# Patient Record
Sex: FEMALE | Race: WHITE | NOT HISPANIC OR LATINO | Employment: OTHER | URBAN - METROPOLITAN AREA
[De-identification: names, ages, dates, MRNs, and addresses within clinical notes are randomized per-mention and may not be internally consistent; named-entity substitution may affect disease eponyms.]

---

## 2017-06-01 DIAGNOSIS — M16.9 OSTEOARTHRITIS OF HIP, UNSPECIFIED LATERALITY, UNSPECIFIED OSTEOARTHRITIS TYPE: ICD-10-CM

## 2017-06-01 DIAGNOSIS — I10 ESSENTIAL HYPERTENSION: ICD-10-CM

## 2017-06-02 NOTE — TELEPHONE ENCOUNTER
lisinopril (PRINIVIL,ZESTRIL) 10 MG tablet,celecoxib (CELEBREX) 200 MG capsule         Last Written Prescription Date:  6/01/2016  Last Fill Quantity: 90,   # refills: 3  Last Office Visit with G, P or [facility] prescribing provider: 12/05/2016  Future Office visit:    Next 5 appointments (look out 90 days)     Jun 21, 2017  9:30 AM CDT   PHYSICAL with Arun Tao MD   [facility]    [address] [place] [address] [id number] [phone]                   Routing refill request to provider for review/approval because:  Medication is reported/discontinued

## 2017-06-06 RX ORDER — LISINOPRIL 10 MG/1
TABLET ORAL
Qty: 90 TABLET | Refills: 0 | Status: SHIPPED | OUTPATIENT
Start: 2017-06-06 | End: 2017-06-21

## 2017-06-06 RX ORDER — CELECOXIB 200 MG/1
CAPSULE ORAL
Qty: 90 CAPSULE | Refills: 0 | Status: SHIPPED | OUTPATIENT
Start: 2017-06-06 | End: 2017-06-21

## 2017-06-21 ENCOUNTER — RADIANT APPOINTMENT (OUTPATIENT)
Dept: MAMMOGRAPHY | Facility: CLINIC | Age: 72
End: 2017-06-21
Attending: INTERNAL MEDICINE
Payer: COMMERCIAL

## 2017-06-21 ENCOUNTER — OFFICE VISIT (OUTPATIENT)
Dept: INTERNAL MEDICINE | Facility: CLINIC | Age: 72
End: 2017-06-21
Payer: COMMERCIAL

## 2017-06-21 VITALS
TEMPERATURE: 98.6 F | DIASTOLIC BLOOD PRESSURE: 80 MMHG | HEART RATE: 74 BPM | HEIGHT: 67 IN | OXYGEN SATURATION: 96 % | BODY MASS INDEX: 39.44 KG/M2 | WEIGHT: 251.3 LBS | SYSTOLIC BLOOD PRESSURE: 128 MMHG

## 2017-06-21 DIAGNOSIS — Z96.642 S/P HIP REPLACEMENT, LEFT: ICD-10-CM

## 2017-06-21 DIAGNOSIS — G89.4 CHRONIC PAIN SYNDROME: ICD-10-CM

## 2017-06-21 DIAGNOSIS — M16.9 OSTEOARTHRITIS OF HIP, UNSPECIFIED LATERALITY, UNSPECIFIED OSTEOARTHRITIS TYPE: ICD-10-CM

## 2017-06-21 DIAGNOSIS — Z23 ENCOUNTER FOR IMMUNIZATION: ICD-10-CM

## 2017-06-21 DIAGNOSIS — I10 ESSENTIAL HYPERTENSION: ICD-10-CM

## 2017-06-21 DIAGNOSIS — Z00.00 MEDICARE ANNUAL WELLNESS VISIT, SUBSEQUENT: Primary | ICD-10-CM

## 2017-06-21 DIAGNOSIS — Z12.11 SCREENING FOR COLON CANCER: ICD-10-CM

## 2017-06-21 DIAGNOSIS — Z12.31 VISIT FOR SCREENING MAMMOGRAM: ICD-10-CM

## 2017-06-21 LAB
ALBUMIN SERPL-MCNC: 4 G/DL (ref 3.4–5)
ALP SERPL-CCNC: 75 U/L (ref 40–150)
ALT SERPL W P-5'-P-CCNC: 37 U/L (ref 0–50)
ANION GAP SERPL CALCULATED.3IONS-SCNC: 8 MMOL/L (ref 3–14)
AST SERPL W P-5'-P-CCNC: 22 U/L (ref 0–45)
BILIRUB SERPL-MCNC: 0.4 MG/DL (ref 0.2–1.3)
BUN SERPL-MCNC: 20 MG/DL (ref 7–30)
CALCIUM SERPL-MCNC: 9.3 MG/DL (ref 8.5–10.1)
CHLORIDE SERPL-SCNC: 106 MMOL/L (ref 94–109)
CHOLEST SERPL-MCNC: 252 MG/DL
CO2 SERPL-SCNC: 26 MMOL/L (ref 20–32)
CREAT SERPL-MCNC: 0.76 MG/DL (ref 0.52–1.04)
GFR SERPL CREATININE-BSD FRML MDRD: 75 ML/MIN/1.7M2
GLUCOSE SERPL-MCNC: 106 MG/DL (ref 70–99)
HDLC SERPL-MCNC: 66 MG/DL
LDLC SERPL CALC-MCNC: 164 MG/DL
NONHDLC SERPL-MCNC: 186 MG/DL
POTASSIUM SERPL-SCNC: 4.3 MMOL/L (ref 3.4–5.3)
PROT SERPL-MCNC: 7.7 G/DL (ref 6.8–8.8)
SODIUM SERPL-SCNC: 140 MMOL/L (ref 133–144)
TRIGL SERPL-MCNC: 112 MG/DL

## 2017-06-21 PROCEDURE — 90471 IMMUNIZATION ADMIN: CPT | Performed by: INTERNAL MEDICINE

## 2017-06-21 PROCEDURE — 80053 COMPREHEN METABOLIC PANEL: CPT | Performed by: INTERNAL MEDICINE

## 2017-06-21 PROCEDURE — 99397 PER PM REEVAL EST PAT 65+ YR: CPT | Mod: 25 | Performed by: INTERNAL MEDICINE

## 2017-06-21 PROCEDURE — 90715 TDAP VACCINE 7 YRS/> IM: CPT | Performed by: INTERNAL MEDICINE

## 2017-06-21 PROCEDURE — 80061 LIPID PANEL: CPT | Performed by: INTERNAL MEDICINE

## 2017-06-21 PROCEDURE — 36415 COLL VENOUS BLD VENIPUNCTURE: CPT | Performed by: INTERNAL MEDICINE

## 2017-06-21 PROCEDURE — G0202 SCR MAMMO BI INCL CAD: HCPCS | Mod: TC

## 2017-06-21 RX ORDER — LISINOPRIL 10 MG/1
10 TABLET ORAL DAILY
Qty: 90 TABLET | Refills: 3 | Status: SHIPPED | OUTPATIENT
Start: 2017-06-21 | End: 2018-06-29

## 2017-06-21 RX ORDER — CELECOXIB 200 MG/1
200 CAPSULE ORAL DAILY
Qty: 90 CAPSULE | Refills: 3 | Status: SHIPPED | OUTPATIENT
Start: 2017-06-21 | End: 2018-06-29

## 2017-06-21 RX ORDER — TRAMADOL HYDROCHLORIDE 50 MG/1
50-100 TABLET ORAL DAILY PRN
Qty: 90 TABLET | Refills: 0 | Status: SHIPPED | OUTPATIENT
Start: 2017-06-21 | End: 2018-02-27

## 2017-06-21 NOTE — PROGRESS NOTES
SUBJECTIVE:                                                            Yohana Marques is a 71 year old female who presents for Preventive Visit.      Are you in the first 12 months of your Medicare Part B coverage?  No    Healthy Habits:    Do you get at least three servings of calcium containing foods daily (dairy, green leafy vegetables, etc.)? yes    Amount of exercise or daily activities, outside of work: As much as she can     Problems taking medications regularly No    Medication side effects: No    Have you had an eye exam in the past two years? No-will be getting one soon     Do you see a dentist twice per year? no    Do you have sleep apnea, excessive snoring or daytime drowsiness?no    COGNITIVE SCREEN  1) Repeat 3 items (Banana, Sunrise, Chair)    2) Clock draw: NORMAL  3) 3 item recall: Recalls 3 objects  Results: 3 items recalled: COGNITIVE IMPAIRMENT LESS LIKELY    Mini-CogTM Copyright JACLYN Jones. Licensed by the author for use in [facility]; reprinted with permission ([email or web address]). All rights reserved.                Reviewed and updated as needed this visit by clinical staff  Tobacco  Allergies         Reviewed and updated as needed this visit by Provider        Social History   Substance Use Topics     Smoking status: Never Smoker     Smokeless tobacco: Never Used     Alcohol use 0.0 oz/week     0 Standard drinks or equivalent per week      Comment: wine, not for last 3 days       The patient does not drink >3 drinks per day nor >7 drinks per week.    Today's PHQ-2 Score:   PHQ-2 ( 1999 Pfizer) 6/1/2016 6/1/2016   Q1: Little interest or pleasure in doing things 0 0   Q2: Feeling down, depressed or hopeless 0 0   PHQ-2 Score 0 0   Q1: Little interest or pleasure in doing things - -   Q2: Feeling down, depressed or hopeless - -   PHQ-2 Score - -       Do you feel safe in your environment - Yes    Do you have a Health Care Directive?: Yes: Advance Directive has been received and  "scanned.    Current providers sharing in care for this patient include:   Patient Care Team:  Arun Tao MD as PCP - General  Venessa Cheung RN as       Hearing impairment: No    Ability to successfully perform activities of daily living: Yes, no assistance needed     Fall risk:       Home safety:  none identified      The following health maintenance items are reviewed in Epic and correct as of today:  Health Maintenance   Topic Date Due     JOSE QUESTIONNAIRE 1 YEAR  1945     PHQ-9 Q1YR  1945     URINE DRUG SCREEN Q1 YR  07/07/1962     ADVANCE DIRECTIVE PLANNING Q5 YRS  04/04/2017     TETANUS Q10 YR  05/02/2017     FALL RISK ASSESSMENT  06/01/2017     MAMMO Q1 YR  06/01/2017     COLONOSCOPY Q10 YR  07/21/2017     INFLUENZA VACCINE (SYSTEM ASSIGNED)  09/01/2017     LIPID SCREEN Q5 YR FEMALE (SYSTEM ASSIGNED)  06/01/2021     DEXA SCAN SCREENING (SYSTEM ASSIGNED)  Completed     PNEUMOCOCCAL  Completed     HEPATITIS C SCREENING  Completed         Pneumonia Vaccine:Adults age 65+ who received Pneumovax (PPSV23) at 65 years or older: Should be given PCV13 > 1 year after their most recent PPSV23     Patient has recovered from her left total hip arthroplasty and her mobility has greatly improved.  She does continue on daily Celebrex, and uses tramadol very judiciously.  She last had a refill of 90 tablets approximately one year ago, which lasted her the entire year.  Requesting a refill today.     Continues on lisinopril monotherapy for hypertension.  Today's blood pressure noted.    ROS:  Constitutional, HEENT, cardiovascular, pulmonary, GI, , musculoskeletal, neuro, skin, endocrine and psych systems are negative, except as otherwise noted.      OBJECTIVE:                                                            /80 (BP Location: Left arm, Patient Position: Chair, Cuff Size: Adult Large)  Pulse 74  Temp 98.6  F (37  C) (Oral)  Ht 5' 7\" (1.702 m)  Wt 251 lb 4.8 oz (114 " "kg)  SpO2 96%  BMI 39.36 kg/m2 Estimated body mass index is 39.36 kg/(m^2) as calculated from the following:    Height as of this encounter: 5' 7\" (1.702 m).    Weight as of this encounter: 251 lb 4.8 oz (114 kg).  EXAM:   GENERAL: healthy, alert and no distress  NECK: no adenopathy, no asymmetry, masses, or scars and thyroid normal to palpation  RESP: lungs clear to auscultation - no rales, rhonchi or wheezes  CV: regular rate and rhythm, normal S1 S2, no S3 or S4, no murmur, click or rub, no peripheral edema and peripheral pulses strong  ABDOMEN: soft, nontender, no hepatosplenomegaly, no masses and bowel sounds normal  MS: no gross musculoskeletal defects noted, no edema    ASSESSMENT / PLAN:                                                            1. Medicare annual wellness visit, subsequent      2. Essential hypertension  Stable, reasonably well-controlled  - lisinopril (PRINIVIL/ZESTRIL) 10 MG tablet; Take 1 tablet (10 mg) by mouth daily  Dispense: 90 tablet; Refill: 3  - Lipid Profile with reflex to direct LDL  - Comprehensive metabolic panel (BMP + Alb, Alk Phos, ALT, AST, Total. Bili, TP)    3. Osteoarthritis of hip, unspecified laterality, unspecified osteoarthritis type    - celecoxib (CELEBREX) 200 MG capsule; Take 1 capsule (200 mg) by mouth daily  Dispense: 90 capsule; Refill: 3    4. S/P hip replacement, left    - traMADol (ULTRAM) 50 MG tablet; Take 1-2 tablets ( mg) by mouth daily as needed for pain  Dispense: 90 tablet; Refill: 0    5. Chronic pain syndrome    - traMADol (ULTRAM) 50 MG tablet; Take 1-2 tablets ( mg) by mouth daily as needed for pain  Dispense: 90 tablet; Refill: 0    6. Screening for colon cancer    - GASTROENTEROLOGY ADULT REF PROCEDURE ONLY    7. Encounter for immunization    - TDAP VACCINE (ADACEL)    End of Life Planning:  Patient currently has an advanced directive: No.  I have verified the patient's ablity to prepare an advanced directive/make health care " "decisions.  Literature was provided to assist patient in preparing an advanced directive.    COUNSELING:  Reviewed preventive health counseling, as reflected in patient instructions        Estimated body mass index is 39.36 kg/(m^2) as calculated from the following:    Height as of this encounter: 5' 7\" (1.702 m).    Weight as of this encounter: 251 lb 4.8 oz (114 kg).  Weight management plan: Discussed healthy diet and exercise guidelines and patient will follow up in 12 months in clinic to re-evaluate.   reports that she has never smoked. She has never used smokeless tobacco.      Appropriate preventive services were discussed with this patient, including applicable screening as appropriate for cardiovascular disease, diabetes, osteopenia/osteoporosis, and glaucoma.  As appropriate for age/gender, discussed screening for colorectal cancer, prostate cancer, breast cancer, and cervical cancer. Checklist reviewing preventive services available has been given to the patient.    Reviewed patients plan of care and provided an AVS. The Basic Care Plan (routine screening as documented in Health Maintenance) for Yohana meets the Care Plan requirement. This Care Plan has been established and reviewed with the Patient.    Counseling Resources:  ATP IV Guidelines  Pooled Cohorts Equation Calculator  Breast Cancer Risk Calculator  FRAX Risk Assessment  ICSI Preventive Guidelines  Dietary Guidelines for Americans, 2010  USDA's MyPlate  ASA Prophylaxis  Lung CA Screening    Arun Tao MD  [facility]  "

## 2017-06-21 NOTE — MR AVS SNAPSHOT
After Visit Summary   6/21/2017    Yohana Marques    MRN: [id number]           Patient Information     Date Of Birth          1945        Visit Information        Provider Department      6/21/2017 9:30 AM Arun Tao MD [facility]        Today's Diagnoses     Medicare annual wellness visit, subsequent    -  1    Essential hypertension        Osteoarthritis of hip, unspecified laterality, unspecified osteoarthritis type        S/P hip replacement, left        Chronic pain syndrome        Screening for colon cancer        Encounter for immunization          Care Instructions      Preventive Health Recommendations  Female Ages 65 +    Yearly exam:     See your health care provider every year in order to  o Review health changes.   o Discuss preventive care.    o Review your medicines if your doctor has prescribed any.      You no longer need a yearly Pap test unless you've had an abnormal Pap test in the past 10 years. If you have vaginal symptoms, such as bleeding or discharge, be sure to talk with your provider about a Pap test.      Every 1 to 2 years, have a mammogram.  If you are over 69, talk with your health care provider about whether or not you want to continue having screening mammograms.      Every 10 years, have a colonoscopy. Or, have a yearly FIT test (stool test). These exams will check for colon cancer.       Have a cholesterol test every 5 years, or more often if your doctor advises it.       Have a diabetes test (fasting glucose) every three years. If you are at risk for diabetes, you should have this test more often.       At age 65, have a bone density scan (DEXA) to check for osteoporosis (brittle bone disease).    Shots:    Get a flu shot each year.    Get a tetanus shot every 10 years.    Talk to your doctor about your pneumonia vaccines. There are now two you should receive - Pneumovax (PPSV 23) and Prevnar (PCV 13).    Talk to your  doctor about the shingles vaccine.    Talk to your doctor about the hepatitis B vaccine.    Nutrition:     Eat at least 5 servings of fruits and vegetables each day.      Eat whole-grain bread, whole-wheat pasta and brown rice instead of white grains and rice.      Talk to your provider about Calcium and Vitamin D.     Lifestyle    Exercise at least 150 minutes a week (30 minutes a day, 5 days a week). This will help you control your weight and prevent disease.      Limit alcohol to one drink per day.      No smoking.       Wear sunscreen to prevent skin cancer.       See your dentist twice a year for an exam and cleaning.      See your eye doctor every 1 to 2 years to screen for conditions such as glaucoma, macular degeneration, cataracts, etc           Follow-ups after your visit        Additional Services     GASTROENTEROLOGY ADULT REF PROCEDURE ONLY       Last Lab Result: Creatinine (mg/dL)       Date                     Value                 12/19/2016               0.66             ----------  Body mass index is 39.36 kg/(m^2).     Needed:  No  Language:  English    Patient will be contacted to schedule procedure.     Please be aware that coverage of these services is subject to the terms and limitations of your health insurance plan.  Call member services at your health plan with any benefit or coverage questions.  Any procedures must be performed at a [place] facility OR coordinated by your clinic's referral office.    Please bring the following with you to your appointment:    (1) Any X-Rays, CTs or MRIs which have been performed.  Contact the facility where they were done to arrange for  prior to your scheduled appointment.    (2) List of current medications   (3) This referral request   (4) Any documents/labs given to you for this referral                  Your next 10 appointments already scheduled     Jun 21, 2017  1:15 PM CDT   MA SCREENING DIGITAL BILATERAL with OXMA1   [place]  [facility] ([facility])    [address] [id number] [phone]           Do not use any powder, lotion or deodorant under your arms or on your breast. If you do, we will ask you to remove it before your exam.  Wear comfortable, two-piece clothing.  If you have any allergies, tell your care team.  Bring any previous mammograms from other facilities or have them mailed to the breast center.              Who to contact     If you have questions or need follow up information about today's clinic visit or your schedule please contact [facility] directly at [phone].  Normal or non-critical lab and imaging results will be communicated to you by Omadahart, letter or phone within 4 business days after the clinic has received the results. If you do not hear from us within 7 days, please contact the clinic through Omadahart or phone. If you have a critical or abnormal lab result, we will notify you by phone as soon as possible.  Submit refill requests through [employer] or call your pharmacy and they will forward the refill request to us. Please allow 3 business days for your refill to be completed.          Additional Information About Your Visit        CommercialTribe Information     [employer] gives you secure access to your electronic health record. If you see a primary care provider, you can also send messages to your care team and make appointments. If you have questions, please call your primary care clinic.  If you do not have a primary care provider, please call [phone] and they will assist you.        Care EveryWhere ID     This is your Care EveryWhere ID. This could be used by other organizations to access your [facility] medical records  [id number]        Your Vitals Were     Pulse Temperature Height Pulse Oximetry BMI (Body Mass Index)       74 98.6  F (37  C) (Oral) 5' 7\" (1.702 m) 96% 39.36 kg/m2        Blood Pressure from Last " 3 Encounters:   06/21/17 128/80   12/20/16 102/60   12/20/16 91/52    Weight from Last 3 Encounters:   06/21/17 251 lb 4.8 oz (114 kg)   12/20/16 251 lb 6.4 oz (114 kg)   12/20/16 251 lb 6.4 oz (114 kg)              We Performed the Following     Comprehensive metabolic panel (BMP + Alb, Alk Phos, ALT, AST, Total. Bili, TP)     GASTROENTEROLOGY ADULT REF PROCEDURE ONLY     Lipid Profile with reflex to direct LDL     TDAP VACCINE (ADACEL)          Today's Medication Changes          These changes are accurate as of: 6/21/17  9:57 AM.  If you have any questions, ask your nurse or doctor.               These medicines have changed or have updated prescriptions.        Dose/Directions    celecoxib 200 MG capsule   Commonly known as:  celeBREX   This may have changed:  See the new instructions.   Used for:  Osteoarthritis of hip, unspecified laterality, unspecified osteoarthritis type   Changed by:  Arun Tao MD        Dose:  200 mg   Take 1 capsule (200 mg) by mouth daily   Quantity:  90 capsule   Refills:  3       lisinopril 10 MG tablet   Commonly known as:  PRINIVIL/ZESTRIL   This may have changed:  See the new instructions.   Used for:  Essential hypertension   Changed by:  Arun Tao MD        Dose:  10 mg   Take 1 tablet (10 mg) by mouth daily   Quantity:  90 tablet   Refills:  3       * TRAMADOL HCL PO   This may have changed:  Another medication with the same name was added. Make sure you understand how and when to take each.   Changed by:  Arun Tao MD        Dose:  50 mg   Take 50 mg by mouth   Refills:  0       * traMADol 50 MG tablet   Commonly known as:  ULTRAM   This may have changed:  You were already taking a medication with the same name, and this prescription was added. Make sure you understand how and when to take each.   Used for:  S/P hip replacement, left, Chronic pain syndrome   Changed by:  Arun Tao MD        Dose:   mg   Take 1-2 tablets (  mg) by mouth daily as needed for pain   Quantity:  90 tablet   Refills:  0       * Notice:  This list has 2 medication(s) that are the same as other medications prescribed for you. Read the directions carefully, and ask your doctor or other care provider to review them with you.      Stop taking these medicines if you haven't already. Please contact your care team if you have questions.     ACETAMINOPHEN PO   Stopped by:  Arun Tao MD           eucerin cream   Stopped by:  Arun Tao MD           OXYCODONE HCL PO   Stopped by:  Arun Tao MD                Where to get your medicines      These medications were sent to [facility] [address] [place], [place] - [address] [facility] [address] [facility] [facility] [address]     Phone:  [phone]     celecoxib 200 MG capsule    lisinopril 10 MG tablet         Some of these will need a paper prescription and others can be bought over the counter.  Ask your nurse if you have questions.     Bring a paper prescription for each of these medications     traMADol 50 MG tablet                Primary Care Provider Office Phone # Fax #    Arun Tao [phone]       [facility] [address]  [facility] [address]        Equal Access to Services     DARREN SILVERIO AH: Hadii kelsie ku hadasho Soomaali, waaxda luqadaha, qaybta kaalmada adeegyada, waxay idiin hayelizabethn alysia paiz. So [facility] [phone].    ATENCIÓN: Si habla español, tiene a haq disposición servicios gratuitos de asistencia lingüística. Llame al [phone].    We comply with applicable federal civil rights laws and [place] laws. We do not discriminate on the basis of race, color, national origin, age, disability sex, sexual orientation or gender identity.            Thank you!     Thank you for choosing [facility]  for your care. Our goal is always to provide you with excellent care.  Hearing back from our patients is one way we can continue to improve our services. Please take a few minutes to complete the written survey that you may receive in the mail after your visit with us. Thank you!             Your Updated Medication List - Protect others around you: Learn how to safely use, store and throw away your medicines at www.disposemymeds.org.          This list is accurate as of: 6/21/17  9:57 AM.  Always use your most recent med list.                   Brand Name Dispense Instructions for use Diagnosis    ASPIRIN EC PO      Take 81 mg by mouth daily        calcium-vitamin D 600-400 MG-UNIT per tablet    CALTRATE     Take 1 tablet by mouth 2 times daily        celecoxib 200 MG capsule    celeBREX    90 capsule    Take 1 capsule (200 mg) by mouth daily    Osteoarthritis of hip, unspecified laterality, unspecified osteoarthritis type       CENTRUM SILVER per tablet      Take 1 tablet by mouth daily        lisinopril 10 MG tablet    PRINIVIL/ZESTRIL    90 tablet    Take 1 tablet (10 mg) by mouth daily    Essential hypertension       * TRAMADOL HCL PO      Take 50 mg by mouth        * traMADol 50 MG tablet    ULTRAM    90 tablet    Take 1-2 tablets ( mg) by mouth daily as needed for pain    S/P hip replacement, left, Chronic pain syndrome       * Notice:  This list has 2 medication(s) that are the same as other medications prescribed for you. Read the directions carefully, and ask your doctor or other care provider to review them with you.

## 2017-06-21 NOTE — NURSING NOTE
"Chief Complaint   Patient presents with     Physical       Initial /80 (BP Location: Left arm, Patient Position: Chair, Cuff Size: Adult Large)  Pulse 74  Temp 98.6  F (37  C) (Oral)  Ht 5' 7\" (1.702 m)  Wt 251 lb 4.8 oz (114 kg)  SpO2 96%  BMI 39.36 kg/m2 Estimated body mass index is 39.36 kg/(m^2) as calculated from the following:    Height as of this encounter: 5' 7\" (1.702 m).    Weight as of this encounter: 251 lb 4.8 oz (114 kg).  Medication Reconciliation: complete    "

## 2017-07-20 ENCOUNTER — HOSPITAL ENCOUNTER (OUTPATIENT)
Facility: CLINIC | Age: 72
Discharge: HOME | End: 2017-07-20
Attending: INTERNAL MEDICINE | Admitting: INTERNAL MEDICINE
Payer: COMMERCIAL

## 2017-07-20 ENCOUNTER — SURGERY (OUTPATIENT)
Age: 72
End: 2017-07-20

## 2017-07-20 VITALS
OXYGEN SATURATION: 93 % | SYSTOLIC BLOOD PRESSURE: 117 MMHG | RESPIRATION RATE: 21 BRPM | DIASTOLIC BLOOD PRESSURE: 77 MMHG

## 2017-07-20 LAB — COLONOSCOPY: NORMAL

## 2017-07-20 PROCEDURE — 88305 TISSUE EXAM BY PATHOLOGIST: CPT | Performed by: INTERNAL MEDICINE

## 2017-07-20 PROCEDURE — 45380 COLONOSCOPY AND BIOPSY: CPT | Performed by: INTERNAL MEDICINE

## 2017-07-20 PROCEDURE — 88305 TISSUE EXAM BY PATHOLOGIST: CPT | Mod: 26 | Performed by: INTERNAL MEDICINE

## 2017-07-20 PROCEDURE — 25000128 H RX IP 250 OP 636: Performed by: INTERNAL MEDICINE

## 2017-07-20 PROCEDURE — G0500 MOD SEDAT ENDO SERVICE >5YRS: HCPCS | Performed by: INTERNAL MEDICINE

## 2017-07-20 RX ORDER — LIDOCAINE 40 MG/G
CREAM TOPICAL
Status: DISCONTINUED | OUTPATIENT
Start: 2017-07-20 | End: 2017-07-20 | Stop reason: HOSPADM

## 2017-07-20 RX ORDER — SODIUM CHLORIDE 9 MG/ML
INJECTION, SOLUTION INTRAVENOUS CONTINUOUS PRN
Status: DISCONTINUED | OUTPATIENT
Start: 2017-07-20 | End: 2017-07-20 | Stop reason: HOSPADM

## 2017-07-20 RX ORDER — FENTANYL CITRATE 50 UG/ML
INJECTION, SOLUTION INTRAMUSCULAR; INTRAVENOUS PRN
Status: DISCONTINUED | OUTPATIENT
Start: 2017-07-20 | End: 2017-07-20 | Stop reason: HOSPADM

## 2017-07-20 RX ORDER — ONDANSETRON 2 MG/ML
4 INJECTION INTRAMUSCULAR; INTRAVENOUS
Status: DISCONTINUED | OUTPATIENT
Start: 2017-07-20 | End: 2017-07-20 | Stop reason: HOSPADM

## 2017-07-20 RX ADMIN — MIDAZOLAM HYDROCHLORIDE 1 MG: 1 INJECTION, SOLUTION INTRAMUSCULAR; INTRAVENOUS at 07:47

## 2017-07-20 RX ADMIN — FENTANYL CITRATE 50 MCG: 50 INJECTION, SOLUTION INTRAMUSCULAR; INTRAVENOUS at 07:52

## 2017-07-20 RX ADMIN — MIDAZOLAM HYDROCHLORIDE 1 MG: 1 INJECTION, SOLUTION INTRAMUSCULAR; INTRAVENOUS at 07:45

## 2017-07-20 RX ADMIN — SODIUM CHLORIDE 500 ML: 9 INJECTION, SOLUTION INTRAVENOUS at 07:27

## 2017-07-20 RX ADMIN — FENTANYL CITRATE 100 MCG: 50 INJECTION, SOLUTION INTRAMUSCULAR; INTRAVENOUS at 07:42

## 2017-07-20 RX ADMIN — MIDAZOLAM HYDROCHLORIDE 1 MG: 1 INJECTION, SOLUTION INTRAMUSCULAR; INTRAVENOUS at 07:52

## 2017-07-20 RX ADMIN — MIDAZOLAM HYDROCHLORIDE 2 MG: 1 INJECTION, SOLUTION INTRAMUSCULAR; INTRAVENOUS at 07:43

## 2017-07-20 NOTE — H&P
[facility]  Pre-Endoscopy History and Physical     Yohana Marques MRN# [id number]   YOB: 1945 Age: 72 year old     Date of Procedure: 7/20/2017  Primary care provider: Arun Tao  Type of Endoscopy: colonoscopy  Reason for Procedure:  Screening colonoscopy  Type of Anesthesia Anticipated: Moderate Sedation    HPI:    Yohana is a 72 year old female who will be undergoing the above procedure.      A history and physical has been performed. The patient's medications and allergies have been reviewed. The risks and benefits of the procedure and the sedation options and risks were discussed with the patient.  All questions were answered and informed consent was obtained.      She denies a personal or family history of anesthesia complications or bleeding disorders.     Allergies   Allergen Reactions     No Known Drug Allergies         Prior to Admission Medications   Prescriptions Last Dose Informant Patient Reported? Taking?   ASPIRIN EC PO 7/19/2017 Self Yes Yes   Sig: Take 81 mg by mouth daily   Multiple Vitamins-Minerals (CENTRUM SILVER) per tablet 7/19/2017 Self Yes Yes   Sig: Take 1 tablet by mouth daily   TRAMADOL HCL PO Past Month  Yes Yes   Sig: Take 50 mg by mouth   calcium-vitamin D (CALTRATE) 600-400 MG-UNIT per tablet 7/19/2017 Self Yes Yes   Sig: Take 1 tablet by mouth 2 times daily   celecoxib (CELEBREX) 200 MG capsule 7/19/2017  No Yes   Sig: Take 1 capsule (200 mg) by mouth daily   lisinopril (PRINIVIL/ZESTRIL) 10 MG tablet 7/19/2017  No Yes   Sig: Take 1 tablet (10 mg) by mouth daily   traMADol (ULTRAM) 50 MG tablet   No No   Sig: Take 1-2 tablets ( mg) by mouth daily as needed for pain      Facility-Administered Medications: None       Patient Active Problem List   Diagnosis     HTN (hypertension)     Osteopenia     HYPERLIPIDEMIA LDL GOAL <160     ACP (advance care planning)     Health Care Home     Spinal stenosis, lumbar region, with neurogenic  claudication     Physical deconditioning     S/P lumbar laminectomy     Chronic pain syndrome     Hip joint replacement status     S/P hip replacement, left     Benign essential hypertension     Primary osteoarthritis of left hip        Past Medical History:   Diagnosis Date     HTN (hypertension)      Hyperlipidemia      Leiomyoma of uterus, unspecified      Numbness and tingling     spinal stenosis causes numbness and tingling on feet and knees bilaterally     Osteoarthrosis, unspecified whether generalized or localized, unspecified site      Other and unspecified disc disorder of lumbar region      Spinal stenosis     S/P diskectomy x 2, most recently 2014        Past Surgical History:   Procedure Laterality Date     ARTHROPLASTY HIP Left 2016    Procedure: ARTHROPLASTY HIP;  Surgeon: Leonid Morfin MD;  Location:  OR     BACK SURGERY       C NONSPECIFIC PROCEDURE  1996    lumbar discectomy     C NONSPECIFIC PROCEDURE      cervical cone biopsy     C NONSPECIFIC PROCEDURE      Left knee replacement     DISCECTOMY LUMBAR POSTERIOR MICROSCOPIC TWO LEVELS  2014    Procedure: DISCECTOMY LUMBAR POSTERIOR MICROSCOPIC TWO LEVELS;  Surgeon: Warren Herring MD;  Location:  OR     ORTHOPEDIC SURGERY         Social History   Substance Use Topics     Smoking status: Never Smoker     Smokeless tobacco: Never Used     Alcohol use 0.0 oz/week     0 Standard drinks or equivalent per week      Comment: social       Family History   Problem Relation Age of Onset     Family History Negative Mother       age 64 mva     CANCER Father       age 84 lung ca     Family History Negative Sister      Breast Cancer Paternal Grandmother      Genetic Disorder Other      daughter has Autoimmune disease       REVIEW OF SYSTEMS:     5 point ROS negative except as noted above in HPI, including Gen., Resp., CV, GI &  system review.      PHYSICAL EXAM:   /82  Resp 16  SpO2 97% Estimated body  "mass index is 39.36 kg/(m^2) as calculated from the following:    Height as of 6/21/17: 1.702 m (5' 7\").    Weight as of 6/21/17: 114 kg (251 lb 4.8 oz).   GENERAL APPEARANCE: healthy, alert and no distress  MENTAL STATUS: alert  AIRWAY EXAM: Mallampatti Class I (visualization of the soft palate, fauces, uvula, anterior and posterior pillars)  RESP: lungs clear to auscultation - no rales, rhonchi or wheezes  CV: normal S1 S2, no S3 or S4      DIAGNOSTICS:    Not indicated      IMPRESSION   ASA Class 1 - Healthy patient, no medical problems        PLAN:       Plan for colonoscopy. We discussed the risks, benefits and alternatives and the patient wished to proceed.    The above has been forwarded to the consulting provider.      Signed Electronically by: Catarino Salas MD  July 20, 2017  "

## 2017-07-24 LAB — COPATH REPORT: NORMAL

## 2018-02-27 DIAGNOSIS — Z96.642 S/P HIP REPLACEMENT, LEFT: ICD-10-CM

## 2018-02-27 DIAGNOSIS — G89.4 CHRONIC PAIN SYNDROME: ICD-10-CM

## 2018-02-27 NOTE — TELEPHONE ENCOUNTER
traMADol (ULTRAM) 50 MG tablet      Last Written Prescription Date:  6/21/2017  Last Fill Quantity: 90,   # refills: 0  Last Office Visit: 6/21/2017  Future Office visit:       Routing refill request to provider for review/approval because:  Drug not on the [facility], [facility] or [facility] refill protocol or controlled substance

## 2018-03-01 RX ORDER — TRAMADOL HYDROCHLORIDE 50 MG/1
TABLET ORAL
Qty: 90 TABLET | Refills: 0 | Status: SHIPPED | OUTPATIENT
Start: 2018-03-01 | End: 2018-06-29

## 2018-06-02 ENCOUNTER — OFFICE VISIT (OUTPATIENT)
Dept: URGENT CARE | Facility: URGENT CARE | Age: 73
End: 2018-06-02
Payer: COMMERCIAL

## 2018-06-02 VITALS
TEMPERATURE: 97.7 F | BODY MASS INDEX: 41.24 KG/M2 | SYSTOLIC BLOOD PRESSURE: 138 MMHG | WEIGHT: 263.3 LBS | RESPIRATION RATE: 20 BRPM | HEART RATE: 74 BPM | DIASTOLIC BLOOD PRESSURE: 84 MMHG

## 2018-06-02 DIAGNOSIS — S70.351A SUPERFICIAL FOREIGN BODY, RIGHT THIGH, INITIAL ENCOUNTER: ICD-10-CM

## 2018-06-02 DIAGNOSIS — S80.852A FOREIGN BODY IN LEFT LOWER EXTREMITY, INITIAL ENCOUNTER: Primary | ICD-10-CM
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
## 2018-06-02 PROCEDURE — 99213 OFFICE O/P EST LOW 20 MIN: CPT | Mod: 25 | Performed by: PHYSICIAN ASSISTANT

## 2018-06-02 PROCEDURE — 10120 INC&RMVL FB SUBQ TISS SMPL: CPT | Mod: 59 | Performed by: PHYSICIAN ASSISTANT

## 2018-06-02 RX ORDER — DOXYCYCLINE 100 MG/1
100 CAPSULE ORAL 2 TIMES DAILY
Qty: 28 CAPSULE | Refills: 0 | Status: SHIPPED | OUTPATIENT
Start: 2018-06-02 | End: 2018-06-16

## 2018-06-02 NOTE — PROGRESS NOTES
SUBJECTIVE:   Yohana Marques is a 72 year old female presenting with a chief complaint of having multiple tick bites with head parts left in the skin.  Onset of symptoms was 2 week(s) ago.  Course of illness is worsening.    Severity moderate  Current and Associated symptoms: noticed red rings around the tick bites  Treatment measures tried include none.  Predisposing factors include tick bites x2 with retained mouth parts.    Past Medical History:   Diagnosis Date     HTN (hypertension)      Hyperlipidemia      Leiomyoma of uterus, unspecified      Numbness and tingling     spinal stenosis causes numbness and tingling on feet and knees bilaterally     Osteoarthrosis, unspecified whether generalized or localized, unspecified site      Other and unspecified disc disorder of lumbar region      Spinal stenosis     S/P diskectomy x 2, most recently 8/2014        Allergies   Allergen Reactions     No Known Drug Allergies          Social History   Substance Use Topics     Smoking status: Never Smoker     Smokeless tobacco: Never Used     Alcohol use 0.0 oz/week     0 Standard drinks or equivalent per week      Comment: social       ROS:  CONSTITUTIONAL:NEGATIVE for fever, chills, change in weight  INTEGUMENTARY/SKIN: POSITIVE for erythematous rings around tick bites  ENT/MOUTH: NEGATIVE for ear, mouth and throat problems  RESP:NEGATIVE for significant cough or SOB  CV: NEGATIVE for chest pain, palpitations or peripheral edema  GI: NEGATIVE for nausea, abdominal pain, heartburn, or change in bowel habits  MUSCULOSKELETAL: Positive for tick bite in right thigh and one in left lower leg  NEURO: NEGATIVE for weakness, dizziness or paresthesias    OBJECTIVE  :/84  Pulse 74  Temp 97.7  F (36.5  C) (Oral)  Resp 20  Wt 263 lb 4.8 oz (119.4 kg)  BMI 41.24 kg/m2  GENERAL APPEARANCE: healthy, alert and no distress  RESP: lungs clear to auscultation - no rales, rhonchi or wheezes  CV: regular rates and rhythm, normal  S1 S2, no murmur noted  ABDOMEN:  soft, nontender, no HSM or masses and bowel sounds normal  Extremities: Positive for erythematous rings on both legs  MS: Positive for retained mouth parts in areas of both tick bites  NEURO: Normal strength and tone, sensory exam grossly normal,  normal speech and mentation  SKIN: Positive for erythema migrans x2    ASSESSMENT/PLAN:    ICD-10-CM    1. Foreign body in left lower extremity, initial encounter S80.852A REMOVE FOREIGN BODY SIMPLE   2. Superficial foreign body, right thigh, initial encounter S70.351A REMOVE FOREIGN BODY SIMPLE   3. Tick bite, initial encounter W57.XXXA doxycycline (VIBRAMYCIN) 100 MG capsule     Orders Placed This Encounter     REMOVE FOREIGN BODY SIMPLE     REMOVE FOREIGN BODY SIMPLE     doxycycline (VIBRAMYCIN) 100 MG capsule       PROCEDURES  #1  Area was prepped with alcohol, 1/2 cc 1% lidocaine with epi was infiltrated under the foreign body.  11 blade was used to make incisions to remove mouth parts from the skin.  Bandage was placed.  Area was left lower leg     #2 Area was prepped with alcohol, 1/2 cc 1% lidocaine with epi was infiltrated under the foreign body.  11 blade was used to make incisions to remove mouth parts from the skin.  Bandage was placed.  Area was right thigh

## 2018-06-02 NOTE — MR AVS SNAPSHOT
After Visit Summary   6/2/2018    Yohana Marques    MRN: [id number]           Patient Information     Date Of Birth          1945        Visit Information        Provider Department      6/2/2018 9:25 AM Naif Aceves PA-C [facility]        Today's Diagnoses     Foreign body in left lower extremity, initial encounter    -  1    Superficial foreign body, right thigh, initial encounter        Tick bite, initial encounter           Follow-ups after your visit        Your next 10 appointments already scheduled     Jun 29, 2018  2:30 PM CDT   [facility] ANNUAL MEDICARE WELLNESS with Arun Tao MD   [facility]    [address] [address] [phone]              Who to contact     If you have questions or need follow up information about today's clinic visit or your schedule please contact [facility] directly at [phone].  Normal or non-critical lab and imaging results will be communicated to you by IntegenXhart, letter or phone within 4 business days after the clinic has received the results. If you do not hear from us within 7 days, please contact the clinic through ChangePanda or phone. If you have a critical or abnormal lab result, we will notify you by phone as soon as possible.  Submit refill requests through ChangePanda or call your pharmacy and they will forward the refill request to us. Please allow 3 business days for your refill to be completed.          Additional Information About Your Visit        IntegenXhart Information     ChangePanda gives you secure access to your electronic health record. If you see a primary care provider, you can also send messages to your care team and make appointments. If you have questions, please call your primary care clinic.  If you do not have a primary care provider, please call [phone] and they will assist  you.        Care EveryWhere ID     This is your Care EveryWhere ID. This could be used by other organizations to access your [place] medical records  [id number]        Your Vitals Were     Pulse Temperature Respirations BMI (Body Mass Index)          74 97.7  F (36.5  C) (Oral) 20 41.24 kg/m2         Blood Pressure from Last 3 Encounters:   06/02/18 138/84   07/20/17 117/77   06/21/17 128/80    Weight from Last 3 Encounters:   06/02/18 263 lb 4.8 oz (119.4 kg)   06/21/17 251 lb 4.8 oz (114 kg)   12/20/16 251 lb 6.4 oz (114 kg)              We Performed the Following     REMOVE FOREIGN BODY SIMPLE     REMOVE FOREIGN BODY SIMPLE          Today's Medication Changes          These changes are accurate as of 6/2/18 10:02 AM.  If you have any questions, ask your nurse or doctor.               Start taking these medicines.        Dose/Directions    doxycycline 100 MG capsule   Commonly known as:  VIBRAMYCIN   Used for:  Tick bite, initial encounter   Started by:  Naif Aceves PA-C        Dose:  100 mg   Take 1 capsule (100 mg) by mouth 2 times daily for 14 days   Quantity:  28 capsule   Refills:  0            Where to get your medicines      These medications were sent to [facility] [place] [id number]     Phone:  [phone]     doxycycline 100 MG capsule                Primary Care Provider Office Phone # Fax #    Arun Tao [phone]       [address] [place] [address]        Equal Access to Services     DARREN SILVERIO AH: Hadii kelsie boldeno Solulu, waaxda luqadaha, qaybta kaalmada maría ching idiburt paiz. So [facility] [phone].    ATENCIÓN: Si habla español, tiene a haq disposición servicios gratuitos de asistencia lingüística. Llame al [phone].    We comply with applicable federal civil rights laws and [place] laws. We do not discriminate on the basis of race, color,  national origin, age, disability, sex, sexual orientation, or gender identity.            Thank you!     Thank you for choosing [facility]  for your care. Our goal is always to provide you with excellent care. Hearing back from our patients is one way we can continue to improve our services. Please take a few minutes to complete the written survey that you may receive in the mail after your visit with us. Thank you!             Your Updated Medication List - Protect others around you: Learn how to safely use, store and throw away your medicines at www.disposemymeds.org.          This list is accurate as of 6/2/18 10:02 AM.  Always use your most recent med list.                   Brand Name Dispense Instructions for use Diagnosis    ASPIRIN EC PO      Take 81 mg by mouth daily        calcium-vitamin D 600-400 MG-UNIT per tablet    CALTRATE     Take 1 tablet by mouth 2 times daily        celecoxib 200 MG capsule    celeBREX    90 capsule    Take 1 capsule (200 mg) by mouth daily    Osteoarthritis of hip, unspecified laterality, unspecified osteoarthritis type       CENTRUM SILVER per tablet      Take 1 tablet by mouth daily        doxycycline 100 MG capsule    VIBRAMYCIN    28 capsule    Take 1 capsule (100 mg) by mouth 2 times daily for 14 days    Tick bite, initial encounter       lisinopril 10 MG tablet    PRINIVIL/ZESTRIL    90 tablet    Take 1 tablet (10 mg) by mouth daily    Essential hypertension       * TRAMADOL HCL PO      Take 50 mg by mouth        * traMADol 50 MG tablet    ULTRAM    90 tablet    TAKE 1-2 TABLETS BY MOUTH DAILY AS NEEDED FOR PAIN    S/P hip replacement, left, Chronic pain syndrome       * Notice:  This list has 2 medication(s) that are the same as other medications prescribed for you. Read the directions carefully, and ask your doctor or other care provider to review them with you.

## 2018-06-29 ENCOUNTER — OFFICE VISIT (OUTPATIENT)
Dept: INTERNAL MEDICINE | Facility: CLINIC | Age: 73
End: 2018-06-29
Payer: COMMERCIAL

## 2018-06-29 VITALS
DIASTOLIC BLOOD PRESSURE: 80 MMHG | HEIGHT: 68 IN | SYSTOLIC BLOOD PRESSURE: 140 MMHG | WEIGHT: 261.1 LBS | TEMPERATURE: 98.1 F | BODY MASS INDEX: 39.57 KG/M2 | HEART RATE: 81 BPM | RESPIRATION RATE: 16 BRPM | OXYGEN SATURATION: 98 %

## 2018-06-29 DIAGNOSIS — Z96.642 S/P HIP REPLACEMENT, LEFT: ICD-10-CM

## 2018-06-29 DIAGNOSIS — I10 ESSENTIAL HYPERTENSION: ICD-10-CM

## 2018-06-29 DIAGNOSIS — Z00.00 MEDICARE ANNUAL WELLNESS VISIT, SUBSEQUENT: Primary | ICD-10-CM

## 2018-06-29 DIAGNOSIS — Z12.31 VISIT FOR SCREENING MAMMOGRAM: ICD-10-CM

## 2018-06-29 DIAGNOSIS — M16.9 OSTEOARTHRITIS OF HIP, UNSPECIFIED LATERALITY, UNSPECIFIED OSTEOARTHRITIS TYPE: ICD-10-CM

## 2018-06-29 DIAGNOSIS — E66.01 MORBID OBESITY (H): ICD-10-CM

## 2018-06-29 DIAGNOSIS — G89.4 CHRONIC PAIN SYNDROME: ICD-10-CM

## 2018-06-29 PROCEDURE — 80061 LIPID PANEL: CPT | Performed by: INTERNAL MEDICINE

## 2018-06-29 PROCEDURE — G0439 PPPS, SUBSEQ VISIT: HCPCS | Performed by: INTERNAL MEDICINE

## 2018-06-29 PROCEDURE — 80053 COMPREHEN METABOLIC PANEL: CPT | Performed by: INTERNAL MEDICINE

## 2018-06-29 PROCEDURE — 36415 COLL VENOUS BLD VENIPUNCTURE: CPT | Performed by: INTERNAL MEDICINE

## 2018-06-29 RX ORDER — CELECOXIB 200 MG/1
200 CAPSULE ORAL DAILY
Qty: 90 CAPSULE | Refills: 3 | Status: SHIPPED | OUTPATIENT
Start: 2018-06-29 | End: 2019-07-03

## 2018-06-29 RX ORDER — LISINOPRIL 10 MG/1
10 TABLET ORAL DAILY
Qty: 90 TABLET | Refills: 3 | Status: SHIPPED | OUTPATIENT
Start: 2018-06-29 | End: 2019-07-03

## 2018-06-29 RX ORDER — TRAMADOL HYDROCHLORIDE 50 MG/1
50-100 TABLET ORAL DAILY PRN
Qty: 90 TABLET | Refills: 0 | Status: SHIPPED | OUTPATIENT
Start: 2018-06-29 | End: 2018-11-20

## 2018-06-29 ASSESSMENT — ANXIETY QUESTIONNAIRES
IF YOU CHECKED OFF ANY PROBLEMS ON THIS QUESTIONNAIRE, HOW DIFFICULT HAVE THESE PROBLEMS MADE IT FOR YOU TO DO YOUR WORK, TAKE CARE OF THINGS AT HOME, OR GET ALONG WITH OTHER PEOPLE: NOT DIFFICULT AT ALL
1. FEELING NERVOUS, ANXIOUS, OR ON EDGE: NOT AT ALL
7. FEELING AFRAID AS IF SOMETHING AWFUL MIGHT HAPPEN: NOT AT ALL
GAD7 TOTAL SCORE: 3
2. NOT BEING ABLE TO STOP OR CONTROL WORRYING: NOT AT ALL
6. BECOMING EASILY ANNOYED OR IRRITABLE: NOT AT ALL
5. BEING SO RESTLESS THAT IT IS HARD TO SIT STILL: SEVERAL DAYS
3. WORRYING TOO MUCH ABOUT DIFFERENT THINGS: SEVERAL DAYS

## 2018-06-29 ASSESSMENT — PATIENT HEALTH QUESTIONNAIRE - PHQ9: 5. POOR APPETITE OR OVEREATING: SEVERAL DAYS

## 2018-06-29 NOTE — MR AVS SNAPSHOT
After Visit Summary   6/29/2018    Yohana Marques    MRN: [id number]           Patient Information     Date Of Birth          1945        Visit Information        Provider Department      6/29/2018 2:30 PM Arun Tao MD [facility]        Today's Diagnoses     Medicare annual wellness visit, subsequent    -  1    Visit for screening mammogram        Osteoarthritis of hip, unspecified laterality, unspecified osteoarthritis type        Essential hypertension        S/P hip replacement, left        Chronic pain syndrome        Morbid obesity (H)          Care Instructions      Preventive Health Recommendations    Female Ages 65 +    Yearly exam:     See your health care provider every year in order to  o Review health changes.   o Discuss preventive care.    o Review your medicines if your doctor has prescribed any.      You no longer need a yearly Pap test unless you've had an abnormal Pap test in the past 10 years. If you have vaginal symptoms, such as bleeding or discharge, be sure to talk with your provider about a Pap test.      Every 1 to 2 years, have a mammogram.  If you are over 69, talk with your health care provider about whether or not you want to continue having screening mammograms.      Every 10 years, have a colonoscopy. Or, have a yearly FIT test (stool test). These exams will check for colon cancer.       Have a cholesterol test every 5 years, or more often if your doctor advises it.       Have a diabetes test (fasting glucose) every three years. If you are at risk for diabetes, you should have this test more often.       At age 65, have a bone density scan (DEXA) to check for osteoporosis (brittle bone disease).    Shots:    Get a flu shot each year.    Get a tetanus shot every 10 years.    Talk to your doctor about your pneumonia vaccines. There are now two you should receive - Pneumovax (PPSV 23) and Prevnar (PCV 13).    Talk to your  pharmacist about the shingles vaccine.    Talk to your doctor about the hepatitis B vaccine.    Nutrition:     Eat at least 5 servings of fruits and vegetables each day.      Eat whole-grain bread, whole-wheat pasta and brown rice instead of white grains and rice.      Get adequate Calcium and Vitamin D.     Lifestyle    Exercise at least 150 minutes a week (30 minutes a day, 5 days a week). This will help you control your weight and prevent disease.      Limit alcohol to one drink per day.      No smoking.       Wear sunscreen to prevent skin cancer.       See your dentist twice a year for an exam and cleaning.      See your eye doctor every 1 to 2 years to screen for conditions such as glaucoma, macular degeneration and cataracts.          Follow-ups after your visit        Who to contact     If you have questions or need follow up information about today's clinic visit or your schedule please contact [facility] directly at [phone].  Normal or non-critical lab and imaging results will be communicated to you by MyChart, letter or phone within 4 business days after the clinic has received the results. If you do not hear from us within 7 days, please contact the clinic through Space-Time Insightt or phone. If you have a critical or abnormal lab result, we will notify you by phone as soon as possible.  Submit refill requests through [employer] or call your pharmacy and they will forward the refill request to us. Please allow 3 business days for your refill to be completed.          Additional Information About Your Visit        ViacorharBNI Video Information     [employer] gives you secure access to your electronic health record. If you see a primary care provider, you can also send messages to your care team and make appointments. If you have questions, please call your primary care clinic.  If you do not have a primary care provider, please call [phone] and they will assist you.        Care EveryWhere ID      "This is your Care EveryWhere ID. This could be used by other organizations to access your [place] medical records  [id number]        Your Vitals Were     Pulse Temperature Respirations Height Pulse Oximetry BMI (Body Mass Index)    81 98.1  F (36.7  C) (Oral) 16 5' 8\" (1.727 m) 98% 39.7 kg/m2       Blood Pressure from Last 3 Encounters:   06/29/18 140/80   06/02/18 138/84   07/20/17 117/77    Weight from Last 3 Encounters:   06/29/18 261 lb 1.6 oz (118.4 kg)   06/02/18 263 lb 4.8 oz (119.4 kg)   06/21/17 251 lb 4.8 oz (114 kg)              We Performed the Following     Comprehensive metabolic panel (BMP + Alb, Alk Phos, ALT, AST, Total. Bili, TP)     Lipid panel reflex to direct LDL Fasting          Today's Medication Changes          These changes are accurate as of 6/29/18 11:59 PM.  If you have any questions, ask your nurse or doctor.               These medicines have changed or have updated prescriptions.        Dose/Directions    traMADol 50 MG tablet   Commonly known as:  ULTRAM   This may have changed:    - See the new instructions.  - Another medication with the same name was removed. Continue taking this medication, and follow the directions you see here.   Used for:  S/P hip replacement, left, Chronic pain syndrome   Changed by:  Arun Tao MD        Dose:   mg   Take 1-2 tablets ( mg) by mouth daily as needed for severe pain   Quantity:  90 tablet   Refills:  0            Where to get your medicines      These medications were sent to [id number] - [place], [place] - [address], [facility] [address]     Phone:  [phone]     celecoxib 200 MG capsule    lisinopril 10 MG tablet         Some of these will need a paper prescription and others can be bought over the counter.  Ask your nurse if you have questions.     Bring a paper prescription for each of these medications     traMADol 50 MG tablet             "   Information about OPIOIDS     PRESCRIPTION OPIOIDS: WHAT YOU NEED TO KNOW   We gave you an opioid (narcotic) pain medicine. It is important to manage your pain, but opioids are not always the best choice. You should first try all the other options your care team gave you. Take this medicine for as short a time (and as few doses) as possible.     These medicines have risks:    DO NOT drive when on new or higher doses of pain medicine. These medicines can affect your alertness and reaction times, and you could be arrested for driving under the influence (DUI). If you need to use opioids long-term, talk to your care team about driving.    DO NOT operate heave machinery    DO NOT do any other dangerous activities while taking these medicines.     DO NOT drink any alcohol while taking these medicines.      If the opioid prescribed includes acetaminophen, DO NOT take with any other medicines that contain acetaminophen. Read all labels carefully. Look for the word  acetaminophen  or  Tylenol.  Ask your pharmacist if you have questions or are unsure.    You can get addicted to pain medicines, especially if you have a history of addiction (chemical, alcohol or substance dependence). Talk to your care team about ways to reduce this risk.    Store your pills in a secure place, locked if possible. We will not replace any lost or stolen medicine. If you don t finish your medicine, please throw away (dispose) as directed by your pharmacist. The [place] Pollution Control Agency has more information about safe disposal: https://www.pca.[facility].[place].us/living-green/managing-unwanted-medications.     All opioids tend to cause constipation. Drink plenty of water and eat foods that have a lot of fiber, such as fruits, vegetables, prune juice, apple juice and high-fiber cereal. Take a laxative (Miralax, milk of magnesia, Colace, Senna) if you don t move your bowels at least every other day.          Primary Care Provider Office Phone #  Fax #    Arun Tao [phone] [address] [address]        Equal Access to Services     DARREN SILVERIO : Hadii aad ku hadstephanie Casaslulu, walulda luqmarlenha, qanadirta kataoda jeane, maría gregoryparadise lozadajyoti reyes laKunalsheron paiz. So [facility] [phone].    ATENCIÓN: Si habla español, tiene a haq disposición servicios gratuitos de asistencia lingüística. Llame al [phone].    We comply with applicable federal civil rights laws and [place] laws. We do not discriminate on the basis of race, color, national origin, age, disability, sex, sexual orientation, or gender identity.            Thank you!     Thank you for choosing [facility]  for your care. Our goal is always to provide you with excellent care. Hearing back from our patients is one way we can continue to improve our services. Please take a few minutes to complete the written survey that you may receive in the mail after your visit with us. Thank you!             Your Updated Medication List - Protect others around you: Learn how to safely use, store and throw away your medicines at www.disposemymeds.org.          This list is accurate as of 6/29/18 11:59 PM.  Always use your most recent med list.                   Brand Name Dispense Instructions for use Diagnosis    ASPIRIN EC PO      Take 81 mg by mouth daily        calcium-vitamin D 600-400 MG-UNIT per tablet    CALTRATE     Take 1 tablet by mouth 2 times daily        celecoxib 200 MG capsule    celeBREX    90 capsule    Take 1 capsule (200 mg) by mouth daily    Osteoarthritis of hip, unspecified laterality, unspecified osteoarthritis type       CENTRUM SILVER per tablet      Take 1 tablet by mouth daily        lisinopril 10 MG tablet    PRINIVIL/ZESTRIL    90 tablet    Take 1 tablet (10 mg) by mouth daily    Essential hypertension       traMADol 50 MG tablet    ULTRAM    90 tablet    Take 1-2 tablets ( mg) by mouth daily as needed  for severe pain    S/P hip replacement, left, Chronic pain syndrome

## 2018-06-29 NOTE — PROGRESS NOTES
SUBJECTIVE:   Yohana Marques is a 72 year old female who presents for Preventive Visit.      Are you in the first 12 months of your Medicare Part B coverage?  No    Healthy Habits:    Do you get at least three servings of calcium containing foods daily (dairy, green leafy vegetables, etc.)? yes    Amount of exercise or daily activities, outside of work: Activities of daily living is for exercise due to stenosis    Problems taking medications regularly No    Medication side effects: No    Have you had an eye exam in the past two years? yes    Do you see a dentist twice per year? no    Do you have sleep apnea, excessive snoring or daytime drowsiness?no      Ability to successfully perform activities of daily living: Yes, no assistance needed    Home safety:  none identified     Hearing impairment: No    Fall risk:  Fallen 2 or more times in the past year?: No  Any fall with injury in the past year?: No        COGNITIVE SCREEN  1) Repeat 3 items (Leader, Season, Table)    2) Clock draw: NORMAL  3) 3 item recall: Recalls 3 objects  Results: 3 items recalled: COGNITIVE IMPAIRMENT LESS LIKELY    Mini-CogTM Copyright JACLYN Jones. Licensed by the author for use in [facility]; reprinted with permission (solaura@[facility]). All rights reserved.                Reviewed and updated as needed this visit by clinical staff  Tobacco  Allergies  Meds  Problems         Reviewed and updated as needed this visit by Provider  Allergies  Meds  Problems        Social History   Substance Use Topics     Smoking status: Never Smoker     Smokeless tobacco: Never Used     Alcohol use 0.0 oz/week     0 Standard drinks or equivalent per week      Comment: social       If you drink alcohol do you typically have >3 drinks per day or >7 drinks per week? No                        Today's PHQ-2 Score:   PHQ-2 ( 1999 [employer]) 6/21/2017 6/1/2016   Q1: Little interest or pleasure in doing things 0 0   Q2: Feeling down, depressed or  "hopeless 0 0   PHQ-2 Score 0 0   Q1: Little interest or pleasure in doing things - -   Q2: Feeling down, depressed or hopeless - -   PHQ-2 Score - -       Do you feel safe in your environment - Yes    Do you have a Health Care Directive?: Yes: Advance Directive has been received and scanned.    Current providers sharing in care for this patient include:   Patient Care Team:  Arun Tao MD as PCP - General  Venessa Cheung RN as     The following health maintenance items are reviewed in Epic and correct as of today:  Health Maintenance   Topic Date Due     URINE DRUG SCREEN Q1 YR  07/07/1962     ADVANCE DIRECTIVE PLANNING Q5 YRS  04/04/2017     INFLUENZA VACCINE (1) 09/01/2018     FALL RISK ASSESSMENT  06/29/2019     JOSE QUESTIONNAIRE 1 YEAR  06/29/2019     PHQ-9 Q1YR  06/29/2019     MAMMO Q1 YR  07/02/2019     LIPID SCREEN Q5 YR FEMALE (SYSTEM ASSIGNED)  06/29/2023     TETANUS Q10 YR  06/21/2027     COLONOSCOPY Q10 YR  07/20/2027     DEXA SCAN SCREENING (SYSTEM ASSIGNED)  Completed     PNEUMOCOCCAL  Completed     HEPATITIS C SCREENING  Completed             ROS:  Constitutional, HEENT, cardiovascular, pulmonary, GI, , musculoskeletal, neuro, skin, endocrine and psych systems are negative, except as otherwise noted.    OBJECTIVE:   /80  Pulse 81  Temp 98.1  F (36.7  C) (Oral)  Resp 16  Ht 5' 8\" (1.727 m)  Wt 261 lb 1.6 oz (118.4 kg)  SpO2 98%  BMI 39.7 kg/m2 Estimated body mass index is 39.7 kg/(m^2) as calculated from the following:    Height as of this encounter: 5' 8\" (1.727 m).    Weight as of this encounter: 261 lb 1.6 oz (118.4 kg).  EXAM:   GENERAL: healthy, alert and no distress  NECK: no adenopathy, no asymmetry, masses, or scars and thyroid normal to palpation  RESP: lungs clear to auscultation - no rales, rhonchi or wheezes  CV: regular rate and rhythm, normal S1 S2, no S3 or S4, no murmur, click or rub, no peripheral edema and peripheral pulses strong  ABDOMEN: " "soft, nontender, no hepatosplenomegaly, no masses and bowel sounds normal  MS: no gross musculoskeletal defects noted, no edema        ASSESSMENT / PLAN:   1. Medicare annual wellness visit, subsequent      2. Visit for screening mammogram      3. Osteoarthritis of hip, unspecified laterality, unspecified osteoarthritis type    - celecoxib (CELEBREX) 200 MG capsule; Take 1 capsule (200 mg) by mouth daily  Dispense: 90 capsule; Refill: 3    4. Essential hypertension    - lisinopril (PRINIVIL/ZESTRIL) 10 MG tablet; Take 1 tablet (10 mg) by mouth daily  Dispense: 90 tablet; Refill: 3  - Lipid panel reflex to direct LDL Fasting  - Comprehensive metabolic panel (BMP + Alb, Alk Phos, ALT, AST, Total. Bili, TP)    5. S/P hip replacement, left    - traMADol (ULTRAM) 50 MG tablet; Take 1-2 tablets ( mg) by mouth daily as needed for severe pain  Dispense: 90 tablet; Refill: 0    6. Chronic pain syndrome    - traMADol (ULTRAM) 50 MG tablet; Take 1-2 tablets ( mg) by mouth daily as needed for severe pain  Dispense: 90 tablet; Refill: 0    7. Morbid obesity (H)        End of Life Planning:  Patient currently has an advanced directive: No.  I have verified the patient's ablity to prepare an advanced directive/make health care decisions.  Literature was provided to assist patient in preparing an advanced directive.    COUNSELING:  Reviewed preventive health counseling, as reflected in patient instructions    BP Readings from Last 1 Encounters:   06/29/18 140/80     Estimated body mass index is 39.7 kg/(m^2) as calculated from the following:    Height as of this encounter: 5' 8\" (1.727 m).    Weight as of this encounter: 261 lb 1.6 oz (118.4 kg).      Weight management plan: Discussed healthy diet and exercise guidelines and patient will follow up in 12 months in clinic to re-evaluate.     reports that she has never smoked. She has never used smokeless tobacco.      Appropriate preventive services were discussed with " this patient, including applicable screening as appropriate for cardiovascular disease, diabetes, osteopenia/osteoporosis, and glaucoma.  As appropriate for age/gender, discussed screening for colorectal cancer, prostate cancer, breast cancer, and cervical cancer. Checklist reviewing preventive services available has been given to the patient.    Reviewed patients plan of care and provided an AVS. The Basic Care Plan (routine screening as documented in Health Maintenance) for Yohana meets the Care Plan requirement. This Care Plan has been established and reviewed with the Patient.    Counseling Resources:  ATP IV Guidelines  Pooled Cohorts Equation Calculator  Breast Cancer Risk Calculator  FRAX Risk Assessment  ICSI Preventive Guidelines  Dietary Guidelines for Americans, 2010  USDA's MyPlate  ASA Prophylaxis  Lung CA Screening    Arun Tao MD  [facility]

## 2018-06-30 LAB
ALBUMIN SERPL-MCNC: 4.2 G/DL (ref 3.4–5)
ALP SERPL-CCNC: 64 U/L (ref 40–150)
ALT SERPL W P-5'-P-CCNC: 62 U/L (ref 0–50)
ANION GAP SERPL CALCULATED.3IONS-SCNC: 10 MMOL/L (ref 3–14)
AST SERPL W P-5'-P-CCNC: 44 U/L (ref 0–45)
BILIRUB SERPL-MCNC: 0.5 MG/DL (ref 0.2–1.3)
BUN SERPL-MCNC: 16 MG/DL (ref 7–30)
CALCIUM SERPL-MCNC: 9.3 MG/DL (ref 8.5–10.1)
CHLORIDE SERPL-SCNC: 106 MMOL/L (ref 94–109)
CHOLEST SERPL-MCNC: 216 MG/DL
CO2 SERPL-SCNC: 24 MMOL/L (ref 20–32)
CREAT SERPL-MCNC: 0.7 MG/DL (ref 0.52–1.04)
GFR SERPL CREATININE-BSD FRML MDRD: 83 ML/MIN/1.7M2
GLUCOSE SERPL-MCNC: 89 MG/DL (ref 70–99)
HDLC SERPL-MCNC: 56 MG/DL
LDLC SERPL CALC-MCNC: 124 MG/DL
NONHDLC SERPL-MCNC: 160 MG/DL
POTASSIUM SERPL-SCNC: 4 MMOL/L (ref 3.4–5.3)
PROT SERPL-MCNC: 8.1 G/DL (ref 6.8–8.8)
SODIUM SERPL-SCNC: 140 MMOL/L (ref 133–144)
TRIGL SERPL-MCNC: 182 MG/DL

## 2018-06-30 ASSESSMENT — ANXIETY QUESTIONNAIRES: GAD7 TOTAL SCORE: 3

## 2018-06-30 ASSESSMENT — PATIENT HEALTH QUESTIONNAIRE - PHQ9: SUM OF ALL RESPONSES TO PHQ QUESTIONS 1-9: 4

## 2018-07-02 ENCOUNTER — RADIANT APPOINTMENT (OUTPATIENT)
Dept: MAMMOGRAPHY | Facility: CLINIC | Age: 73
End: 2018-07-02
Attending: INTERNAL MEDICINE
Payer: COMMERCIAL

## 2018-07-02 DIAGNOSIS — Z12.31 VISIT FOR SCREENING MAMMOGRAM: ICD-10-CM

## 2018-07-02 PROCEDURE — 77067 SCR MAMMO BI INCL CAD: CPT | Mod: TC

## 2018-08-14 ENCOUNTER — OFFICE VISIT (OUTPATIENT)
Dept: DERMATOLOGY | Facility: CLINIC | Age: 73
End: 2018-08-14
Payer: COMMERCIAL

## 2018-08-14 VITALS — SYSTOLIC BLOOD PRESSURE: 139 MMHG | DIASTOLIC BLOOD PRESSURE: 75 MMHG | HEART RATE: 78 BPM | OXYGEN SATURATION: 93 %

## 2018-08-14 DIAGNOSIS — D48.5 NEOPLASM OF UNCERTAIN BEHAVIOR OF SKIN: Primary | ICD-10-CM

## 2018-08-14 DIAGNOSIS — B07.8 PERIUNGUAL WART: ICD-10-CM

## 2018-08-14 PROCEDURE — 11100 HC BIOPSY SKIN/SUBQ/MUC MEM, SINGLE LESION: CPT | Performed by: PHYSICIAN ASSISTANT

## 2018-08-14 PROCEDURE — 99213 OFFICE O/P EST LOW 20 MIN: CPT | Mod: 25 | Performed by: PHYSICIAN ASSISTANT

## 2018-08-14 PROCEDURE — 88305 TISSUE EXAM BY PATHOLOGIST: CPT | Mod: TC | Performed by: PHYSICIAN ASSISTANT

## 2018-08-14 NOTE — PROGRESS NOTES
HPI:   Yohana Marques is a 73 year old female who presents for evaluation of a spot on the chin  chief complaint  Location: inferior chin   Condition present for:  Awhile - seems to be growing.   Previous treatments include: none    Review Of Systems  Eyes: negative  Ears/Nose/Throat: negative  Respiratory: No shortness of breath, dyspnea on exertion, cough, or hemoptysis  Cardiovascular: negative  Gastrointestinal: negative  Genitourinary: negative  Musculoskeletal: negative  Neurologic: negative  Psychiatric: negative        PHYSICAL EXAM:    /75  Pulse 78  SpO2 93%  Breastfeeding? No  Skin exam performed as follows: Type 2 skin. Mood appropriate  Alert and Oriented X 3. Well developed, well nourished in no distress.  General appearance: Normal  Head including face: Normal  Eyes: conjunctiva and lids: Normal  Mouth: Lips, teeth, gums: Normal  Neck: Normal  Chest-breast/axillae: Normal  Back: Normal  Spleen and liver: Normal  Cardiovascular: Exam of peripheral vascular system by observation for swelling, varicosities, edema: Normal  Genitalia: groin, buttocks: Normal  Extremities: digits/nails (clubbing): Normal  Eccrine and Apocrine glands: Normal  Right upper extremity: Normal  Left upper extremity: Normal  Right lower extremity: Normal  Left lower extremity: Normal  Skin: Scalp and body hair: See below    1. 6 mm verrucous papule on the inferior chin  2. Verrucous plaque on right 2nd finger    ASSESSMENT/PLAN:     1. Wart r/o other on the inferior chin. Shave bx in typical fashion .  Area cleaned with betadyne and anesthetized with 1% lidocaine with epi .  Dermablade used to remove the lesion and sent to pathology. Bleeding was cauterized. Pt tolerated procedure well.  2. Periungual wart on the right 2nd digit - would like to start with OTC treatments.           Follow-up: PRN  CC:   Scribed By: Loraine Solomon, MS, PA-C

## 2018-08-14 NOTE — MR AVS SNAPSHOT
After Visit Summary   8/14/2018    Yohana Marques    MRN: [id number]           Patient Information     Date Of Birth          1945        Visit Information        Provider Department      8/14/2018 9:00 AM Loraine Solomon PA-C [facility]        Today's Diagnoses     Neoplasm of uncertain behavior of skin    -  1      Care Instructions          Wound Care Instructions     FOR SUPERFICIAL WOUNDS     [facility] [phone]    [facility] [phone]                       AFTER 24 HOURS YOU SHOULD REMOVE THE BANDAGE AND BEGIN DAILY DRESSING CHANGES AS FOLLOWS:     1) Remove Dressing.     2) Clean and dry the area with tap water using a Q-tip or sterile gauze pad.     3) Apply Vaseline, Aquaphor, Polysporin ointment or Bacitracin ointment over entire wound.  Do NOT use Neosporin ointment.     4) Cover the wound with a band-aid, or a sterile non-stick gauze pad and micropore paper tape      REPEAT THESE INSTRUCTIONS AT LEAST ONCE A DAY UNTIL THE WOUND HAS COMPLETELY HEALED.    It is an old wives tale that a wound heals better when it is exposed to air and allowed to dry out. The wound will heal faster with a better cosmetic result if it is kept moist with ointment and covered with a bandage.    **Do not let the wound dry out.**      Supplies Needed:      *Cotton tipped applicators (Q-tips)    *Polysporin Ointment or Bacitracin Ointment (NOT NEOSPORIN)    *Band-aids or non-stick gauze pads and micropore paper tape.      PATIENT INFORMATION:    During the healing process you will notice a number of changes. All wounds develop a small halo of redness surrounding the wound.  This means healing is occurring. Severe itching with extensive redness usually indicates sensitivity to the ointment or bandage tape used to dress the wound.  You should call our office if this develops.      Swelling  and/or discoloration around your surgical site is common,  particularly when performed around the eye.    All wounds normally drain.  The larger the wound the more drainage there will be.  After 7-10 days, you will notice the wound beginning to shrink and new skin will begin to grow.  The wound is healed when you can see skin has formed over the entire area.  A healed wound has a healthy, shiny look to the surface and is red to dark pink in color to normalize.  Wounds may take approximately 4-6 weeks to heal.  Larger wounds may take 6-8 weeks.  After the wound is healed you may discontinue dressing changes.    You may experience a sensation of tightness as your wound heals. This is normal and will gradually subside.    Your healed wound may be sensitive to temperature changes. This sensitivity improves with time, but if you re having a lot of discomfort, try to avoid temperature extremes.    Patients frequently experience itching after their wound appears to have healed because of the continue healing under the skin.  Plain Vaseline will help relieve the itching.        POSSIBLE COMPLICATIONS    BLEEDIN. Leave the bandage in place.  2. Use tightly rolled up gauze or a cloth to apply direct pressure over the bandage for 30  minutes.  3. Reapply pressure for an additional 30 minutes if necessary  4. Use additional gauze and tape to maintain pressure once the bleeding has stopped.          Follow-ups after your visit        Who to contact     If you have questions or need follow up information about today's clinic visit or your schedule please contact [facility] directly at [phone].  Normal or non-critical lab and imaging results will be communicated to you by MyChart, letter or phone within 4 business days after the clinic has received the results. If you do not hear from us within 7 days, please contact the clinic through MyChart or phone. If you have a critical or abnormal lab result, we will notify you by phone as soon as  possible.  Submit refill requests through [employer] or call your pharmacy and they will forward the refill request to us. Please allow 3 business days for your refill to be completed.          Additional Information About Your Visit        [employer] Information     [employer] gives you secure access to your electronic health record. If you see a primary care provider, you can also send messages to your care team and make appointments. If you have questions, please call your primary care clinic.  If you do not have a primary care provider, please call [phone] and they will assist you.        Care EveryWhere ID     This is your Care EveryWhere ID. This could be used by other organizations to access your [facility] medical records  [id number]        Your Vitals Were     Pulse Pulse Oximetry Breastfeeding?             78 93% No          Blood Pressure from Last 3 Encounters:   08/14/18 139/75   06/29/18 140/80   06/02/18 138/84    Weight from Last 3 Encounters:   06/29/18 118.4 kg (261 lb 1.6 oz)   06/02/18 119.4 kg (263 lb 4.8 oz)   06/21/17 114 kg (251 lb 4.8 oz)              We Performed the Following     BIOPSY SKIN/SUBQ/MUC MEM, SINGLE LESION     Dermatological path order and indications        Primary Care Provider Office Phone # Fax #    Arun Tao [phone]       [address] [place] [address]        Equal Access to Services     DARREN SILVERIO : Hadii aad ku hadasho Soomaali, waaxda luqadaha, qaybta kaalmada adejyotiyada, maría paiz. So [facility] [phone].    ATENCIÓN: Si habla español, tiene a haq disposición servicios gratuitos de asistencia lingüística. Candace al [phone].    We comply with applicable federal civil rights laws and [place] laws. We do not discriminate on the basis of race, color, national origin, age, disability, sex, sexual orientation, or gender identity.            Thank you!     Thank you for choosing [facility]  [facility]  for your care. Our goal is always to provide you with excellent care. Hearing back from our patients is one way we can continue to improve our services. Please take a few minutes to complete the written survey that you may receive in the mail after your visit with us. Thank you!             Your Updated Medication List - Protect others around you: Learn how to safely use, store and throw away your medicines at www.disposemymeds.org.          This list is accurate as of 8/14/18  9:12 AM.  Always use your most recent med list.                   Brand Name Dispense Instructions for use Diagnosis    ASPIRIN EC PO      Take 81 mg by mouth daily        calcium-vitamin D 600-400 MG-UNIT per tablet    CALTRATE     Take 1 tablet by mouth 2 times daily        celecoxib 200 MG capsule    celeBREX    90 capsule    Take 1 capsule (200 mg) by mouth daily    Osteoarthritis of hip, unspecified laterality, unspecified osteoarthritis type       CENTRUM SILVER per tablet      Take 1 tablet by mouth daily        lisinopril 10 MG tablet    PRINIVIL/ZESTRIL    90 tablet    Take 1 tablet (10 mg) by mouth daily    Essential hypertension       traMADol 50 MG tablet    ULTRAM    90 tablet    Take 1-2 tablets ( mg) by mouth daily as needed for severe pain    S/P hip replacement, left, Chronic pain syndrome

## 2018-08-14 NOTE — PATIENT INSTRUCTIONS
Wound Care Instructions     FOR SUPERFICIAL WOUNDS     [facility] [phone]    [facility] [phone]                       AFTER 24 HOURS YOU SHOULD REMOVE THE BANDAGE AND BEGIN DAILY DRESSING CHANGES AS FOLLOWS:     1) Remove Dressing.     2) Clean and dry the area with tap water using a Q-tip or sterile gauze pad.     3) Apply Vaseline, Aquaphor, Polysporin ointment or Bacitracin ointment over entire wound.  Do NOT use Neosporin ointment.     4) Cover the wound with a band-aid, or a sterile non-stick gauze pad and micropore paper tape      REPEAT THESE INSTRUCTIONS AT LEAST ONCE A DAY UNTIL THE WOUND HAS COMPLETELY HEALED.    It is an old wives tale that a wound heals better when it is exposed to air and allowed to dry out. The wound will heal faster with a better cosmetic result if it is kept moist with ointment and covered with a bandage.    **Do not let the wound dry out.**      Supplies Needed:      *Cotton tipped applicators (Q-tips)    *Polysporin Ointment or Bacitracin Ointment (NOT NEOSPORIN)    *Band-aids or non-stick gauze pads and micropore paper tape.      PATIENT INFORMATION:    During the healing process you will notice a number of changes. All wounds develop a small halo of redness surrounding the wound.  This means healing is occurring. Severe itching with extensive redness usually indicates sensitivity to the ointment or bandage tape used to dress the wound.  You should call our office if this develops.      Swelling  and/or discoloration around your surgical site is common, particularly when performed around the eye.    All wounds normally drain.  The larger the wound the more drainage there will be.  After 7-10 days, you will notice the wound beginning to shrink and new skin will begin to grow.  The wound is healed when you can see skin has formed over the entire area.  A healed wound has a healthy, shiny look to the surface and is red to dark pink in color  to normalize.  Wounds may take approximately 4-6 weeks to heal.  Larger wounds may take 6-8 weeks.  After the wound is healed you may discontinue dressing changes.    You may experience a sensation of tightness as your wound heals. This is normal and will gradually subside.    Your healed wound may be sensitive to temperature changes. This sensitivity improves with time, but if you re having a lot of discomfort, try to avoid temperature extremes.    Patients frequently experience itching after their wound appears to have healed because of the continue healing under the skin.  Plain Vaseline will help relieve the itching.        POSSIBLE COMPLICATIONS    BLEEDIN. Leave the bandage in place.  2. Use tightly rolled up gauze or a cloth to apply direct pressure over the bandage for 30  minutes.  3. Reapply pressure for an additional 30 minutes if necessary  4. Use additional gauze and tape to maintain pressure once the bleeding has stopped.

## 2018-08-14 NOTE — LETTER
8/14/2018         RE: Yohana Marques  [address] [address] [place] [id number]        Dear Colleague,    Thank you for referring your patient, Yohana Marques, to the [facility]. Please see a copy of my visit note below.    HPI:   Yohana Marques is a 73 year old female who presents for evaluation of a spot on the chin  chief complaint  Location: inferior chin   Condition present for:  Awhile - seems to be growing.   Previous treatments include: none    Review Of Systems  Eyes: negative  Ears/Nose/Throat: negative  Respiratory: No shortness of breath, dyspnea on exertion, cough, or hemoptysis  Cardiovascular: negative  Gastrointestinal: negative  Genitourinary: negative  Musculoskeletal: negative  Neurologic: negative  Psychiatric: negative        PHYSICAL EXAM:    /75  Pulse 78  SpO2 93%  Breastfeeding? No  Skin exam performed as follows: Type 2 skin. Mood appropriate  Alert and Oriented X 3. Well developed, well nourished in no distress.  General appearance: Normal  Head including face: Normal  Eyes: conjunctiva and lids: Normal  Mouth: Lips, teeth, gums: Normal  Neck: Normal  Chest-breast/axillae: Normal  Back: Normal  Spleen and liver: Normal  Cardiovascular: Exam of peripheral vascular system by observation for swelling, varicosities, edema: Normal  Genitalia: groin, buttocks: Normal  Extremities: digits/nails (clubbing): Normal  Eccrine and Apocrine glands: Normal  Right upper extremity: Normal  Left upper extremity: Normal  Right lower extremity: Normal  Left lower extremity: Normal  Skin: Scalp and body hair: See below    1. 6 mm verrucous papule on the inferior chin  2. Verrucous plaque on right 2nd finger    ASSESSMENT/PLAN:     1. Wart r/o other on the inferior chin. Shave bx in typical fashion .  Area cleaned with betadyne and anesthetized with 1% lidocaine with epi .  Dermablade used to remove the lesion and sent to pathology. Bleeding was cauterized. Pt  tolerated procedure well.  2. Periungual wart on the right 2nd digit - would like to start with OTC treatments.           Follow-up: PRN  CC:   Scribed By: Loraine Solomon MS, CHERRI      Again, thank you for allowing me to participate in the care of your patient.        Sincerely,        Loraine Solomon PA-C

## 2018-08-20 LAB — COPATH REPORT: NORMAL

## 2018-11-20 DIAGNOSIS — G89.4 CHRONIC PAIN SYNDROME: ICD-10-CM

## 2018-11-20 DIAGNOSIS — Z96.642 S/P HIP REPLACEMENT, LEFT: ICD-10-CM

## 2018-11-20 RX ORDER — TRAMADOL HYDROCHLORIDE 50 MG/1
TABLET ORAL
Qty: 90 TABLET | Refills: 0 | Status: SHIPPED | OUTPATIENT
Start: 2018-11-20 | End: 2019-05-14

## 2018-11-20 NOTE — TELEPHONE ENCOUNTER
Controlled Substance Refill Request for tramadol    Last refill: 6/29/18    Last clinic visit: 6/29/18     Clinic visit frequency required: Q 6  months  Next appt:     Controlled substance agreement on file: Yes:  Date see chart.    Documentation in problem list reviewed:  Yes    Processing:  mail to specified pharmacy     RX [place] ([place] reviewed: when reviewed  no medications showed up on the patients file.    [place] profile:  [email or web address]

## 2018-11-20 NOTE — TELEPHONE ENCOUNTER
Approved, at [facility].    Patient informed Additional Views of Left Breast along with US are needed. She will call [facility] Patient Scheduling.   Ryan Mckeon, 01/16/18, 2:36 PM

## 2019-05-14 DIAGNOSIS — Z96.642 S/P HIP REPLACEMENT, LEFT: ICD-10-CM

## 2019-05-14 DIAGNOSIS — G89.4 CHRONIC PAIN SYNDROME: ICD-10-CM

## 2019-05-15 RX ORDER — TRAMADOL HYDROCHLORIDE 50 MG/1
TABLET ORAL
Qty: 90 TABLET | Refills: 0 | Status: SHIPPED | OUTPATIENT
Start: 2019-05-15 | End: 2019-05-24

## 2019-05-15 NOTE — TELEPHONE ENCOUNTER
Requested Prescriptions   Pending Prescriptions Disp Refills     traMADol (ULTRAM) 50 MG tablet [Pharmacy Med Name: TRAMADOL 50MG TABLETS] 90 tablet 0     Sig: TAKE 1-2 TABLETS BY MOUTH DAILY AS NEEDED FOR SEVERE PAIN       There is no refill protocol information for this order        Last Written Prescription Date:  11/20/18  Last Fill Quantity: 90,  # refills: 0   Last office visit: 6/29/2018 with prescribing provider:  6/29/18   Future Office Visit:

## 2019-05-16 ENCOUNTER — TELEPHONE (OUTPATIENT)
Dept: INTERNAL MEDICINE | Facility: CLINIC | Age: 74
End: 2019-05-16

## 2019-05-16 DIAGNOSIS — Z96.642 S/P HIP REPLACEMENT, LEFT: ICD-10-CM

## 2019-05-16 DIAGNOSIS — G89.4 CHRONIC PAIN SYNDROME: ICD-10-CM

## 2019-05-16 NOTE — TELEPHONE ENCOUNTER
Prior Authorization Retail Medication Request    Medication/DosetraMADol (ULTRAM) 50 MG tablet:   ICD code (if different than what is on RX):  Z96.642  Previously Tried and Failed:    Rationale:      Insurance Name:  [facility]  Insurance ID:  [id number]  Please call plan at [phone]    Pharmacy Information (if different than what is on RX)  Name:  [facility]  Phone:  [phone]

## 2019-05-20 NOTE — TELEPHONE ENCOUNTER
PA Initiation    Medication: traMADol (ULTRAM) 50 MG tablet  Insurance Company: [employer] - Phone [phone] Fax [phone]  Pharmacy Filling the Rx: [id number] - [place], [place] -  [address]  Filling Pharmacy Phone: [phone]  Filling Pharmacy Fax:    Start Date: 5/20/2019    Central Prior Authorization Team   Phone: [phone]        Awaiting additional questions via CMM

## 2019-05-21 NOTE — TELEPHONE ENCOUNTER
Pharmacy is in need of new script showing that pt can get a 7 day supply at a time due to insurance guidelines. Please see message below. (hopefully I am reading this correctly). Samantha Mitchell on 5/21/2019 at 10:29 AM

## 2019-05-21 NOTE — TELEPHONE ENCOUNTER
Prior Authorization Not Needed per Insurance    Medication: traMADol (ULTRAM) 50 MG tablet  Insurance Company: [employer] - Phone [phone] Fax [phone]  Pharmacy Filling the Rx: [facility] [address] [place] [address] - [address]  Pharmacy Notified: Yes    Called [facility] to check make sure medication did go through patient's insurance, per pharmacist they did sell patient a weeks worth on 05/17/19, this plan has the limit of only 7 days first fill for all opioids, pharmacy would need a new script as the remainder of the script is void. Routing back to clinic

## 2019-05-24 RX ORDER — TRAMADOL HYDROCHLORIDE 50 MG/1
TABLET ORAL
Qty: 90 TABLET | Refills: 0 | Status: SHIPPED | OUTPATIENT
Start: 2019-05-24 | End: 2019-09-13

## 2019-07-03 ENCOUNTER — OFFICE VISIT (OUTPATIENT)
Dept: INTERNAL MEDICINE | Facility: CLINIC | Age: 74
End: 2019-07-03
Payer: COMMERCIAL

## 2019-07-03 VITALS
WEIGHT: 248.2 LBS | TEMPERATURE: 98.2 F | DIASTOLIC BLOOD PRESSURE: 78 MMHG | OXYGEN SATURATION: 98 % | RESPIRATION RATE: 16 BRPM | BODY MASS INDEX: 37.62 KG/M2 | HEART RATE: 80 BPM | HEIGHT: 68 IN | SYSTOLIC BLOOD PRESSURE: 136 MMHG

## 2019-07-03 DIAGNOSIS — E78.5 HYPERLIPIDEMIA LDL GOAL <160: ICD-10-CM

## 2019-07-03 DIAGNOSIS — Z12.31 VISIT FOR SCREENING MAMMOGRAM: ICD-10-CM

## 2019-07-03 DIAGNOSIS — M16.9 OSTEOARTHRITIS OF HIP, UNSPECIFIED LATERALITY, UNSPECIFIED OSTEOARTHRITIS TYPE: ICD-10-CM

## 2019-07-03 DIAGNOSIS — E66.01 MORBID OBESITY (H): ICD-10-CM

## 2019-07-03 DIAGNOSIS — I10 ESSENTIAL HYPERTENSION: ICD-10-CM

## 2019-07-03 DIAGNOSIS — Z00.00 MEDICARE ANNUAL WELLNESS VISIT, SUBSEQUENT: Primary | ICD-10-CM

## 2019-07-03 LAB
ALBUMIN SERPL-MCNC: 4 G/DL (ref 3.4–5)
ALP SERPL-CCNC: 68 U/L (ref 40–150)
ALT SERPL W P-5'-P-CCNC: 48 U/L (ref 0–50)
ANION GAP SERPL CALCULATED.3IONS-SCNC: 5 MMOL/L (ref 3–14)
AST SERPL W P-5'-P-CCNC: 31 U/L (ref 0–45)
BILIRUB SERPL-MCNC: 0.5 MG/DL (ref 0.2–1.3)
BUN SERPL-MCNC: 13 MG/DL (ref 7–30)
CALCIUM SERPL-MCNC: 9.2 MG/DL (ref 8.5–10.1)
CHLORIDE SERPL-SCNC: 104 MMOL/L (ref 94–109)
CHOLEST SERPL-MCNC: 213 MG/DL
CO2 SERPL-SCNC: 28 MMOL/L (ref 20–32)
CREAT SERPL-MCNC: 0.72 MG/DL (ref 0.52–1.04)
GFR SERPL CREATININE-BSD FRML MDRD: 84 ML/MIN/{1.73_M2}
GLUCOSE SERPL-MCNC: 103 MG/DL (ref 70–99)
HDLC SERPL-MCNC: 64 MG/DL
LDLC SERPL CALC-MCNC: 118 MG/DL
NONHDLC SERPL-MCNC: 149 MG/DL
POTASSIUM SERPL-SCNC: 4.2 MMOL/L (ref 3.4–5.3)
PROT SERPL-MCNC: 7.6 G/DL (ref 6.8–8.8)
SODIUM SERPL-SCNC: 137 MMOL/L (ref 133–144)
TRIGL SERPL-MCNC: 156 MG/DL

## 2019-07-03 PROCEDURE — 99397 PER PM REEVAL EST PAT 65+ YR: CPT | Performed by: INTERNAL MEDICINE

## 2019-07-03 PROCEDURE — 77067 SCR MAMMO BI INCL CAD: CPT | Mod: TC

## 2019-07-03 PROCEDURE — 80053 COMPREHEN METABOLIC PANEL: CPT | Performed by: INTERNAL MEDICINE

## 2019-07-03 PROCEDURE — 36415 COLL VENOUS BLD VENIPUNCTURE: CPT | Performed by: INTERNAL MEDICINE

## 2019-07-03 PROCEDURE — 99214 OFFICE O/P EST MOD 30 MIN: CPT | Mod: 25 | Performed by: INTERNAL MEDICINE

## 2019-07-03 PROCEDURE — 80061 LIPID PANEL: CPT | Performed by: INTERNAL MEDICINE

## 2019-07-03 RX ORDER — CELECOXIB 200 MG/1
200 CAPSULE ORAL DAILY
Qty: 90 CAPSULE | Refills: 3 | Status: SHIPPED | OUTPATIENT
Start: 2019-07-03 | End: 2020-09-11

## 2019-07-03 RX ORDER — LISINOPRIL 10 MG/1
10 TABLET ORAL DAILY
Qty: 90 TABLET | Refills: 3 | Status: SHIPPED | OUTPATIENT
Start: 2019-07-03 | End: 2020-09-11

## 2019-07-03 ASSESSMENT — PATIENT HEALTH QUESTIONNAIRE - PHQ9
SUM OF ALL RESPONSES TO PHQ QUESTIONS 1-9: 0
5. POOR APPETITE OR OVEREATING: NOT AT ALL

## 2019-07-03 ASSESSMENT — ANXIETY QUESTIONNAIRES
5. BEING SO RESTLESS THAT IT IS HARD TO SIT STILL: NOT AT ALL
1. FEELING NERVOUS, ANXIOUS, OR ON EDGE: NOT AT ALL
3. WORRYING TOO MUCH ABOUT DIFFERENT THINGS: NOT AT ALL
IF YOU CHECKED OFF ANY PROBLEMS ON THIS QUESTIONNAIRE, HOW DIFFICULT HAVE THESE PROBLEMS MADE IT FOR YOU TO DO YOUR WORK, TAKE CARE OF THINGS AT HOME, OR GET ALONG WITH OTHER PEOPLE: NOT DIFFICULT AT ALL
GAD7 TOTAL SCORE: 0
6. BECOMING EASILY ANNOYED OR IRRITABLE: NOT AT ALL
2. NOT BEING ABLE TO STOP OR CONTROL WORRYING: NOT AT ALL
7. FEELING AFRAID AS IF SOMETHING AWFUL MIGHT HAPPEN: NOT AT ALL

## 2019-07-03 ASSESSMENT — ACTIVITIES OF DAILY LIVING (ADL): CURRENT_FUNCTION: NO ASSISTANCE NEEDED

## 2019-07-03 ASSESSMENT — MIFFLIN-ST. JEOR: SCORE: 1689.33

## 2019-07-03 NOTE — PROGRESS NOTES
"SUBJECTIVE:   Yohana Marques is a 73 year old female who presents for Preventive Visit.      Are you in the first 12 months of your Medicare coverage?  No    Healthy Habits:     In general, how would you rate your overall health?  Good    Frequency of exercise:  None    Do you usually eat at least 4 servings of fruit and vegetables a day, include whole grains    & fiber and avoid regularly eating high fat or \"junk\" foods?  Yes    Taking medications regularly:  Yes    Medication side effects:  None    Ability to successfully perform activities of daily living:  No assistance needed    Home Safety:  No safety concerns identified    Hearing Impairment:  No hearing concerns    In the past 6 months, have you been bothered by leaking of urine? Yes    In general, how would you rate your overall mental or emotional health?  Good      PHQ-2 Total Score: 0    Additional concerns today:  No  Question 1.  In the last 12 months: We worried food would run out before we had money to buy more. Never True    Question 2.  In the last 12 months: The food we bought just didn't last and we didn't have money to buy more. Never True    Did the patient answer Sometimes True or Often True to EITHER Question 1 or Question 2? No      Do you feel safe in your environment? Yes    Do you have a Health Care Directive? No: Advance care planning reviewed with patient; information given to patient to review.      Fall risk  Fallen 2 or more times in the past year?: No  Any fall with injury in the past year?: No    Cognitive Screening   1) Repeat 3 items (Leader, Season, Table)    2) Clock draw: NORMAL  3) 3 item recall: Recalls 3 objects  Results: 3 items recalled: COGNITIVE IMPAIRMENT LESS LIKELY    Mini-CogTM Copyright JACLYN Jones. Licensed by the author for use in [facility]; reprinted with permission ([email or web address].[email or web address]). All rights reserved.      Do you have sleep apnea, excessive snoring or daytime drowsiness?: no    Reviewed and " updated as needed this visit by clinical staff  Tobacco  Allergies  Meds  Problems  Med Hx  Surg Hx  Fam Hx         Reviewed and updated as needed this visit by Provider  Tobacco  Allergies  Meds  Problems  Med Hx  Surg Hx  Fam Hx        Social History     Tobacco Use     Smoking status: Never Smoker     Smokeless tobacco: Never Used   Substance Use Topics     Alcohol use: Yes     Alcohol/week: 0.0 oz     Comment: social     If you drink alcohol do you typically have >3 drinks per day or >7 drinks per week? No    Alcohol Use 7/3/2019   Prescreen: >3 drinks/day or >7 drinks/week? No   Prescreen: >3 drinks/day or >7 drinks/week? -               Current providers sharing in care for this patient include:   Patient Care Team:  Arun Tao MD as PCP - Venessa Bernard RN as   Arun Tao MD as Assigned PCP    The following health maintenance items are reviewed in Epic and correct as of today:  Health Maintenance   Topic Date Due     URINE DRUG SCREEN  1945     ZOSTER IMMUNIZATION (2 of 3) 07/01/2013     ADVANCE CARE PLANNING  04/04/2017     FALL RISK ASSESSMENT  06/29/2019     MEDICARE ANNUAL WELLNESS VISIT  06/29/2019     JOSE ASSESSMENT  06/29/2019     PHQ-9  06/29/2019     MAMMO SCREENING  07/02/2019     INFLUENZA VACCINE (1) 09/01/2019     LIPID  06/29/2023     DTAP/TDAP/TD IMMUNIZATION (3 - Td) 06/21/2027     COLONOSCOPY  07/20/2027     DEXA  Completed     HEPATITIS C SCREENING  Completed     IPV IMMUNIZATION  Aged Out     MENINGITIS IMMUNIZATION  Aged Out         Patient continues on lisinopril therapy for blood pressure control.  Today's blood pressure noted.    Continues on stable dose of tramadol chronically for chronic pain.      Review of Systems  Constitutional, HEENT, cardiovascular, pulmonary, GI, , musculoskeletal, neuro, skin, endocrine and psych systems are negative, except as otherwise noted.    OBJECTIVE:   /78 (BP Location: Left arm,  "Patient Position: Chair, Cuff Size: Adult Large)   Pulse 80   Temp 98.2  F (36.8  C) (Oral)   Resp 16   Ht 1.727 m (5' 8\")   Wt 112.6 kg (248 lb 3.2 oz)   SpO2 98%   BMI 37.74 kg/m   Estimated body mass index is 37.74 kg/m  as calculated from the following:    Height as of this encounter: 1.727 m (5' 8\").    Weight as of this encounter: 112.6 kg (248 lb 3.2 oz).  Physical Exam  GENERAL: healthy, alert and no distress  NECK: no adenopathy, no asymmetry, masses, or scars and thyroid normal to palpation  RESP: lungs clear to auscultation - no rales, rhonchi or wheezes  CV: regular rate and rhythm, normal S1 S2, no S3 or S4, no murmur, click or rub, no peripheral edema and peripheral pulses strong  ABDOMEN: soft, nontender, no hepatosplenomegaly, no masses and bowel sounds normal  MS: no gross musculoskeletal defects noted, no edema        ASSESSMENT / PLAN:   1. Medicare annual wellness visit, subsequent      2. Morbid obesity (H)  Reiterated the importance of dietary modification and weight loss.    3. Essential hypertension  Stable, reasonably well controlled  - lisinopril (PRINIVIL/ZESTRIL) 10 MG tablet; Take 1 tablet (10 mg) by mouth daily  Dispense: 90 tablet; Refill: 3    4. Hyperlipidemia LDL goal <160  Recheck surveillance labs today  - Lipid panel reflex to direct LDL Fasting  - Comprehensive metabolic panel (BMP + Alb, Alk Phos, ALT, AST, Total. Bili, TP)    5. Osteoarthritis of hip, unspecified laterality, unspecified osteoarthritis type  Continue Celebrex once daily, has been quite effective.  - celecoxib (CELEBREX) 200 MG capsule; Take 1 capsule (200 mg) by mouth daily  Dispense: 90 capsule; Refill: 3    End of Life Planning:  Patient currently has an advanced directive: Yes.  Practitioner is supportive of decision.    COUNSELING:  Reviewed preventive health counseling, as reflected in patient instructions    Estimated body mass index is 37.74 kg/m  as calculated from the following:    Height as of " "this encounter: 1.727 m (5' 8\").    Weight as of this encounter: 112.6 kg (248 lb 3.2 oz).    Weight management plan: Discussed healthy diet and exercise guidelines     reports that she has never smoked. She has never used smokeless tobacco.      Appropriate preventive services were discussed with this patient, including applicable screening as appropriate for cardiovascular disease, diabetes, osteopenia/osteoporosis, and glaucoma.  As appropriate for age/gender, discussed screening for colorectal cancer, prostate cancer, breast cancer, and cervical cancer. Checklist reviewing preventive services available has been given to the patient.    Reviewed patients plan of care and provided an AVS. The Basic Care Plan (routine screening as documented in Health Maintenance) for Yohana meets the Care Plan requirement. This Care Plan has been established and reviewed with the Patient.    Counseling Resources:  ATP IV Guidelines  Pooled Cohorts Equation Calculator  Breast Cancer Risk Calculator  FRAX Risk Assessment  ICSI Preventive Guidelines  Dietary Guidelines for Americans, 2010  USDA's MyPlate  ASA Prophylaxis  Lung CA Screening    Arun Tao MD  [facility]    "

## 2019-07-04 ASSESSMENT — ANXIETY QUESTIONNAIRES: GAD7 TOTAL SCORE: 0

## 2019-09-13 DIAGNOSIS — G89.4 CHRONIC PAIN SYNDROME: ICD-10-CM

## 2019-09-13 DIAGNOSIS — Z96.642 S/P HIP REPLACEMENT, LEFT: ICD-10-CM

## 2019-09-14 NOTE — TELEPHONE ENCOUNTER
Requested Prescriptions   Pending Prescriptions Disp Refills     traMADol (ULTRAM) 50 MG tablet [Pharmacy Med Name: TRAMADOL 50MG TABLETS] 90 tablet 0     Sig: TAKE 1 TO 2 TABLETS BY MOUTH DAILY AS NEEDED FOR SEVERE PAIN       There is no refill protocol information for this order        Controlled Substance Refill Request for tramadol  Problem List Complete:  No     PROVIDER TO CONSIDER COMPLETION OF PROBLEM LIST AND OVERVIEW/CONTROLLED SUBSTANCE AGREEMENT    Last Written Prescription Date:  5/24/2019  Last Fill Quantity: 90,   # refills: 0    THE MOST RECENT OFFICE VISIT MUST BE WITHIN THE PAST 3 MONTHS. AT LEAST ONE FACE TO FACE VISIT MUST OCCUR EVERY 6 MONTHS. ADDITIONAL VISITS CAN BE VIRTUAL.  (THIS STATEMENT SHOULD BE DELETED.)    Last Office Visit with [facility] primary care provider: 7/3/2019    Future Office visit:     Controlled substance agreement:   Encounter-Level CSA - 06/01/2016:    Controlled Substance Agreement - Scan on 6/6/2016 11:34 AM: CONTROLLED SUBSTANCE AGREEMENT 6.1.16 (below)       Patient-Level CSA:    There are no patient-level csa.         Last Urine Drug Screen: No results found for: CDAUT, No results found for: COMDAT, No results found for: THC13, PCP13, COC13, MAMP13, OPI13, AMP13, BZO13, TCA13, MTD13, BAR13, OXY13, PPX13, BUP13     Processing:  Patient will  in clinic     [email or web address]       checked in past 3 months?  No, route to RN

## 2019-09-24 RX ORDER — TRAMADOL HYDROCHLORIDE 50 MG/1
TABLET ORAL
Qty: 90 TABLET | Refills: 0 | Status: SHIPPED | OUTPATIENT
Start: 2019-09-24 | End: 2020-03-16

## 2019-09-25 NOTE — TELEPHONE ENCOUNTER
Unable to reach a person on the phone (held too long) so left a voicemail message with the new prescription's details (if needed) and asked them to call us back if there are any further questions or concerns.  RUSTAM Collier LPN

## 2019-09-26 ENCOUNTER — TELEPHONE (OUTPATIENT)
Dept: INTERNAL MEDICINE | Facility: CLINIC | Age: 74
End: 2019-09-26

## 2019-09-26 NOTE — TELEPHONE ENCOUNTER
Prior Authorization Retail Medication Request    Medication/Dose: traMADol (ULTRAM) 50 MG tablet  ICD code (if different than what is on RX):  Z96.642,G89.4  Previously Tried and Failed:    Rationale:      Insurance Name:  [facility]  Insurance ID:  [id number]      Pharmacy Information (if different than what is on RX)  Name:  [facility]  Phone:  [phone]

## 2019-09-27 NOTE — TELEPHONE ENCOUNTER
Central Prior Authorization Team  Phone: [phone]    PA Initiation    Medication: traMADol (ULTRAM) 50 MG tablet  Insurance Company: [employer] - Phone [phone] Fax [phone]  Pharmacy Filling the Rx: [id number] - [facility] [address]  Filling Pharmacy Phone: [phone]  Filling Pharmacy Fax:    Start Date: 9/27/2019    Initiated pa over the phone. Pa case #[id number]

## 2019-09-27 NOTE — TELEPHONE ENCOUNTER
Prior Authorization Approval    Authorization Effective Date: 8/28/2019  Authorization Expiration Date: 9/26/2020  Medication: traMADol (ULTRAM) 50 MG tablet- APPROVED   Approved Dose/Quantity:   Reference #:     Insurance Company: [employer] - Phone [phone] Fax [phone]  Expected CoPay:       CoPay Card Available:      Foundation Assistance Needed:    Which Pharmacy is filling the prescription (Not needed for infusion/clinic administered): [id number] - [place], [place] - [address]  Pharmacy Notified: Yes  Patient Notified: Comment:  **Instructed pharmacy to notify patient when script is ready to /ship.**

## 2019-11-25 ENCOUNTER — OFFICE VISIT (OUTPATIENT)
Dept: INTERNAL MEDICINE | Facility: CLINIC | Age: 74
End: 2019-11-25
Payer: COMMERCIAL

## 2019-11-25 VITALS
TEMPERATURE: 98.7 F | SYSTOLIC BLOOD PRESSURE: 120 MMHG | RESPIRATION RATE: 16 BRPM | WEIGHT: 246 LBS | DIASTOLIC BLOOD PRESSURE: 80 MMHG | OXYGEN SATURATION: 96 % | BODY MASS INDEX: 37.4 KG/M2 | HEART RATE: 76 BPM

## 2019-11-25 DIAGNOSIS — Z01.818 PREOP GENERAL PHYSICAL EXAM: Primary | ICD-10-CM

## 2019-11-25 DIAGNOSIS — H26.9 CATARACT OF BOTH EYES, UNSPECIFIED CATARACT TYPE: ICD-10-CM

## 2019-11-25 DIAGNOSIS — I10 BENIGN ESSENTIAL HYPERTENSION: ICD-10-CM

## 2019-11-25 PROCEDURE — 99214 OFFICE O/P EST MOD 30 MIN: CPT | Performed by: INTERNAL MEDICINE

## 2019-11-25 NOTE — PROGRESS NOTES
[address] [id number] [phone]  Dept: [phone]    PRE-OP EVALUATION:  Today's date: 2019    Yohana Marques (: 1945) presents for pre-operative evaluation assessment as requested by Dr. EUCEDA.  She requires evaluation and anesthesia risk assessment prior to undergoing surgery/procedure for treatment of Cataract 19 left eye, and Cataract right eye 19 .    Fax number for surgical facility: [phone]  Primary Physician: Arun Tao  Type of Anesthesia Anticipated: Local with MAC    Patient has a Health Care Directive or Living Will:  NO    Preop Questions 2019   Who is doing your surgery? Dr Lyle Euceda MD   What are you having done? Cataract both eyes   Date of Surgery/Procedure:  and 2019   Facility or Hospital where procedure/surgery will be performed: 0600   1.  Do you have a history of Heart attack, stroke, stent, coronary bypass surgery, or other heart surgery? No   2.  Do you ever have any pain or discomfort in your chest? No   3.  Do you have a history of  Heart Failure? No   4.   Are you troubled by shortness of breath when:  walking on a level surface, or up a slight hill, or at night? No   5.  Do you currently have a cold, bronchitis or other respiratory infection? No   6.  Do you have a cough, shortness of breath, or wheezing? No   7.  Do you sometimes get pains in the calves of your legs when you walk? YES -    8. Do you or anyone in your family have previous history of blood clots? No   9.  Do you or does anyone in your family have a serious bleeding problem such as prolonged bleeding following surgeries or cuts? No   10. Have you ever had problems with anemia or been told to take iron pills? No   11. Have you had any abnormal blood loss such as black, tarry or bloody stools, or abnormal vaginal bleeding? No   12. Have you ever had a blood transfusion? No   13. Have you or any  of your relatives ever had problems with anesthesia? No   14. Do you have sleep apnea, excessive snoring or daytime drowsiness? No   15. Do you have any prosthetic heart valves? No   16. Do you have prosthetic joints? YES -    17. Is there any chance that you may be pregnant? No         HPI:       HYPERTENSION - Patient has longstanding history of HTN , currently denies any symptoms referable to elevated blood pressure. Specifically denies chest pain, palpitations, dyspnea, orthopnea, PND or peripheral edema. Blood pressure readings have been in normal range. Current medication regimen is as listed below. Patient denies any side effects of medication.     No known history of coronary artery disease.  She had nuclear stress test in 2012 that was perfectly normal.  She denies any chest pain or reduction in exertional capacity.      MEDICAL HISTORY:     Patient Active Problem List    Diagnosis Date Noted     ACP (advance care planning) 04/04/2012     Priority: High     Discussed advance care planning with patient; information given to patient to review.4/4/2012   Facilitator called patient and mailed information re: Honoring choices  Will await call back regarding meeting with facilitator. JOVANA Jordan RN  4/25/2012          Morbid obesity (H) 06/29/2018     Priority: Medium     S/P hip replacement, left 12/18/2016     Priority: Medium     Benign essential hypertension 12/18/2016     Priority: Medium     Primary osteoarthritis of left hip 12/18/2016     Priority: Medium     Hip joint replacement status 12/12/2016     Priority: Medium     Chronic pain syndrome 06/01/2016     Priority: Medium     Patient is followed by [facility] for ongoing prescription of pain medication.  All refills should be approved by this provider, or covering partner.    Medication(s): Tramadol.   Maximum quantity per month: 40  Clinic visit frequency required: Q 6  months     Controlled substance agreement on file: Yes       Date(s):  6/1/2016    Pain Clinic evaluation in the past: No    DIRE Total Score(s):  No flowsheet data found.    Last [facility] website verification:  done on 6/1/2016   [email or web address]         S/P lumbar laminectomy 08/29/2014     Priority: Medium     Physical deconditioning 08/15/2014     Priority: Medium     Spinal stenosis, lumbar region, with neurogenic claudication 08/11/2014     Priority: Medium     --S/P discectomy x 2, most recently 2-level laminectomy/discectomy 8/2014       Health Care Home 07/20/2012     Priority: Medium     FPA [facility] for .  Criselda Wood RN-BC    [phone]     DX V65.8 REPLACED WITH [id number] HEALTH CARE HOME (04/08/2013)       HYPERLIPIDEMIA LDL GOAL <160 10/31/2010     Priority: Medium     Osteopenia 04/07/2010     Priority: Medium     HTN (hypertension)      Priority: Medium      Past Medical History:   Diagnosis Date     HTN (hypertension)      Hyperlipidemia      Leiomyoma of uterus, unspecified      Numbness and tingling     spinal stenosis causes numbness and tingling on feet and knees bilaterally     Osteoarthrosis, unspecified whether generalized or localized, unspecified site      Other and unspecified disc disorder of lumbar region      Spinal stenosis     S/P diskectomy x 2, most recently 8/2014     Past Surgical History:   Procedure Laterality Date     ARTHROPLASTY HIP Left 12/12/2016    Procedure: ARTHROPLASTY HIP;  Surgeon: Leonid Morfin MD;  Location:  OR     BACK SURGERY       C NONSPECIFIC PROCEDURE  1996    lumbar discectomy     C NONSPECIFIC PROCEDURE      cervical cone biopsy     C NONSPECIFIC PROCEDURE      Left knee replacement     COLONOSCOPY N/A 7/20/2017    Procedure: COMBINED COLONOSCOPY, SINGLE OR MULTIPLE BIOPSY/POLYPECTOMY BY BIOPSY;  colonoscopy;  Surgeon: Catarino Salas MD;  Location:  GI     DISCECTOMY LUMBAR POSTERIOR MICROSCOPIC TWO LEVELS  8/11/2014    Procedure: DISCECTOMY LUMBAR POSTERIOR MICROSCOPIC TWO  LEVELS;  Surgeon: Warren Herring MD;  Location:  OR     ORTHOPEDIC SURGERY       Current Outpatient Medications   Medication Sig Dispense Refill     ASPIRIN EC PO Take 81 mg by mouth daily       calcium-vitamin D (CALTRATE) 600-400 MG-UNIT per tablet Take 1 tablet by mouth 2 times daily       celecoxib (CELEBREX) 200 MG capsule Take 1 capsule (200 mg) by mouth daily 90 capsule 3     lisinopril (PRINIVIL/ZESTRIL) 10 MG tablet Take 1 tablet (10 mg) by mouth daily 90 tablet 3     Multiple Vitamins-Minerals (CENTRUM SILVER) per tablet Take 1 tablet by mouth daily       traMADol (ULTRAM) 50 MG tablet TAKE 1 TO 2 TABLETS BY MOUTH DAILY AS NEEDED FOR SEVERE PAIN 90 tablet 0     OTC products: Aspirin    No Known Allergies   Latex Allergy: NO    Social History     Tobacco Use     Smoking status: Never Smoker     Smokeless tobacco: Never Used   Substance Use Topics     Alcohol use: Yes     Alcohol/week: 0.0 standard drinks     Comment: social     History   Drug Use No       REVIEW OF SYSTEMS:   CONSTITUTIONAL: NEGATIVE for fever, chills, change in weight  ENT/MOUTH: NEGATIVE for ear, mouth and throat problems  RESP: NEGATIVE for significant cough or SOB  CV: NEGATIVE for chest pain, palpitations or peripheral edema    EXAM:   /80 (BP Location: Left arm, Patient Position: Chair, Cuff Size: Adult Large)   Pulse 76   Temp 98.7  F (37.1  C) (Oral)   Resp 16   Wt 111.6 kg (246 lb)   SpO2 96%   BMI 37.40 kg/m    GENERAL APPEARANCE: healthy, alert and no distress  HENT: ear canals and TM's normal and nose and mouth without ulcers or lesions  RESP: lungs clear to auscultation - no rales, rhonchi or wheezes  CV: regular rate and rhythm, normal S1 S2, no S3 or S4 and no murmur, click or rub   ABDOMEN: soft, nontender, no HSM or masses and bowel sounds normal  NEURO: Normal strength and tone, sensory exam grossly normal, mentation intact and speech normal    DIAGNOSTICS:   No labs or EKG required for low risk surgery  (cataract, skin procedure, breast biopsy, etc)    Recent Labs   Lab Test 07/03/19  0927 06/29/18  1451  12/19/16  0700 12/15/16  0622 12/14/16  0638  08/08/14  0908   HGB  --   --   --  10.9*  --  10.5*   < > 14.6   PLT  --   --   --  334 167  --    < >  --    INR  --   --   --   --   --   --   --  0.97    140   < > 138  --   --    < > 138   POTASSIUM 4.2 4.0   < > 4.5  --   --    < > 4.4   CR 0.72 0.70   < > 0.66 0.63  --    < > 0.80    < > = values in this interval not displayed.        IMPRESSION:   Reason for surgery/procedure: Bilateral cataract  Diagnosis/reason for consult: HTN    The proposed surgical procedure is considered LOW risk.    REVISED CARDIAC RISK INDEX  The patient has the following serious cardiovascular risks for perioperative complications such as (MI, PE, VFib and 3  AV Block):  No serious cardiac risks  INTERPRETATION: 0 risks: Class I (very low risk - 0.4% complication rate)    The patient has the following additional risks for perioperative complications:  No identified additional risks      ICD-10-CM    1. Preop general physical exam Z01.818    2. Cataract of both eyes, unspecified cataract type H26.9    3. Benign essential hypertension I10        RECOMMENDATIONS:     --Patient is to take all scheduled medications on the day of surgery    APPROVAL GIVEN to proceed with proposed procedure, without further diagnostic evaluation       Signed Electronically by: Arun Tao MD    Copy of this evaluation report is provided to requesting physician.    [facility] Preop Guidelines    Revised Cardiac Risk Index

## 2020-03-12 DIAGNOSIS — Z96.642 S/P HIP REPLACEMENT, LEFT: ICD-10-CM

## 2020-03-12 DIAGNOSIS — G89.4 CHRONIC PAIN SYNDROME: ICD-10-CM

## 2020-03-16 RX ORDER — TRAMADOL HYDROCHLORIDE 50 MG/1
TABLET ORAL
Qty: 90 TABLET | Refills: 0 | Status: SHIPPED | OUTPATIENT
Start: 2020-03-16 | End: 2020-06-12

## 2020-06-12 DIAGNOSIS — Z96.642 S/P HIP REPLACEMENT, LEFT: ICD-10-CM

## 2020-06-12 DIAGNOSIS — G89.4 CHRONIC PAIN SYNDROME: ICD-10-CM

## 2020-06-12 RX ORDER — TRAMADOL HYDROCHLORIDE 50 MG/1
TABLET ORAL
Qty: 90 TABLET | Refills: 0 | Status: SHIPPED | OUTPATIENT
Start: 2020-06-12 | End: 2020-09-09

## 2020-06-12 NOTE — TELEPHONE ENCOUNTER
Tramadol      Last Written Prescription Date:  3/16/2020  Last Fill Quantity: 90,   # refills: 0  Last Office Visit: 11/25/2019  Future Office visit:       Routing refill request to provider for review/approval because:  Drug not on the [facility], [facility] or [facility] refill protocol or controlled substance      Anh FIELDSN, RN, PHN

## 2020-09-08 DIAGNOSIS — G89.4 CHRONIC PAIN SYNDROME: ICD-10-CM

## 2020-09-08 DIAGNOSIS — Z96.642 S/P HIP REPLACEMENT, LEFT: ICD-10-CM

## 2020-09-09 DIAGNOSIS — M16.9 OSTEOARTHRITIS OF HIP, UNSPECIFIED LATERALITY, UNSPECIFIED OSTEOARTHRITIS TYPE: ICD-10-CM

## 2020-09-09 DIAGNOSIS — I10 ESSENTIAL HYPERTENSION: ICD-10-CM

## 2020-09-09 RX ORDER — TRAMADOL HYDROCHLORIDE 50 MG/1
TABLET ORAL
Qty: 90 TABLET | Refills: 0 | Status: SHIPPED | OUTPATIENT
Start: 2020-09-09 | End: 2020-12-10

## 2020-09-09 NOTE — TELEPHONE ENCOUNTER
tramadol      Last Written Prescription Date:  6/12/20  Last Fill Quantity: 90,   # refills: 0  Last Office Visit: 11/25/19  Future Office visit:       Routing refill request to provider for review/approval because:  Drug not on the [facility], P or [facility] refill protocol or controlled substance

## 2020-09-09 NOTE — TELEPHONE ENCOUNTER
Routing refill request to provider for review/approval because:  out of range:  age  Labs not current:  Alt, ast, cbc, creat, K

## 2020-09-11 RX ORDER — LISINOPRIL 10 MG/1
TABLET ORAL
Qty: 90 TABLET | Refills: 3 | Status: SHIPPED | OUTPATIENT
Start: 2020-09-11 | End: 2021-07-05

## 2020-09-11 RX ORDER — CELECOXIB 200 MG/1
CAPSULE ORAL
Qty: 90 CAPSULE | Refills: 3 | Status: SHIPPED | OUTPATIENT
Start: 2020-09-11 | End: 2021-07-05

## 2020-10-29 ENCOUNTER — TELEPHONE (OUTPATIENT)
Dept: SCHEDULING | Facility: CLINIC | Age: 75
End: 2020-10-29

## 2020-12-08 DIAGNOSIS — G89.4 CHRONIC PAIN SYNDROME: ICD-10-CM

## 2020-12-08 DIAGNOSIS — Z96.642 S/P HIP REPLACEMENT, LEFT: ICD-10-CM

## 2020-12-09 NOTE — TELEPHONE ENCOUNTER
tramadol      Last Written Prescription Date:  9/9/20  Last Fill Quantity: 90,   # refills: 0  Last Office Visit: 11/25/19  Future Office visit:       Routing refill request to provider for review/approval because:  Drug not on the [facility], P or [facility] refill protocol or controlled substance

## 2020-12-10 RX ORDER — TRAMADOL HYDROCHLORIDE 50 MG/1
TABLET ORAL
Qty: 90 TABLET | Refills: 0 | Status: SHIPPED | OUTPATIENT
Start: 2020-12-10 | End: 2021-03-09

## 2021-01-15 ENCOUNTER — HEALTH MAINTENANCE LETTER (OUTPATIENT)
Age: 76
End: 2021-01-15

## 2021-03-06 ENCOUNTER — MYC MEDICAL ADVICE (OUTPATIENT)
Dept: INTERNAL MEDICINE | Facility: CLINIC | Age: 76
End: 2021-03-06

## 2021-03-08 DIAGNOSIS — Z96.642 S/P HIP REPLACEMENT, LEFT: ICD-10-CM

## 2021-03-08 DIAGNOSIS — G89.4 CHRONIC PAIN SYNDROME: ICD-10-CM

## 2021-03-09 NOTE — TELEPHONE ENCOUNTER
Tramadol    Last Written Prescription Date:  12/8  Last Fill Quantity: 90,  # refills: 0   Last office visit: 11/25/2019    Future Office Visit: NONE    Routing refill request to provider for review/approval because:  Drug not on the [facility] refill protocol     Erika Johnson, MSN, RN  [facility]

## 2021-03-10 RX ORDER — TRAMADOL HYDROCHLORIDE 50 MG/1
TABLET ORAL
Qty: 90 TABLET | Refills: 0 | Status: SHIPPED | OUTPATIENT
Start: 2021-03-10 | End: 2021-06-14

## 2021-06-07 DIAGNOSIS — Z96.642 S/P HIP REPLACEMENT, LEFT: ICD-10-CM

## 2021-06-07 DIAGNOSIS — I10 BENIGN ESSENTIAL HYPERTENSION: Primary | ICD-10-CM

## 2021-06-07 DIAGNOSIS — G89.4 CHRONIC PAIN SYNDROME: ICD-10-CM

## 2021-06-07 NOTE — LETTER
[facility]  [address] [address]  [phone]            Yohana Marques  [address] [address] [place] [address]        June 14, 2021    Dear Yohana,    While refilling your prescription today, we noticed that you are due to have labs drawn and see your provider.  We will refill your prescription for 60 days, but a follow-up appointment must be made before any additional refills can be approved.     Taking care of your health is important to us and we look forward to seeing you in the near future.  Please call us at [phone] or [phone] (or use [employer]) to schedule an appointment.     Please disregard this notice if you have already made an appointment.    Sincerely,        [facility]

## 2021-06-14 RX ORDER — TRAMADOL HYDROCHLORIDE 50 MG/1
TABLET ORAL
Qty: 90 TABLET | Refills: 0 | Status: SHIPPED | OUTPATIENT
Start: 2021-06-14 | End: 2021-09-08

## 2021-06-14 NOTE — TELEPHONE ENCOUNTER
Approved, but needs follow-up appointment in clinic in next few months, with fasting labs a few days prior.  Future orders placed, please assist patient in scheduling.

## 2021-06-14 NOTE — TELEPHONE ENCOUNTER
Medication refilled and letter sent to pt to make an appt with PCP and lab appt before next refill.

## 2021-07-05 ENCOUNTER — ANCILLARY PROCEDURE (OUTPATIENT)
Dept: MAMMOGRAPHY | Facility: CLINIC | Age: 76
End: 2021-07-05
Payer: COMMERCIAL

## 2021-07-05 ENCOUNTER — OFFICE VISIT (OUTPATIENT)
Dept: INTERNAL MEDICINE | Facility: CLINIC | Age: 76
End: 2021-07-05
Payer: COMMERCIAL

## 2021-07-05 VITALS
TEMPERATURE: 95.3 F | OXYGEN SATURATION: 96 % | HEART RATE: 101 BPM | SYSTOLIC BLOOD PRESSURE: 120 MMHG | DIASTOLIC BLOOD PRESSURE: 80 MMHG | HEIGHT: 67 IN | RESPIRATION RATE: 16 BRPM | WEIGHT: 263.9 LBS | BODY MASS INDEX: 41.42 KG/M2

## 2021-07-05 DIAGNOSIS — Z12.31 VISIT FOR SCREENING MAMMOGRAM: ICD-10-CM

## 2021-07-05 DIAGNOSIS — Z00.00 MEDICARE ANNUAL WELLNESS VISIT, SUBSEQUENT: Primary | ICD-10-CM

## 2021-07-05 DIAGNOSIS — M16.9 OSTEOARTHRITIS OF HIP, UNSPECIFIED LATERALITY, UNSPECIFIED OSTEOARTHRITIS TYPE: ICD-10-CM

## 2021-07-05 DIAGNOSIS — E66.01 MORBID OBESITY (H): ICD-10-CM

## 2021-07-05 DIAGNOSIS — I10 ESSENTIAL HYPERTENSION: ICD-10-CM

## 2021-07-05 LAB
ALBUMIN SERPL-MCNC: 4 G/DL (ref 3.4–5)
ALP SERPL-CCNC: 71 U/L (ref 40–150)
ALT SERPL W P-5'-P-CCNC: 66 U/L (ref 0–50)
ANION GAP SERPL CALCULATED.3IONS-SCNC: 6 MMOL/L (ref 3–14)
AST SERPL W P-5'-P-CCNC: 60 U/L (ref 0–45)
BILIRUB SERPL-MCNC: 0.5 MG/DL (ref 0.2–1.3)
BUN SERPL-MCNC: 15 MG/DL (ref 7–30)
CALCIUM SERPL-MCNC: 9.5 MG/DL (ref 8.5–10.1)
CHLORIDE SERPL-SCNC: 104 MMOL/L (ref 94–109)
CHOLEST SERPL-MCNC: 220 MG/DL
CO2 SERPL-SCNC: 27 MMOL/L (ref 20–32)
CREAT SERPL-MCNC: 0.87 MG/DL (ref 0.52–1.04)
GFR SERPL CREATININE-BSD FRML MDRD: 66 ML/MIN/{1.73_M2}
GLUCOSE SERPL-MCNC: 101 MG/DL (ref 70–99)
HDLC SERPL-MCNC: 66 MG/DL
LDLC SERPL CALC-MCNC: 130 MG/DL
NONHDLC SERPL-MCNC: 154 MG/DL
POTASSIUM SERPL-SCNC: 4.2 MMOL/L (ref 3.4–5.3)
PROT SERPL-MCNC: 8.2 G/DL (ref 6.8–8.8)
SODIUM SERPL-SCNC: 137 MMOL/L (ref 133–144)
TRIGL SERPL-MCNC: 122 MG/DL

## 2021-07-05 PROCEDURE — 80053 COMPREHEN METABOLIC PANEL: CPT | Performed by: INTERNAL MEDICINE

## 2021-07-05 PROCEDURE — 36415 COLL VENOUS BLD VENIPUNCTURE: CPT | Performed by: INTERNAL MEDICINE

## 2021-07-05 PROCEDURE — 99214 OFFICE O/P EST MOD 30 MIN: CPT | Mod: 25 | Performed by: INTERNAL MEDICINE

## 2021-07-05 PROCEDURE — 99397 PER PM REEVAL EST PAT 65+ YR: CPT | Performed by: INTERNAL MEDICINE

## 2021-07-05 PROCEDURE — 77067 SCR MAMMO BI INCL CAD: CPT | Mod: TC | Performed by: RADIOLOGY

## 2021-07-05 PROCEDURE — 80061 LIPID PANEL: CPT | Performed by: INTERNAL MEDICINE

## 2021-07-05 RX ORDER — CELECOXIB 200 MG/1
CAPSULE ORAL
Qty: 90 CAPSULE | Refills: 3 | Status: SHIPPED | OUTPATIENT
Start: 2021-07-05 | End: 2022-07-07

## 2021-07-05 RX ORDER — CHOLECALCIFEROL (VITAMIN D3) 50 MCG
1 TABLET ORAL DAILY
COMMUNITY
Start: 2021-07-05

## 2021-07-05 RX ORDER — LISINOPRIL 10 MG/1
10 TABLET ORAL DAILY
Qty: 90 TABLET | Refills: 3 | Status: SHIPPED | OUTPATIENT
Start: 2021-07-05 | End: 2022-07-07

## 2021-07-05 ASSESSMENT — MIFFLIN-ST. JEOR: SCORE: 1734.67

## 2021-07-05 NOTE — PATIENT INSTRUCTIONS
Patient Education   Personalized Prevention Plan  You are due for the preventive services outlined below.  Your care team is available to assist you in scheduling these services.  If you have already completed any of these items, please share that information with your care team to update in your medical record.  Health Maintenance Due   Topic Date Due     URINE DRUG SCREEN  Never done     ANNUAL REVIEW OF HM ORDERS  Never done     Discuss Advance Care Planning  04/04/2017     Annual Wellness Visit  07/03/2020     FALL RISK ASSESSMENT  07/03/2020     Mammogram  07/03/2020     PHQ-2  01/01/2021

## 2021-07-05 NOTE — PROGRESS NOTES
"  SUBJECTIVE:   Yohana Marques is a 75 year old female who presents for Preventive Visit.      Patient has been advised of split billing requirements and indicates understanding: Yes  Are you in the first 12 months of your Medicare Part B coverage?  No    Physical Health:    In general, how would you rate your overall physical health? good    Outside of work, how many days during the week do you exercise? none    Outside of work, approximately how many minutes a day do you exercise?not applicable    If you drink alcohol do you typically have >3 drinks per day or >7 drinks per week? No    Do you usually eat at least 4 servings of fruit and vegetables a day, include whole grains & fiber and avoid regularly eating high fat or \"junk\" foods? Yes    Do you have any problems taking medications regularly?  No    Do you have any side effects from medications? none    Needs assistance for the following daily activities: no assistance needed    Which of the following safety concerns are present in your home?  none identified     Hearing impairment: No    In the past 6 months, have you been bothered by leaking of urine? yes    Mental Health:    In general, how would you rate your overall mental or emotional health? good  PHQ-2 Score:      Do you feel safe in your environment? Yes    Have you ever done Advance Care Planning? (For example, a Health Directive, POLST, or a discussion with a medical provider or your loved ones about your wishes): No, advance care planning information given to patient to review.  Patient plans to discuss their wishes with loved ones or provider.      Additional concerns to address?  No    Fall risk:  Fallen 2 or more times in the past year?: No  Any fall with injury in the past year?: No    Cognitive Screenin) Repeat 3 items (Leader, Season, Table)    2) Clock draw: NORMAL  3) 3 item recall: Recalls 3 objects  Results: 3 items recalled: COGNITIVE IMPAIRMENT LESS LIKELY    Mini-CogTM " Copyright JACLYN Jones. Licensed by the author for use in [facility]; reprinted with permission ([email or web address]@[facility]). All rights reserved.      Do you have sleep apnea, excessive snoring or daytime drowsiness?: no            Reviewed and updated as needed this visit by clinical staff  Tobacco  Allergies  Meds              Reviewed and updated as needed this visit by Provider                Social History     Tobacco Use     Smoking status: Never Smoker     Smokeless tobacco: Never Used   Substance Use Topics     Alcohol use: Yes     Alcohol/week: 0.0 standard drinks     Comment: social                           Current providers sharing in care for this patient include:   Patient Care Team:  Arun Tao MD as PCP - General  Venessa Cheung RN as   Arun Tao MD as Assigned PCP    The following health maintenance items are reviewed in Epic and correct as of today:  Health Maintenance   Topic Date Due     URINE DRUG SCREEN  Never done     FALL RISK ASSESSMENT  07/03/2020     MAMMO SCREENING  07/03/2020     INFLUENZA VACCINE (1) 09/01/2021     MEDICARE ANNUAL WELLNESS VISIT  07/05/2022     ANNUAL REVIEW OF HM ORDERS  07/05/2022     LIPID  07/03/2024     ADVANCE CARE PLANNING  07/05/2026     DTAP/TDAP/TD IMMUNIZATION (3 - Td) 06/21/2027     COLORECTAL CANCER SCREENING  07/20/2027     DEXA  04/11/2028     HEPATITIS C SCREENING  Completed     PHQ-2  Completed     Pneumococcal Vaccine: 65+ Years  Completed     ZOSTER IMMUNIZATION  Completed     COVID-19 Vaccine  Completed     IPV IMMUNIZATION  Aged Out     MENINGITIS IMMUNIZATION  Aged Out     HEPATITIS B IMMUNIZATION  Aged Out           ROS:  Constitutional, HEENT, cardiovascular, pulmonary, GI, , musculoskeletal, neuro, skin, endocrine and psych systems are negative, except as otherwise noted.    OBJECTIVE:   /80 (BP Location: Left arm, Patient Position: Chair, Cuff Size: Adult Large)   Pulse 101   Temp 95.3  F (35.2  C)  "(Temporal)   Resp 16   Ht 1.702 m (5' 7\")   Wt 119.7 kg (263 lb 14.4 oz)   SpO2 96%   BMI 41.33 kg/m   Estimated body mass index is 41.33 kg/m  as calculated from the following:    Height as of this encounter: 1.702 m (5' 7\").    Weight as of this encounter: 119.7 kg (263 lb 14.4 oz).  EXAM:   GENERAL: healthy, alert and no distress  NECK: no adenopathy, no asymmetry, masses, or scars and thyroid normal to palpation  RESP: lungs clear to auscultation - no rales, rhonchi or wheezes  CV: regular rate and rhythm, normal S1 S2, no S3 or S4, no murmur, click or rub, no peripheral edema and peripheral pulses strong  ABDOMEN: soft, nontender, no hepatosplenomegaly, no masses and bowel sounds normal  MS: no gross musculoskeletal defects noted, no edema        ASSESSMENT / PLAN:   1. Medicare annual wellness visit, subsequent      2. Osteoarthritis of hip, unspecified laterality, unspecified osteoarthritis type    - celecoxib (CELEBREX) 200 MG capsule; TAKE 1 CAPSULE BY MOUTH EVERY DAY  Dispense: 90 capsule; Refill: 3    3. Essential hypertension    - lisinopril (ZESTRIL) 10 MG tablet; Take 1 tablet (10 mg) by mouth daily  Dispense: 90 tablet; Refill: 3  - Lipid panel reflex to direct LDL Fasting  - Comprehensive metabolic panel (BMP + Alb, Alk Phos, ALT, AST, Total. Bili, TP)    4. Morbid obesity (H)  Reiterated the importance of dietary modification and weight loss.      Patient has been advised of split billing requirements and indicates understanding: Yes    COUNSELING:  Reviewed preventive health counseling, as reflected in patient instructions    Estimated body mass index is 41.33 kg/m  as calculated from the following:    Height as of this encounter: 1.702 m (5' 7\").    Weight as of this encounter: 119.7 kg (263 lb 14.4 oz).    Weight management plan: Discussed healthy diet and exercise guidelines    She reports that she has never smoked. She has never used smokeless tobacco.    Appropriate preventive services " were discussed with this patient, including applicable screening as appropriate for cardiovascular disease, diabetes, osteopenia/osteoporosis, and glaucoma.  As appropriate for age/gender, discussed screening for colorectal cancer, prostate cancer, breast cancer, and cervical cancer. Checklist reviewing preventive services available has been given to the patient.    Reviewed patients plan of care and provided an AVS. The Basic Care Plan (routine screening as documented in Health Maintenance) for Yohana meets the Care Plan requirement. This Care Plan has been established and reviewed with the Patient.    Counseling Resources:  ATP IV Guidelines  Pooled Cohorts Equation Calculator  Breast Cancer Risk Calculator  BRCA-Related Cancer Risk Assessment: FHS-7 Tool  FRAX Risk Assessment  ICSI Preventive Guidelines  Dietary Guidelines for Americans, 2010  USDA's MyPlate  ASA Prophylaxis  Lung CA Screening    Arun Tao MD  [facility]

## 2021-09-04 ENCOUNTER — HEALTH MAINTENANCE LETTER (OUTPATIENT)
Age: 76
End: 2021-09-04

## 2021-09-06 DIAGNOSIS — G89.4 CHRONIC PAIN SYNDROME: ICD-10-CM

## 2021-09-06 DIAGNOSIS — Z96.642 S/P HIP REPLACEMENT, LEFT: ICD-10-CM

## 2021-09-08 RX ORDER — TRAMADOL HYDROCHLORIDE 50 MG/1
TABLET ORAL
Qty: 90 TABLET | Refills: 0 | Status: SHIPPED | OUTPATIENT
Start: 2021-09-08 | End: 2021-12-10

## 2021-09-08 NOTE — TELEPHONE ENCOUNTER
Routing refill request to provider for review/approval because:  Drug not on the [facility] refill protocol   Mary Kate Covington RN  [facility]=

## 2021-12-06 DIAGNOSIS — Z96.642 S/P HIP REPLACEMENT, LEFT: ICD-10-CM

## 2021-12-06 DIAGNOSIS — G89.4 CHRONIC PAIN SYNDROME: ICD-10-CM

## 2021-12-08 NOTE — TELEPHONE ENCOUNTER
Routing refill request to provider for review/approval because:  Drug not on the [facility] refill protocol     Winston Rush RN  [facility] Triage Nurse

## 2021-12-10 RX ORDER — TRAMADOL HYDROCHLORIDE 50 MG/1
TABLET ORAL
Qty: 90 TABLET | Refills: 0 | Status: SHIPPED | OUTPATIENT
Start: 2021-12-10 | End: 2022-03-09

## 2022-03-07 DIAGNOSIS — Z96.642 S/P HIP REPLACEMENT, LEFT: ICD-10-CM

## 2022-03-07 DIAGNOSIS — G89.4 CHRONIC PAIN SYNDROME: ICD-10-CM

## 2022-03-07 NOTE — TELEPHONE ENCOUNTER
Routing refill request to provider for review/approval because:  Drug not on the [facility] refill protocol   Yee Mallory RN

## 2022-03-09 RX ORDER — TRAMADOL HYDROCHLORIDE 50 MG/1
TABLET ORAL
Qty: 90 TABLET | Refills: 0 | Status: SHIPPED | OUTPATIENT
Start: 2022-03-09 | End: 2022-06-01

## 2022-06-01 DIAGNOSIS — G89.4 CHRONIC PAIN SYNDROME: ICD-10-CM

## 2022-06-01 DIAGNOSIS — Z96.642 S/P HIP REPLACEMENT, LEFT: ICD-10-CM

## 2022-06-01 RX ORDER — TRAMADOL HYDROCHLORIDE 50 MG/1
TABLET ORAL
Qty: 90 TABLET | Refills: 0 | Status: SHIPPED | OUTPATIENT
Start: 2022-06-01 | End: 2022-09-02

## 2022-06-01 NOTE — TELEPHONE ENCOUNTER
Routing refill request to provider for review/approval because:  Drug not on the [facility] refill protocol     Winston Rush RN  [facility] Triage Nurse

## 2022-07-07 ENCOUNTER — ANCILLARY PROCEDURE (OUTPATIENT)
Dept: MAMMOGRAPHY | Facility: CLINIC | Age: 77
End: 2022-07-07
Attending: INTERNAL MEDICINE
Payer: COMMERCIAL

## 2022-07-07 ENCOUNTER — OFFICE VISIT (OUTPATIENT)
Dept: INTERNAL MEDICINE | Facility: CLINIC | Age: 77
End: 2022-07-07
Payer: COMMERCIAL

## 2022-07-07 VITALS
HEIGHT: 68 IN | SYSTOLIC BLOOD PRESSURE: 122 MMHG | TEMPERATURE: 97.9 F | WEIGHT: 227 LBS | BODY MASS INDEX: 34.4 KG/M2 | OXYGEN SATURATION: 96 % | DIASTOLIC BLOOD PRESSURE: 78 MMHG | HEART RATE: 86 BPM

## 2022-07-07 DIAGNOSIS — I10 ESSENTIAL HYPERTENSION: ICD-10-CM

## 2022-07-07 DIAGNOSIS — Z12.31 VISIT FOR SCREENING MAMMOGRAM: ICD-10-CM

## 2022-07-07 DIAGNOSIS — E78.5 HYPERLIPIDEMIA LDL GOAL <160: ICD-10-CM

## 2022-07-07 DIAGNOSIS — Z00.00 MEDICARE ANNUAL WELLNESS VISIT, SUBSEQUENT: Primary | ICD-10-CM

## 2022-07-07 DIAGNOSIS — M16.9 OSTEOARTHRITIS OF HIP, UNSPECIFIED LATERALITY, UNSPECIFIED OSTEOARTHRITIS TYPE: ICD-10-CM

## 2022-07-07 PROBLEM — E66.01 MORBID OBESITY (H): Status: RESOLVED | Noted: 2018-06-29 | Resolved: 2022-07-07

## 2022-07-07 LAB
ALBUMIN SERPL-MCNC: 3.7 G/DL (ref 3.4–5)
ALP SERPL-CCNC: 299 U/L (ref 40–150)
ALT SERPL W P-5'-P-CCNC: 69 U/L (ref 0–50)
ANION GAP SERPL CALCULATED.3IONS-SCNC: 7 MMOL/L (ref 3–14)
AST SERPL W P-5'-P-CCNC: 70 U/L (ref 0–45)
BILIRUB SERPL-MCNC: 0.8 MG/DL (ref 0.2–1.3)
BUN SERPL-MCNC: 12 MG/DL (ref 7–30)
CALCIUM SERPL-MCNC: 9.9 MG/DL (ref 8.5–10.1)
CHLORIDE BLD-SCNC: 104 MMOL/L (ref 94–109)
CHOLEST SERPL-MCNC: 241 MG/DL
CO2 SERPL-SCNC: 26 MMOL/L (ref 20–32)
CREAT SERPL-MCNC: 0.63 MG/DL (ref 0.52–1.04)
FASTING STATUS PATIENT QL REPORTED: YES
GFR SERPL CREATININE-BSD FRML MDRD: >90 ML/MIN/1.73M2
GLUCOSE BLD-MCNC: 90 MG/DL (ref 70–99)
HDLC SERPL-MCNC: 78 MG/DL
LDLC SERPL CALC-MCNC: 139 MG/DL
NONHDLC SERPL-MCNC: 163 MG/DL
POTASSIUM BLD-SCNC: 3.9 MMOL/L (ref 3.4–5.3)
PROT SERPL-MCNC: 8.3 G/DL (ref 6.8–8.8)
SODIUM SERPL-SCNC: 137 MMOL/L (ref 133–144)
TRIGL SERPL-MCNC: 119 MG/DL

## 2022-07-07 PROCEDURE — 80061 LIPID PANEL: CPT | Performed by: INTERNAL MEDICINE

## 2022-07-07 PROCEDURE — 99397 PER PM REEVAL EST PAT 65+ YR: CPT | Performed by: INTERNAL MEDICINE

## 2022-07-07 PROCEDURE — 80053 COMPREHEN METABOLIC PANEL: CPT | Performed by: INTERNAL MEDICINE

## 2022-07-07 PROCEDURE — 36415 COLL VENOUS BLD VENIPUNCTURE: CPT | Performed by: INTERNAL MEDICINE

## 2022-07-07 PROCEDURE — 77067 SCR MAMMO BI INCL CAD: CPT | Mod: TC | Performed by: RADIOLOGY

## 2022-07-07 RX ORDER — LISINOPRIL 10 MG/1
10 TABLET ORAL DAILY
Qty: 90 TABLET | Refills: 3 | Status: SHIPPED | OUTPATIENT
Start: 2022-07-07 | End: 2022-09-08

## 2022-07-07 RX ORDER — CELECOXIB 200 MG/1
CAPSULE ORAL
Qty: 90 CAPSULE | Refills: 3 | Status: SHIPPED | OUTPATIENT
Start: 2022-07-07 | End: 2022-09-08

## 2022-07-07 ASSESSMENT — ACTIVITIES OF DAILY LIVING (ADL): CURRENT_FUNCTION: NO ASSISTANCE NEEDED

## 2022-07-07 ASSESSMENT — ENCOUNTER SYMPTOMS
NERVOUS/ANXIOUS: 0
JOINT SWELLING: 0
FEVER: 0
DIZZINESS: 0
WEAKNESS: 0
SORE THROAT: 0
SHORTNESS OF BREATH: 0
COUGH: 0
DIARRHEA: 0
ABDOMINAL PAIN: 0
PARESTHESIAS: 1
CONSTIPATION: 0
FREQUENCY: 0
PALPITATIONS: 0
MYALGIAS: 1
DYSURIA: 0
HEADACHES: 0
HEMATOCHEZIA: 0
EYE PAIN: 0
ARTHRALGIAS: 1
BREAST MASS: 0
HEARTBURN: 0
CHILLS: 0
NAUSEA: 0
HEMATURIA: 0

## 2022-07-07 NOTE — PROGRESS NOTES
SUBJECTIVE:   Yohana Marques is a 77 year old female who presents for Preventive Visit.      Patient has been advised of split billing requirements and indicates understanding: Yes  Are you in the first 12 months of your Medicare coverage?  No    HPI  Do you feel safe in your environment? Yes    Have you ever done Advance Care Planning? (For example, a Health Directive, POLST, or a discussion with a medical provider or your loved ones about your wishes): No, advance care planning information given to patient to review.  Patient declined advance care planning discussion at this time.      Fall risk  Fallen 2 or more times in the past year?: No  Any fall with injury in the past year?: No    Cognitive Screening   1) Repeat 3 items (Leader, Season, Table)    2) Clock draw: NORMAL  3) 3 item recall: Recalls 3 objects  Results: 3 items recalled: COGNITIVE IMPAIRMENT LESS LIKELY    Mini-CogTM Copyright S Robert. Licensed by the author for use in [facility]; reprinted with permission (christina@[facility]). All rights reserved.      Do you have sleep apnea, excessive snoring or daytime drowsiness?: no    Reviewed and updated as needed this visit by clinical staff   Tobacco  Allergies                 Reviewed and updated as needed this visit by Provider                   Social History     Tobacco Use     Smoking status: Never Smoker     Smokeless tobacco: Never Used   Substance Use Topics     Alcohol use: Yes     Alcohol/week: 0.0 standard drinks     Comment: social         Alcohol Use 7/7/2022   Prescreen: >3 drinks/day or >7 drinks/week? No   Prescreen: >3 drinks/day or >7 drinks/week? -           Current providers sharing in care for this patient include:   Patient Care Team:  Arun Tao MD as PCP - Venessa Bernard RN as   Arun Tao MD as Assigned PCP    The following health maintenance items are reviewed in Epic and correct as of today:  Health Maintenance Due  "  Topic Date Due     URINE DRUG SCREEN  Never done     ANNUAL REVIEW OF HM ORDERS  07/05/2022     MEDICARE ANNUAL WELLNESS VISIT  07/05/2022     MAMMO SCREENING  07/05/2022             Pertinent mammograms are reviewed under the imaging tab.    Review of Systems  Constitutional, HEENT, cardiovascular, pulmonary, GI, , musculoskeletal, neuro, skin, endocrine and psych systems are negative, except as otherwise noted.    OBJECTIVE:   /78   Pulse 86   Temp 97.9  F (36.6  C) (Oral)   Ht 1.727 m (5' 8\")   Wt 103 kg (227 lb)   SpO2 96%   BMI 34.52 kg/m   Estimated body mass index is 34.52 kg/m  as calculated from the following:    Height as of this encounter: 1.727 m (5' 8\").    Weight as of this encounter: 103 kg (227 lb).  Physical Exam  GENERAL: healthy, alert and no distress  NECK: no adenopathy, no asymmetry, masses, or scars and thyroid normal to palpation  RESP: lungs clear to auscultation - no rales, rhonchi or wheezes  CV: regular rate and rhythm, normal S1 S2, no S3 or S4, no murmur, click or rub, no peripheral edema and peripheral pulses strong  ABDOMEN: soft, nontender, no hepatosplenomegaly, no masses and bowel sounds normal  MS: no gross musculoskeletal defects noted, no edema        ASSESSMENT / PLAN:   Medicare annual wellness visit, subsequent      Hyperlipidemia LDL goal <160    - Lipid panel reflex to direct LDL Fasting; Future  - Comprehensive metabolic panel (BMP + Alb, Alk Phos, ALT, AST, Total. Bili, TP); Future  - Lipid panel reflex to direct LDL Fasting  - Comprehensive metabolic panel (BMP + Alb, Alk Phos, ALT, AST, Total. Bili, TP)    Osteoarthritis of hip, unspecified laterality, unspecified osteoarthritis type    - celecoxib (CELEBREX) 200 MG capsule; TAKE 1 CAPSULE BY MOUTH EVERY DAY    Essential hypertension    - lisinopril (ZESTRIL) 10 MG tablet; Take 1 tablet (10 mg) by mouth daily          COUNSELING:      Estimated body mass index is 34.52 kg/m  as calculated from the " "following:    Height as of this encounter: 1.727 m (5' 8\").    Weight as of this encounter: 103 kg (227 lb).        She reports that she has never smoked. She has never used smokeless tobacco.      Appropriate preventive services were discussed with this patient, including applicable screening as appropriate for cardiovascular disease, diabetes, osteopenia/osteoporosis, and glaucoma.  As appropriate for age/gender, discussed screening for colorectal cancer, prostate cancer, breast cancer, and cervical cancer. Checklist reviewing preventive services available has been given to the patient.    Reviewed patients plan of care and provided an AVS. The Basic Care Plan (routine screening as documented in Health Maintenance) for Yohana meets the Care Plan requirement. This Care Plan has been established and reviewed with the Patient.    Counseling Resources:  ATP IV Guidelines  Pooled Cohorts Equation Calculator  Breast Cancer Risk Calculator  Breast Cancer: Medication to Reduce Risk  FRAX Risk Assessment  ICSI Preventive Guidelines  Dietary Guidelines for Americans, 2010  USDA's MyPlate  ASA Prophylaxis  Lung CA Screening    Arun Tao MD  [facility]    Identified Health Risks:  "

## 2022-07-14 DIAGNOSIS — R79.89 ELEVATED LFTS: Primary | ICD-10-CM

## 2022-08-08 ENCOUNTER — HOSPITAL ENCOUNTER (OUTPATIENT)
Dept: ULTRASOUND IMAGING | Facility: OTHER | Age: 77
Discharge: HOME | End: 2022-08-08
Attending: INTERNAL MEDICINE | Admitting: INTERNAL MEDICINE
Payer: COMMERCIAL

## 2022-08-08 DIAGNOSIS — R79.89 ELEVATED LFTS: ICD-10-CM

## 2022-08-08 PROCEDURE — 76700 US EXAM ABDOM COMPLETE: CPT

## 2022-08-17 DIAGNOSIS — K80.20 CALCULUS OF GALLBLADDER WITHOUT CHOLECYSTITIS WITHOUT OBSTRUCTION: Primary | ICD-10-CM

## 2022-08-23 ENCOUNTER — OFFICE VISIT (OUTPATIENT)
Dept: SURGERY | Facility: OTHER | Age: 77
End: 2022-08-23
Attending: INTERNAL MEDICINE
Payer: COMMERCIAL

## 2022-08-23 ENCOUNTER — TELEPHONE (OUTPATIENT)
Dept: SURGERY | Facility: OTHER | Age: 77
End: 2022-08-23

## 2022-08-23 VITALS
SYSTOLIC BLOOD PRESSURE: 158 MMHG | HEART RATE: 68 BPM | BODY MASS INDEX: 34.4 KG/M2 | WEIGHT: 227 LBS | DIASTOLIC BLOOD PRESSURE: 84 MMHG | HEIGHT: 68 IN | RESPIRATION RATE: 18 BRPM | TEMPERATURE: 97.9 F | OXYGEN SATURATION: 98 %

## 2022-08-23 DIAGNOSIS — K80.20 CALCULUS OF GALLBLADDER WITHOUT CHOLECYSTITIS WITHOUT OBSTRUCTION: Primary | ICD-10-CM

## 2022-08-23 DIAGNOSIS — K83.8 COMMON BILE DUCT DILATATION: ICD-10-CM

## 2022-08-23 LAB
ALBUMIN SERPL-MCNC: 3.7 G/DL (ref 3.5–5.7)
ALP SERPL-CCNC: 373 U/L (ref 34–104)
ALT SERPL W P-5'-P-CCNC: 65 U/L (ref 7–52)
AMYLASE SERPL-CCNC: 41 U/L (ref 29–103)
ANION GAP SERPL CALCULATED.3IONS-SCNC: 11 MMOL/L (ref 3–14)
AST SERPL W P-5'-P-CCNC: 101 U/L (ref 13–39)
BILIRUB SERPL-MCNC: 1.5 MG/DL (ref 0.3–1)
BUN SERPL-MCNC: 12 MG/DL (ref 7–25)
CALCIUM SERPL-MCNC: 9.7 MG/DL (ref 8.6–10.3)
CHLORIDE BLD-SCNC: 100 MMOL/L (ref 98–107)
CO2 SERPL-SCNC: 28 MMOL/L (ref 21–31)
CREAT SERPL-MCNC: 0.58 MG/DL (ref 0.6–1.2)
ERYTHROCYTE [DISTWIDTH] IN BLOOD BY AUTOMATED COUNT: 15.3 % (ref 10–15)
GFR SERPL CREATININE-BSD FRML MDRD: >90 ML/MIN/1.73M2
GLUCOSE BLD-MCNC: 95 MG/DL (ref 70–105)
HCT VFR BLD AUTO: 40.7 % (ref 35–47)
HGB BLD-MCNC: 13.4 G/DL (ref 11.7–15.7)
HOLD SPECIMEN: NORMAL
LIPASE SERPL-CCNC: 31 U/L (ref 11–82)
MCH RBC QN AUTO: 32.1 PG (ref 26.5–33)
MCHC RBC AUTO-ENTMCNC: 32.9 G/DL (ref 31.5–36.5)
MCV RBC AUTO: 97 FL (ref 78–100)
PLATELET # BLD AUTO: 306 10E3/UL (ref 150–450)
POTASSIUM BLD-SCNC: 4.2 MMOL/L (ref 3.5–5.1)
PROT SERPL-MCNC: 7.7 G/DL (ref 6.4–8.9)
RBC # BLD AUTO: 4.18 10E6/UL (ref 3.8–5.2)
SODIUM SERPL-SCNC: 139 MMOL/L (ref 134–144)
WBC # BLD AUTO: 6.8 10E3/UL (ref 4–11)

## 2022-08-23 PROCEDURE — 99204 OFFICE O/P NEW MOD 45 MIN: CPT | Performed by: SURGERY

## 2022-08-23 PROCEDURE — G0463 HOSPITAL OUTPT CLINIC VISIT: HCPCS

## 2022-08-23 PROCEDURE — 82150 ASSAY OF AMYLASE: CPT | Mod: ZL | Performed by: SURGERY

## 2022-08-23 PROCEDURE — 83690 ASSAY OF LIPASE: CPT | Mod: ZL | Performed by: SURGERY

## 2022-08-23 PROCEDURE — 85027 COMPLETE CBC AUTOMATED: CPT | Mod: ZL | Performed by: SURGERY

## 2022-08-23 PROCEDURE — 36415 COLL VENOUS BLD VENIPUNCTURE: CPT | Mod: ZL | Performed by: SURGERY

## 2022-08-23 PROCEDURE — 80053 COMPREHEN METABOLIC PANEL: CPT | Mod: ZL | Performed by: SURGERY

## 2022-08-23 ASSESSMENT — PAIN SCALES - GENERAL: PAINLEVEL: NO PAIN (0)

## 2022-08-23 NOTE — PROGRESS NOTES
Surgical Clinic Consult  Referring physician:  Arun Tao   Primary physician:     Arun Tao    Chief complaint:   Gallstones    History of present illness:  This is a 77 year old female I am seeing in consultation for gallstones.  The patient had mild elevation of her alkaline phosphatase and transaminases at the beginning of July 2022, bilirubin normal.  The patient denies any abdominal pain nausea jaundice dark urine.  An ultrasound was done which shows gallstones and enlarged common duct at 14 mm .  She reports that she is intentionally lost 50 pounds, although she noted it came off easily.     Past medical history:   Past Medical History:   Diagnosis Date     HTN (hypertension)      Hyperlipidemia      Leiomyoma of uterus, unspecified      Numbness and tingling     spinal stenosis causes numbness and tingling on feet and knees bilaterally     Osteoarthrosis, unspecified whether generalized or localized, unspecified site      Other and unspecified disc disorder of lumbar region      Spinal stenosis     S/P diskectomy x 2, most recently 8/2014       Pastsurgical history:  Past Surgical History:   Procedure Laterality Date     ARTHROPLASTY HIP Left 12/12/2016    Procedure: ARTHROPLASTY HIP;  Surgeon: Leonid Morfin MD;  Location:  OR     BACK SURGERY       COLONOSCOPY N/A 7/20/2017    Procedure: COMBINED COLONOSCOPY, SINGLE OR MULTIPLE BIOPSY/POLYPECTOMY BY BIOPSY;  colonoscopy;  Surgeon: Catarino Salas MD;  Location:  GI     DISCECTOMY LUMBAR POSTERIOR MICROSCOPIC TWO LEVELS  8/11/2014    Procedure: DISCECTOMY LUMBAR POSTERIOR MICROSCOPIC TWO LEVELS;  Surgeon: Warren Herring MD;  Location:  OR     ORTHOPEDIC SURGERY       [facility] NONSPECIFIC PROCEDURE  1996    lumbar discectomy     [facility] NONSPECIFIC PROCEDURE      cervical cone biopsy     [facility] NONSPECIFIC PROCEDURE      Left knee replacement       Current medications:  Current Outpatient Medications   Medication Sig  Dispense Refill     ASPIRIN EC PO Take 81 mg by mouth daily       calcium-vitamin D (CALTRATE) 600-400 MG-UNIT per tablet Take 1 tablet by mouth 2 times daily       celecoxib (CELEBREX) 200 MG capsule TAKE 1 CAPSULE BY MOUTH EVERY DAY 90 capsule 3     lisinopril (ZESTRIL) 10 MG tablet Take 1 tablet (10 mg) by mouth daily 90 tablet 3     Multiple Vitamins-Minerals (CENTRUM SILVER) per tablet Take 1 tablet by mouth daily       traMADol (ULTRAM) 50 MG tablet TAKE 1 TO 2 TABLETS BY MOUTH EVERY DAY FOR SEVERE PAIN 90 tablet 0     vitamin D3 (CHOLECALCIFEROL) 50 mcg (2000 units) tablet Take 1 tablet (50 mcg) by mouth daily         Allergies:  No Known Allergies    Family history:  Family History   Problem Relation Age of Onset     Family History Negative Mother          age 64 mva     Cancer Father          age 84 lung ca     Family History Negative Sister      Breast Cancer Paternal Grandmother      Genetic Disorder Other         daughter has Autoimmune disease       Social history:  Social History     Socioeconomic History     Marital status:      Spouse name: Not on file     Number of children: Not on file     Years of education: Not on file     Highest education level: Not on file   Occupational History     Not on file   Tobacco Use     Smoking status: Never Smoker     Smokeless tobacco: Never Used   Substance and Sexual Activity     Alcohol use: Yes     Alcohol/week: 0.0 standard drinks     Comment: social     Drug use: No     Sexual activity: Yes     Partners: Male   Other Topics Concern     Parent/sibling w/ CABG, MI or angioplasty before 65F 55M? Not Asked   Social History Narrative     Not on file     Social Determinants of Health     Financial Resource Strain: Not on file   Food Insecurity: Not on file   Transportation Needs: Not on file   Physical Activity: Not on file   Stress: Not on file   Social Connections: Not on file   Intimate Partner Violence: Not on file   Housing Stability: Not  "on file       PROBLEM LIST:  Patient Active Problem List   Diagnosis     HTN (hypertension)     Osteopenia     HYPERLIPIDEMIA LDL GOAL <160     ACP (advance care planning)     Health Care Home     Spinal stenosis, lumbar region, with neurogenic claudication     Physical deconditioning     S/P lumbar laminectomy     Chronic pain syndrome     Hip joint replacement status     S/P hip replacement, left     Benign essential hypertension     Primary osteoarthritis of left hip     Common bile duct dilatation     Review of systems:  COMPLETE REVIEW OF SYSTEMS: Weight loss as noted in HPI  Gastrointestinal: Constipation  Cardiovascular: Denies problems  Respiratory: Denies problems  Genitourinary: Denies problems  Musculoskeletal: Joint and muscle pain difficulty walking  Skin: Itching  Neurologic: Denies problems  Psychiatric: Denies problems  Endocrine: Denies problems  Heme/Lymphatic: Denies problems  Vascular: Leg pain with walking and at night      Physical exam: BP (!) 158/84 (BP Location: Right arm, Patient Position: Sitting, Cuff Size: Adult Large)   Pulse 68   Temp 97.9  F (36.6  C) (Tympanic)   Resp 18   Ht 1.727 m (5' 8\")   Wt 103 kg (227 lb)   SpO2 98%   BMI 34.52 kg/m        General: this is a pleasant female patient in no acute distress.  Patient is awake alert and oriented x3 .   Respiratory: Clear  Cardiovascular: Regular rate and rhythm  Abdominal: Soft and nontender    Assessment:   1.  Cholelithiasis  2. Common bile duct dilation  3. Weight loss ( too easy intentional)  Gallstone appear asymptomatic, but could cause dilation of CBD ( not so asymptomatic). Also concerned that we need to exclude malignancy/mass with CBD dilation and no pain.     Plan:    CBC, Comp panel, amylase and lipase today  MR ABD attn pancreas  MRCP      Warren Zhang MD FACS          "

## 2022-08-23 NOTE — NURSING NOTE
Patient presents to clinic today for consult for calculus of gallbladder.   Medication reconciliation completed.    ACP on file? No    FOOD SECURITY SCREENING QUESTIONS    The next two questions are to help us understand your food security.  If you are feeling you need any assistance in this area, we have resources available to support you today.    Hunger Vital Signs:  Within the past 12 months we worried whether our food would run out before we got money to buy more. Never  Within the past 12 months the food we bought just didn't last and we didn't have money to get more. Never    Daniella Nguyen CMA(AAMA)..................8/23/2022   9:06 AM

## 2022-08-29 ENCOUNTER — HOSPITAL ENCOUNTER (OUTPATIENT)
Dept: MRI IMAGING | Facility: OTHER | Age: 77
Discharge: HOME | End: 2022-08-29
Attending: SURGERY | Admitting: SURGERY
Payer: COMMERCIAL

## 2022-08-29 DIAGNOSIS — K83.8 COMMON BILE DUCT DILATATION: ICD-10-CM

## 2022-08-29 DIAGNOSIS — K80.20 CALCULUS OF GALLBLADDER WITHOUT CHOLECYSTITIS WITHOUT OBSTRUCTION: ICD-10-CM

## 2022-08-29 PROCEDURE — 74183 MRI ABD W/O CNTR FLWD CNTR: CPT

## 2022-08-29 PROCEDURE — A9575 INJ GADOTERATE MEGLUMI 0.1ML: HCPCS | Performed by: SURGERY

## 2022-08-29 PROCEDURE — 255N000002 HC RX 255 OP 636: Performed by: SURGERY

## 2022-08-29 RX ADMIN — GADOTERATE MEGLUMINE 20 ML: 376.9 INJECTION INTRAVENOUS at 10:22

## 2022-09-02 DIAGNOSIS — G89.4 CHRONIC PAIN SYNDROME: ICD-10-CM

## 2022-09-02 DIAGNOSIS — Z96.642 S/P HIP REPLACEMENT, LEFT: ICD-10-CM

## 2022-09-02 RX ORDER — TRAMADOL HYDROCHLORIDE 50 MG/1
TABLET ORAL
Qty: 90 TABLET | Refills: 0 | Status: SHIPPED | OUTPATIENT
Start: 2022-09-02 | End: 2022-12-20

## 2022-09-07 ENCOUNTER — TELEPHONE (OUTPATIENT)
Dept: SURGERY | Facility: OTHER | Age: 77
End: 2022-09-07

## 2022-09-07 DIAGNOSIS — I10 ESSENTIAL HYPERTENSION: ICD-10-CM

## 2022-09-07 DIAGNOSIS — M16.9 OSTEOARTHRITIS OF HIP, UNSPECIFIED LATERALITY, UNSPECIFIED OSTEOARTHRITIS TYPE: ICD-10-CM

## 2022-09-07 NOTE — TELEPHONE ENCOUNTER
Patient states she is waiting for results on MR done 8/29. Please call.    Sangita Hernandez on 9/7/2022 at 1:11 PM

## 2022-09-07 NOTE — TELEPHONE ENCOUNTER
Surgery scheduled for October with Dr. Zhang for laparoscopic cholecystectomy.  Will pick-up surgery folder at unit 4 registration.  Georgette Manrique LPN..........9/7/2022  2:12 PM

## 2022-09-08 RX ORDER — CELECOXIB 200 MG/1
CAPSULE ORAL
Qty: 90 CAPSULE | Refills: 1 | Status: ON HOLD | OUTPATIENT
Start: 2022-09-08 | End: 2022-12-03

## 2022-09-08 RX ORDER — LISINOPRIL 10 MG/1
TABLET ORAL
Qty: 90 TABLET | Refills: 1 | Status: SHIPPED | OUTPATIENT
Start: 2022-09-08 | End: 2023-01-01

## 2022-09-08 NOTE — TELEPHONE ENCOUNTER
Routing refill request to provider for review/approval because:   Blood pressure under 140/90 in past 12 months    Normal ALT on file in past 12 months    Normal AST on file in past 12 months    Patient is age 6-64 years    Normal serum creatinine on file in past 12 months       Winston Rush, RN  [facility] Triage Nurse

## 2022-09-09 NOTE — TELEPHONE ENCOUNTER
After verification of name and date of birth, patient notified of change in surgery date. Patient verbalizes understanding and has no further questions or concerns. Had patient note new appointments in folder for surgery, PAC, and Post op appointment. Patient verified correct information received. Harper Murray RN  ....................  9/9/2022   11:13 AM

## 2022-09-26 RX ORDER — CEFAZOLIN SODIUM 2 G/100ML
2 INJECTION, SOLUTION INTRAVENOUS SEE ADMIN INSTRUCTIONS
Status: CANCELLED | OUTPATIENT
Start: 2022-09-26

## 2022-09-26 RX ORDER — ACETAMINOPHEN 325 MG/1
975 TABLET ORAL ONCE
Status: CANCELLED | OUTPATIENT
Start: 2022-09-26 | End: 2022-09-26

## 2022-09-26 RX ORDER — CEFAZOLIN SODIUM 2 G/100ML
2 INJECTION, SOLUTION INTRAVENOUS
Status: CANCELLED | OUTPATIENT
Start: 2022-09-26

## 2022-10-04 PROBLEM — K83.1 MIRIZZI'S SYNDROME (H): Status: ACTIVE | Noted: 2022-08-23

## 2022-10-04 RX ORDER — HYDROCODONE BITARTRATE AND ACETAMINOPHEN 5; 325 MG/1; MG/1
1 TABLET ORAL EVERY 6 HOURS PRN
Qty: 12 TABLET | Refills: 0 | Status: CANCELLED | OUTPATIENT
Start: 2022-10-04

## 2022-10-05 ENCOUNTER — ANESTHESIA EVENT (OUTPATIENT)
Dept: SURGERY | Facility: OTHER | Age: 77
End: 2022-10-05
Payer: COMMERCIAL

## 2022-10-06 ENCOUNTER — HOSPITAL ENCOUNTER (OUTPATIENT)
Facility: OTHER | Age: 77
Discharge: HOME | End: 2022-10-06
Attending: SURGERY | Admitting: SURGERY
Payer: COMMERCIAL

## 2022-10-06 ENCOUNTER — ANESTHESIA (OUTPATIENT)
Dept: SURGERY | Facility: OTHER | Age: 77
End: 2022-10-06
Payer: COMMERCIAL

## 2022-10-06 VITALS
OXYGEN SATURATION: 94 % | SYSTOLIC BLOOD PRESSURE: 136 MMHG | HEART RATE: 72 BPM | WEIGHT: 205 LBS | TEMPERATURE: 97.5 F | DIASTOLIC BLOOD PRESSURE: 80 MMHG | BODY MASS INDEX: 31.17 KG/M2 | RESPIRATION RATE: 16 BRPM

## 2022-10-06 DIAGNOSIS — K80.20 CALCULUS OF GALLBLADDER WITHOUT CHOLECYSTITIS WITHOUT OBSTRUCTION: ICD-10-CM

## 2022-10-06 PROBLEM — K81.1: Status: ACTIVE | Noted: 2022-08-23

## 2022-10-06 LAB — GLUCOSE BLDC GLUCOMTR-MCNC: 104 MG/DL (ref 70–99)

## 2022-10-06 PROCEDURE — 47379 UNLISTED LAPS PX LIVER: CPT | Performed by: SURGERY

## 2022-10-06 PROCEDURE — 258N000003 HC RX IP 258 OP 636: Performed by: NURSE ANESTHETIST, CERTIFIED REGISTERED

## 2022-10-06 PROCEDURE — 710N000010 HC RECOVERY PHASE 1, LEVEL 2, PER MIN: Performed by: SURGERY

## 2022-10-06 PROCEDURE — 82962 GLUCOSE BLOOD TEST: CPT

## 2022-10-06 PROCEDURE — 258N000001 HC RX 258: Performed by: SURGERY

## 2022-10-06 PROCEDURE — 999N000141 HC STATISTIC PRE-PROCEDURE NURSING ASSESSMENT: Performed by: SURGERY

## 2022-10-06 PROCEDURE — 88307 TISSUE EXAM BY PATHOLOGIST: CPT

## 2022-10-06 PROCEDURE — 250N000009 HC RX 250: Performed by: NURSE ANESTHETIST, CERTIFIED REGISTERED

## 2022-10-06 PROCEDURE — 250N000011 HC RX IP 250 OP 636: Performed by: SURGERY

## 2022-10-06 PROCEDURE — 250N000011 HC RX IP 250 OP 636: Performed by: NURSE ANESTHETIST, CERTIFIED REGISTERED

## 2022-10-06 PROCEDURE — 272N000002 HC OR SUPPLY OTHER OPNP: Performed by: SURGERY

## 2022-10-06 PROCEDURE — 370N000017 HC ANESTHESIA TECHNICAL FEE, PER MIN: Performed by: SURGERY

## 2022-10-06 PROCEDURE — 250N000025 HC SEVOFLURANE, PER MIN: Performed by: SURGERY

## 2022-10-06 PROCEDURE — 272N000001 HC OR GENERAL SUPPLY STERILE: Performed by: SURGERY

## 2022-10-06 PROCEDURE — 250N000013 HC RX MED GY IP 250 OP 250 PS 637: Performed by: SURGERY

## 2022-10-06 PROCEDURE — 360N000076 HC SURGERY LEVEL 3, PER MIN: Performed by: SURGERY

## 2022-10-06 PROCEDURE — 88313 SPECIAL STAINS GROUP 2: CPT

## 2022-10-06 RX ORDER — CEFAZOLIN SODIUM/WATER 2 G/20 ML
2 SYRINGE (ML) INTRAVENOUS
Status: DISCONTINUED | OUTPATIENT
Start: 2022-10-06 | End: 2022-10-06 | Stop reason: HOSPADM

## 2022-10-06 RX ORDER — MEPERIDINE HYDROCHLORIDE 50 MG/ML
12.5 INJECTION INTRAMUSCULAR; INTRAVENOUS; SUBCUTANEOUS
Status: DISCONTINUED | OUTPATIENT
Start: 2022-10-06 | End: 2022-10-06 | Stop reason: HOSPADM

## 2022-10-06 RX ORDER — SODIUM CHLORIDE, SODIUM LACTATE, POTASSIUM CHLORIDE, CALCIUM CHLORIDE 600; 310; 30; 20 MG/100ML; MG/100ML; MG/100ML; MG/100ML
INJECTION, SOLUTION INTRAVENOUS CONTINUOUS PRN
Status: DISCONTINUED | OUTPATIENT
Start: 2022-10-06 | End: 2022-10-06

## 2022-10-06 RX ORDER — ACETAMINOPHEN 325 MG/1
975 TABLET ORAL ONCE
Status: COMPLETED | OUTPATIENT
Start: 2022-10-06 | End: 2022-10-06

## 2022-10-06 RX ORDER — NALOXONE HYDROCHLORIDE 0.4 MG/ML
0.2 INJECTION, SOLUTION INTRAMUSCULAR; INTRAVENOUS; SUBCUTANEOUS
Status: DISCONTINUED | OUTPATIENT
Start: 2022-10-06 | End: 2022-10-06 | Stop reason: HOSPADM

## 2022-10-06 RX ORDER — GLYCOPYRROLATE 0.2 MG/ML
INJECTION, SOLUTION INTRAMUSCULAR; INTRAVENOUS PRN
Status: DISCONTINUED | OUTPATIENT
Start: 2022-10-06 | End: 2022-10-06

## 2022-10-06 RX ORDER — FENTANYL CITRATE 50 UG/ML
50 INJECTION, SOLUTION INTRAMUSCULAR; INTRAVENOUS
Status: DISCONTINUED | OUTPATIENT
Start: 2022-10-06 | End: 2022-10-06 | Stop reason: HOSPADM

## 2022-10-06 RX ORDER — OXYCODONE AND ACETAMINOPHEN 5; 325 MG/1; MG/1
1 TABLET ORAL EVERY 6 HOURS PRN
Qty: 12 TABLET | Refills: 0 | Status: SHIPPED | OUTPATIENT
Start: 2022-10-06 | End: 2022-10-09

## 2022-10-06 RX ORDER — CEFAZOLIN SODIUM/WATER 2 G/20 ML
2 SYRINGE (ML) INTRAVENOUS SEE ADMIN INSTRUCTIONS
Status: DISCONTINUED | OUTPATIENT
Start: 2022-10-06 | End: 2022-10-06 | Stop reason: HOSPADM

## 2022-10-06 RX ORDER — OXYCODONE HYDROCHLORIDE 5 MG/1
5 TABLET ORAL EVERY 4 HOURS PRN
Status: DISCONTINUED | OUTPATIENT
Start: 2022-10-06 | End: 2022-10-06 | Stop reason: HOSPADM

## 2022-10-06 RX ORDER — ONDANSETRON 2 MG/ML
4 INJECTION INTRAMUSCULAR; INTRAVENOUS EVERY 30 MIN PRN
Status: DISCONTINUED | OUTPATIENT
Start: 2022-10-06 | End: 2022-10-06 | Stop reason: HOSPADM

## 2022-10-06 RX ORDER — SODIUM CHLORIDE, SODIUM LACTATE, POTASSIUM CHLORIDE, CALCIUM CHLORIDE 600; 310; 30; 20 MG/100ML; MG/100ML; MG/100ML; MG/100ML
INJECTION, SOLUTION INTRAVENOUS CONTINUOUS
Status: DISCONTINUED | OUTPATIENT
Start: 2022-10-06 | End: 2022-10-06 | Stop reason: HOSPADM

## 2022-10-06 RX ORDER — LIDOCAINE 40 MG/G
CREAM TOPICAL
Status: DISCONTINUED | OUTPATIENT
Start: 2022-10-06 | End: 2022-10-06 | Stop reason: HOSPADM

## 2022-10-06 RX ORDER — VECURONIUM BROMIDE 1 MG/ML
INJECTION, POWDER, LYOPHILIZED, FOR SOLUTION INTRAVENOUS PRN
Status: DISCONTINUED | OUTPATIENT
Start: 2022-10-06 | End: 2022-10-06

## 2022-10-06 RX ORDER — NALOXONE HYDROCHLORIDE 0.4 MG/ML
0.4 INJECTION, SOLUTION INTRAMUSCULAR; INTRAVENOUS; SUBCUTANEOUS
Status: DISCONTINUED | OUTPATIENT
Start: 2022-10-06 | End: 2022-10-06 | Stop reason: HOSPADM

## 2022-10-06 RX ORDER — PROPOFOL 10 MG/ML
INJECTION, EMULSION INTRAVENOUS PRN
Status: DISCONTINUED | OUTPATIENT
Start: 2022-10-06 | End: 2022-10-06

## 2022-10-06 RX ORDER — ACETAMINOPHEN 325 MG/1
650 TABLET ORAL 4 TIMES DAILY
Qty: 24 TABLET | Refills: 0 | Status: SHIPPED | OUTPATIENT
Start: 2022-10-06 | End: 2022-10-09

## 2022-10-06 RX ORDER — FENTANYL CITRATE 50 UG/ML
50 INJECTION, SOLUTION INTRAMUSCULAR; INTRAVENOUS EVERY 5 MIN PRN
Status: DISCONTINUED | OUTPATIENT
Start: 2022-10-06 | End: 2022-10-06 | Stop reason: HOSPADM

## 2022-10-06 RX ORDER — BUPIVACAINE HYDROCHLORIDE 2.5 MG/ML
INJECTION, SOLUTION INFILTRATION; PERINEURAL PRN
Status: DISCONTINUED | OUTPATIENT
Start: 2022-10-06 | End: 2022-10-06 | Stop reason: HOSPADM

## 2022-10-06 RX ORDER — FENTANYL CITRATE 50 UG/ML
INJECTION, SOLUTION INTRAMUSCULAR; INTRAVENOUS PRN
Status: DISCONTINUED | OUTPATIENT
Start: 2022-10-06 | End: 2022-10-06

## 2022-10-06 RX ORDER — ONDANSETRON 4 MG/1
4 TABLET, ORALLY DISINTEGRATING ORAL EVERY 30 MIN PRN
Status: DISCONTINUED | OUTPATIENT
Start: 2022-10-06 | End: 2022-10-06 | Stop reason: HOSPADM

## 2022-10-06 RX ORDER — OXYCODONE HYDROCHLORIDE 5 MG/1
5 TABLET ORAL
Status: DISCONTINUED | OUTPATIENT
Start: 2022-10-06 | End: 2022-10-06 | Stop reason: HOSPADM

## 2022-10-06 RX ORDER — IBUPROFEN 600 MG/1
600 TABLET, FILM COATED ORAL 4 TIMES DAILY
Qty: 12 TABLET | Refills: 0 | Status: SHIPPED | OUTPATIENT
Start: 2022-10-06 | End: 2022-10-09

## 2022-10-06 RX ORDER — HYDROMORPHONE HYDROCHLORIDE 1 MG/ML
0.4 INJECTION, SOLUTION INTRAMUSCULAR; INTRAVENOUS; SUBCUTANEOUS EVERY 5 MIN PRN
Status: DISCONTINUED | OUTPATIENT
Start: 2022-10-06 | End: 2022-10-06 | Stop reason: HOSPADM

## 2022-10-06 RX ADMIN — SODIUM CHLORIDE, SODIUM LACTATE, POTASSIUM CHLORIDE, AND CALCIUM CHLORIDE 100 ML/HR: 600; 310; 30; 20 INJECTION, SOLUTION INTRAVENOUS at 18:00

## 2022-10-06 RX ADMIN — SODIUM CHLORIDE, POTASSIUM CHLORIDE, SODIUM LACTATE AND CALCIUM CHLORIDE: 600; 310; 30; 20 INJECTION, SOLUTION INTRAVENOUS at 13:13

## 2022-10-06 RX ADMIN — SUGAMMADEX 200 MG: 100 INJECTION, SOLUTION INTRAVENOUS at 17:21

## 2022-10-06 RX ADMIN — FENTANYL CITRATE 50 MCG: 50 INJECTION, SOLUTION INTRAMUSCULAR; INTRAVENOUS at 16:35

## 2022-10-06 RX ADMIN — VECURONIUM BROMIDE 7 MG: 1 INJECTION, POWDER, LYOPHILIZED, FOR SOLUTION INTRAVENOUS at 16:37

## 2022-10-06 RX ADMIN — FENTANYL CITRATE 50 MCG: 50 INJECTION, SOLUTION INTRAMUSCULAR; INTRAVENOUS at 18:05

## 2022-10-06 RX ADMIN — SODIUM CHLORIDE, POTASSIUM CHLORIDE, SODIUM LACTATE AND CALCIUM CHLORIDE 100 ML/HR: 600; 310; 30; 20 INJECTION, SOLUTION INTRAVENOUS at 17:41

## 2022-10-06 RX ADMIN — Medication 2 G: at 15:57

## 2022-10-06 RX ADMIN — FENTANYL CITRATE 50 MCG: 50 INJECTION, SOLUTION INTRAMUSCULAR; INTRAVENOUS at 17:52

## 2022-10-06 RX ADMIN — GLYCOPYRROLATE 0.2 MG: 0.2 INJECTION, SOLUTION INTRAMUSCULAR; INTRAVENOUS at 16:35

## 2022-10-06 RX ADMIN — SODIUM CHLORIDE, SODIUM LACTATE, POTASSIUM CHLORIDE, AND CALCIUM CHLORIDE: 600; 310; 30; 20 INJECTION, SOLUTION INTRAVENOUS at 16:33

## 2022-10-06 RX ADMIN — PROPOFOL 150 MG: 10 INJECTION, EMULSION INTRAVENOUS at 16:37

## 2022-10-06 RX ADMIN — DEXMEDETOMIDINE HYDROCHLORIDE 12 MCG: 100 INJECTION, SOLUTION INTRAVENOUS at 16:53

## 2022-10-06 RX ADMIN — ACETAMINOPHEN 975 MG: 325 TABLET ORAL at 12:56

## 2022-10-06 ASSESSMENT — ACTIVITIES OF DAILY LIVING (ADL)
ADLS_ACUITY_SCORE: 22
ADLS_ACUITY_SCORE: 24
ADLS_ACUITY_SCORE: 22

## 2022-10-06 NOTE — BRIEF OP NOTE
[facility]    Brief Operative Note    Pre-operative diagnosis: Calculus of gallbladder without cholecystitis without obstruction [K80.20]  Post-operative diagnosis Cirrhosis and posible gallbladder cancer    Procedure: Procedure(s):  Diagnostic laparoscopy with liver biopsy  Surgeon: Surgeon(s) and Role:     * Warren Zhang MD - Primary  Anesthesia: General   Estimated Blood Loss: Less than 100 ml    Drains: None  Specimens:   ID Type Source Tests Collected by Time Destination   1 :  Biopsy Liver SURGICAL PATHOLOGY EXAM Warren Zhang MD 10/6/2022  5:08 PM      Findings:   See photos.  Complications: None.  Implants: * No implants in log *    Severely contracted fibrous gallbladder and dense nodular liver.

## 2022-10-06 NOTE — ANESTHESIA POSTPROCEDURE EVALUATION
Patient: Yohana Marques    Procedure: Procedure(s):  Diagnostic laparoscopy with liver biopsy       Anesthesia Type:  General    Note:  Disposition: Admission   Postop Pain Control:            Sign Out: Well controlled pain   PONV: No   Neuro/Psych:             Sign Out: Acceptable/Baseline neuro status   Airway/Respiratory:             Sign Out: Acceptable/Baseline resp. status   CV/Hemodynamics:             Sign Out: Acceptable CV status   Other NRE: NONE   DID A NON-ROUTINE EVENT OCCUR? No           Last vitals:  Vitals Value Taken Time   /71 10/06/22 1820   Temp 97.1  F (36.2  C) 10/06/22 1810   Pulse 73 10/06/22 1821   Resp 14 10/06/22 1821   SpO2 97 % 10/06/22 1821   Vitals shown include unvalidated device data.    Electronically Signed By: David Kellerman, APRN CRNA  October 6, 2022  6:21 PM

## 2022-10-06 NOTE — PROGRESS NOTES
Patient did not want percocet for after procedure- she states she would use tramdol that she has at home instead.

## 2022-10-06 NOTE — H&P
History and Physical    CHIEF COMPLAINT / REASON FOR PROCEDURE:  Cholelithiasis    PERTINENT HISTORY   Patient is a 77 year old female who presents today for  Laparoscopic cholecystectomy.   The patient had mild elevation of her alkaline phosphatase and transaminases at the beginning of July 2022, bilirubin normal.  The patient denies any abdominal pain nausea jaundice dark urine.  An ultrasound was done which shows gallstones and enlarged common duct at 14 mm .  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   Patient has no complaints.    Past Medical History:   Diagnosis Date     HTN (hypertension)      Hyperlipidemia      Leiomyoma of uterus, unspecified      Numbness and tingling     spinal stenosis causes numbness and tingling on feet and knees bilaterally     Osteoarthrosis, unspecified whether generalized or localized, unspecified site      Other and unspecified disc disorder of lumbar region      Primary osteoarthritis of left hip 12/18/2016     Spinal stenosis     S/P diskectomy x 2, most recently 8/2014     Past Surgical History:   Procedure Laterality Date     ARTHROPLASTY HIP Left 12/12/2016    Procedure: ARTHROPLASTY HIP;  Surgeon: Leonid Morfin MD;  Location:  OR     BACK SURGERY       COLONOSCOPY N/A 7/20/2017    Procedure: COMBINED COLONOSCOPY, SINGLE OR MULTIPLE BIOPSY/POLYPECTOMY BY BIOPSY;  colonoscopy;  Surgeon: Catarino Salas MD;  Location:  GI     DISCECTOMY LUMBAR POSTERIOR MICROSCOPIC TWO LEVELS  8/11/2014    Procedure: DISCECTOMY LUMBAR POSTERIOR MICROSCOPIC TWO LEVELS;  Surgeon: Warren Herring MD;  Location:  OR     ORTHOPEDIC SURGERY       [facility] NONSPECIFIC PROCEDURE  1996    lumbar discectomy     [facility] NONSPECIFIC PROCEDURE      cervical cone biopsy     [facility] NONSPECIFIC PROCEDURE      Left knee replacement       Bleeding tendencies:  No    ALLERGIES/SENSITIVITIES: No Known Allergies     CURRENT MEDICATIONS:    Prior  to Admission medications    Medication Sig Start Date End Date Taking? Authorizing Provider   celecoxib (CELEBREX) 200 MG capsule TAKE 1 CAPSULE BY MOUTH EVERY DAY 9/8/22  Yes Arun Tao MD   lisinopril (ZESTRIL) 10 MG tablet TAKE 1 TABLET(10 MG) BY MOUTH DAILY 9/8/22  Yes Arun Tao MD   Multiple Vitamins-Minerals (CENTRUM SILVER) per tablet Take 1 tablet by mouth daily   Yes Reported, Patient   traMADol (ULTRAM) 50 MG tablet TAKE 1 TO 2 TABLETS BY MOUTH EVERY DAY FOR SEVERE PAIN 9/2/22  Yes Arun Tao MD   ASPIRIN EC PO Take 81 mg by mouth daily    Reported, Patient   calcium-vitamin D (CALTRATE) 600-400 MG-UNIT per tablet Take 1 tablet by mouth 2 times daily    Reported, Patient   vitamin D3 (CHOLECALCIFEROL) 50 mcg (2000 units) tablet Take 1 tablet (50 mcg) by mouth daily 7/5/21   Arun Tao MD       Physical Exam:  BP (!) 148/84 (Cuff Size: Adult Regular)   Pulse 80   Temp 98.4  F (36.9  C) (Tympanic)   Resp 14   Wt 93 kg (205 lb)   SpO2 97%   BMI 31.17 kg/m    EXAM:  Chest/Respiratory Exam: Normal - Clear to auscultation without rales, rhonchi, or wheezing.  Cardiovascular Exam: normal, regular rate and rhythm  Abdomen: soft and non-tender      PLAN: Laparoscopic cholecystectomy .  Patient understands risks of bleeding, infection, potential injury to bowel or bile duct, possible open procedure and wishes to proceed.

## 2022-10-06 NOTE — ANESTHESIA CARE TRANSFER NOTE
Patient: Yohana Marques    Procedure: Procedure(s):  Diagnostic laparoscopy with liver biopsy       Diagnosis: Calculus of gallbladder without cholecystitis without obstruction [K80.20]  Diagnosis Additional Information: No value filed.    Anesthesia Type:   General     Note:      Level of Consciousness: awake and drowsy  Oxygen Supplementation: face mask  Level of Supplemental Oxygen (L/min / FiO2): 8 L/min  Independent Airway: airway patency satisfactory and stable  Dentition: dentition unchanged  Vital Signs Stable: post-procedure vital signs reviewed and stable  Report to RN Given: handoff report given  Patient transferred to: PACU    Handoff Report: Identifed the Patient, Identified the Reponsible Provider, Reviewed the pertinent medical history, Discussed the surgical course, Reviewed Intra-OP anesthesia mangement and issues during anesthesia, Set expectations for post-procedure period and Allowed opportunity for questions and acknowledgement of understanding      Vitals:  Vitals Value Taken Time   /69 10/06/22 1740   Temp     Pulse 74 10/06/22 1742   Resp 20 10/06/22 1742   SpO2 95 % 10/06/22 1742   Vitals shown include unvalidated device data.    Electronically Signed By: David Kellerman, APRN CRNA  October 6, 2022  5:43 PM

## 2022-10-06 NOTE — ANESTHESIA PREPROCEDURE EVALUATION
Anesthesia Pre-Procedure Evaluation    Patient: Yohana Marques   MRN: [id number] : 1945        Procedure : Procedure(s):  CHOLECYSTECTOMY, LAPAROSCOPIC          Past Medical History:   Diagnosis Date     HTN (hypertension)      Hyperlipidemia      Leiomyoma of uterus, unspecified      Numbness and tingling     spinal stenosis causes numbness and tingling on feet and knees bilaterally     Osteoarthrosis, unspecified whether generalized or localized, unspecified site      Other and unspecified disc disorder of lumbar region      Primary osteoarthritis of left hip 2016     Spinal stenosis     S/P diskectomy x 2, most recently 2014      Past Surgical History:   Procedure Laterality Date     ARTHROPLASTY HIP Left 2016    Procedure: ARTHROPLASTY HIP;  Surgeon: Leonid Morfin MD;  Location:  OR     BACK SURGERY       COLONOSCOPY N/A 2017    Procedure: COMBINED COLONOSCOPY, SINGLE OR MULTIPLE BIOPSY/POLYPECTOMY BY BIOPSY;  colonoscopy;  Surgeon: Catarino Salas MD;  Location:  GI     DISCECTOMY LUMBAR POSTERIOR MICROSCOPIC TWO LEVELS  2014    Procedure: DISCECTOMY LUMBAR POSTERIOR MICROSCOPIC TWO LEVELS;  Surgeon: Warren Herring MD;  Location:  OR     ORTHOPEDIC SURGERY       [facility] NONSPECIFIC PROCEDURE  1996    lumbar discectomy     [facility] NONSPECIFIC PROCEDURE      cervical cone biopsy     [facility] NONSPECIFIC PROCEDURE      Left knee replacement      No Known Allergies   Social History     Tobacco Use     Smoking status: Never Smoker     Smokeless tobacco: Never Used   Substance Use Topics     Alcohol use: Yes     Alcohol/week: 0.0 standard drinks     Comment: social      Wt Readings from Last 1 Encounters:   10/06/22 93 kg (205 lb)        Anesthesia Evaluation   Pt has had prior anesthetic.         ROS/MED HX  ENT/Pulmonary:  - neg pulmonary ROS     Neurologic:  - neg neurologic ROS     Cardiovascular:     (+) Dyslipidemia hypertension-----    METS/Exercise  Tolerance: 4 - Raking leaves, gardening    Hematologic:       Musculoskeletal: Comment: H/O Left AMARIS    Spinal Stenosis   (+) arthritis,     GI/Hepatic:  - neg GI/hepatic ROS     Renal/Genitourinary:  - neg Renal ROS     Endo:  - neg endo ROS     Psychiatric/Substance Use:  - neg psychiatric ROS     Infectious Disease:  - neg infectious disease ROS     Malignancy:  - neg malignancy ROS     Other:  - neg other ROS          Physical Exam    Airway        Mallampati: II   TM distance: > 3 FB   Neck ROM: full   Mouth opening: > 3 cm    Respiratory Devices and Support         Dental  no notable dental history         Cardiovascular   cardiovascular exam normal       Rhythm and rate: regular and normal     Pulmonary   pulmonary exam normal        breath sounds clear to auscultation           OUTSIDE LABS:  CBC:   Lab Results   Component Value Date    WBC 6.8 08/23/2022    WBC 6.6 12/19/2016    HGB 13.4 08/23/2022    HGB 10.9 (L) 12/19/2016    HCT 40.7 08/23/2022    HCT 32.8 (L) 12/19/2016     08/23/2022     12/19/2016     BMP:   Lab Results   Component Value Date     08/23/2022     07/07/2022    POTASSIUM 4.2 08/23/2022    POTASSIUM 3.9 07/07/2022    CHLORIDE 100 08/23/2022    CHLORIDE 104 07/07/2022    CO2 28 08/23/2022    CO2 26 07/07/2022    BUN 12 08/23/2022    BUN 12 07/07/2022    CR 0.58 (L) 08/23/2022    CR 0.63 07/07/2022     (H) 10/06/2022    GLC 95 08/23/2022     COAGS:   Lab Results   Component Value Date    PTT 25 05/31/2006    INR 0.97 08/08/2014     POC:   Lab Results   Component Value Date     (H) 08/13/2014     HEPATIC:   Lab Results   Component Value Date    ALBUMIN 3.7 08/23/2022    PROTTOTAL 7.7 08/23/2022    ALT 65 (H) 08/23/2022     (H) 08/23/2022    ALKPHOS 373 (H) 08/23/2022    BILITOTAL 1.5 (H) 08/23/2022     OTHER:   Lab Results   Component Value Date    A1C 6.1 (H) 11/18/2005    YEE 9.7 08/23/2022    LIPASE 31 08/23/2022    AMYLASE 41 08/23/2022     TSH 2.30 11/18/2005    T4 1.13 11/18/2005    SED 5 08/20/2013       Anesthesia Plan    ASA Status:  2   NPO Status:  NPO Appropriate    Anesthesia Type: General.              Consents    Anesthesia Plan(s) and associated risks, benefits, and realistic alternatives discussed. Questions answered and patient/representative(s) expressed understanding.     - Discussed: Risks, Benefits and Alternatives for the PROCEDURE were discussed     - Discussed with:  Patient         Postoperative Care            Comments:                David Kellerman, APRN CRNA

## 2022-10-06 NOTE — OR NURSING
PACU Transfer Note    Yohana Marques was transferred to [facility] room 316 via cart.  Equipment used for transport:  none.  Accompanied by:  RN X2  Prescriptions were: e-scribed to [facility]    PACU Respiratory Event Documentation     1) Episodes of Apnea greater than or equal to 10 seconds: no    2) Bradypnea - less than 8 breaths per minute: no    3) Pain score on 0 to 10 scale: 0/10    4) Pain-sedation mismatch (yes or no): no    5) Repeated 02 desaturation less than 90% (yes or no): no    Anesthesia notified? (yes or no): no    Any of the above events occuring repeatedly in separate 30 minute intervals may be considered recurrent PACU respiratory events.    Patient stable and meets phase 1 discharge criteria for transport from PACU.    Report given to GARCÍA Luna

## 2022-10-06 NOTE — ANESTHESIA PROCEDURE NOTES
Airway       Patient location during procedure: OR       Procedure Start/Stop Times: 10/6/2022 4:37 PM  Staff -        CRNA: Kellerman, David, APRN CRNA       Performed By: CRNA  Consent for Airway        Urgency: elective  Indications and Patient Condition       Indications for airway management: stevie-procedural       Induction type:intravenous       Mask difficulty assessment: 0 - not attempted    Final Airway Details       Final airway type: endotracheal airway       Successful airway: ETT - single  Endotracheal Airway Details        ETT size (mm): 7.0       Cuffed: yes       Cuff volume (mL): 8       Successful intubation technique: direct laryngoscopy       DL Blade Type: MAC 4       Grade View of Cords: 1       Adjucts: stylet       Position: Right       Measured from: gums/teeth       Secured at (cm): 21       Bite block used: None    Post intubation assessment        Placement verified by: capnometry, equal breath sounds and chest rise        Number of attempts at approach: 1       Number of other approaches attempted: 0       Secured with: silk tape       Ease of procedure: easy       Dentition: Intact and Unchanged    Medication(s) Administered   Medication Administration Time: 10/6/2022 4:37 PM

## 2022-10-07 ENCOUNTER — TELEPHONE (OUTPATIENT)
Dept: SURGERY | Facility: OTHER | Age: 77
End: 2022-10-07

## 2022-10-07 DIAGNOSIS — K81.1: Primary | ICD-10-CM

## 2022-10-07 NOTE — TELEPHONE ENCOUNTER
S-(situation): Referral Hepato-biliary Surgery    B-(background): Patient was seen by Dr. Zhang on 10/6/2022 for Diagnostic Laparoscopic with liver biopsy. Received message from Dr. Zhang looking for help getting patient a referral to hepato-biliary surgery. Patient prefers [facility]. Otherwise agreeable to go to the [facility] or [facility].     A-(assessment): Spoke with [facility] and was informed they do not perform this procedure. Advised to contact [facility]. Contact the [facility] and after some bouncing around with numbers looking for this information did ended up getting information on process.    R-(recommendations): Advised to send referral to [facility] and it will get sent to the location. Unable to confirm that this procedure is performed there except that [facility] sends all Liver procedures to the [facility].     Harper Murray RN  ....................  10/7/2022   9:41 AM

## 2022-10-07 NOTE — PROGRESS NOTES
NSG DISCHARGE NOTE    Patient discharged to home at 9:20 PM via wheel chair. Accompanied by spouse and staff. Discharge instructions reviewed with patient and spouse, opportunity offered to ask questions. Prescriptions sent to patients preferred pharmacy. All belongings sent with patient.    Patient is POD 0 from lap colectomy. VSS. Room Air. Afebrile. Pain managed with ice. Denies nausea. Ate, drank and voided. Up with SBA assist first time and IND there after.     Lorenza Roman RN

## 2022-10-07 NOTE — OP NOTE
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    POSTOPERATIVE DIAGNOSES:    1.  Nodular liver suspicious for cirrhosis.  2.  Contracted gallbladder, suspicious for gallbladder cancer or Xanthogranulomatous cholecystitis.    PROCEDURE:  Diagnostic laparoscopy with needle biopsy of the liver.    SURGEON:  Warren Zhang M.D.     ASSISTANT:  TAMMI Fowler    ANESTHESIA:  General, David Kellerman, CRNA.    INDICATIONS FOR PROCEDURE:  The patient is a 77-year-old woman who was noted to have gallstones and had been noted to have a mild elevation in her alkaline phosphatase and transaminases at the beginning of July 2022.  Her bilirubin was normal.  She did not have any abdominal symptoms except that she intentionally lost 50 pounds, but reported that it came off too easily.  An ultrasound showed gallstones and suggested a common duct at 14 mm.  An MRCP was done, which showed a large gallstone with external compression of the common hepatic duct, which was dilated at 1.5 cm and the common duct was normal.    DESCRIPTION OF PROCEDURE:  After adequate general anesthesia, the patient was prepped and draped in the usual sterile fashion.  The abdomen was entered through a 5 mm incision at the umbilicus using the Visiport technique.  The abdomen was insufflated with carbon dioxide to a pressure of 15 mmHg.  Under direct vision, a 12 mm epigastric and two 5 mm right-sided ports were placed.  The gallbladder was inspected.  The liver was markedly nodular, dense and fibrous suggesting cirrhosis.  The gallbladder was identified.  It was contracted.  There were adhesions to the dome of the gallbladder.  These were taken down with scissors.  There was some bleeding from those adhesions, which was controlled with cautery.  The gallbladder itself appeared white, fibrous and was very dense, not mobile and highly suspicious for malignancy.  Alternatively under consideration would be  a granulomatous cholecystitis given the contracted nature of the hepatic portal area.  The duodenum, common duct area was all close to the hepatic portal area, and that area near the base of the gallbladder was very dense and immobile; therefore, it was elected to discontinue the cholecystectomy and proceed with liver biopsy.  Three needle core biopsies were taken of the right side of the liver.  There was adequate hemostasis.  The right upper quadrant was irrigated with a liter of normal saline.  There was good hemostasis.  Photos were taken of the liver and the gallbladder.  The three upper ports were inspected, were unremarkable and were removed.  The abdomen deflated.  The remaining trocar removed.  The umbilical fascial defect was closed with a 2-0 Vicryl suture.  The wound was infiltrated with 0.25% Marcaine plain.  Proxy strips, clean dressings were applied and the patient was taken to recovery room in stable condition.    Warren Zhang MD        D: 10/06/2022   T: 10/06/2022   MT: RAY    Name:     CARLY DONALDSON  MRN:      [id number]        Account:        [id number]   :      1945           Procedure Date: 10/06/2022     Document: [id number]

## 2022-10-10 LAB
PATH REPORT.COMMENTS IMP SPEC: NORMAL
PATH REPORT.FINAL DX SPEC: NORMAL
PATH REPORT.RELEVANT HX SPEC: NORMAL
PHOTO IMAGE: NORMAL

## 2022-10-13 ENCOUNTER — HOSPITAL ENCOUNTER (INPATIENT)
Facility: CLINIC | Age: 77
LOS: 3 days | Discharge: HOME | DRG: 444 | End: 2022-10-16
Attending: EMERGENCY MEDICINE | Admitting: INTERNAL MEDICINE
Payer: COMMERCIAL

## 2022-10-13 ENCOUNTER — TELEPHONE (OUTPATIENT)
Dept: ONCOLOGY | Facility: CLINIC | Age: 77
End: 2022-10-13

## 2022-10-13 ENCOUNTER — OFFICE VISIT (OUTPATIENT)
Dept: SURGERY | Facility: OTHER | Age: 77
End: 2022-10-13
Attending: SURGERY
Payer: COMMERCIAL

## 2022-10-13 VITALS
OXYGEN SATURATION: 100 % | TEMPERATURE: 98.4 F | DIASTOLIC BLOOD PRESSURE: 60 MMHG | WEIGHT: 212.8 LBS | RESPIRATION RATE: 15 BRPM | SYSTOLIC BLOOD PRESSURE: 100 MMHG | HEART RATE: 67 BPM | BODY MASS INDEX: 32.36 KG/M2

## 2022-10-13 DIAGNOSIS — K83.09 CHOLANGITIS (H): Primary | ICD-10-CM

## 2022-10-13 DIAGNOSIS — K83.8 COMMON BILE DUCT DILATATION: Primary | ICD-10-CM

## 2022-10-13 DIAGNOSIS — Z20.822 CONTACT WITH AND (SUSPECTED) EXPOSURE TO COVID-19: ICD-10-CM

## 2022-10-13 DIAGNOSIS — K83.1 BILIARY OBSTRUCTION (H): ICD-10-CM

## 2022-10-13 LAB
ABO/RH(D): NORMAL
ALBUMIN SERPL BCG-MCNC: 3 G/DL (ref 3.5–5.2)
ALBUMIN SERPL-MCNC: 2.9 G/DL (ref 3.5–5.7)
ALP SERPL-CCNC: 583 U/L (ref 34–104)
ALP SERPL-CCNC: 736 U/L (ref 35–104)
ALT SERPL W P-5'-P-CCNC: 63 U/L (ref 7–52)
ALT SERPL W P-5'-P-CCNC: 66 U/L (ref 10–35)
AMMONIA PLAS-SCNC: 35 UMOL/L (ref 11–51)
ANION GAP SERPL CALCULATED.3IONS-SCNC: 10 MMOL/L (ref 7–15)
ANION GAP SERPL CALCULATED.3IONS-SCNC: 8 MMOL/L (ref 3–14)
ANTIBODY SCREEN: NEGATIVE
AST SERPL W P-5'-P-CCNC: 167 U/L (ref 13–39)
AST SERPL W P-5'-P-CCNC: 187 U/L (ref 10–35)
ATRIAL RATE - MUSE: 80 BPM
BASOPHILS # BLD AUTO: 0 10E3/UL (ref 0–0.2)
BASOPHILS NFR BLD AUTO: 0 %
BILIRUB DIRECT SERPL-MCNC: 6.9 MG/DL (ref 0–0.2)
BILIRUB SERPL-MCNC: 10.5 MG/DL (ref 0.3–1)
BILIRUB SERPL-MCNC: 9.2 MG/DL
BUN SERPL-MCNC: 10 MG/DL (ref 7–25)
BUN SERPL-MCNC: 9.4 MG/DL (ref 8–23)
CALCIUM SERPL-MCNC: 8.9 MG/DL (ref 8.8–10.2)
CALCIUM SERPL-MCNC: 9.3 MG/DL (ref 8.6–10.3)
CHLORIDE BLD-SCNC: 97 MMOL/L (ref 98–107)
CHLORIDE SERPL-SCNC: 98 MMOL/L (ref 98–107)
CO2 SERPL-SCNC: 31 MMOL/L (ref 21–31)
CREAT SERPL-MCNC: 0.59 MG/DL (ref 0.51–0.95)
CREAT SERPL-MCNC: 0.73 MG/DL (ref 0.6–1.2)
CRP SERPL-MCNC: 105 MG/L
DEPRECATED HCO3 PLAS-SCNC: 26 MMOL/L (ref 22–29)
DIASTOLIC BLOOD PRESSURE - MUSE: NORMAL MMHG
EOSINOPHIL # BLD AUTO: 0.1 10E3/UL (ref 0–0.7)
EOSINOPHIL NFR BLD AUTO: 1 %
ERYTHROCYTE [DISTWIDTH] IN BLOOD BY AUTOMATED COUNT: 18.2 % (ref 10–15)
ERYTHROCYTE [DISTWIDTH] IN BLOOD BY AUTOMATED COUNT: 18.2 % (ref 10–15)
ERYTHROCYTE [SEDIMENTATION RATE] IN BLOOD BY WESTERGREN METHOD: 67 MM/HR (ref 0–30)
GFR SERPL CREATININE-BSD FRML MDRD: 85 ML/MIN/1.73M2
GFR SERPL CREATININE-BSD FRML MDRD: >90 ML/MIN/1.73M2
GLUCOSE BLD-MCNC: 95 MG/DL (ref 70–105)
GLUCOSE SERPL-MCNC: 88 MG/DL (ref 70–99)
HCT VFR BLD AUTO: 35.2 % (ref 35–47)
HCT VFR BLD AUTO: 36 % (ref 35–47)
HGB BLD-MCNC: 12.5 G/DL (ref 11.7–15.7)
HGB BLD-MCNC: 12.7 G/DL (ref 11.7–15.7)
IMM GRANULOCYTES # BLD: 0.1 10E3/UL
IMM GRANULOCYTES NFR BLD: 1 %
INTERPRETATION ECG - MUSE: NORMAL
LYMPHOCYTES # BLD AUTO: 2.2 10E3/UL (ref 0.8–5.3)
LYMPHOCYTES NFR BLD AUTO: 20 %
MAGNESIUM SERPL-MCNC: 1.9 MG/DL (ref 1.7–2.3)
MCH RBC QN AUTO: 33.2 PG (ref 26.5–33)
MCH RBC QN AUTO: 33.5 PG (ref 26.5–33)
MCHC RBC AUTO-ENTMCNC: 35.3 G/DL (ref 31.5–36.5)
MCHC RBC AUTO-ENTMCNC: 35.5 G/DL (ref 31.5–36.5)
MCV RBC AUTO: 94 FL (ref 78–100)
MCV RBC AUTO: 94 FL (ref 78–100)
MONOCYTES # BLD AUTO: 0.7 10E3/UL (ref 0–1.3)
MONOCYTES NFR BLD AUTO: 6 %
NEUTROPHILS # BLD AUTO: 8 10E3/UL (ref 1.6–8.3)
NEUTROPHILS NFR BLD AUTO: 72 %
NRBC # BLD AUTO: 0 10E3/UL
NRBC BLD AUTO-RTO: 0 /100
P AXIS - MUSE: 72 DEGREES
PLATELET # BLD AUTO: 328 10E3/UL (ref 150–450)
PLATELET # BLD AUTO: 340 10E3/UL (ref 150–450)
POTASSIUM BLD-SCNC: 4 MMOL/L (ref 3.5–5.1)
POTASSIUM SERPL-SCNC: 4.2 MMOL/L (ref 3.4–5.3)
PR INTERVAL - MUSE: 150 MS
PROT SERPL-MCNC: 6.9 G/DL (ref 6.4–8.3)
PROT SERPL-MCNC: 6.9 G/DL (ref 6.4–8.9)
QRS DURATION - MUSE: 88 MS
QT - MUSE: 406 MS
QTC - MUSE: 468 MS
R AXIS - MUSE: 22 DEGREES
RBC # BLD AUTO: 3.73 10E6/UL (ref 3.8–5.2)
RBC # BLD AUTO: 3.82 10E6/UL (ref 3.8–5.2)
SARS-COV-2 RNA RESP QL NAA+PROBE: NEGATIVE
SODIUM SERPL-SCNC: 134 MMOL/L (ref 136–145)
SODIUM SERPL-SCNC: 136 MMOL/L (ref 134–144)
SPECIMEN EXPIRATION DATE: NORMAL
SYSTOLIC BLOOD PRESSURE - MUSE: NORMAL MMHG
T AXIS - MUSE: 43 DEGREES
VENTRICULAR RATE- MUSE: 80 BPM
WBC # BLD AUTO: 11 10E3/UL (ref 4–11)
WBC # BLD AUTO: 9.8 10E3/UL (ref 4–11)

## 2022-10-13 PROCEDURE — 86140 C-REACTIVE PROTEIN: CPT | Performed by: EMERGENCY MEDICINE

## 2022-10-13 PROCEDURE — 80053 COMPREHEN METABOLIC PANEL: CPT | Mod: ZL | Performed by: SURGERY

## 2022-10-13 PROCEDURE — 82248 BILIRUBIN DIRECT: CPT | Mod: ZL | Performed by: SURGERY

## 2022-10-13 PROCEDURE — C9803 HOPD COVID-19 SPEC COLLECT: HCPCS | Performed by: EMERGENCY MEDICINE

## 2022-10-13 PROCEDURE — G0463 HOSPITAL OUTPT CLINIC VISIT: HCPCS

## 2022-10-13 PROCEDURE — 99285 EMERGENCY DEPT VISIT HI MDM: CPT | Performed by: EMERGENCY MEDICINE

## 2022-10-13 PROCEDURE — 120N000002 HC R&B MED SURG/OB UMMC

## 2022-10-13 PROCEDURE — 99207 PR APP CREDIT; MD BILLING SHARED VISIT: CPT | Performed by: PHYSICIAN ASSISTANT

## 2022-10-13 PROCEDURE — 83735 ASSAY OF MAGNESIUM: CPT | Performed by: EMERGENCY MEDICINE

## 2022-10-13 PROCEDURE — 99223 1ST HOSP IP/OBS HIGH 75: CPT | Mod: AI | Performed by: INTERNAL MEDICINE

## 2022-10-13 PROCEDURE — U0005 INFEC AGEN DETEC AMPLI PROBE: HCPCS | Performed by: EMERGENCY MEDICINE

## 2022-10-13 PROCEDURE — 36415 COLL VENOUS BLD VENIPUNCTURE: CPT | Mod: ZL | Performed by: SURGERY

## 2022-10-13 PROCEDURE — 85027 COMPLETE CBC AUTOMATED: CPT | Performed by: EMERGENCY MEDICINE

## 2022-10-13 PROCEDURE — 82140 ASSAY OF AMMONIA: CPT | Performed by: EMERGENCY MEDICINE

## 2022-10-13 PROCEDURE — 99281 EMR DPT VST MAYX REQ PHY/QHP: CPT | Performed by: EMERGENCY MEDICINE

## 2022-10-13 PROCEDURE — 86901 BLOOD TYPING SEROLOGIC RH(D): CPT | Performed by: EMERGENCY MEDICINE

## 2022-10-13 PROCEDURE — 99024 POSTOP FOLLOW-UP VISIT: CPT | Performed by: SURGERY

## 2022-10-13 PROCEDURE — 84155 ASSAY OF PROTEIN SERUM: CPT | Performed by: EMERGENCY MEDICINE

## 2022-10-13 PROCEDURE — 93005 ELECTROCARDIOGRAM TRACING: CPT | Performed by: EMERGENCY MEDICINE

## 2022-10-13 PROCEDURE — 84450 TRANSFERASE (AST) (SGOT): CPT | Performed by: EMERGENCY MEDICINE

## 2022-10-13 PROCEDURE — 85027 COMPLETE CBC AUTOMATED: CPT | Mod: ZL | Performed by: SURGERY

## 2022-10-13 PROCEDURE — 85652 RBC SED RATE AUTOMATED: CPT | Performed by: EMERGENCY MEDICINE

## 2022-10-13 PROCEDURE — 36415 COLL VENOUS BLD VENIPUNCTURE: CPT | Performed by: EMERGENCY MEDICINE

## 2022-10-13 RX ORDER — ONDANSETRON 4 MG/1
4 TABLET, ORALLY DISINTEGRATING ORAL EVERY 6 HOURS PRN
Status: DISCONTINUED | OUTPATIENT
Start: 2022-10-13 | End: 2022-10-16 | Stop reason: HOSPADM

## 2022-10-13 RX ORDER — ONDANSETRON 2 MG/ML
4 INJECTION INTRAMUSCULAR; INTRAVENOUS EVERY 6 HOURS PRN
Status: DISCONTINUED | OUTPATIENT
Start: 2022-10-13 | End: 2022-10-16 | Stop reason: HOSPADM

## 2022-10-13 RX ORDER — LIDOCAINE 40 MG/G
CREAM TOPICAL
Status: DISCONTINUED | OUTPATIENT
Start: 2022-10-13 | End: 2022-10-16 | Stop reason: HOSPADM

## 2022-10-13 RX ORDER — SODIUM CHLORIDE, SODIUM LACTATE, POTASSIUM CHLORIDE, CALCIUM CHLORIDE 600; 310; 30; 20 MG/100ML; MG/100ML; MG/100ML; MG/100ML
INJECTION, SOLUTION INTRAVENOUS CONTINUOUS
Status: DISCONTINUED | OUTPATIENT
Start: 2022-10-13 | End: 2022-10-15

## 2022-10-13 ASSESSMENT — ACTIVITIES OF DAILY LIVING (ADL)
ADLS_ACUITY_SCORE: 35

## 2022-10-13 ASSESSMENT — PAIN SCALES - GENERAL: PAINLEVEL: NO PAIN (1)

## 2022-10-13 NOTE — NURSING NOTE
"Chief Complaint   Patient presents with     Abdominal Pain     Surgical Followup     Surgical sites pain 1/10. Continue to have the nausea intermittent. Some days are worse than others. Some swelling in the lower extremities.     Initial There were no vitals taken for this visit. Estimated body mass index is 31.17 kg/m  as calculated from the following:    Height as of 8/23/22: 1.727 m (5' 8\").    Weight as of 10/6/22: 93 kg (205 lb).  Medication Reconciliation: complete  Harper Murray RN      "

## 2022-10-13 NOTE — TELEPHONE ENCOUNTER
Late entry note.  Discussed Yohana Marques with Dr Villagomez at noon today.    Received a call regarding her recent diagnostic laparoscopy + liver biopsy and aborted laparoscopic cholecystectomy by Dr Zhang. 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  The liver biopsy showed steatohepatitis.  Dr Villagomez saw her today who noted her bilirubin to be significantly elevated (total 10.5, direct 6.9) and called for advice.  He indicated that the patient was doing well without fever or signs of cholangitis. He felt she was safe to drive to the [place].  I recommended that she come to the [facility] Emergency Department to be evaluated by GI for urgent biliary decompression.  She should be admitted to medicine if admission is indicated.  Surgical oncology will see her in consultation as well, though the primary objective will be biliary decompression, recovery, then followed by subsequent elective surgery with the surgical oncology hepatobiliary team as long as she remains stable.    Nani Carney MD MSc [facility] FACS  Associate Professor of Surgery  Division of Surgical Oncology  [facility]

## 2022-10-13 NOTE — ED NOTES
"ED Triage Provider Note  [facility]  Encounter Date: Oct 13, 2022    History:  Chief Complaint   Patient presents with     Jaundice     Yohana Marques is a 77 year old female with history of gallstones and dyslipidemia who presents to the ED with referral from primary doctor with concern for jaundice and hyperbilirubinemia, referred to [facility] for stent placement and definitive treatment..     Review of Systems:  Denies fevers    Exam:  BP (!) 150/85   Pulse 87   Temp 97.9  F (36.6  C) (Oral)   Resp 20   Ht 1.727 m (5' 8\")   Wt 96.2 kg (212 lb 1.9 oz)   SpO2 98%   BMI 32.25 kg/m    General: No acute distress. Appears stated age.   Cardio: Regular rate, extremities well perfused  Resp: Normal work of breathing, grossly normal respiratory rate  Neuro: Alert. CN II-XII grossly intact. Grossly intact strength.       Medical Decision Making:  Patient arriving to the ED with problem as above. A medical screening exam was performed. IV, labs orders initiated from Triage. The patient is appropriate to wait in triage.      Wilner Lomas MD on 10/13/2022 at 6:52 PM     Wilner Lomas MD  10/13/22 1855    "

## 2022-10-13 NOTE — PROGRESS NOTES
Yohana Marques presents to clinic for follow-up after undergoing diagnostic laparoscopy with liver biopsy on 10/6/2020.  Patient states she is doing well today.  Denies significant abdominal pain, nausea or vomiting.  States she is little bit low energy.  Denies fevers or chills.  States the port sites are healing and she is having no issues with the incisions.  Patient notes that yellowness to skin and eyes is new and she is having dark orange color urine, stools are normal.      /60 (BP Location: Right arm, Patient Position: Sitting, Cuff Size: Adult Large)   Pulse 67   Temp 98.4  F (36.9  C) (Tympanic)   Resp 15   Wt 96.5 kg (212 lb 12.8 oz)   SpO2 100%   BMI 32.36 kg/m      Patient is sitting up in the chair, appears comfortable  There is scleral icterus and diffuse jaundice  Abdomen is soft, nontender, nondistended.  Incisions are clean and intact  Patient is alert and oriented, normal speech and mentation    Labs are reviewed and are significant for sodium 136, potassium 4.0, creatinine 0.73, albumin 2.9, alk phos 538, ALT 63, , total bilirubin 10.5, direct bilirubin 6.9, WBC 9.8, hemoglobin 12.7, platelet 328    Surgical pathology report shows micronodular cirrhosis consistent with steatohepatitis      Yohana Marques presents to clinic for follow-up after undergoing diagnostic laparoscopy with liver biopsy, aborted laparoscopic cholecystectomy.  Patient is doing well and recovering from her procedure, there are no apparent complications.  However, patient is newly jaundice and bilirubin has made a significant jump since previously checked.  Seems like the biliary blockage has persisted and gotten worse.  This clearly bears urgent investigation.  I called Dr. Carney, surgical oncologist, the [facility] who has made arrangements for the patient to be admitted through the emergency department for further work-up and treatment.  Patient was instructed over the phone to  present to the ER by private car on the [address] of the [facility] as soon as is convenient for her.  Patient understood the directions and said them back to me.      Aristeo Villagomez MD

## 2022-10-13 NOTE — ED TRIAGE NOTES
Pt was seen in the clinic today and advised to come to the ED to have a stent placed in her bile duct. Pt reports she has an obstruction in her bile ducts and has gallbladder and liver surgery to be scheduled.      Triage Assessment     Row Name 10/13/22 7613       Triage Assessment (Adult)    Airway WDL WDL       Respiratory WDL    Respiratory WDL WDL       Skin Circulation/Temperature WDL    Skin Circulation/Temperature WDL WDL       Cardiac WDL    Cardiac WDL WDL       Peripheral/Neurovascular WDL    Peripheral Neurovascular WDL WDL       Cognitive/Neuro/Behavioral WDL    Cognitive/Neuro/Behavioral WDL WDL

## 2022-10-14 ENCOUNTER — ANESTHESIA EVENT (OUTPATIENT)
Dept: SURGERY | Facility: CLINIC | Age: 77
DRG: 444 | End: 2022-10-14
Payer: COMMERCIAL

## 2022-10-14 ENCOUNTER — APPOINTMENT (OUTPATIENT)
Dept: GENERAL RADIOLOGY | Facility: CLINIC | Age: 77
DRG: 444 | End: 2022-10-14
Attending: INTERNAL MEDICINE
Payer: COMMERCIAL

## 2022-10-14 ENCOUNTER — ANESTHESIA (OUTPATIENT)
Dept: SURGERY | Facility: CLINIC | Age: 77
DRG: 444 | End: 2022-10-14
Payer: COMMERCIAL

## 2022-10-14 ENCOUNTER — APPOINTMENT (OUTPATIENT)
Dept: CT IMAGING | Facility: CLINIC | Age: 77
DRG: 444 | End: 2022-10-14
Attending: PHYSICIAN ASSISTANT
Payer: COMMERCIAL

## 2022-10-14 LAB
ALBUMIN SERPL BCG-MCNC: 2.6 G/DL (ref 3.5–5.2)
ALBUMIN UR-MCNC: NEGATIVE MG/DL
ALP SERPL-CCNC: 655 U/L (ref 35–104)
ALT SERPL W P-5'-P-CCNC: 64 U/L (ref 10–35)
ANION GAP SERPL CALCULATED.3IONS-SCNC: 9 MMOL/L (ref 7–15)
APPEARANCE UR: CLEAR
AST SERPL W P-5'-P-CCNC: 173 U/L (ref 10–35)
BASOPHILS # BLD AUTO: 0 10E3/UL (ref 0–0.2)
BASOPHILS NFR BLD AUTO: 0 %
BILIRUB SERPL-MCNC: 8.3 MG/DL
BILIRUB UR QL STRIP: ABNORMAL
BUN SERPL-MCNC: 9.1 MG/DL (ref 8–23)
CALCIUM SERPL-MCNC: 8.6 MG/DL (ref 8.8–10.2)
CHLORIDE SERPL-SCNC: 99 MMOL/L (ref 98–107)
COLOR UR AUTO: ABNORMAL
CREAT SERPL-MCNC: 0.56 MG/DL (ref 0.51–0.95)
DEPRECATED HCO3 PLAS-SCNC: 26 MMOL/L (ref 22–29)
EOSINOPHIL # BLD AUTO: 0.1 10E3/UL (ref 0–0.7)
EOSINOPHIL NFR BLD AUTO: 1 %
ERCP: NORMAL
ERYTHROCYTE [DISTWIDTH] IN BLOOD BY AUTOMATED COUNT: 18.5 % (ref 10–15)
GFR SERPL CREATININE-BSD FRML MDRD: >90 ML/MIN/1.73M2
GLUCOSE BLDC GLUCOMTR-MCNC: 76 MG/DL (ref 70–99)
GLUCOSE SERPL-MCNC: 80 MG/DL (ref 70–99)
GLUCOSE UR STRIP-MCNC: NEGATIVE MG/DL
HCT VFR BLD AUTO: 33 % (ref 35–47)
HGB BLD-MCNC: 11.5 G/DL (ref 11.7–15.7)
HGB UR QL STRIP: NEGATIVE
IMM GRANULOCYTES # BLD: 0.1 10E3/UL
IMM GRANULOCYTES NFR BLD: 1 %
INR PPP: 1.74 (ref 0.85–1.15)
KETONES UR STRIP-MCNC: ABNORMAL MG/DL
LEUKOCYTE ESTERASE UR QL STRIP: NEGATIVE
LYMPHOCYTES # BLD AUTO: 2.1 10E3/UL (ref 0.8–5.3)
LYMPHOCYTES NFR BLD AUTO: 21 %
MCH RBC QN AUTO: 32.7 PG (ref 26.5–33)
MCHC RBC AUTO-ENTMCNC: 34.8 G/DL (ref 31.5–36.5)
MCV RBC AUTO: 94 FL (ref 78–100)
MONOCYTES # BLD AUTO: 0.7 10E3/UL (ref 0–1.3)
MONOCYTES NFR BLD AUTO: 7 %
NEUTROPHILS # BLD AUTO: 6.8 10E3/UL (ref 1.6–8.3)
NEUTROPHILS NFR BLD AUTO: 70 %
NITRATE UR QL: NEGATIVE
NRBC # BLD AUTO: 0 10E3/UL
NRBC BLD AUTO-RTO: 0 /100
PH UR STRIP: 6.5 [PH] (ref 5–7)
PLATELET # BLD AUTO: 331 10E3/UL (ref 150–450)
POTASSIUM SERPL-SCNC: 4.2 MMOL/L (ref 3.4–5.3)
PROT SERPL-MCNC: 6 G/DL (ref 6.4–8.3)
RBC # BLD AUTO: 3.52 10E6/UL (ref 3.8–5.2)
RBC URINE: 1 /HPF
SODIUM SERPL-SCNC: 134 MMOL/L (ref 136–145)
SP GR UR STRIP: 1.01 (ref 1–1.03)
SQUAMOUS EPITHELIAL: 4 /HPF
UROBILINOGEN UR STRIP-MCNC: NORMAL MG/DL
WBC # BLD AUTO: 9.7 10E3/UL (ref 4–11)
WBC URINE: 0 /HPF

## 2022-10-14 PROCEDURE — 74177 CT ABD & PELVIS W/CONTRAST: CPT

## 2022-10-14 PROCEDURE — C2617 STENT, NON-COR, TEM W/O DEL: HCPCS | Performed by: INTERNAL MEDICINE

## 2022-10-14 PROCEDURE — 250N000009 HC RX 250: Performed by: NURSE ANESTHETIST, CERTIFIED REGISTERED

## 2022-10-14 PROCEDURE — 250N000009 HC RX 250: Performed by: INTERNAL MEDICINE

## 2022-10-14 PROCEDURE — 999N000248 HC STATISTIC IV INSERT WITH US BY RN

## 2022-10-14 PROCEDURE — C1769 GUIDE WIRE: HCPCS | Performed by: INTERNAL MEDICINE

## 2022-10-14 PROCEDURE — 85610 PROTHROMBIN TIME: CPT | Performed by: STUDENT IN AN ORGANIZED HEALTH CARE EDUCATION/TRAINING PROGRAM

## 2022-10-14 PROCEDURE — 99221 1ST HOSP IP/OBS SF/LOW 40: CPT | Mod: GC | Performed by: SURGERY

## 2022-10-14 PROCEDURE — 250N000013 HC RX MED GY IP 250 OP 250 PS 637: Performed by: PHYSICIAN ASSISTANT

## 2022-10-14 PROCEDURE — 99222 1ST HOSP IP/OBS MODERATE 55: CPT | Performed by: PHYSICIAN ASSISTANT

## 2022-10-14 PROCEDURE — 82040 ASSAY OF SERUM ALBUMIN: CPT | Performed by: PHYSICIAN ASSISTANT

## 2022-10-14 PROCEDURE — 360N000083 HC SURGERY LEVEL 3 W/ FLUORO, PER MIN: Performed by: INTERNAL MEDICINE

## 2022-10-14 PROCEDURE — 370N000017 HC ANESTHESIA TECHNICAL FEE, PER MIN: Performed by: INTERNAL MEDICINE

## 2022-10-14 PROCEDURE — 74177 CT ABD & PELVIS W/CONTRAST: CPT | Mod: 26 | Performed by: RADIOLOGY

## 2022-10-14 PROCEDURE — 0F9480Z DRAINAGE OF GALLBLADDER WITH DRAINAGE DEVICE, VIA NATURAL OR ARTIFICIAL OPENING ENDOSCOPIC: ICD-10-PCS | Performed by: INTERNAL MEDICINE

## 2022-10-14 PROCEDURE — 80053 COMPREHEN METABOLIC PANEL: CPT | Performed by: PHYSICIAN ASSISTANT

## 2022-10-14 PROCEDURE — 250N000011 HC RX IP 250 OP 636: Performed by: NURSE ANESTHETIST, CERTIFIED REGISTERED

## 2022-10-14 PROCEDURE — 272N000001 HC OR GENERAL SUPPLY STERILE: Performed by: INTERNAL MEDICINE

## 2022-10-14 PROCEDURE — 255N000002 HC RX 255 OP 636: Performed by: INTERNAL MEDICINE

## 2022-10-14 PROCEDURE — 71260 CT THORAX DX C+: CPT | Mod: 26 | Performed by: RADIOLOGY

## 2022-10-14 PROCEDURE — 85025 COMPLETE CBC W/AUTO DIFF WBC: CPT | Performed by: PHYSICIAN ASSISTANT

## 2022-10-14 PROCEDURE — 0F798DZ DILATION OF COMMON BILE DUCT WITH INTRALUMINAL DEVICE, VIA NATURAL OR ARTIFICIAL OPENING ENDOSCOPIC: ICD-10-PCS | Performed by: INTERNAL MEDICINE

## 2022-10-14 PROCEDURE — C1877 STENT, NON-COAT/COV W/O DEL: HCPCS | Performed by: INTERNAL MEDICINE

## 2022-10-14 PROCEDURE — 0FC98ZZ EXTIRPATION OF MATTER FROM COMMON BILE DUCT, VIA NATURAL OR ARTIFICIAL OPENING ENDOSCOPIC: ICD-10-PCS | Performed by: INTERNAL MEDICINE

## 2022-10-14 PROCEDURE — 999N000157 HC STATISTIC RCP TIME EA 10 MIN

## 2022-10-14 PROCEDURE — 81003 URINALYSIS AUTO W/O SCOPE: CPT | Performed by: PHYSICIAN ASSISTANT

## 2022-10-14 PROCEDURE — 710N000010 HC RECOVERY PHASE 1, LEVEL 2, PER MIN: Performed by: INTERNAL MEDICINE

## 2022-10-14 PROCEDURE — 36415 COLL VENOUS BLD VENIPUNCTURE: CPT | Performed by: PHYSICIAN ASSISTANT

## 2022-10-14 PROCEDURE — 999N000179 XR SURGERY CARM FLUORO LESS THAN 5 MIN W STILLS: Mod: TC

## 2022-10-14 PROCEDURE — 250N000013 HC RX MED GY IP 250 OP 250 PS 637: Performed by: STUDENT IN AN ORGANIZED HEALTH CARE EDUCATION/TRAINING PROGRAM

## 2022-10-14 PROCEDURE — 258N000003 HC RX IP 258 OP 636: Performed by: PHYSICIAN ASSISTANT

## 2022-10-14 PROCEDURE — 258N000003 HC RX IP 258 OP 636: Performed by: NURSE ANESTHETIST, CERTIFIED REGISTERED

## 2022-10-14 PROCEDURE — 250N000025 HC SEVOFLURANE, PER MIN: Performed by: INTERNAL MEDICINE

## 2022-10-14 PROCEDURE — C1726 CATH, BAL DIL, NON-VASCULAR: HCPCS | Performed by: INTERNAL MEDICINE

## 2022-10-14 PROCEDURE — 999N000141 HC STATISTIC PRE-PROCEDURE NURSING ASSESSMENT: Performed by: INTERNAL MEDICINE

## 2022-10-14 PROCEDURE — 120N000002 HC R&B MED SURG/OB UMMC

## 2022-10-14 PROCEDURE — 99233 SBSQ HOSP IP/OBS HIGH 50: CPT | Performed by: INTERNAL MEDICINE

## 2022-10-14 PROCEDURE — 250N000011 HC RX IP 250 OP 636: Performed by: PHYSICIAN ASSISTANT

## 2022-10-14 PROCEDURE — 250N000011 HC RX IP 250 OP 636: Performed by: INTERNAL MEDICINE

## 2022-10-14 DEVICE — STENT ZIMMON BILIARY 07FRX12CM DBL PIGTAIL
Type: IMPLANTABLE DEVICE | Site: BILE DUCT | Status: NON-FUNCTIONAL
Removed: 2022-12-01

## 2022-10-14 DEVICE — IMPLANTABLE DEVICE
Type: IMPLANTABLE DEVICE | Site: BILE DUCT | Status: NON-FUNCTIONAL
Removed: 2023-01-25

## 2022-10-14 RX ORDER — LIDOCAINE HYDROCHLORIDE 20 MG/ML
INJECTION, SOLUTION INFILTRATION; PERINEURAL PRN
Status: DISCONTINUED | OUTPATIENT
Start: 2022-10-14 | End: 2022-10-14

## 2022-10-14 RX ORDER — LISINOPRIL 10 MG/1
10 TABLET ORAL DAILY
Status: DISCONTINUED | OUTPATIENT
Start: 2022-10-15 | End: 2022-10-16 | Stop reason: HOSPADM

## 2022-10-14 RX ORDER — IOPAMIDOL 510 MG/ML
INJECTION, SOLUTION INTRAVASCULAR PRN
Status: DISCONTINUED | OUTPATIENT
Start: 2022-10-14 | End: 2022-10-14 | Stop reason: HOSPADM

## 2022-10-14 RX ORDER — SODIUM CHLORIDE, SODIUM LACTATE, POTASSIUM CHLORIDE, CALCIUM CHLORIDE 600; 310; 30; 20 MG/100ML; MG/100ML; MG/100ML; MG/100ML
INJECTION, SOLUTION INTRAVENOUS CONTINUOUS PRN
Status: DISCONTINUED | OUTPATIENT
Start: 2022-10-14 | End: 2022-10-14

## 2022-10-14 RX ORDER — HYDROMORPHONE HCL IN WATER/PF 6 MG/30 ML
0.2 PATIENT CONTROLLED ANALGESIA SYRINGE INTRAVENOUS EVERY 5 MIN PRN
Status: DISCONTINUED | OUTPATIENT
Start: 2022-10-14 | End: 2022-10-14 | Stop reason: HOSPADM

## 2022-10-14 RX ORDER — CIPROFLOXACIN 2 MG/ML
INJECTION, SOLUTION INTRAVENOUS PRN
Status: DISCONTINUED | OUTPATIENT
Start: 2022-10-14 | End: 2022-10-14

## 2022-10-14 RX ORDER — DEXAMETHASONE SODIUM PHOSPHATE 4 MG/ML
INJECTION, SOLUTION INTRA-ARTICULAR; INTRALESIONAL; INTRAMUSCULAR; INTRAVENOUS; SOFT TISSUE PRN
Status: DISCONTINUED | OUTPATIENT
Start: 2022-10-14 | End: 2022-10-14

## 2022-10-14 RX ORDER — LABETALOL 20 MG/4 ML (5 MG/ML) INTRAVENOUS SYRINGE
PRN
Status: DISCONTINUED | OUTPATIENT
Start: 2022-10-14 | End: 2022-10-14

## 2022-10-14 RX ORDER — ONDANSETRON 4 MG/1
4 TABLET, ORALLY DISINTEGRATING ORAL EVERY 30 MIN PRN
Status: DISCONTINUED | OUTPATIENT
Start: 2022-10-14 | End: 2022-10-14 | Stop reason: HOSPADM

## 2022-10-14 RX ORDER — HYDRALAZINE HYDROCHLORIDE 25 MG/1
25 TABLET, FILM COATED ORAL 4 TIMES DAILY PRN
Status: DISCONTINUED | OUTPATIENT
Start: 2022-10-14 | End: 2022-10-16 | Stop reason: HOSPADM

## 2022-10-14 RX ORDER — SODIUM CHLORIDE, SODIUM LACTATE, POTASSIUM CHLORIDE, CALCIUM CHLORIDE 600; 310; 30; 20 MG/100ML; MG/100ML; MG/100ML; MG/100ML
INJECTION, SOLUTION INTRAVENOUS CONTINUOUS
Status: DISCONTINUED | OUTPATIENT
Start: 2022-10-14 | End: 2022-10-14 | Stop reason: HOSPADM

## 2022-10-14 RX ORDER — INDOMETHACIN 50 MG/1
SUPPOSITORY RECTAL PRN
Status: DISCONTINUED | OUTPATIENT
Start: 2022-10-14 | End: 2022-10-15

## 2022-10-14 RX ORDER — MULTIVITAMIN,THERAPEUTIC
1 TABLET ORAL DAILY
Status: DISCONTINUED | OUTPATIENT
Start: 2022-10-15 | End: 2022-10-16 | Stop reason: HOSPADM

## 2022-10-14 RX ORDER — IOPAMIDOL 755 MG/ML
126 INJECTION, SOLUTION INTRAVASCULAR ONCE
Status: COMPLETED | OUTPATIENT
Start: 2022-10-14 | End: 2022-10-14

## 2022-10-14 RX ORDER — PROPOFOL 10 MG/ML
INJECTION, EMULSION INTRAVENOUS PRN
Status: DISCONTINUED | OUTPATIENT
Start: 2022-10-14 | End: 2022-10-14

## 2022-10-14 RX ORDER — OXYCODONE HYDROCHLORIDE 5 MG/1
5 TABLET ORAL EVERY 4 HOURS PRN
Status: DISCONTINUED | OUTPATIENT
Start: 2022-10-14 | End: 2022-10-14 | Stop reason: HOSPADM

## 2022-10-14 RX ORDER — FENTANYL CITRATE 50 UG/ML
INJECTION, SOLUTION INTRAMUSCULAR; INTRAVENOUS PRN
Status: DISCONTINUED | OUTPATIENT
Start: 2022-10-14 | End: 2022-10-14

## 2022-10-14 RX ORDER — FENTANYL CITRATE 50 UG/ML
25 INJECTION, SOLUTION INTRAMUSCULAR; INTRAVENOUS EVERY 5 MIN PRN
Status: DISCONTINUED | OUTPATIENT
Start: 2022-10-14 | End: 2022-10-14 | Stop reason: HOSPADM

## 2022-10-14 RX ORDER — TRAMADOL HYDROCHLORIDE 50 MG/1
50 TABLET ORAL EVERY 6 HOURS PRN
Status: DISCONTINUED | OUTPATIENT
Start: 2022-10-14 | End: 2022-10-14

## 2022-10-14 RX ORDER — ONDANSETRON 2 MG/ML
INJECTION INTRAMUSCULAR; INTRAVENOUS PRN
Status: DISCONTINUED | OUTPATIENT
Start: 2022-10-14 | End: 2022-10-14

## 2022-10-14 RX ORDER — ONDANSETRON 2 MG/ML
4 INJECTION INTRAMUSCULAR; INTRAVENOUS EVERY 30 MIN PRN
Status: DISCONTINUED | OUTPATIENT
Start: 2022-10-14 | End: 2022-10-14 | Stop reason: HOSPADM

## 2022-10-14 RX ORDER — CELECOXIB 200 MG/1
200 CAPSULE ORAL DAILY
Status: DISCONTINUED | OUTPATIENT
Start: 2022-10-15 | End: 2022-10-16 | Stop reason: HOSPADM

## 2022-10-14 RX ADMIN — Medication 50 MG: at 16:16

## 2022-10-14 RX ADMIN — Medication 50 MG: at 01:46

## 2022-10-14 RX ADMIN — LABETALOL 20 MG/4 ML (5 MG/ML) INTRAVENOUS SYRINGE 5 MG: at 16:45

## 2022-10-14 RX ADMIN — SUGAMMADEX 200 MG: 100 INJECTION, SOLUTION INTRAVENOUS at 17:56

## 2022-10-14 RX ADMIN — SODIUM CHLORIDE, POTASSIUM CHLORIDE, SODIUM LACTATE AND CALCIUM CHLORIDE: 600; 310; 30; 20 INJECTION, SOLUTION INTRAVENOUS at 17:45

## 2022-10-14 RX ADMIN — TRAMADOL HYDROCHLORIDE 50 MG: 50 TABLET, COATED ORAL at 18:56

## 2022-10-14 RX ADMIN — CIPROFLOXACIN 400 MG: 2 INJECTION INTRAVENOUS at 16:42

## 2022-10-14 RX ADMIN — FENTANYL CITRATE 50 MCG: 50 INJECTION, SOLUTION INTRAMUSCULAR; INTRAVENOUS at 16:40

## 2022-10-14 RX ADMIN — FENTANYL CITRATE 100 MCG: 50 INJECTION, SOLUTION INTRAMUSCULAR; INTRAVENOUS at 16:15

## 2022-10-14 RX ADMIN — Medication 20 MG: at 17:25

## 2022-10-14 RX ADMIN — PHYTONADIONE 10 MG: 10 INJECTION, EMULSION INTRAMUSCULAR; INTRAVENOUS; SUBCUTANEOUS at 11:26

## 2022-10-14 RX ADMIN — IOPAMIDOL 126 ML: 755 INJECTION, SOLUTION INTRAVENOUS at 12:15

## 2022-10-14 RX ADMIN — ONDANSETRON 4 MG: 2 INJECTION INTRAMUSCULAR; INTRAVENOUS at 17:51

## 2022-10-14 RX ADMIN — PROPOFOL 180 MG: 10 INJECTION, EMULSION INTRAVENOUS at 16:15

## 2022-10-14 RX ADMIN — Medication 50 MG: at 13:53

## 2022-10-14 RX ADMIN — SODIUM CHLORIDE, POTASSIUM CHLORIDE, SODIUM LACTATE AND CALCIUM CHLORIDE: 600; 310; 30; 20 INJECTION, SOLUTION INTRAVENOUS at 02:17

## 2022-10-14 RX ADMIN — LIDOCAINE HYDROCHLORIDE 100 MG: 20 INJECTION, SOLUTION INFILTRATION; PERINEURAL at 16:15

## 2022-10-14 RX ADMIN — DEXAMETHASONE SODIUM PHOSPHATE 4 MG: 4 INJECTION, SOLUTION INTRA-ARTICULAR; INTRALESIONAL; INTRAMUSCULAR; INTRAVENOUS; SOFT TISSUE at 16:31

## 2022-10-14 RX ADMIN — SODIUM CHLORIDE, POTASSIUM CHLORIDE, SODIUM LACTATE AND CALCIUM CHLORIDE: 600; 310; 30; 20 INJECTION, SOLUTION INTRAVENOUS at 12:38

## 2022-10-14 RX ADMIN — SODIUM CHLORIDE, POTASSIUM CHLORIDE, SODIUM LACTATE AND CALCIUM CHLORIDE: 600; 310; 30; 20 INJECTION, SOLUTION INTRAVENOUS at 16:05

## 2022-10-14 RX ADMIN — FENTANYL CITRATE 50 MCG: 50 INJECTION, SOLUTION INTRAMUSCULAR; INTRAVENOUS at 16:36

## 2022-10-14 RX ADMIN — LABETALOL 20 MG/4 ML (5 MG/ML) INTRAVENOUS SYRINGE 5 MG: at 17:11

## 2022-10-14 ASSESSMENT — ACTIVITIES OF DAILY LIVING (ADL)
WEAR_GLASSES_OR_BLIND: YES
ADLS_ACUITY_SCORE: 20
ADLS_ACUITY_SCORE: 18
DIFFICULTY_EATING/SWALLOWING: NO
DOING_ERRANDS_INDEPENDENTLY_DIFFICULTY: NO
ADLS_ACUITY_SCORE: 35
ADLS_ACUITY_SCORE: 20
FALL_HISTORY_WITHIN_LAST_SIX_MONTHS: NO
TOILETING: 0-->INDEPENDENT
TOILETING: 0-->INDEPENDENT
WALKING_OR_CLIMBING_STAIRS: AMBULATION DIFFICULTY, REQUIRES EQUIPMENT
ADLS_ACUITY_SCORE: 18
ADLS_ACUITY_SCORE: 20
ADLS_ACUITY_SCORE: 20
EQUIPMENT_CURRENTLY_USED_AT_HOME: CANE, STRAIGHT
CHANGE_IN_FUNCTIONAL_STATUS_SINCE_ONSET_OF_CURRENT_ILLNESS/INJURY: NO
TRANSFERRING: 0-->INDEPENDENT
TOILETING_ISSUES: NO
TRANSFERRING: 0-->INDEPENDENT
ADLS_ACUITY_SCORE: 20
CONCENTRATING,_REMEMBERING_OR_MAKING_DECISIONS_DIFFICULTY: NO
ADLS_ACUITY_SCORE: 20
ADLS_ACUITY_SCORE: 20
DRESSING/BATHING_DIFFICULTY: NO
WALKING_OR_CLIMBING_STAIRS_DIFFICULTY: YES
ADLS_ACUITY_SCORE: 20
ADLS_ACUITY_SCORE: 20
VISION_MANAGEMENT: GLASSES

## 2022-10-14 ASSESSMENT — LIFESTYLE VARIABLES: TOBACCO_USE: 0

## 2022-10-14 ASSESSMENT — COPD QUESTIONNAIRES: COPD: 0

## 2022-10-14 NOTE — PROGRESS NOTES
"Admission/Transfer from: ED  2 RN skin assessment completed. Yes  Significant findings include: blanchable redness in gluteal cleft, \"corn\" on R third toe, dry skin.   WOC Nurse Consult Ordered? No  Was NST/NA able to join during assessment? No  Bed algorithm can be found in PCS flowsheets and forms binder, was this used for this assessment? Yes  Was a pulsate mattress ordered? No        "

## 2022-10-14 NOTE — BRIEF OP NOTE
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      Procedure: Procedure(s):  ENDOSCOPIC RETROGRADE CHOLANGIOPANCREATOGRAPHY with biliary spincterotomy, biliary stent placement, cholangioscopy  Surgeon: Surgeon(s) and Role:     * Carson Franklin MD - Primary  Anesthesia: General   Estimated Blood Loss: None    Drains: None  Specimens: * No specimens in log *  Findings:   Biliary sphincterotomy, evidence of Mirizzi by both cholangioscopy and cholangiography, three large stacked stonesm two in the gallbladder and one occupying the cystic compressing the common with upstream dilation; impressive pus drainage, 10F stent across the compressed common and 7F DPT to drain the gallbladder.  Complications: None.  Implants:   Implant Name Type Inv. Item Serial No.  Lot No. LRB No. Used Action   STENT [employer]OTTON-REYES [employer] NAVYA SOF-FLEX [id number] - [id number] Stent STENT COTTON-REYES ([place]) NAVYA SOF-FLEX [id number]  [employer]  N/A 1 Implanted   STENT [facility] BILIARY [id number] DBL PIGTAIL - [id number] Stent STENT ZIMMON BILIARY [id number] DBL PIGTAIL  [employer]  N/A 1 Implanted       Recommendations: Follow liver studies, complete a course of antibiotic for cholangitis or cholecystitis, repeat ERCP within 3m preferably immedaitely after tandem cholecystectomy (GB stent can be pulled just before); avoid antithrombotics for at least three days; all other medications and diet may resume

## 2022-10-14 NOTE — CONSULTS
SURGERY CONSULT  Yohana Marques MRN# [id number]   YOB: 1945 Age: 77 year old     Date of Admission: 10/14/22    REASON FOR CONSULTATION: biliary obstruction, Mirizzi Syndrome     HPI: Yohana Marques is a 77 year old year old female transferred from an outside provider and admitted to the medical service 10/13/2022. She noted a 50 lb unintentional weight loss earlier this year and, upon evaluation, was found to have abnormal LFTs in July 2022.     Further workup included and US RUQ with gallstones and dilated CBD (14mm). MRCP 8/29 showed findings suspicious for Mirizzi syndrome with stone within gallbladder neck and focal mass effect on common hepatic duct with associated intrahepatic and common hepatic ductal dilation.     She underwent attempted laparoscopic cholecystectomy on 10/6 which was aborted intraoperatively. Liver biopsies were obtained with path report revealing micronodular cirrhosis consistent with steatohepatitis.     She presented for post surgical follow up yesterday and was noted to be jaundiced and to have worsening bilirubin. She was then transferred to the [facility] for definitive cares.     Upon interview, the patient notes an unintentional weight loss of around 50-60 lbs throughout the year. She has not had much of an appetite. Will become intermittently nauseous but denies vomiting. Denies loose, bloody or acholic stools. Denies dark urine. She had not noticed the skin color change until her surgeon mentioned it in clinic yesterday.     Denies abdominal surgical history other than recent laparoscopy. Has had a colonoscopy around 5 years ago - recalls a few polyps and was told to return in 10 years. She denies abdominal pain.     REVIEW OF SYSTEMS: The remainder of the complete ROS was negative unless noted in the HPI. Denies visual changes, headache, sore throat, rhinorrhea, chest pain, sob, abdominal pain, constipation, fevers, night sweats.     PAST MEDICAL  "HISTORY:   HTN  HLD  Spinal stenosis   Osteoarthritis     PAST SURGICAL HISTORY:   Arthroplasty hip   Back surgery   Diagnostic laparoscopy     ALLERGIES:  No Known Allergies    HOME MEDICATIONS:   No anticoagulation or immunosuppression     SOCIAL HISTORY:  Lives with  En[facility]   Non smoker   Infrequent EtOH   Uses a cane due to back pain     FAMILY HISTORY:   Non contributory     PHYSICAL EXAMINATION:  BP (!) 150/69 (BP Location: Left arm, Patient Position: Semi-Penny's, Cuff Size: Adult Regular)   Pulse 75   Temp 98.1  F (36.7  C) (Oral)   Resp 18   Ht 1.727 m (5' 8\")   Wt 96.2 kg (212 lb 1.9 oz)   SpO2 96%   BMI 32.25 kg/m       General: NAD, awake and alert, jaundice and scleral icterus present   CV: Non-cyanotic   Pulm: No increased work of breathing on room air   Abd: soft, non-distended, minimally tender to deep palpation in the RUQ   : Deferred   Extremities: WWP without edema  Neuro: No focal deficits noted, patient moves all extremities spontaneously    LABS reviewed   Alb 2.6    ALT 64    Bili total 8.3  Bili direct 6.9     IMAGING reviewed     US RUQ 8/8/2022                                                                    IMPRESSION:      Cholelithiasis. Dilation of the common bile duct up to 1.4 cm.  Choledocholithiasis is in the differential. Correlate with serum  bilirubin levels.     Hepatic steatosis.    MRI/MRCP 8/29/2022     IMPRESSION:  Findings suspicious for Mirizzi syndrome. A 2.4 cm T2  hypointense stone within the gallbladder neck is associated with focal  mass-effect upon the common hepatic duct and upstream intrahepatic and  common hepatic ductal dilatation.      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     Recommend GI consultation for biliary decompression   Will plan to offer operative intervention as an outpatient in the coming weeks "     Discussed with Dr. Jose Rodriguez Vásquez   Surgery Resident

## 2022-10-14 NOTE — H&P (VIEW-ONLY)
Advanced Endoscopy/Pancreaticobiliary Consultation      Date of Admission:  10/13/2022  Reason for Admission: jaundice  Date of Consult  10/14/2022   Requesting Physician:  Andrzej Parkinson MD           ASSESSMENT AND RECOMMENDATIONS:   Assessment:  77 year old female with a history of spinal stenosis with newly discovered elevated liver tests who is admitted for workup of worsening jaundice and biliary obstruction.    #. Painless jaundice  #. 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 She is without GI symptoms except mild nausea but has had significant weight loss. Plan for ERCP with biliary decompression today however will need to involve the surgical oncology team    #. Cirrhosis, new diagnosis  Biopsy from laparoscopy showing micronodular cirrhosis most compatible with steatohepatitis. No known history of liver disease. Denies alcohol use. Also with mild LE edema, unclear if new.   MELD-Na score: 22 at 10/14/2022  5:32 AM  MELD score: 20 at 10/14/2022  5:32 AM  Calculated from:  Serum Creatinine: 0.56 mg/dL (Using min of 1 mg/dL) at 10/14/2022  5:32 AM  Serum Sodium: 134 mmol/L at 10/14/2022  5:32 AM  Total Bilirubin: 8.3 mg/dL at 10/14/2022  5:32 AM  INR(ratio): 1.74 at 10/14/2022  5:32 AM  Age: 77 years     Recommendations:  CT chest/abd/pelvis with IV contrast today (ordered)  ERCP planned later today (on ADD ON list)  Continue NPO status   Give IV Vit K 10mg (ordered)  Trend LFT, INR, CBC  Surg/onc consultation  Analgesia/Antiemetics per primary team  Discussed with primary medicine team    Gastroenterology follow up recommendations: TBD    Thank you for involving us in this patient's care. Please do not hesitate to contact the GI service with any  questions or concerns.     Pt seen and care plan discussed with Dr. Henry, GI staff physician.        Ruby Baez PA-C  Advanced Endoscopy/Pancreaticobiliary GI Service  [facility]  Text Page  -------------------------------------------------------------------------------------------------------------------       Reason for Consultation:   Biliary obstruction           History of Present Illness:   Patient seen and examined at 0830. History is obtained from the patient and the medical record.    Yohana Marques is a 77 year old female with a PMH significant for spinal stenosis s/p diskectomy with chronic back pain, HTN, who presents to [facility] with painless jaundice and concern for biliary obstruction. The patient was initially discovered to have elevation in her liver test this summer during a wellness visit.  At that time her alkaline phosphatase was 299, ALT 69, AST 70, total bilirubin 0.8.  An ultrasound of the right upper quadrant was obtained showing dilation of the common bile duct up to 1.4 cm as well as hepatic steatosis.  At that time she was not having any symptoms however did note approximately 50 pound weight loss which she reports was somewhat intentional but the weight came off very easily.  She attributes this to eating much less than normal.    The patient was referred to a general surgeon near her hometown. He ordered an MRCP 8/29 showing findings suspicious for Mirizzi syndrome with a 2.4 cm hypointense stone within the gallbladder neck and focal mass-effect upon the common hepatic duct with upstream intra and extrahepatic biliary dilation.  He arranged for laparoscopic cholecystectomy.  Per the operative report on 10/6, her liver was noted to be markedly nodular, dense and fibrous and there were adhesions to the dome of the gallbladder.  The gallbladder itself appeared white, fibrous and was very dense, not mobile and highly suspicious for malignancy.  The  cholecystectomy was aborted and a liver biopsy obtained.  This showed micronodular cirrhosis most compatible with steatohepatitis etiology as well as ductular proliferation and focal cholestasis compatible with superimposed large duct obstruction.    Between the time of her MRCP and her surgery she did develop some nausea, fatigue and lack of appetite.  However she has denied abdominal pain, vomiting, bowel or bladder changes (she does have chronic intermittent incontinence of both bowel and bladder).  She denies any chest pain, shortness of breath, fevers, chills or sweats.  She does note some itching but this is chronic and was told it may be related to her spinal stenosis.  When she followed up with surgery on 10/13 after her laparoscopy she was noted to be jaundiced and did state that her urine was becoming very dark and her stools were pale.  Her liver tests were rechecked and showed total bilirubin 10.5, alk phos 583, ALT 63 and . Her surgeon contacted the surgical oncology team here at [facility] to discuss transfer via ED for further evaluation of possible gallbladder malignancy as well as biliary obstruction.     In the ED, she was afebrile, slightly hypertensive, not tachycardic or hypoxic. Her liver tests are below, INR elevated to 1.7. She was admitted to the internal medicine service. Currently, the patient reports that her only symptom is that she is very thirsty.              Past Medical History:   Reviewed and edited as appropriate  Past Medical History:   Diagnosis Date     HTN (hypertension)      Hyperlipidemia      Leiomyoma of uterus, unspecified      Numbness and tingling     spinal stenosis causes numbness and tingling on feet and knees bilaterally     Osteoarthrosis, unspecified whether generalized or localized, unspecified site      Other and unspecified disc disorder of lumbar region      Primary osteoarthritis of left hip 12/18/2016     Spinal stenosis     S/P diskectomy x 2, most  recently 2014            Past Surgical History:   Reviewed and edited as appropriate   Past Surgical History:   Procedure Laterality Date     ARTHROPLASTY HIP Left 2016    Procedure: ARTHROPLASTY HIP;  Surgeon: Leonid Morfin MD;  Location:  OR     BACK SURGERY       COLONOSCOPY N/A 2017    Procedure: COMBINED COLONOSCOPY, SINGLE OR MULTIPLE BIOPSY/POLYPECTOMY BY BIOPSY;  colonoscopy;  Surgeon: Catarino Salas MD;  Location:  GI     DISCECTOMY LUMBAR POSTERIOR MICROSCOPIC TWO LEVELS  2014    Procedure: DISCECTOMY LUMBAR POSTERIOR MICROSCOPIC TWO LEVELS;  Surgeon: Warren Herring MD;  Location:  OR     LAPAROSCOPY DIAGNOSTIC (GENERAL) N/A 10/6/2022    Procedure: Diagnostic laparoscopy with liver biopsy;  Surgeon: Warren Zhang MD;  Location:  OR     ORTHOPEDIC SURGERY       ZZ NONSPECIFIC PROCEDURE  1996    lumbar discectomy     Z NONSPECIFIC PROCEDURE      cervical cone biopsy     Z NONSPECIFIC PROCEDURE      Left knee replacement              Social History:   The patient lives in [place], [place] with spouse  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)    Alcohol: denies  Tobacco: denies  Illicit drugs: denies           Family History:   Patient's family history is reviewed today and is non-contributory  Family History   Problem Relation Age of Onset     Family History Negative Mother          age 64 mva     Cancer Father          age 84 lung ca     Family History Negative Sister      Breast Cancer Paternal Grandmother      Genetic Disorder Other         daughter has Autoimmune disease             Allergies:   Reviewed and edited as appropriate   No Known Allergies         Medications:     Current Facility-Administered Medications   Medication     [START ON 10/15/2022] calcium carbonate (OS-YEE) tablet 600 mg     [START ON 10/15/2022] celecoxib (celeBREX) capsule 200 mg     hydrALAZINE (APRESOLINE) tablet 25 mg      lactated ringers infusion     lidocaine (LMX4) cream     lidocaine 1 % 0.1-1 mL     [START ON 10/15/2022] lisinopril (ZESTRIL) tablet 10 mg     melatonin tablet 1 mg     [START ON 10/15/2022] multivitamin, therapeutic (THERA-VIT) tablet 1 tablet     ondansetron (ZOFRAN ODT) ODT tab 4 mg    Or     ondansetron (ZOFRAN) injection 4 mg     phytonadione 10 mg in sodium chloride 0.9 % 50 mL intermittent infusion     sodium chloride (PF) 0.9% PF flush 3 mL     sodium chloride (PF) 0.9% PF flush 3 mL     traMADol (ULTRAM) half-tab 25-50 mg             Review of Systems:     A complete review of systems was performed and is negative except as noted in the HPI             Physical Exam:   Temp: 98.1  F (36.7  C) Temp src: Oral BP: (!) 150/69 Pulse: 75   Resp: 18 SpO2: 96 % O2 Device: None (Room air)    Wt:   Wt Readings from Last 2 Encounters:   10/13/22 96.2 kg (212 lb 1.9 oz)   10/13/22 96.5 kg (212 lb 12.8 oz)        General: Very pleasant female in NAD.  Answers appropriately.    HEENT: Head is AT/NC. Sclera +icteric. No conjunctival injection.  Oropharynx is clear, moist and w/o exudate or lesions.  Neck: No masses or thyromegaly.  Lungs: Clear to auscultation bilaterally.  No wheezes, rhonchi or crackles.    Heart: Regular rate and rhythm.  No murmurs, gallops or rubs.  Normal S1 and S2.  Abdomen: Soft, obese, non-tender, non-distended.  BS +.  No hepatosplenomegaly. No rebound or peritoneal signs, lap incisions CDI  Extremities: WWP, 1+ gayle LE edema  Skin: + jaundice         Data:   Labs and imaging below were independently reviewed and interpreted    LAB WORK:    BMP  Recent Labs   Lab 10/14/22  0532 10/13/22  1841 10/13/22  0920   * 134* 136   POTASSIUM 4.2 4.2 4.0   CHLORIDE 99 98 97*   YEE 8.6* 8.9 9.3   CO2 26 26 31   BUN 9.1 9.4 10   CR 0.56 0.59 0.73   GLC 80 88 95     CBC  Recent Labs   Lab 10/14/22  0532 10/13/22  1841 10/13/22  0920   WBC 9.7 11.0 9.8   RBC 3.52* 3.73* 3.82   HGB 11.5* 12.5 12.7   HCT  "33.0* 35.2 36.0   MCV 94 94 94   MCH 32.7 33.5* 33.2*   MCHC 34.8 35.5 35.3   RDW 18.5* 18.2* 18.2*    340 328     INR  Recent Labs   Lab 10/14/22  0532   INR 1.74*     LFTs  Recent Labs   Lab 10/14/22  0532 10/13/22  1841 10/13/22  0920   ALKPHOS 655* 736* 583*   * 187* 167*   ALT 64* 66* 63*   BILITOTAL 8.3* 9.2* 10.5*   PROTTOTAL 6.0* 6.9 6.9   ALBUMIN 2.6* 3.0* 2.9*      PANCNo lab results found in last 7 days.    IMAGING:  PROCEDURE: MR ABDOMEN MRCP W/O & W CONTRAST     INDICATION: Enlarged CBD; Calculus of gallbladder without  cholecystitis without obstruction; Common bile duct dilatation.     COMPARISON: Ultrasound 8/8/2022     TECHNIQUE:  Axial and coronal T2 HASTE, axial diffusion and ADC, axial  in and opposed phase T1, axial and coronal HASTE T2 THIN, axial T2 fat  suppressed, coronal T2 MRCP, axial and coronal pre-contrast T1, and  axial and coronal post-contrast T1 weighted images were obtained per  the \"MRCP/Pancreas\" protocol.     MEDS/CONTRAST: Dotarem 20 mL     FINDINGS:  The gallbladder contains multiple bulky stones. A 2.2 cm  stone within the gallbladder neck is associated with mass effect upon  the hepatic and cystic ducts. There is dilation of the hepatic duct to  1.5 cm above this level. There is intrahepatic ductal dilatation. The  common bile duct below the stone is borderline dilated for age at 8  mm.     No pericholecystic fluid is identified. The gallbladder is not fully  distended, mostly collapsed around multiple stones.     No pancreatic mass is identified. No hepatic or common duct filling  defect is seen. No pancreatic ductal dilatation is seen.     No hepatic steatosis is identified. No solid intrahepatic mass is  identified. The spleen is normal in volume. Symmetric nephrograms are  present without hydronephrosis. Subcentimeter renal cortical  hypodensities are too small to characterize, likely tiny cysts. Next     No suspicious adenopathy is identified. No suspicious " osseous lesion  is seen.                                                                      IMPRESSION:  Findings suspicious for Mirizzi syndrome. A 2.4 cm T2  hypointense stone within the gallbladder neck is associated with focal  mass-effect upon the common hepatic duct and upstream intrahepatic and  common hepatic ductal dilatation.     LETY RAYO MD         =======================================================================

## 2022-10-14 NOTE — PLAN OF CARE
Goal Outcome Evaluation:           Overall Patient Progress: no changeOverall Patient Progress: no change    Outcome Evaluation: Currently NPO; pt admit with decreased appetite and unintentional wt loss; nutrition to follow per protocol.

## 2022-10-14 NOTE — ANESTHESIA CARE TRANSFER NOTE
Patient: Yohana Marques    Procedure: Procedure(s):  ENDOSCOPIC RETROGRADE CHOLANGIOPANCREATOGRAPHY with biliary spincterotomy, biliary stent placement, cholangioscopy       Diagnosis: Biliary obstruction [K83.1]  Diagnosis Additional Information: No value filed.    Anesthesia Type:   General     Note:    Oropharynx: oropharynx clear of all foreign objects  Level of Consciousness: drowsy  Oxygen Supplementation: face mask  Level of Supplemental Oxygen (L/min / FiO2): 4  Independent Airway: airway patency satisfactory and stable  Dentition: dentition unchanged  Vital Signs Stable: post-procedure vital signs reviewed and stable  Report to RN Given: handoff report given  Patient transferred to: PACU    Handoff Report: Identifed the Patient, Identified the Reponsible Provider, Reviewed the pertinent medical history, Discussed the surgical course, Reviewed Intra-OP anesthesia mangement and issues during anesthesia, Set expectations for post-procedure period and Allowed opportunity for questions and acknowledgement of understanding      Vitals:  Vitals Value Taken Time   /70 10/14/22 1845   Temp 36.4  C (97.5  F) 10/14/22 1811   Pulse 65 10/14/22 1851   Resp 15 10/14/22 1851   SpO2 95 % 10/14/22 1851   Vitals shown include unvalidated device data.    Electronically Signed By: HANY De Souza CRNA  October 14, 2022  6:52 PM

## 2022-10-14 NOTE — PROGRESS NOTES
"CLINICAL NUTRITION SERVICES - ASSESSMENT NOTE     Nutrition Prescription    RECOMMENDATIONS FOR MDs/PROVIDERS TO ORDER:  Diet advance as soon as medically appropriate    Malnutrition Status:    Severe malnutrition in the context of acute illness    Recommendations already ordered by Registered Dietitian (RD):  None at this time     Future/Additional Recommendations:  Monitor diet adv, PO intakes, wt trends     REASON FOR ASSESSMENT  Yohana Marques is a/an 77 year old female assessed by the dietitian for Admission Nutrition Risk Screen for positive    NUTRITION HISTORY  Per medicine team note yesterday, \"She reports lack of appetite and poor PO intake over the past few months, as well as previously stated weight loss. \"; per this note, pt reported 25 lb wt loss over the past 6 months.  Per GI note, pt reported\"approximately 50 pound weight loss which she reports was somewhat intentional but the weight came off very easily.  She attributes this to eating much less than normal.\"    CURRENT NUTRITION ORDERS  Diet: NPO  Intake/Tolerance: NPO since admission yesterday.    LABS  Labs reviewed    MEDICATIONS  Medications reviewed  - Os-Alex  - Thera-Vit  - LR @ 100 mL/hr    ANTHROPOMETRICS  Height: 172.7 cm (5' 8\")  Most Recent Weight: 93.9 kg (207 lb 1.6 oz)    IBW: 63.6 kg   BMI: Obesity Grade I BMI 30-34.9  Weight History: 20 lb wt loss over the past 2 months (8.8% wt loss)  Wt Readings from Last 10 Encounters:   10/14/22 93.9 kg (207 lb 1.6 oz)   10/13/22 96.5 kg (212 lb 12.8 oz)   10/06/22 93 kg (205 lb)   08/23/22 103 kg (227 lb)   07/07/22 103 kg (227 lb)   07/05/21 119.7 kg (263 lb 14.4 oz)   11/25/19 111.6 kg (246 lb)   07/03/19 112.6 kg (248 lb 3.2 oz)   06/29/18 118.4 kg (261 lb 1.6 oz)   06/02/18 119.4 kg (263 lb 4.8 oz)      Dosing Weight: 71 kg (adjusted based on actual wt 93.9 kg and IBW)    ASSESSED NUTRITION NEEDS  Estimated Energy Needs: 1005-7051+ kcals/day (25 - 30+ kcals/kg)  Justification: " "Maintenance, aim higher end given recent wt loss  Estimated Protein Needs:  grams protein/day (1.2 - 1.5 grams of pro/kg)  Justification: Hypercatabolism with acute illness and Repletion  Estimated Fluid Needs: (1 mL/kcal)   Justification: Maintenance, or other per provider pending fluid status    PHYSICAL FINDINGS  See malnutrition section below.    MALNUTRITION  % Intake: < 75% for >/= 1 month (moderate)  % Weight Loss: > 5% in 1 month (severe) vs >7.5% in 3 months (severe)  Subcutaneous Fat Loss: Unable to assess  Muscle Loss: Unable to assess  Fluid Accumulation/Edema: mild per flowsheets  Malnutrition Diagnosis: Severe malnutrition in the context of acute illness    NUTRITION DIAGNOSIS  Inadequate oral intake related to decreased appetite as evidenced by provider note reports \"She reports lack of appetite and poor PO intake over the past few months\"      INTERVENTIONS  Implementation  Nutrition Education: Unable to complete due to pt unavailable     Goals  Patient to consume % of nutritionally adequate meal trays TID, or the equivalent with supplements/snacks.     Monitoring/Evaluation  Progress toward goals will be monitored and evaluated per protocol.     Enedelia Leon, RD, , LD  Weekday Pager: [phone]  Weekday Units covered: 7C (all beds) and 5A (beds 5201 through 5211-2)  Weekend/Holiday RD Pager: [phone]    "

## 2022-10-14 NOTE — PLAN OF CARE
Goal Outcome Evaluation:      Plan of Care Reviewed With: patient    Overall Patient Progress: no changeOverall Patient Progress: no change     Pt admitted from [facility] ~ 0130 for bili obstruction & GI consult.  A&Ox4.  VSS on RA ex hypertensive.  MD notified of BP, PRN hydralazine ordered not in parameters this AM. C/o back pain, tolerable w/ PRN tramadol x1.  Up SBA, cane at bedside used for further distances.  L PIV removed dt pain, new R PIV infusing LR @ 100 ml/hr.  Denies abd pain or nausea.  NPO for GI & Surg Onc consults.  Skin flaky & jaundice.  Urine lisa.  LBM 10/12.  Continue to monitor & follow POC.

## 2022-10-14 NOTE — CONSULTS
Advanced Endoscopy/Pancreaticobiliary Consultation      Date of Admission:  10/13/2022  Reason for Admission: jaundice  Date of Consult  10/14/2022   Requesting Physician:  Andrzej Parkinson MD           ASSESSMENT AND RECOMMENDATIONS:   Assessment:  77 year old female with a history of spinal stenosis with newly discovered elevated liver tests who is admitted for workup of worsening jaundice and biliary obstruction.    #. Painless jaundice  #. 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 She is without GI symptoms except mild nausea but has had significant weight loss. Plan for ERCP with biliary decompression today however will need to involve the surgical oncology team    #. Cirrhosis, new diagnosis  Biopsy from laparoscopy showing micronodular cirrhosis most compatible with steatohepatitis. No known history of liver disease. Denies alcohol use. Also with mild LE edema, unclear if new.   MELD-Na score: 22 at 10/14/2022  5:32 AM  MELD score: 20 at 10/14/2022  5:32 AM  Calculated from:  Serum Creatinine: 0.56 mg/dL (Using min of 1 mg/dL) at 10/14/2022  5:32 AM  Serum Sodium: 134 mmol/L at 10/14/2022  5:32 AM  Total Bilirubin: 8.3 mg/dL at 10/14/2022  5:32 AM  INR(ratio): 1.74 at 10/14/2022  5:32 AM  Age: 77 years     Recommendations:  CT chest/abd/pelvis with IV contrast today (ordered)  ERCP planned later today (on ADD ON list)  Continue NPO status   Give IV Vit K 10mg (ordered)  Trend LFT, INR, CBC  Surg/onc consultation  Analgesia/Antiemetics per primary team  Discussed with primary medicine team    Gastroenterology follow up recommendations: TBD    Thank you for involving us in this patient's care. Please do not hesitate to contact the GI service with any  questions or concerns.     Pt seen and care plan discussed with Dr. Henry, GI staff physician.        Ruby Baez PA-C  Advanced Endoscopy/Pancreaticobiliary GI Service  [facility]  Text Page  -------------------------------------------------------------------------------------------------------------------       Reason for Consultation:   Biliary obstruction           History of Present Illness:   Patient seen and examined at 0830. History is obtained from the patient and the medical record.    Yohana Marques is a 77 year old female with a PMH significant for spinal stenosis s/p diskectomy with chronic back pain, HTN, who presents to [facility] with painless jaundice and concern for biliary obstruction. The patient was initially discovered to have elevation in her liver test this summer during a wellness visit.  At that time her alkaline phosphatase was 299, ALT 69, AST 70, total bilirubin 0.8.  An ultrasound of the right upper quadrant was obtained showing dilation of the common bile duct up to 1.4 cm as well as hepatic steatosis.  At that time she was not having any symptoms however did note approximately 50 pound weight loss which she reports was somewhat intentional but the weight came off very easily.  She attributes this to eating much less than normal.    The patient was [place] to a general surgeon near [place] hometown. He ordered an MRCP 8/29 showing findings suspicious for Mirizzi syndrome with a 2.4 cm hypointense stone within the gallbladder neck and focal mass-effect upon the common hepatic duct with upstream intra and extrahepatic biliary dilation.  He arranged for laparoscopic cholecystectomy.  Per the operative report on 10/6, her liver was noted to be markedly nodular, dense and fibrous and there were adhesions to the dome of the gallbladder.  The gallbladder itself appeared white, fibrous and was very dense, not mobile and highly suspicious for malignancy.  The  cholecystectomy was aborted and a liver biopsy obtained.  This showed micronodular cirrhosis most compatible with steatohepatitis etiology as well as ductular proliferation and focal cholestasis compatible with superimposed large duct obstruction.    Between the time of her MRCP and her surgery she did develop some nausea, fatigue and lack of appetite.  However she has denied abdominal pain, vomiting, bowel or bladder changes (she does have chronic intermittent incontinence of both bowel and bladder).  She denies any chest pain, shortness of breath, fevers, chills or sweats.  She does note some itching but this is chronic and was told it may be related to her spinal stenosis.  When she followed up with surgery on 10/13 after her laparoscopy she was noted to be jaundiced and did state that her urine was becoming very dark and her stools were pale.  Her liver tests were rechecked and showed total bilirubin 10.5, alk phos 583, ALT 63 and . Her surgeon contacted the surgical oncology team here at [facility] to discuss transfer via ED for further evaluation of possible gallbladder malignancy as well as biliary obstruction.     In the ED, she was afebrile, slightly hypertensive, not tachycardic or hypoxic. Her liver tests are below, INR elevated to 1.7. She was admitted to the internal medicine service. Currently, the patient reports that her only symptom is that she is very thirsty.              Past Medical History:   Reviewed and edited as appropriate  Past Medical History:   Diagnosis Date     HTN (hypertension)      Hyperlipidemia      Leiomyoma of uterus, unspecified      Numbness and tingling     spinal stenosis causes numbness and tingling on feet and knees bilaterally     Osteoarthrosis, unspecified whether generalized or localized, unspecified site      Other and unspecified disc disorder of lumbar region      Primary osteoarthritis of left hip 12/18/2016     Spinal stenosis     S/P diskectomy x 2, most  recently 2014            Past Surgical History:   Reviewed and edited as appropriate   Past Surgical History:   Procedure Laterality Date     ARTHROPLASTY HIP Left 2016    Procedure: ARTHROPLASTY HIP;  Surgeon: Leonid Morfin MD;  Location:  OR     BACK SURGERY       COLONOSCOPY N/A 2017    Procedure: COMBINED COLONOSCOPY, SINGLE OR MULTIPLE BIOPSY/POLYPECTOMY BY BIOPSY;  colonoscopy;  Surgeon: Catarino Salas MD;  Location:  GI     DISCECTOMY LUMBAR POSTERIOR MICROSCOPIC TWO LEVELS  2014    Procedure: DISCECTOMY LUMBAR POSTERIOR MICROSCOPIC TWO LEVELS;  Surgeon: Warren Herring MD;  Location:  OR     LAPAROSCOPY DIAGNOSTIC (GENERAL) N/A 10/6/2022    Procedure: Diagnostic laparoscopy with liver biopsy;  Surgeon: Warren Zhang MD;  Location:  OR     ORTHOPEDIC SURGERY       ZZ NONSPECIFIC PROCEDURE  1996    lumbar discectomy     Z NONSPECIFIC PROCEDURE      cervical cone biopsy     Z NONSPECIFIC PROCEDURE      Left knee replacement              Social History:   The patient lives in [place], [place] with spouse  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)    Alcohol: denies  Tobacco: denies  Illicit drugs: denies           Family History:   Patient's family history is reviewed today and is non-contributory  Family History   Problem Relation Age of Onset     Family History Negative Mother          age 64 mva     Cancer Father          age 84 lung ca     Family History Negative Sister      Breast Cancer Paternal Grandmother      Genetic Disorder Other         daughter has Autoimmune disease             Allergies:   Reviewed and edited as appropriate   No Known Allergies         Medications:     Current Facility-Administered Medications   Medication     [START ON 10/15/2022] calcium carbonate (OS-YEE) tablet 600 mg     [START ON 10/15/2022] celecoxib (celeBREX) capsule 200 mg     hydrALAZINE (APRESOLINE) tablet 25 mg      lactated ringers infusion     lidocaine (LMX4) cream     lidocaine 1 % 0.1-1 mL     [START ON 10/15/2022] lisinopril (ZESTRIL) tablet 10 mg     melatonin tablet 1 mg     [START ON 10/15/2022] multivitamin, therapeutic (THERA-VIT) tablet 1 tablet     ondansetron (ZOFRAN ODT) ODT tab 4 mg    Or     ondansetron (ZOFRAN) injection 4 mg     phytonadione 10 mg in sodium chloride 0.9 % 50 mL intermittent infusion     sodium chloride (PF) 0.9% PF flush 3 mL     sodium chloride (PF) 0.9% PF flush 3 mL     traMADol (ULTRAM) half-tab 25-50 mg             Review of Systems:     A complete review of systems was performed and is negative except as noted in the HPI             Physical Exam:   Temp: 98.1  F (36.7  C) Temp src: Oral BP: (!) 150/69 Pulse: 75   Resp: 18 SpO2: 96 % O2 Device: None (Room air)    Wt:   Wt Readings from Last 2 Encounters:   10/13/22 96.2 kg (212 lb 1.9 oz)   10/13/22 96.5 kg (212 lb 12.8 oz)        General: Very pleasant female in NAD.  Answers appropriately.    HEENT: Head is AT/NC. Sclera +icteric. No conjunctival injection.  Oropharynx is clear, moist and w/o exudate or lesions.  Neck: No masses or thyromegaly.  Lungs: Clear to auscultation bilaterally.  No wheezes, rhonchi or crackles.    Heart: Regular rate and rhythm.  No murmurs, gallops or rubs.  Normal S1 and S2.  Abdomen: Soft, obese, non-tender, non-distended.  BS +.  No hepatosplenomegaly. No rebound or peritoneal signs, lap incisions CDI  Extremities: WWP, 1+ gayle LE edema  Skin: + jaundice         Data:   Labs and imaging below were independently reviewed and interpreted    LAB WORK:    BMP  Recent Labs   Lab 10/14/22  0532 10/13/22  1841 10/13/22  0920   * 134* 136   POTASSIUM 4.2 4.2 4.0   CHLORIDE 99 98 97*   YEE 8.6* 8.9 9.3   CO2 26 26 31   BUN 9.1 9.4 10   CR 0.56 0.59 0.73   GLC 80 88 95     CBC  Recent Labs   Lab 10/14/22  0532 10/13/22  1841 10/13/22  0920   WBC 9.7 11.0 9.8   RBC 3.52* 3.73* 3.82   HGB 11.5* 12.5 12.7   HCT  "33.0* 35.2 36.0   MCV 94 94 94   MCH 32.7 33.5* 33.2*   MCHC 34.8 35.5 35.3   RDW 18.5* 18.2* 18.2*    340 328     INR  Recent Labs   Lab 10/14/22  0532   INR 1.74*     LFTs  Recent Labs   Lab 10/14/22  0532 10/13/22  1841 10/13/22  0920   ALKPHOS 655* 736* 583*   * 187* 167*   ALT 64* 66* 63*   BILITOTAL 8.3* 9.2* 10.5*   PROTTOTAL 6.0* 6.9 6.9   ALBUMIN 2.6* 3.0* 2.9*      PANCNo lab results found in last 7 days.    IMAGING:  PROCEDURE: MR ABDOMEN MRCP W/O & W CONTRAST     INDICATION: Enlarged CBD; Calculus of gallbladder without  cholecystitis without obstruction; Common bile duct dilatation.     COMPARISON: Ultrasound 8/8/2022     TECHNIQUE:  Axial and coronal T2 HASTE, axial diffusion and ADC, axial  in and opposed phase T1, axial and coronal HASTE T2 THIN, axial T2 fat  suppressed, coronal T2 MRCP, axial and coronal pre-contrast T1, and  axial and coronal post-contrast T1 weighted images were obtained per  the \"MRCP/Pancreas\" protocol.     MEDS/CONTRAST: Dotarem 20 mL     FINDINGS:  The gallbladder contains multiple bulky stones. A 2.2 cm  stone within the gallbladder neck is associated with mass effect upon  the hepatic and cystic ducts. There is dilation of the hepatic duct to  1.5 cm above this level. There is intrahepatic ductal dilatation. The  common bile duct below the stone is borderline dilated for age at 8  mm.     No pericholecystic fluid is identified. The gallbladder is not fully  distended, mostly collapsed around multiple stones.     No pancreatic mass is identified. No hepatic or common duct filling  defect is seen. No pancreatic ductal dilatation is seen.     No hepatic steatosis is identified. No solid intrahepatic mass is  identified. The spleen is normal in volume. Symmetric nephrograms are  present without hydronephrosis. Subcentimeter renal cortical  hypodensities are too small to characterize, likely tiny cysts. Next     No suspicious adenopathy is identified. No suspicious " osseous lesion  is seen.                                                                      IMPRESSION:  Findings suspicious for Mirizzi syndrome. A 2.4 cm T2  hypointense stone within the gallbladder neck is associated with focal  mass-effect upon the common hepatic duct and upstream intrahepatic and  common hepatic ductal dilatation.     LETY RAYO MD         =======================================================================

## 2022-10-14 NOTE — H&P
[facility]    History and Physical - Hospitalist Service, GOLD TEAM        Date of Admission:  10/13/2022    Assessment & Plan   Yohana Marques is a 77 year old female admitted on 10/13/2022. She has a history of HTN, HLD, OA, and spinal stenosis. She presents with acute hyperbiliruinemia and jaundice in setting of recently recently diagnosed Mirizzi syndrome and concern for gallbladder mass.     Hyperbilirubinemia with obstructive jaundice d/t suspected biliary mass:  Found to have abnormal LFTs in 7/2022 during evaluation for unintentional weight loss, although bilirubin normal at that time. Abdominal US 8/8 showed gallstones and dilated CBD (14mm). MRCP 8/29 showed findings suspicious for Mirizzi syndrome with possible stone within gallbladder neck and focal mass effect on common hepatic duct. Scheduled for lap cholecystectomy on 10/6, aborted due to findings of nodular liver and contracted fibrous gallbladder. Liver biopsy obtained, pathology showed micronodular cirrhosis. Gallbladder findings concerning for biliary mass vs granulomatous disease. Bilirubin up from 1.5 to 10.5 today at clinic follow up. Case discussed with surgical oncology, who recommended admission for urgent biliary decompression. GI team aware of admission. Vitals stable. Exam benign.   - OK for regular diet tonight  - [place] at [place]  - LR at 100 ml/h  - GI consulted for possible ERCP tomorrow  - Surgical oncology consult ordered for AM  - Repeat CBC and CMP in AM    Hyponatremia:  Suspect volume depletion d/t poor PO hydration.   - IVF as above  - BMP in AM    HTN:  Previously on lisinopril 40mg daily. BP improved following spinal surgery, therefore hypertension felt 2/2 pain. Currently on lisinopril 10mg daily.   - Will hold lisinopril for now to ensure stable GFR  - Monitor BP  - Restart meds as needed        Diet: Regular Diet Adult  NPO per Anesthesia Guidelines for Procedure/Surgery Except  "for: Meds    DVT Prophylaxis: Pneumatic Compression Devices  Nye Catheter: Not present  Central Lines: None  Cardiac Monitoring: None  Code Status: Full Code      Clinically Significant Risk Factors Present on Admission             # Hypoalbuminemia: Albumin = 3.0 g/dL (Ref range: 3.5 - 5.2 g/dL) on admission, will monitor as appropriate     # Hypertension: home medication list includes antihypertensive(s)    # Obesity: Estimated body mass index is 32.25 kg/m  as calculated from the following:    Height as of this encounter: 1.727 m (5' 8\").    Weight as of this encounter: 96.2 kg (212 lb 1.9 oz).        Disposition Plan      Expected Discharge Date: 10/15/2022                The patient's care was discussed with the Attending Physician, Dr. Parkinson.    Boo Packer PA-C  Hospitalist Service, [facility]  Securely message with the Vocera Web Console (learn more here)  Text page via [facility] Paging/Directory   Please see signed in provider for up to date coverage information      ______________________________________________________________________    Chief Complaint   Jaundice    History is obtained from the patient    History of Present Illness   Yohana Marques is a 77 year old female who presents with new onset hyperbilirubinemia and jaundice. Patient reports approximately 25 lb weight loss over the past 6 months. Her primary care provider ordered routine labs to further evaluate and she was noted to have elevated alkaline phosphatase and transaminases. Abdominal US showed gallstones and dilated common bile duct. Subsequent MRCP showed large stone with abutment of the common hepatic duct. She was scheduled for laparoscopic cholecystectomy on 10/6 however the procedure was aborted after the surgeon noted the liver was nodular in appearence with contracted fibrous gallbladder concerning for mass vs granulomatous disease. The liver was biopsied. She " was seen in the clinic today for routine follow up. Surgical pathology showed micronodular cirrhosis. Repeat labs showed a significant rise in bilirubin from 1.5 to 10.5. The case was dicussed with surgical oncology at [facility] and it was recommended that she be directly admitted for urgent biliary decompression.     Patient currently reports feeling well. She is very thirsty and hasn't had anything to eat yet today. She notes intermittent RUQ abdominal pain for the past months with associated nausea but no vomiting. She did not notice jaundice or dark urine until today. She reports lack of appetite and poor PO intake over the past few months, as well as previously stated weight loss.     Review of Systems    The 10 point Review of Systems is negative other than noted in the HPI or here.     Past Medical History    I have reviewed this patient's medical history and updated it with pertinent information if needed.   Past Medical History:   Diagnosis Date     HTN (hypertension)      Hyperlipidemia      Leiomyoma of uterus, unspecified      Numbness and tingling     spinal stenosis causes numbness and tingling on feet and knees bilaterally     Osteoarthrosis, unspecified whether generalized or localized, unspecified site      Other and unspecified disc disorder of lumbar region      Primary osteoarthritis of left hip 12/18/2016     Spinal stenosis     S/P diskectomy x 2, most recently 8/2014       Past Surgical History   I have reviewed this patient's surgical history and updated it with pertinent information if needed.  Past Surgical History:   Procedure Laterality Date     ARTHROPLASTY HIP Left 12/12/2016    Procedure: ARTHROPLASTY HIP;  Surgeon: Leonid Morifn MD;  Location:  OR     BACK SURGERY       COLONOSCOPY N/A 7/20/2017    Procedure: COMBINED COLONOSCOPY, SINGLE OR MULTIPLE BIOPSY/POLYPECTOMY BY BIOPSY;  colonoscopy;  Surgeon: Catarino Salas MD;  Location:  GI     DISCECTOMY LUMBAR  POSTERIOR MICROSCOPIC TWO LEVELS  2014    Procedure: DISCECTOMY LUMBAR POSTERIOR MICROSCOPIC TWO LEVELS;  Surgeon: Warren Herring MD;  Location:  OR     LAPAROSCOPY DIAGNOSTIC (GENERAL) N/A 10/6/2022    Procedure: Diagnostic laparoscopy with liver biopsy;  Surgeon: Warren Zhang MD;  Location:  OR     ORTHOPEDIC SURGERY       [facility] NONSPECIFIC PROCEDURE      lumbar discectomy     [facility] NONSPECIFIC PROCEDURE      cervical cone biopsy     [facility] NONSPECIFIC PROCEDURE      Left knee replacement       Social History   I have reviewed this patient's social history and updated it with pertinent information if needed.  Social History     Tobacco Use     Smoking status: Never     Smokeless tobacco: Never   Substance Use Topics     Alcohol use: Yes     Alcohol/week: 0.0 standard drinks     Comment: social     Drug use: No       Family History   I have reviewed this patient's family history and updated it with pertinent information if needed.  Family History   Problem Relation Age of Onset     Family History Negative Mother          age 64 mva     Cancer Father          age 84 lung ca     Family History Negative Sister      Breast Cancer Paternal Grandmother      Genetic Disorder Other         daughter has Autoimmune disease       Prior to Admission Medications   Prior to Admission Medications   Prescriptions Last Dose Informant Patient Reported? Taking?   ASPIRIN EC PO  Self Yes No   Sig: Take 81 mg by mouth daily   Multiple Vitamins-Minerals (CENTRUM SILVER) per tablet  Self Yes No   Sig: Take 1 tablet by mouth daily   calcium-vitamin D (CALTRATE) 600-400 MG-UNIT per tablet  Self Yes No   Sig: Take 1 tablet by mouth 2 times daily   celecoxib (CELEBREX) 200 MG capsule   No No   Sig: TAKE 1 CAPSULE BY MOUTH EVERY DAY   lisinopril (ZESTRIL) 10 MG tablet   No No   Sig: TAKE 1 TABLET(10 MG) BY MOUTH DAILY   traMADol (ULTRAM) 50 MG tablet   No No   Sig: TAKE 1 TO 2 TABLETS BY MOUTH EVERY DAY FOR SEVERE  PAIN   vitamin D3 (CHOLECALCIFEROL) 50 mcg (2000 units) tablet   Yes No   Sig: Take 1 tablet (50 mcg) by mouth daily      Facility-Administered Medications: None     Allergies   No Known Allergies    Physical Exam   Vital Signs: Temp: 97.9  F (36.6  C) Temp src: Oral BP: (!) 150/85 Pulse: 87   Resp: 20 SpO2: 98 %      Weight: 212 lbs 1.92 oz    General Appearance:  Awake. Alert. Oriented x3. NAD.   HEENT:  mild scleral icterus. Moist mucous membranes.   Respiratory:  Normal effort. CTAB.   Cardiovascular:  RRR. S1,S2. No murmurs or gallops.   GI:  Soft, ND, NT, +BS. No mass or HSM.  Extremity:  Trace pitting edema. No calf tenderness.   Skin:  No visible rash. No jaundice.   Neuro:  Grossly non-focal.  Psych:  Appropriate mood and affect.      Data   Data reviewed today: I reviewed all medications, new labs and imaging results over the last 24 hours.     Recent Labs   Lab 10/13/22  1841 10/13/22  0920   * 136   POTASSIUM 4.2 4.0   CHLORIDE 98 97*   CO2 26 31   ANIONGAP 10 8   BUN 9.4 10   CR 0.59 0.73   YEE 8.9 9.3   MAG 1.9  --    PROTTOTAL 6.9 6.9   ALBUMIN 3.0* 2.9*   BILITOTAL 9.2* 10.5*   ALKPHOS 736* 583*   * 167*   ALT 66* 63*     Recent Labs   Lab 10/13/22  1841 10/13/22  0920   WBC 11.0 9.8   RBC 3.73* 3.82   HGB 12.5 12.7   HCT 35.2 36.0   MCV 94 94   MCH 33.5* 33.2*   MCHC 35.5 35.3   RDW 18.2* 18.2*    328     No lab results found in last 7 days.  Recent Labs   Lab 10/13/22  1841   .00*   SED 67*     Recent Labs   Lab 10/13/22  1841 10/13/22  0920   GLC 88 95

## 2022-10-14 NOTE — INTERVAL H&P NOTE
"I have reviewed the surgical (or preoperative) H&P that is linked to this encounter, and examined the patient. There are no significant changes    Clinical Conditions Present on Arrival:  Clinically Significant Risk Factors Present on Admission               # Hypoalbuminemia: Albumin = 2.6 g/dL (Ref range: 3.5 - 5.2 g/dL) on admission, will monitor as appropriate   # Coagulation Defect: INR = 1.74 (Ref range: 0.85 - 1.15) and/or PTT = N/A on admission, will monitor for bleeding   # Obesity: Estimated body mass index is 31.49 kg/m  as calculated from the following:    Height as of this encounter: 1.727 m (5' 8\").    Weight as of this encounter: 93.9 kg (207 lb 1.6 oz).       "

## 2022-10-14 NOTE — ANESTHESIA PREPROCEDURE EVALUATION
Anesthesia Pre-Procedure Evaluation    Patient: Yohana Marques   MRN: [id number] : 1945        Procedure : Procedure(s):  ENDOSCOPIC RETROGRADE CHOLANGIOPANCREATOGRAPHY          Past Medical History:   Diagnosis Date     HTN (hypertension)      Hyperlipidemia      Leiomyoma of uterus, unspecified      Numbness and tingling     spinal stenosis causes numbness and tingling on feet and knees bilaterally     Osteoarthrosis, unspecified whether generalized or localized, unspecified site      Other and unspecified disc disorder of lumbar region      Primary osteoarthritis of left hip 2016     Spinal stenosis     S/P diskectomy x 2, most recently 2014      Past Surgical History:   Procedure Laterality Date     ARTHROPLASTY HIP Left 2016    Procedure: ARTHROPLASTY HIP;  Surgeon: Leonid Morfin MD;  Location:  OR     BACK SURGERY       COLONOSCOPY N/A 2017    Procedure: COMBINED COLONOSCOPY, SINGLE OR MULTIPLE BIOPSY/POLYPECTOMY BY BIOPSY;  colonoscopy;  Surgeon: Catarino Salas MD;  Location:  GI     DISCECTOMY LUMBAR POSTERIOR MICROSCOPIC TWO LEVELS  2014    Procedure: DISCECTOMY LUMBAR POSTERIOR MICROSCOPIC TWO LEVELS;  Surgeon: Warren Herring MD;  Location:  OR     LAPAROSCOPY DIAGNOSTIC (GENERAL) N/A 10/6/2022    Procedure: Diagnostic laparoscopy with liver biopsy;  Surgeon: Warren Zhang MD;  Location:  OR     ORTHOPEDIC SURGERY       [facility] NONSPECIFIC PROCEDURE  1996    lumbar discectomy     [facility] NONSPECIFIC PROCEDURE      cervical cone biopsy     [facility] NONSPECIFIC PROCEDURE      Left knee replacement      No Known Allergies   Social History     Tobacco Use     Smoking status: Never     Smokeless tobacco: Never   Substance Use Topics     Alcohol use: Yes     Alcohol/week: 0.0 standard drinks     Comment: social      Wt Readings from Last 1 Encounters:   10/14/22 93.9 kg (207 lb 1.6 oz)        Anesthesia Evaluation   Pt has had prior anesthetic. Type: General  and MAC.    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    (+) Dyslipidemia hypertension----- (-) LOVE   METS/Exercise Tolerance: 4 - Raking leaves, gardening    Hematologic:  - neg hematologic  ROS  (-) anemia   Musculoskeletal: Comment: H/O Left AMARIS    Spinal Stenosis   (+) arthritis,     GI/Hepatic:  - neg GI/hepatic ROS   (+) liver disease,  (-) GERD   Renal/Genitourinary:  - neg Renal ROS  (-) renal disease and BPH   Endo:  - neg endo ROS  (-) Type II DM and thyroid disease   Psychiatric/Substance Use:  - neg psychiatric ROS  (-) psychiatric history   Infectious Disease:  - neg infectious disease ROS     Malignancy:  - neg malignancy ROS     Other:  - neg other ROS          Physical Exam    Airway        Mallampati: II   TM distance: > 3 FB   Neck ROM: full   Mouth opening: > 3 cm    Respiratory Devices and Support         Dental       (+) upper dentures and lower dentures      Cardiovascular   cardiovascular exam normal       Rhythm and rate: regular and normal     Pulmonary   pulmonary exam normal        breath sounds clear to auscultation           OUTSIDE LABS:  CBC:   Lab Results   Component Value Date    WBC 9.7 10/14/2022    WBC 11.0 10/13/2022    HGB 11.5 (L) 10/14/2022    HGB 12.5 10/13/2022    HCT 33.0 (L) 10/14/2022    HCT 35.2 10/13/2022     10/14/2022     10/13/2022     BMP:   Lab Results   Component Value Date     (L) 10/14/2022     (L) 10/13/2022    POTASSIUM 4.2 10/14/2022    POTASSIUM 4.2 10/13/2022    CHLORIDE 99 10/14/2022    CHLORIDE 98 10/13/2022    CO2 26 10/14/2022    CO2 26 10/13/2022    BUN 9.1 10/14/2022    BUN 9.4 10/13/2022    CR 0.56 10/14/2022    CR 0.59 10/13/2022    GLC 80 10/14/2022    GLC 88 10/13/2022     COAGS:   Lab Results   Component Value Date    PTT 25  05/31/2006    INR 1.74 (H) 10/14/2022     POC:   Lab Results   Component Value Date     (H) 08/13/2014     HEPATIC:   Lab Results   Component Value Date    ALBUMIN 2.6 (L) 10/14/2022    PROTTOTAL 6.0 (L) 10/14/2022    ALT 64 (H) 10/14/2022     (H) 10/14/2022    ALKPHOS 655 (H) 10/14/2022    BILITOTAL 8.3 (H) 10/14/2022    SVEN 35 10/13/2022     OTHER:   Lab Results   Component Value Date    A1C 6.1 (H) 11/18/2005    YEE 8.6 (L) 10/14/2022    MAG 1.9 10/13/2022    LIPASE 31 08/23/2022    AMYLASE 41 08/23/2022    TSH 2.30 11/18/2005    T4 1.13 11/18/2005    .00 (H) 10/13/2022    SED 67 (H) 10/13/2022       Anesthesia Plan    ASA Status:  3   NPO Status:  NPO Appropriate    Anesthesia Type: General.     - Airway: ETT   Induction: Intravenous, Propofol.   Maintenance: Balanced.   Techniques and Equipment:     - Airway: Video-Laryngoscope     - Lines/Monitors: 2nd IV, BIS     Consents    Anesthesia Plan(s) and associated risks, benefits, and realistic alternatives discussed. Questions answered and patient/representative(s) expressed understanding.    - Discussed:     - Discussed with:  Patient      - Patient is DNR/DNI Status: No         Postoperative Care    Pain management: IV analgesics.   PONV prophylaxis: Ondansetron (or other 5HT-3), Dexamethasone or Solumedrol     Comments:                Marin Noguera MD

## 2022-10-14 NOTE — PHARMACY-ADMISSION MEDICATION HISTORY
Pharmacy Admission Medication History    Admission medication history interview status for the 10/13/2022 admission is complete. See EPIC admission navigator for allergy information, prior to admission medications and immunization status.     Medication history interview source(s): Patient    Medication history resources (including written lists, pill bottles, clinic record): None    Medication history source reliability: Good    Actions taken by pharmacist (provider contacted, medication changes, etc):None     Changes made to medication history:NO CHANGES    Additional medication history information: None    Medication reconciliation/reorder completed by provider prior to medication history? Yes    Time spent in this activity: 15 MINUTES    Prior to Admission medications    Medication Sig Last Dose Taking? Auth Provider Long Term End Date   ASPIRIN EC PO Take 81 mg by mouth daily 10/12/2022 Yes Reported, Patient     lisinopril (ZESTRIL) 10 MG tablet TAKE 1 TABLET(10 MG) BY MOUTH DAILY 10/12/2022 Yes Arun Tao MD Yes    calcium-vitamin D (CALTRATE) 600-400 MG-UNIT per tablet Take 1 tablet by mouth 2 times daily 10/12/2022  Reported, Patient     celecoxib (CELEBREX) 200 MG capsule TAKE 1 CAPSULE BY MOUTH EVERY DAY 10/12/2022  Arun Tao MD Yes    Multiple Vitamins-Minerals (CENTRUM SILVER) per tablet Take 1 tablet by mouth daily 10/12/2022  Reported, Patient     traMADol (ULTRAM) 50 MG tablet TAKE 1 TO 2 TABLETS BY MOUTH EVERY DAY FOR SEVERE PAIN 10/12/2022  Arun Tao MD No    vitamin D3 (CHOLECALCIFEROL) 50 mcg (2000 units) tablet Take 1 tablet (50 mcg) by mouth daily 10/12/2022  Arun Tao MD

## 2022-10-14 NOTE — ED NOTES
Is a 77-year-old female who presents due to concern for biliary obstruction.  She was found to have elevated bilirubin and sent for admission as she likely has biliary obstruction.  Labs in triage showed a alkaline phosphatase of 736, AST of 27 and ALT of 66.  Total bilirubin is 9.2.  I discussed the case with Internal Medicine who will admit to their service.     Chase Chaparro, DO  10/14/22 0229

## 2022-10-15 LAB
ALBUMIN SERPL BCG-MCNC: 2.5 G/DL (ref 3.5–5.2)
ALP SERPL-CCNC: 655 U/L (ref 35–104)
ALT SERPL W P-5'-P-CCNC: 61 U/L (ref 10–35)
ANION GAP SERPL CALCULATED.3IONS-SCNC: 9 MMOL/L (ref 7–15)
AST SERPL W P-5'-P-CCNC: 152 U/L (ref 10–35)
BILIRUB SERPL-MCNC: 7.7 MG/DL
BUN SERPL-MCNC: 9.4 MG/DL (ref 8–23)
CALCIUM SERPL-MCNC: 8.7 MG/DL (ref 8.8–10.2)
CHLORIDE SERPL-SCNC: 99 MMOL/L (ref 98–107)
CREAT SERPL-MCNC: 0.56 MG/DL (ref 0.51–0.95)
DEPRECATED HCO3 PLAS-SCNC: 26 MMOL/L (ref 22–29)
ERYTHROCYTE [DISTWIDTH] IN BLOOD BY AUTOMATED COUNT: 18.6 % (ref 10–15)
GFR SERPL CREATININE-BSD FRML MDRD: >90 ML/MIN/1.73M2
GLUCOSE SERPL-MCNC: 119 MG/DL (ref 70–99)
HCT VFR BLD AUTO: 33.6 % (ref 35–47)
HGB BLD-MCNC: 11.7 G/DL (ref 11.7–15.7)
INR PPP: 1.23 (ref 0.85–1.15)
MCH RBC QN AUTO: 33.1 PG (ref 26.5–33)
MCHC RBC AUTO-ENTMCNC: 34.8 G/DL (ref 31.5–36.5)
MCV RBC AUTO: 95 FL (ref 78–100)
PLATELET # BLD AUTO: 322 10E3/UL (ref 150–450)
POTASSIUM SERPL-SCNC: 4.3 MMOL/L (ref 3.4–5.3)
PROT SERPL-MCNC: 6 G/DL (ref 6.4–8.3)
RBC # BLD AUTO: 3.53 10E6/UL (ref 3.8–5.2)
SODIUM SERPL-SCNC: 134 MMOL/L (ref 136–145)
WBC # BLD AUTO: 8.9 10E3/UL (ref 4–11)

## 2022-10-15 PROCEDURE — 99232 SBSQ HOSP IP/OBS MODERATE 35: CPT | Mod: FS | Performed by: INTERNAL MEDICINE

## 2022-10-15 PROCEDURE — 85027 COMPLETE CBC AUTOMATED: CPT | Performed by: INTERNAL MEDICINE

## 2022-10-15 PROCEDURE — 80053 COMPREHEN METABOLIC PANEL: CPT | Performed by: INTERNAL MEDICINE

## 2022-10-15 PROCEDURE — 85610 PROTHROMBIN TIME: CPT | Performed by: INTERNAL MEDICINE

## 2022-10-15 PROCEDURE — 36415 COLL VENOUS BLD VENIPUNCTURE: CPT | Performed by: INTERNAL MEDICINE

## 2022-10-15 PROCEDURE — 99207 PR APP CREDIT; MD BILLING SHARED VISIT: CPT | Performed by: PHYSICIAN ASSISTANT

## 2022-10-15 PROCEDURE — 250N000013 HC RX MED GY IP 250 OP 250 PS 637: Performed by: PHYSICIAN ASSISTANT

## 2022-10-15 PROCEDURE — 82040 ASSAY OF SERUM ALBUMIN: CPT | Performed by: INTERNAL MEDICINE

## 2022-10-15 PROCEDURE — 120N000002 HC R&B MED SURG/OB UMMC

## 2022-10-15 RX ORDER — METRONIDAZOLE 500 MG/1
500 TABLET ORAL 3 TIMES DAILY
Status: DISCONTINUED | OUTPATIENT
Start: 2022-10-15 | End: 2022-10-16 | Stop reason: HOSPADM

## 2022-10-15 RX ORDER — CIPROFLOXACIN 500 MG/1
500 TABLET, FILM COATED ORAL EVERY 12 HOURS SCHEDULED
Status: DISCONTINUED | OUTPATIENT
Start: 2022-10-15 | End: 2022-10-16 | Stop reason: HOSPADM

## 2022-10-15 RX ADMIN — CELECOXIB 200 MG: 200 CAPSULE ORAL at 09:13

## 2022-10-15 RX ADMIN — CIPROFLOXACIN 500 MG: 500 TABLET, FILM COATED ORAL at 20:33

## 2022-10-15 RX ADMIN — METRONIDAZOLE 500 MG: 500 TABLET ORAL at 16:36

## 2022-10-15 RX ADMIN — LISINOPRIL 10 MG: 10 TABLET ORAL at 09:13

## 2022-10-15 RX ADMIN — Medication 600 MG: at 22:31

## 2022-10-15 RX ADMIN — METRONIDAZOLE 500 MG: 500 TABLET ORAL at 20:33

## 2022-10-15 RX ADMIN — THERA TABS 1 TABLET: TAB at 09:13

## 2022-10-15 RX ADMIN — Medication 600 MG: at 09:13

## 2022-10-15 RX ADMIN — Medication 50 MG: at 20:33

## 2022-10-15 ASSESSMENT — ACTIVITIES OF DAILY LIVING (ADL)
ADLS_ACUITY_SCORE: 20
ADLS_ACUITY_SCORE: 19
ADLS_ACUITY_SCORE: 20
ADLS_ACUITY_SCORE: 19
ADLS_ACUITY_SCORE: 20
ADLS_ACUITY_SCORE: 19
ADLS_ACUITY_SCORE: 20
ADLS_ACUITY_SCORE: 19
ADLS_ACUITY_SCORE: 20
ADLS_ACUITY_SCORE: 20

## 2022-10-15 NOTE — PROGRESS NOTES
[facility]    Medicine Progress Note - Hospitalist Service, GOLD TEAM 6    Date of Admission:  10/13/2022    Assessment & Plan          Mrs. Sagar Marques is a 77-year-old female admitted 10/13/2022 with a past medical history of hypertension, hyperlipidemia obstructive sleep apnea and spinal stenosis who recently visited with her primary care provider after experiencing weight loss and painless jaundice and was found to have elevated liver enzymes (July 2022) and subsequent ultrasound in August 2022 revealed gallstones and a dilated common bile duct.  Follow-up MRCP displayed findings suspicious for Mirizzi syndrome and possible stones in the gallbladder neck with a focal mass-effect on the common hepatic duct.  She was scheduled for a laparoscopic cholecystectomy 10/6/2022 which was aborted secondary to a nodular liver and a contracted fibrous gallbladder.  Liver biopsy was obtained however pathology revealed micronodular cirrhosis.  Her bilirubin had increased to 10.5 and during a clinic visit she was directly admitted for further work-up and care.    # Painless jaundice with hyperbilirubinemia secondary to 3 large gallbladder stones s/p stenting  # 20 pound unintentional weight loss   # Mirizzi syndrome with cholecystitis    -GI was consulted and per their recommendations she underwent a CT chest abdomen and pelvis 10/14/2022 which revealed 4 mm left upper lobe pulmonary nodule, multiple large gallbladder stones in the gallbladder neck, and hepatic and intrahepatic biliary ductal system with no cholecystitis and no masses.  She subsequently underwent an ERCP  -Continue to monitor daily LFTs  -No blood thinners for 3 days  -Resume general diet and encourage oral intake  -START Cipro 500mg twice daily and Flagyl 500mg three times daily x 5 days for treatment of cholangitis/pustulant cholecystitis per GI recs in their procedure note    # SANGEETA pulmonary nodule  -Follow up  "in 6 months with repeat CT chest        Diet: Advance Diet as Tolerated: Regular Diet Adult    DVT Prophylaxis: No blood thinners  Nye Catheter: Not present  Central Lines: None  Cardiac Monitoring: None  Code Status: Full Code      Disposition Plan   From home, will discharge to home after discharge    Expected Discharge Date: 10/17/2022                  AMADOR Rabago  Hospitalist Service, GOLD TEAM 6  [facility]  Securely message with the Vocera Web Console (learn more here)  Text page via [facility] Paging/Directory   Please see signed in provider for up to date coverage information      Clinically Significant Risk Factors Present on Admission               # Obesity: Estimated body mass index is 31.49 kg/m  as calculated from the following:    Height as of this encounter: 1.727 m (5' 8\").    Weight as of this encounter: 93.9 kg (207 lb 1.6 oz).    # Severe Malnutrition: based on nutrition assessment     ______________________________________________________________________    Interval History   Ms. Keith was resting in bed ordering breakfast this morning. She denies any pain, fevers, chills, shortness of breath or additional new symptoms overnight. We reviewed the findings of her imaging and ERCP from yesterday and she had no further questions or concerns.    Data reviewed today: I reviewed all medications, new labs and imaging results over the last 24 hours.    Physical Exam   Vital Signs: Temp: 97.8  F (36.6  C) Temp src: Oral BP: 109/55 Pulse: 59   Resp: 12 SpO2: 96 % O2 Device: None (Room air) Oxygen Delivery: 6 LPM  Weight: 207 lbs 1.6 oz  General Appearance: 77 year old female resting in bed, jaundiced throughout, no acute distress, denies pain  Respiratory: breathing comfortably on room air, no adventitious sounds to bilateral auscultation  Cardiovascular: regular rate and rhythm, no appreciable murmurs, rubs or gallops  GI: tinkering bowel sounds " present, moderately distended, non-tender to palpation throughout  Skin: warm, dry, no open sores, lesions or ulcerations    Data   Recent Labs   Lab 10/15/22  0728 10/14/22  1433 10/14/22  0532 10/13/22  1841   WBC 8.9  --  9.7 11.0   HGB 11.7  --  11.5* 12.5   MCV 95  --  94 94     --  331 340   INR 1.23*  --  1.74*  --    *  --  134* 134*   POTASSIUM 4.3  --  4.2 4.2   CHLORIDE 99  --  99 98   CO2 26  --  26 26   BUN 9.4  --  9.1 9.4   CR 0.56  --  0.56 0.59   ANIONGAP 9  --  9 10   YEE 8.7*  --  8.6* 8.9   * 76 80 88   ALBUMIN 2.5*  --  2.6* 3.0*   PROTTOTAL 6.0*  --  6.0* 6.9   BILITOTAL 7.7*  --  8.3* 9.2*   ALKPHOS 655*  --  655* 736*   ALT 61*  --  64* 66*   *  --  173* 187*

## 2022-10-15 NOTE — PLAN OF CARE
Goal Outcome Evaluation:      Plan of Care Reviewed With: patient, child    Overall Patient Progress: improving    Outcome Evaluation: Returned from ERCP procedure aprox 1930. HTN, did not meet order parameters for PRNs but all other VSS on RA. Per pt, pain managed well with PRN tramadol. A&Ox4. Making needs known. No BM since 10/12, not passing gas, audible bowel sounds. Diet progressed, ate pudding apple sauce and crackers. SBA to bathroom, urinating.

## 2022-10-15 NOTE — PROGRESS NOTES
[facility]    Medicine Progress Note - Hospitalist Service, GOLD TEAM 6    Date of Admission:  10/13/2022    Assessment & Plan            Yohana Marques is a 77 year old female admitted on 10/13/2022. She has a history of HTN, HLD, OA, and spinal stenosis. She presented with acute hyperbiliruinemia and jaundice in setting of recently recently diagnosed Mirizzi syndrome and concern for gallbladder mass.     Hyperbilirubinemia with obstructive jaundice d/t suspected biliary mass:  Found to have abnormal LFTs in 7/2022 during evaluation for unintentional weight loss, although bilirubin normal at that time. Abdominal US 8/8 showed gallstones and dilated CBD (14mm). MRCP 8/29 showed findings suspicious for Mirizzi syndrome with possible stone within gallbladder neck and focal mass effect on common hepatic duct. Scheduled for lap cholecystectomy on 10/6, aborted due to findings of nodular liver and contracted fibrous gallbladder. Liver biopsy obtained, pathology showed micronodular cirrhosis. Gallbladder findings concerning for biliary mass vs granulomatous disease. Bilirubin up from 1.5 to 10.5 at clinic follow up. Case discussed with surgical oncology, who recommended admission for urgent biliary decompression. Vitals stable. Exam benign.   - Appreciate GI consultation: ERCP performed- 2 GB stones and 1 in cystic duct- Mirizzi syndrome and no evidence of mass/malignancy. 2 stents placed and purulence drained  - LR at 100 ml/h before procedure- likely to stop on 10/15/2022 with resumption of reg diet  - Surgical oncology consult appreciated- CT C/A/P without evidence of malignancy  - Trend CBC and CMP after ERCP    Mild, asymptomatic hyponatremia:  Suspect volume depletion d/t poor PO hydration.   - IVF as above  - BMP in AM    HTN:  Previously on lisinopril 40mg daily. BP improved following spinal surgery, therefore hypertension felt 2/2 pain. Currently on lisinopril  "10mg daily.   - Hold lisinopril   - Monitor BP  - Restart meds as needed     Severe malnutrition in the context of acute illness     Diet: Advance Diet as Tolerated: Regular Diet Adult    DVT Prophylaxis: Low Risk/Ambulatory with no VTE prophylaxis indicated, Pneumatic Compression Devices and Ambulate every shift  Nye Catheter: Not present  Central Lines: None  Cardiac Monitoring: None  Code Status: Full Code      Disposition Plan     Expected Discharge Date: 10/17/2022                The patient's care was discussed with the Bedside Nurse, Patient and GI  Consultant.    Claudine Polanco MD  Hospitalist Service, GOLD TEAM [address] [place] [address]  Securely message with the Vocera Web Console (learn more here)  Text page via [facility] Paging/Directory   Please see signed in provider for up to date coverage information      Clinically Significant Risk Factors Present on Admission               # Obesity: Estimated body mass index is 31.49 kg/m  as calculated from the following:    Height as of this encounter: 1.727 m (5' 8\").    Weight as of this encounter: 93.9 kg (207 lb 1.6 oz).      ______________________________________________________________________    Interval History   Yohana continues to feel nauseated today, with no abd pain. She is ready to figure out what's going on and then discharge to home as soon as she can. Remainder of 4 pt ROS neg    Data reviewed today: I reviewed all medications, new labs and imaging results over the last 24 hours.    Physical Exam   Vital Signs: Temp: 98.4  F (36.9  C) Temp src: Oral BP: (!) 143/55 Pulse: 65   Resp: 18 SpO2: 94 % O2 Device: None (Room air) Oxygen Delivery: 6 LPM  Weight: 207 lbs 1.6 oz  Gen: comfortably sitting up in bed chatting on phone and with multiple consultants  HEENT: scleral icterus but no conjunctival injection gayle, no rhinorrhea, MMM, EOMI  Resp: CTAB, no w/C, no increased work of breathing  CV: RRR, no m/r/g, cap " refill <2   Abd: soft, NT.ND, + BS  Extrem: no pitting pedal edema gayle  Neuro: alert, oriented, CN 2-12 grossly intact  Skin: no rashes or lesions noted on exposed skin    Data   Recent Labs   Lab 10/14/22  1433 10/14/22  0532 10/13/22  1841 10/13/22  0920   WBC  --  9.7 11.0 9.8   HGB  --  11.5* 12.5 12.7   MCV  --  94 94 94   PLT  --  331 340 328   INR  --  1.74*  --   --    NA  --  134* 134* 136   POTASSIUM  --  4.2 4.2 4.0   CHLORIDE  --  99 98 97*   CO2  --  26 26 31   BUN  --  9.1 9.4 10   CR  --  0.56 0.59 0.73   ANIONGAP  --  9 10 8   YEE  --  8.6* 8.9 9.3   GLC 76 80 88 95   ALBUMIN  --  2.6* 3.0* 2.9*   PROTTOTAL  --  6.0* 6.9 6.9   BILITOTAL  --  8.3* 9.2* 10.5*   ALKPHOS  --  655* 736* 583*   ALT  --  64* 66* 63*   AST  --  173* 187* 167*     Recent Results (from the past 24 hour(s))   CT Chest/Abdomen/Pelvis w Contrast    Narrative    EXAM: CT CHEST/ABDOMEN/PELVIS W CONTRAST 10/14/2022 12:42 PM    HISTORY: 77-year-old female with jaundice, biliary obstruction,  concern for gallbladder/biliary malignancy, recent MRCP showing  Mirizzi syndrome    COMPARISON: MRCP 8/29/2022. Complete abdominal ultrasound 8/8/2022.    TECHNIQUE: Helical CT images from the thoracic inlet through the  symphysis pubis were obtained with IV contrast. Contrast dose: 126 mL  [id number].    FINDINGS:   image(s) are noncontributory.    LINES/TUBES: None.    CHEST:  LOWER NECK: Unremarkable thyroid.     PULMONARY: Central tracheobronchial tree is patent. No bronchial wall  thickening. No pneumothorax or pleural effusion. No pulmonary edema.  No focal airspace opacity. 4 mm left upper lobe nodule (series 9 image  71).    CARDIAC: No cardiomegaly. No pericardial effusion.     MEDIASTINUM: Ascending aorta and main pulmonary artery diameters are  within normal limits. Normal appearance and configuration of the great  vessels off of the aortic arch. No suspicious mediastinal, hilar, or  axillary lymph nodes.     ESOPHAGUS:  Unremarkable.    ABDOMEN/PELVIS:  HEPATIC: No suspicious focal hepatic lesion.    BILIARY: The gallbladder contains at least 3 large peripherally  calcified stones, one of which is located at the gallbladder neck and  causes mass effect upon the hepatic and cystic biliary ducts. There is  intrahepatic biliary ductal dilation measuring up to 1.3 cm and  hepatic ductal dilation up to 1.6 cm. The common bile duct just below  the stone measures approximately 6 mm. No pericholecystic fluid,  necrosis, or evidence for biliary perforation. Gallbladder appears  collapsed around the stones. No pneumobilia.    PANCREAS: No focal pancreatic lesion. The main pancreatic duct is not  dilated.    SPLEEN: No splenomegaly. No suspicious lesions or masses.    ADRENALS: Unremarkable.    RENAL: No hydronephrosis, calculi, or mass. Simple left renal cortical  cyst. Proximal ureters appear patent without evidence for filling  defect or hydroureter.    URINARY BLADDER: Unremarkable.    REPRODUCTIVE: Unremarkable.    GASTROINTESTINAL: Unremarkable stomach and duodenum. Normal caliber  large and small bowel. No abnormal bowel wall thickening or  enhancement. No pneumatosis or portal venous gas. No appendicitis.    MESENTERY/PERITONEUM: No free air. Small amount of dependent pelvic  free fluid.    VASCULAR: Nonaneurysmal abdominal aorta. Major abdominal vessels  appear patent. Mild atherosclerotic vascular calcifications. Pelvic  phleboliths.    LYMPH NODES: No lymphadenopathy. Benign appearing bilateral axillary  an nonenlarged mediastinal lymph nodes are present.    MUSCULOSKELETAL: Degenerative changes in keeping with the patient's  age. Left hip arthroplasty. L4 discectomy changes. No acute osseous  abnormality. No aggressive osseous lesions. Unremarkable soft tissues.      Impression    IMPRESSION:   1.  Multiple large gallbladder stones, one of which is located at the  gallbladder neck causing mass effect with subsequent common  hepatic  and intrahepatic biliary ductal dilation. No evidence for  cholecystitis or complications thereof.  2.  No definitive  evidence for gallbladder/biliary neoplasm.  3.  4 mm left upper lobe pulmonary nodule, follow-up within one year  recommended.    I have personally reviewed the examination and initial interpretation  and I agree with the findings.    SATISH QUINONES MD         SYSTEM ID:  [id number]   XR Surgery [facility] Fluoro Less Than 5 Min w Stills    Narrative    This exam was marked as non-reportable because it will not be read by a   radiologist or a [facility] non-radiologist provider.           Medications     lactated ringers 100 mL/hr at 10/14/22 1238       calcium carbonate  600 mg Oral BID w/meals     celecoxib  200 mg Oral Daily     lisinopril  10 mg Oral Daily     multivitamin, therapeutic  1 tablet Oral Daily     sodium chloride (PF)  3 mL Intracatheter Q8H

## 2022-10-15 NOTE — PLAN OF CARE
Goal Outcome Evaluation:      Plan of Care Reviewed With: patient    Overall Patient Progress: improvingOverall Patient Progress: improving    Outcome Evaluation: Pt denied pain.  Transitioned to PO antibiotics. Pt started eating more today w/ good tolerance.  Passing flatus, but no BM yet.  Currently awaiting for GI to see pt before discharge.

## 2022-10-15 NOTE — PLAN OF CARE
Goal Outcome Evaluation:      Plan of Care Reviewed With: patient    Overall Patient Progress: improvingOverall Patient Progress: improving    Outcome Evaluation: A&Ox4, VSS on RA. No PRNs given, pt denied pain. Pt slept well throughout the night. No BM.

## 2022-10-15 NOTE — ANESTHESIA POSTPROCEDURE EVALUATION
Patient: Yohana Marques    Procedure: Procedure(s):  ENDOSCOPIC RETROGRADE CHOLANGIOPANCREATOGRAPHY with biliary spincterotomy, biliary stent placement, cholangioscopy       Anesthesia Type:  General    Note:  Disposition: Inpatient   Postop Pain Control: Uneventful            Sign Out: Well controlled pain   PONV: No   Neuro/Psych: Uneventful            Sign Out: Acceptable/Baseline neuro status   Airway/Respiratory: Uneventful            Sign Out: Acceptable/Baseline resp. status   CV/Hemodynamics: Uneventful            Sign Out: Acceptable CV status; No obvious hypovolemia; No obvious fluid overload   Other NRE: NONE   DID A NON-ROUTINE EVENT OCCUR? No           Last vitals:  Vitals Value Taken Time   /74 10/14/22 1900   Temp 36.4  C (97.5  F) 10/14/22 1811   Pulse 65 10/14/22 1907   Resp 13 10/14/22 1907   SpO2 92 % 10/14/22 1907   Vitals shown include unvalidated device data.    Electronically Signed By: Rex Garcia MD  October 14, 2022  7:08 PM

## 2022-10-15 NOTE — OP NOTE
ERCP 10/14/2022  4:07 PM [address] [place] [address] [phone]     Endoscopy Department   _______________________________________________________________________________   Patient Name: Yohana Marques    Procedure Date: 10/14/2022 4:07 PM   MRN: [id number]                       Account Number: [id number]   YOB: 1945               Admit Type: Inpatient   Age: 77                               Room:  OR    Gender: Female                        Note Status: Finalized   Attending MD: ANTON SONG MD Pause for the Cause: pause for cause    completed   Total Sedation Time:                     _______________________________________________________________________________       Procedure:             ERCP   Indications:           Abnormal abdominal CT, Abnormal MRCP, Jaundice   Providers:             ANTON SONG MD   Patient Profile:       Ms Sagar Marques is a 78yo woman with abdominal pain                          and weight loss found to have imaging suggestive of                          Mirzzi. An attempt at cholecystectomy was aborted and                          with rising liver studies she was transferred for                          management by ERCP.   Referring MD:          DIAZ EL MD   Requesting Provider:   MACK SALCEDO MD   Medicines:             General Anesthesia, Cipro 400 mg IV, Indomethacin 100                          mg TX   Complications:         No immediate complications.   _______________________________________________________________________________   Procedure:             Pre-Anesthesia Assessment:                          - Prior to the procedure, a History and Physical was                          performed, and patient medications and allergies were                          reviewed. The patient is competent. The risks and                          benefits of  the procedure and the sedation options and                          risks were discussed with the patient. All questions                          were answered and informed consent was obtained.                          Patient identification and proposed procedure were                          verified by the nurse in the pre-procedure area.                          Mental Status Examination: alert and oriented. Airway                          Examination: Mallampati Class II (the uvula but not                          tonsillar pillars visualized). Respiratory                          Examination: clear to auscultation. CV Examination:                          normal. ASA Grade Assessment: III - A patient with                          severe systemic disease. After reviewing the risks and                          benefits, the patient was deemed in satisfactory                          condition to undergo the procedure. The anesthesia                          plan was to use general anesthesia. Immediately prior                          to administration of medications, the patient was                          re-assessed for adequacy to receive sedatives. The                          heart rate, respiratory rate, oxygen saturations,                          blood pressure, adequacy of pulmonary ventilation, and                          response to care were monitored throughout the                          procedure. The physical status of the patient was                          re-assessed after the procedure.                          After obtaining informed consent, the scope was passed                          under direct vision. Throughout the procedure, the                          patient's blood pressure, pulse, and oxygen                          saturations were monitored continuously. The                          Enteroscope was introduced through the mouth, and used                          to  "inject contrast into and used to cannulate the bile                          duct. The ERCP was accomplished without difficulty.                          The patient tolerated the procedure well.                                                                                     Findings:        A  film of th right upper quadrant was unremarkable. Limited white        light imaging was notable for de ramiro pus from the ampulla. The bile        duct was selectively deeply cannulated with the short-nosed traction        sphincterotome in concert with multiple 0.025\" Visiglide wires. Contrast        was injected and I personally interpreted the bile duct images. Ductal        flow of contrast was adequate, image quality was excellent and contrast        extended throghout the intrahepatics. The biliary system was diffusely        dilated upstream of a mid to lower common duct stenosis related to a        large filling defect within the cystic near the cystic insertion. The        were also two large filing defects within a relatively small        gallbladder. Cannulation of the upstream biliary system was extremely        difficult and ultimately required cholangioscopy. A 6 mm biliary        sphincterotomy was made with a monofilament traction (standard)        sphincterotome using ERBE electrocautery. There was no        post-sphincterotomy bleeding. The bile duct was explored endoscopically        using the SpOmnilink Systemslass direct visualization system and the SpyScope was        advanced to the middle and lower third near the cystic insertion where        the tissue was found macerated and a stone was seen in the widely        dilated cystic insertion. A 10 Fr by 9 cm SF stent with a single        external flap and a single internal flap was placed 9 cm into the common        duct across the compression. Bile and pus flowed through the stent and        the stent was in good position. A 7 Fr by 12 cm transpapillary " Zimmon        DDPT stent with a single external pigtail and a single internal pigtail        was placed 12 cm into the gallbladder. Bile and pus flowed through the        stent and the stent was in good position. The ventral pancreatic duct        and orifice were selectively not interrogated.                                                                                     Impression:            - Mirizzi confirmed by both cholangioscopy and                          cholangiography with two large stones found in a small                          gallbladder and a third large stone occupying a                          dilated cystic compressing the adjacent common                          - Purulent drainage either from the gallbladder or the                          biliary tree upstream of the stenosis, therefore both                          a gallbladder stent and a common duct stent were                          deployed following a sphincterotomy                          - Diffuse dilation of the biliary system upstream of                          the compression   Recommendation:        - General anesthesia recovery with return to the floor                          for ongoing care                          - Complete a course of antibiotic for either                          cholangitis or cholecystitis                          - Hold antithrombotics for at least three days; all                          other medications and diet may resume                          - Plan should be for cholecystectomy wthin 3m at which                          time the gallbladder stent may be removed (just prior)                          and an ERCP performed in tandem just after                          - The findings and recommendations were discussed with                          the patient and their family                                                                                       electronically signed by S.  Amateau

## 2022-10-16 VITALS
TEMPERATURE: 97.9 F | BODY MASS INDEX: 31.9 KG/M2 | DIASTOLIC BLOOD PRESSURE: 55 MMHG | RESPIRATION RATE: 18 BRPM | HEART RATE: 60 BPM | WEIGHT: 210.5 LBS | SYSTOLIC BLOOD PRESSURE: 125 MMHG | HEIGHT: 68 IN | OXYGEN SATURATION: 96 %

## 2022-10-16 LAB
ALBUMIN SERPL BCG-MCNC: 2.6 G/DL (ref 3.5–5.2)
ALP SERPL-CCNC: 607 U/L (ref 35–104)
ALT SERPL W P-5'-P-CCNC: 53 U/L (ref 10–35)
ANION GAP SERPL CALCULATED.3IONS-SCNC: 9 MMOL/L (ref 7–15)
AST SERPL W P-5'-P-CCNC: 130 U/L (ref 10–35)
BILIRUB SERPL-MCNC: 5.1 MG/DL
BUN SERPL-MCNC: 10.9 MG/DL (ref 8–23)
CALCIUM SERPL-MCNC: 8.7 MG/DL (ref 8.8–10.2)
CHLORIDE SERPL-SCNC: 101 MMOL/L (ref 98–107)
CREAT SERPL-MCNC: 0.6 MG/DL (ref 0.51–0.95)
DEPRECATED HCO3 PLAS-SCNC: 26 MMOL/L (ref 22–29)
ERYTHROCYTE [DISTWIDTH] IN BLOOD BY AUTOMATED COUNT: 19 % (ref 10–15)
GFR SERPL CREATININE-BSD FRML MDRD: >90 ML/MIN/1.73M2
GLUCOSE SERPL-MCNC: 93 MG/DL (ref 70–99)
HCT VFR BLD AUTO: 32.6 % (ref 35–47)
HGB BLD-MCNC: 11.1 G/DL (ref 11.7–15.7)
MCH RBC QN AUTO: 32.9 PG (ref 26.5–33)
MCHC RBC AUTO-ENTMCNC: 34 G/DL (ref 31.5–36.5)
MCV RBC AUTO: 97 FL (ref 78–100)
PLATELET # BLD AUTO: 320 10E3/UL (ref 150–450)
POTASSIUM SERPL-SCNC: 4.1 MMOL/L (ref 3.4–5.3)
PROT SERPL-MCNC: 6 G/DL (ref 6.4–8.3)
RBC # BLD AUTO: 3.37 10E6/UL (ref 3.8–5.2)
SODIUM SERPL-SCNC: 136 MMOL/L (ref 136–145)
WBC # BLD AUTO: 8.4 10E3/UL (ref 4–11)

## 2022-10-16 PROCEDURE — 999N000157 HC STATISTIC RCP TIME EA 10 MIN

## 2022-10-16 PROCEDURE — 99207 PR APP CREDIT; MD BILLING SHARED VISIT: CPT | Performed by: PHYSICIAN ASSISTANT

## 2022-10-16 PROCEDURE — G0008 ADMIN INFLUENZA VIRUS VAC: HCPCS | Performed by: PHYSICIAN ASSISTANT

## 2022-10-16 PROCEDURE — 82040 ASSAY OF SERUM ALBUMIN: CPT | Performed by: PHYSICIAN ASSISTANT

## 2022-10-16 PROCEDURE — 3E02340 INTRODUCTION OF INFLUENZA VACCINE INTO MUSCLE, PERCUTANEOUS APPROACH: ICD-10-PCS | Performed by: INTERNAL MEDICINE

## 2022-10-16 PROCEDURE — 85027 COMPLETE CBC AUTOMATED: CPT | Performed by: PHYSICIAN ASSISTANT

## 2022-10-16 PROCEDURE — 250N000011 HC RX IP 250 OP 636: Performed by: PHYSICIAN ASSISTANT

## 2022-10-16 PROCEDURE — 80053 COMPREHEN METABOLIC PANEL: CPT | Performed by: PHYSICIAN ASSISTANT

## 2022-10-16 PROCEDURE — 250N000013 HC RX MED GY IP 250 OP 250 PS 637: Performed by: PHYSICIAN ASSISTANT

## 2022-10-16 PROCEDURE — 99239 HOSP IP/OBS DSCHRG MGMT >30: CPT | Mod: FS | Performed by: INTERNAL MEDICINE

## 2022-10-16 PROCEDURE — 90662 IIV NO PRSV INCREASED AG IM: CPT | Performed by: PHYSICIAN ASSISTANT

## 2022-10-16 PROCEDURE — 36415 COLL VENOUS BLD VENIPUNCTURE: CPT | Performed by: PHYSICIAN ASSISTANT

## 2022-10-16 RX ORDER — CIPROFLOXACIN 500 MG/1
500 TABLET, FILM COATED ORAL 2 TIMES DAILY
Qty: 8 TABLET | Refills: 0 | Status: SHIPPED | OUTPATIENT
Start: 2022-10-16 | End: 2022-10-20

## 2022-10-16 RX ORDER — METRONIDAZOLE 500 MG/1
500 TABLET ORAL 3 TIMES DAILY
Qty: 15 TABLET | Refills: 0 | Status: SHIPPED | OUTPATIENT
Start: 2022-10-16 | End: 2022-10-21

## 2022-10-16 RX ORDER — METRONIDAZOLE 500 MG/1
500 TABLET ORAL 3 TIMES DAILY
Qty: 12 TABLET | Refills: 0 | Status: SHIPPED | OUTPATIENT
Start: 2022-10-16 | End: 2022-10-16

## 2022-10-16 RX ORDER — CIPROFLOXACIN 500 MG/1
500 TABLET, FILM COATED ORAL EVERY 12 HOURS
Qty: 8 TABLET | Refills: 0 | Status: SHIPPED | OUTPATIENT
Start: 2022-10-16 | End: 2022-10-16

## 2022-10-16 RX ADMIN — Medication 50 MG: at 05:46

## 2022-10-16 RX ADMIN — CELECOXIB 200 MG: 200 CAPSULE ORAL at 07:53

## 2022-10-16 RX ADMIN — LISINOPRIL 10 MG: 10 TABLET ORAL at 07:52

## 2022-10-16 RX ADMIN — METRONIDAZOLE 500 MG: 500 TABLET ORAL at 07:53

## 2022-10-16 RX ADMIN — Medication 600 MG: at 11:05

## 2022-10-16 RX ADMIN — INFLUENZA A VIRUS A/VICTORIA/2570/2019 IVR-215 (H1N1) ANTIGEN (FORMALDEHYDE INACTIVATED), INFLUENZA A VIRUS A/DARWIN/9/2021 SAN-010 (H3N2) ANTIGEN (FORMALDEHYDE INACTIVATED), INFLUENZA B VIRUS B/PHUKET/3073/2013 ANTIGEN (FORMALDEHYDE INACTIVATED), AND INFLUENZA B VIRUS B/MICHIGAN/01/2021 ANTIGEN (FORMALDEHYDE INACTIVATED) 0.7 ML: 60; 60; 60; 60 INJECTION, SUSPENSION INTRAMUSCULAR at 10:54

## 2022-10-16 RX ADMIN — THERA TABS 1 TABLET: TAB at 11:05

## 2022-10-16 RX ADMIN — CIPROFLOXACIN 500 MG: 500 TABLET, FILM COATED ORAL at 07:52

## 2022-10-16 ASSESSMENT — ACTIVITIES OF DAILY LIVING (ADL)
ADLS_ACUITY_SCORE: 19

## 2022-10-16 NOTE — PROGRESS NOTES
"Patient refused CPAP treatment.    /68 (BP Location: Right arm)   Pulse 69   Temp 98.2  F (36.8  C) (Oral)   Resp 16   Ht 1.727 m (5' 8\")   Wt 93.9 kg (207 lb 1.6 oz)   SpO2 96%   BMI 31.49 kg/m      Eliot Roman RT on 10/16/2022 at 2:03 AM    "

## 2022-10-16 NOTE — PLAN OF CARE
Goal Outcome Evaluation:      Plan of Care Reviewed With: patient    Overall Patient Progress: improvingOverall Patient Progress: improving    Outcome Evaluation: VSS on room air. Slept well overnight. Pagestan BOLDEN Discontinued CPAP and continuous pulse ox orders, pt unaware of sleep apnea diagnosis and does not use at home equipment. Recieved tramadol x2 overnight with relief for back pain. PIV Saline locked. Up and ind in room.

## 2022-10-16 NOTE — PLAN OF CARE
Goal Outcome Evaluation:      Plan of Care Reviewed With: patient    Overall Patient Progress: improvingOverall Patient Progress: improving    Outcome Evaluation: Pt discharging today w/ oral abx.  Education provided on medication.  PIV removed.  Pt to have  pick pt up for discharge to home.  Denies pain/discomfort.  Pt will follow up with GI outpatient.

## 2022-10-16 NOTE — DISCHARGE SUMMARY
[facility]  Hospitalist Discharge Summary      Date of Admission:  10/13/2022  Date of Discharge:  10/16/2022  Discharging Provider: AMADOR Rabago  Discharge Service: Hospitalist Service, GOLD TEAM 6    Discharge Diagnoses   # Painless jaundice with hyperbilirubinemia secondary to 3 large gallbladder stones s/p stenting  # 20 pound unintentional weight loss   # Mirizzi syndrome with cholecystitis  # Cholangitis due to pustulant gallstones  # Left upper lobe pulmonary nodule    Follow-ups Needed After Discharge   Follow-up Appointments     Follow Up ([facility])      Follow up with GI as an outpatient within two weeks to review pathology   results of brushings taken during ERCP.    Appointments on [place] and/or [facility] (with [facility] or [facility]   provider or service). Call [phone] if you haven't heard regarding   these appointments within 7 days of discharge.             Unresulted Labs Ordered in the Past 30 Days of this Admission     No orders found from 9/13/2022 to 10/14/2022.          Discharge Disposition   Discharged to home  Condition at discharge: Stable    Hospital Course   Mrs. Sagar Marques is a 77-year-old female admitted 10/13/2022 with a past medical history of hypertension, hyperlipidemia obstructive sleep apnea and spinal stenosis who recently visited with her primary care provider after experiencing weight loss and painless jaundice and was found to have elevated liver enzymes (July 2022) and subsequent ultrasound in August 2022 revealed gallstones and a dilated common bile duct.  Follow-up MRCP displayed findings suspicious for Mirizzi syndrome and possible stones in the gallbladder neck with a focal mass-effect on the common hepatic duct.  She was scheduled for a laparoscopic cholecystectomy 10/6/2022 which was aborted secondary to a nodular liver and a contracted fibrous gallbladder.  Liver biopsy was obtained however pathology  revealed micronodular cirrhosis.  Her bilirubin had increased to 10.5 and during a clinic visit she was directly admitted for further work-up and care.    During her admission, she underwent a CT chest, abdomen and pelvis that revealed 3 large gallstones. GI was consulted and recommended an ERCP which was performed and stenting was performed with removal of stones which had an area of pus behind them. Brushings were sent to pathology for further review. Post operatively, she remains jaundiced but improving daily along with her liver enzymes which at the time of discharge were ALT 53 /  / Total bilirubin 5.1.  She continues to deny any pain and was eating and drinking well. She was started on Cipro/Flagyl for treatment of cholangitis given the pustulant gall stones. She will discharge to a local condo in [place] and follow up with GI within two weeks of discharge for follow up of her brushings and to evaluate recovery from her gallstones.     Note that she was also found to have a left upper lobe pulmonary nodule on CT scan that will require a repeat CT chest in 6 months. Ms. Keith prefers to have this imaging repeated in her home town ([facility]) by her primary care team and will follow up with them upon returning home in a week or two.    She also received her yearly influenza vaccine while in the hospital but will need to have her COVID booster as an outpatient.        Consultations This Hospital Stay   GI PANCREATICOBILIARY ADULT IP CONSULT  SURGICAL ONCOLOGY ADULT IP CONSULT  NURSING TO CONSULT FOR VASCULAR ACCESS CARE IP CONSULT    Code Status   Full Code    Time Spent on this Encounter   Mara WALLACE PA, personally saw the patient today and spent greater than 30 minutes discharging this patient.       AMADOR Rabago  [facility] [address] [address]  Phone:  [phone]  ______________________________________________________________________    Physical Exam   Vital Signs: Temp: 97.9  F (36.6  C) Temp src: Oral BP: 125/55 Pulse: 60   Resp: 18 SpO2: 96 % O2 Device: None (Room air)    Weight: 210 lbs 8 oz  General Appearance: 77 year old female resting in bed, jaundiced throughout, no acute distress, denies pain  Respiratory: breathing comfortably on room air, no adventitious sounds to bilateral auscultation  Cardiovascular: regular rate and rhythm, no appreciable murmurs, rubs or gallops  GI: tinkering bowel sounds present, moderately distended, non-tender to palpation throughout  Skin: warm, dry, no open sores, lesions or ulcerations       Primary Care Physician   Arun Tao    Discharge Orders      Reason for your hospital stay    You were hospitalized with acute transaminitis secondary to gall stones and cholangitis.     Activity    Your activity upon discharge: activity as tolerated     Follow Up ([facility])    Follow up with GI as an outpatient within two weeks to review pathology results of brushings taken during ERCP.    Appointments on [place] and/or [facility] (with [facility] or [facility] provider or service). Call [phone] if you haven't heard regarding these appointments within 7 days of discharge.     Diet    Follow this diet upon discharge: Orders Placed This Encounter      Advance Diet as Tolerated: Regular Diet Adult       Significant Results and Procedures   Most Recent 3 CBC's:Recent Labs   Lab Test 10/16/22  0609 10/15/22  0728 10/14/22  0532   WBC 8.4 8.9 9.7   HGB 11.1* 11.7 11.5*   MCV 97 95 94    322 331     Most Recent 3 BMP's:Recent Labs   Lab Test 10/16/22  0609 10/15/22  0728 10/14/22  1433 10/14/22  0532    134*  --  134*   POTASSIUM 4.1 4.3  --  4.2   CHLORIDE 101 99  --  99   CO2 26 26  --  26   BUN 10.9 9.4  --  9.1   CR 0.60 0.56  --  0.56   ANIONGAP 9 9  --  9   YEE 8.7* 8.7*  --  8.6*   GLC 93 119* 76 80     Most  Recent 2 LFT's:Recent Labs   Lab Test 10/16/22  0609 10/15/22  0728   * 152*   ALT 53* 61*   ALKPHOS 607* 655*   BILITOTAL 5.1* 7.7*     Most Recent 3 INR's:Recent Labs   Lab Test 10/15/22  0728 10/14/22  0532 08/08/14  0908   INR 1.23* 1.74* 0.97       Discharge Medications   Current Discharge Medication List      START taking these medications    Details   !! ciprofloxacin (CIPRO) 500 MG tablet Take 1 tablet (500 mg) by mouth 2 times daily for 4 days  Qty: 8 tablet, Refills: 0    Associated Diagnoses: Cholangitis      !! ciprofloxacin (CIPRO) 500 MG tablet Take 1 tablet (500 mg) by mouth every 12 hours for 4 days  Qty: 8 tablet, Refills: 0    Associated Diagnoses: Cholangitis      !! metroNIDAZOLE (FLAGYL) 500 MG tablet Take 1 tablet (500 mg) by mouth 3 times daily for 5 days  Qty: 15 tablet, Refills: 0    Associated Diagnoses: Cholangitis      !! metroNIDAZOLE (FLAGYL) 500 MG tablet Take 1 tablet (500 mg) by mouth 3 times daily for 4 days  Qty: 12 tablet, Refills: 0    Associated Diagnoses: Cholangitis       !! - Potential duplicate medications found. Please discuss with provider.      CONTINUE these medications which have NOT CHANGED    Details   ASPIRIN EC PO Take 81 mg by mouth daily      lisinopril (ZESTRIL) 10 MG tablet TAKE 1 TABLET(10 MG) BY MOUTH DAILY  Qty: 90 tablet, Refills: 1    Associated Diagnoses: Essential hypertension      calcium-vitamin D (CALTRATE) 600-400 MG-UNIT per tablet Take 1 tablet by mouth 2 times daily      celecoxib (CELEBREX) 200 MG capsule TAKE 1 CAPSULE BY MOUTH EVERY DAY  Qty: 90 capsule, Refills: 1    Associated Diagnoses: Osteoarthritis of hip, unspecified laterality, unspecified osteoarthritis type      Multiple Vitamins-Minerals (CENTRUM SILVER) per tablet Take 1 tablet by mouth daily      traMADol (ULTRAM) 50 MG tablet TAKE 1 TO 2 TABLETS BY MOUTH EVERY DAY FOR SEVERE PAIN  Qty: 90 tablet, Refills: 0    Associated Diagnoses: S/P hip replacement, left; Chronic pain  syndrome      vitamin D3 (CHOLECALCIFEROL) 50 mcg (2000 units) tablet Take 1 tablet (50 mcg) by mouth daily  Qty:             Allergies   No Known Allergies

## 2022-10-17 ENCOUNTER — PATIENT OUTREACH (OUTPATIENT)
Dept: SURGERY | Facility: CLINIC | Age: 77
End: 2022-10-17

## 2022-10-17 DIAGNOSIS — K83.1 MIRIZZI'S SYNDROME (H): Primary | ICD-10-CM

## 2022-10-17 NOTE — TELEPHONE ENCOUNTER
Patient seen seen in clinic by provider and later admitted at the [facility] Harper Murray RN  ....................  10/17/2022   10:21 AM

## 2022-10-17 NOTE — PROGRESS NOTES
New Patient Oncology Nurse Navigator Note     Referring provider: Dr. Villagomez     Referring Clinic/Organization: [facility]     Referred to: Hepatobiliary Surgery      Requested provider (if applicable): First available provider    Referral Received: 10/17/22       Evaluation for : Mirizzi's syndrome      Clinical History (per Nurse review of records provided):      See book marked documents:       Referring MD office note    Pathology report    Imaging reports     Procedure reports     Lab Results   Component Value Date/Time    ALBUMIN 2.6 (L) 10/16/2022 06:09 AM    ALBUMIN 2.9 (L) 10/13/2022 09:20 AM    ALBUMIN 4.0 07/05/2021 01:52 PM     (H) 10/16/2022 06:09 AM    AST 60 (H) 07/05/2021 01:52 PM    ALT 53 (H) 10/16/2022 06:09 AM    ALT 66 (H) 07/05/2021 01:52 PM    ALKPHOS 607 (H) 10/16/2022 06:09 AM    ALKPHOS 71 07/05/2021 01:52 PM    BILITOTAL 5.1 (H) 10/16/2022 06:09 AM    BILITOTAL 0.5 07/05/2021 01:52 PM    WBC 8.4 10/16/2022 06:09 AM    WBC 6.6 12/19/2016 07:00 AM          Past Medical History:   Diagnosis Date     HTN (hypertension)      Hyperlipidemia      Leiomyoma of uterus, unspecified      Numbness and tingling     spinal stenosis causes numbness and tingling on feet and knees bilaterally     Osteoarthrosis, unspecified whether generalized or localized, unspecified site      Other and unspecified disc disorder of lumbar region      Primary osteoarthritis of left hip 12/18/2016     Spinal stenosis     S/P diskectomy x 2, most recently 8/2014       Past Surgical History:   Procedure Laterality Date     ARTHROPLASTY HIP Left 12/12/2016    Procedure: ARTHROPLASTY HIP;  Surgeon: Leonid Morfin MD;  Location:  OR     BACK SURGERY       COLONOSCOPY N/A 7/20/2017    Procedure: COMBINED COLONOSCOPY, SINGLE OR MULTIPLE BIOPSY/POLYPECTOMY BY BIOPSY;  colonoscopy;  Surgeon: Catarino Salas MD;  Location:  GI     DISCECTOMY LUMBAR POSTERIOR MICROSCOPIC TWO LEVELS  8/11/2014     Procedure: DISCECTOMY LUMBAR POSTERIOR MICROSCOPIC TWO LEVELS;  Surgeon: Warren Herring MD;  Location:  OR     ENDOSCOPIC RETROGRADE CHOLANGIOPANCREATOGRAM N/A 10/14/2022    Procedure: ENDOSCOPIC RETROGRADE CHOLANGIOPANCREATOGRAPHY with biliary spincterotomy, biliary stent placement, cholangioscopy;  Surgeon: Carson Franklin MD;  Location: [facility] OR     LAPAROSCOPY DIAGNOSTIC (GENERAL) N/A 10/6/2022    Procedure: Diagnostic laparoscopy with liver biopsy;  Surgeon: Warren Zhang MD;  Location:  OR     ORTHOPEDIC SURGERY       [facility] NONSPECIFIC PROCEDURE  1996    lumbar discectomy     [facility] NONSPECIFIC PROCEDURE      cervical cone biopsy     [facility] NONSPECIFIC PROCEDURE      Left knee replacement       Current Outpatient Medications   Medication Sig Dispense Refill     ASPIRIN EC PO Take 81 mg by mouth daily       calcium-vitamin D (CALTRATE) 600-400 MG-UNIT per tablet Take 1 tablet by mouth 2 times daily       celecoxib (CELEBREX) 200 MG capsule TAKE 1 CAPSULE BY MOUTH EVERY DAY 90 capsule 1     ciprofloxacin (CIPRO) 500 MG tablet Take 1 tablet (500 mg) by mouth 2 times daily for 4 days 8 tablet 0     lisinopril (ZESTRIL) 10 MG tablet TAKE 1 TABLET(10 MG) BY MOUTH DAILY 90 tablet 1     metroNIDAZOLE (FLAGYL) 500 MG tablet Take 1 tablet (500 mg) by mouth 3 times daily for 5 days 15 tablet 0     Multiple Vitamins-Minerals (CENTRUM SILVER) per tablet Take 1 tablet by mouth daily       traMADol (ULTRAM) 50 MG tablet TAKE 1 TO 2 TABLETS BY MOUTH EVERY DAY FOR SEVERE PAIN 90 tablet 0     vitamin D3 (CHOLECALCIFEROL) 50 mcg (2000 units) tablet Take 1 tablet (50 mcg) by mouth daily           No Known Allergies       Patient Active Problem List   Diagnosis     HTN (hypertension)     Osteopenia     HYPERLIPIDEMIA LDL GOAL <160     ACP (advance care planning)     Health Care Home     Spinal stenosis, lumbar region, with neurogenic claudication     Physical deconditioning     S/P lumbar laminectomy     Chronic pain syndrome      Hip joint replacement status     S/P hip replacement, left     Benign essential hypertension     Xanthogranulomatous cholecystitis v gallbladder cancer     Calculus of gallbladder without cholecystitis without obstruction     Biliary obstruction         Clinical Assessment / Barriers to Care (Per Nurse):    None at this time.     Records Location:     [facility] Everywhere     Records Needed:     NONE AT THIS TIME      Additional testing needed prior to consult:     NONE AT THIS TIME    Referral updates and Plan:       Consult with Surgical Oncology       Chhaya Keith RN, BSN   Surgical Oncology New Patient Nurse Navigator  [facility]  [phone]

## 2022-10-18 ENCOUNTER — TELEPHONE (OUTPATIENT)
Dept: INTERNAL MEDICINE | Facility: CLINIC | Age: 77
End: 2022-10-18

## 2022-10-18 NOTE — TELEPHONE ENCOUNTER
Pt called the clinic stating-     She left the hospital on Sunday and was told she didn't have cancer however, she received a call yesterday from an oncologist to schedule an appt.    Pt stated she looked at her mychart and didn't see anything that said that she has cancer but stated she may have overlooked it.     Pt is wondering if she has cancer?     Please call pt back with PCPs response.     [phone]- pts home number   [phone]- pts cell phone    Can leave a detailed message on both numbers.

## 2022-10-18 NOTE — TELEPHONE ENCOUNTER
"She does not have any evidence of malignancy, all biopsies thus far have been negative for malignancy.  She has gall stones and had resultant obstruction of her flow of bile, which is why she had the ERCP and stent placement.  The next step is to eventually surgically remove her gallbladder, and it appears the intent was to refer to a particular surgeon who specializes in surgeries involving the gallbladder, and that this referral is accomplished through the Oncology dept.  So - no evidence of cancer, and that's why she got a call from \"Oncology.\"  "

## 2022-10-19 NOTE — TELEPHONE ENCOUNTER
Received a call back from the patient. Relayed message from the provider. Patient expressed understanding and had no additional questions at this time.     Triage nurse gave patient the phone number to call and get her COVID vaccine appointment scheduled.     Isi Meza RN

## 2022-10-20 ENCOUNTER — PATIENT OUTREACH (OUTPATIENT)
Dept: GASTROENTEROLOGY | Facility: CLINIC | Age: 77
End: 2022-10-20

## 2022-10-20 NOTE — TELEPHONE ENCOUNTER
"Called patient to discuss GI follow up after recent admission, per ERCP note:  Plan should be for cholecystectomy wthin 3m at which  time the gallbladder stent may be removed (just prior)  and an ERCP performed in tandem just after     Has clinic scheduled with Dr Garcia 10-31, will follow up with patient after that visit to determine next steps/    Per patient \"having trouble with bowel movements\", still infrequent bowel movement after discharge, is eating \"salad and roughage\", not helping. Get appetite back . Isn't feeling constipated. Discussed recommendation for Miralax if having constipation. Is done with antibiotics.     Will reach out after visit with Dr Garcia to discuss next steps.  ML  "

## 2022-10-24 PROBLEM — K75.81 STEATOHEPATITIS: Status: ACTIVE | Noted: 2022-10-24

## 2022-10-24 PROBLEM — K74.69: Status: ACTIVE | Noted: 2022-10-24

## 2022-10-31 ENCOUNTER — PRE VISIT (OUTPATIENT)
Dept: SURGERY | Facility: CLINIC | Age: 77
End: 2022-10-31

## 2022-10-31 ENCOUNTER — OFFICE VISIT (OUTPATIENT)
Dept: SURGERY | Facility: CLINIC | Age: 77
End: 2022-10-31
Attending: INTERNAL MEDICINE
Payer: COMMERCIAL

## 2022-10-31 VITALS
HEART RATE: 96 BPM | TEMPERATURE: 98.7 F | SYSTOLIC BLOOD PRESSURE: 162 MMHG | DIASTOLIC BLOOD PRESSURE: 82 MMHG | OXYGEN SATURATION: 96 % | WEIGHT: 206.9 LBS | BODY MASS INDEX: 31.46 KG/M2 | RESPIRATION RATE: 16 BRPM

## 2022-10-31 DIAGNOSIS — K83.1 MIRIZZI'S SYNDROME (H): ICD-10-CM

## 2022-10-31 LAB
ALBUMIN SERPL BCG-MCNC: 3.3 G/DL (ref 3.5–5.2)
ALP SERPL-CCNC: 201 U/L (ref 35–104)
ALT SERPL W P-5'-P-CCNC: 10 U/L (ref 10–35)
ANION GAP SERPL CALCULATED.3IONS-SCNC: 11 MMOL/L (ref 7–15)
AST SERPL W P-5'-P-CCNC: 32 U/L (ref 10–35)
BASOPHILS # BLD AUTO: 0 10E3/UL (ref 0–0.2)
BASOPHILS NFR BLD AUTO: 1 %
BILIRUB SERPL-MCNC: 1.7 MG/DL
BUN SERPL-MCNC: 10.7 MG/DL (ref 8–23)
CALCIUM SERPL-MCNC: 9.2 MG/DL (ref 8.8–10.2)
CHLORIDE SERPL-SCNC: 106 MMOL/L (ref 98–107)
CREAT SERPL-MCNC: 0.67 MG/DL (ref 0.51–0.95)
DEPRECATED HCO3 PLAS-SCNC: 24 MMOL/L (ref 22–29)
EOSINOPHIL # BLD AUTO: 0.1 10E3/UL (ref 0–0.7)
EOSINOPHIL NFR BLD AUTO: 2 %
ERYTHROCYTE [DISTWIDTH] IN BLOOD BY AUTOMATED COUNT: 15.7 % (ref 10–15)
GFR SERPL CREATININE-BSD FRML MDRD: >90 ML/MIN/1.73M2
GLUCOSE SERPL-MCNC: 115 MG/DL (ref 70–99)
HCT VFR BLD AUTO: 37.8 % (ref 35–47)
HGB BLD-MCNC: 12.6 G/DL (ref 11.7–15.7)
IMM GRANULOCYTES # BLD: 0 10E3/UL
IMM GRANULOCYTES NFR BLD: 0 %
LYMPHOCYTES # BLD AUTO: 1.3 10E3/UL (ref 0.8–5.3)
LYMPHOCYTES NFR BLD AUTO: 30 %
MCH RBC QN AUTO: 33.5 PG (ref 26.5–33)
MCHC RBC AUTO-ENTMCNC: 33.3 G/DL (ref 31.5–36.5)
MCV RBC AUTO: 101 FL (ref 78–100)
MONOCYTES # BLD AUTO: 0.4 10E3/UL (ref 0–1.3)
MONOCYTES NFR BLD AUTO: 9 %
NEUTROPHILS # BLD AUTO: 2.4 10E3/UL (ref 1.6–8.3)
NEUTROPHILS NFR BLD AUTO: 58 %
NRBC # BLD AUTO: 0 10E3/UL
NRBC BLD AUTO-RTO: 0 /100
PLATELET # BLD AUTO: 248 10E3/UL (ref 150–450)
POTASSIUM SERPL-SCNC: 3.7 MMOL/L (ref 3.4–5.3)
PROT SERPL-MCNC: 6.9 G/DL (ref 6.4–8.3)
RBC # BLD AUTO: 3.76 10E6/UL (ref 3.8–5.2)
SODIUM SERPL-SCNC: 141 MMOL/L (ref 136–145)
WBC # BLD AUTO: 4.2 10E3/UL (ref 4–11)

## 2022-10-31 PROCEDURE — 36415 COLL VENOUS BLD VENIPUNCTURE: CPT | Performed by: SURGERY

## 2022-10-31 PROCEDURE — 85025 COMPLETE CBC W/AUTO DIFF WBC: CPT | Performed by: SURGERY

## 2022-10-31 PROCEDURE — 84134 ASSAY OF PREALBUMIN: CPT | Performed by: SURGERY

## 2022-10-31 PROCEDURE — G0463 HOSPITAL OUTPT CLINIC VISIT: HCPCS

## 2022-10-31 PROCEDURE — 99213 OFFICE O/P EST LOW 20 MIN: CPT | Performed by: SURGERY

## 2022-10-31 PROCEDURE — 80053 COMPREHEN METABOLIC PANEL: CPT | Performed by: SURGERY

## 2022-10-31 PROCEDURE — 82040 ASSAY OF SERUM ALBUMIN: CPT | Performed by: SURGERY

## 2022-10-31 ASSESSMENT — PAIN SCALES - GENERAL: PAINLEVEL: NO PAIN (0)

## 2022-10-31 NOTE — LETTER
10/31/2022         RE: Yohana Marques  [id number] [address] [place] [id number]        Dear Colleague,    Thank you for referring your patient, Yohana Marques, to the [facility]. Please see a copy of my visit note below.    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    HISTORY OF PRESENT ILLNESS:  Ms. 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  She had a stent placed, defervesced and was discharged to home.  Prior to that hospitalization, she did have an attempted laparoscopic cholecystectomy and had worrisome findings.  She has micronodular cirrhosis of the liver along with a very fibrosed gallbladder that raised a concern for possible malignancy and her original surgery was aborted for those reasons.  Now that she has recovered from her recent hospital stay, I am seeing her as an outpatient for further discussion regarding management.  I reiterated some of our previous discussion regarding the management of Mirizzi syndrome.  I think overall, the risk of malignancy is low, but it is certainly a possibility, particularly in light of the fact that she has lost about 50 pounds unintentionally which I did discuss with her is somewhat concerning.    I discussed plans for a reattempt at laparoscopic cholecystectomy.  I discussed the likely need for open surgical resection based on the intraoperative images that we have been able to review.  I discussed the need for bile duct repair as well as the possible need for a partial hepatectomy and lymph node dissection in the event that we identify malignancy.  I discussed once again the likelihood of open surgery.  I discussed risks of surgery including but not limited to bleeding, infection, venous thromboembolism, bile leak.    Overall, Ms. Wilson actually looks quite good today.  I think she is recovering well from her recent illness.  We will get her on the operating room  schedule in the coming weeks for a planned laparoscopic cholecystectomy, common bile duct repair, possible partial hepatectomy, lymph node dissection, possible open surgery with intraoperative cholangiogram.    A total of 15 minutes was spent in face-to-face time with Mr. Wilson today.  An additional 5 minutes was spent in review of her interval history, review of her imaging and coordination of her care.          Again, thank you for allowing me to participate in the care of your patient.        Sincerely,        Thai Garcia MD

## 2022-10-31 NOTE — NURSING NOTE
"Oncology Rooming Note    October 31, 2022 2:04 PM   Yohana Marques is a 77 year old female who presents for:    Chief Complaint   Patient presents with     New Patient     Mirizzis syndrome      Initial Vitals: BP (!) 162/82 (BP Location: Right arm, Patient Position: Sitting, Cuff Size: Adult Large)   Pulse 96   Temp 98.7  F (37.1  C) (Tympanic)   Resp 16   Wt 93.8 kg (206 lb 14.4 oz)   SpO2 96%   BMI 31.46 kg/m   Estimated body mass index is 31.46 kg/m  as calculated from the following:    Height as of 10/13/22: 1.727 m (5' 8\").    Weight as of this encounter: 93.8 kg (206 lb 14.4 oz). Body surface area is 2.12 meters squared.  No Pain (0) Comment: Data Unavailable   No LMP recorded. Patient is postmenopausal.  Allergies reviewed: Yes  Medications reviewed: Yes    Medications: Medication refills not needed today.  Pharmacy name entered into [employer]:    [id number] - [place], [place] - [address] [facility] - [place] - [address] [facility] [place] [address] [id number]  [id number] - [place] - [address]    Clinical concerns: New patient.        Maru Huitron CMA            "

## 2022-10-31 NOTE — TELEPHONE ENCOUNTER
RECORDS STATUS - ALL OTHER DIAGNOSIS      RECORDS RECEIVED FROM: [facility]   DATE RECEIVED: 10/31/22   NOTES STATUS DETAILS   OFFICE NOTE from referring provider Epic 10/13/22: Dr. Claudine Polanco   DISCHARGE SUMMARY from hospital [facility] 10/13/22: [facility]  10/06/22: [facility]    OPERATIVE REPORT Epic 10/14/22: ENDOSCOPIC RETROGRADE CHOLANGIOPANCREATOGRAPHY    10/06/22: Diagnostic laparoscopy with liver biopsy   MEDICATION LIST [facility]    LABS     PATHOLOGY REPORTS Report in EPIC 10/06/22: [id number]   ANYTHING RELATED TO DIAGNOSIS Epic Most recent 10/16/22   IMAGING (NEED IMAGES & REPORT)     CT SCANS PACS 10/14/22: CT Chest Abd Pel   MRI PACS 08/29/22: MR Abd   ULTRASOUND PACS 08/08/22: US Abd

## 2022-10-31 NOTE — PROGRESS NOTES
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    HISTORY OF PRESENT ILLNESS:  Ms. 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  She had a stent placed, defervesced and was discharged to home.  Prior to that hospitalization, she did have an attempted laparoscopic cholecystectomy and had worrisome findings.  She has micronodular cirrhosis of the liver along with a very fibrosed gallbladder that raised a concern for possible malignancy and her original surgery was aborted for those reasons.  Now that she has recovered from her recent hospital stay, I am seeing her as an outpatient for further discussion regarding management.  I reiterated some of our previous discussion regarding the management of Mirizzi syndrome.  I think overall, the risk of malignancy is low, but it is certainly a possibility, particularly in light of the fact that she has lost about 50 pounds unintentionally which I did discuss with her is somewhat concerning.    I discussed plans for a reattempt at laparoscopic cholecystectomy.  I discussed the likely need for open surgical resection based on the intraoperative images that we have been able to review.  I discussed the need for bile duct repair as well as the possible need for a partial hepatectomy and lymph node dissection in the event that we identify malignancy.  I discussed once again the likelihood of open surgery.  I discussed risks of surgery including but not limited to bleeding, infection, venous thromboembolism, bile leak.    Overall, Ms. Wilson actually looks quite good today.  I think she is recovering well from her recent illness.  We will get her on the operating room schedule in the coming weeks for a planned laparoscopic cholecystectomy, common bile duct repair, possible partial hepatectomy, lymph node dissection, possible open surgery with intraoperative cholangiogram.    A total of 15 minutes was spent in face-to-face time  with Mr. Wilson today.  An additional 5 minutes was spent in review of her interval history, review of her imaging and coordination of her care.

## 2022-11-01 ENCOUNTER — TELEPHONE (OUTPATIENT)
Dept: SURGERY | Facility: CLINIC | Age: 77
End: 2022-11-01

## 2022-11-01 LAB — PREALB SERPL IA-MCNC: 14 MG/DL (ref 15–45)

## 2022-11-01 NOTE — TELEPHONE ENCOUNTER
Called patient to schedule surgery with   Date of Surgery: 12/01/2022  Approximate arrival time given:  Yes  Location of surgery: [facility]  Pre-Op H&P: With PCP  Post-Op Appt Date: 12/19/2022   Imaging needed:  No  Discussed COVID-19 Testing: Yes     Patient aware that pre-op RN will call 2-3 days prior to surgery with arrival time and instructions Yes  Packet sent out: Yes 11/01/22      Additional Comments:       All patients questions were answered and was instructed to review surgical packet and call back with any questions or concerns.       Jerod Elias on 11/1/2022 at 1:53 PM

## 2022-11-16 ENCOUNTER — IMMUNIZATION (OUTPATIENT)
Dept: NURSING | Facility: CLINIC | Age: 77
End: 2022-11-16
Payer: COMMERCIAL

## 2022-11-16 PROCEDURE — 0134A COVID-19,PF,MODERNA BIVALENT: CPT

## 2022-11-16 PROCEDURE — 91313 COVID-19,PF,MODERNA BIVALENT: CPT

## 2022-11-21 ENCOUNTER — OFFICE VISIT (OUTPATIENT)
Dept: INTERNAL MEDICINE | Facility: CLINIC | Age: 77
End: 2022-11-21
Payer: COMMERCIAL

## 2022-11-21 VITALS
OXYGEN SATURATION: 98 % | DIASTOLIC BLOOD PRESSURE: 81 MMHG | TEMPERATURE: 98 F | BODY MASS INDEX: 31.75 KG/M2 | SYSTOLIC BLOOD PRESSURE: 136 MMHG | HEART RATE: 81 BPM | HEIGHT: 68 IN | WEIGHT: 209.5 LBS

## 2022-11-21 DIAGNOSIS — I10 BENIGN ESSENTIAL HYPERTENSION: ICD-10-CM

## 2022-11-21 DIAGNOSIS — Z79.899 ENCOUNTER FOR LONG-TERM (CURRENT) USE OF MEDICATIONS: ICD-10-CM

## 2022-11-21 DIAGNOSIS — Z01.818 PREOP GENERAL PHYSICAL EXAM: Primary | ICD-10-CM

## 2022-11-21 DIAGNOSIS — K83.1 BILIARY OBSTRUCTION (H): ICD-10-CM

## 2022-11-21 LAB
ALBUMIN SERPL BCG-MCNC: 3.7 G/DL (ref 3.5–5.2)
ALP SERPL-CCNC: 92 U/L (ref 35–104)
ALT SERPL W P-5'-P-CCNC: 9 U/L (ref 10–35)
ANION GAP SERPL CALCULATED.3IONS-SCNC: 12 MMOL/L (ref 7–15)
AST SERPL W P-5'-P-CCNC: 28 U/L (ref 10–35)
BILIRUB SERPL-MCNC: 0.9 MG/DL
BUN SERPL-MCNC: 10.6 MG/DL (ref 8–23)
CALCIUM SERPL-MCNC: 9.5 MG/DL (ref 8.8–10.2)
CHLORIDE SERPL-SCNC: 105 MMOL/L (ref 98–107)
CREAT SERPL-MCNC: 0.65 MG/DL (ref 0.51–0.95)
CREAT UR-MCNC: 100 MG/DL
DEPRECATED HCO3 PLAS-SCNC: 24 MMOL/L (ref 22–29)
GFR SERPL CREATININE-BSD FRML MDRD: >90 ML/MIN/1.73M2
GLUCOSE SERPL-MCNC: 101 MG/DL (ref 70–99)
POTASSIUM SERPL-SCNC: 4 MMOL/L (ref 3.4–5.3)
PROT SERPL-MCNC: 7.1 G/DL (ref 6.4–8.3)
SODIUM SERPL-SCNC: 141 MMOL/L (ref 136–145)

## 2022-11-21 PROCEDURE — 80307 DRUG TEST PRSMV CHEM ANLYZR: CPT | Performed by: INTERNAL MEDICINE

## 2022-11-21 PROCEDURE — 99214 OFFICE O/P EST MOD 30 MIN: CPT | Performed by: INTERNAL MEDICINE

## 2022-11-21 PROCEDURE — 36415 COLL VENOUS BLD VENIPUNCTURE: CPT | Performed by: INTERNAL MEDICINE

## 2022-11-21 PROCEDURE — 80053 COMPREHEN METABOLIC PANEL: CPT | Performed by: INTERNAL MEDICINE

## 2022-11-21 ASSESSMENT — PATIENT HEALTH QUESTIONNAIRE - PHQ9
SUM OF ALL RESPONSES TO PHQ QUESTIONS 1-9: 2
5. POOR APPETITE OR OVEREATING: NOT AT ALL

## 2022-11-21 ASSESSMENT — ANXIETY QUESTIONNAIRES
3. WORRYING TOO MUCH ABOUT DIFFERENT THINGS: NOT AT ALL
6. BECOMING EASILY ANNOYED OR IRRITABLE: NOT AT ALL
7. FEELING AFRAID AS IF SOMETHING AWFUL MIGHT HAPPEN: NOT AT ALL
2. NOT BEING ABLE TO STOP OR CONTROL WORRYING: NOT AT ALL
1. FEELING NERVOUS, ANXIOUS, OR ON EDGE: NOT AT ALL
5. BEING SO RESTLESS THAT IT IS HARD TO SIT STILL: NOT AT ALL
IF YOU CHECKED OFF ANY PROBLEMS ON THIS QUESTIONNAIRE, HOW DIFFICULT HAVE THESE PROBLEMS MADE IT FOR YOU TO DO YOUR WORK, TAKE CARE OF THINGS AT HOME, OR GET ALONG WITH OTHER PEOPLE: NOT DIFFICULT AT ALL
GAD7 TOTAL SCORE: 0
GAD7 TOTAL SCORE: 0

## 2022-11-21 NOTE — PATIENT INSTRUCTIONS
- Please STOP TAKING your Celebrex, aspirin and multivitamin, 7 days prior to surgery.   - Continue all other medications as usual, up to and including the day of the surgery.

## 2022-11-21 NOTE — LETTER
Opioid / Opioid Plus Controlled Substance Agreement    This is an agreement between you and your provider about the safe and appropriate use of controlled substance/opioids prescribed by your care team. Controlled substances are medicines that can cause physical and mental dependence (abuse).    There are strict laws about having and using these medicines. We here at [facility] are committing to working with you in your efforts to get better. To support you in this work, we ll help you schedule regular office appointments for medicine refills. If we must cancel or change your appointment for any reason, we ll make sure you have enough medicine to last until your next appointment.     As a Provider, I will:    Listen carefully to your concerns and treat you with respect.     Recommend a treatment plan that I believe is in your best interest. This plan may involve therapies other than opioid pain medication.     Talk with you often about the possible benefits, and the risk of harm of any medicine that we prescribe for you.     Provide a plan on how to taper (discontinue or go off) using this medicine if the decision is made to stop its use.    As a Patient, I understand that opioid(s):     Are a controlled substance prescribed by my care team to help me function or work and manage my condition(s).     Are strong medicines and can cause serious side effects such as:    Drowsiness, which can seriously affect my driving ability    A lower breathing rate, enough to cause death    Harm to my thinking ability     Depression     Abuse of and addiction to this medicine    Need to be taken exactly as prescribed. Combining opioids with certain medicines or chemicals (such as illegal drugs, sedatives, sleeping pills, and benzodiazepines) can be dangerous or even fatal. If I stop opioids suddenly, I may have severe withdrawal symptoms.    Do not work for all types of pain nor for all patients. If they re not helpful, I may  be asked to stop them.        The risks, benefits and side effects of these medicine(s) were explained to me. I agree that:  1. I will take part in other treatments as advised by my care team. This may be psychiatry or counseling, physical therapy, behavioral therapy, group treatment or a referral to a specialist.     2. I will keep all my appointments. I understand that this is part of the monitoring of opioids. My care team may require an office visit for EVERY opioid/controlled substance refill. If I miss appointments or don t follow instructions, my care team may stop my medicine.    3. I will take my medicines as prescribed. I will not change the dose or schedule unless my care team tells me to. There will be no refills if I run out early.     4. I may be asked to come to the clinic and complete a urine drug test or complete a pill count at any time. If I don t give a urine sample or participate in a pill count, the care team may stop my medicine.    5. I will only receive prescriptions from this clinic for chronic pain. If I am treated by another provider for acute pain issues, I will tell them that I am taking opioid pain medication for chronic pain and that I have a treatment agreement with this provider. I will inform my [facility] care team within one business day if I am given a prescription for any pain medication by another healthcare provider. My [facility] care team can contact other providers and pharmacists about my use of any medicines.    6. It is up to me to make sure that I don t run out of my medicines on weekends or holidays. If my care team is willing to refill my opioid prescription without a visit, I must request refills only during office hours. Refills may take up to 3 business days to process. I will use one pharmacy to fill all my opioid and other controlled substance prescriptions. I will notify the clinic about any changes to my insurance or medication  availability.    7. I am responsible for my prescriptions. If the medicine/prescription is lost, stolen or destroyed, it will not be replaced. I also agree not to share controlled substance medicines with anyone.    8. I am aware I should not use any illegal or recreational drugs. I agree not to drink alcohol unless my care team says I can.       9. If I enroll in the [place] [facility] program, I will tell my care team prior to my next refill.     10. I will tell my care team right away if I become pregnant, have a new medical problem treated outside of my regular clinic, or have a change in my medications.    11. I understand that this medicine can affect my thinking, judgment and reaction time. Alcohol and drugs affect the brain and body, which can affect the safety of my driving. Being under the influence of alcohol or drugs can affect my decision-making, behaviors, personal safety, and the safety of others. Driving while impaired (DWI) can occur if a person is driving, operating, or in physical control of a car, motorcycle, boat, snowmobile, ATV, motorbike, off-road vehicle, or any other motor vehicle ([place] Statute 169A.20). I understand the risk if I choose to drive or operate any vehicle or machinery.    I understand that if I do not follow any of the conditions above, my prescriptions or treatment may be stopped or changed.          Opioids  What You Need to Know    What are opioids?   Opioids are pain medicines that must be prescribed by a doctor. They are also known as narcotics.     Examples are:   1. morphine (MS Contin, Carmen)  2. oxycodone (Oxycontin)  3. oxycodone and acetaminophen (Percocet)  4. hydrocodone and acetaminophen (Vicodin, Norco)   5. fentanyl patch (Duragesic)   6. hydromorphone (Dilaudid)   7. methadone  8. codeine (Tylenol #3)     What do opioids do well?   Opioids are best for severe short-term pain such as after a surgery or injury. They may work well for cancer pain. They may  help some people with long-lasting (chronic) pain.     What do opioids NOT do well?   Opioids never get rid of pain entirely, and they don t work well for most patients with chronic pain. Opioids don t reduce swelling, one of the causes of pain.                                    Other ways to manage chronic pain and improve function include:       Treat the health problem that may be causing pain    Anti-inflammation medicines, which reduce swelling and tenderness, such as ibuprofen (Advil, Motrin) or naproxen (Aleve)    Acetaminophen (Tylenol)    Antidepressants and anti-seizure medicines, especially for nerve pain    Topical treatments such as patches or creams    Injections or nerve blocks    Chiropractic or osteopathic treatment    Acupuncture, massage, deep breathing, meditation, visual imagery, aromatherapy    Use heat or ice at the pain site    Physical therapy     Exercise    Stop smoking    Take part in therapy       Risks and side effects     Talk to your doctor before you start or decide to keep taking opioids. Possible side effects include:      Lowering your breathing rate enough to cause death    Overdose, including death, especially if taking higher than prescribed doses    Worse depression symptoms; less pleasure in things you usually enjoy    Feeling tired or sluggish    Slower thoughts or cloudy thinking    Being more sensitive to pain over time; pain is harder to control    Trouble sleeping or restless sleep    Changes in hormone levels (for example, less testosterone)    Changes in sex drive or ability to have sex    Constipation    Unsafe driving    Itching and sweating    Dizziness    Nausea, throwing up and dry mouth    What else should I know about opioids?    Opioids may lead to dependence, tolerance, or addiction.      Dependence means that if you stop or reduce the medicine too quickly, you will have withdrawal symptoms. These include loose poop (diarrhea), jitters, flu-like symptoms,  nervousness and tremors. Dependence is not the same as addiction.                       Tolerance means needing higher doses over time to get the same effect. This may increase the chance of serious side effects.      Addiction is when people improperly use a substance that harms their body, their mind or their relations with others. Use of opiates can cause a relapse of addiction if you have a history of drug or alcohol abuse.      People who have used opioids for a long time may have a lower quality of life, worse depression, higher levels of pain and more visits to doctors.    You can overdose on opioids. Take these steps to lower your risk of overdose:    1. Recognize the signs:  Signs of overdose include decrease or loss of consciousness (blackout), slowed breathing, trouble waking up and blue lips. If someone is worried about overdose, they should call 911.    2. Talk to your doctor about Narcan (naloxone).   If you are at risk for overdose, you may be given a prescription for Narcan. This medicine very quickly reverses the effects of opioids.   If you overdose, a friend or family member can give you Narcan while waiting for the ambulance. They need to know the signs of overdose and how to give Narcan.     3. Don't use alcohol or street drugs.   Taking them with opioids can cause death.    4. Do not take any of these medicines unless your doctor says it s OK. Taking these with opioids can cause death:    Benzodiazepines, such as lorazepam (Ativan), alprazolam (Xanax) or diazepam (Valium)    Muscle relaxers, such as cyclobenzaprine (Flexeril)    Sleeping pills like zolpidem (Ambien)     Other opioids      How to keep you and other people safe while taking opioids:    1. Never share your opioids with others.  Opioid medicines are regulated by the Drug Enforcement Agency (DELIO). Selling or sharing medications is a criminal act.    2. Be sure to store opioids in a secure place, locked up if possible. Young children  can easily swallow them and overdose.    3. When you are traveling with your medicines, keep them in the original bottles. If you use a pill box, be sure you also carry a copy of your medicine list from your clinic or pharmacy.    4. Safe disposal of opioids    Most pharmacies have places to get rid of medicine, called disposal kiosks. Medicine disposal options are also available in every [facility]. Search your county and  medication disposal  to find more options. You can find more details at:  [email or web address]://[email or web address].[email or web address]./living-green/managing-unwanted-medications     I agree that my provider, clinic care team, and pharmacy may work with any city, state or federal law enforcement agency that investigates the misuse, sale, or other diversion of my controlled medicine. I will allow my provider to discuss my care with, or share a copy of, this agreement with any other treating provider, pharmacy or emergency room where I receive care.    I have read this agreement and have asked questions about anything I did not understand.    _______________________________________________________  Patient Signature - Yohana Marques _____________________                   Date     _______________________________________________________  Provider Signature - Arun Tao MD   _____________________                   Date     _______________________________________________________  Witness Signature (required if provider not present while patient signing)   _____________________                   Date

## 2022-11-21 NOTE — PROGRESS NOTES
[address] [address]  Phone: [phone]  Primary Provider: Diaz Le  Pre-op Performing Provider: DIAZ LE      PREOPERATIVE EVALUATION:  Today's date: 11/21/2022    Yohana Marques is a 77 year old female who presents for a preoperative evaluation.    Surgical Information:  Surgery/Procedure: LAPAROSCOPIC CHOLECYSTECTOMY  WITH CHOLANGIOGRAM; INTRAOPERATIVE ULTRASOUND OF THE LIVER, COMMON BILE DUCT REPAIR, POSSIBLE OPEN PARTIAL HEPATECTOMY, POSSIBLE PORTAL LYMPH NODE DISSECTION  Surgery Location: [facility]  Surgeon: Dr. Garcia  Surgery Date: 12/01/2022  Time of Surgery: 8:00 am  Where patient plans to recover: At home with family  Fax number for surgical facility: [phone]    Type of Anesthesia Anticipated: General    Assessment & Plan     The proposed surgical procedure is considered LOW risk.    Preop general physical exam    - Comprehensive metabolic panel (BMP + Alb, Alk Phos, ALT, AST, Total. Bili, TP); Future  - Comprehensive metabolic panel (BMP + Alb, Alk Phos, ALT, AST, Total. Bili, TP)    Biliary obstruction      Encounter for long-term (current) use of medications    - [id number] - Urine Drug Confirmation Panel (Comprehensive); Future  - [id number] - Urine Drug Confirmation Panel (Comprehensive)    Benign essential hypertension               Risks and Recommendations:  The patient has the following additional risks and recommendations for perioperative complications:   - No identified additional risk factors other than previously addressed    Medication Instructions:  Patient is to take all scheduled medications on the day of surgery EXCEPT for modifications listed below:   - aspirin: Discontinue aspirin 7-10 days prior to procedure to reduce bleeding risk. It should be resumed postoperatively.    - celecoxib (Celebrex): HOLD 3 days before surgery. May continue without modification for  management of severe pain.     RECOMMENDATION:  APPROVAL GIVEN to proceed with proposed procedure, without further diagnostic evaluation.                      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  Biliary stent is in place which has improved her LFTs.  And attempt at cholecystectomy was aborted due to appearance of micronodular cirrhosis and fibrosed gallbladder raising question of malignancy.  Biopsies from subsequent ERCP however were negative for malignancy.    Preop Questions 11/20/2022   1. Have you ever had a heart attack or stroke? No   2. Have you ever had surgery on your heart or blood vessels, such as a stent placement, a coronary artery bypass, or surgery on an artery in your head, neck, heart, or legs? No   3. Do you have chest pain with activity? No   4. Do you have a history of  heart failure? No   5. Do you currently have a cold, bronchitis or symptoms of other infection? No   6. Do you have a cough, shortness of breath, or wheezing? No   7. Do you or anyone in your family have previous history of blood clots? No   8. Do you or does anyone in your family have a serious bleeding problem such as prolonged bleeding following surgeries or cuts? No   9. Have you ever had problems with anemia or been told to take iron pills? No   10. Have you had any abnormal blood loss such as black, tarry or bloody stools, or abnormal vaginal bleeding? No   11. Have you ever had a blood transfusion? No   12. Are you willing to have a blood transfusion if it is medically needed before, during, or after your surgery? Yes   13. Have you or any of your relatives ever had problems with anesthesia? No   14. Do you have sleep apnea, excessive snoring or daytime drowsiness? No   15. Do you have any artifical heart valves or other implanted medical devices like a pacemaker, defibrillator, or  continuous glucose monitor? No   16. Do you have artificial joints? YES -    17. Are you allergic to latex? No   18. Is there any chance that you may be pregnant? -       Health Care Directive:  Patient does not have a Health Care Directive or Living Will:     Preoperative Review of :   reviewed - controlled substances reflected in medication list.      Status of Chronic Conditions:  HYPERTENSION - Patient has longstanding history of HTN , currently denies any symptoms referable to elevated blood pressure. Specifically denies chest pain, palpitations, dyspnea, orthopnea, PND or peripheral edema. Blood pressure readings have been in normal range. Current medication regimen is as listed below. Patient denies any side effects of medication.       Review of Systems  CONSTITUTIONAL: NEGATIVE for fever, chills, change in weight  ENT/MOUTH: NEGATIVE for ear, mouth and throat problems  RESP: NEGATIVE for significant cough or SOB  CV: NEGATIVE for chest pain, palpitations or peripheral edema    Patient Active Problem List    Diagnosis Date Noted     ACP (advance care planning) 04/04/2012     Priority: High     Discussed advance care planning with patient; information given to patient to review.4/4/2012   Facilitator called patient and mailed information re: Honoring choices  Will await call back regarding meeting with facilitator. 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  All refills should be approved by this provider, or covering partner.    Medication(s): Tramadol.   Maximum quantity per month: 40  Clinic visit frequency required: Q 6  months     Controlled substance agreement on file: Yes       Date(s): 6/1/2016    Pain Clinic evaluation in the past: No    DIRE Total Score(s):  No flowsheet data found.    Last [facility] website verification:  done on 6/1/2016   [email or web address]         S/P lumbar laminectomy 08/29/2014     Priority: Medium     Physical deconditioning 08/15/2014     Priority: Medium     Spinal stenosis, lumbar region, with neurogenic claudication 08/11/2014     Priority: Medium     --S/P discectomy x 2, most recently 2-level laminectomy/discectomy 8/2014       Health Care Home 07/20/2012     Priority: Medium     FPA [facility] for .  Criselda Wood RN-BC    [phone]     DX V65.8 REPLACED WITH [id number]ALTH CARE HOME (04/08/2013)       HYPERLIPIDEMIA LDL GOAL <160 10/31/2010     Priority: Medium     Osteopenia 04/07/2010     Priority: Medium     HTN (hypertension)      Priority: Medium      Past Medical History:   Diagnosis Date     HTN (hypertension)      Hyperlipidemia      Leiomyoma of uterus, unspecified      Numbness and tingling     spinal stenosis causes numbness and tingling on feet and knees bilaterally     Osteoarthrosis, unspecified whether generalized or localized, unspecified site      Other and unspecified disc disorder of lumbar region      Primary osteoarthritis of left hip 12/18/2016     Spinal stenosis     S/P diskectomy x 2, most recently 8/2014     Past Surgical History:   Procedure Laterality Date     ARTHROPLASTY HIP Left 12/12/2016    Procedure: ARTHROPLASTY HIP;  Surgeon: Leonid Morfin MD;  Location: [facility] OR     BACK SURGERY       COLONOSCOPY N/A 7/20/2017    Procedure:  "COMBINED COLONOSCOPY, SINGLE OR MULTIPLE BIOPSY/POLYPECTOMY BY BIOPSY;  colonoscopy;  Surgeon: Catarino Salas MD;  Location:  GI     DISCECTOMY LUMBAR POSTERIOR MICROSCOPIC TWO LEVELS  8/11/2014    Procedure: DISCECTOMY LUMBAR POSTERIOR MICROSCOPIC TWO LEVELS;  Surgeon: Warren Herring MD;  Location:  OR     ENDOSCOPIC RETROGRADE CHOLANGIOPANCREATOGRAM N/A 10/14/2022    Procedure: ENDOSCOPIC RETROGRADE CHOLANGIOPANCREATOGRAPHY with biliary spincterotomy, biliary stent placement, cholangioscopy;  Surgeon: Carson Franklin MD;  Location: [facility] OR     LAPAROSCOPY DIAGNOSTIC (GENERAL) N/A 10/6/2022    Procedure: Diagnostic laparoscopy with liver biopsy;  Surgeon: Warren Zhang MD;  Location:  OR     ORTHOPEDIC SURGERY       [facility] NONSPECIFIC PROCEDURE  1996    lumbar discectomy     [facility] NONSPECIFIC PROCEDURE      cervical cone biopsy     [facility] NONSPECIFIC PROCEDURE      Left knee replacement     Current Outpatient Medications   Medication Sig Dispense Refill     ASPIRIN EC PO Take 81 mg by mouth daily       calcium-vitamin D (CALTRATE) 600-400 MG-UNIT per tablet Take 1 tablet by mouth 2 times daily       celecoxib (CELEBREX) 200 MG capsule TAKE 1 CAPSULE BY MOUTH EVERY DAY 90 capsule 1     lisinopril (ZESTRIL) 10 MG tablet TAKE 1 TABLET(10 MG) BY MOUTH DAILY 90 tablet 1     Multiple Vitamins-Minerals (CENTRUM SILVER) per tablet Take 1 tablet by mouth daily       traMADol (ULTRAM) 50 MG tablet TAKE 1 TO 2 TABLETS BY MOUTH EVERY DAY FOR SEVERE PAIN 90 tablet 0     vitamin D3 (CHOLECALCIFEROL) 50 mcg (2000 units) tablet Take 1 tablet (50 mcg) by mouth daily         No Known Allergies     Social History     Tobacco Use     Smoking status: Never     Smokeless tobacco: Never   Substance Use Topics     Alcohol use: Yes     Alcohol/week: 0.0 standard drinks     Comment: social       History   Drug Use No         Objective     /81   Pulse 81   Temp 98  F (36.7  C) (Temporal)   Ht 1.727 m (5' 8\")   Wt 95 kg " (209 lb 8 oz)   SpO2 98%   BMI 31.85 kg/m      Physical Exam  GENERAL APPEARANCE: healthy, alert and no distress  HENT: ear canals and TM's normal and nose and mouth without ulcers or lesions  RESP: lungs clear to auscultation - no rales, rhonchi or wheezes  CV: regular rate and rhythm, normal S1 S2, no S3 or S4 and no murmur, click or rub   ABDOMEN: soft, nontender, no HSM or masses and bowel sounds normal  NEURO: Normal strength and tone, sensory exam grossly normal, mentation intact and speech normal    Recent Labs   Lab Test 10/31/22  1452 10/16/22  0609 10/15/22  0728 10/14/22  0532   HGB 12.6 11.1* 11.7 11.5*    320 322 331   INR  --   --  1.23* 1.74*    136 134* 134*   POTASSIUM 3.7 4.1 4.3 4.2   CR 0.67 0.60 0.56 0.56        Diagnostics:  Labs pending at this time.  Results will be reviewed when available.   EKG: appears normal, NSR, normal axis, normal intervals, no acute ST/T changes c/w ischemia, no LVH by voltage criteria, unchanged from previous tracings    Revised Cardiac Risk Index (RCRI):  The patient has the following serious cardiovascular risks for perioperative complications:   - No serious cardiac risks = 0 points     RCRI Interpretation: 0 points: Class I (very low risk - 0.4% complication rate)           Signed Electronically by: Arun Tao MD  Copy of this evaluation report is provided to requesting physician.

## 2022-11-22 ENCOUNTER — OFFICE VISIT (OUTPATIENT)
Dept: PODIATRY | Facility: CLINIC | Age: 77
End: 2022-11-22
Payer: COMMERCIAL

## 2022-11-22 VITALS
DIASTOLIC BLOOD PRESSURE: 74 MMHG | SYSTOLIC BLOOD PRESSURE: 132 MMHG | WEIGHT: 209.5 LBS | BODY MASS INDEX: 31.75 KG/M2 | HEIGHT: 68 IN

## 2022-11-22 DIAGNOSIS — L97.511 ULCER OF TOE, RIGHT, LIMITED TO BREAKDOWN OF SKIN (H): Primary | ICD-10-CM

## 2022-11-22 DIAGNOSIS — M79.674 TOE PAIN, RIGHT: ICD-10-CM

## 2022-11-22 PROCEDURE — 99203 OFFICE O/P NEW LOW 30 MIN: CPT | Mod: 25 | Performed by: PODIATRIST

## 2022-11-22 PROCEDURE — 97597 DBRDMT OPN WND 1ST 20 CM/<: CPT | Performed by: PODIATRIST

## 2022-11-22 NOTE — PROGRESS NOTES
ASSESSMENT:  Encounter Diagnoses   Name Primary?     Ulcer of toe, right, limited to breakdown of skin (H) Yes     Toe pain, right      MEDICAL DECISION MAKING:  Problem is consistent with a prolific hyperkeratotic lesion at the distal aspect of the right third toe which is caused underlying ulceration.  Excisional debridement was performed.  Please see below.  I discussed daily cleansing, applying a topical antibiotic and light dressing until the wound is healed.  She was provided basic wound care instructions and clinical signs of infection in her after visit summary.    I explained that she should try to keep this callus from forming via filing.  I also explained that I am happy to help by paring the lesion down, yet this likely will be covered by her insurance.  She is encouraged to check with her health insurance.  I prefer she not let the lesion grow to the size it causes future ulceration.    Using a tissue nippers and #15 scalpel, the hyperkeratotic eschar was excised from the distal right third toe.  The underlying wound was revealed and debrided to the level of deeper skin.  There was mild bleeding.  The area was cleansed with alcohol wipe.  Bacitracin and Band-Aid applied.    Disclaimer: This note consists of symbols derived from keyboarding, dictation and/or voice recognition software. As a result, there may be errors in the script that have gone undetected. Please consider this when interpreting information found in this chart.    Bj Silva DPM, FACFAS, MS    [place] [facility]      ____________________________________________________________________    HPI:       Peggy presents for foot exam and concerned about a growth on her left foot.  She specifies the right third toe.  Problem started months ago.  She has associated throbbing pain.  Pain daily  Worse with walking  She tried an over-the-counter corn removal product  She reports having spinal stenosis and  numbness in her feet.  *  Past Medical History:   Diagnosis Date     HTN (hypertension)      Hyperlipidemia      Leiomyoma of uterus, unspecified      Numbness and tingling     spinal stenosis causes numbness and tingling on feet and knees bilaterally     Osteoarthrosis, unspecified whether generalized or localized, unspecified site      Other and unspecified disc disorder of lumbar region      Primary osteoarthritis of left hip 12/18/2016     Spinal stenosis     S/P diskectomy x 2, most recently 8/2014   *  *  Past Surgical History:   Procedure Laterality Date     ARTHROPLASTY HIP Left 12/12/2016    Procedure: ARTHROPLASTY HIP;  Surgeon: Leonid Morfin MD;  Location:  OR     BACK SURGERY       COLONOSCOPY N/A 7/20/2017    Procedure: COMBINED COLONOSCOPY, SINGLE OR MULTIPLE BIOPSY/POLYPECTOMY BY BIOPSY;  colonoscopy;  Surgeon: Catarino Salas MD;  Location:  GI     DISCECTOMY LUMBAR POSTERIOR MICROSCOPIC TWO LEVELS  8/11/2014    Procedure: DISCECTOMY LUMBAR POSTERIOR MICROSCOPIC TWO LEVELS;  Surgeon: Warren Herring MD;  Location:  OR     ENDOSCOPIC RETROGRADE CHOLANGIOPANCREATOGRAM N/A 10/14/2022    Procedure: ENDOSCOPIC RETROGRADE CHOLANGIOPANCREATOGRAPHY with biliary spincterotomy, biliary stent placement, cholangioscopy;  Surgeon: Carson Franklin MD;  Location: [facility] OR     LAPAROSCOPY DIAGNOSTIC (GENERAL) N/A 10/6/2022    Procedure: Diagnostic laparoscopy with liver biopsy;  Surgeon: Warren Zhang MD;  Location:  OR     ORTHOPEDIC SURGERY       [facility] NONSPECIFIC PROCEDURE  1996    lumbar discectomy     [facility] NONSPECIFIC PROCEDURE      cervical cone biopsy     [facility] NONSPECIFIC PROCEDURE      Left knee replacement   *  *  Current Outpatient Medications   Medication Sig Dispense Refill     ASPIRIN EC PO Take 81 mg by mouth daily       calcium-vitamin D (CALTRATE) 600-400 MG-UNIT per tablet Take 1 tablet by mouth 2 times daily       celecoxib (CELEBREX) 200 MG capsule TAKE 1 CAPSULE BY  MOUTH EVERY DAY 90 capsule 1     lisinopril (ZESTRIL) 10 MG tablet TAKE 1 TABLET(10 MG) BY MOUTH DAILY 90 tablet 1     Multiple Vitamins-Minerals (CENTRUM SILVER) per tablet Take 1 tablet by mouth daily       traMADol (ULTRAM) 50 MG tablet TAKE 1 TO 2 TABLETS BY MOUTH EVERY DAY FOR SEVERE PAIN 90 tablet 0     vitamin D3 (CHOLECALCIFEROL) 50 mcg (2000 units) tablet Take 1 tablet (50 mcg) by mouth daily           EXAM:    Vitals: There were no vitals taken for this visit.  BMI: There is no height or weight on file to calculate BMI.    Constitutional:  Yohana Marques is in no apparent distress, appears well-nourished.  Cooperative with history and physical exam.    Vascular:  Pedal pulses are palpable for both the DP and PT arteries.  CFT < 3 sec.  No edema.      Neuro: Light touch sensation is intact to the L4, L5, S1 distributions  No evidence of weakness, spasticity, or contracture in the lower extremities.   Protective sensation is diminished bilateral foot per [place] monofilament testing.    Derm: Normal texture and turgor.  No erythema, ecchymosis, or cyanosis.  No open lesions.   Thick, prolific, almost nail like hyperkeratotic lesion at the distal aspect of the right third toe.  There is evidence of intraepidermal bleeding.  Post debridement there is a superficial ulceration to the level of deeper skin which bleeds.    Musculoskeletal:    Lower extremity muscle strength is normal.  Pes planus.  Digital contractures.

## 2022-11-22 NOTE — PATIENT INSTRUCTIONS
Thank you for choosing [facility]!    DR. SEVILLA'S CLINIC LOCATIONS:     [facility] TRIAGE LINE: [phone]   [address] APPOINTMENTS: [phone]   [place] [address] RADIOLOGY: [phone]   (Every other Tues - Wed - Fri PM) SET UP SURGERY: [phone]    PHYSICAL THERAPY: [phone]   [place] [facility] BILLING QUESTIONS: [phone] [address] FAX: [phone]   [place] [address]    (Thurs & Fri AM)      WOUND CARE INSTRUCTIONS    1)  Keep the wound covered by a bandage when bathing.    2)  Gently clean the wound with soap water, separate from bath/shower water.      3)  Each day, apply a topical antibiotic ointment to the wound (Neosporin, Triple antibiotic, Bacitracin).   Cover with large band-aid or gauze.      5)  Please seek immediate medical attention if any increasing redness, drainage, smell, or pain related to the wound.     6)  Please return to clinic in the period of time requested by Dr. Sevilla.      SIGNS OF INFECTION  expanding redness around the wound   yellow or greenish-colored pus or cloudy wound drainage   red streaking spreading from the wound   increased swelling, tenderness, or pain around the wound   fever  *If you notice any of these signs of infection, call us right away!      CALLUS / CORNS / IPKs  When there is excessive friction or pressure on the skin, the body responds by making the skin thicker to protect the deeper structures from becoming exposed. While this works well to protect the deeper structures, the thickened skin can increase pressure and pain.    CALLUS: Flat, diffuse thickening are simple calluses and they are usually caused by friction. Often these are the result of rubbing on a shoe or going barefoot.    CORNS: Calluses with a central core between the toes are called corns. These result from prominent joints on adjacent toes rubbing together. Theses are a symptom of bone malalignment and  will always recur unless the underlying bones are addressed surgically.    IPKs: Calluses with a central core on the ball of the foot are usually IPKs (intractable plantar keratosis). These are caused by excessive pressure from the metatarsals, the bones that make up the ball of the foot. Often one of these bones is too long or too prominent.  Again, these will always recur unless the underlying bone issue is addressed. There is no cure for these. They will either go away by themselves, recur, or more could develop.    ROUTINE MAINTENANCE  1. File them down with a pumice stone or callus file a couple times a week.   2. An electric callus removing device. Amope Pedi Perfect Electronic Pedicure Foot File and Callus Remover can be a good option.   3. Lotion can be applied to soften the callus. A urea based cream such as Kersal or Vanicream or thicker cream with shea butter are good options.  4. Toe spacers or toe covers can be used for corns, gel pads can be used for other lesions on the bottom of the foot.   If there is a surgical pathology noted, such as a prominent bone, often this needs to be addressed surgically to minimize recurrence. However, sometimes the lesion simply migrates to another spot after surgery, so it is not a guaranteed cure.     **If you come back to clinic for treatment, insurance does not cover it, and you would be billed. This charge could range from $100 - $227**    Here is a list of routine foot care resources, which includes toenail trimming and callus/corn management.     This is not a referral. It is your responsibility to contact the organization and your insurance to confirm cost and coverage.      ROUTINE FOOT CARE (NAIL TRIMMING / CALLUSES)      [facility] (in home)  [phone]   [facility] (out of pocket)  [phone]  Multiple locations   [facility]  [phone] [facility]  [phone]  Multiple locations   [facility]  [phone]  [place]  [facility]  [phone]  Multiple locations   [facility]  [phone]  Multiple locations [facility]   [phone]   Multiple locations

## 2022-11-22 NOTE — LETTER
11/22/2022         RE: Yohana Marques  [id number] [address] [place] [id number]        Dear Colleague,    Thank you for referring your patient, Yohana Marques, to the [facility]. Please see a copy of my visit note below.    ASSESSMENT:  Encounter Diagnoses   Name Primary?     Ulcer of toe, right, limited to breakdown of skin (H) Yes     Toe pain, right      MEDICAL DECISION MAKING:  Problem is consistent with a prolific hyperkeratotic lesion at the distal aspect of the right third toe which is caused underlying ulceration.  Excisional debridement was performed.  Please see below.  I discussed daily cleansing, applying a topical antibiotic and light dressing until the wound is healed.  She was provided basic wound care instructions and clinical signs of infection in her after visit summary.    I explained that she should try to keep this callus from forming via filing.  I also explained that I am happy to help by paring the lesion down, yet this likely will be covered by her insurance.  She is encouraged to check with her health insurance.  I prefer she not let the lesion grow to the size it causes future ulceration.    Using a tissue nippers and #15 scalpel, the hyperkeratotic eschar was excised from the distal right third toe.  The underlying wound was revealed and debrided to the level of deeper skin.  There was mild bleeding.  The area was cleansed with alcohol wipe.  Bacitracin and Band-Aid applied.    Disclaimer: This note consists of symbols derived from keyboarding, dictation and/or voice recognition software. As a result, there may be errors in the script that have gone undetected. Please consider this when interpreting information found in this chart.    Bj Silva DPM, FACFAS, [facility] Department of Podiatry/Foot & Ankle Surgery      ____________________________________________________________________    HPI:       Peggy presents for foot exam and  concerned about a growth on her left foot.  She specifies the right third toe.  Problem started months ago.  She has associated throbbing pain.  Pain daily  Worse with walking  She tried an over-the-counter corn removal product  She reports having spinal stenosis and numbness in her feet.  *  Past Medical History:   Diagnosis Date     HTN (hypertension)      Hyperlipidemia      Leiomyoma of uterus, unspecified      Numbness and tingling     spinal stenosis causes numbness and tingling on feet and knees bilaterally     Osteoarthrosis, unspecified whether generalized or localized, unspecified site      Other and unspecified disc disorder of lumbar region      Primary osteoarthritis of left hip 12/18/2016     Spinal stenosis     S/P diskectomy x 2, most recently 8/2014   *  *  Past Surgical History:   Procedure Laterality Date     ARTHROPLASTY HIP Left 12/12/2016    Procedure: ARTHROPLASTY HIP;  Surgeon: Leonid Morfin MD;  Location:  OR     BACK SURGERY       COLONOSCOPY N/A 7/20/2017    Procedure: COMBINED COLONOSCOPY, SINGLE OR MULTIPLE BIOPSY/POLYPECTOMY BY BIOPSY;  colonoscopy;  Surgeon: Catarino Salas MD;  Location:  GI     DISCECTOMY LUMBAR POSTERIOR MICROSCOPIC TWO LEVELS  8/11/2014    Procedure: DISCECTOMY LUMBAR POSTERIOR MICROSCOPIC TWO LEVELS;  Surgeon: Warren Herring MD;  Location:  OR     ENDOSCOPIC RETROGRADE CHOLANGIOPANCREATOGRAM N/A 10/14/2022    Procedure: ENDOSCOPIC RETROGRADE CHOLANGIOPANCREATOGRAPHY with biliary spincterotomy, biliary stent placement, cholangioscopy;  Surgeon: Carson Franklin MD;  Location: U OR     LAPAROSCOPY DIAGNOSTIC (GENERAL) N/A 10/6/2022    Procedure: Diagnostic laparoscopy with liver biopsy;  Surgeon: Warren Zhang MD;  Location:  OR     ORTHOPEDIC SURGERY       [facility] NONSPECIFIC PROCEDURE  1996    lumbar discectomy     [facility] NONSPECIFIC PROCEDURE      cervical cone biopsy     [facility] NONSPECIFIC PROCEDURE      Left knee replacement    *  *  Current Outpatient Medications   Medication Sig Dispense Refill     ASPIRIN EC PO Take 81 mg by mouth daily       calcium-vitamin D (CALTRATE) 600-400 MG-UNIT per tablet Take 1 tablet by mouth 2 times daily       celecoxib (CELEBREX) 200 MG capsule TAKE 1 CAPSULE BY MOUTH EVERY DAY 90 capsule 1     lisinopril (ZESTRIL) 10 MG tablet TAKE 1 TABLET(10 MG) BY MOUTH DAILY 90 tablet 1     Multiple Vitamins-Minerals (CENTRUM SILVER) per tablet Take 1 tablet by mouth daily       traMADol (ULTRAM) 50 MG tablet TAKE 1 TO 2 TABLETS BY MOUTH EVERY DAY FOR SEVERE PAIN 90 tablet 0     vitamin D3 (CHOLECALCIFEROL) 50 mcg (2000 units) tablet Take 1 tablet (50 mcg) by mouth daily           EXAM:    Vitals: There were no vitals taken for this visit.  BMI: There is no height or weight on file to calculate BMI.    Constitutional:  Yohana Marques is in no apparent distress, appears well-nourished.  Cooperative with history and physical exam.    Vascular:  Pedal pulses are palpable for both the DP and PT arteries.  CFT < 3 sec.  No edema.      Neuro: Light touch sensation is intact to the L4, L5, S1 distributions  No evidence of weakness, spasticity, or contracture in the lower extremities.   Protective sensation is diminished bilateral foot per [employer] monofilament testing.    Derm: Normal texture and turgor.  No erythema, ecchymosis, or cyanosis.  No open lesions.   Thick, prolific, almost nail like hyperkeratotic lesion at the distal aspect of the right third toe.  There is evidence of intraepidermal bleeding.  Post debridement there is a superficial ulceration to the level of deeper skin which bleeds.    Musculoskeletal:    Lower extremity muscle strength is normal.  Pes planus.  Digital contractures.              Again, thank you for allowing me to participate in the care of your patient.        Sincerely,        Bj Silva, MARTIN

## 2022-11-22 NOTE — PROGRESS NOTES
Pre op physical and labs manually faxed to [facility] at [phone].

## 2022-11-23 LAB
CODEINE UR CFM-MCNC: 65 NG/ML
CODEINE/CREAT UR: 65 NG/MG {CREAT}
N-NORTRAMADOL/CREAT UR CFM: 5860 NG/MG {CREAT}
O-NORTRAMADOL UR CFM-MCNC: 5860 NG/ML
TRAMADOL CTO UR CFM-MCNC: 1180 NG/ML
TRAMADOL/CREAT UR: 1180 NG/MG {CREAT}

## 2022-11-28 ENCOUNTER — LAB (OUTPATIENT)
Dept: URGENT CARE | Facility: URGENT CARE | Age: 77
End: 2022-11-28
Payer: COMMERCIAL

## 2022-11-28 DIAGNOSIS — Z20.822 ENCOUNTER FOR LABORATORY TESTING FOR COVID-19 VIRUS: ICD-10-CM

## 2022-11-28 LAB — SARS-COV-2 RNA RESP QL NAA+PROBE: NEGATIVE

## 2022-11-28 PROCEDURE — U0003 INFECTIOUS AGENT DETECTION BY NUCLEIC ACID (DNA OR RNA); SEVERE ACUTE RESPIRATORY SYNDROME CORONAVIRUS 2 (SARS-COV-2) (CORONAVIRUS DISEASE [COVID-19]), AMPLIFIED PROBE TECHNIQUE, MAKING USE OF HIGH THROUGHPUT TECHNOLOGIES AS DESCRIBED BY CMS-2020-01-R: HCPCS

## 2022-11-28 PROCEDURE — U0005 INFEC AGEN DETEC AMPLI PROBE: HCPCS

## 2022-11-30 ENCOUNTER — ANESTHESIA EVENT (OUTPATIENT)
Dept: SURGERY | Facility: CLINIC | Age: 77
DRG: 409 | End: 2022-11-30
Payer: COMMERCIAL

## 2022-11-30 NOTE — ANESTHESIA PREPROCEDURE EVALUATION
Anesthesia Pre-Procedure Evaluation    Patient: Yohana Marques   MRN: [id number] : 1945        Procedure : Procedure(s):  LAPAROSCOPIC CHOLECYSTECTOMY  WITH CHOLANGIOGRAM; INTRAOPERATIVE ULTRASOUND OF THE LIVER  COMMON BILE DUCT REPAIR  POSSIBLE OPEN PARTIAL HEPATECTOMY  POSSIBLE PORTAL LYMPH NODE DISSECTION          Past Medical History:   Diagnosis Date     HTN (hypertension)      Hyperlipidemia      Leiomyoma of uterus, unspecified      Numbness and tingling     spinal stenosis causes numbness and tingling on feet and knees bilaterally     Osteoarthrosis, unspecified whether generalized or localized, unspecified site      Other and unspecified disc disorder of lumbar region      Primary osteoarthritis of left hip 2016     Spinal stenosis     S/P diskectomy x 2, most recently 2014      Past Surgical History:   Procedure Laterality Date     ARTHROPLASTY HIP Left 2016    Procedure: ARTHROPLASTY HIP;  Surgeon: Leonid Morfin MD;  Location:  OR     BACK SURGERY       COLONOSCOPY N/A 2017    Procedure: COMBINED COLONOSCOPY, SINGLE OR MULTIPLE BIOPSY/POLYPECTOMY BY BIOPSY;  colonoscopy;  Surgeon: Catarino Salas MD;  Location:  GI     DISCECTOMY LUMBAR POSTERIOR MICROSCOPIC TWO LEVELS  2014    Procedure: DISCECTOMY LUMBAR POSTERIOR MICROSCOPIC TWO LEVELS;  Surgeon: Warren Herring MD;  Location:  OR     ENDOSCOPIC RETROGRADE CHOLANGIOPANCREATOGRAM N/A 10/14/2022    Procedure: ENDOSCOPIC RETROGRADE CHOLANGIOPANCREATOGRAPHY with biliary spincterotomy, biliary stent placement, cholangioscopy;  Surgeon: Carson Franklin MD;  Location: [facility] OR     LAPAROSCOPY DIAGNOSTIC (GENERAL) N/A 10/6/2022    Procedure: Diagnostic laparoscopy with liver biopsy;  Surgeon: Warren Zhang MD;  Location:  OR     ORTHOPEDIC SURGERY       [facility] NONSPECIFIC PROCEDURE  1996    lumbar discectomy     [facility] NONSPECIFIC PROCEDURE      cervical cone biopsy     [facility] NONSPECIFIC PROCEDURE       Left knee replacement      No Known Allergies   Social History     Tobacco Use     Smoking status: Never     Smokeless tobacco: Never   Substance Use Topics     Alcohol use: Yes     Alcohol/week: 0.0 standard drinks     Comment: social      Wt Readings from Last 1 Encounters:   11/22/22 95 kg (209 lb 8 oz)        Anesthesia Evaluation   Pt has had prior anesthetic. Type: General.    History of anesthetic complications   MI after knee replacement in .    ROS/MED HX  ENT/Pulmonary:       Neurologic:     (+) peripheral neuropathy,     Cardiovascular: Comment: On asprin    (+) Dyslipidemia hypertension-----Previous cardiac testing   Echo: Date: Results:    Stress Test: Date: Results:    ECG Reviewed: Date: 10/2022 Results:  NSR  Cath: Date: Results:      METS/Exercise Tolerance: 4 - Raking leaves, gardening    Hematologic:       Musculoskeletal: Comment: Spinal Stenosis  (+) arthritis,     GI/Hepatic:     (+) cholecystitis/cholelithiasis, liver disease,     Renal/Genitourinary:       Endo:       Psychiatric/Substance Use:       Infectious Disease:       Malignancy:       Other:      (+) , H/O Chronic Pain,        Physical Exam    Airway        Mallampati: II   TM distance: > 3 FB   Neck ROM: full   Mouth opening: > 3 cm    Respiratory Devices and Support         Dental           Cardiovascular             Pulmonary                   OUTSIDE LABS:  CBC:   Lab Results   Component Value Date    WBC 4.2 10/31/2022    WBC 8.4 10/16/2022    HGB 12.6 10/31/2022    HGB 11.1 (L) 10/16/2022    HCT 37.8 10/31/2022    HCT 32.6 (L) 10/16/2022     10/31/2022     10/16/2022     BMP:   Lab Results   Component Value Date     11/21/2022     10/31/2022    POTASSIUM 4.0 11/21/2022    POTASSIUM 3.7 10/31/2022    CHLORIDE 105 11/21/2022    CHLORIDE 106 10/31/2022    CO2 24 11/21/2022    CO2 24 10/31/2022    BUN 10.6 11/21/2022    BUN 10.7 10/31/2022    CR 0.65 11/21/2022    CR 0.67 10/31/2022     (H)  11/21/2022     (H) 10/31/2022     COAGS:   Lab Results   Component Value Date    PTT 25 05/31/2006    INR 1.23 (H) 10/15/2022     POC:   Lab Results   Component Value Date     (H) 08/13/2014     HEPATIC:   Lab Results   Component Value Date    ALBUMIN 3.7 11/21/2022    PROTTOTAL 7.1 11/21/2022    ALT 9 (L) 11/21/2022    AST 28 11/21/2022    ALKPHOS 92 11/21/2022    BILITOTAL 0.9 11/21/2022    SVEN 35 10/13/2022     OTHER:   Lab Results   Component Value Date    A1C 6.1 (H) 11/18/2005    YEE 9.5 11/21/2022    MAG 1.9 10/13/2022    LIPASE 31 08/23/2022    AMYLASE 41 08/23/2022    TSH 2.30 11/18/2005    T4 1.13 11/18/2005    .00 (H) 10/13/2022    SED 67 (H) 10/13/2022       Anesthesia Plan    ASA Status:  2   NPO Status:  NPO Appropriate    Anesthesia Type: General.     - Airway: ETT   Induction: Intravenous, Propofol.   Maintenance: Balanced.   Techniques and Equipment:     - Lines/Monitors: 2nd IV, BIS, Arterial Line     Consents    Anesthesia Plan(s) and associated risks, benefits, and realistic alternatives discussed. Questions answered and patient/representative(s) expressed understanding.    - Discussed: Risks, Benefits and Alternatives for BOTH SEDATION and the PROCEDURE were discussed     - Discussed with:       - Extended Intubation/Ventilatory Support Discussed: Yes.         Postoperative Care    Pain management: IV analgesics, Oral pain medications, Multi-modal analgesia.   PONV prophylaxis: Ondansetron (or other 5HT-3), Dexamethasone or Solumedrol     Comments:                Raymond Malloy MD

## 2022-12-01 ENCOUNTER — APPOINTMENT (OUTPATIENT)
Dept: GENERAL RADIOLOGY | Facility: CLINIC | Age: 77
DRG: 409 | End: 2022-12-01
Attending: SURGERY
Payer: COMMERCIAL

## 2022-12-01 ENCOUNTER — ANESTHESIA (OUTPATIENT)
Dept: SURGERY | Facility: CLINIC | Age: 77
DRG: 409 | End: 2022-12-01
Payer: COMMERCIAL

## 2022-12-01 ENCOUNTER — HOSPITAL ENCOUNTER (INPATIENT)
Facility: CLINIC | Age: 77
LOS: 2 days | Discharge: HOME | DRG: 409 | End: 2022-12-03
Attending: SURGERY | Admitting: SURGERY
Payer: COMMERCIAL

## 2022-12-01 DIAGNOSIS — M16.9 OSTEOARTHRITIS OF HIP, UNSPECIFIED LATERALITY, UNSPECIFIED OSTEOARTHRITIS TYPE: ICD-10-CM

## 2022-12-01 DIAGNOSIS — K83.1 MIRIZZI'S SYNDROME (H): Primary | ICD-10-CM

## 2022-12-01 LAB
ALBUMIN SERPL BCG-MCNC: 3.5 G/DL (ref 3.5–5.2)
ALP SERPL-CCNC: 82 U/L (ref 35–104)
ALT SERPL W P-5'-P-CCNC: 24 U/L (ref 10–35)
ANION GAP SERPL CALCULATED.3IONS-SCNC: 10 MMOL/L (ref 7–15)
AST SERPL W P-5'-P-CCNC: 63 U/L (ref 10–35)
BILIRUB SERPL-MCNC: 0.7 MG/DL
BUN SERPL-MCNC: 9.4 MG/DL (ref 8–23)
CALCIUM SERPL-MCNC: 9 MG/DL (ref 8.8–10.2)
CHLORIDE SERPL-SCNC: 106 MMOL/L (ref 98–107)
CREAT SERPL-MCNC: 0.61 MG/DL (ref 0.51–0.95)
DEPRECATED HCO3 PLAS-SCNC: 23 MMOL/L (ref 22–29)
ERYTHROCYTE [DISTWIDTH] IN BLOOD BY AUTOMATED COUNT: 13.2 % (ref 10–15)
GFR SERPL CREATININE-BSD FRML MDRD: >90 ML/MIN/1.73M2
GLUCOSE BLDC GLUCOMTR-MCNC: 102 MG/DL (ref 70–99)
GLUCOSE SERPL-MCNC: 144 MG/DL (ref 70–99)
HCT VFR BLD AUTO: 41.4 % (ref 35–47)
HGB BLD-MCNC: 12.1 G/DL (ref 11.7–15.7)
HGB BLD-MCNC: 13.6 G/DL (ref 11.7–15.7)
HOLD SPECIMEN: NORMAL
MAGNESIUM SERPL-MCNC: 1.5 MG/DL (ref 1.7–2.3)
MCH RBC QN AUTO: 32.5 PG (ref 26.5–33)
MCHC RBC AUTO-ENTMCNC: 32.9 G/DL (ref 31.5–36.5)
MCV RBC AUTO: 99 FL (ref 78–100)
PHOSPHATE SERPL-MCNC: 4 MG/DL (ref 2.5–4.5)
PLATELET # BLD AUTO: 178 10E3/UL (ref 150–450)
POTASSIUM SERPL-SCNC: 4.1 MMOL/L (ref 3.4–5.3)
PROT SERPL-MCNC: 6.8 G/DL (ref 6.4–8.3)
RADIOLOGIST FLAGS: ABNORMAL
RBC # BLD AUTO: 4.18 10E6/UL (ref 3.8–5.2)
SODIUM SERPL-SCNC: 139 MMOL/L (ref 136–145)
WBC # BLD AUTO: 11.2 10E3/UL (ref 4–11)

## 2022-12-01 PROCEDURE — 85018 HEMOGLOBIN: CPT | Performed by: ANESTHESIOLOGY

## 2022-12-01 PROCEDURE — 0FPB4DZ REMOVAL OF INTRALUMINAL DEVICE FROM HEPATOBILIARY DUCT, PERCUTANEOUS ENDOSCOPIC APPROACH: ICD-10-PCS | Performed by: SURGERY

## 2022-12-01 PROCEDURE — 272N000001 HC OR GENERAL SUPPLY STERILE: Performed by: SURGERY

## 2022-12-01 PROCEDURE — 84100 ASSAY OF PHOSPHORUS: CPT | Performed by: SURGERY

## 2022-12-01 PROCEDURE — 85014 HEMATOCRIT: CPT | Performed by: SURGERY

## 2022-12-01 PROCEDURE — 255N000002 HC RX 255 OP 636: Performed by: SURGERY

## 2022-12-01 PROCEDURE — 250N000011 HC RX IP 250 OP 636: Performed by: SURGERY

## 2022-12-01 PROCEDURE — 83735 ASSAY OF MAGNESIUM: CPT | Performed by: SURGERY

## 2022-12-01 PROCEDURE — 250N000011 HC RX IP 250 OP 636: Performed by: STUDENT IN AN ORGANIZED HEALTH CARE EDUCATION/TRAINING PROGRAM

## 2022-12-01 PROCEDURE — 250N000013 HC RX MED GY IP 250 OP 250 PS 637: Performed by: STUDENT IN AN ORGANIZED HEALTH CARE EDUCATION/TRAINING PROGRAM

## 2022-12-01 PROCEDURE — 0FC44ZZ EXTIRPATION OF MATTER FROM GALLBLADDER, PERCUTANEOUS ENDOSCOPIC APPROACH: ICD-10-PCS | Performed by: SURGERY

## 2022-12-01 PROCEDURE — 80053 COMPREHEN METABOLIC PANEL: CPT | Performed by: SURGERY

## 2022-12-01 PROCEDURE — 0FB44ZZ EXCISION OF GALLBLADDER, PERCUTANEOUS ENDOSCOPIC APPROACH: ICD-10-PCS | Performed by: SURGERY

## 2022-12-01 PROCEDURE — 120N000002 HC R&B MED SURG/OB UMMC

## 2022-12-01 PROCEDURE — 0FU947Z SUPPLEMENT COMMON BILE DUCT WITH AUTOLOGOUS TISSUE SUBSTITUTE, PERCUTANEOUS ENDOSCOPIC APPROACH: ICD-10-PCS | Performed by: SURGERY

## 2022-12-01 PROCEDURE — C9290 INJ, BUPIVACAINE LIPOSOME: HCPCS | Performed by: STUDENT IN AN ORGANIZED HEALTH CARE EDUCATION/TRAINING PROGRAM

## 2022-12-01 PROCEDURE — 74018 RADEX ABDOMEN 1 VIEW: CPT | Mod: 26 | Performed by: RADIOLOGY

## 2022-12-01 PROCEDURE — 999N000065 XR ABDOMEN PORT 1 VIEW

## 2022-12-01 PROCEDURE — 250N000009 HC RX 250: Performed by: STUDENT IN AN ORGANIZED HEALTH CARE EDUCATION/TRAINING PROGRAM

## 2022-12-01 PROCEDURE — 250N000011 HC RX IP 250 OP 636

## 2022-12-01 PROCEDURE — 999N000179 XR SURGERY CARM FLUORO LESS THAN 5 MIN W STILLS: Mod: TC

## 2022-12-01 PROCEDURE — 250N000013 HC RX MED GY IP 250 OP 250 PS 637: Performed by: SURGERY

## 2022-12-01 PROCEDURE — 710N000010 HC RECOVERY PHASE 1, LEVEL 2, PER MIN: Performed by: SURGERY

## 2022-12-01 PROCEDURE — 250N000025 HC SEVOFLURANE, PER MIN: Performed by: SURGERY

## 2022-12-01 PROCEDURE — 88304 TISSUE EXAM BY PATHOLOGIST: CPT | Mod: TC | Performed by: SURGERY

## 2022-12-01 PROCEDURE — 999N000141 HC STATISTIC PRE-PROCEDURE NURSING ASSESSMENT: Performed by: SURGERY

## 2022-12-01 PROCEDURE — 370N000017 HC ANESTHESIA TECHNICAL FEE, PER MIN: Performed by: SURGERY

## 2022-12-01 PROCEDURE — 258N000003 HC RX IP 258 OP 636: Performed by: STUDENT IN AN ORGANIZED HEALTH CARE EDUCATION/TRAINING PROGRAM

## 2022-12-01 PROCEDURE — 88304 TISSUE EXAM BY PATHOLOGIST: CPT | Mod: 26 | Performed by: PATHOLOGY

## 2022-12-01 PROCEDURE — 0FN44ZZ RELEASE GALLBLADDER, PERCUTANEOUS ENDOSCOPIC APPROACH: ICD-10-PCS | Performed by: SURGERY

## 2022-12-01 PROCEDURE — BF131ZZ FLUOROSCOPY OF GALLBLADDER AND BILE DUCTS USING LOW OSMOLAR CONTRAST: ICD-10-PCS | Performed by: SURGERY

## 2022-12-01 PROCEDURE — 360N000084 HC SURGERY LEVEL 4 W/ FLUORO, PER MIN: Performed by: SURGERY

## 2022-12-01 RX ORDER — CARBOXYMETHYLCELLULOSE SODIUM 5 MG/ML
1 SOLUTION/ DROPS OPHTHALMIC 3 TIMES DAILY PRN
Status: DISCONTINUED | OUTPATIENT
Start: 2022-12-01 | End: 2022-12-03 | Stop reason: HOSPADM

## 2022-12-01 RX ORDER — HYDROMORPHONE HCL IN WATER/PF 6 MG/30 ML
0.4 PATIENT CONTROLLED ANALGESIA SYRINGE INTRAVENOUS EVERY 5 MIN PRN
Status: DISCONTINUED | OUTPATIENT
Start: 2022-12-01 | End: 2022-12-01 | Stop reason: HOSPADM

## 2022-12-01 RX ORDER — ACETAMINOPHEN 325 MG/1
650 TABLET ORAL EVERY 8 HOURS
Status: DISCONTINUED | OUTPATIENT
Start: 2022-12-04 | End: 2022-12-01

## 2022-12-01 RX ORDER — LIDOCAINE 40 MG/G
CREAM TOPICAL
Status: DISCONTINUED | OUTPATIENT
Start: 2022-12-01 | End: 2022-12-01 | Stop reason: HOSPADM

## 2022-12-01 RX ORDER — HYDROMORPHONE HCL IN WATER/PF 6 MG/30 ML
0.2 PATIENT CONTROLLED ANALGESIA SYRINGE INTRAVENOUS EVERY 5 MIN PRN
Status: DISCONTINUED | OUTPATIENT
Start: 2022-12-01 | End: 2022-12-01 | Stop reason: HOSPADM

## 2022-12-01 RX ORDER — OXYCODONE HYDROCHLORIDE 10 MG/1
10 TABLET ORAL EVERY 4 HOURS PRN
Status: DISCONTINUED | OUTPATIENT
Start: 2022-12-01 | End: 2022-12-02

## 2022-12-01 RX ORDER — ENOXAPARIN SODIUM 100 MG/ML
40 INJECTION SUBCUTANEOUS
Status: COMPLETED | OUTPATIENT
Start: 2022-12-01 | End: 2022-12-01

## 2022-12-01 RX ORDER — NALOXONE HYDROCHLORIDE 0.4 MG/ML
0.4 INJECTION, SOLUTION INTRAMUSCULAR; INTRAVENOUS; SUBCUTANEOUS
Status: DISCONTINUED | OUTPATIENT
Start: 2022-12-01 | End: 2022-12-03 | Stop reason: HOSPADM

## 2022-12-01 RX ORDER — PROCHLORPERAZINE MALEATE 5 MG
5 TABLET ORAL EVERY 6 HOURS PRN
Status: DISCONTINUED | OUTPATIENT
Start: 2022-12-01 | End: 2022-12-03 | Stop reason: HOSPADM

## 2022-12-01 RX ORDER — ONDANSETRON 2 MG/ML
4 INJECTION INTRAMUSCULAR; INTRAVENOUS EVERY 6 HOURS PRN
Status: DISCONTINUED | OUTPATIENT
Start: 2022-12-01 | End: 2022-12-03 | Stop reason: HOSPADM

## 2022-12-01 RX ORDER — METRONIDAZOLE 500 MG/100ML
500 INJECTION, SOLUTION INTRAVENOUS ONCE
Status: COMPLETED | OUTPATIENT
Start: 2022-12-01 | End: 2022-12-01

## 2022-12-01 RX ORDER — SODIUM CHLORIDE, SODIUM LACTATE, POTASSIUM CHLORIDE, CALCIUM CHLORIDE 600; 310; 30; 20 MG/100ML; MG/100ML; MG/100ML; MG/100ML
INJECTION, SOLUTION INTRAVENOUS CONTINUOUS
Status: DISCONTINUED | OUTPATIENT
Start: 2022-12-01 | End: 2022-12-02

## 2022-12-01 RX ORDER — FENTANYL CITRATE 50 UG/ML
50 INJECTION, SOLUTION INTRAMUSCULAR; INTRAVENOUS EVERY 5 MIN PRN
Status: DISCONTINUED | OUTPATIENT
Start: 2022-12-01 | End: 2022-12-01 | Stop reason: HOSPADM

## 2022-12-01 RX ORDER — HYDROMORPHONE HCL IN WATER/PF 6 MG/30 ML
0.4 PATIENT CONTROLLED ANALGESIA SYRINGE INTRAVENOUS
Status: DISCONTINUED | OUTPATIENT
Start: 2022-12-01 | End: 2022-12-03 | Stop reason: HOSPADM

## 2022-12-01 RX ORDER — AMOXICILLIN 250 MG
1 CAPSULE ORAL 2 TIMES DAILY
Status: DISCONTINUED | OUTPATIENT
Start: 2022-12-01 | End: 2022-12-03 | Stop reason: HOSPADM

## 2022-12-01 RX ORDER — HYDRALAZINE HYDROCHLORIDE 20 MG/ML
2.5-5 INJECTION INTRAMUSCULAR; INTRAVENOUS EVERY 10 MIN PRN
Status: DISCONTINUED | OUTPATIENT
Start: 2022-12-01 | End: 2022-12-01 | Stop reason: HOSPADM

## 2022-12-01 RX ORDER — MAGNESIUM SULFATE HEPTAHYDRATE 40 MG/ML
4 INJECTION, SOLUTION INTRAVENOUS ONCE
Status: COMPLETED | OUTPATIENT
Start: 2022-12-01 | End: 2022-12-01

## 2022-12-01 RX ORDER — DEXAMETHASONE SODIUM PHOSPHATE 4 MG/ML
INJECTION, SOLUTION INTRA-ARTICULAR; INTRALESIONAL; INTRAMUSCULAR; INTRAVENOUS; SOFT TISSUE PRN
Status: DISCONTINUED | OUTPATIENT
Start: 2022-12-01 | End: 2022-12-01

## 2022-12-01 RX ORDER — POLYETHYLENE GLYCOL 3350 17 G/17G
17 POWDER, FOR SOLUTION ORAL DAILY
Status: DISCONTINUED | OUTPATIENT
Start: 2022-12-02 | End: 2022-12-03 | Stop reason: HOSPADM

## 2022-12-01 RX ORDER — NALOXONE HYDROCHLORIDE 0.4 MG/ML
0.2 INJECTION, SOLUTION INTRAMUSCULAR; INTRAVENOUS; SUBCUTANEOUS
Status: DISCONTINUED | OUTPATIENT
Start: 2022-12-01 | End: 2022-12-03 | Stop reason: HOSPADM

## 2022-12-01 RX ORDER — BUPIVACAINE HYDROCHLORIDE 2.5 MG/ML
INJECTION, SOLUTION EPIDURAL; INFILTRATION; INTRACAUDAL
Status: COMPLETED | OUTPATIENT
Start: 2022-12-01 | End: 2022-12-01

## 2022-12-01 RX ORDER — LIDOCAINE HYDROCHLORIDE 20 MG/ML
INJECTION, SOLUTION INFILTRATION; PERINEURAL PRN
Status: DISCONTINUED | OUTPATIENT
Start: 2022-12-01 | End: 2022-12-01

## 2022-12-01 RX ORDER — DIPHENHYDRAMINE HCL 12.5MG/5ML
12.5 LIQUID (ML) ORAL EVERY 6 HOURS PRN
Status: DISCONTINUED | OUTPATIENT
Start: 2022-12-01 | End: 2022-12-01 | Stop reason: HOSPADM

## 2022-12-01 RX ORDER — DIPHENHYDRAMINE HYDROCHLORIDE 50 MG/ML
12.5 INJECTION INTRAMUSCULAR; INTRAVENOUS EVERY 6 HOURS PRN
Status: DISCONTINUED | OUTPATIENT
Start: 2022-12-01 | End: 2022-12-01 | Stop reason: HOSPADM

## 2022-12-01 RX ORDER — ONDANSETRON 2 MG/ML
INJECTION INTRAMUSCULAR; INTRAVENOUS PRN
Status: DISCONTINUED | OUTPATIENT
Start: 2022-12-01 | End: 2022-12-01

## 2022-12-01 RX ORDER — LEVOFLOXACIN 5 MG/ML
500 INJECTION, SOLUTION INTRAVENOUS ONCE
Status: COMPLETED | OUTPATIENT
Start: 2022-12-01 | End: 2022-12-01

## 2022-12-01 RX ORDER — FENTANYL CITRATE 50 UG/ML
25-50 INJECTION, SOLUTION INTRAMUSCULAR; INTRAVENOUS
Status: DISCONTINUED | OUTPATIENT
Start: 2022-12-01 | End: 2022-12-01 | Stop reason: HOSPADM

## 2022-12-01 RX ORDER — FENTANYL CITRATE 50 UG/ML
INJECTION, SOLUTION INTRAMUSCULAR; INTRAVENOUS PRN
Status: DISCONTINUED | OUTPATIENT
Start: 2022-12-01 | End: 2022-12-01

## 2022-12-01 RX ORDER — ACETAMINOPHEN 325 MG/1
650 TABLET ORAL EVERY 8 HOURS
Status: DISCONTINUED | OUTPATIENT
Start: 2022-12-01 | End: 2022-12-03 | Stop reason: HOSPADM

## 2022-12-01 RX ORDER — LABETALOL HYDROCHLORIDE 5 MG/ML
10 INJECTION, SOLUTION INTRAVENOUS
Status: DISCONTINUED | OUTPATIENT
Start: 2022-12-01 | End: 2022-12-01 | Stop reason: HOSPADM

## 2022-12-01 RX ORDER — SODIUM CHLORIDE, SODIUM LACTATE, POTASSIUM CHLORIDE, CALCIUM CHLORIDE 600; 310; 30; 20 MG/100ML; MG/100ML; MG/100ML; MG/100ML
INJECTION, SOLUTION INTRAVENOUS CONTINUOUS
Status: DISCONTINUED | OUTPATIENT
Start: 2022-12-01 | End: 2022-12-01 | Stop reason: HOSPADM

## 2022-12-01 RX ORDER — LEVOFLOXACIN 5 MG/ML
750 INJECTION, SOLUTION INTRAVENOUS ONCE
Status: DISCONTINUED | OUTPATIENT
Start: 2022-12-01 | End: 2022-12-01 | Stop reason: ALTCHOICE

## 2022-12-01 RX ORDER — FAMOTIDINE 20 MG/1
20 TABLET, FILM COATED ORAL 2 TIMES DAILY
Status: DISCONTINUED | OUTPATIENT
Start: 2022-12-01 | End: 2022-12-03 | Stop reason: HOSPADM

## 2022-12-01 RX ORDER — HYDRALAZINE HYDROCHLORIDE 20 MG/ML
5-10 INJECTION INTRAMUSCULAR; INTRAVENOUS EVERY 6 HOURS PRN
Status: DISCONTINUED | OUTPATIENT
Start: 2022-12-01 | End: 2022-12-03 | Stop reason: HOSPADM

## 2022-12-01 RX ORDER — SODIUM CHLORIDE, SODIUM LACTATE, POTASSIUM CHLORIDE, CALCIUM CHLORIDE 600; 310; 30; 20 MG/100ML; MG/100ML; MG/100ML; MG/100ML
INJECTION, SOLUTION INTRAVENOUS CONTINUOUS PRN
Status: DISCONTINUED | OUTPATIENT
Start: 2022-12-01 | End: 2022-12-01

## 2022-12-01 RX ORDER — FLUMAZENIL 0.1 MG/ML
0.2 INJECTION, SOLUTION INTRAVENOUS
Status: DISCONTINUED | OUTPATIENT
Start: 2022-12-01 | End: 2022-12-01 | Stop reason: HOSPADM

## 2022-12-01 RX ORDER — LIDOCAINE 40 MG/G
CREAM TOPICAL
Status: DISCONTINUED | OUTPATIENT
Start: 2022-12-01 | End: 2022-12-03 | Stop reason: HOSPADM

## 2022-12-01 RX ORDER — PROPOFOL 10 MG/ML
INJECTION, EMULSION INTRAVENOUS PRN
Status: DISCONTINUED | OUTPATIENT
Start: 2022-12-01 | End: 2022-12-01

## 2022-12-01 RX ORDER — HYDROMORPHONE HCL IN WATER/PF 6 MG/30 ML
0.2 PATIENT CONTROLLED ANALGESIA SYRINGE INTRAVENOUS
Status: DISCONTINUED | OUTPATIENT
Start: 2022-12-01 | End: 2022-12-03 | Stop reason: HOSPADM

## 2022-12-01 RX ORDER — ONDANSETRON 4 MG/1
4 TABLET, ORALLY DISINTEGRATING ORAL EVERY 6 HOURS PRN
Status: DISCONTINUED | OUTPATIENT
Start: 2022-12-01 | End: 2022-12-03 | Stop reason: HOSPADM

## 2022-12-01 RX ORDER — ONDANSETRON 4 MG/1
4 TABLET, ORALLY DISINTEGRATING ORAL EVERY 30 MIN PRN
Status: DISCONTINUED | OUTPATIENT
Start: 2022-12-01 | End: 2022-12-01 | Stop reason: HOSPADM

## 2022-12-01 RX ORDER — HALOPERIDOL 5 MG/ML
1 INJECTION INTRAMUSCULAR
Status: DISCONTINUED | OUTPATIENT
Start: 2022-12-01 | End: 2022-12-01 | Stop reason: HOSPADM

## 2022-12-01 RX ORDER — ACETAMINOPHEN 325 MG/1
975 TABLET ORAL ONCE
Status: COMPLETED | OUTPATIENT
Start: 2022-12-01 | End: 2022-12-01

## 2022-12-01 RX ORDER — IOPAMIDOL 510 MG/ML
INJECTION, SOLUTION INTRAVASCULAR PRN
Status: DISCONTINUED | OUTPATIENT
Start: 2022-12-01 | End: 2022-12-01 | Stop reason: HOSPADM

## 2022-12-01 RX ORDER — ONDANSETRON 2 MG/ML
4 INJECTION INTRAMUSCULAR; INTRAVENOUS EVERY 30 MIN PRN
Status: DISCONTINUED | OUTPATIENT
Start: 2022-12-01 | End: 2022-12-01 | Stop reason: HOSPADM

## 2022-12-01 RX ORDER — OXYCODONE HYDROCHLORIDE 5 MG/1
5 TABLET ORAL EVERY 4 HOURS PRN
Status: DISCONTINUED | OUTPATIENT
Start: 2022-12-01 | End: 2022-12-02

## 2022-12-01 RX ORDER — FENTANYL CITRATE 50 UG/ML
25 INJECTION, SOLUTION INTRAMUSCULAR; INTRAVENOUS EVERY 5 MIN PRN
Status: DISCONTINUED | OUTPATIENT
Start: 2022-12-01 | End: 2022-12-01 | Stop reason: HOSPADM

## 2022-12-01 RX ADMIN — HYDROMORPHONE HYDROCHLORIDE 0.4 MG: 0.2 INJECTION, SOLUTION INTRAMUSCULAR; INTRAVENOUS; SUBCUTANEOUS at 15:49

## 2022-12-01 RX ADMIN — PROPOFOL 20 MG: 10 INJECTION, EMULSION INTRAVENOUS at 13:02

## 2022-12-01 RX ADMIN — MAGNESIUM SULFATE HEPTAHYDRATE 2 G: 40 INJECTION, SOLUTION INTRAVENOUS at 19:57

## 2022-12-01 RX ADMIN — FENTANYL CITRATE 50 MCG: 50 INJECTION, SOLUTION INTRAMUSCULAR; INTRAVENOUS at 09:10

## 2022-12-01 RX ADMIN — FENTANYL CITRATE 50 MCG: 50 INJECTION, SOLUTION INTRAMUSCULAR; INTRAVENOUS at 07:45

## 2022-12-01 RX ADMIN — FENTANYL CITRATE 50 MCG: 50 INJECTION, SOLUTION INTRAMUSCULAR; INTRAVENOUS at 13:55

## 2022-12-01 RX ADMIN — HYDROMORPHONE HYDROCHLORIDE 0.2 MG: 0.2 INJECTION, SOLUTION INTRAMUSCULAR; INTRAVENOUS; SUBCUTANEOUS at 14:25

## 2022-12-01 RX ADMIN — Medication 50 MG: at 08:16

## 2022-12-01 RX ADMIN — FENTANYL CITRATE 25 MCG: 50 INJECTION, SOLUTION INTRAMUSCULAR; INTRAVENOUS at 13:05

## 2022-12-01 RX ADMIN — HYDROMORPHONE HYDROCHLORIDE 0.4 MG: 0.2 INJECTION, SOLUTION INTRAMUSCULAR; INTRAVENOUS; SUBCUTANEOUS at 15:26

## 2022-12-01 RX ADMIN — FENTANYL CITRATE 50 MCG: 50 INJECTION, SOLUTION INTRAMUSCULAR; INTRAVENOUS at 13:43

## 2022-12-01 RX ADMIN — SENNOSIDES AND DOCUSATE SODIUM 1 TABLET: 50; 8.6 TABLET ORAL at 21:10

## 2022-12-01 RX ADMIN — ACETAMINOPHEN 975 MG: 325 TABLET, FILM COATED ORAL at 07:40

## 2022-12-01 RX ADMIN — OXYCODONE HYDROCHLORIDE 5 MG: 5 TABLET ORAL at 19:27

## 2022-12-01 RX ADMIN — Medication 5 MG: at 12:19

## 2022-12-01 RX ADMIN — SUGAMMADEX 200 MG: 100 INJECTION, SOLUTION INTRAVENOUS at 12:59

## 2022-12-01 RX ADMIN — DEXAMETHASONE SODIUM PHOSPHATE 10 MG: 4 INJECTION, SOLUTION INTRA-ARTICULAR; INTRALESIONAL; INTRAMUSCULAR; INTRAVENOUS; SOFT TISSUE at 08:37

## 2022-12-01 RX ADMIN — Medication 20 MG: at 09:46

## 2022-12-01 RX ADMIN — SODIUM CHLORIDE, POTASSIUM CHLORIDE, SODIUM LACTATE AND CALCIUM CHLORIDE: 600; 310; 30; 20 INJECTION, SOLUTION INTRAVENOUS at 08:06

## 2022-12-01 RX ADMIN — LIDOCAINE HYDROCHLORIDE 60 MG: 20 INJECTION, SOLUTION INFILTRATION; PERINEURAL at 08:13

## 2022-12-01 RX ADMIN — HYDROMORPHONE HYDROCHLORIDE 0.4 MG: 0.2 INJECTION, SOLUTION INTRAMUSCULAR; INTRAVENOUS; SUBCUTANEOUS at 21:10

## 2022-12-01 RX ADMIN — HYDROMORPHONE HYDROCHLORIDE 0.4 MG: 0.2 INJECTION, SOLUTION INTRAMUSCULAR; INTRAVENOUS; SUBCUTANEOUS at 15:00

## 2022-12-01 RX ADMIN — METRONIDAZOLE 500 MG: 500 INJECTION, SOLUTION INTRAVENOUS at 08:09

## 2022-12-01 RX ADMIN — BUPIVACAINE HYDROCHLORIDE 30 ML: 2.5 INJECTION, SOLUTION EPIDURAL; INFILTRATION; INTRACAUDAL; PERINEURAL at 07:56

## 2022-12-01 RX ADMIN — HYDROMORPHONE HYDROCHLORIDE 0.5 MG: 1 INJECTION, SOLUTION INTRAMUSCULAR; INTRAVENOUS; SUBCUTANEOUS at 11:04

## 2022-12-01 RX ADMIN — FAMOTIDINE 20 MG: 20 TABLET ORAL at 21:10

## 2022-12-01 RX ADMIN — Medication 20 MG: at 08:46

## 2022-12-01 RX ADMIN — Medication 20 MG: at 10:40

## 2022-12-01 RX ADMIN — BUPIVACAINE 20 ML: 13.3 INJECTION, SUSPENSION, LIPOSOMAL INFILTRATION at 07:56

## 2022-12-01 RX ADMIN — ONDANSETRON 4 MG: 2 INJECTION INTRAMUSCULAR; INTRAVENOUS at 11:47

## 2022-12-01 RX ADMIN — PHENYLEPHRINE HYDROCHLORIDE 100 MCG: 10 INJECTION INTRAVENOUS at 08:53

## 2022-12-01 RX ADMIN — HYDROMORPHONE HYDROCHLORIDE 0.2 MG: 0.2 INJECTION, SOLUTION INTRAMUSCULAR; INTRAVENOUS; SUBCUTANEOUS at 14:40

## 2022-12-01 RX ADMIN — ENOXAPARIN SODIUM 40 MG: 40 INJECTION SUBCUTANEOUS at 07:40

## 2022-12-01 RX ADMIN — MIDAZOLAM 1 MG: 1 INJECTION INTRAMUSCULAR; INTRAVENOUS at 08:10

## 2022-12-01 RX ADMIN — ACETAMINOPHEN 650 MG: 325 TABLET, FILM COATED ORAL at 21:10

## 2022-12-01 RX ADMIN — PROPOFOL 120 MG: 10 INJECTION, EMULSION INTRAVENOUS at 08:13

## 2022-12-01 RX ADMIN — FENTANYL CITRATE 50 MCG: 50 INJECTION, SOLUTION INTRAMUSCULAR; INTRAVENOUS at 14:01

## 2022-12-01 RX ADMIN — FENTANYL CITRATE 75 MCG: 50 INJECTION, SOLUTION INTRAMUSCULAR; INTRAVENOUS at 13:33

## 2022-12-01 RX ADMIN — PHENYLEPHRINE HYDROCHLORIDE 100 MCG: 10 INJECTION INTRAVENOUS at 08:22

## 2022-12-01 RX ADMIN — FENTANYL CITRATE 50 MCG: 50 INJECTION, SOLUTION INTRAMUSCULAR; INTRAVENOUS at 08:51

## 2022-12-01 RX ADMIN — PROPOFOL 10 MG: 10 INJECTION, EMULSION INTRAVENOUS at 13:17

## 2022-12-01 RX ADMIN — LEVOFLOXACIN 500 MG: 5 INJECTION, SOLUTION INTRAVENOUS at 08:22

## 2022-12-01 RX ADMIN — MAGNESIUM SULFATE HEPTAHYDRATE 2 G: 40 INJECTION, SOLUTION INTRAVENOUS at 18:34

## 2022-12-01 RX ADMIN — FENTANYL CITRATE 50 MCG: 50 INJECTION, SOLUTION INTRAMUSCULAR; INTRAVENOUS at 14:15

## 2022-12-01 RX ADMIN — FENTANYL CITRATE 100 MCG: 50 INJECTION, SOLUTION INTRAMUSCULAR; INTRAVENOUS at 08:13

## 2022-12-01 ASSESSMENT — ACTIVITIES OF DAILY LIVING (ADL)
ADLS_ACUITY_SCORE: 24
ADLS_ACUITY_SCORE: 18
ADLS_ACUITY_SCORE: 24

## 2022-12-01 NOTE — ANESTHESIA CARE TRANSFER NOTE
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    Anesthesia Type:   General     Note:    Oropharynx: oropharynx clear of all foreign objects and spontaneously breathing  Level of Consciousness: awake  Oxygen Supplementation: face mask  Level of Supplemental Oxygen (L/min / FiO2): 6  Independent Airway: airway patency satisfactory and stable  Dentition: dentition unchanged  Vital Signs Stable: post-procedure vital signs reviewed and stable  Report to RN Given: handoff report given  Patient transferred to: PACU    Handoff Report: Identifed the Patient, Identified the Reponsible Provider, Reviewed the pertinent medical history, Discussed the surgical course, Reviewed Intra-OP anesthesia mangement and issues during anesthesia, Set expectations for post-procedure period and Allowed opportunity for questions and acknowledgement of understanding      Vitals:  Vitals Value Taken Time   /86 12/01/22 1331   Temp     Pulse 79 12/01/22 1334   Resp 13 12/01/22 1334   SpO2 98 % 12/01/22 1334   Vitals shown include unvalidated device data.    Electronically Signed By: Raymond Malloy MD  December 1, 2022  1:36 PM

## 2022-12-01 NOTE — BRIEF OP NOTE
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]  Post-operative diagnosis Same as pre-operative diagnosis    Procedure: Procedure(s):  EXPLORATORY LAPAROSCOPY, LAPAROSCOPIC PARTIAL CHOLECYSTECTOMY WITH RETREVIAL OF STONES WITH CHOLANGIOGRAM; choledochoscopy, INTRAOPERATIVE ULTRASOUND  COMMON BILE DUCT REPAIR  Surgeon: Surgeon(s) and Role:     * Thai Garcia MD - Primary     * Kim Juarez MD - Resident - Assisting  Anesthesia: General with Block   Estimated Blood Loss: 30 ml  UOP: 550 ml  IVFs: 2300 ml    Drains:  Tyson-Hernandez under liver near biliary repair  Specimens:   ID Type Source Tests Collected by Time Destination   1 : Gallbladder and gallstones Tissue Gallbladder with Stone(s) SURGICAL PATHOLOGY EXAM Thai Garcia MD 12/1/2022 10:49 AM      Findings:   Stones x3 removed with large stone at gallbladder neck eroding into CBD with large CBD defect c/w Mirizzi's syndrome. Blue pigtail stent in gallbladder noted appearing to go through posterior wall of gallbladder, removed. Plastic CBD stent left in place. Cholangiogram confirmed appropriate placement and opacification of left and right biliary systems appropriately. CBD defect repaired with 3-0 Vicryl interrupted sutures without apparent bile leak at end of case. Anterior gallbladder wall resected and remaining mucosa fulgurated. Liver noted to be cirrhotic.  Complications: None.  Implants:  CBD stent remains.  Pigtail stent in gallbladder removed as below.   Implant Name Type Inv. Item Serial No.  Lot No. LRB No. Used Action   STENT ZIMMON BILIARY [id number] DBL PIGTAIL - [id number] Stent STENT ZIMMON BILIARY [id number] DBL PIGTAIL  [employer]  N/A 1 Explanted       Plan:  - CLD  - LR @ 125 ml/hr  - Tylenol 650 mg q8h, oxycodone, dilaudid IV PRN, TAP block intraop  - MANSI to THUMBPRINT suction only! No aggressive suction! May change  to a bili bag gravity tomorrow.  - Nye, plan to remove POD#1 but will assess on AM rounds  - Ambulate QID  - Hold PTA aspirin, Celebrex, lisinopril, tramadol. PRN anti-HTNs IV for now, likely resume lisinopril tomorrow.  - Daily CBC, CMP  - PPx: SCDs, ambulate    - - - - - - - - - - - - - - - - - -  Kim Juarez MD  General Surgery PGY-5  See [facility] for on call information.

## 2022-12-01 NOTE — OR NURSING
Pt received pre-operative bilateral TAP block in 3C. 50mcg Fentanyl given for sedation and well tolerated. Pt remained vitally stable throughout procedure.

## 2022-12-01 NOTE — ANESTHESIA POSTPROCEDURE EVALUATION
Patient: Yohana Marques    Procedure: Procedure(s):  EXPLORATORY LAPAROSCOPY, LAPAROSCOPIC PARTIAL CHOLECYSTECTOMY WITH RETREVIAL OF STONES WITH CHOLANGIOGRAM; choledochoscopy, INTRAOPERATIVE ULTRASOUND  COMMON BILE DUCT REPAIR       Anesthesia Type:  General    Note:  Disposition: Inpatient   Postop Pain Control: Uneventful            Sign Out: Well controlled pain   PONV: No   Neuro/Psych: Uneventful            Sign Out: Acceptable/Baseline neuro status   Airway/Respiratory: Uneventful            Sign Out: Acceptable/Baseline resp. status   CV/Hemodynamics: Uneventful            Sign Out: Acceptable CV status; No obvious hypovolemia; No obvious fluid overload   Other NRE: NONE   DID A NON-ROUTINE EVENT OCCUR? No           Last vitals:  Vitals Value Taken Time   /80 12/01/22 1600   Temp 36.5  C (97.7  F) 12/01/22 1600   Pulse 74 12/01/22 1609   Resp 18 12/01/22 1609   SpO2 97 % 12/01/22 1609   Vitals shown include unvalidated device data.    Electronically Signed By: Seb Celeste MD  December 1, 2022  4:10 PM

## 2022-12-01 NOTE — PHARMACY-ADMISSION MEDICATION HISTORY
Admission Medication History Completed by Pharmacy    See [facility] Admission Navigator for allergy information, preferred outpatient pharmacy, prior to admission medications and immunization status.     Medication History Sources:     Pre-op RN assessment, Dispense history        Prior to Admission medications    Medication Sig Last Dose Taking? Auth Provider Long Term End Date   lisinopril (ZESTRIL) 10 MG tablet TAKE 1 TABLET(10 MG) BY MOUTH DAILY 11/30/2022 at 2000 Yes Arun Tao MD Yes    traMADol (ULTRAM) 50 MG tablet TAKE 1 TO 2 TABLETS BY MOUTH EVERY DAY FOR SEVERE PAIN 11/30/2022 at 0000 Yes Arun Tao MD No    ASPIRIN EC PO Take 81 mg by mouth daily 11/24/2022 at 11/24/22  Reported, Patient     calcium-vitamin D (CALTRATE) 600-400 MG-UNIT per tablet Take 1 tablet by mouth 2 times daily 11/24/2022  Reported, Patient     celecoxib (CELEBREX) 200 MG capsule TAKE 1 CAPSULE BY MOUTH EVERY DAY 11/24/2022  Arun Tao MD Yes    Multiple Vitamins-Minerals (CENTRUM SILVER) per tablet Take 1 tablet by mouth daily 11/24/2022 at 11/24/22  Reported, Patient     vitamin D3 (CHOLECALCIFEROL) 50 mcg (2000 units) tablet Take 1 tablet (50 mcg) by mouth daily 11/29/2022  Arun Tao MD         Date completed: 12/01/22    Medication history completed by: Susana Wheeler [facility]

## 2022-12-01 NOTE — ANESTHESIA PROCEDURE NOTES
Airway       Patient location during procedure: OR       Procedure Start/Stop Times: 12/1/2022 8:17 AM  Staff -        Anesthesiologist:  Seb Celeste MD       Resident/Fellow: Raymond Malloy MD       Performed By: resident  Consent for Airway        Urgency: elective  Indications and Patient Condition       Indications for airway management: stevie-procedural       Induction type:intravenous       Mask difficulty assessment: 1 - vent by mask    Final Airway Details       Final airway type: endotracheal airway       Successful airway: ETT - single  Endotracheal Airway Details        ETT size (mm): 7.0       Cuffed: yes       Successful intubation technique: direct laryngoscopy       DL Blade Type: MAC 4       Grade View of Cords: 1       Adjucts: stylet       Position: Right       Measured from: lips       Secured at (cm): 22       Bite block used: Soft    Post intubation assessment        Placement verified by: capnometry, equal breath sounds and chest rise        Number of attempts at approach: 1       Number of other approaches attempted: 0       Secured with: pink tape       Ease of procedure: easy       Dentition: Unchanged    Medication(s) Administered   Medication Administration Time: 12/1/2022 8:17 AM

## 2022-12-01 NOTE — ANESTHESIA PROCEDURE NOTES
TAP Procedure Note    Pre-Procedure   Staff -        Anesthesiologist:  Jori Parisi MD       Resident/Fellow: Raymond Malloy MD       Performed By: resident       Location: pre-op       Procedure Start/Stop Times: 12/1/2022 7:40 AM and 12/1/2022 7:56 AM       Pre-Anesthestic Checklist: patient identified, IV checked, site marked, risks and benefits discussed, informed consent, monitors and equipment checked, pre-op evaluation, at physician/surgeon's request and post-op pain management  Timeout:       Correct Patient: Yes        Correct Procedure: Yes        Correct Site: Yes        Correct Position: Yes        Correct Laterality: Yes        Site Marked: Yes  Procedure Documentation  Procedure: TAP       Diagnosis: POST OP PAIN CONTROL       Laterality: bilateral       Patient Position: supine       Skin prep: Chloraprep       Needle Type: insulated       Needle Gauge: 21.        Needle Length (Inches): 3.13        Ultrasound guided       1. Ultrasound was used to identify targeted nerve, plexus, vascular marker, or fascial plane and place a needle adjacent to it in real-time.       2. Ultrasound was used to visualize the spread of anesthetic in close proximity to the above referenced structure.       3. A permanent image is entered into the patient's record.       4. The visualized anatomic structures appeared normal.       5. There were no apparent abnormal pathologic findings.    Assessment/Narrative         The placement was negative for: blood aspirated, painful injection and site bleeding       Paresthesias: No.       Insertion/Infusion Method: Single Shot       Complications: none    Medication(s) Administered   Bupivacaine 0.25% PF (Infiltration) - Infiltration   30 mL - 12/1/2022 7:56:00 AM  Bupivacaine liposome (Exparel) 1.3% LA inj susp (Infiltration) - Infiltration   20 mL - 12/1/2022 7:56:00 AM  Medication Administration Time: 12/1/2022 7:40 AM     Comments:  Bilateral subcostal TAP  "Block      FOR [facility] ONLY:   Pain Team Contact information: please page the Pain Team Via [facility]. Search \"Pain\". During daytime hours, please page the attending first. At night please page the resident first.    "

## 2022-12-01 NOTE — OR NURSING
Paged Dr. Juarez for clarification about the MAG replacement protocol since the order is based on creatinine clearance.

## 2022-12-02 ENCOUNTER — APPOINTMENT (OUTPATIENT)
Dept: OCCUPATIONAL THERAPY | Facility: CLINIC | Age: 77
DRG: 409 | End: 2022-12-02
Attending: SURGERY
Payer: COMMERCIAL

## 2022-12-02 LAB
ALBUMIN SERPL BCG-MCNC: 3 G/DL (ref 3.5–5.2)
ALP SERPL-CCNC: 63 U/L (ref 35–104)
ALT SERPL W P-5'-P-CCNC: 17 U/L (ref 10–35)
ANION GAP SERPL CALCULATED.3IONS-SCNC: 10 MMOL/L (ref 7–15)
AST SERPL W P-5'-P-CCNC: 39 U/L (ref 10–35)
BILIRUB SERPL-MCNC: 0.8 MG/DL
BUN SERPL-MCNC: 8.6 MG/DL (ref 8–23)
CALCIUM SERPL-MCNC: 8.8 MG/DL (ref 8.8–10.2)
CHLORIDE SERPL-SCNC: 103 MMOL/L (ref 98–107)
CREAT SERPL-MCNC: 0.64 MG/DL (ref 0.51–0.95)
DEPRECATED HCO3 PLAS-SCNC: 26 MMOL/L (ref 22–29)
ERYTHROCYTE [DISTWIDTH] IN BLOOD BY AUTOMATED COUNT: 13.2 % (ref 10–15)
GFR SERPL CREATININE-BSD FRML MDRD: >90 ML/MIN/1.73M2
GLUCOSE SERPL-MCNC: 93 MG/DL (ref 70–99)
HCT VFR BLD AUTO: 38.3 % (ref 35–47)
HGB BLD-MCNC: 12.3 G/DL (ref 11.7–15.7)
MAGNESIUM SERPL-MCNC: 2.1 MG/DL (ref 1.7–2.3)
MCH RBC QN AUTO: 32.9 PG (ref 26.5–33)
MCHC RBC AUTO-ENTMCNC: 32.1 G/DL (ref 31.5–36.5)
MCV RBC AUTO: 102 FL (ref 78–100)
PHOSPHATE SERPL-MCNC: 3.7 MG/DL (ref 2.5–4.5)
PLATELET # BLD AUTO: 151 10E3/UL (ref 150–450)
POTASSIUM SERPL-SCNC: 4.4 MMOL/L (ref 3.4–5.3)
PROT SERPL-MCNC: 5.6 G/DL (ref 6.4–8.3)
RBC # BLD AUTO: 3.74 10E6/UL (ref 3.8–5.2)
SODIUM SERPL-SCNC: 139 MMOL/L (ref 136–145)
WBC # BLD AUTO: 9.9 10E3/UL (ref 4–11)

## 2022-12-02 PROCEDURE — 250N000013 HC RX MED GY IP 250 OP 250 PS 637: Performed by: PHYSICIAN ASSISTANT

## 2022-12-02 PROCEDURE — 99024 POSTOP FOLLOW-UP VISIT: CPT | Performed by: PHYSICIAN ASSISTANT

## 2022-12-02 PROCEDURE — 80053 COMPREHEN METABOLIC PANEL: CPT | Performed by: SURGERY

## 2022-12-02 PROCEDURE — 250N000011 HC RX IP 250 OP 636: Performed by: SURGERY

## 2022-12-02 PROCEDURE — 85027 COMPLETE CBC AUTOMATED: CPT | Performed by: SURGERY

## 2022-12-02 PROCEDURE — 97535 SELF CARE MNGMENT TRAINING: CPT | Mod: GO

## 2022-12-02 PROCEDURE — 83735 ASSAY OF MAGNESIUM: CPT | Performed by: SURGERY

## 2022-12-02 PROCEDURE — 258N000003 HC RX IP 258 OP 636: Performed by: SURGERY

## 2022-12-02 PROCEDURE — 97165 OT EVAL LOW COMPLEX 30 MIN: CPT | Mod: GO

## 2022-12-02 PROCEDURE — 120N000002 HC R&B MED SURG/OB UMMC

## 2022-12-02 PROCEDURE — 82040 ASSAY OF SERUM ALBUMIN: CPT | Performed by: SURGERY

## 2022-12-02 PROCEDURE — 84100 ASSAY OF PHOSPHORUS: CPT | Performed by: SURGERY

## 2022-12-02 PROCEDURE — 250N000013 HC RX MED GY IP 250 OP 250 PS 637: Performed by: STUDENT IN AN ORGANIZED HEALTH CARE EDUCATION/TRAINING PROGRAM

## 2022-12-02 PROCEDURE — 36415 COLL VENOUS BLD VENIPUNCTURE: CPT | Performed by: SURGERY

## 2022-12-02 PROCEDURE — 250N000013 HC RX MED GY IP 250 OP 250 PS 637: Performed by: SURGERY

## 2022-12-02 RX ORDER — ACETAMINOPHEN 325 MG/1
650 TABLET ORAL EVERY 8 HOURS PRN
Qty: 50 TABLET | Refills: 0 | Status: SHIPPED | OUTPATIENT
Start: 2022-12-02

## 2022-12-02 RX ORDER — KETOROLAC TROMETHAMINE 15 MG/ML
15 INJECTION, SOLUTION INTRAMUSCULAR; INTRAVENOUS EVERY 6 HOURS
Status: DISCONTINUED | OUTPATIENT
Start: 2022-12-02 | End: 2022-12-03 | Stop reason: HOSPADM

## 2022-12-02 RX ORDER — LISINOPRIL 5 MG/1
10 TABLET ORAL DAILY
Status: DISCONTINUED | OUTPATIENT
Start: 2022-12-02 | End: 2022-12-03 | Stop reason: HOSPADM

## 2022-12-02 RX ORDER — TRAMADOL HYDROCHLORIDE 50 MG/1
50 TABLET ORAL EVERY 6 HOURS PRN
Status: DISCONTINUED | OUTPATIENT
Start: 2022-12-02 | End: 2022-12-02

## 2022-12-02 RX ORDER — ENOXAPARIN SODIUM 100 MG/ML
40 INJECTION SUBCUTANEOUS EVERY 24 HOURS
Status: DISCONTINUED | OUTPATIENT
Start: 2022-12-02 | End: 2022-12-02

## 2022-12-02 RX ORDER — TRAMADOL HYDROCHLORIDE 50 MG/1
50-100 TABLET ORAL EVERY 6 HOURS PRN
Status: DISCONTINUED | OUTPATIENT
Start: 2022-12-02 | End: 2022-12-02

## 2022-12-02 RX ORDER — TRAMADOL HYDROCHLORIDE 50 MG/1
50-100 TABLET ORAL EVERY 4 HOURS PRN
Status: DISCONTINUED | OUTPATIENT
Start: 2022-12-02 | End: 2022-12-03 | Stop reason: HOSPADM

## 2022-12-02 RX ADMIN — SODIUM CHLORIDE, POTASSIUM CHLORIDE, SODIUM LACTATE AND CALCIUM CHLORIDE: 600; 310; 30; 20 INJECTION, SOLUTION INTRAVENOUS at 01:28

## 2022-12-02 RX ADMIN — OXYCODONE HYDROCHLORIDE 10 MG: 10 TABLET ORAL at 03:29

## 2022-12-02 RX ADMIN — LISINOPRIL 10 MG: 5 TABLET ORAL at 10:45

## 2022-12-02 RX ADMIN — ACETAMINOPHEN 650 MG: 325 TABLET, FILM COATED ORAL at 06:05

## 2022-12-02 RX ADMIN — KETOROLAC TROMETHAMINE 15 MG: 15 INJECTION, SOLUTION INTRAMUSCULAR; INTRAVENOUS at 20:49

## 2022-12-02 RX ADMIN — HYDROMORPHONE HYDROCHLORIDE 0.4 MG: 0.2 INJECTION, SOLUTION INTRAMUSCULAR; INTRAVENOUS; SUBCUTANEOUS at 10:36

## 2022-12-02 RX ADMIN — POLYETHYLENE GLYCOL 3350 17 G: 17 POWDER, FOR SOLUTION ORAL at 08:30

## 2022-12-02 RX ADMIN — HYDROMORPHONE HYDROCHLORIDE 0.4 MG: 0.2 INJECTION, SOLUTION INTRAMUSCULAR; INTRAVENOUS; SUBCUTANEOUS at 08:21

## 2022-12-02 RX ADMIN — TRAMADOL HYDROCHLORIDE 50 MG: 50 TABLET, COATED ORAL at 09:18

## 2022-12-02 RX ADMIN — ACETAMINOPHEN 650 MG: 325 TABLET, FILM COATED ORAL at 22:03

## 2022-12-02 RX ADMIN — HYDROMORPHONE HYDROCHLORIDE 0.4 MG: 0.2 INJECTION, SOLUTION INTRAMUSCULAR; INTRAVENOUS; SUBCUTANEOUS at 00:48

## 2022-12-02 RX ADMIN — SENNOSIDES AND DOCUSATE SODIUM 1 TABLET: 50; 8.6 TABLET ORAL at 20:50

## 2022-12-02 RX ADMIN — TRAMADOL HYDROCHLORIDE 50 MG: 50 TABLET, COATED ORAL at 12:03

## 2022-12-02 RX ADMIN — KETOROLAC TROMETHAMINE 15 MG: 15 INJECTION, SOLUTION INTRAMUSCULAR; INTRAVENOUS at 15:45

## 2022-12-02 RX ADMIN — SENNOSIDES AND DOCUSATE SODIUM 1 TABLET: 50; 8.6 TABLET ORAL at 08:30

## 2022-12-02 RX ADMIN — HYDROMORPHONE HYDROCHLORIDE 0.4 MG: 0.2 INJECTION, SOLUTION INTRAMUSCULAR; INTRAVENOUS; SUBCUTANEOUS at 13:28

## 2022-12-02 RX ADMIN — FAMOTIDINE 20 MG: 20 TABLET ORAL at 20:49

## 2022-12-02 RX ADMIN — ACETAMINOPHEN 650 MG: 325 TABLET, FILM COATED ORAL at 14:15

## 2022-12-02 RX ADMIN — FAMOTIDINE 20 MG: 20 TABLET ORAL at 08:29

## 2022-12-02 ASSESSMENT — ACTIVITIES OF DAILY LIVING (ADL)
ADLS_ACUITY_SCORE: 30
ADLS_ACUITY_SCORE: 30
PREVIOUS_RESPONSIBILITIES: MEAL PREP;HOUSEKEEPING;LAUNDRY;SHOPPING;MEDICATION MANAGEMENT;FINANCES;DRIVING
ADLS_ACUITY_SCORE: 30
DEPENDENT_IADLS:: CLEANING;LAUNDRY
ADLS_ACUITY_SCORE: 30

## 2022-12-02 NOTE — PROGRESS NOTES
Pt is POD1. Patient is A&Ox4 with VSS on 2L nasal canula. Patient's pain managed with Tylenol, Dilaudid, Tramadol, and cold therapy. Pain is not well controlled. Doctor added Toradol by IV and increased the frequency of Tramadol to q4 hr. Patient is up with assist. Pt ambulated 2x and up in chair 2x.  Pt quiroz was removed at 0900 and the patient is voiding spontaneously. No BM or gas passed on shift, but pt is belching.  Pt is tolerating regular diet. Pt has a RLQ MANSI suction to thumbprint with a little leakage, so new dressing was applied. Pt Right PIV is saline locked. Plan is to ambulate and resume normal bowel function.    I attest to the accuracy of the assessment, interventions and notes of Ms. Rider.

## 2022-12-02 NOTE — PROGRESS NOTES
Surgical Oncology Progress Note    Interval History:  No acute events overnight. Reports pain and would prefer Tramadol over oxycodone. Denies nausea.     Physical Exam:   Temp:  [96.5  F (35.8  C)-97.7  F (36.5  C)] 97.2  F (36.2  C)  Pulse:  [67-92] 67  Resp:  [10-24] 18  BP: (102-173)/() 102/45  SpO2:  [90 %-98 %] 97 %  General: Alert, oriented, appears comfortable, NAD.  Respiratory: breathing non labored  Abdomen: Abdomen is soft, non-tender, non-distended. Incision is clean, dry and intact with mild bruising. Drain with serosanguinous output.     Data:   All laboratory and imaging data in the past 24 hours reviewed  I/O last 3 completed shifts:  In: 2740 [P.O.:440; I.V.:2300]  Out: 2925 [Urine:2835; Drains:90]  Recent Labs   Lab Test 12/02/22  0524 12/01/22  1436 12/01/22  0826 10/31/22  1452 10/16/22  0609 10/15/22  0728 10/14/22  0532   WBC 9.9 11.2*  --  4.2   < > 8.9 9.7   HGB 12.3 13.6 12.1 12.6   < > 11.7 11.5*    178  --  248   < > 322 331   INR  --   --   --   --   --  1.23* 1.74*    < > = values in this interval not displayed.      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     Doing well.     - Oxycodone discontinued. Available for pain is scheduled acetaminophen, tramadol prn, IV dilaudid prn.   - Regular diet as tolerated. Discontinue IV fluid. Miralax and senna.   - Remove quiroz catheter.   - MANSI drain teaching.   - Home  lisinopril resumed.   - Discharge planning for tomorrow.     Seen with Chief resident who will discuss with Staff.     Raquel Cain PA-C   Surgical Oncology

## 2022-12-02 NOTE — DISCHARGE SUMMARY
[facility] Discharge Summary    Yohana Marques MRN# [id number]   Age: 77 year old YOB: 1945     Date of Admission:  12/1/2022  Date of Discharge::  12/3/2022   Admitting Physician:  Thai Garcia MD  Discharge Physician:  Charles Euceda MD     PCP:  Arun Tao    Disposition: Patient discharged to home in stable condition.    Admission Diagnosis:  Mirizzi's syndrome  Hypertension  Hyperlipidemia  Osteoarthritis    Discharge Diagnosis:  S/p laparoscopic partial cholecystectomy and repair of common bile duct  Mirizzi's syndrome  Hypertension  Hyperlipidemia  Osteoarthritis    Procedures:  12/1/22 Dr. Garcia  EXPLORATORY LAPAROSCOPY, LAPAROSCOPIC PARTIAL CHOLECYSTECTOMY WITH RETREVIAL OF STONES WITH CHOLANGIOGRAM; choledochoscopy, INTRAOPERATIVE ULTRASOUND  COMMON BILE DUCT REPAIR    Consultations:  CARE MANAGEMENT / SOCIAL WORK IP CONSULT  OCCUPATIONAL THERAPY ADULT IP CONSULT  PHYSICAL THERAPY ADULT IP CONSULT    Brief HPI:  Yohana Marques is a 77 year old year old woman with a h/o Mirizzi Syndrome. After a discussion of the risks, benefits, and alternatives, the patient elected to proceed to cholecystectomy and CBD repair.    Hospital Course:  The patient was admitted and underwent the above procedure. The patient tolerated the procedure well; there was a large defect in the common bile duct that was able to be repaired laparoscopically, and given the extent of gallbladder inflammation a partial cholecystectomy was performed with fulguration of the remaining gallbladder mucosa. The CBD stent previously in place was left in place, and the transcystic duct stent was removed intraoperatively.    The patient recovered well with no post-operative complications. Her surgical drain remained nonbilious and was removed on the day of discharge. Two Nylon stitches were placed at the drain site to help it close as it had significant drainage, and these will be removed  "in 5-7 days in clinic.  Her diet was advanced and she was passing flatus and tolerating regular food prior to discharge. Her pain was well controlled with Toradol and her home tramadol; she reported not needing any additional tramadol at home as she takes this for her back pain. She was able to ambulate at her baseline and void without difficulty.  The patient received appropriate education post operatively. On POD#2 the patient was discharged to home.    Surgical pathology  Pending     Discharge exam  Vitals:    12/02/22 1654 12/02/22 2214 12/03/22 0719 12/03/22 0828   BP:  125/52 133/71 139/74   BP Location:  Left arm Left arm    Pulse:  84 84    Resp:  22 20    Temp:  96.8  F (36  C) 97.4  F (36.3  C)    TempSrc:  Oral Temporal    SpO2:  95% 91% 93%   Weight: 95 kg (209 lb 6.4 oz)      Height: 1.727 m (5' 8\")         I/O last 3 completed shifts:  In: 862 [P.O.:840; I.V.:22]  Out: 1610 [Urine:1525; Drains:85]     General: Alert, oriented, appears comfortable, NAD, sitting in chair.  Respiratory: breathing non labored  CV: RRR, WWP  Abdomen: Abdomen is soft, appropriately minimally tender, non-distended. Incisions are clean, dry and intact with mild bruising, no erythema or drainage. Drain with serosanguinous output, removed and Nylon stitches in place.   Extremities: No peripheral edema.    Discharge medications  Current Discharge Medication List      START taking these medications    Details   acetaminophen (TYLENOL) 325 MG tablet Take 2 tablets (650 mg) by mouth every 8 hours as needed for mild pain  Qty: 50 tablet, Refills: 0    Associated Diagnoses: Mirizzi's syndrome      ketorolac (TORADOL) 10 MG tablet Take 1 tablet (10 mg) by mouth every 6 hours as needed for moderate pain (4-6) Stay very well hydrated while using this, as it is hard on your kidneys. Do NOT take Celebrex or ibuprofen while taking this Toradol as they are all NSAIDs (nonsteroidal anti-inflammatory drugs) and can cause kidney injury when " taken together.  Qty: 8 tablet, Refills: 0    Associated Diagnoses: Mirizzi's syndrome      methocarbamol (ROBAXIN) 500 MG tablet Take 1 tablet (500 mg) by mouth 3 times daily as needed for muscle spasms  Qty: 30 tablet, Refills: 0    Associated Diagnoses: Mirizzi's syndrome      polyethylene glycol (MIRALAX) 17 GM/Dose powder Take 17 g by mouth daily Take for constipation after surgery.  Qty: 510 g, Refills: 3    Associated Diagnoses: Mirizzi's syndrome         CONTINUE these medications which have CHANGED    Details   celecoxib (CELEBREX) 200 MG capsule Take 1 capsule (200 mg) by mouth daily  Qty: 90 capsule, Refills: 1    Comments: Do NOT take Celebrex or ibuprofen while taking Toradol as they are all NSAIDs (nonsteroidal anti-inflammatory drugs) and can cause kidney injury when taken together.  Associated Diagnoses: Osteoarthritis of hip, unspecified laterality, unspecified osteoarthritis type         CONTINUE these medications which have NOT CHANGED    Details   traMADol (ULTRAM) 50 MG tablet TAKE 1 TO 2 TABLETS BY MOUTH EVERY DAY FOR SEVERE PAIN  Qty: 90 tablet, Refills: 0    Associated Diagnoses: S/P hip replacement, left; Chronic pain syndrome      ASPIRIN EC PO Take 81 mg by mouth daily      calcium-vitamin D (CALTRATE) 600-400 MG-UNIT per tablet Take 1 tablet by mouth 2 times daily      lisinopril (ZESTRIL) 10 MG tablet TAKE 1 TABLET(10 MG) BY MOUTH DAILY  Qty: 90 tablet, Refills: 1    Associated Diagnoses: Essential hypertension      Multiple Vitamins-Minerals (CENTRUM SILVER) per tablet Take 1 tablet by mouth daily      vitamin D3 (CHOLECALCIFEROL) 50 mcg (2000 units) tablet Take 1 tablet (50 mcg) by mouth daily  Qty:             Follow up, Special Instructions:  After Care     Future Labs/Procedures    Activity     Comments:    Your activity upon discharge: no lifting more than 10-15 lbs for 4 weeks. Frequent out of bed and ambulation is recommended.    Diet     Comments:    Follow this diet upon  discharge: regular diet    Discharge Instructions     Comments:    You have TWO stitches in your skin on the right side at the site of the drain. These will need to be removed in ~5-7 days. Please call our Surgical Oncology RN Coordinator Chhaya Keith at [phone] on Monday (12/5/2022) to discuss setting up an appointment for stitch removal.    Stay very well hydrated while taking Toradol. Do NOT take Celebrex or ibuprofen while taking Toradol as they are all NSAIDs (nonsteroidal anti-inflammatory drugs) and can cause kidney injury when taken together.    If your travel plans upon discharge include prolonged periods of sitting (a lengthy car or plane ride), it is highly beneficial to get up and walk at least once per hour to help prevent swelling and blood clots.     You may shower after operation, let warm soapy water run over incision and pat dry. Do not submerge, soak, or scrub incision.    Take incentive spirometer home for continued frequent use    You can take tramadol for surgical pain. We recommend you take Tylenol around the clock for baseline pain control (this was prescribed for you). Then take narcotic pain medication only as needed. Once you are no longer needing narcotics, you may take the Tylenol as needed.   Narcotic pain medications are constipating. Please ensure that you are drinking adequate amounts of fluids and taking stool softeners while you are taking narcotics. Take Miralax as needed for constipation.     WOUND CARE  -Inspect your wounds daily for signs of infection (increased redness, drainage, pain)  -Keep your wound clean and dry. Please refrain from applying anything, including alcohol or peroxide, to the incision.  -You may shower after operation, let warm soapy water run over incision and pat dry. Do not submerge, soak, or scrub incision.  -You have surgical superglue over the incision and your stitches are absorbable; the surgical superglue will peel off on its own in 7-14  days.  -Caution with putting ice on the incision as it will be numb you will not realize it if you leave the ice for too long and can damage your skin.  - Do not drive until you can with stand pressing the brake pedal quickly and fully without pain and you are not distracted by pain.     NOTIFY  Please contact Surgical Oncology (Monday through Friday 8:00am-4:30pm call the Surgical Oncology nurse care coordinator Chhaya Keith at [phone]) for problems after discharge such as:  -Temperature > 101F, chills, rigors, dizziness  -Red skin around incision, drainage from incision, increased swelling from the incision, pr bleeding from the incision that is not controlled with light pressure  -Inability to tolerate diet, new nausea or vomiting  -Have severe diarrhea/constipation  -Any other questions or concerns.    At nights (after 4:30pm), on weekends, or if urgent, call [phone] and ask the  to speak with the on-call Surgical Oncology resident  In emergencies, call [phone] and/or go to the ER.          Discussed with Dr. Euceda on the day of discharge.  - - - - - - - - - - - - - - - - - -  Kim Juarez MD  General Surgery PGY-5  See [facility] for on call information.

## 2022-12-02 NOTE — PROGRESS NOTES
Post Op Check    12/01/2022    Yohana Marques is a 77 year old female with h/o Mirizzi syndrome now POD#0 s/p exp laparoscopy w/ partial cholecystectomy with stone retrieval, and CBD repair.    Pt reports being in no acute distress. Denies SOB, chest pain, or dizziness. Has not gotten out of bed. Denies nausea/vomiting.    /62   Pulse 80   Temp 97.7  F (36.5  C) (Oral)   Resp 15   SpO2 95%     Gen: A&O x3, NAD  Chest: breathing non-labored  Abdomen: soft, non-tender, non-distended; RLQ MANSI with serosang output  Incision: clean, dry, intact  Extremities: warm and well perfused*    A/P: No acute post-op issues. Continue plan of care per primary team. Please call with any questions.    Please page if questions,     Yu Pickett,   General Surgery, PGY-1  [phone]

## 2022-12-02 NOTE — PLAN OF CARE
/67   Pulse 78   Temp (!) 96.5  F (35.8  C) (Oral)   Resp 22   SpO2 94%      Reason for Admission: Mirizzi's syndrome - POD 1 ex lap and partial cholecystectomy w/retrieval of stones and common bile duct repair    Status: Progressing    RN assumed cares at 2300    Pain: Pain managed w/IV dilaudid x 1, PO oxycodone x1, and Tylenol x 1  Neuro: A/Ox4, n/t reported at baseline; calls appropriately  Cardiac: WDL - denies chest pain  Respiratory: 2L NC, sating mid 90s, denies SOB  GI/: No flatus reported; quiroz in place w/adequate output  IV/Drains: L PIV - infusing LR @125mL/hr; R PIV - SL; R MANSI - thumbprint suction, leaking at site -dressing changed x 1  Activity: Assist x 1 - pt dangled at bedside on shift  Skin: Lap sites x 4, R MANSI, redness and L skin tear under pannus  Labs: Reviewed    Plan of Care: Promote ambulation; proceed w/POC

## 2022-12-02 NOTE — PLAN OF CARE
Goal Outcome Evaluation:  POD 0  VS: WDL, on 2L O2, no report of SOB  Neuro: A&O x4, able to make needs known  GI/: Nye in place with pale color output, no BS or gas at this time.  Diet/appetite: On clear liquid diet, patient tolerating fluids  Activity: Not OOB at this time  Pain: Left abdominal pain managed with  IV dilaudid and scheduled tylenol.  Skin/drains: 4 lap sites and MANSI site. Redness under abdominal skin fold and skin tear under left abdominal skin fold.  Lines: Bilateral PIV, R PIV is saline locked, L infusing LR @ 125ml/hr.  Continue to manage pain and monitor for ROBF. Continue POC.

## 2022-12-02 NOTE — PROVIDER NOTIFICATION
Pages surg-onc resident:    7C 7-410-1 ADRIANAS. Her pain is still not well controlled. IS is at 250, she is belching, no gas yet and ambulated x2, chair x2. Please advise. Debora FISHER RN [id number]    Response: Increased frequency of tramadol and added toradol.

## 2022-12-02 NOTE — PROVIDER NOTIFICATION
Paged Surg-Onc resident:     7-410-1 S.J.S. Her pain is 8-10/10. She is very tearful. Can we increase her tramadol? She takes  at home. I have given dilaudid twice. Debora COPELAND RN [id number]    Response: Increased tramadol to 50-100mg.

## 2022-12-02 NOTE — CONSULTS
Care Management Initial Consult    General Information  Assessment completed with: Patient,    Type of CM/SW Visit: Initial Assessment    Primary Care Provider verified and updated as needed: Yes   Readmission within the last 30 days: no previous admission in last 30 days      Reason for Consult: discharge planning  Advance Care Planning: Advance Care Planning Reviewed: no concerns identified        Communication Assessment  Patient's communication style: spoken language (English or Bilingual)    Hearing Difficulty or Deaf: no   Wear Glasses or Blind: yes    Cognitive  Cognitive/Neuro/Behavioral: WDL  Level of Consciousness: alert  Arousal Level: opens eyes spontaneously                Living Environment:   People in home: spouse     Current living Arrangements: condominium, house      Able to return to prior arrangements: yes       Family/Social Support:  Care provided by: self, spouse/significant other  Provides care for: no one  Marital Status:   , Children, Neighbor          Description of Support System: Supportive    Support Assessment: Adequate social supports, Adequate family and caregiver support    Current Resources:   Patient receiving home care services: No     Community Resources: None  Equipment currently used at home: cane, straight, wheelchair, manual  Supplies currently used at home: None    Employment/Financial:  Employment Status: retired        Financial Concerns: No concerns identified   Referral to Financial Worker: No       Lifestyle & Psychosocial Needs: (pulled from chart)  Social Determinants of Health     Tobacco Use: Low Risk      Smoking Tobacco Use: Never     Smokeless Tobacco Use: Never     Passive Exposure: Not on file   Alcohol Use: Not on file   Financial Resource Strain: Not on file   Food Insecurity: Not on file   Transportation Needs: Not on file   Physical Activity: Not on file   Stress: Not on file   Social Connections: Not on file   Intimate Partner Violence: Not  on file   Depression: Not at risk     PHQ-2 Score: 1   Housing Stability: Not on file       Functional Status:  Prior to admission patient needed assistance:   Dependent ADLs:: Independent  Dependent IADLs:: Cleaning, Laundry       Additional Information:  Pt is a 77 year old female POD 1 s/p laparoscopic partial cholecystectomy with stone retrieval and 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     RNCC completed a CM assessment due to CM consult for discharge planning. RNCC met with pt at bedside and introduced RNCC role. Pt was tearful during beginning of conversation due to intense pain. Pt RN was already notified and was preparing IV medication for her. Pt states she lives with her spouse in a home in [place] [facility] but also has a condo in [place]. Pt plans on staying in her condo for a short time before going back to her home in [place]. Pt states she was independent with ADLs and got some assistance for IADLs. Pt has a cane that she uses occasionally and her  has an old w/c that she can use if needed. Pt has a lot of support from her family and can also call on her neighbors to help out if needed. Pt was not open to any home services before hospitalization. Family will provide transportation home. RNCC spoke to pt regarding orders for therapy evaluations and that pt may need get recommendations for TCU or home care depending on how she does. Pt states she has been to [facility] previously when she needed TCU but has not gotten home care before. Pt believes she is doing well enough that she would not need home care. Pt is also planning on returning to [place] as soon as possible so does not really want home care as they live [place] miles from [facility]. RNCC will await results of PT/OT evals before discussing more regarding TCU or home care.    Condo Address:  [address]  [place] [address]    Addendum:  RNCC saw OT eval note at 3pm and OT is recommending TCU. RNCC went  and spoke with patient regarding TCU. Pt states that she does not want to go to TCU and is planning on discharging home tomorrow. Pt states her  is a retired  and EMT and would be able to assist her with anything she needs. Her  is not a w/c user but they do have a w/c at home if she needs it. RNCC offered home care as an alterative but pt declined that as well. She states she does not feel she needs any home services at discharge and that her pain is a little better now that they switched her medications. Pt states she will follow up in clinic if anything changes and she feels she may need home services.    Piyush Raphael RN BSN  7C RN Care Coordinator   Ph: [phone]  Pager: [phone]

## 2022-12-02 NOTE — PROGRESS NOTES
Admitted/transferred from: PACU  2 RN skin assessment completed by Naty Talavera RN and Karolina RAMSEY RN  Skin assessment findin lap sites and MANSI site. Redness under abdominal skin fold and skin tear under left skin fold. PIV x2  Interventions/actions: No adjustment needed.  Will continue to monitor.

## 2022-12-02 NOTE — PROGRESS NOTES
"   12/02/22 0945   Appointment Info   Signing Clinician's Name / Credentials (OT) Shannon Velazquez, OTR/L   Living Environment   People in Home spouse   Current Living Arrangements house;condominium   Home Accessibility no concerns;stairs to enter home   Number of Stairs, Main Entrance 3   Stair Railings, Main Entrance railings safe and in good condition   Transportation Anticipated family or friend will provide;car, drives self   Living Environment Comments Pt reports living in one-level home with 3STE and resides with  of whom is a w/c user. Walk-in shower and main level laundry. Per pt, plans to discharge to [facility] of which there are no stairs and similar set-up to home w/ walk-in shower and in-unit laundry.   Self-Care   Usual Activity Tolerance good   Current Activity Tolerance fair   Regular Exercise No   Equipment Currently Used at Home cane, straight  (reacher, sock-aid, toilet frame, has FWW if needed)   Fall history within last six months no   Activity/Exercise/Self-Care Comment Pt reports ADL IND at baseline.   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\"   Existing Precautions/Restrictions abdominal;spinal  (spinal stenosis)   Left Upper Extremity (Weight-bearing Status) partial weight-bearing (PWB)  (10lb lifting restriction)   Right Upper Extremity (Weight-bearing Status) partial weight-bearing " (PWB)  (10lb lifting restriction)   Left Lower Extremity (Weight-bearing Status) full weight-bearing (FWB)   Right Lower Extremity (Weight-bearing Status) full weight-bearing (FWB)   General Observations and Info Activity: Ambulate QID   Cognitive Status Examination   Orientation Status orientation to person, place and time   Visual Perception   Visual Impairment/Limitations corrective lenses full-time   Sensory   Sensory Quick Adds sensation intact   Pain Assessment   Patient Currently in Pain Yes, see Vital Sign flowsheet   Posture   Posture not impaired   Range of Motion Comprehensive   General Range of Motion no range of motion deficits identified   Strength Comprehensive (MMT)   General Manual Muscle Testing (MMT) Assessment no strength deficits identified   Comment, General Manual Muscle Testing (MMT) Assessment Not formally assessed 2/2 precautions; functionally observed >3/5 BUEs grossly   Bed Mobility   Comment (Bed Mobility) min. A   Transfers   Transfer Comments CGA-min. A   Activities of Daily Living   BADL Assessment/Intervention bathing;upper body dressing;lower body dressing;grooming;toileting   Bathing Assessment/Intervention   Riley Level (Bathing) moderate assist (50% patient effort)   Comment, (Bathing) Per clinical judgement   Upper Body Dressing Assessment/Training   Comment, (Upper Body Dressing) Per clinical judgement   Riley Level (Upper Body Dressing) minimum assist (75% patient effort)   Lower Body Dressing Assessment/Training   Comment, (Lower Body Dressing) Per clinical judgement   Riley Level (Lower Body Dressing) dependent (less than 25% patient effort)   Grooming Assessment/Training   Riley Level (Grooming) contact guard assist;minimum assist (75% patient effort)   Comment, (Grooming) Per clinical judgement   Toileting   Comment, (Toileting) Per clinical judgement   Riley Level (Toileting) contact guard assist;minimum assist (75% patient effort)    Clinical Impression   Criteria for Skilled Therapeutic Interventions Met (OT) Yes, treatment indicated   OT Diagnosis decreased ADL/IADL/mobility 2/2 new post-surgical precautions and significant pain at incision site   OT Problem List-Impairments impacting ADL problems related to;activity tolerance impaired;mobility;pain;post-surgical precautions   Assessment of Occupational Performance 5 or more Performance Deficits   Identified Performance Deficits toileting, dressing, bathing, grooming/hygiene, functional mobility/transfers   Planned Therapy Interventions (OT) ADL retraining;IADL retraining;strengthening;transfer training;home program guidelines;progressive activity/exercise   Clinical Decision Making Complexity (OT) low complexity   Anticipated Equipment Needs Upon Discharge (OT)   (tbd)   Risk & Benefits of therapy have been explained evaluation/treatment results reviewed;care plan/treatment goals reviewed;risks/benefits reviewed;current/potential barriers reviewed;participants voiced agreement with care plan;participants included;patient   Clinical Impression Comments Pt will benefit from skilled IP OT to maximize safety and IND completing ADL/IADL within precautions   OT Total Evaluation Time   OT Eval, Low Complexity Minutes (27846) 9   OT Goals   Therapy Frequency (OT) 6 times/wk   OT Predicted Duration/Target Date for Goal Attainment 12/09/22   OT Goals Hygiene/Grooming;Upper Body Dressing;Lower Body Dressing;Upper Body Bathing;Lower Body Bathing;Toilet Transfer/Toileting   OT: Hygiene/Grooming modified independent   OT: Upper Body Dressing Independent   OT: Lower Body Dressing Modified independent   OT: Upper Body Bathing Independent   OT: Lower Body Bathing Independent   OT: Toilet Transfer/Toileting Modified independent   OT Discharge Planning   OT Plan OT: Progress functional mobility, sinkside ADL as pain allows   OT Discharge Recommendation (DC Rec) Transitional Care Facility;home with home care  occupational therapy;home with assist   OT Rationale for DC Rec Pt limited severely by pain this date. Reports baseline ADL/IADL IND and currently requiring min-mod. A all ADL and poor ax tolerance. This date recommending TCU to progress ADL I, increase functional ax tolerance, and strength. Pending particiaption and pain management may progress to home with assist from spouse and HH OT. Pt needs to be near IND prior to return home,  is wc user and wife assists as needed.   Total Session Time   Total Session Time (sum of timed and untimed services) 9

## 2022-12-03 ENCOUNTER — APPOINTMENT (OUTPATIENT)
Dept: OCCUPATIONAL THERAPY | Facility: CLINIC | Age: 77
DRG: 409 | End: 2022-12-03
Attending: SURGERY
Payer: COMMERCIAL

## 2022-12-03 VITALS
RESPIRATION RATE: 20 BRPM | BODY MASS INDEX: 31.74 KG/M2 | HEART RATE: 84 BPM | DIASTOLIC BLOOD PRESSURE: 74 MMHG | WEIGHT: 209.4 LBS | HEIGHT: 68 IN | SYSTOLIC BLOOD PRESSURE: 139 MMHG | OXYGEN SATURATION: 93 % | TEMPERATURE: 97.4 F

## 2022-12-03 LAB
ALBUMIN SERPL BCG-MCNC: 2.7 G/DL (ref 3.5–5.2)
ALP SERPL-CCNC: 69 U/L (ref 35–104)
ALT SERPL W P-5'-P-CCNC: 10 U/L (ref 10–35)
ANION GAP SERPL CALCULATED.3IONS-SCNC: 8 MMOL/L (ref 7–15)
AST SERPL W P-5'-P-CCNC: 26 U/L (ref 10–35)
BILIRUB SERPL-MCNC: 0.6 MG/DL
BUN SERPL-MCNC: 12 MG/DL (ref 8–23)
CALCIUM SERPL-MCNC: 8.4 MG/DL (ref 8.8–10.2)
CHLORIDE SERPL-SCNC: 101 MMOL/L (ref 98–107)
CREAT SERPL-MCNC: 0.7 MG/DL (ref 0.51–0.95)
DEPRECATED HCO3 PLAS-SCNC: 27 MMOL/L (ref 22–29)
ERYTHROCYTE [DISTWIDTH] IN BLOOD BY AUTOMATED COUNT: 13.2 % (ref 10–15)
GFR SERPL CREATININE-BSD FRML MDRD: 90 ML/MIN/1.73M2
GLUCOSE SERPL-MCNC: 98 MG/DL (ref 70–99)
HCT VFR BLD AUTO: 37.6 % (ref 35–47)
HGB BLD-MCNC: 11.9 G/DL (ref 11.7–15.7)
MAGNESIUM SERPL-MCNC: 1.8 MG/DL (ref 1.7–2.3)
MCH RBC QN AUTO: 32.6 PG (ref 26.5–33)
MCHC RBC AUTO-ENTMCNC: 31.6 G/DL (ref 31.5–36.5)
MCV RBC AUTO: 103 FL (ref 78–100)
PHOSPHATE SERPL-MCNC: 3.4 MG/DL (ref 2.5–4.5)
PLATELET # BLD AUTO: 144 10E3/UL (ref 150–450)
POTASSIUM SERPL-SCNC: 4 MMOL/L (ref 3.4–5.3)
PROT SERPL-MCNC: 5.6 G/DL (ref 6.4–8.3)
RBC # BLD AUTO: 3.65 10E6/UL (ref 3.8–5.2)
SODIUM SERPL-SCNC: 136 MMOL/L (ref 136–145)
WBC # BLD AUTO: 7.5 10E3/UL (ref 4–11)

## 2022-12-03 PROCEDURE — 84100 ASSAY OF PHOSPHORUS: CPT | Performed by: SURGERY

## 2022-12-03 PROCEDURE — 80053 COMPREHEN METABOLIC PANEL: CPT | Performed by: SURGERY

## 2022-12-03 PROCEDURE — 97535 SELF CARE MNGMENT TRAINING: CPT | Mod: GO

## 2022-12-03 PROCEDURE — 47563 LAPARO CHOLECYSTECTOMY/GRAPH: CPT | Mod: 22 | Performed by: SURGERY

## 2022-12-03 PROCEDURE — 250N000011 HC RX IP 250 OP 636: Performed by: SURGERY

## 2022-12-03 PROCEDURE — 36415 COLL VENOUS BLD VENIPUNCTURE: CPT | Performed by: SURGERY

## 2022-12-03 PROCEDURE — 250N000013 HC RX MED GY IP 250 OP 250 PS 637: Performed by: PHYSICIAN ASSISTANT

## 2022-12-03 PROCEDURE — 250N000013 HC RX MED GY IP 250 OP 250 PS 637: Performed by: SURGERY

## 2022-12-03 PROCEDURE — 76998 US GUIDE INTRAOP: CPT | Mod: 26 | Performed by: SURGERY

## 2022-12-03 PROCEDURE — 83735 ASSAY OF MAGNESIUM: CPT | Performed by: SURGERY

## 2022-12-03 PROCEDURE — 85027 COMPLETE CBC AUTOMATED: CPT | Performed by: SURGERY

## 2022-12-03 PROCEDURE — 47550 BILE DUCT ENDOSCOPY ADD-ON: CPT | Performed by: SURGERY

## 2022-12-03 RX ORDER — CELECOXIB 200 MG/1
200 CAPSULE ORAL DAILY
Qty: 90 CAPSULE | Refills: 1 | COMMUNITY
Start: 2022-12-03 | End: 2023-01-01

## 2022-12-03 RX ORDER — KETOROLAC TROMETHAMINE 10 MG/1
10 TABLET, FILM COATED ORAL EVERY 6 HOURS PRN
Qty: 20 TABLET | Refills: 0 | Status: SHIPPED | OUTPATIENT
Start: 2022-12-03 | End: 2023-01-23

## 2022-12-03 RX ORDER — POLYETHYLENE GLYCOL 3350 17 G/17G
17 POWDER, FOR SOLUTION ORAL DAILY
Qty: 510 G | Refills: 3 | Status: SHIPPED | OUTPATIENT
Start: 2022-12-03 | End: 2023-01-01

## 2022-12-03 RX ORDER — METHOCARBAMOL 500 MG/1
500 TABLET, FILM COATED ORAL 3 TIMES DAILY PRN
Qty: 30 TABLET | Refills: 0 | Status: SHIPPED | OUTPATIENT
Start: 2022-12-03 | End: 2023-01-23

## 2022-12-03 RX ADMIN — POLYETHYLENE GLYCOL 3350 17 G: 17 POWDER, FOR SOLUTION ORAL at 08:40

## 2022-12-03 RX ADMIN — ACETAMINOPHEN 650 MG: 325 TABLET, FILM COATED ORAL at 06:08

## 2022-12-03 RX ADMIN — LISINOPRIL 10 MG: 5 TABLET ORAL at 08:28

## 2022-12-03 RX ADMIN — SENNOSIDES AND DOCUSATE SODIUM 1 TABLET: 50; 8.6 TABLET ORAL at 08:40

## 2022-12-03 RX ADMIN — TRAMADOL HYDROCHLORIDE 100 MG: 50 TABLET, COATED ORAL at 14:18

## 2022-12-03 RX ADMIN — KETOROLAC TROMETHAMINE 15 MG: 15 INJECTION, SOLUTION INTRAMUSCULAR; INTRAVENOUS at 08:28

## 2022-12-03 RX ADMIN — TRAMADOL HYDROCHLORIDE 100 MG: 50 TABLET, COATED ORAL at 09:53

## 2022-12-03 RX ADMIN — ACETAMINOPHEN 650 MG: 325 TABLET, FILM COATED ORAL at 14:17

## 2022-12-03 RX ADMIN — KETOROLAC TROMETHAMINE 15 MG: 15 INJECTION, SOLUTION INTRAMUSCULAR; INTRAVENOUS at 02:24

## 2022-12-03 RX ADMIN — FAMOTIDINE 20 MG: 20 TABLET ORAL at 08:28

## 2022-12-03 ASSESSMENT — ACTIVITIES OF DAILY LIVING (ADL)
ADLS_ACUITY_SCORE: 30
ADLS_ACUITY_SCORE: 30
ADLS_ACUITY_SCORE: 28
ADLS_ACUITY_SCORE: 28
ADLS_ACUITY_SCORE: 32

## 2022-12-03 NOTE — PLAN OF CARE
Assumed nursing care from [id number]. Patient alert, oriented x 4, and up with staff assist x 1. Vital signs stable and within defined parameters. Pain managed with scheduled Tylenol and Toradol, and PRN Dilaudid and Tramadol. Regular diet; appetite is fair. Voiding spontaneously with adequate output. Passing flatus; no BM. RLQ drain attached to thumbprint bulb suction with moderate serosanguineous output. Abdominal lap sites closed with liquid adhesive; abdominal binder in place. (R) PIV saline locked. No acute events overnight; slept comfortably.

## 2022-12-05 ENCOUNTER — PATIENT OUTREACH (OUTPATIENT)
Dept: SURGERY | Facility: CLINIC | Age: 77
End: 2022-12-05

## 2022-12-05 LAB
PATH REPORT.COMMENTS IMP SPEC: NORMAL
PATH REPORT.COMMENTS IMP SPEC: NORMAL
PATH REPORT.FINAL DX SPEC: NORMAL
PATH REPORT.GROSS SPEC: NORMAL
PATH REPORT.MICROSCOPIC SPEC OTHER STN: NORMAL
PATH REPORT.RELEVANT HX SPEC: NORMAL
PHOTO IMAGE: NORMAL

## 2022-12-05 NOTE — PROGRESS NOTES
Post Op Discharge Call    Surgery: Laparoscopic Cholecystectomy and common bile duct repair     Surgery date: 12/1/2022    Discharge Date:  12/4/22    Immediate Concerns: None at this time.     Pain:  No concerns with pain management.   Using pain medications as recommended with appropriate relief.   Discussed using medication only as needed. Not scheduled.   Discussed use of ice and heat, recommended alternating between the two and using 20 mins on and 20 mins off.     Incision:   No concerns, healing well, no redness, drainage or edema reported.     Drains:   No drain.     Diet:   Regular diet   Denies nausea and vomiting.   Discussed advancing diet as tolerated.     Bowels:   Passing gas.   Patient reports she has had bowel movement post op.   Taking stool softeners daily.   Denies feelings of constipation and cramping.     Activity:   No difficulty with ADLs reported.   Patient is up independently at home.   Encourage patient to continue to stay active.     Post op/follow up plans:   Post op appointment scheduled,confirmed date and time with patient.       Future Appointments   Date Time Provider Department Center   12/19/2022 10:00 AM Thai Garcia MD [facility]         Patient has our direct number for any questions or concerns that may arise.      Chhaya Keith RN, BSN  Care Coordinator - Surgical Oncology

## 2022-12-09 NOTE — OP NOTE
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    PROCEDURES PERFORMED:     1.  Exploratory laparoscopy.  2.  Laparoscopic partial cholecystectomy with retrieval of stones.  3.  Intraoperative cholangiogram.  4.  Intraoperative laparoscopic choledochoscopy.  5.  Intraoperative ultrasound.  6.  Laparoscopic common bile duct repair with infundibulum flap.  7.  Taking down adhesions to the undersurface of the gallbladder, which lasted approximately 30 minutes.    ANESTHESIA:  General.    ESTIMATED BLOOD LOSS:  30 mL.    SPECIMENS:  Gallbladder and stones.    FINDINGS:    1.  Three large stones were removed from the gallbladder.    2.  No obvious evidence of cancer.    3.  A large fistula between the common bile duct and gallbladder with a very large defect in the common bile duct requiring repair with an infundibular gallbladder flap.    4.  Nodular fibrosis of the liver.    DESCRIPTION OF PROCEDURE:  Ms. Sagar Marques was put to sleep and positioned by Anesthesia.  She was prepped and draped sterilely.  Pause for the cause was performed and was accurate.  We entered the abdomen using the Veress technique without difficulty and placed additional trocars under direct visualization.  We began by taking down adhesions to the undersurface of the gallbladder, which lasted approximately 30 minutes, at which time we were able to identify the gallbladder itself, which was extremely fibrotic and shrunken.  It was essentially a solid mass because of the stones within it.  There was no way to actually grasp the gallbladder itself because it was simply too firm for our graspers to hold.  We  placed a liver retractor as well just to hold up segment 4B of the liver so we could get a good look at the norm itself.  The findings were consistent with preoperative imaging.  We did not identify any lesions in the abdomen that looked concerning for peritoneal disease.  There was no mass or concerning features on the gallbladder itself either.  With that in mind, we went ahead with our treatment of a suspected preoperative diagnosed Mirizzi syndrome with large stones impacted in the gallbladder.  In order to address this, we made a defect in the lower portion of the gallbladder anterior wall to allow removal of the stones.  We extended initially a transverse incision longitudinally along the undersurface of the gallbladder along the fundus away from the hilum.  This allowed us to retrieve 3 large stones from within the gallbladder.  They were all placed in EndoCatch bags individually and removed one by one.  The final stone that was removed was the stone that was actually causing the obstruction of the bile duct.  Upon removal of the stone, it became apparent that the stone had created a very large fistula between the gallbladder and the common bile duct, and there was an approximately 2 cm defect in the side of the common bile duct wall.  Multiple photographs were taken for the patient's medical record demonstrating this.  We had intentionally left the infundibulum of the gallbladder with some soft tissue in the event that we needed to repair a large defect in the bile duct wall, but the remaining gallbladder was so fibrosed into the liver that the posterior wall really could not safely be removed without complicating the surgery significantly.  With that in mind, we essentially performed a partial cholecystectomy, removing the gallbladder wall, body and fundus of the gallbladder on the peritoneal side.  The gallbladder fossa with the posterior wall of the gallbladder was left intact, but the gallbladder mucosa  was cauterized using the argon beam coagulator.  At this point, we had the stones were removed and all the gallbladder that we could safely remove had been removed as well after placing it in an EndoCatch bag and sent for permanent pathologic evaluation.     We then turned our attention to a large defect in the bile duct.  We performed a laparoscopic choledochoscopy, evaluating the proximal and distal duct to be sure there was no remaining stone debris.  On choledochoscopy, it was not clear to me how far away the large defect was from the hilum and therefore, we also performed an intraoperative cholangiogram, which confirmed that there were no stones remaining in the biliary tree, but also that the defect that we needed to repair was well below the confluence of the biliary tree.  I should also note that prior to removal of the stones, we did use intraoperative ultrasound to assess the gallbladder and the bile duct and to plan the location of our cholecystotomy to remove the stones.  At this point, we had performed an intraoperative cholangiogram, as well as a choledochoscopy, and I was confident there was no further stone debris and the defect that we needed to repair was below the confluence of the right and left ducts.  We then spent quite a bit of time freeing up the posterior aspect of the infundibulum to allow for enough tissue mobility using the posterior wall of the infundibulum of the gallbladder to create a flap over the very large defect in the common bile duct.  We were then able to primarily close the defect with a flap closure using interrupted 4-0 Vicryl suture.  This was extremely tedious and difficult because of the severe inflammatory changes surrounding the norm as expected because of Mirizzi syndrome.     Of note, in addition, prior to removal of the gallbladder, it was clear that one of the previously placed stents actually went through the wall of the common bile duct into the gallbladder and  then through the wall of the gallbladder into the liver.  This was a pigtail configuration stent, which was removed in order to allow for our repair.  There was a second stent in the extrahepatic bile duct, which we could easily see through the large defect in the bile duct, which we left in place and it remained in place after our repair.  Once we completed our infundibular flap repair of the bile duct, we irrigated the abdomen thoroughly.  We did a little bit of additional argon beam coagulation of the gallbladder fossa as well.  At this point, hemostasis was excellent.  There was no evidence of active bile leak, but we did leave a 15 fluted drain adjacent to our bile duct repair, particularly because it was an extremely difficult repair and the tissues were very inflamed as expected.  At this point, the abdomen was desufflated.  The drain was tacked in place using a 3-0 nylon suture.  The port sites were all irrigated and the skin incisions were closed using 4-0 Monocryl followed by Dermabond.  The abdominal fascia from our 12 mm port sites was closed using #1 Vicryl prior to skin closure, The patient tolerated the procedure well, was extubated in the operating room, transferred to recovery room in stable condition.  I was present throughout the entire procedure as described above.    This case should certainly qualify for a complexity 22 modifier because of the presence of Mirizzi syndrome, severe fibrosis of the gallbladder and portal structures, as well as a very large hole in the lateral aspect of the common bile duct requiring a flap repair closure, which was also quite difficult because of the inflammatory nature of the disease.  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    Thai Garcia MD        D: 12/09/2022   T: 12/09/2022   MT:  [facility]    Name:     CARLY DONALDSON  MRN:      [id number]        Account:        [id number]   :      1945           Procedure Date: 2022     Document: [id number]

## 2022-12-19 ENCOUNTER — PREP FOR PROCEDURE (OUTPATIENT)
Dept: GASTROENTEROLOGY | Facility: CLINIC | Age: 77
End: 2022-12-19

## 2022-12-19 ENCOUNTER — OFFICE VISIT (OUTPATIENT)
Dept: SURGERY | Facility: CLINIC | Age: 77
End: 2022-12-19
Attending: SURGERY
Payer: COMMERCIAL

## 2022-12-19 ENCOUNTER — PATIENT OUTREACH (OUTPATIENT)
Dept: GASTROENTEROLOGY | Facility: CLINIC | Age: 77
End: 2022-12-19

## 2022-12-19 VITALS
SYSTOLIC BLOOD PRESSURE: 153 MMHG | DIASTOLIC BLOOD PRESSURE: 81 MMHG | TEMPERATURE: 98 F | HEART RATE: 72 BPM | OXYGEN SATURATION: 97 % | BODY MASS INDEX: 30.88 KG/M2 | WEIGHT: 203.1 LBS

## 2022-12-19 DIAGNOSIS — G89.4 CHRONIC PAIN SYNDROME: ICD-10-CM

## 2022-12-19 DIAGNOSIS — Z96.642 S/P HIP REPLACEMENT, LEFT: ICD-10-CM

## 2022-12-19 DIAGNOSIS — K83.9 BILE LEAK: Primary | ICD-10-CM

## 2022-12-19 DIAGNOSIS — K83.1 MIRIZZI'S SYNDROME (H): Primary | ICD-10-CM

## 2022-12-19 DIAGNOSIS — K83.1 MIRIZZI'S SYNDROME (H): ICD-10-CM

## 2022-12-19 PROCEDURE — 99024 POSTOP FOLLOW-UP VISIT: CPT | Performed by: SURGERY

## 2022-12-19 PROCEDURE — G0463 HOSPITAL OUTPT CLINIC VISIT: HCPCS

## 2022-12-19 ASSESSMENT — PAIN SCALES - GENERAL: PAINLEVEL: NO PAIN (0)

## 2022-12-19 NOTE — TELEPHONE ENCOUNTER
Called patient to follow up, pt needs ERCP for biliary stent removal with Dr. Franklin    Please assist in scheduling:     Procedure/Imaging/Clinic: ERCP  Physician: Dr. Franklin  Timin23  Procedure length:provider average  Anesthesia:gen  Dx: Mirizzi syndrome  Tier:2  Location: [facility]     Orders placed, pt understands plan    ML

## 2022-12-19 NOTE — PROGRESS NOTES
Looks great  No issues  Incisions healing well  Path benign    Showed pictures of her surgery which she appreciated    Follow-up with me PRN  Message sent to GI for stent removal

## 2022-12-19 NOTE — NURSING NOTE
"Oncology Rooming Note    December 19, 2022 9:55 AM   Yohana Marques is a 77 year old female who presents for:    Chief Complaint   Patient presents with     Oncology Clinic Visit     Mirizzi's syndrome     Initial Vitals: BP (!) 153/81   Pulse 72   Temp 98  F (36.7  C) (Oral)   Wt 92.1 kg (203 lb 1.6 oz)   SpO2 97%   BMI 30.88 kg/m   Estimated body mass index is 30.88 kg/m  as calculated from the following:    Height as of 12/2/22: 1.727 m (5' 8\").    Weight as of this encounter: 92.1 kg (203 lb 1.6 oz). Body surface area is 2.1 meters squared.  No Pain (0) Comment: Data Unavailable   No LMP recorded. Patient is postmenopausal.  Allergies reviewed: Yes  Medications reviewed: Yes    Medications: Medication refills not needed today.  Pharmacy name entered into EPIC:    [id number] - [place], [place] - [address] AT [address] [place] [address] PHARMACY - [place], [place] - [address] [facility] [place] - [address] [address] [facility]  [id number] - [place] - [address]  [facility] #[id number] - [place] - [address] AT [facility] [address]    Clinical concerns: none       Paige Paniagua            "

## 2022-12-19 NOTE — LETTER
12/19/2022         RE: Yohana Marques  [address] [address] [place] [id number]        Dear Colleague,    Thank you for referring your patient, Yohana Marques, to the [facility]. Please see a copy of my visit note below.    Looks great  No issues  Incisions healing well  Path benign    Showed pictures of her surgery which she appreciated    Follow-up with me PRN  Message sent to GI for stent removal    Sincerely,        Thai Garcia MD

## 2022-12-20 ENCOUNTER — TELEPHONE (OUTPATIENT)
Dept: INTERNAL MEDICINE | Facility: CLINIC | Age: 77
End: 2022-12-20

## 2022-12-20 RX ORDER — TRAMADOL HYDROCHLORIDE 50 MG/1
TABLET ORAL
Qty: 90 TABLET | Refills: 0 | Status: SHIPPED | OUTPATIENT
Start: 2022-12-20 | End: 2023-03-26

## 2022-12-20 NOTE — TELEPHONE ENCOUNTER
Prior Authorization Retail Medication Request    Medication/Dose: traMADol (ULTRAM) 50 MG tablet  ICD code (if different than what is on RX):  Z96.642,G89.4  Previously Tried and Failed:    Rationale:      Insurance Name:  [facility]  Insurance ID:  [id number]      Pharmacy Information (if different than what is on RX)  Name:  [facility]  Phone:  [phone]

## 2022-12-21 NOTE — TELEPHONE ENCOUNTER
Prior Authorization Approval    Authorization Effective Date: 11/21/2022  Authorization Expiration Date: 12/21/2023  Medication: traMADol (ULTRAM) 50 MG tablet PA APPROVED  Approved Dose/Quantity: 90  Reference #:     Insurance Company: [employer] Phone [phone] Fax [phone]  Expected CoPay:       CoPay Card Available:      Foundation Assistance Needed:    Which Pharmacy is filling the prescription (Not needed for infusion/clinic administered): [id number] [place], [place] - [address]  Pharmacy Notified: Yes  Patient Notified: Comment:  Pharmacy will notify patient.          PA Initiation    Medication: traMADol (ULTRAM) 50 MG tablet PA INITIATED  Insurance Company: [employer] Phone [phone] Fax [phone]  Pharmacy Filling the Rx: [facility] #[id number] [place], [place] - [address]  Filling Pharmacy Phone: [phone]  Filling Pharmacy Fax:    Start Date: 12/21/2022    Central Prior Authorization Team   Phone: [phone]

## 2023-01-01 ENCOUNTER — HOSPITAL ENCOUNTER (OUTPATIENT)
Facility: CLINIC | Age: 78
Discharge: HOME | End: 2023-05-02
Attending: INTERNAL MEDICINE | Admitting: INTERNAL MEDICINE
Payer: COMMERCIAL

## 2023-01-01 ENCOUNTER — OFFICE VISIT (OUTPATIENT)
Dept: INTERNAL MEDICINE | Facility: CLINIC | Age: 78
End: 2023-01-01
Payer: COMMERCIAL

## 2023-01-01 ENCOUNTER — ANESTHESIA (OUTPATIENT)
Dept: SURGERY | Facility: CLINIC | Age: 78
End: 2023-01-01
Payer: COMMERCIAL

## 2023-01-01 ENCOUNTER — APPOINTMENT (OUTPATIENT)
Dept: GENERAL RADIOLOGY | Facility: CLINIC | Age: 78
End: 2023-01-01
Attending: INTERNAL MEDICINE
Payer: COMMERCIAL

## 2023-01-01 ENCOUNTER — ANCILLARY PROCEDURE (OUTPATIENT)
Dept: MAMMOGRAPHY | Facility: CLINIC | Age: 78
End: 2023-01-01
Payer: COMMERCIAL

## 2023-01-01 ENCOUNTER — ANESTHESIA EVENT (OUTPATIENT)
Dept: SURGERY | Facility: CLINIC | Age: 78
End: 2023-01-01
Payer: COMMERCIAL

## 2023-01-01 VITALS
RESPIRATION RATE: 14 BRPM | HEART RATE: 61 BPM | DIASTOLIC BLOOD PRESSURE: 70 MMHG | TEMPERATURE: 98.1 F | SYSTOLIC BLOOD PRESSURE: 122 MMHG | BODY MASS INDEX: 30.89 KG/M2 | WEIGHT: 203.8 LBS | HEIGHT: 68 IN | OXYGEN SATURATION: 97 %

## 2023-01-01 VITALS
DIASTOLIC BLOOD PRESSURE: 72 MMHG | HEART RATE: 67 BPM | SYSTOLIC BLOOD PRESSURE: 120 MMHG | HEIGHT: 66 IN | OXYGEN SATURATION: 99 % | BODY MASS INDEX: 32.95 KG/M2 | WEIGHT: 205 LBS | TEMPERATURE: 97.5 F

## 2023-01-01 VITALS
SYSTOLIC BLOOD PRESSURE: 138 MMHG | TEMPERATURE: 97.8 F | HEIGHT: 68 IN | HEART RATE: 78 BPM | BODY MASS INDEX: 30.89 KG/M2 | WEIGHT: 203.8 LBS | DIASTOLIC BLOOD PRESSURE: 84 MMHG | OXYGEN SATURATION: 96 % | RESPIRATION RATE: 16 BRPM

## 2023-01-01 DIAGNOSIS — G89.4 CHRONIC PAIN SYNDROME: ICD-10-CM

## 2023-01-01 DIAGNOSIS — Z00.00 MEDICARE ANNUAL WELLNESS VISIT, SUBSEQUENT: Primary | ICD-10-CM

## 2023-01-01 DIAGNOSIS — Z96.642 S/P HIP REPLACEMENT, LEFT: ICD-10-CM

## 2023-01-01 DIAGNOSIS — I10 ESSENTIAL HYPERTENSION: ICD-10-CM

## 2023-01-01 DIAGNOSIS — Z12.31 VISIT FOR SCREENING MAMMOGRAM: ICD-10-CM

## 2023-01-01 DIAGNOSIS — M16.9 OSTEOARTHRITIS OF HIP, UNSPECIFIED LATERALITY, UNSPECIFIED OSTEOARTHRITIS TYPE: ICD-10-CM

## 2023-01-01 DIAGNOSIS — K83.1 BILIARY STRICTURE (H): ICD-10-CM

## 2023-01-01 DIAGNOSIS — Z01.818 PREOP GENERAL PHYSICAL EXAM: Primary | ICD-10-CM

## 2023-01-01 DIAGNOSIS — E78.5 HYPERLIPIDEMIA LDL GOAL <160: ICD-10-CM

## 2023-01-01 DIAGNOSIS — L65.9 LOSS OF HAIR: ICD-10-CM

## 2023-01-01 DIAGNOSIS — K83.1 MIRIZZI'S SYNDROME (H): ICD-10-CM

## 2023-01-01 DIAGNOSIS — K74.69 OTHER CIRRHOSIS OF LIVER (H): ICD-10-CM

## 2023-01-01 DIAGNOSIS — I10 PRIMARY HYPERTENSION: ICD-10-CM

## 2023-01-01 LAB
ALBUMIN SERPL BCG-MCNC: 3.8 G/DL (ref 3.5–5.2)
ALP SERPL-CCNC: 78 U/L (ref 35–104)
ALT SERPL W P-5'-P-CCNC: 8 U/L (ref 10–35)
AMYLASE SERPL-CCNC: 75 U/L (ref 28–100)
ANION GAP SERPL CALCULATED.3IONS-SCNC: 11 MMOL/L (ref 7–15)
ANION GAP SERPL CALCULATED.3IONS-SCNC: 11 MMOL/L (ref 7–15)
AST SERPL W P-5'-P-CCNC: 22 U/L (ref 10–35)
BILIRUB SERPL-MCNC: 0.2 MG/DL
BUN SERPL-MCNC: 18.8 MG/DL (ref 8–23)
BUN SERPL-MCNC: 20.4 MG/DL (ref 8–23)
CALCIUM SERPL-MCNC: 9.3 MG/DL (ref 8.8–10.2)
CALCIUM SERPL-MCNC: 9.7 MG/DL (ref 8.8–10.2)
CHLORIDE SERPL-SCNC: 104 MMOL/L (ref 98–107)
CHLORIDE SERPL-SCNC: 106 MMOL/L (ref 98–107)
CREAT SERPL-MCNC: 0.73 MG/DL (ref 0.51–0.95)
CREAT SERPL-MCNC: 0.76 MG/DL (ref 0.51–0.95)
DEPRECATED HCO3 PLAS-SCNC: 23 MMOL/L (ref 22–29)
DEPRECATED HCO3 PLAS-SCNC: 27 MMOL/L (ref 22–29)
ERCP: NORMAL
ERYTHROCYTE [DISTWIDTH] IN BLOOD BY AUTOMATED COUNT: 13 % (ref 10–15)
ERYTHROCYTE [DISTWIDTH] IN BLOOD BY AUTOMATED COUNT: 13.6 % (ref 10–15)
GFR SERPL CREATININE-BSD FRML MDRD: 81 ML/MIN/1.73M2
GFR SERPL CREATININE-BSD FRML MDRD: 85 ML/MIN/1.73M2
GLUCOSE SERPL-MCNC: 101 MG/DL (ref 70–99)
GLUCOSE SERPL-MCNC: 103 MG/DL (ref 70–99)
HCT VFR BLD AUTO: 34.5 % (ref 35–47)
HCT VFR BLD AUTO: 36.5 % (ref 35–47)
HGB BLD-MCNC: 11.1 G/DL (ref 11.7–15.7)
HGB BLD-MCNC: 11.5 G/DL (ref 11.7–15.7)
INR PPP: 1.02 (ref 0.85–1.15)
LIPASE SERPL-CCNC: 48 U/L (ref 13–60)
MCH RBC QN AUTO: 30.2 PG (ref 26.5–33)
MCH RBC QN AUTO: 31.9 PG (ref 26.5–33)
MCHC RBC AUTO-ENTMCNC: 31.5 G/DL (ref 31.5–36.5)
MCHC RBC AUTO-ENTMCNC: 32.2 G/DL (ref 31.5–36.5)
MCV RBC AUTO: 101 FL (ref 78–100)
MCV RBC AUTO: 94 FL (ref 78–100)
PLATELET # BLD AUTO: 184 10E3/UL (ref 150–450)
PLATELET # BLD AUTO: 219 10E3/UL (ref 150–450)
POTASSIUM SERPL-SCNC: 4.1 MMOL/L (ref 3.4–5.3)
POTASSIUM SERPL-SCNC: 4.4 MMOL/L (ref 3.4–5.3)
PROT SERPL-MCNC: 6.6 G/DL (ref 6.4–8.3)
RBC # BLD AUTO: 3.61 10E6/UL (ref 3.8–5.2)
RBC # BLD AUTO: 3.67 10E6/UL (ref 3.8–5.2)
SODIUM SERPL-SCNC: 140 MMOL/L (ref 136–145)
SODIUM SERPL-SCNC: 142 MMOL/L (ref 136–145)
TSH SERPL DL<=0.005 MIU/L-ACNC: 1.56 UIU/ML (ref 0.3–4.2)
WBC # BLD AUTO: 4.1 10E3/UL (ref 4–11)
WBC # BLD AUTO: 5.5 10E3/UL (ref 4–11)

## 2023-01-01 PROCEDURE — 85027 COMPLETE CBC AUTOMATED: CPT | Performed by: INTERNAL MEDICINE

## 2023-01-01 PROCEDURE — G0439 PPPS, SUBSEQ VISIT: HCPCS | Performed by: INTERNAL MEDICINE

## 2023-01-01 PROCEDURE — 82150 ASSAY OF AMYLASE: CPT | Performed by: INTERNAL MEDICINE

## 2023-01-01 PROCEDURE — 77067 SCR MAMMO BI INCL CAD: CPT | Mod: TC | Performed by: RADIOLOGY

## 2023-01-01 PROCEDURE — C1769 GUIDE WIRE: HCPCS | Performed by: INTERNAL MEDICINE

## 2023-01-01 PROCEDURE — 84520 ASSAY OF UREA NITROGEN: CPT | Performed by: INTERNAL MEDICINE

## 2023-01-01 PROCEDURE — 99213 OFFICE O/P EST LOW 20 MIN: CPT | Mod: 25 | Performed by: INTERNAL MEDICINE

## 2023-01-01 PROCEDURE — 36415 COLL VENOUS BLD VENIPUNCTURE: CPT | Performed by: INTERNAL MEDICINE

## 2023-01-01 PROCEDURE — 250N000011 HC RX IP 250 OP 636: Performed by: NURSE ANESTHETIST, CERTIFIED REGISTERED

## 2023-01-01 PROCEDURE — 84443 ASSAY THYROID STIM HORMONE: CPT | Performed by: INTERNAL MEDICINE

## 2023-01-01 PROCEDURE — 710N000010 HC RECOVERY PHASE 1, LEVEL 2, PER MIN: Performed by: INTERNAL MEDICINE

## 2023-01-01 PROCEDURE — 710N000012 HC RECOVERY PHASE 2, PER MINUTE: Performed by: INTERNAL MEDICINE

## 2023-01-01 PROCEDURE — 36415 COLL VENOUS BLD VENIPUNCTURE: CPT | Performed by: PHYSICIAN ASSISTANT

## 2023-01-01 PROCEDURE — 250N000009 HC RX 250: Performed by: INTERNAL MEDICINE

## 2023-01-01 PROCEDURE — 77063 BREAST TOMOSYNTHESIS BI: CPT | Mod: TC | Performed by: RADIOLOGY

## 2023-01-01 PROCEDURE — 370N000017 HC ANESTHESIA TECHNICAL FEE, PER MIN: Performed by: INTERNAL MEDICINE

## 2023-01-01 PROCEDURE — 360N000082 HC SURGERY LEVEL 2 W/ FLUORO, PER MIN: Performed by: INTERNAL MEDICINE

## 2023-01-01 PROCEDURE — 80048 BASIC METABOLIC PNL TOTAL CA: CPT | Performed by: PHYSICIAN ASSISTANT

## 2023-01-01 PROCEDURE — 999N000179 XR SURGERY CARM FLUORO LESS THAN 5 MIN W STILLS: Mod: TC

## 2023-01-01 PROCEDURE — 255N000002 HC RX 255 OP 636: Performed by: INTERNAL MEDICINE

## 2023-01-01 PROCEDURE — 999N000141 HC STATISTIC PRE-PROCEDURE NURSING ASSESSMENT: Performed by: INTERNAL MEDICINE

## 2023-01-01 PROCEDURE — 85610 PROTHROMBIN TIME: CPT | Performed by: INTERNAL MEDICINE

## 2023-01-01 PROCEDURE — 272N000001 HC OR GENERAL SUPPLY STERILE: Performed by: INTERNAL MEDICINE

## 2023-01-01 PROCEDURE — C1726 CATH, BAL DIL, NON-VASCULAR: HCPCS | Performed by: INTERNAL MEDICINE

## 2023-01-01 PROCEDURE — 83690 ASSAY OF LIPASE: CPT | Performed by: INTERNAL MEDICINE

## 2023-01-01 PROCEDURE — 250N000009 HC RX 250: Performed by: NURSE ANESTHETIST, CERTIFIED REGISTERED

## 2023-01-01 PROCEDURE — 250N000025 HC SEVOFLURANE, PER MIN: Performed by: INTERNAL MEDICINE

## 2023-01-01 PROCEDURE — 99214 OFFICE O/P EST MOD 30 MIN: CPT | Performed by: PHYSICIAN ASSISTANT

## 2023-01-01 PROCEDURE — 258N000003 HC RX IP 258 OP 636: Performed by: NURSE ANESTHETIST, CERTIFIED REGISTERED

## 2023-01-01 RX ORDER — LABETALOL HYDROCHLORIDE 5 MG/ML
10 INJECTION, SOLUTION INTRAVENOUS
Status: DISCONTINUED | OUTPATIENT
Start: 2023-01-01 | End: 2023-01-01 | Stop reason: HOSPADM

## 2023-01-01 RX ORDER — SODIUM CHLORIDE, SODIUM LACTATE, POTASSIUM CHLORIDE, CALCIUM CHLORIDE 600; 310; 30; 20 MG/100ML; MG/100ML; MG/100ML; MG/100ML
INJECTION, SOLUTION INTRAVENOUS CONTINUOUS
Status: DISCONTINUED | OUTPATIENT
Start: 2023-01-01 | End: 2023-01-01 | Stop reason: HOSPADM

## 2023-01-01 RX ORDER — INDOMETHACIN 50 MG/1
SUPPOSITORY RECTAL PRN
Status: DISCONTINUED | OUTPATIENT
Start: 2023-01-01 | End: 2023-01-01 | Stop reason: HOSPADM

## 2023-01-01 RX ORDER — FENTANYL CITRATE 50 UG/ML
INJECTION, SOLUTION INTRAMUSCULAR; INTRAVENOUS PRN
Status: DISCONTINUED | OUTPATIENT
Start: 2023-01-01 | End: 2023-01-01

## 2023-01-01 RX ORDER — OXYCODONE HYDROCHLORIDE 5 MG/1
5 TABLET ORAL
Status: CANCELLED | OUTPATIENT
Start: 2023-01-01

## 2023-01-01 RX ORDER — PROPOFOL 10 MG/ML
INJECTION, EMULSION INTRAVENOUS PRN
Status: DISCONTINUED | OUTPATIENT
Start: 2023-01-01 | End: 2023-01-01

## 2023-01-01 RX ORDER — DEXAMETHASONE SODIUM PHOSPHATE 4 MG/ML
INJECTION, SOLUTION INTRA-ARTICULAR; INTRALESIONAL; INTRAMUSCULAR; INTRAVENOUS; SOFT TISSUE PRN
Status: DISCONTINUED | OUTPATIENT
Start: 2023-01-01 | End: 2023-01-01

## 2023-01-01 RX ORDER — FENTANYL CITRATE 50 UG/ML
25 INJECTION, SOLUTION INTRAMUSCULAR; INTRAVENOUS EVERY 5 MIN PRN
Status: DISCONTINUED | OUTPATIENT
Start: 2023-01-01 | End: 2023-01-01 | Stop reason: HOSPADM

## 2023-01-01 RX ORDER — EPHEDRINE SULFATE 50 MG/ML
INJECTION, SOLUTION INTRAMUSCULAR; INTRAVENOUS; SUBCUTANEOUS PRN
Status: DISCONTINUED | OUTPATIENT
Start: 2023-01-01 | End: 2023-01-01

## 2023-01-01 RX ORDER — HYDROMORPHONE HCL IN WATER/PF 6 MG/30 ML
0.2 PATIENT CONTROLLED ANALGESIA SYRINGE INTRAVENOUS EVERY 5 MIN PRN
Status: DISCONTINUED | OUTPATIENT
Start: 2023-01-01 | End: 2023-01-01 | Stop reason: HOSPADM

## 2023-01-01 RX ORDER — SODIUM CHLORIDE, SODIUM LACTATE, POTASSIUM CHLORIDE, CALCIUM CHLORIDE 600; 310; 30; 20 MG/100ML; MG/100ML; MG/100ML; MG/100ML
INJECTION, SOLUTION INTRAVENOUS CONTINUOUS PRN
Status: DISCONTINUED | OUTPATIENT
Start: 2023-01-01 | End: 2023-01-01

## 2023-01-01 RX ORDER — ONDANSETRON 2 MG/ML
4 INJECTION INTRAMUSCULAR; INTRAVENOUS EVERY 30 MIN PRN
Status: DISCONTINUED | OUTPATIENT
Start: 2023-01-01 | End: 2023-01-01 | Stop reason: HOSPADM

## 2023-01-01 RX ORDER — LISINOPRIL 10 MG/1
10 TABLET ORAL DAILY
Qty: 90 TABLET | Refills: 3 | Status: SHIPPED | OUTPATIENT
Start: 2023-01-01

## 2023-01-01 RX ORDER — GLYCOPYRROLATE 0.2 MG/ML
INJECTION, SOLUTION INTRAMUSCULAR; INTRAVENOUS PRN
Status: DISCONTINUED | OUTPATIENT
Start: 2023-01-01 | End: 2023-01-01

## 2023-01-01 RX ORDER — LIDOCAINE 40 MG/G
CREAM TOPICAL
Status: DISCONTINUED | OUTPATIENT
Start: 2023-01-01 | End: 2023-01-01 | Stop reason: HOSPADM

## 2023-01-01 RX ORDER — FENTANYL CITRATE 50 UG/ML
50 INJECTION, SOLUTION INTRAMUSCULAR; INTRAVENOUS EVERY 5 MIN PRN
Status: DISCONTINUED | OUTPATIENT
Start: 2023-01-01 | End: 2023-01-01 | Stop reason: HOSPADM

## 2023-01-01 RX ORDER — TRAMADOL HYDROCHLORIDE 50 MG/1
TABLET ORAL
Qty: 90 TABLET | Refills: 0 | Status: SHIPPED | OUTPATIENT
Start: 2023-01-01 | End: 2023-01-01

## 2023-01-01 RX ORDER — ONDANSETRON 4 MG/1
4 TABLET, ORALLY DISINTEGRATING ORAL EVERY 30 MIN PRN
Status: DISCONTINUED | OUTPATIENT
Start: 2023-01-01 | End: 2023-01-01 | Stop reason: HOSPADM

## 2023-01-01 RX ORDER — HYDROMORPHONE HCL IN WATER/PF 6 MG/30 ML
0.4 PATIENT CONTROLLED ANALGESIA SYRINGE INTRAVENOUS EVERY 5 MIN PRN
Status: DISCONTINUED | OUTPATIENT
Start: 2023-01-01 | End: 2023-01-01 | Stop reason: HOSPADM

## 2023-01-01 RX ORDER — LIDOCAINE HYDROCHLORIDE 20 MG/ML
INJECTION, SOLUTION INFILTRATION; PERINEURAL PRN
Status: DISCONTINUED | OUTPATIENT
Start: 2023-01-01 | End: 2023-01-01

## 2023-01-01 RX ORDER — ONDANSETRON 2 MG/ML
INJECTION INTRAMUSCULAR; INTRAVENOUS PRN
Status: DISCONTINUED | OUTPATIENT
Start: 2023-01-01 | End: 2023-01-01

## 2023-01-01 RX ORDER — OXYCODONE HYDROCHLORIDE 10 MG/1
10 TABLET ORAL
Status: CANCELLED | OUTPATIENT
Start: 2023-01-01

## 2023-01-01 RX ORDER — CELECOXIB 200 MG/1
200 CAPSULE ORAL DAILY
Qty: 90 CAPSULE | Refills: 3 | Status: SHIPPED | OUTPATIENT
Start: 2023-01-01

## 2023-01-01 RX ORDER — TRAMADOL HYDROCHLORIDE 50 MG/1
TABLET ORAL
Qty: 90 TABLET | Refills: 0 | Status: SHIPPED | OUTPATIENT
Start: 2023-01-01 | End: 2024-01-01

## 2023-01-01 RX ORDER — IOPAMIDOL 510 MG/ML
INJECTION, SOLUTION INTRAVASCULAR PRN
Status: DISCONTINUED | OUTPATIENT
Start: 2023-01-01 | End: 2023-01-01 | Stop reason: HOSPADM

## 2023-01-01 RX ORDER — POLYETHYLENE GLYCOL 3350 17 G/17G
17 POWDER, FOR SOLUTION ORAL DAILY
Qty: 510 G | Refills: 3 | Status: SHIPPED | OUTPATIENT
Start: 2023-01-01 | End: 2024-01-01

## 2023-01-01 RX ADMIN — LIDOCAINE HYDROCHLORIDE 5 ML: 20 INJECTION, SOLUTION INFILTRATION; PERINEURAL at 08:32

## 2023-01-01 RX ADMIN — PHENYLEPHRINE HYDROCHLORIDE 200 MCG: 10 INJECTION INTRAVENOUS at 08:52

## 2023-01-01 RX ADMIN — Medication 5 MG: at 09:00

## 2023-01-01 RX ADMIN — PHENYLEPHRINE HYDROCHLORIDE 150 MCG: 10 INJECTION INTRAVENOUS at 09:03

## 2023-01-01 RX ADMIN — PROPOFOL 200 MG: 10 INJECTION, EMULSION INTRAVENOUS at 08:32

## 2023-01-01 RX ADMIN — PHENYLEPHRINE HYDROCHLORIDE 100 MCG: 10 INJECTION INTRAVENOUS at 08:39

## 2023-01-01 RX ADMIN — Medication 7.5 MG: at 08:48

## 2023-01-01 RX ADMIN — MIDAZOLAM 2 MG: 1 INJECTION INTRAMUSCULAR; INTRAVENOUS at 08:24

## 2023-01-01 RX ADMIN — Medication 50 MG: at 08:33

## 2023-01-01 RX ADMIN — GLYCOPYRROLATE 0.1 MG: 0.2 INJECTION, SOLUTION INTRAMUSCULAR; INTRAVENOUS at 08:55

## 2023-01-01 RX ADMIN — Medication 7.5 MG: at 08:55

## 2023-01-01 RX ADMIN — Medication 10 MG: at 09:08

## 2023-01-01 RX ADMIN — FENTANYL CITRATE 50 MCG: 50 INJECTION, SOLUTION INTRAMUSCULAR; INTRAVENOUS at 08:32

## 2023-01-01 RX ADMIN — SODIUM CHLORIDE, POTASSIUM CHLORIDE, SODIUM LACTATE AND CALCIUM CHLORIDE: 600; 310; 30; 20 INJECTION, SOLUTION INTRAVENOUS at 08:30

## 2023-01-01 RX ADMIN — ONDANSETRON 4 MG: 2 INJECTION INTRAMUSCULAR; INTRAVENOUS at 09:13

## 2023-01-01 RX ADMIN — SUGAMMADEX 200 MG: 100 INJECTION, SOLUTION INTRAVENOUS at 09:20

## 2023-01-01 RX ADMIN — DEXAMETHASONE SODIUM PHOSPHATE 4 MG: 4 INJECTION, SOLUTION INTRA-ARTICULAR; INTRALESIONAL; INTRAMUSCULAR; INTRAVENOUS; SOFT TISSUE at 08:52

## 2023-01-01 ASSESSMENT — ACTIVITIES OF DAILY LIVING (ADL)
CURRENT_FUNCTION: NO ASSISTANCE NEEDED
ADLS_ACUITY_SCORE: 37
ADLS_ACUITY_SCORE: 37
CURRENT_FUNCTION: NO ASSISTANCE NEEDED

## 2023-01-01 ASSESSMENT — ENCOUNTER SYMPTOMS
ARTHRALGIAS: 1
CHILLS: 0
DIARRHEA: 0
FEVER: 0
BREAST MASS: 0
COUGH: 0
NAUSEA: 0
PARESTHESIAS: 0
MYALGIAS: 0
HEADACHES: 0
ABDOMINAL PAIN: 0
BREAST MASS: 0
SHORTNESS OF BREATH: 0
ARTHRALGIAS: 1
HEMATOCHEZIA: 0
DYSURIA: 0
WEAKNESS: 0
FREQUENCY: 1
HEARTBURN: 0
NAUSEA: 0
COUGH: 0
DIARRHEA: 0
HEMATOCHEZIA: 0
FREQUENCY: 1
ABDOMINAL PAIN: 0
EYE PAIN: 0
EYE PAIN: 0
JOINT SWELLING: 0
PALPITATIONS: 0
DIZZINESS: 0
NERVOUS/ANXIOUS: 0
CONSTIPATION: 0
SORE THROAT: 0
SORE THROAT: 0
NERVOUS/ANXIOUS: 0
PARESTHESIAS: 0
HEMATURIA: 0
HEADACHES: 0
CHILLS: 0
DYSURIA: 0
CONSTIPATION: 0
DIZZINESS: 0
MYALGIAS: 0
PALPITATIONS: 0
JOINT SWELLING: 0
HEMATURIA: 0
FEVER: 0
HEARTBURN: 0
SHORTNESS OF BREATH: 0
WEAKNESS: 0

## 2023-01-18 ENCOUNTER — PREP FOR PROCEDURE (OUTPATIENT)
Dept: GASTROENTEROLOGY | Facility: CLINIC | Age: 78
End: 2023-01-18
Payer: COMMERCIAL

## 2023-01-24 ENCOUNTER — ANESTHESIA EVENT (OUTPATIENT)
Dept: SURGERY | Facility: CLINIC | Age: 78
End: 2023-01-24
Payer: COMMERCIAL

## 2023-01-25 ENCOUNTER — ANESTHESIA (OUTPATIENT)
Dept: SURGERY | Facility: CLINIC | Age: 78
End: 2023-01-25
Payer: COMMERCIAL

## 2023-01-25 ENCOUNTER — APPOINTMENT (OUTPATIENT)
Dept: GENERAL RADIOLOGY | Facility: CLINIC | Age: 78
End: 2023-01-25
Attending: INTERNAL MEDICINE
Payer: COMMERCIAL

## 2023-01-25 ENCOUNTER — HOSPITAL ENCOUNTER (OUTPATIENT)
Facility: CLINIC | Age: 78
Discharge: HOME | End: 2023-01-25
Attending: INTERNAL MEDICINE | Admitting: INTERNAL MEDICINE
Payer: COMMERCIAL

## 2023-01-25 VITALS
DIASTOLIC BLOOD PRESSURE: 102 MMHG | WEIGHT: 196.21 LBS | SYSTOLIC BLOOD PRESSURE: 190 MMHG | RESPIRATION RATE: 16 BRPM | HEART RATE: 69 BPM | BODY MASS INDEX: 29.74 KG/M2 | OXYGEN SATURATION: 95 % | TEMPERATURE: 97.9 F | HEIGHT: 68 IN

## 2023-01-25 LAB
ALBUMIN SERPL BCG-MCNC: 4.1 G/DL (ref 3.5–5.2)
ALP SERPL-CCNC: 85 U/L (ref 35–104)
ALT SERPL W P-5'-P-CCNC: 11 U/L (ref 10–35)
AMYLASE SERPL-CCNC: 71 U/L (ref 28–100)
ANION GAP SERPL CALCULATED.3IONS-SCNC: 14 MMOL/L (ref 7–15)
AST SERPL W P-5'-P-CCNC: 27 U/L (ref 10–35)
BILIRUB SERPL-MCNC: 0.6 MG/DL
BUN SERPL-MCNC: 16 MG/DL (ref 8–23)
CALCIUM SERPL-MCNC: 10 MG/DL (ref 8.8–10.2)
CHLORIDE SERPL-SCNC: 104 MMOL/L (ref 98–107)
CREAT SERPL-MCNC: 0.68 MG/DL (ref 0.51–0.95)
DEPRECATED HCO3 PLAS-SCNC: 22 MMOL/L (ref 22–29)
ERCP: NORMAL
ERYTHROCYTE [DISTWIDTH] IN BLOOD BY AUTOMATED COUNT: 13.2 % (ref 10–15)
GFR SERPL CREATININE-BSD FRML MDRD: 90 ML/MIN/1.73M2
GLUCOSE BLDC GLUCOMTR-MCNC: 97 MG/DL (ref 70–99)
GLUCOSE SERPL-MCNC: 108 MG/DL (ref 70–99)
HCT VFR BLD AUTO: 44.4 % (ref 35–47)
HGB BLD-MCNC: 14.3 G/DL (ref 11.7–15.7)
INR PPP: 1.06 (ref 0.85–1.15)
LIPASE SERPL-CCNC: 50 U/L (ref 13–60)
MCH RBC QN AUTO: 31.4 PG (ref 26.5–33)
MCHC RBC AUTO-ENTMCNC: 32.2 G/DL (ref 31.5–36.5)
MCV RBC AUTO: 97 FL (ref 78–100)
PLATELET # BLD AUTO: 197 10E3/UL (ref 150–450)
POTASSIUM SERPL-SCNC: 4.1 MMOL/L (ref 3.4–5.3)
PROT SERPL-MCNC: 7.6 G/DL (ref 6.4–8.3)
RBC # BLD AUTO: 4.56 10E6/UL (ref 3.8–5.2)
SODIUM SERPL-SCNC: 140 MMOL/L (ref 136–145)
WBC # BLD AUTO: 5.8 10E3/UL (ref 4–11)

## 2023-01-25 PROCEDURE — 999N000141 HC STATISTIC PRE-PROCEDURE NURSING ASSESSMENT: Performed by: INTERNAL MEDICINE

## 2023-01-25 PROCEDURE — 85610 PROTHROMBIN TIME: CPT | Performed by: INTERNAL MEDICINE

## 2023-01-25 PROCEDURE — 250N000009 HC RX 250: Performed by: INTERNAL MEDICINE

## 2023-01-25 PROCEDURE — C1874 STENT, COATED/COV W/DEL SYS: HCPCS | Performed by: INTERNAL MEDICINE

## 2023-01-25 PROCEDURE — 36415 COLL VENOUS BLD VENIPUNCTURE: CPT | Performed by: INTERNAL MEDICINE

## 2023-01-25 PROCEDURE — 250N000011 HC RX IP 250 OP 636: Performed by: ANESTHESIOLOGY

## 2023-01-25 PROCEDURE — 250N000009 HC RX 250: Performed by: ANESTHESIOLOGY

## 2023-01-25 PROCEDURE — 272N000001 HC OR GENERAL SUPPLY STERILE: Performed by: INTERNAL MEDICINE

## 2023-01-25 PROCEDURE — 250N000025 HC SEVOFLURANE, PER MIN: Performed by: INTERNAL MEDICINE

## 2023-01-25 PROCEDURE — 710N000010 HC RECOVERY PHASE 1, LEVEL 2, PER MIN: Performed by: INTERNAL MEDICINE

## 2023-01-25 PROCEDURE — 82962 GLUCOSE BLOOD TEST: CPT

## 2023-01-25 PROCEDURE — 80053 COMPREHEN METABOLIC PANEL: CPT | Performed by: INTERNAL MEDICINE

## 2023-01-25 PROCEDURE — 255N000002 HC RX 255 OP 636: Performed by: INTERNAL MEDICINE

## 2023-01-25 PROCEDURE — 85014 HEMATOCRIT: CPT | Performed by: INTERNAL MEDICINE

## 2023-01-25 PROCEDURE — C1769 GUIDE WIRE: HCPCS | Performed by: INTERNAL MEDICINE

## 2023-01-25 PROCEDURE — 360N000082 HC SURGERY LEVEL 2 W/ FLUORO, PER MIN: Performed by: INTERNAL MEDICINE

## 2023-01-25 PROCEDURE — 272N000002 HC OR SUPPLY OTHER OPNP: Performed by: INTERNAL MEDICINE

## 2023-01-25 PROCEDURE — 999N000181 XR SURGERY CARM FLUORO GREATER THAN 5 MIN W STILLS: Mod: TC

## 2023-01-25 PROCEDURE — 82150 ASSAY OF AMYLASE: CPT | Performed by: INTERNAL MEDICINE

## 2023-01-25 PROCEDURE — 83690 ASSAY OF LIPASE: CPT | Performed by: INTERNAL MEDICINE

## 2023-01-25 PROCEDURE — C1726 CATH, BAL DIL, NON-VASCULAR: HCPCS | Performed by: INTERNAL MEDICINE

## 2023-01-25 PROCEDURE — 250N000013 HC RX MED GY IP 250 OP 250 PS 637: Performed by: ANESTHESIOLOGY

## 2023-01-25 PROCEDURE — 370N000017 HC ANESTHESIA TECHNICAL FEE, PER MIN: Performed by: INTERNAL MEDICINE

## 2023-01-25 PROCEDURE — 710N000012 HC RECOVERY PHASE 2, PER MINUTE: Performed by: INTERNAL MEDICINE

## 2023-01-25 PROCEDURE — 258N000003 HC RX IP 258 OP 636: Performed by: ANESTHESIOLOGY

## 2023-01-25 DEVICE — IMPLANTABLE DEVICE
Type: IMPLANTABLE DEVICE | Site: BILE DUCT | Status: NON-FUNCTIONAL
Removed: 2023-05-02

## 2023-01-25 RX ORDER — DEXAMETHASONE SODIUM PHOSPHATE 4 MG/ML
INJECTION, SOLUTION INTRA-ARTICULAR; INTRALESIONAL; INTRAMUSCULAR; INTRAVENOUS; SOFT TISSUE PRN
Status: DISCONTINUED | OUTPATIENT
Start: 2023-01-25 | End: 2023-01-25

## 2023-01-25 RX ORDER — LIDOCAINE 40 MG/G
CREAM TOPICAL
Status: DISCONTINUED | OUTPATIENT
Start: 2023-01-25 | End: 2023-01-25 | Stop reason: HOSPADM

## 2023-01-25 RX ORDER — ONDANSETRON 2 MG/ML
INJECTION INTRAMUSCULAR; INTRAVENOUS PRN
Status: DISCONTINUED | OUTPATIENT
Start: 2023-01-25 | End: 2023-01-25

## 2023-01-25 RX ORDER — ONDANSETRON 2 MG/ML
4 INJECTION INTRAMUSCULAR; INTRAVENOUS EVERY 30 MIN PRN
Status: DISCONTINUED | OUTPATIENT
Start: 2023-01-25 | End: 2023-01-25 | Stop reason: HOSPADM

## 2023-01-25 RX ORDER — SODIUM CHLORIDE, SODIUM LACTATE, POTASSIUM CHLORIDE, CALCIUM CHLORIDE 600; 310; 30; 20 MG/100ML; MG/100ML; MG/100ML; MG/100ML
INJECTION, SOLUTION INTRAVENOUS CONTINUOUS
Status: DISCONTINUED | OUTPATIENT
Start: 2023-01-25 | End: 2023-01-25 | Stop reason: HOSPADM

## 2023-01-25 RX ORDER — LISINOPRIL 10 MG/1
10 TABLET ORAL DAILY
Status: DISCONTINUED | OUTPATIENT
Start: 2023-01-25 | End: 2023-01-25 | Stop reason: HOSPADM

## 2023-01-25 RX ORDER — FENTANYL CITRATE 50 UG/ML
INJECTION, SOLUTION INTRAMUSCULAR; INTRAVENOUS PRN
Status: DISCONTINUED | OUTPATIENT
Start: 2023-01-25 | End: 2023-01-25

## 2023-01-25 RX ORDER — HYDROMORPHONE HCL IN WATER/PF 6 MG/30 ML
0.4 PATIENT CONTROLLED ANALGESIA SYRINGE INTRAVENOUS EVERY 5 MIN PRN
Status: DISCONTINUED | OUTPATIENT
Start: 2023-01-25 | End: 2023-01-25 | Stop reason: HOSPADM

## 2023-01-25 RX ORDER — INDOMETHACIN 50 MG/1
SUPPOSITORY RECTAL PRN
Status: DISCONTINUED | OUTPATIENT
Start: 2023-01-25 | End: 2023-01-25 | Stop reason: HOSPADM

## 2023-01-25 RX ORDER — HYDROMORPHONE HCL IN WATER/PF 6 MG/30 ML
0.2 PATIENT CONTROLLED ANALGESIA SYRINGE INTRAVENOUS EVERY 5 MIN PRN
Status: DISCONTINUED | OUTPATIENT
Start: 2023-01-25 | End: 2023-01-25 | Stop reason: HOSPADM

## 2023-01-25 RX ORDER — FENTANYL CITRATE 50 UG/ML
50 INJECTION, SOLUTION INTRAMUSCULAR; INTRAVENOUS EVERY 5 MIN PRN
Status: DISCONTINUED | OUTPATIENT
Start: 2023-01-25 | End: 2023-01-25 | Stop reason: HOSPADM

## 2023-01-25 RX ORDER — FENTANYL CITRATE 50 UG/ML
25 INJECTION, SOLUTION INTRAMUSCULAR; INTRAVENOUS EVERY 5 MIN PRN
Status: DISCONTINUED | OUTPATIENT
Start: 2023-01-25 | End: 2023-01-25 | Stop reason: HOSPADM

## 2023-01-25 RX ORDER — IOPAMIDOL 510 MG/ML
INJECTION, SOLUTION INTRAVASCULAR PRN
Status: DISCONTINUED | OUTPATIENT
Start: 2023-01-25 | End: 2023-01-25 | Stop reason: HOSPADM

## 2023-01-25 RX ORDER — ONDANSETRON 4 MG/1
4 TABLET, ORALLY DISINTEGRATING ORAL EVERY 30 MIN PRN
Status: DISCONTINUED | OUTPATIENT
Start: 2023-01-25 | End: 2023-01-25 | Stop reason: HOSPADM

## 2023-01-25 RX ORDER — LIDOCAINE HYDROCHLORIDE 20 MG/ML
INJECTION, SOLUTION INFILTRATION; PERINEURAL PRN
Status: DISCONTINUED | OUTPATIENT
Start: 2023-01-25 | End: 2023-01-25

## 2023-01-25 RX ORDER — KETOROLAC TROMETHAMINE 10 MG/1
10 TABLET, FILM COATED ORAL EVERY 6 HOURS PRN
COMMUNITY
End: 2023-01-01

## 2023-01-25 RX ORDER — PROPOFOL 10 MG/ML
INJECTION, EMULSION INTRAVENOUS PRN
Status: DISCONTINUED | OUTPATIENT
Start: 2023-01-25 | End: 2023-01-25

## 2023-01-25 RX ADMIN — PHENYLEPHRINE HYDROCHLORIDE 100 MCG: 10 INJECTION INTRAVENOUS at 09:23

## 2023-01-25 RX ADMIN — PROCHLORPERAZINE EDISYLATE 5 MG: 5 INJECTION INTRAMUSCULAR; INTRAVENOUS at 11:27

## 2023-01-25 RX ADMIN — LIDOCAINE HYDROCHLORIDE 100 MG: 20 INJECTION, SOLUTION INFILTRATION; PERINEURAL at 09:02

## 2023-01-25 RX ADMIN — PHENYLEPHRINE HYDROCHLORIDE 50 MCG: 10 INJECTION INTRAVENOUS at 09:39

## 2023-01-25 RX ADMIN — DEXAMETHASONE SODIUM PHOSPHATE 4 MG: 4 INJECTION, SOLUTION INTRA-ARTICULAR; INTRALESIONAL; INTRAMUSCULAR; INTRAVENOUS; SOFT TISSUE at 09:24

## 2023-01-25 RX ADMIN — PROPOFOL 30 MG: 10 INJECTION, EMULSION INTRAVENOUS at 09:28

## 2023-01-25 RX ADMIN — FENTANYL CITRATE 50 MCG: 50 INJECTION, SOLUTION INTRAMUSCULAR; INTRAVENOUS at 09:34

## 2023-01-25 RX ADMIN — Medication 40 MG: at 09:02

## 2023-01-25 RX ADMIN — ONDANSETRON 4 MG: 2 INJECTION INTRAMUSCULAR; INTRAVENOUS at 10:38

## 2023-01-25 RX ADMIN — LISINOPRIL 10 MG: 10 TABLET ORAL at 12:40

## 2023-01-25 RX ADMIN — FENTANYL CITRATE 50 MCG: 50 INJECTION, SOLUTION INTRAMUSCULAR; INTRAVENOUS at 09:31

## 2023-01-25 RX ADMIN — ONDANSETRON 4 MG: 2 INJECTION INTRAMUSCULAR; INTRAVENOUS at 09:34

## 2023-01-25 RX ADMIN — SUGAMMADEX 200 MG: 100 INJECTION, SOLUTION INTRAVENOUS at 09:51

## 2023-01-25 RX ADMIN — SODIUM CHLORIDE, POTASSIUM CHLORIDE, SODIUM LACTATE AND CALCIUM CHLORIDE: 600; 310; 30; 20 INJECTION, SOLUTION INTRAVENOUS at 08:58

## 2023-01-25 RX ADMIN — PROPOFOL 160 MG: 10 INJECTION, EMULSION INTRAVENOUS at 09:02

## 2023-01-25 ASSESSMENT — ACTIVITIES OF DAILY LIVING (ADL)
ADLS_ACUITY_SCORE: 35

## 2023-01-25 NOTE — ANESTHESIA PREPROCEDURE EVALUATION
Anesthesia Pre-Procedure Evaluation    Patient: Yohana Marques   MRN: [id number] : 1945        Procedure : Procedure(s):  ENDOSCOPIC RETROGRADE CHOLANGIOPANCREATOGRAPHY          Past Medical History:   Diagnosis Date     HTN (hypertension)      Hyperlipidemia      Leiomyoma of uterus, unspecified      Numbness and tingling     spinal stenosis causes numbness and tingling on feet and knees bilaterally     Osteoarthrosis, unspecified whether generalized or localized, unspecified site      Other and unspecified disc disorder of lumbar region      Primary osteoarthritis of left hip 2016     Spinal stenosis     S/P diskectomy x 2, most recently 2014      Past Surgical History:   Procedure Laterality Date     ARTHROPLASTY HIP Left 2016    Procedure: ARTHROPLASTY HIP;  Surgeon: Leonid Morfin MD;  Location:  OR     BACK SURGERY       COLONOSCOPY N/A 2017    Procedure: COMBINED COLONOSCOPY, SINGLE OR MULTIPLE BIOPSY/POLYPECTOMY BY BIOPSY;  colonoscopy;  Surgeon: Catarino Salas MD;  Location:  GI     DISCECTOMY LUMBAR POSTERIOR MICROSCOPIC TWO LEVELS  2014    Procedure: DISCECTOMY LUMBAR POSTERIOR MICROSCOPIC TWO LEVELS;  Surgeon: Warren Herring MD;  Location:  OR     ENDOSCOPIC RETROGRADE CHOLANGIOPANCREATOGRAM N/A 10/14/2022    Procedure: ENDOSCOPIC RETROGRADE CHOLANGIOPANCREATOGRAPHY with biliary spincterotomy, biliary stent placement, cholangioscopy;  Surgeon: Carson Franklin MD;  Location:  OR     EXPLORE COMMON BILE DUCT N/A 2022    Procedure: COMMON BILE DUCT REPAIR;  Surgeon: Thai Garcia MD;  Location: [facility]     LAPAROSCOPIC CHOLECYSTECTOMY WITH CHOLANGIOGRAMS N/A 2022    Procedure: EXPLORATORY LAPAROSCOPY, LAPAROSCOPIC PARTIAL CHOLECYSTECTOMY WITH RETREVIAL OF STONES WITH CHOLANGIOGRAM; choledochoscopy, INTRAOPERATIVE ULTRASOUND;  Surgeon: Thai Garcia MD;  Location: [facility]     LAPAROSCOPY DIAGNOSTIC (GENERAL) N/A  10/06/2022    Procedure: Diagnostic laparoscopy with liver biopsy;  Surgeon: Warren Zhang MD;  Location: [facility] OR     ORTHOPEDIC SURGERY       TOTAL KNEE ARTHROPLASTY Left      ZZC NONSPECIFIC PROCEDURE  1996    lumbar discectomy     ZZC NONSPECIFIC PROCEDURE      cervical cone biopsy     ZZC NONSPECIFIC PROCEDURE      Left knee replacement      No Known Allergies   Social History     Tobacco Use     Smoking status: Never     Smokeless tobacco: Never   Substance Use Topics     Alcohol use: Yes     Alcohol/week: 0.0 standard drinks     Comment: social      Wt Readings from Last 1 Encounters:   01/25/23 89 kg (196 lb 3.4 oz)        Anesthesia Evaluation   Pt has had prior anesthetic. Type: General.        ROS/MED HX  ENT/Pulmonary:       Neurologic:       Cardiovascular:     (+) hypertension-----    METS/Exercise Tolerance:     Hematologic:       Musculoskeletal:       GI/Hepatic:     (+) hepatitis liver disease,     Renal/Genitourinary:       Endo:       Psychiatric/Substance Use:       Infectious Disease:       Malignancy:       Other:            Physical Exam    Airway        Mallampati: II    Neck ROM: full     Respiratory Devices and Support         Dental       (+) Full dentures      Cardiovascular   cardiovascular exam normal          Pulmonary   pulmonary exam normal                OUTSIDE LABS:  CBC:   Lab Results   Component Value Date    WBC 5.8 01/25/2023    WBC 7.5 12/03/2022    HGB 14.3 01/25/2023    HGB 11.9 12/03/2022    HCT 44.4 01/25/2023    HCT 37.6 12/03/2022     01/25/2023     (L) 12/03/2022     BMP:   Lab Results   Component Value Date     12/03/2022     12/02/2022    POTASSIUM 4.0 12/03/2022    POTASSIUM 4.4 12/02/2022    CHLORIDE 101 12/03/2022    CHLORIDE 103 12/02/2022    CO2 27 12/03/2022    CO2 26 12/02/2022    BUN 12.0 12/03/2022    BUN 8.6 12/02/2022    CR 0.70 12/03/2022    CR 0.64 12/02/2022    GLC 97 01/25/2023    GLC 98 12/03/2022     COAGS:   Lab Results    Component Value Date    PTT 25 05/31/2006    INR 1.06 01/25/2023     POC:   Lab Results   Component Value Date     (H) 08/13/2014     HEPATIC:   Lab Results   Component Value Date    ALBUMIN 2.7 (L) 12/03/2022    PROTTOTAL 5.6 (L) 12/03/2022    ALT 10 12/03/2022    AST 26 12/03/2022    ALKPHOS 69 12/03/2022    BILITOTAL 0.6 12/03/2022    SVEN 35 10/13/2022     OTHER:   Lab Results   Component Value Date    A1C 6.1 (H) 11/18/2005    YEE 8.4 (L) 12/03/2022    PHOS 3.4 12/03/2022    MAG 1.8 12/03/2022    LIPASE 31 08/23/2022    AMYLASE 41 08/23/2022    TSH 2.30 11/18/2005    T4 1.13 11/18/2005    SED 67 (H) 10/13/2022       Anesthesia Plan    ASA Status:  3   NPO Status:  NPO Appropriate    Anesthesia Type: General.     - Airway: ETT   Induction: Intravenous.   Maintenance: Inhalation.        Consents    Anesthesia Plan(s) and associated risks, benefits, and realistic alternatives discussed. Questions answered and patient/representative(s) expressed understanding.     - Discussed: Risks, Benefits and Alternatives for BOTH SEDATION and the PROCEDURE were discussed     - Discussed with:  Patient    Use of blood products discussed: Yes.     Postoperative Care    Pain management: IV analgesics.   PONV prophylaxis: Ondansetron (or other 5HT-3), Dexamethasone or Solumedrol     Comments:                Alberto Benson MD

## 2023-01-25 NOTE — DISCHARGE INSTRUCTIONS
[facility]  Same-Day Surgery   Adult Discharge Orders & Instructions     For 24 hours after surgery    Get plenty of rest.  A responsible adult must stay with you for at least 24 hours after you leave the hospital.   Do not drive or use heavy equipment.  If you have weakness or tingling, don't drive or use heavy equipment until this feeling goes away.  Do not drink alcohol.  Avoid strenuous or risky activities.  Ask for help when climbing stairs.   You may feel lightheaded.  IF so, sit for a few minutes before standing.  Have someone help you get up.   If you have nausea (feel sick to your stomach): Drink only clear liquids such as apple juice, ginger ale, broth or 7-Up.  Rest may also help.  Be sure to drink enough fluids.  Move to a regular diet as you feel able.  You may have a slight fever. Call the doctor if your fever is over 100 F (37.7 C) (taken under the tongue) or lasts longer than 24 hours.  You may have a dry mouth, a sore throat, muscle aches or trouble sleeping.  These should go away after 24 hours.  Do not make important or legal decisions.   Call your doctor for any of the followin.  Signs of infection (fever, growing tenderness at the surgery site, a large amount of drainage or bleeding, severe pain, foul-smelling drainage, redness, swelling).    2. It has been over 8 to 10 hours since surgery and you are still not able to urinate (pass water).    3.  Headache for over 24 hours.    4.  Numbness, tingling or weakness the day after surgery (if you had spinal anesthesia).  To contact a doctor, call Dr Franklin at [phone] (GASTROENTEROLOGY CLINIC)  or:    '   [phone] and ask for the resident on call for Gastroenterology (answered 24 hours a day)  '   Emergency Department:    [facility]: [phone]       (TTY for hearing impaired: [phone])    [facility] [place]: [phone]       (TTY for hearing impaired: [phone])

## 2023-01-25 NOTE — OP NOTE
ERCP 01/25/2023  8:52 AM [address] [place] [address] [phone]     Endoscopy Department   _______________________________________________________________________________   Patient Name: Yohana Marques    Procedure Date: 1/25/2023 8:52 AM   MRN: [id number]                       Account Number: [id number]   YOB: 1945               Admit Type: Outpatient   Age: 77                               Room: [place] [address]   Gender: Female                        Note Status: Finalized   Attending MD: ANTON SONG MD  Total Sedation Time:   _______________________________________________________________________________       Procedure:             ERCP   Indications:           Stent change, Bile duct stricture   Providers:             ANTON SONG MD, DANIKA KONG   Patient Profile:       Ms Sagar Marques is a 76yo woman with recent history                          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   Referring MD:          MACK SALCEDO MD   Medicines:             General Anesthesia, Indomethacin 100 mg MI   Complications:         No immediate complications. .   _______________________________________________________________________________   Procedure:             Pre-Anesthesia Assessment:                          - Prior to the procedure, a History and Physical was                          performed, and patient medications and allergies were                          reviewed. The patient is competent. The risks and                          benefits of the procedure and the sedation options and                          risks were discussed  with the patient. All questions                          were answered and informed consent was obtained.                          Patient identification and proposed procedure were                          verified by the nurse in the pre-procedure area.                          Mental Status Examination: alert and oriented. Airway                          Examination: Mallampati Class II (the uvula but not                          tonsillar pillars visualized). Respiratory                          Examination: clear to auscultation. CV Examination:                          normal. ASA Grade Assessment: III - A patient with                          severe systemic disease. After reviewing the risks and                          benefits, the patient was deemed in satisfactory                          condition to undergo the procedure. The anesthesia                          plan was to use general anesthesia. Immediately prior                          to administration of medications, the patient was                          re-assessed for adequacy to receive sedatives. The                          heart rate, respiratory rate, oxygen saturations,                          blood pressure, adequacy of pulmonary ventilation, and                          response to care were monitored throughout the                          procedure. The physical status of the patient was                          re-assessed after the procedure. After obtaining                          informed consent, the scope was passed under direct                          vision. Throughout the procedure, the patient's blood                          pressure, pulse, and oxygen saturations were monitored                          continuously. The duodenoscope was introduced through                          the mouth, and used to inject contrast into and used                          to inject contrast into the bile duct. The ERCP was                           accomplished without difficulty. The patient tolerated                          the procedure well.                                                                                     Findings:         films demonstrated partial downstream migration of the biliary        stent. This was confirmed on limited white light imaging where the stent        was seen across a patent biliary sphincterotomy and it appeared        partially occluded. The stent was removed from the biliary tree using a        snare. The bile duct was deeply cannulated with the short-nosed traction        sphincterotome and later the 12 mm balloon. Contrast was injected and I        personally interpreted the bile duct images. Ductal flow of contrast was        adequate, image quality was excellent and contrast extended throughout        the intrahepatics. There was a stenosis in the upper mid duct near the        location of the previous extrinsic compression and or the previous        common duct repeat. There was evidence of a residual stump or neck of        gallbladder in this region as well. There was mild central intraheaptic,        bifurcation and common duct dilation upstream of this area and the        common downstream was unremarkable. Drinage was sluggish across the        narrowed region. The biliary tree was swept with a 12 mm balloon        starting at the bifurcation recovering debris and concretion. A 10 mm by        10 cm covered metal Viabil stent was placed into the common bile duct        with proximal end just below the bifurcation and the common across the        sphincterotomy. Bile flowed through the stent and the stent was in good        position. The ventral pancreatic duct and orifice were selectively not        interrogated                                                                                     Impression:            - Uncomplicated removal of a partially migrated,                           partially occluded biliary stent from a patent biliary                          sphincterotomy                          - Persistent stenosis at mid common duct near the site                          of previous extrinsic stone compression and or common                          duct repair with mild central upstream dilation                          - Uncomplicated removal of biliary concretion and                          debris                          - Successful placement of a [id number] covered metal                          biliary stent with the proximal end below the                          bifurcation and distal end 1cm internalized   Recommendation:        - General anesthesia recovery with probable discharge                          home this morning                          - All medications, diet and activity may resume                          without delay                          - Repeat ERCP in 3.5-4m for stricture interrogation                          and stent change or removal                          - Follow up with established providers as scheduled                          - The findings and recommendations were discussed with                          the patient and their family                                                                                       electronically signed by DAYNA Franklin

## 2023-01-25 NOTE — ANESTHESIA POSTPROCEDURE EVALUATION
Patient: Yohana Marques    Procedure: Procedure(s):  ENDOSCOPIC RETROGRADE CHOLANGIOPANCREATOGRAPHY WITH BILIARY STENT EXCHANGE AND DEBRIS REMOVAL       Anesthesia Type:  General    Note:  Disposition: Outpatient   Postop Pain Control: Uneventful            Sign Out: Well controlled pain   PONV: No   Neuro/Psych: Uneventful            Sign Out: Acceptable/Baseline neuro status   Airway/Respiratory: Uneventful            Sign Out: Acceptable/Baseline resp. status   CV/Hemodynamics: Uneventful            Sign Out: Acceptable CV status; No obvious hypovolemia; No obvious fluid overload   Other NRE: NONE   DID A NON-ROUTINE EVENT OCCUR? No           Last vitals:  Vitals Value Taken Time   /91 01/25/23 1030   Temp 35.8  C (96.4  F) 01/25/23 1000   Pulse 67 01/25/23 1031   Resp 19 01/25/23 1031   SpO2 96 % 01/25/23 1031   Vitals shown include unvalidated device data.    Electronically Signed By: Paul Guzman MD  January 25, 2023  10:33 AM

## 2023-01-25 NOTE — OR NURSING
Dr. Benson called because patient blood pressure was in the 180s-190s after patient got up and dressed in phase II - all other vital signs WNL and patient feels fine. Yohana states she has not taken her lisinopril since Monday. Plan to give 10 mg now in phase II and discharge home right after.     Patient states she understands that she needs to resume taking all medications as previously instructed starting 1/26 am

## 2023-01-25 NOTE — H&P
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 She had repeat laparoscopic cholecystectomy and CBD repair. She has been doing well since. Hepatic function panel normalized. She presents today for stent removal and bile duct interrogation.        Patient Active Problem List   Diagnosis     HTN (hypertension)     Osteopenia     Hyperlipidemia LDL goal <160     ACP (advance care planning)     Health Care Home     Spinal stenosis, lumbar region, with neurogenic claudication     Physical deconditioning     S/P lumbar laminectomy     Chronic pain syndrome     Hip joint replacement status     S/P hip replacement, left     Benign essential hypertension     Xanthogranulomatous cholecystitis v gallbladder cancer     Calculus of gallbladder without cholecystitis without obstruction     Biliary obstruction     Micronodular cirrhosis (H)     Steatohepatitis     Mirizzi's syndrome       Past Surgical History:    Past Surgical History:   Procedure Laterality Date     ARTHROPLASTY HIP Left 12/12/2016    Procedure: ARTHROPLASTY HIP;  Surgeon: Leonid Morfin MD;  Location:  OR     BACK SURGERY       COLONOSCOPY N/A 07/20/2017    Procedure: COMBINED COLONOSCOPY, SINGLE OR MULTIPLE BIOPSY/POLYPECTOMY BY BIOPSY;  colonoscopy;  Surgeon: Catarino Salas MD;  Location:  GI     DISCECTOMY LUMBAR POSTERIOR MICROSCOPIC TWO LEVELS  08/11/2014    Procedure: DISCECTOMY LUMBAR POSTERIOR MICROSCOPIC TWO LEVELS;  Surgeon: Warren Herring MD;  Location:  OR     ENDOSCOPIC RETROGRADE CHOLANGIOPANCREATOGRAM N/A 10/14/2022    Procedure: ENDOSCOPIC RETROGRADE CHOLANGIOPANCREATOGRAPHY with biliary spincterotomy, biliary stent placement, cholangioscopy;  Surgeon:  Carson Franklin MD;  Location: [facility] OR     EXPLORE COMMON BILE DUCT N/A 2022    Procedure: COMMON BILE DUCT REPAIR;  Surgeon: Thai Garcia MD;  Location: [facility] OR     LAPAROSCOPIC CHOLECYSTECTOMY WITH CHOLANGIOGRAMS N/A 2022    Procedure: EXPLORATORY LAPAROSCOPY, LAPAROSCOPIC PARTIAL CHOLECYSTECTOMY WITH RETREVIAL OF STONES WITH CHOLANGIOGRAM; choledochoscopy, INTRAOPERATIVE ULTRASOUND;  Surgeon: Thai Garcia MD;  Location: [facility] OR     LAPAROSCOPY DIAGNOSTIC (GENERAL) N/A 10/06/2022    Procedure: Diagnostic laparoscopy with liver biopsy;  Surgeon: Warren Zhang MD;  Location:  OR     ORTHOPEDIC SURGERY       TOTAL KNEE ARTHROPLASTY Left      [facility] NONSPECIFIC PROCEDURE  1996    lumbar discectomy     [facility] NONSPECIFIC PROCEDURE      cervical cone biopsy     [facility] NONSPECIFIC PROCEDURE      Left knee replacement       Past Medical History:   Past Medical History:   Diagnosis Date     HTN (hypertension)      Hyperlipidemia      Leiomyoma of uterus, unspecified      Numbness and tingling     spinal stenosis causes numbness and tingling on feet and knees bilaterally     Osteoarthrosis, unspecified whether generalized or localized, unspecified site      Other and unspecified disc disorder of lumbar region      Primary osteoarthritis of left hip 2016     Spinal stenosis     S/P diskectomy x 2, most recently 2014       Social History:   Social History     Tobacco Use     Smoking status: Never     Smokeless tobacco: Never   Substance Use Topics     Alcohol use: Yes     Alcohol/week: 0.0 standard drinks     Comment: social       Family History:   Family History   Problem Relation Age of Onset     Family History Negative Mother          age 64 mva     Cancer Father          age 84 lung ca     Family History Negative Sister      Breast Cancer Paternal Grandmother      Genetic Disorder Other         daughter has Autoimmune disease       Allergies: No Known Allergies    Active  "Medications:   No current outpatient medications on file.       Systemic Review:   CONSTITUTIONAL: NEGATIVE for fever, chills, change in weight  ENT/MOUTH: NEGATIVE for ear, mouth and throat problems  RESP: NEGATIVE for significant cough or SOB  CV: NEGATIVE for chest pain, palpitations or peripheral edema    Physical Examination:   Vital Signs: BP (!) 146/84   Pulse 75   Temp 98.1  F (36.7  C) (Oral)   Resp 16   Ht 1.727 m (5' 8\")   Wt 89 kg (196 lb 3.4 oz)   SpO2 99%   BMI 29.83 kg/m    GENERAL: healthy, alert and no distress  NECK: no adenopathy, no asymmetry, masses, or scars  RESP: lungs clear to auscultation - no rales, rhonchi or wheezes  CV: regular rate and rhythm, normal S1 S2, no S3 or S4, no murmur, click or rub, no peripheral edema and peripheral pulses strong  ABDOMEN: soft, nontender, no hepatosplenomegaly, no masses and bowel sounds normal  MS: no gross musculoskeletal defects noted, no edema    Plan: Appropriate to proceed as scheduled.      Jorge Lovett MD  1/25/2023    PCP:  Arun Tao    "

## 2023-01-25 NOTE — ANESTHESIA CARE TRANSFER NOTE
Patient: Yohana Marques    Procedure: Procedure(s):  ENDOSCOPIC RETROGRADE CHOLANGIOPANCREATOGRAPHY WITH BILIARY STENT EXCHANGE AND DEBRIS REMOVAL       Diagnosis: Mirizzi's syndrome [K83.1]  Diagnosis Additional Information: No value filed.    Anesthesia Type:   General     Note:    Oropharynx: oropharynx clear of all foreign objects  Level of Consciousness: awake  Oxygen Supplementation: nasal cannula  Level of Supplemental Oxygen (L/min / FiO2): 4  Independent Airway: airway patency satisfactory and stable  Dentition: dentition unchanged  Vital Signs Stable: post-procedure vital signs reviewed and stable  Report to RN Given: handoff report given  Patient transferred to: PACU    Handoff Report: Identifed the Patient, Identified the Reponsible Provider, Reviewed the pertinent medical history, Discussed the surgical course, Reviewed Intra-OP anesthesia mangement and issues during anesthesia, Set expectations for post-procedure period and Allowed opportunity for questions and acknowledgement of understanding      Vitals:  Vitals Value Taken Time   /79 01/25/23 0958   Temp     Pulse 76 01/25/23 0959   Resp 14 01/25/23 0959   SpO2 93 % 01/25/23 0959   Vitals shown include unvalidated device data.    Electronically Signed By: HANY Jasso CRNA  January 25, 2023  10:01 AM

## 2023-01-30 ENCOUNTER — CARE COORDINATION (OUTPATIENT)
Dept: GASTROENTEROLOGY | Facility: CLINIC | Age: 78
End: 2023-01-30
Payer: COMMERCIAL

## 2023-01-30 ENCOUNTER — PREP FOR PROCEDURE (OUTPATIENT)
Dept: GASTROENTEROLOGY | Facility: CLINIC | Age: 78
End: 2023-01-30
Payer: COMMERCIAL

## 2023-01-30 DIAGNOSIS — K83.1 BILIARY STRICTURE (H): Primary | ICD-10-CM

## 2023-01-30 NOTE — PROGRESS NOTES
Post ERCP with Dr. Franklin 1/25/23  Repeat ERCP in 3.5-4m for stricture interrogation   and stent change or removal     Please assist in scheduling:     Procedure/Imaging/Clinic: ERCP  Physician: Dr. Franklin  Timing: 3.5-4 months (April-May 2023)  Procedure length:provider average  Anesthesia:gen  Dx: biliary stricture  Tier:2  Location: [facility]       Orders placed    ML

## 2023-03-16 ENCOUNTER — PATIENT OUTREACH (OUTPATIENT)
Dept: GASTROENTEROLOGY | Facility: CLINIC | Age: 78
End: 2023-03-16
Payer: COMMERCIAL

## 2023-03-16 NOTE — TELEPHONE ENCOUNTER
Procedure/Imaging/Clinic: ERCP   Physician: Dr. Franklin   Timin23  Procedure length:provider average   Anesthesia:gen   Dx: biliary stricture   Tier:2   Location: [facility]         Called to discuss with patient.     Explained they can expect a call from  for date and time of procedure, will need a , someone to stay with them for 24 hours and should stay in town for 24 hours (within 45 min of Hospital) post procedure    Patient needs to get pre-op physical completed. If outside [facility] system will need physical faxed to number [phone]   If you do not get a preop physical, your procedure could be cancelled, patient voiced understanding*    Preop Plan:PCP will do, PCP verified    Does patient have any history of gastric bypass/gastric surgery/altered panc/bili anatomy?no    Med Review    Blood thinner -  no  ASA - 81mg  Diabetic - no    Patient Education r/t procedure:done    A pre-op nurse will call 1-2 days prior to the procedure.    NPO/Prep:   Adults and Children of all ages may consume solids up to 8 hours prior to arrival time - may consume clear liquids up to 1 hour prior to arrival time.    Other specific details/comments:none     Verbalized understanding of all instructions. All questions answered.     Procedure order placed, message routed to OR / Endo

## 2023-03-26 DIAGNOSIS — G89.4 CHRONIC PAIN SYNDROME: ICD-10-CM

## 2023-03-26 DIAGNOSIS — Z96.642 S/P HIP REPLACEMENT, LEFT: ICD-10-CM

## 2023-03-28 RX ORDER — TRAMADOL HYDROCHLORIDE 50 MG/1
TABLET ORAL
Qty: 90 TABLET | Refills: 0 | Status: SHIPPED | OUTPATIENT
Start: 2023-03-28 | End: 2023-01-01

## 2023-04-28 NOTE — PATIENT INSTRUCTIONS
Ok to take lisinopril the morning of procedure  HOLD Vitamins and supplements the morning of surgery  Stop celebrex 3 days prior as per surgery center  Stop Asa prior to surgery      For informational purposes only. Not to replace the advice of your health care provider. Copyright   , 2019 [facility]. All rights reserved. Clinically reviewed by Mary Noland MD. [employer] [id number] - REV .  Preparing for Your Surgery  Getting started  A nurse will call you to review your health history and instructions. They will give you an arrival time based on your scheduled surgery time. Please be ready to share:  Your doctor's clinic name and phone number  Your medical, surgical, and anesthesia history  A list of allergies and sensitivities  A list of medicines, including herbal treatments and over-the-counter drugs  Whether the patient has a legal guardian (ask how to send us the papers in advance)  Please tell us if you're pregnant--or if there's any chance you might be pregnant. Some surgeries may injure a fetus (unborn baby), so they require a pregnancy test. Surgeries that are safe for a fetus don't always need a test, and you can choose whether to have one.   If you have a child who's having surgery, please ask for a copy of Preparing for Your Child's Surgery.    Preparing for surgery  Within 10 to 30 days of surgery: Have a pre-op exam (sometimes called an H&P, or History and Physical). This can be done at a clinic or pre-operative center.  If you're having a , you may not need this exam. Talk to your care team.  At your pre-op exam, talk to your care team about all medicines you take. If you need to stop any medicines before surgery, ask when to start taking them again.  We do this for your safety. Many medicines can make you bleed too much during surgery. Some change how well surgery (anesthesia) drugs work.  Call your insurance company to let them know you're having surgery. (If you don't  have insurance, call [phone].)  Call your clinic if there's any change in your health. This includes signs of a cold or flu (sore throat, runny nose, cough, rash, fever). It also includes a scrape or scratch near the surgery site.  If you have questions on the day of surgery, call your hospital or surgery center.  Eating and drinking guidelines  For your safety: Unless your surgeon tells you otherwise, follow the guidelines below.  Eat and drink as usual until 8 hours before you arrive for surgery. After that, no food or milk.  Drink clear liquids until 2 hours before you arrive. These are liquids you can see through, like water, Gatorade, and Propel Water. They also include plain black coffee and tea (no cream or milk), candy, and breath mints. You can spit out gum when you arrive.  If you drink alcohol: Stop drinking it the night before surgery.  If your care team tells you to take medicine on the morning of surgery, it's okay to take it with a sip of water.  Preventing infection  Shower or bathe the night before and morning of your surgery. Follow the instructions your clinic gave you. (If no instructions, use regular soap.)  Don't shave or clip hair near your surgery site. We'll remove the hair if needed.  Don't smoke or vape the morning of surgery. You may chew nicotine gum up to 2 hours before surgery. A nicotine patch is okay.  Note: Some surgeries require you to completely quit smoking and nicotine. Check with your surgeon.  Your care team will make every effort to keep you safe from infection. We will:  Clean our hands often with soap and water (or an alcohol-based hand rub).  Clean the skin at your surgery site with a special soap that kills germs.  Give you a special gown to keep you warm. (Cold raises the risk of infection.)  Wear special hair covers, masks, gowns and gloves during surgery.  Give antibiotic medicine, if prescribed. Not all surgeries need antibiotics.  What to bring on the day of  surgery  Photo ID and insurance card  Copy of your health care directive, if you have one  Glasses and hearing aids (bring cases)  You can't wear contacts during surgery  Inhaler and eye drops, if you use them (tell us about these when you arrive)  CPAP machine or breathing device, if you use them  A few personal items, if spending the night  If you have . . .  A pacemaker, ICD (cardiac defibrillator) or other implant: Bring the ID card.  An implanted stimulator: Bring the remote control.  A legal guardian: Bring a copy of the certified (court-stamped) guardianship papers.  Please remove any jewelry, including body piercings. Leave jewelry and other valuables at home.  If you're going home the day of surgery  You must have a responsible adult drive you home. They should stay with you overnight as well.  If you don't have someone to stay with you, and you aren't safe to go home alone, we may keep you overnight. Insurance often won't pay for this.  After surgery  If it's hard to control your pain or you need more pain medicine, please call your surgeon's office.  Questions?   If you have any questions for your care team, list them here: _________________________________________________________________________________________________________________________________________________________________________ ____________________________________ ____________________________________ ____________________________________    How to Take Your Medication Before Surgery  - as above

## 2023-04-28 NOTE — PROGRESS NOTES
[address] [address]  Phone: [phone]  Primary Provider: Arun Tao  Pre-op Performing Provider: SUSAN HIGGINS      PREOPERATIVE EVALUATION:  Today's date: 4/28/2023    Yohana Marques is a 77 year old female who presents for a preoperative evaluation.    Surgical Information:  Surgery/Procedure: ENDOSCOPIC RETROGRADE CHOLANGIOPANCREATOGRAPHY  Surgery Location: [facility]  Surgeon: Dr Franklin  Surgery Date: 5/02/2023  Time of Surgery: 830am  Where patient plans to recover: At home with family  Fax number for surgical facility: Note does not need to be faxed, will be available electronically in Epic.    Assessment & Plan     The proposed surgical procedure is considered low to INTERMEDIATE risk.    Preop general physical exam    - Basic metabolic panel  (Ca, Cl, CO2, Creat, Gluc, K, Na, BUN); Future    Biliary stricture    - Basic metabolic panel  (Ca, Cl, CO2, Creat, Gluc, K, Na, BUN); Future    Chronic pain syndrome    - Basic metabolic panel  (Ca, Cl, CO2, Creat, Gluc, K, Na, BUN); Future    Primary hypertension    - Basic metabolic panel  (Ca, Cl, CO2, Creat, Gluc, K, Na, BUN); Future    Hyperlipidemia LDL goal <160               Risks and Recommendations:  The patient has the following additional risks and recommendations for perioperative complications:   - No identified additional risk factors other than previously addressed    Antiplatelet or Anticoagulation Medication Instructions:   - aspirin: Discontinue aspirin 7-10 days prior to procedure to reduce bleeding risk. It should be resumed postoperatively.     Additional Medication Instructions:  Patient is to take all scheduled medications on the day of surgery EXCEPT for modifications listed below:  hold all supplements and vitamins the morning of surgery   - ACE/ARB: Continue without modification (e.g., MAC anesthesia, neurosurgery, spine surgery,  heart failure, or labile hypertension with risk of hypertension).   - celecoxib (Celebrex): HOLD 3 days before surgery. May continue without modification for management of severe pain.     RECOMMENDATION:  APPROVAL GIVEN to proceed with proposed procedure, without further diagnostic evaluation.            Subjective     HPI related to upcoming procedure: biliary stricture        4/24/2023     9:20 AM   Preop Questions   1. Have you ever had a heart attack or stroke? No   2. Have you ever had surgery on your heart or blood vessels, such as a stent placement, a coronary artery bypass, or surgery on an artery in your head, neck, heart, or legs? No   3. Do you have chest pain with activity? No   4. Do you have a history of  heart failure? No   5. Do you currently have a cold, bronchitis or symptoms of other infection? No   6. Do you have a cough, shortness of breath, or wheezing? No   7. Do you or anyone in your family have previous history of blood clots? No   8. Do you or does anyone in your family have a serious bleeding problem such as prolonged bleeding following surgeries or cuts? No   9. Have you ever had problems with anemia or been told to take iron pills? No   10. Have you had any abnormal blood loss such as black, tarry or bloody stools, or abnormal vaginal bleeding? No   11. Have you ever had a blood transfusion? No   12. Are you willing to have a blood transfusion if it is medically needed before, during, or after your surgery? Yes   13. Have you or any of your relatives ever had problems with anesthesia? No   14. Do you have sleep apnea, excessive snoring or daytime drowsiness? No   15. Do you have any artifical heart valves or other implanted medical devices like a pacemaker, defibrillator, or continuous glucose monitor? No   16. Do you have artificial joints? YES - hip and knee   17. Are you allergic to latex? No       Health Care Directive:  Patient does not have a Health Care Directive or Living Will:      Preoperative Review of :   reviewed - controlled substances reflected in medication list.      Status of Chronic Conditions:  See problem list for active medical problems.  Problems all longstanding and stable, except as noted/documented.  See ROS for pertinent symptoms related to these conditions.    HYPERTENSION - Patient has longstanding history of HTN , currently denies any symptoms referable to elevated blood pressure. Specifically denies chest pain, palpitations, dyspnea, orthopnea, PND or peripheral edema. Blood pressure readings have been in normal range. Current medication regimen is as listed below. Patient denies any side effects of medication.     Rheumatoid arthritis - chronic pain stable on current medications.     Review of Systems  CONSTITUTIONAL: NEGATIVE for fever, chills, change in weight  ENT/MOUTH: NEGATIVE for ear, mouth and throat problems  RESP: NEGATIVE for significant cough or SOB  CV: NEGATIVE for chest pain, palpitations or peripheral edema  GI: NEGATIVE for nausea, abdominal pain, heartburn, or change in bowel habits  MUSCULOSKELETAL: NEGATIVE for significant arthralgias or myalgia  NEURO: NEGATIVE for weakness, dizziness or paresthesias  ENDOCRINE: NEGATIVE for temperature intolerance, skin/hair changes  HEME/ALLERGY/IMMUNE: NEGATIVE for bleeding problems  PSYCHIATRIC: NEGATIVE for changes in mood or affect  ROS otherwise negative    Patient Active Problem List    Diagnosis Date Noted     ACP (advance care planning) 04/04/2012     Priority: High     Discussed advance care planning with patient; information given to patient to review.4/4/2012   Facilitator called patient and mailed information re: Honoring choices  Will await call back regarding meeting with facilitator. 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  All refills should be approved by this provider, or covering partner.    Medication(s): Tramadol.   Maximum quantity per month: 40  Clinic visit frequency required: Q 6  months     Controlled substance agreement on file: Yes       Date(s): 6/1/2016    Pain Clinic evaluation in the past: No    DIRE Total Score(s):  No flowsheet data found.    Last [facility] website verification:  done on 6/1/2016   [email or web address]         S/P lumbar laminectomy 08/29/2014     Priority: Medium     Physical deconditioning 08/15/2014     Priority: Medium     Spinal stenosis, lumbar region, with neurogenic claudication 08/11/2014     Priority: Medium     --S/P discectomy x 2, most recently 2-level laminectomy/discectomy 8/2014       Health Care Home 07/20/2012     Priority: Medium     A [facility] for .  Criselda Wood RN-BC    [phone]     DX V65.8 REPLACED WITH [id number]ALTH CARE HOME (04/08/2013)       Hyperlipidemia LDL goal <160 10/31/2010     Priority: Medium     Osteopenia 04/07/2010     Priority: Medium     HTN (hypertension)      Priority: Medium      Past Medical History:   Diagnosis Date     HTN (hypertension)      Hyperlipidemia      Leiomyoma of uterus, unspecified      Numbness and tingling     spinal stenosis causes numbness and tingling on feet and knees bilaterally     Osteoarthrosis, unspecified whether generalized or  localized, unspecified site      Other and unspecified disc disorder of lumbar region      Other chronic pain      Primary osteoarthritis of left hip 12/18/2016     Spinal stenosis     S/P diskectomy x 2, most recently 8/2014     Past Surgical History:   Procedure Laterality Date     ARTHROPLASTY HIP Left 12/12/2016    Procedure: ARTHROPLASTY HIP;  Surgeon: Leonid Morfin MD;  Location:  OR     BACK SURGERY       COLONOSCOPY N/A 07/20/2017    Procedure: COMBINED COLONOSCOPY, SINGLE OR MULTIPLE BIOPSY/POLYPECTOMY BY BIOPSY;  colonoscopy;  Surgeon: Catarino Salas MD;  Location:  GI     DISCECTOMY LUMBAR POSTERIOR MICROSCOPIC TWO LEVELS  08/11/2014    Procedure: DISCECTOMY LUMBAR POSTERIOR MICROSCOPIC TWO LEVELS;  Surgeon: Warren Herring MD;  Location:  OR     ENDOSCOPIC RETROGRADE CHOLANGIOPANCREATOGRAM N/A 10/14/2022    Procedure: ENDOSCOPIC RETROGRADE CHOLANGIOPANCREATOGRAPHY with biliary spincterotomy, biliary stent placement, cholangioscopy;  Surgeon: Carson Franklin MD;  Location: [facility] OR     ENDOSCOPIC RETROGRADE CHOLANGIOPANCREATOGRAPHY, EXCHANGE TUBE/STENT N/A 1/25/2023    Procedure: ENDOSCOPIC RETROGRADE CHOLANGIOPANCREATOGRAPHY WITH BILIARY STENT EXCHANGE AND DEBRIS REMOVAL;  Surgeon: Carson Franklin MD;  Location: [facility] OR     EXPLORE COMMON BILE DUCT N/A 12/01/2022    Procedure: COMMON BILE DUCT REPAIR;  Surgeon: Thai Garcia MD;  Location: [facility] OR     LAPAROSCOPIC CHOLECYSTECTOMY WITH CHOLANGIOGRAMS N/A 12/01/2022    Procedure: EXPLORATORY LAPAROSCOPY, LAPAROSCOPIC PARTIAL CHOLECYSTECTOMY WITH RETREVIAL OF STONES WITH CHOLANGIOGRAM; choledochoscopy, INTRAOPERATIVE ULTRASOUND;  Surgeon: Thai Garcia MD;  Location: [facility] OR     LAPAROSCOPY DIAGNOSTIC (GENERAL) N/A 10/06/2022    Procedure: Diagnostic laparoscopy with liver biopsy;  Surgeon: Warren Zhang MD;  Location:  OR     ORTHOPEDIC SURGERY       TOTAL KNEE ARTHROPLASTY Left      ZZC NONSPECIFIC PROCEDURE   "1996    lumbar discectomy     [facility] NONSPECIFIC PROCEDURE      cervical cone biopsy     [facility] NONSPECIFIC PROCEDURE      Left knee replacement     Current Outpatient Medications   Medication Sig Dispense Refill     acetaminophen (TYLENOL) 325 MG tablet Take 2 tablets (650 mg) by mouth every 8 hours as needed for mild pain 50 tablet 0     ASPIRIN EC PO Take 81 mg by mouth daily       calcium-vitamin D (CALTRATE) 600-400 MG-UNIT per tablet Take 1 tablet by mouth 2 times daily       celecoxib (CELEBREX) 200 MG capsule Take 1 capsule (200 mg) by mouth daily 90 capsule 1     lisinopril (ZESTRIL) 10 MG tablet TAKE 1 TABLET(10 MG) BY MOUTH DAILY 90 tablet 1     Multiple Vitamins-Minerals (CENTRUM SILVER) per tablet Take 1 tablet by mouth daily       polyethylene glycol (MIRALAX) 17 GM/Dose powder Take 17 g by mouth daily Take for constipation after surgery. 510 g 3     traMADol (ULTRAM) 50 MG tablet TAKE 1 TO 2 TABLETS BY MOUTH EVERY DAY FOR SEVERE PAIN Strength: 50 mg (Patient taking differently: 50 mg TAKE 1 TO 2 TABLETS BY MOUTH EVERY DAY FOR SEVERE PAIN Strength: 50 mg) 90 tablet 0     vitamin D3 (CHOLECALCIFEROL) 50 mcg (2000 units) tablet Take 1 tablet (50 mcg) by mouth daily         No Known Allergies     Social History     Tobacco Use     Smoking status: Never     Smokeless tobacco: Never   Vaping Use     Vaping status: Never Used   Substance Use Topics     Alcohol use: Yes     Alcohol/week: 0.0 standard drinks of alcohol     Comment: social       History   Drug Use No         Objective     /84   Pulse 78   Temp 97.8  F (36.6  C) (Tympanic)   Resp 16   Ht 1.727 m (5' 8\")   Wt 92.4 kg (203 lb 12.8 oz)   SpO2 96%   BMI 30.99 kg/m      Physical Exam  GENERAL APPEARANCE: healthy, alert and no distress  HENT: ear canals and TM's normal and nose and mouth without ulcers or lesions  RESP: lungs clear to auscultation - no rales, rhonchi or wheezes  CV: regular rate and rhythm, normal S1 S2, no S3 or S4 and no " murmur, click or rub   MS: extremities normal- no gross deformities noted  SKIN: no suspicious lesions or rashes  NEURO: Normal strength and tone, sensory exam grossly normal, mentation intact and speech normal  PSYCH: mentation appears normal and affect normal/bright    Recent Labs   Lab Test 01/25/23  0719 12/03/22  0548 10/16/22  0609 10/15/22  0728   HGB 14.3 11.9   < > 11.7    144*   < > 322   INR 1.06  --   --  1.23*    136   < > 134*   POTASSIUM 4.1 4.0   < > 4.3   CR 0.68 0.70   < > 0.56    < > = values in this interval not displayed.        Diagnostics:  Labs pending at this time.  Results will be reviewed when available.   No EKG required, no history of coronary heart disease, significant arrhythmia, peripheral arterial disease or other structural heart disease.    Revised Cardiac Risk Index (RCRI):  The patient has the following serious cardiovascular risks for perioperative complications:   - No serious cardiac risks = 0 points     RCRI Interpretation: 0 points: Class I (very low risk - 0.4% complication rate)           Signed Electronically by: Honey Oshea PA-C  Copy of this evaluation report is provided to requesting physician.

## 2023-04-28 NOTE — H&P (VIEW-ONLY)
[address] [address]  Phone: [phone]  Primary Provider: Arun Tao  Pre-op Performing Provider: SUSAN HIGGINS      PREOPERATIVE EVALUATION:  Today's date: 4/28/2023    Yohana Marques is a 77 year old female who presents for a preoperative evaluation.    Surgical Information:  Surgery/Procedure: ENDOSCOPIC RETROGRADE CHOLANGIOPANCREATOGRAPHY  Surgery Location: [facility]  Surgeon: Dr Franklin  Surgery Date: 5/02/2023  Time of Surgery: 830am  Where patient plans to recover: At home with family  Fax number for surgical facility: Note does not need to be faxed, will be available electronically in Epic.    Assessment & Plan     The proposed surgical procedure is considered low to INTERMEDIATE risk.    Preop general physical exam    - Basic metabolic panel  (Ca, Cl, CO2, Creat, Gluc, K, Na, BUN); Future    Biliary stricture    - Basic metabolic panel  (Ca, Cl, CO2, Creat, Gluc, K, Na, BUN); Future    Chronic pain syndrome    - Basic metabolic panel  (Ca, Cl, CO2, Creat, Gluc, K, Na, BUN); Future    Primary hypertension    - Basic metabolic panel  (Ca, Cl, CO2, Creat, Gluc, K, Na, BUN); Future    Hyperlipidemia LDL goal <160               Risks and Recommendations:  The patient has the following additional risks and recommendations for perioperative complications:   - No identified additional risk factors other than previously addressed    Antiplatelet or Anticoagulation Medication Instructions:   - aspirin: Discontinue aspirin 7-10 days prior to procedure to reduce bleeding risk. It should be resumed postoperatively.     Additional Medication Instructions:  Patient is to take all scheduled medications on the day of surgery EXCEPT for modifications listed below:  hold all supplements and vitamins the morning of surgery   - ACE/ARB: Continue without modification (e.g., MAC anesthesia, neurosurgery, spine surgery,  heart failure, or labile hypertension with risk of hypertension).   - celecoxib (Celebrex): HOLD 3 days before surgery. May continue without modification for management of severe pain.     RECOMMENDATION:  APPROVAL GIVEN to proceed with proposed procedure, without further diagnostic evaluation.            Subjective     HPI related to upcoming procedure: biliary stricture        4/24/2023     9:20 AM   Preop Questions   1. Have you ever had a heart attack or stroke? No   2. Have you ever had surgery on your heart or blood vessels, such as a stent placement, a coronary artery bypass, or surgery on an artery in your head, neck, heart, or legs? No   3. Do you have chest pain with activity? No   4. Do you have a history of  heart failure? No   5. Do you currently have a cold, bronchitis or symptoms of other infection? No   6. Do you have a cough, shortness of breath, or wheezing? No   7. Do you or anyone in your family have previous history of blood clots? No   8. Do you or does anyone in your family have a serious bleeding problem such as prolonged bleeding following surgeries or cuts? No   9. Have you ever had problems with anemia or been told to take iron pills? No   10. Have you had any abnormal blood loss such as black, tarry or bloody stools, or abnormal vaginal bleeding? No   11. Have you ever had a blood transfusion? No   12. Are you willing to have a blood transfusion if it is medically needed before, during, or after your surgery? Yes   13. Have you or any of your relatives ever had problems with anesthesia? No   14. Do you have sleep apnea, excessive snoring or daytime drowsiness? No   15. Do you have any artifical heart valves or other implanted medical devices like a pacemaker, defibrillator, or continuous glucose monitor? No   16. Do you have artificial joints? YES - hip and knee   17. Are you allergic to latex? No       Health Care Directive:  Patient does not have a Health Care Directive or Living Will:      Preoperative Review of :   reviewed - controlled substances reflected in medication list.      Status of Chronic Conditions:  See problem list for active medical problems.  Problems all longstanding and stable, except as noted/documented.  See ROS for pertinent symptoms related to these conditions.    HYPERTENSION - Patient has longstanding history of HTN , currently denies any symptoms referable to elevated blood pressure. Specifically denies chest pain, palpitations, dyspnea, orthopnea, PND or peripheral edema. Blood pressure readings have been in normal range. Current medication regimen is as listed below. Patient denies any side effects of medication.     Rheumatoid arthritis - chronic pain stable on current medications.     Review of Systems  CONSTITUTIONAL: NEGATIVE for fever, chills, change in weight  ENT/MOUTH: NEGATIVE for ear, mouth and throat problems  RESP: NEGATIVE for significant cough or SOB  CV: NEGATIVE for chest pain, palpitations or peripheral edema  GI: NEGATIVE for nausea, abdominal pain, heartburn, or change in bowel habits  MUSCULOSKELETAL: NEGATIVE for significant arthralgias or myalgia  NEURO: NEGATIVE for weakness, dizziness or paresthesias  ENDOCRINE: NEGATIVE for temperature intolerance, skin/hair changes  HEME/ALLERGY/IMMUNE: NEGATIVE for bleeding problems  PSYCHIATRIC: NEGATIVE for changes in mood or affect  ROS otherwise negative    Patient Active Problem List    Diagnosis Date Noted     ACP (advance care planning) 04/04/2012     Priority: High     Discussed advance care planning with patient; information given to patient to review.4/4/2012   Facilitator called patient and mailed information re: Honoring choices  Will await call back regarding meeting with facilitator. 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  All refills should be approved by this provider, or covering partner.    Medication(s): Tramadol.   Maximum quantity per month: 40  Clinic visit frequency required: Q 6  months     Controlled substance agreement on file: Yes       Date(s): 6/1/2016    Pain Clinic evaluation in the past: No    DIRE Total Score(s):  No flowsheet data found.    Last [facility] website verification:  done on 6/1/2016   [email or web address]         S/P lumbar laminectomy 08/29/2014     Priority: Medium     Physical deconditioning 08/15/2014     Priority: Medium     Spinal stenosis, lumbar region, with neurogenic claudication 08/11/2014     Priority: Medium     --S/P discectomy x 2, most recently 2-level laminectomy/discectomy 8/2014       Health Care Home 07/20/2012     Priority: Medium     A [facility] for .  Criselda Wood RN-BC    [phone]     DX V65.8 REPLACED WITH [id number]ALTH CARE HOME (04/08/2013)       Hyperlipidemia LDL goal <160 10/31/2010     Priority: Medium     Osteopenia 04/07/2010     Priority: Medium     HTN (hypertension)      Priority: Medium      Past Medical History:   Diagnosis Date     HTN (hypertension)      Hyperlipidemia      Leiomyoma of uterus, unspecified      Numbness and tingling     spinal stenosis causes numbness and tingling on feet and knees bilaterally     Osteoarthrosis, unspecified whether generalized or  localized, unspecified site      Other and unspecified disc disorder of lumbar region      Other chronic pain      Primary osteoarthritis of left hip 12/18/2016     Spinal stenosis     S/P diskectomy x 2, most recently 8/2014     Past Surgical History:   Procedure Laterality Date     ARTHROPLASTY HIP Left 12/12/2016    Procedure: ARTHROPLASTY HIP;  Surgeon: Leonid Morfin MD;  Location:  OR     BACK SURGERY       COLONOSCOPY N/A 07/20/2017    Procedure: COMBINED COLONOSCOPY, SINGLE OR MULTIPLE BIOPSY/POLYPECTOMY BY BIOPSY;  colonoscopy;  Surgeon: Catarino Salas MD;  Location:  GI     DISCECTOMY LUMBAR POSTERIOR MICROSCOPIC TWO LEVELS  08/11/2014    Procedure: DISCECTOMY LUMBAR POSTERIOR MICROSCOPIC TWO LEVELS;  Surgeon: Warren Herring MD;  Location:  OR     ENDOSCOPIC RETROGRADE CHOLANGIOPANCREATOGRAM N/A 10/14/2022    Procedure: ENDOSCOPIC RETROGRADE CHOLANGIOPANCREATOGRAPHY with biliary spincterotomy, biliary stent placement, cholangioscopy;  Surgeon: Carson Franklin MD;  Location: [facility] OR     ENDOSCOPIC RETROGRADE CHOLANGIOPANCREATOGRAPHY, EXCHANGE TUBE/STENT N/A 1/25/2023    Procedure: ENDOSCOPIC RETROGRADE CHOLANGIOPANCREATOGRAPHY WITH BILIARY STENT EXCHANGE AND DEBRIS REMOVAL;  Surgeon: Carson Franklin MD;  Location: [facility] OR     EXPLORE COMMON BILE DUCT N/A 12/01/2022    Procedure: COMMON BILE DUCT REPAIR;  Surgeon: Thai Garcia MD;  Location: [facility]     LAPAROSCOPIC CHOLECYSTECTOMY WITH CHOLANGIOGRAMS N/A 12/01/2022    Procedure: EXPLORATORY LAPAROSCOPY, LAPAROSCOPIC PARTIAL CHOLECYSTECTOMY WITH RETREVIAL OF STONES WITH CHOLANGIOGRAM; choledochoscopy, INTRAOPERATIVE ULTRASOUND;  Surgeon: Thai Garcia MD;  Location: [facility] OR     LAPAROSCOPY DIAGNOSTIC (GENERAL) N/A 10/06/2022    Procedure: Diagnostic laparoscopy with liver biopsy;  Surgeon: Warren Zhang MD;  Location:  OR     ORTHOPEDIC SURGERY       TOTAL KNEE ARTHROPLASTY Left      ZZC NONSPECIFIC PROCEDURE   "1996    lumbar discectomy     [facility] NONSPECIFIC PROCEDURE      cervical cone biopsy     [facility] NONSPECIFIC PROCEDURE      Left knee replacement     Current Outpatient Medications   Medication Sig Dispense Refill     acetaminophen (TYLENOL) 325 MG tablet Take 2 tablets (650 mg) by mouth every 8 hours as needed for mild pain 50 tablet 0     ASPIRIN EC PO Take 81 mg by mouth daily       calcium-vitamin D (CALTRATE) 600-400 MG-UNIT per tablet Take 1 tablet by mouth 2 times daily       celecoxib (CELEBREX) 200 MG capsule Take 1 capsule (200 mg) by mouth daily 90 capsule 1     lisinopril (ZESTRIL) 10 MG tablet TAKE 1 TABLET(10 MG) BY MOUTH DAILY 90 tablet 1     Multiple Vitamins-Minerals (CENTRUM SILVER) per tablet Take 1 tablet by mouth daily       polyethylene glycol (MIRALAX) 17 GM/Dose powder Take 17 g by mouth daily Take for constipation after surgery. 510 g 3     traMADol (ULTRAM) 50 MG tablet TAKE 1 TO 2 TABLETS BY MOUTH EVERY DAY FOR SEVERE PAIN Strength: 50 mg (Patient taking differently: 50 mg TAKE 1 TO 2 TABLETS BY MOUTH EVERY DAY FOR SEVERE PAIN Strength: 50 mg) 90 tablet 0     vitamin D3 (CHOLECALCIFEROL) 50 mcg (2000 units) tablet Take 1 tablet (50 mcg) by mouth daily         No Known Allergies     Social History     Tobacco Use     Smoking status: Never     Smokeless tobacco: Never   Vaping Use     Vaping status: Never Used   Substance Use Topics     Alcohol use: Yes     Alcohol/week: 0.0 standard drinks of alcohol     Comment: social       History   Drug Use No         Objective     /84   Pulse 78   Temp 97.8  F (36.6  C) (Tympanic)   Resp 16   Ht 1.727 m (5' 8\")   Wt 92.4 kg (203 lb 12.8 oz)   SpO2 96%   BMI 30.99 kg/m      Physical Exam  GENERAL APPEARANCE: healthy, alert and no distress  HENT: ear canals and TM's normal and nose and mouth without ulcers or lesions  RESP: lungs clear to auscultation - no rales, rhonchi or wheezes  CV: regular rate and rhythm, normal S1 S2, no S3 or S4 and no " murmur, click or rub   MS: extremities normal- no gross deformities noted  SKIN: no suspicious lesions or rashes  NEURO: Normal strength and tone, sensory exam grossly normal, mentation intact and speech normal  PSYCH: mentation appears normal and affect normal/bright    Recent Labs   Lab Test 01/25/23  0719 12/03/22  0548 10/16/22  0609 10/15/22  0728   HGB 14.3 11.9   < > 11.7    144*   < > 322   INR 1.06  --   --  1.23*    136   < > 134*   POTASSIUM 4.1 4.0   < > 4.3   CR 0.68 0.70   < > 0.56    < > = values in this interval not displayed.        Diagnostics:  Labs pending at this time.  Results will be reviewed when available.   No EKG required, no history of coronary heart disease, significant arrhythmia, peripheral arterial disease or other structural heart disease.    Revised Cardiac Risk Index (RCRI):  The patient has the following serious cardiovascular risks for perioperative complications:   - No serious cardiac risks = 0 points     RCRI Interpretation: 0 points: Class I (very low risk - 0.4% complication rate)           Signed Electronically by: Honey Oshea PA-C  Copy of this evaluation report is provided to requesting physician.

## 2023-05-02 NOTE — ANESTHESIA PREPROCEDURE EVALUATION
Anesthesia Pre-Procedure Evaluation    Patient: Yohana Marques   MRN: [id number] : 1945        Procedure : Procedure(s):  ENDOSCOPIC RETROGRADE CHOLANGIOPANCREATOGRAPHY          Past Medical History:   Diagnosis Date     HTN (hypertension)      Hyperlipidemia      Leiomyoma of uterus, unspecified      Numbness and tingling     spinal stenosis causes numbness and tingling on feet and knees bilaterally     Osteoarthrosis, unspecified whether generalized or localized, unspecified site      Other and unspecified disc disorder of lumbar region      Other chronic pain      Primary osteoarthritis of left hip 2016     Spinal stenosis     S/P diskectomy x 2, most recently 2014      Past Surgical History:   Procedure Laterality Date     ARTHROPLASTY HIP Left 2016    Procedure: ARTHROPLASTY HIP;  Surgeon: Leonid Morfin MD;  Location:  OR     BACK SURGERY       COLONOSCOPY N/A 2017    Procedure: COMBINED COLONOSCOPY, SINGLE OR MULTIPLE BIOPSY/POLYPECTOMY BY BIOPSY;  colonoscopy;  Surgeon: Catarino Salas MD;  Location:  GI     DISCECTOMY LUMBAR POSTERIOR MICROSCOPIC TWO LEVELS  2014    Procedure: DISCECTOMY LUMBAR POSTERIOR MICROSCOPIC TWO LEVELS;  Surgeon: Warren Herring MD;  Location:  OR     ENDOSCOPIC RETROGRADE CHOLANGIOPANCREATOGRAM N/A 10/14/2022    Procedure: ENDOSCOPIC RETROGRADE CHOLANGIOPANCREATOGRAPHY with biliary spincterotomy, biliary stent placement, cholangioscopy;  Surgeon: Carson Franklin MD;  Location: [facility]     ENDOSCOPIC RETROGRADE CHOLANGIOPANCREATOGRAPHY, EXCHANGE TUBE/STENT N/A 2023    Procedure: ENDOSCOPIC RETROGRADE CHOLANGIOPANCREATOGRAPHY WITH BILIARY STENT EXCHANGE AND DEBRIS REMOVAL;  Surgeon: Carson Franklin MD;  Location[facility]     EXPLORE COMMON BILE DUCT N/A 2022    Procedure: COMMON BILE DUCT REPAIR;  Surgeon: Thai Garcia MD;  Location: [facility]     LAPAROSCOPIC CHOLECYSTECTOMY WITH CHOLANGIOGRAMS  N/A 12/01/2022    Procedure: EXPLORATORY LAPAROSCOPY, LAPAROSCOPIC PARTIAL CHOLECYSTECTOMY WITH RETREVIAL OF STONES WITH CHOLANGIOGRAM; choledochoscopy, INTRAOPERATIVE ULTRASOUND;  Surgeon: Thai Garcia MD;  Location: [facility]     LAPAROSCOPY DIAGNOSTIC (GENERAL) N/A 10/06/2022    Procedure: Diagnostic laparoscopy with liver biopsy;  Surgeon: Warren Zhang MD;  Location: [facility] OR     ORTHOPEDIC SURGERY       TOTAL KNEE ARTHROPLASTY Left      Z NONSPECIFIC PROCEDURE  1996    lumbar discectomy     Z NONSPECIFIC PROCEDURE      cervical cone biopsy     Z NONSPECIFIC PROCEDURE      Left knee replacement      No Known Allergies   Social History     Tobacco Use     Smoking status: Never     Smokeless tobacco: Never   Vaping Use     Vaping status: Never Used   Substance Use Topics     Alcohol use: Yes     Alcohol/week: 0.0 standard drinks of alcohol     Comment: social      Wt Readings from Last 1 Encounters:   05/02/23 92.4 kg (203 lb 12.8 oz)        Anesthesia Evaluation   Pt has had prior anesthetic. Type: General.        ROS/MED HX  ENT/Pulmonary:  - neg pulmonary ROS     Neurologic:  - neg neurologic ROS     Cardiovascular:     (+) Dyslipidemia hypertension (took lisinopril today)-----    METS/Exercise Tolerance:     Hematologic:  - neg hematologic  ROS     Musculoskeletal:   (+) arthritis,     GI/Hepatic:     (+) cholecystitis/cholelithiasis, hepatitis liver disease (cirrhosis, fatty liver),     Renal/Genitourinary:  - neg Renal ROS     Endo:     (+) Obesity (BMI 31),     Psychiatric/Substance Use:     (+) H/O chronic opiod use .     Infectious Disease:  - neg infectious disease ROS     Malignancy:       Other:      (+) , H/O Chronic Pain,        Physical Exam    Airway        Mallampati: II   TM distance: > 3 FB   Neck ROM: full   Mouth opening: > 3 cm    Respiratory Devices and Support         Dental     Comment: Full upper and lower dentures        Cardiovascular          Rhythm and rate: regular and normal      Pulmonary           breath sounds clear to auscultation           OUTSIDE LABS:  CBC:   Lab Results   Component Value Date    WBC 4.1 05/02/2023    WBC 5.8 01/25/2023    HGB 11.5 (L) 05/02/2023    HGB 14.3 01/25/2023    HCT 36.5 05/02/2023    HCT 44.4 01/25/2023     05/02/2023     01/25/2023     BMP:   Lab Results   Component Value Date     05/02/2023     04/28/2023    POTASSIUM 4.1 05/02/2023    POTASSIUM 4.4 04/28/2023    CHLORIDE 106 05/02/2023    CHLORIDE 104 04/28/2023    CO2 23 05/02/2023    CO2 27 04/28/2023    BUN 18.8 05/02/2023    BUN 20.4 04/28/2023    CR 0.73 05/02/2023    CR 0.76 04/28/2023     (H) 05/02/2023     (H) 04/28/2023     COAGS:   Lab Results   Component Value Date    PTT 25 05/31/2006    INR 1.02 05/02/2023     POC:   Lab Results   Component Value Date     (H) 08/13/2014     HEPATIC:   Lab Results   Component Value Date    ALBUMIN 3.8 05/02/2023    PROTTOTAL 6.6 05/02/2023    ALT 8 (L) 05/02/2023    AST 22 05/02/2023    ALKPHOS 78 05/02/2023    BILITOTAL 0.2 05/02/2023    SVEN 35 10/13/2022     OTHER:   Lab Results   Component Value Date    A1C 6.1 (H) 11/18/2005    YEE 9.3 05/02/2023    PHOS 3.4 12/03/2022    MAG 1.8 12/03/2022    LIPASE 48 05/02/2023    AMYLASE 75 05/02/2023    TSH 2.30 11/18/2005    T4 1.13 11/18/2005    SED 67 (H) 10/13/2022       Anesthesia Plan    ASA Status:  3   NPO Status:  NPO Appropriate    Anesthesia Type: General.     - Airway: ETT   Induction: Intravenous.   Maintenance: Balanced.        Consents    Anesthesia Plan(s) and associated risks, benefits, and realistic alternatives discussed. Questions answered and patient/representative(s) expressed understanding.    - Discussed:     - Discussed with:  Patient, Spouse         Postoperative Care    Pain management: IV analgesics, Oral pain medications, Postop Epidural.   PONV prophylaxis: Ondansetron (or other 5HT-3), Dexamethasone or Solumedrol     Comments:                 Nieves Nielsen MD

## 2023-05-02 NOTE — DISCHARGE INSTRUCTIONS
[facility], [place]  Same-Day Surgery ERCP Procedure   Adult Discharge Orders & Instructions     You had a procedure known as an Endoscopic Retrograde Cholangiopancreatography (ERCP). Your healthcare provider performed the ERCP to look at your bile or pancreatic ducts, and to locate and/or treat blockages (dilation, stenting, removal, etc.) in these ducts. Often biopsies, small samples of tissue, are taken to help diagnose and/or classify stages of disease growth. This procedure is used to diagnose diseases of the pancreas, bile ducts, pancreatic duct, liver, and gallbladder.     After your procedure   Make sure to clarify with your healthcare provider any diet restrictions (For example, clear liquid, low fat, no caffeine, etc.)   Do NOT take aspirin containing medications or any other blood-thinning medicines (anticoagulants) until your healthcare provider says it's OK.   You MAY be prescribed antibiotics, depending on what was done and/or found during your EUS, make sure to take antibiotics as prescribed by your healthcare provider    For 24 hours after surgery  Get plenty of rest.  A responsible adult must stay with you for at least 24 hours after you leave the hospital.   Do not drive or use heavy equipment.  If you have weakness or tingling, don't drive or use heavy equipment until this feeling goes away.  Do not drink alcohol.  Avoid strenuous or risky activities (gym, yoga, cycling, etc.).  Ask for help when climbing stairs.   You may feel lightheaded.  IF so, sit for a few minutes before standing.  Have someone help you get up.   If you have nausea (feel sick to your stomach): Drink only clear liquids such as apple juice, ginger ale, broth or 7-Up.  Rest may also help.  Be sure to drink enough fluids.  Move to a regular diet as you feel able.  If you feel bloated or have too much gas, use a heating pad on your belly to help reduce the discomfort. This should help you feel better.    You may have a slight fever. This is normal for the first 24 hours.   You may have a dry mouth, a sore throat, muscle aches or trouble sleeping.  These are normal and will go away after 24 hours. A sore throat is most common. Use lozenges or gargle with salt water to ease the discomfort.   Do not make important or legal decisions.      Call your doctor for any of the following:  Chest pain, and/or shortness of breath  Abdominal  pain, bloating or cramping that has not improved or does not respond to pain reliving medications (Tylenol or narcotics if prescribed)   Difficulty swallowing or feeling as though food or liquids are stuck in your throat   Sore throat lasting more than 2 days or pain that has worsened over time   Black or tarry stools   Nausea and/or vomiting that is not resolving or has not responded to anti-nausea medications prescribed to you   It has been over 8 to 10 hours since surgery and you are still not able to urinate (pass water)   Headache for over 24 hours   Fever over 100.5 F (38 C) lasting more than 24 hours after the procedure   Signs of jaundice or blockage (fever, chills, abdominal pain, yellowing of the whites of your eyes, yellowing of your skin, and/or passing darker than normal urine)     To contact a doctor, call:   [ ] Dr Franklin at [phone] (Endoscopy[facility])  (Monday thru Friday 8:00am to 4:30pm)   [ ] [phone] and ask for the ___________________ resident on call (answered 24 hours a day)   [ ] Emergency Department: [facility]: [phone]     Take it easy when you get home.  Remember, same day surgery DOES NOT MEAN SAME DAY RECOVERY!  Healing is a gradual process.  You will need some time to recover - you may be more tired than you realize at first.  Rest and relax for at least the first 24 hours at home.  You'll feel better and heal faster if you take good care of yourself.

## 2023-05-02 NOTE — OP NOTE
ERCP 05/02/2023  7:15 AM [address] [place] [address] [phone]     Endoscopy Department   _______________________________________________________________________________   Patient Name: Yohana Marques    Procedure Date: 5/2/2023 7:15 AM   MRN: [id number]                       Account Number: [id number]   YOB: 1945               Admit Type: Outpatient   Age: 77                               Room: [place] [address]   Gender: Female                        Note Status: Finalized   Attending MD: ANTON SONG MD,   Total Sedation Time:   _______________________________________________________________________________       Procedure:             ERCP   Indications:           Stent removal, Bile duct stricture   Providers:             ANTON SONG MD   Patient Profile:       Ms Keith is a 76yo woman with a history of Mirizzi                          status post serial ERCP for stone and related common                          duct stricture management most recently with a 10mm                          covered metal stent. She is also post cholecystectomy.                          She now proceeds to further management by ERCP with                          complaints.   Referring MD:          MACK SALCEDO MD   Requesting Provider:   DIAZ LE MD   Medicines:             General Anesthesia, Indomethacin 100 mg DC   Complications:         No immediate complications.   _______________________________________________________________________________   Procedure:             Pre-Anesthesia Assessment:                          - Prior to the procedure, a History and Physical was                          performed, and patient medications and allergies were                          reviewed. The patient is competent. The risks and                          benefits of the procedure and the sedation options and                           risks were discussed with the patient. All questions                          were answered and informed consent was obtained.                          Patient identification and proposed procedure were                          verified by the nurse in the pre-procedure area.                          Mental Status Examination: alert and oriented. Airway                          Examination: Mallampati Class II (the uvula but not                          tonsillar pillars visualized). Respiratory                          Examination: clear to auscultation. CV Examination:                          normal. ASA Grade Assessment: II - A patient with mild                          systemic disease. After reviewing the risks and                          benefits, the patient was deemed in satisfactory                          condition to undergo the procedure. The anesthesia                          plan was to use general anesthesia. Immediately prior                          to administration of medications, the patient was                          re-assessed for adequacy to receive sedatives. The                          heart rate, respiratory rate, oxygen saturations,                          blood pressure, adequacy of pulmonary ventilation, and                          response to care were monitored throughout the                          procedure. The physical status of the patient was                          re-assessed after the procedure. After obtaining                          informed consent, the scope was passed under direct                          vision. Throughout the procedure, the patient's blood                          pressure, pulse, and oxygen saturations were monitored                          continuously. The duodenoscope was introduced through                          the mouth, and used to inject contrast into and used                          to inject contrast into the  "bile duct. The ERCP was                          accomplished without difficulty. The patient tolerated                          the procedure well.                                                                                    Findings:        The patient was prone under general anesthesia and a 190 was used        throughout.  films demonstrated the biliary metal stent. Limited        white light imaging revealed the internalized covered metal stent        appearing mostly patent and well positioned across a patent biliary        sphincterotomy. The stent was removed from the biliary tree using a        snare without trauma. The bile duct was deeply cannulated with the 12 mm        balloon in concert with an 0.025\" Visiglide wire. Contrast was injected        and I personally interpreted the bile duct images. Ductal flow of        contrast was adequate, image quality was excellent and contrast extended        throughout the intrahepatics. The intrahepatics were grossly healthy        appearing as were the bifurcation and common, though there was a mild        narrowing at the site of cystic stump insertion. Drianage remained        excellent. There were no large filling defects however the biliary tree        was swept with a 12 mm balloon starting at the left intrahepatic        duct(s). Debris and concretion were swept from the duct. The ventral        pancreatic duct and orifice were selectively not interrogated.                                                                                     Impression:            - Uncomplicated removal of the covered metal biliary                          stent from a patent biliary sphincterotomy                          - Mild residual (relative) stenosis at the site of                          cystic stump insertion, likely not clinically relavent                          - Otherwise grossly healthy intra and extrahepatics                          - Successful " removal of biliary debris and concretion   Recommendation:        - General anesthesia recovery with probable discharge                          home this morning                          - All medications, diet and activity may resume                          without delay                          - No plans for future ERCP at this juncture                          - Follow up with your established providers                          - The findings and recommendations were discussed with                          the patient and their family                                                                                       electronically signed by DAYNA Song   ________________________   ANTON SONG MD   5/2/2023 9:35:16 AM   I was physically present for the entire viewing portion of the exam.   __________________________   Signature of teaching physician   Saad/Lam SONG MD   Number of Addenda: 0     Note Initiated On: 5/2/2023 7:15 AM   Scope In:   Scope Out:

## 2023-05-02 NOTE — ANESTHESIA CARE TRANSFER NOTE
Patient: Yohana Marques    Procedure: Procedure(s):  ENDOSCOPIC RETROGRADE CHOLANGIOPANCREATOGRAPHY with biliary stone and stent removal       Diagnosis: Biliary stricture [K83.1]  Diagnosis Additional Information: No value filed.    Anesthesia Type:   General     Note:    Oropharynx: oropharynx clear of all foreign objects and spontaneously breathing  Level of Consciousness: awake  Oxygen Supplementation: nasal cannula  Level of Supplemental Oxygen (L/min / FiO2): 3  Independent Airway: airway patency satisfactory and stable  Dentition: dentition unchanged  Vital Signs Stable: post-procedure vital signs reviewed and stable  Report to RN Given: handoff report given  Patient transferred to: PACU    Handoff Report: Identifed the Patient, Identified the Reponsible Provider, Reviewed the pertinent medical history, Discussed the surgical course, Reviewed Intra-OP anesthesia mangement and issues during anesthesia, Set expectations for post-procedure period and Allowed opportunity for questions and acknowledgement of understanding      Vitals:  Vitals Value Taken Time   /68 05/02/23 0930   Temp     Pulse 70 05/02/23 0933   Resp 18 05/02/23 0933   SpO2 99 % 05/02/23 0933   Vitals shown include unvalidated device data.    Electronically Signed By: HANY Hernandez CRNA  May 2, 2023  9:34 AM

## 2023-05-02 NOTE — ANESTHESIA POSTPROCEDURE EVALUATION
Patient: Yohana Marques    Procedure: Procedure(s):  ENDOSCOPIC RETROGRADE CHOLANGIOPANCREATOGRAPHY with biliary stone and stent removal       Anesthesia Type:  General    Note:  Disposition: Outpatient   Postop Pain Control: Uneventful            Sign Out: Well controlled pain   PONV: No   Neuro/Psych: Uneventful            Sign Out: Acceptable/Baseline neuro status   Airway/Respiratory: Uneventful            Sign Out: Acceptable/Baseline resp. status   CV/Hemodynamics: Uneventful            Sign Out: Acceptable CV status; No obvious hypovolemia; No obvious fluid overload   Other NRE: NONE   DID A NON-ROUTINE EVENT OCCUR? No           Last vitals:  Vitals Value Taken Time   /69 05/02/23 0945   Temp 36.4  C (97.5  F) 05/02/23 1000   Pulse 58 05/02/23 1000   Resp 15 05/02/23 1000   SpO2 95 % 05/02/23 1000   Vitals shown include unvalidated device data.    Electronically Signed By: Tip Sanchez MD  May 2, 2023  10:02 AM

## 2023-05-02 NOTE — ANESTHESIA PROCEDURE NOTES
Airway       Patient location during procedure: OR       Procedure Start/Stop Times: 5/2/2023 8:35 AM  Staff -        CRNA: Shelley Sanchez APRN CRNA       Performed By: CRNA  Consent for Airway        Urgency: elective  Indications and Patient Condition       Indications for airway management: stevie-procedural       Induction type:intravenous       Mask difficulty assessment: 1 - vent by mask    Final Airway Details       Final airway type: endotracheal airway       Successful airway: ETT - single and Oral  Endotracheal Airway Details        ETT size (mm): 7.0       Cuffed: yes       Cuff volume (mL): 8       Successful intubation technique: direct laryngoscopy       DL Blade Type: Alonzo 2       Grade View of Cords: 1       Adjucts: stylet       Position: Right       Measured from: gums/teeth       Secured at (cm): 21       Bite Block used: endo bite block padded with gauze.    Post intubation assessment        Placement verified by: capnometry, equal breath sounds and chest rise        Number of attempts at approach: 1       Secured with: pink tape       Ease of procedure: easy       Dentition: Intact and Unchanged    Medication(s) Administered   Medication Administration Time: 5/2/2023 8:35 AM

## 2023-05-02 NOTE — INTERVAL H&P NOTE
"I have reviewed the surgical (or preoperative) H&P that is linked to this encounter, and examined the patient. There are no significant changes    Clinical Conditions Present on Arrival:  Clinically Significant Risk Factors Present on Admission                  # Obesity: Estimated body mass index is 30.99 kg/m  as calculated from the following:    Height as of this encounter: 1.727 m (5' 7.99\").    Weight as of this encounter: 92.4 kg (203 lb 12.8 oz).       "

## 2023-07-12 NOTE — PROGRESS NOTES
"SUBJECTIVE:   Yohana is a 78 year old who presents for Preventive Visit.       No data to display              Are you in the first 12 months of your Medicare coverage?  No    Healthy Habits:     In general, how would you rate your overall health?  Good    Frequency of exercise:  None    Do you usually eat at least 4 servings of fruit and vegetables a day, include whole grains    & fiber and avoid regularly eating high fat or \"junk\" foods?  Yes    Taking medications regularly:  Yes    Medication side effects:  None    Ability to successfully perform activities of daily living:  No assistance needed    Home Safety:  No safety concerns identified    Hearing Impairment:  No hearing concerns    In the past 6 months, have you been bothered by leaking of urine? Yes    In general, how would you rate your overall mental or emotional health?  Excellent    Additional concerns today:  No        Have you ever done Advance Care Planning? (For example, a Health Directive, POLST, or a discussion with a medical provider or your loved ones about your wishes): No, advance care planning information given to patient to review.  Patient declined advance care planning discussion at this time.    Fall risk  Fallen 2 or more times in the past year?: No  Any fall with injury in the past year?: No    Cognitive Screening   1) Repeat 3 items (Leader, Season, Table)    2) Clock draw: NORMAL  3) 3 item recall: Recalls 3 objects  Results: 3 items recalled: COGNITIVE IMPAIRMENT LESS LIKELY    Mini-CogTM Copyright JACLYN Jones. Licensed by the author for use in [facility]; reprinted with permission (christina@.[facility]). All rights reserved.      Do you have sleep apnea, excessive snoring or daytime drowsiness?: no    Reviewed and updated as needed this visit by clinical staff                  Reviewed and updated as needed this visit by Provider                 Social History     Tobacco Use     Smoking status: Never     Smokeless tobacco: Never "   Substance Use Topics     Alcohol use: Yes     Alcohol/week: 0.0 standard drinks of alcohol     Comment: social             7/6/2023    10:11 AM   Alcohol Use   Prescreen: >3 drinks/day or >7 drinks/week? No     Do you have a current opioid prescription? No  Do you use any other controlled substances or medications that are not prescribed by a provider? None            Current providers sharing in care for this patient include:   Patient Care Team:  Arun Tao MD as PCP - Venessa Bernard RN as   Arun Tao MD as Assigned PCP  Thai Garcia MD as MD (Surgery)  Thai Garcia MD as Assigned Surgical Provider  Arun Tao MD as Assigned Pain Medication Provider    The following health maintenance items are reviewed in Epic and correct as of today:  Health Maintenance   Topic Date Due     HEPATITIS B IMMUNIZATION (1 of 3 - Risk 3-dose series) Never done     COVID-19 Vaccine (6 - Moderna series) 03/16/2023     MEDICARE ANNUAL WELLNESS VISIT  07/07/2023     MAMMO SCREENING  07/07/2023     INFLUENZA VACCINE (1) 09/01/2023     URINE DRUG SCREEN  11/21/2023     ANNUAL REVIEW OF HM ORDERS  11/21/2023     FALL RISK ASSESSMENT  07/12/2024     DTAP/TDAP/TD IMMUNIZATION (3 - Td or Tdap) 06/21/2027     LIPID  07/07/2027     ADVANCE CARE PLANNING  07/07/2027     COLORECTAL CANCER SCREENING  07/20/2027     DEXA  04/11/2028     HEPATITIS C SCREENING  Completed     PHQ-2 (once per calendar year)  Completed     Pneumococcal Vaccine: 65+ Years  Completed     ZOSTER IMMUNIZATION  Completed     IPV IMMUNIZATION  Aged Out     MENINGITIS IMMUNIZATION  Aged Out             Pertinent mammograms are reviewed under the imaging tab.    Review of Systems   Constitutional: Negative for chills and fever.   HENT: Negative for congestion, ear pain, hearing loss and sore throat.    Eyes: Negative for pain and visual disturbance.   Respiratory: Negative for cough and shortness of breath.   "  Cardiovascular: Negative for chest pain, palpitations and peripheral edema.   Gastrointestinal: Negative for abdominal pain, constipation, diarrhea, heartburn, hematochezia and nausea.   Breasts:  Negative for tenderness, breast mass and discharge.   Genitourinary: Positive for frequency and urgency. Negative for dysuria, genital sores, hematuria, pelvic pain, vaginal bleeding and vaginal discharge.   Musculoskeletal: Positive for arthralgias. Negative for joint swelling and myalgias.   Skin: Negative for rash.   Neurological: Negative for dizziness, weakness, headaches and paresthesias.   Psychiatric/Behavioral: Negative for mood changes. The patient is not nervous/anxious.        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  Feeling well at present, recent CMP was within normal limits with normal transaminases and alkaline phosphatase.      OBJECTIVE:   There were no vitals taken for this visit. Estimated body mass index is 30.99 kg/m  as calculated from the following:    Height as of 5/2/23: 1.727 m (5' 7.99\").    Weight as of 5/2/23: 92.4 kg (203 lb 12.8 oz).  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    Other cirrhosis of " "liver (H)  Continues to follow with gastroenterology.  Transaminases are normal after recent biliary intervention    Osteoarthritis of hip, unspecified laterality, unspecified osteoarthritis type    - celecoxib (CELEBREX) 200 MG capsule; Take 1 capsule (200 mg) by mouth daily    Essential hypertension  Stable and well-controlled  - lisinopril (ZESTRIL) 10 MG tablet; Take 1 tablet (10 mg) by mouth daily    Loss of hair    - CBC with platelets; Future  - TSH with free T4 reflex; Future            COUNSELING:  Reviewed preventive health counseling, as reflected in patient instructions      BMI:   Estimated body mass index is 30.99 kg/m  as calculated from the following:    Height as of 5/2/23: 1.727 m (5' 7.99\").    Weight as of 5/2/23: 92.4 kg (203 lb 12.8 oz).   Weight management plan: Discussed healthy diet and exercise guidelines      She reports that she has never smoked. She has never used smokeless tobacco.      Appropriate preventive services were discussed with this patient, including applicable screening as appropriate for cardiovascular disease, diabetes, osteopenia/osteoporosis, and glaucoma.  As appropriate for age/gender, discussed screening for colorectal cancer, prostate cancer, breast cancer, and cervical cancer. Checklist reviewing preventive services available has been given to the patient.    Reviewed patients plan of care and provided an AVS. The Basic Care Plan (routine screening as documented in Health Maintenance) for Yohana meets the Care Plan requirement. This Care Plan has been established and reviewed with the Patient.      Arun Tao MD  [facility]    Identified Health Risks:    "

## 2024-01-01 ENCOUNTER — PREP FOR PROCEDURE (OUTPATIENT)
Dept: SURGERY | Facility: CLINIC | Age: 79
End: 2024-01-01
Payer: COMMERCIAL

## 2024-01-01 ENCOUNTER — APPOINTMENT (OUTPATIENT)
Dept: CARDIOLOGY | Facility: CLINIC | Age: 79
DRG: 004 | End: 2024-01-01
Attending: INTERNAL MEDICINE
Payer: COMMERCIAL

## 2024-01-01 ENCOUNTER — APPOINTMENT (OUTPATIENT)
Dept: GENERAL RADIOLOGY | Facility: CLINIC | Age: 79
DRG: 004 | End: 2024-01-01
Attending: INTERNAL MEDICINE
Payer: COMMERCIAL

## 2024-01-01 ENCOUNTER — APPOINTMENT (OUTPATIENT)
Dept: OCCUPATIONAL THERAPY | Facility: CLINIC | Age: 79
DRG: 004 | End: 2024-01-01
Payer: COMMERCIAL

## 2024-01-01 ENCOUNTER — APPOINTMENT (OUTPATIENT)
Dept: PHYSICAL THERAPY | Facility: CLINIC | Age: 79
DRG: 004 | End: 2024-01-01
Payer: COMMERCIAL

## 2024-01-01 ENCOUNTER — APPOINTMENT (OUTPATIENT)
Dept: GENERAL RADIOLOGY | Facility: CLINIC | Age: 79
DRG: 004 | End: 2024-01-01
Attending: EMERGENCY MEDICINE
Payer: COMMERCIAL

## 2024-01-01 ENCOUNTER — APPOINTMENT (OUTPATIENT)
Dept: ULTRASOUND IMAGING | Facility: CLINIC | Age: 79
DRG: 004 | End: 2024-01-01
Attending: INTERNAL MEDICINE
Payer: COMMERCIAL

## 2024-01-01 ENCOUNTER — APPOINTMENT (OUTPATIENT)
Dept: CT IMAGING | Facility: CLINIC | Age: 79
DRG: 004 | End: 2024-01-01
Attending: EMERGENCY MEDICINE
Payer: COMMERCIAL

## 2024-01-01 ENCOUNTER — ANESTHESIA EVENT (OUTPATIENT)
Dept: SURGERY | Facility: CLINIC | Age: 79
DRG: 004 | End: 2024-01-01
Payer: COMMERCIAL

## 2024-01-01 ENCOUNTER — APPOINTMENT (OUTPATIENT)
Dept: OCCUPATIONAL THERAPY | Facility: CLINIC | Age: 79
DRG: 004 | End: 2024-01-01
Attending: STUDENT IN AN ORGANIZED HEALTH CARE EDUCATION/TRAINING PROGRAM
Payer: COMMERCIAL

## 2024-01-01 ENCOUNTER — APPOINTMENT (OUTPATIENT)
Dept: MRI IMAGING | Facility: CLINIC | Age: 79
DRG: 004 | End: 2024-01-01
Attending: INTERNAL MEDICINE
Payer: COMMERCIAL

## 2024-01-01 ENCOUNTER — APPOINTMENT (OUTPATIENT)
Dept: MRI IMAGING | Facility: CLINIC | Age: 79
DRG: 004 | End: 2024-01-01
Payer: COMMERCIAL

## 2024-01-01 ENCOUNTER — APPOINTMENT (OUTPATIENT)
Dept: MRI IMAGING | Facility: CLINIC | Age: 79
DRG: 004 | End: 2024-01-01
Attending: STUDENT IN AN ORGANIZED HEALTH CARE EDUCATION/TRAINING PROGRAM
Payer: COMMERCIAL

## 2024-01-01 ENCOUNTER — RESULTS ONLY (OUTPATIENT)
Dept: ADMINISTRATIVE | Facility: CLINIC | Age: 79
End: 2024-01-01

## 2024-01-01 ENCOUNTER — APPOINTMENT (OUTPATIENT)
Dept: CT IMAGING | Facility: CLINIC | Age: 79
DRG: 004 | End: 2024-01-01
Payer: COMMERCIAL

## 2024-01-01 ENCOUNTER — APPOINTMENT (OUTPATIENT)
Dept: CT IMAGING | Facility: CLINIC | Age: 79
DRG: 004 | End: 2024-01-01
Attending: STUDENT IN AN ORGANIZED HEALTH CARE EDUCATION/TRAINING PROGRAM
Payer: COMMERCIAL

## 2024-01-01 ENCOUNTER — ANESTHESIA (OUTPATIENT)
Dept: SURGERY | Facility: CLINIC | Age: 79
DRG: 004 | End: 2024-01-01
Payer: COMMERCIAL

## 2024-01-01 ENCOUNTER — APPOINTMENT (OUTPATIENT)
Dept: CARDIOLOGY | Facility: CLINIC | Age: 79
DRG: 004 | End: 2024-01-01
Attending: SURGERY
Payer: COMMERCIAL

## 2024-01-01 ENCOUNTER — APPOINTMENT (OUTPATIENT)
Dept: GENERAL RADIOLOGY | Facility: CLINIC | Age: 79
DRG: 004 | End: 2024-01-01
Attending: STUDENT IN AN ORGANIZED HEALTH CARE EDUCATION/TRAINING PROGRAM
Payer: COMMERCIAL

## 2024-01-01 ENCOUNTER — APPOINTMENT (OUTPATIENT)
Dept: SURGERY | Facility: PHYSICIAN GROUP | Age: 79
End: 2024-01-01
Payer: COMMERCIAL

## 2024-01-01 ENCOUNTER — APPOINTMENT (OUTPATIENT)
Dept: CT IMAGING | Facility: CLINIC | Age: 79
DRG: 004 | End: 2024-01-01
Attending: INTERNAL MEDICINE
Payer: COMMERCIAL

## 2024-01-01 ENCOUNTER — APPOINTMENT (OUTPATIENT)
Dept: CARDIOLOGY | Facility: CLINIC | Age: 79
DRG: 004 | End: 2024-01-01
Attending: EMERGENCY MEDICINE
Payer: COMMERCIAL

## 2024-01-01 ENCOUNTER — HOSPITAL ENCOUNTER (INPATIENT)
Facility: CLINIC | Age: 79
LOS: 26 days | DRG: 004 | End: 2024-04-03
Attending: EMERGENCY MEDICINE | Admitting: STUDENT IN AN ORGANIZED HEALTH CARE EDUCATION/TRAINING PROGRAM
Payer: COMMERCIAL

## 2024-01-01 ENCOUNTER — APPOINTMENT (OUTPATIENT)
Dept: PHYSICAL THERAPY | Facility: CLINIC | Age: 79
DRG: 004 | End: 2024-01-01
Attending: STUDENT IN AN ORGANIZED HEALTH CARE EDUCATION/TRAINING PROGRAM
Payer: COMMERCIAL

## 2024-01-01 ENCOUNTER — APPOINTMENT (OUTPATIENT)
Dept: GENERAL RADIOLOGY | Facility: CLINIC | Age: 79
DRG: 004 | End: 2024-01-01
Payer: COMMERCIAL

## 2024-01-01 VITALS
BODY MASS INDEX: 36.72 KG/M2 | SYSTOLIC BLOOD PRESSURE: 117 MMHG | DIASTOLIC BLOOD PRESSURE: 58 MMHG | TEMPERATURE: 98.6 F | HEIGHT: 68 IN | OXYGEN SATURATION: 92 % | WEIGHT: 242.29 LBS

## 2024-01-01 DIAGNOSIS — R65.20 SEVERE SEPSIS (H): ICD-10-CM

## 2024-01-01 DIAGNOSIS — R53.1 WEAKNESS: ICD-10-CM

## 2024-01-01 DIAGNOSIS — A41.9 SEVERE SEPSIS (H): ICD-10-CM

## 2024-01-01 DIAGNOSIS — Z96.642 S/P HIP REPLACEMENT, LEFT: ICD-10-CM

## 2024-01-01 DIAGNOSIS — G89.4 CHRONIC PAIN SYNDROME: ICD-10-CM

## 2024-01-01 DIAGNOSIS — I51.81 STRESS-INDUCED CARDIOMYOPATHY: Primary | ICD-10-CM

## 2024-01-01 DIAGNOSIS — I24.9 ACS (ACUTE CORONARY SYNDROME) (H): ICD-10-CM

## 2024-01-01 LAB
5HIAA & CREATININE UR-IMP: NORMAL
5OH-INDOLEACETATE 24H UR-MCNC: 1.5 MG/L
5OH-INDOLEACETATE 24H UR-MRATE: NORMAL MG/D
5OH-INDOLEACETATE/CREAT 24H UR: 8 MG/GCR
ABO/RH(D): NORMAL
ABO/RH(D): NORMAL
ACHR BIND IGG+IGM SER IA-SCNC: 0 NMOL/L
ALBUMIN SERPL BCG-MCNC: 2.2 G/DL (ref 3.5–5.2)
ALBUMIN SERPL BCG-MCNC: 2.5 G/DL (ref 3.5–5.2)
ALBUMIN SERPL BCG-MCNC: 2.6 G/DL (ref 3.5–5.2)
ALBUMIN SERPL BCG-MCNC: 2.8 G/DL (ref 3.5–5.2)
ALBUMIN SERPL BCG-MCNC: 2.8 G/DL (ref 3.5–5.2)
ALBUMIN SERPL BCG-MCNC: 3 G/DL (ref 3.5–5.2)
ALBUMIN SERPL BCG-MCNC: 3.1 G/DL (ref 3.5–5.2)
ALBUMIN SERPL BCG-MCNC: 3.3 G/DL (ref 3.5–5.2)
ALBUMIN SERPL BCG-MCNC: 3.4 G/DL (ref 3.5–5.2)
ALBUMIN SERPL BCG-MCNC: 3.4 G/DL (ref 3.5–5.2)
ALBUMIN SERPL BCG-MCNC: 3.5 G/DL (ref 3.5–5.2)
ALBUMIN SERPL BCG-MCNC: 3.6 G/DL (ref 3.5–5.2)
ALBUMIN SERPL BCG-MCNC: 3.6 G/DL (ref 3.5–5.2)
ALBUMIN SERPL BCG-MCNC: 3.7 G/DL (ref 3.5–5.2)
ALBUMIN SERPL BCG-MCNC: 3.8 G/DL (ref 3.5–5.2)
ALBUMIN SERPL BCG-MCNC: 3.8 G/DL (ref 3.5–5.2)
ALBUMIN SERPL BCG-MCNC: 3.9 G/DL (ref 3.5–5.2)
ALBUMIN SERPL BCG-MCNC: 4.1 G/DL (ref 3.5–5.2)
ALBUMIN UR-MCNC: NEGATIVE MG/DL
ALDOLASE SERPL-CCNC: 8.4 U/L
ALLEN'S TEST: YES
ALP SERPL-CCNC: 100 U/L (ref 40–150)
ALP SERPL-CCNC: 102 U/L (ref 40–150)
ALP SERPL-CCNC: 103 U/L (ref 40–150)
ALP SERPL-CCNC: 109 U/L (ref 40–150)
ALP SERPL-CCNC: 22 U/L (ref 40–150)
ALP SERPL-CCNC: 24 U/L (ref 40–150)
ALP SERPL-CCNC: 25 U/L (ref 40–150)
ALP SERPL-CCNC: 33 U/L (ref 40–150)
ALP SERPL-CCNC: 33 U/L (ref 40–150)
ALP SERPL-CCNC: 35 U/L (ref 40–150)
ALP SERPL-CCNC: 37 U/L (ref 40–150)
ALP SERPL-CCNC: 40 U/L (ref 40–150)
ALP SERPL-CCNC: 47 U/L (ref 40–150)
ALP SERPL-CCNC: 55 U/L (ref 40–150)
ALP SERPL-CCNC: 60 U/L (ref 40–150)
ALP SERPL-CCNC: 61 U/L (ref 40–150)
ALP SERPL-CCNC: 62 U/L (ref 40–150)
ALP SERPL-CCNC: 62 U/L (ref 40–150)
ALP SERPL-CCNC: 63 U/L (ref 40–150)
ALP SERPL-CCNC: 65 U/L (ref 40–150)
ALP SERPL-CCNC: 72 U/L (ref 40–150)
ALP SERPL-CCNC: 79 U/L (ref 40–150)
ALP SERPL-CCNC: 81 U/L (ref 40–150)
ALP SERPL-CCNC: 86 U/L (ref 40–150)
ALP SERPL-CCNC: 90 U/L (ref 40–150)
ALP SERPL-CCNC: 92 U/L (ref 40–150)
ALT SERPL W P-5'-P-CCNC: 10 U/L (ref 0–50)
ALT SERPL W P-5'-P-CCNC: 11 U/L (ref 0–50)
ALT SERPL W P-5'-P-CCNC: 13 U/L (ref 0–50)
ALT SERPL W P-5'-P-CCNC: 14 U/L (ref 0–50)
ALT SERPL W P-5'-P-CCNC: 14 U/L (ref 0–50)
ALT SERPL W P-5'-P-CCNC: 15 U/L (ref 0–50)
ALT SERPL W P-5'-P-CCNC: 15 U/L (ref 0–50)
ALT SERPL W P-5'-P-CCNC: 16 U/L (ref 0–50)
ALT SERPL W P-5'-P-CCNC: 16 U/L (ref 0–50)
ALT SERPL W P-5'-P-CCNC: 17 U/L (ref 0–50)
ALT SERPL W P-5'-P-CCNC: 18 U/L (ref 0–50)
ALT SERPL W P-5'-P-CCNC: 19 U/L (ref 0–50)
ALT SERPL W P-5'-P-CCNC: 20 U/L (ref 0–50)
ALT SERPL W P-5'-P-CCNC: 24 U/L (ref 0–50)
ALT SERPL W P-5'-P-CCNC: 5 U/L (ref 0–50)
ALT SERPL W P-5'-P-CCNC: 6 U/L (ref 0–50)
ALT SERPL W P-5'-P-CCNC: 7 U/L (ref 0–50)
ALT SERPL W P-5'-P-CCNC: 8 U/L (ref 0–50)
ALT SERPL W P-5'-P-CCNC: 8 U/L (ref 0–50)
ALT SERPL W P-5'-P-CCNC: <5 U/L (ref 0–50)
ALT SERPL W P-5'-P-CCNC: <5 U/L (ref 0–50)
ANA SER QL IF: NEGATIVE
ANCA AB PATTERN SER IF-IMP: NORMAL
ANION GAP SERPL CALCULATED.3IONS-SCNC: 10 MMOL/L (ref 7–15)
ANION GAP SERPL CALCULATED.3IONS-SCNC: 11 MMOL/L (ref 7–15)
ANION GAP SERPL CALCULATED.3IONS-SCNC: 12 MMOL/L (ref 7–15)
ANION GAP SERPL CALCULATED.3IONS-SCNC: 13 MMOL/L (ref 7–15)
ANION GAP SERPL CALCULATED.3IONS-SCNC: 17 MMOL/L (ref 7–15)
ANION GAP SERPL CALCULATED.3IONS-SCNC: 6 MMOL/L (ref 7–15)
ANION GAP SERPL CALCULATED.3IONS-SCNC: 6 MMOL/L (ref 7–15)
ANION GAP SERPL CALCULATED.3IONS-SCNC: 7 MMOL/L (ref 7–15)
ANION GAP SERPL CALCULATED.3IONS-SCNC: 8 MMOL/L (ref 7–15)
ANION GAP SERPL CALCULATED.3IONS-SCNC: 9 MMOL/L (ref 7–15)
ANNOTATION COMMENT IMP: NOT DETECTED
ANNOTATION COMMENT IMP: NOT DETECTED
ANTIBODY SCREEN: NEGATIVE
ANTIBODY SCREEN: NEGATIVE
APPEARANCE CSF: ABNORMAL
APPEARANCE CSF: CLEAR
APPEARANCE FLD: ABNORMAL
APPEARANCE UR: CLEAR
AST SERPL W P-5'-P-CCNC: 14 U/L (ref 0–45)
AST SERPL W P-5'-P-CCNC: 15 U/L (ref 0–45)
AST SERPL W P-5'-P-CCNC: 15 U/L (ref 0–45)
AST SERPL W P-5'-P-CCNC: 16 U/L (ref 0–45)
AST SERPL W P-5'-P-CCNC: 17 U/L (ref 0–45)
AST SERPL W P-5'-P-CCNC: 17 U/L (ref 0–45)
AST SERPL W P-5'-P-CCNC: 19 U/L (ref 0–45)
AST SERPL W P-5'-P-CCNC: 19 U/L (ref 0–45)
AST SERPL W P-5'-P-CCNC: 20 U/L (ref 0–45)
AST SERPL W P-5'-P-CCNC: 21 U/L (ref 0–45)
AST SERPL W P-5'-P-CCNC: 24 U/L (ref 0–45)
AST SERPL W P-5'-P-CCNC: 26 U/L (ref 0–45)
AST SERPL W P-5'-P-CCNC: 27 U/L (ref 0–45)
AST SERPL W P-5'-P-CCNC: 28 U/L (ref 0–45)
AST SERPL W P-5'-P-CCNC: 29 U/L (ref 0–45)
AST SERPL W P-5'-P-CCNC: 29 U/L (ref 0–45)
AST SERPL W P-5'-P-CCNC: 30 U/L (ref 0–45)
AST SERPL W P-5'-P-CCNC: 31 U/L (ref 0–45)
AST SERPL W P-5'-P-CCNC: 31 U/L (ref 0–45)
AST SERPL W P-5'-P-CCNC: 32 U/L (ref 0–45)
AST SERPL W P-5'-P-CCNC: 36 U/L (ref 0–45)
AST SERPL W P-5'-P-CCNC: 40 U/L (ref 0–45)
ATRIAL RATE - MUSE: 110 BPM
ATRIAL RATE - MUSE: 130 BPM
ATRIAL RATE - MUSE: 134 BPM
ATRIAL RATE - MUSE: 156 BPM
ATRIAL RATE - MUSE: 159 BPM
ATRIAL RATE - MUSE: 47 BPM
ATRIAL RATE - MUSE: 74 BPM
ATRIAL RATE - MUSE: 77 BPM
ATRIAL RATE - MUSE: 87 BPM
ATRIAL RATE - MUSE: 90 BPM
ATRIAL RATE - MUSE: 90 BPM
ATRIAL RATE - MUSE: 99 BPM
B BURGDOR DNA SPEC QL NAA+PROBE: NOT DETECTED
B BURGDOR IGG CSF QL IB: NEGATIVE
B BURGDOR IGM CSF QL IB: NEGATIVE
BACTERIA BLD CULT: NO GROWTH
BACTERIA BLD CULT: NO GROWTH
BACTERIA BRONCH: NO GROWTH
BACTERIA BRONCH: NORMAL
BACTERIA CSF CULT: NO GROWTH
BACTERIA CSF CULT: NORMAL
BACTERIA SPEC CULT: ABNORMAL
BACTERIA SPT CULT: ABNORMAL
BACTERIA SPT CULT: NO GROWTH
BASE EXCESS BLDA CALC-SCNC: 12.8 MMOL/L (ref -3–3)
BASE EXCESS BLDA CALC-SCNC: 16 MMOL/L (ref -3–3)
BASE EXCESS BLDA CALC-SCNC: 17.4 MMOL/L (ref -3–3)
BASE EXCESS BLDA CALC-SCNC: 2.9 MMOL/L (ref -3–3)
BASE EXCESS BLDA CALC-SCNC: 3.4 MMOL/L (ref -3–3)
BASE EXCESS BLDA CALC-SCNC: 9.8 MMOL/L (ref -3–3)
BASE EXCESS BLDV CALC-SCNC: 13.2 MMOL/L (ref -3–3)
BASE EXCESS BLDV CALC-SCNC: 17.3 MMOL/L (ref -3–3)
BASE EXCESS BLDV CALC-SCNC: 2.5 MMOL/L (ref -3–3)
BASE EXCESS BLDV CALC-SCNC: 2.6 MMOL/L (ref -3–3)
BASOPHILS # BLD AUTO: 0 10E3/UL (ref 0–0.2)
BASOPHILS NFR BLD AUTO: 0 %
BI-PLANE LVEF ECHO: NORMAL
BILIRUB DIRECT SERPL-MCNC: <0.2 MG/DL (ref 0–0.3)
BILIRUB SERPL-MCNC: 0.3 MG/DL
BILIRUB SERPL-MCNC: 0.4 MG/DL
BILIRUB SERPL-MCNC: 0.5 MG/DL
BILIRUB SERPL-MCNC: 0.6 MG/DL
BILIRUB SERPL-MCNC: 0.7 MG/DL
BILIRUB SERPL-MCNC: 0.8 MG/DL
BILIRUB SERPL-MCNC: 0.9 MG/DL
BILIRUB SERPL-MCNC: 1 MG/DL
BILIRUB UR QL STRIP: NEGATIVE
BLD PROD TYP BPU: NORMAL
BLD PROD TYP BPU: NORMAL
BLOOD COMPONENT TYPE: NORMAL
BLOOD COMPONENT TYPE: NORMAL
BUN SERPL-MCNC: 10.2 MG/DL (ref 8–23)
BUN SERPL-MCNC: 10.4 MG/DL (ref 8–23)
BUN SERPL-MCNC: 11.4 MG/DL (ref 8–23)
BUN SERPL-MCNC: 11.5 MG/DL (ref 8–23)
BUN SERPL-MCNC: 11.5 MG/DL (ref 8–23)
BUN SERPL-MCNC: 12.3 MG/DL (ref 8–23)
BUN SERPL-MCNC: 13.1 MG/DL (ref 8–23)
BUN SERPL-MCNC: 13.6 MG/DL (ref 8–23)
BUN SERPL-MCNC: 13.8 MG/DL (ref 8–23)
BUN SERPL-MCNC: 14.2 MG/DL (ref 8–23)
BUN SERPL-MCNC: 14.3 MG/DL (ref 8–23)
BUN SERPL-MCNC: 17.7 MG/DL (ref 8–23)
BUN SERPL-MCNC: 19.2 MG/DL (ref 8–23)
BUN SERPL-MCNC: 19.2 MG/DL (ref 8–23)
BUN SERPL-MCNC: 24.2 MG/DL (ref 8–23)
BUN SERPL-MCNC: 24.3 MG/DL (ref 8–23)
BUN SERPL-MCNC: 24.7 MG/DL (ref 8–23)
BUN SERPL-MCNC: 30.1 MG/DL (ref 8–23)
BUN SERPL-MCNC: 38.8 MG/DL (ref 8–23)
BUN SERPL-MCNC: 45.1 MG/DL (ref 8–23)
BUN SERPL-MCNC: 46.5 MG/DL (ref 8–23)
BUN SERPL-MCNC: 49.4 MG/DL (ref 8–23)
BUN SERPL-MCNC: 49.8 MG/DL (ref 8–23)
BUN SERPL-MCNC: 52.6 MG/DL (ref 8–23)
BUN SERPL-MCNC: 52.7 MG/DL (ref 8–23)
BUN SERPL-MCNC: 53 MG/DL (ref 8–23)
BUN SERPL-MCNC: 58.5 MG/DL (ref 8–23)
BUN SERPL-MCNC: 59.4 MG/DL (ref 8–23)
BUN SERPL-MCNC: 75.4 MG/DL (ref 8–23)
BUN SERPL-MCNC: 9.5 MG/DL (ref 8–23)
BUN SERPL-MCNC: 9.7 MG/DL (ref 8–23)
C GATTII+NEOFOR DNA CSF QL NAA+NON-PROBE: NEGATIVE
C PNEUM DNA SPEC QL NAA+PROBE: NOT DETECTED
C PNEUM DNA SPEC QL NAA+PROBE: NOT DETECTED
C-ANCA TITR SER IF: NORMAL {TITER}
CA-I BLD-MCNC: 4.2 MG/DL (ref 4.4–5.2)
CA-I BLD-MCNC: 4.7 MG/DL (ref 4.4–5.2)
CALCIUM SERPL-MCNC: 6.8 MG/DL (ref 8.8–10.2)
CALCIUM SERPL-MCNC: 7.1 MG/DL (ref 8.8–10.2)
CALCIUM SERPL-MCNC: 7.3 MG/DL (ref 8.8–10.2)
CALCIUM SERPL-MCNC: 7.6 MG/DL (ref 8.8–10.2)
CALCIUM SERPL-MCNC: 7.7 MG/DL (ref 8.8–10.2)
CALCIUM SERPL-MCNC: 7.7 MG/DL (ref 8.8–10.2)
CALCIUM SERPL-MCNC: 7.9 MG/DL (ref 8.8–10.2)
CALCIUM SERPL-MCNC: 8 MG/DL (ref 8.8–10.2)
CALCIUM SERPL-MCNC: 8 MG/DL (ref 8.8–10.2)
CALCIUM SERPL-MCNC: 8.1 MG/DL (ref 8.8–10.2)
CALCIUM SERPL-MCNC: 8.2 MG/DL (ref 8.8–10.2)
CALCIUM SERPL-MCNC: 8.2 MG/DL (ref 8.8–10.2)
CALCIUM SERPL-MCNC: 8.3 MG/DL (ref 8.8–10.2)
CALCIUM SERPL-MCNC: 8.4 MG/DL (ref 8.8–10.2)
CALCIUM SERPL-MCNC: 8.4 MG/DL (ref 8.8–10.2)
CALCIUM SERPL-MCNC: 8.5 MG/DL (ref 8.8–10.2)
CALCIUM SERPL-MCNC: 8.6 MG/DL (ref 8.8–10.2)
CALCIUM SERPL-MCNC: 8.6 MG/DL (ref 8.8–10.2)
CALCIUM SERPL-MCNC: 8.7 MG/DL (ref 8.8–10.2)
CALCIUM SERPL-MCNC: 9 MG/DL (ref 8.8–10.2)
CALCIUM SERPL-MCNC: 9 MG/DL (ref 8.8–10.2)
CALCIUM SERPL-MCNC: 9.2 MG/DL (ref 8.8–10.2)
CALCIUM SERPL-MCNC: 9.5 MG/DL (ref 8.8–10.2)
CD19 CELLS NFR BRONCH: <1 %
CD3 CELLS NFR BRONCH: 89 %
CD3+CD4+ CELLS NFR BRONCH: 72 %
CD3+CD4+ CELLS/CD3+CD8+ CLL BRONCH: 4.8 %
CD3+CD8+ CELLS NFR BRONCH: 15 %
CD3-CD16+CD56+ CELLS NFR SPEC: 11 %
CELL COUNT BODY FLUID SOURCE: ABNORMAL
CHLORIDE SERPL-SCNC: 100 MMOL/L (ref 98–107)
CHLORIDE SERPL-SCNC: 102 MMOL/L (ref 98–107)
CHLORIDE SERPL-SCNC: 102 MMOL/L (ref 98–107)
CHLORIDE SERPL-SCNC: 103 MMOL/L (ref 98–107)
CHLORIDE SERPL-SCNC: 104 MMOL/L (ref 98–107)
CHLORIDE SERPL-SCNC: 105 MMOL/L (ref 98–107)
CHLORIDE SERPL-SCNC: 106 MMOL/L (ref 98–107)
CHLORIDE SERPL-SCNC: 108 MMOL/L (ref 98–107)
CHLORIDE SERPL-SCNC: 109 MMOL/L (ref 98–107)
CHLORIDE SERPL-SCNC: 112 MMOL/L (ref 98–107)
CHLORIDE SERPL-SCNC: 113 MMOL/L (ref 98–107)
CHLORIDE SERPL-SCNC: 113 MMOL/L (ref 98–107)
CHLORIDE SERPL-SCNC: 114 MMOL/L (ref 98–107)
CHLORIDE SERPL-SCNC: 115 MMOL/L (ref 98–107)
CHLORIDE SERPL-SCNC: 116 MMOL/L (ref 98–107)
CHLORIDE SERPL-SCNC: 99 MMOL/L (ref 98–107)
CK SERPL-CCNC: 118 U/L (ref 26–192)
CK SERPL-CCNC: 200 U/L (ref 26–192)
CK SERPL-CCNC: 273 U/L (ref 26–192)
CK SERPL-CCNC: 50 U/L (ref 26–192)
CMV DNA CSF QL NAA+NON-PROBE: NEGATIVE
CODING SYSTEM: NORMAL
CODING SYSTEM: NORMAL
COHGB MFR BLD: 87.2 % (ref 95–96)
COHGB MFR BLD: 87.3 % (ref 95–96)
COHGB MFR BLD: 89.6 % (ref 95–96)
COHGB MFR BLD: 90.9 % (ref 95–96)
COHGB MFR BLD: 97.5 % (ref 95–96)
COHGB MFR BLD: >100 % (ref 95–96)
COLOR CSF: ABNORMAL
COLOR CSF: COLORLESS
COLOR FLD: ABNORMAL
COLOR UR AUTO: ABNORMAL
COPRO1/CREAT UR-SRTO: 10 UMOL/MOL CRT
COPRO3/CREAT UR-SRTO: 12 UMOL/MOL CRT
CORTIS SERPL-MCNC: 35.9 UG/DL
CREAT 24H UR-MRATE: ABNORMAL MG/D
CREAT 24H UR-MRATE: NORMAL MG/D
CREAT SERPL-MCNC: 0.39 MG/DL (ref 0.51–0.95)
CREAT SERPL-MCNC: 0.42 MG/DL (ref 0.51–0.95)
CREAT SERPL-MCNC: 0.47 MG/DL (ref 0.51–0.95)
CREAT SERPL-MCNC: 0.47 MG/DL (ref 0.51–0.95)
CREAT SERPL-MCNC: 0.51 MG/DL (ref 0.51–0.95)
CREAT SERPL-MCNC: 0.52 MG/DL (ref 0.51–0.95)
CREAT SERPL-MCNC: 0.54 MG/DL (ref 0.51–0.95)
CREAT SERPL-MCNC: 0.56 MG/DL (ref 0.51–0.95)
CREAT SERPL-MCNC: 0.57 MG/DL (ref 0.51–0.95)
CREAT SERPL-MCNC: 0.58 MG/DL (ref 0.51–0.95)
CREAT SERPL-MCNC: 0.63 MG/DL (ref 0.51–0.95)
CREAT SERPL-MCNC: 0.64 MG/DL (ref 0.51–0.95)
CREAT SERPL-MCNC: 0.67 MG/DL (ref 0.51–0.95)
CREAT SERPL-MCNC: 0.7 MG/DL (ref 0.51–0.95)
CREAT SERPL-MCNC: 0.71 MG/DL (ref 0.51–0.95)
CREAT SERPL-MCNC: 0.72 MG/DL (ref 0.51–0.95)
CREAT SERPL-MCNC: 0.75 MG/DL (ref 0.51–0.95)
CREAT SERPL-MCNC: 0.78 MG/DL (ref 0.51–0.95)
CREAT SERPL-MCNC: 0.79 MG/DL (ref 0.51–0.95)
CREAT SERPL-MCNC: 0.8 MG/DL (ref 0.51–0.95)
CREAT SERPL-MCNC: 0.83 MG/DL (ref 0.51–0.95)
CREAT SERPL-MCNC: 0.88 MG/DL (ref 0.51–0.95)
CREAT SERPL-MCNC: 0.88 MG/DL (ref 0.51–0.95)
CREAT SERPL-MCNC: 0.93 MG/DL (ref 0.51–0.95)
CREAT SERPL-MCNC: 0.98 MG/DL (ref 0.51–0.95)
CREAT SERPL-MCNC: 1.06 MG/DL (ref 0.51–0.95)
CREAT SERPL-MCNC: 1.14 MG/DL (ref 0.51–0.95)
CREAT SERPL-MCNC: 1.16 MG/DL (ref 0.51–0.95)
CREAT SERPL-MCNC: 1.21 MG/DL (ref 0.51–0.95)
CREAT SERPL-MCNC: 1.22 MG/DL (ref 0.51–0.95)
CREAT UR-MCNC: 19 MG/DL
CREAT UR-MCNC: 66 MG/DL
CROSSMATCH: NORMAL
CROSSMATCH: NORMAL
CRP SERPL-MCNC: 144.18 MG/L
CRP SERPL-MCNC: 159.36 MG/L
CRP SERPL-MCNC: 215.38 MG/L
CRP SERPL-MCNC: 220.4 MG/L
CRP SERPL-MCNC: 236.88 MG/L
CRP SERPL-MCNC: 268.53 MG/L
DEPRECATED HCO3 PLAS-SCNC: 21 MMOL/L (ref 22–29)
DEPRECATED HCO3 PLAS-SCNC: 22 MMOL/L (ref 22–29)
DEPRECATED HCO3 PLAS-SCNC: 23 MMOL/L (ref 22–29)
DEPRECATED HCO3 PLAS-SCNC: 24 MMOL/L (ref 22–29)
DEPRECATED HCO3 PLAS-SCNC: 25 MMOL/L (ref 22–29)
DEPRECATED HCO3 PLAS-SCNC: 27 MMOL/L (ref 22–29)
DEPRECATED HCO3 PLAS-SCNC: 27 MMOL/L (ref 22–29)
DEPRECATED HCO3 PLAS-SCNC: 29 MMOL/L (ref 22–29)
DEPRECATED HCO3 PLAS-SCNC: 30 MMOL/L (ref 22–29)
DEPRECATED HCO3 PLAS-SCNC: 32 MMOL/L (ref 22–29)
DEPRECATED HCO3 PLAS-SCNC: 34 MMOL/L (ref 22–29)
DEPRECATED HCO3 PLAS-SCNC: 34 MMOL/L (ref 22–29)
DEPRECATED HCO3 PLAS-SCNC: 35 MMOL/L (ref 22–29)
DEPRECATED HCO3 PLAS-SCNC: 35 MMOL/L (ref 22–29)
DEPRECATED HCO3 PLAS-SCNC: 36 MMOL/L (ref 22–29)
DEPRECATED HCO3 PLAS-SCNC: 38 MMOL/L (ref 22–29)
DEPRECATED HCO3 PLAS-SCNC: 38 MMOL/L (ref 22–29)
DEPRECATED HCO3 PLAS-SCNC: 39 MMOL/L (ref 22–29)
DEPRECATED HCO3 PLAS-SCNC: 40 MMOL/L (ref 22–29)
DEPRECATED HCO3 PLAS-SCNC: 41 MMOL/L (ref 22–29)
DEPRECATED M TB RPOB XXX QL NAA+PROBE: NORMAL
DEPRECATED M TB RPOB XXX QL NAA+PROBE: NORMAL
DIASTOLIC BLOOD PRESSURE - MUSE: NORMAL MMHG
E COLI K1 AG CSF QL: NEGATIVE
EGFRCR SERPLBLD CKD-EPI 2021: 46 ML/MIN/1.73M2
EGFRCR SERPLBLD CKD-EPI 2021: 46 ML/MIN/1.73M2
EGFRCR SERPLBLD CKD-EPI 2021: 49 ML/MIN/1.73M2
EGFRCR SERPLBLD CKD-EPI 2021: 50 ML/MIN/1.73M2
EGFRCR SERPLBLD CKD-EPI 2021: 54 ML/MIN/1.73M2
EGFRCR SERPLBLD CKD-EPI 2021: 60 ML/MIN/1.73M2
EGFRCR SERPLBLD CKD-EPI 2021: 63 ML/MIN/1.73M2
EGFRCR SERPLBLD CKD-EPI 2021: 68 ML/MIN/1.73M2
EGFRCR SERPLBLD CKD-EPI 2021: 68 ML/MIN/1.73M2
EGFRCR SERPLBLD CKD-EPI 2021: 73 ML/MIN/1.73M2
EGFRCR SERPLBLD CKD-EPI 2021: 76 ML/MIN/1.73M2
EGFRCR SERPLBLD CKD-EPI 2021: 77 ML/MIN/1.73M2
EGFRCR SERPLBLD CKD-EPI 2021: 78 ML/MIN/1.73M2
EGFRCR SERPLBLD CKD-EPI 2021: 82 ML/MIN/1.73M2
EGFRCR SERPLBLD CKD-EPI 2021: 86 ML/MIN/1.73M2
EGFRCR SERPLBLD CKD-EPI 2021: 88 ML/MIN/1.73M2
EGFRCR SERPLBLD CKD-EPI 2021: 89 ML/MIN/1.73M2
EGFRCR SERPLBLD CKD-EPI 2021: 90 ML/MIN/1.73M2
EGFRCR SERPLBLD CKD-EPI 2021: >90 ML/MIN/1.73M2
EOSINOPHIL # BLD AUTO: 0 10E3/UL (ref 0–0.7)
EOSINOPHIL # BLD AUTO: 0.1 10E3/UL (ref 0–0.7)
EOSINOPHIL # BLD AUTO: 0.4 10E3/UL (ref 0–0.7)
EOSINOPHIL NFR BLD AUTO: 0 %
EOSINOPHIL NFR BLD AUTO: 1 %
EOSINOPHIL NFR BLD AUTO: 2 %
EOSINOPHIL NFR BLD AUTO: 3 %
EOSINOPHIL NFR BLD AUTO: 4 %
EOSINOPHIL NFR BLD AUTO: 5 %
ERYTHROCYTE [DISTWIDTH] IN BLOOD BY AUTOMATED COUNT: 15.2 % (ref 10–15)
ERYTHROCYTE [DISTWIDTH] IN BLOOD BY AUTOMATED COUNT: 15.8 % (ref 10–15)
ERYTHROCYTE [DISTWIDTH] IN BLOOD BY AUTOMATED COUNT: 16.1 % (ref 10–15)
ERYTHROCYTE [DISTWIDTH] IN BLOOD BY AUTOMATED COUNT: 16.7 % (ref 10–15)
ERYTHROCYTE [DISTWIDTH] IN BLOOD BY AUTOMATED COUNT: 17.2 % (ref 10–15)
ERYTHROCYTE [DISTWIDTH] IN BLOOD BY AUTOMATED COUNT: 17.5 % (ref 10–15)
ERYTHROCYTE [DISTWIDTH] IN BLOOD BY AUTOMATED COUNT: 17.7 % (ref 10–15)
ERYTHROCYTE [DISTWIDTH] IN BLOOD BY AUTOMATED COUNT: 18.1 % (ref 10–15)
ERYTHROCYTE [DISTWIDTH] IN BLOOD BY AUTOMATED COUNT: 18.3 % (ref 10–15)
ERYTHROCYTE [DISTWIDTH] IN BLOOD BY AUTOMATED COUNT: 18.4 % (ref 10–15)
ERYTHROCYTE [DISTWIDTH] IN BLOOD BY AUTOMATED COUNT: 18.5 % (ref 10–15)
ERYTHROCYTE [DISTWIDTH] IN BLOOD BY AUTOMATED COUNT: 18.6 % (ref 10–15)
ERYTHROCYTE [DISTWIDTH] IN BLOOD BY AUTOMATED COUNT: 18.7 % (ref 10–15)
ERYTHROCYTE [DISTWIDTH] IN BLOOD BY AUTOMATED COUNT: 18.8 % (ref 10–15)
ERYTHROCYTE [DISTWIDTH] IN BLOOD BY AUTOMATED COUNT: 18.8 % (ref 10–15)
ERYTHROCYTE [DISTWIDTH] IN BLOOD BY AUTOMATED COUNT: 19 % (ref 10–15)
ERYTHROCYTE [DISTWIDTH] IN BLOOD BY AUTOMATED COUNT: 19 % (ref 10–15)
ERYTHROCYTE [DISTWIDTH] IN BLOOD BY AUTOMATED COUNT: 19.4 % (ref 10–15)
ERYTHROCYTE [DISTWIDTH] IN BLOOD BY AUTOMATED COUNT: 19.5 % (ref 10–15)
ERYTHROCYTE [DISTWIDTH] IN BLOOD BY AUTOMATED COUNT: 19.6 % (ref 10–15)
EV RNA SPEC QL NAA+PROBE: NEGATIVE
FLUAV H1 2009 PAND RNA SPEC QL NAA+PROBE: NOT DETECTED
FLUAV H1 RNA SPEC QL NAA+PROBE: NOT DETECTED
FLUAV H3 RNA SPEC QL NAA+PROBE: NOT DETECTED
FLUAV RNA SPEC QL NAA+PROBE: NEGATIVE
FLUAV RNA SPEC QL NAA+PROBE: NOT DETECTED
FLUBV RNA RESP QL NAA+PROBE: NEGATIVE
FLUBV RNA SPEC QL NAA+PROBE: NOT DETECTED
GALACTOMANNAN AG BAL QL: NEGATIVE
GALACTOMANNAN AG SPEC IA-ACNC: 0.05
GD1B GANGL IGG SER IA-ACNC: 219 IV
GD1B GANGL IGM SER IA-ACNC: 8 IV
GLUCOSE BLDC GLUCOMTR-MCNC: 101 MG/DL (ref 70–99)
GLUCOSE BLDC GLUCOMTR-MCNC: 102 MG/DL (ref 70–99)
GLUCOSE BLDC GLUCOMTR-MCNC: 102 MG/DL (ref 70–99)
GLUCOSE BLDC GLUCOMTR-MCNC: 103 MG/DL (ref 70–99)
GLUCOSE BLDC GLUCOMTR-MCNC: 103 MG/DL (ref 70–99)
GLUCOSE BLDC GLUCOMTR-MCNC: 104 MG/DL (ref 70–99)
GLUCOSE BLDC GLUCOMTR-MCNC: 104 MG/DL (ref 70–99)
GLUCOSE BLDC GLUCOMTR-MCNC: 105 MG/DL (ref 70–99)
GLUCOSE BLDC GLUCOMTR-MCNC: 106 MG/DL (ref 70–99)
GLUCOSE BLDC GLUCOMTR-MCNC: 107 MG/DL (ref 70–99)
GLUCOSE BLDC GLUCOMTR-MCNC: 111 MG/DL (ref 70–99)
GLUCOSE BLDC GLUCOMTR-MCNC: 112 MG/DL (ref 70–99)
GLUCOSE BLDC GLUCOMTR-MCNC: 113 MG/DL (ref 70–99)
GLUCOSE BLDC GLUCOMTR-MCNC: 113 MG/DL (ref 70–99)
GLUCOSE BLDC GLUCOMTR-MCNC: 114 MG/DL (ref 70–99)
GLUCOSE BLDC GLUCOMTR-MCNC: 114 MG/DL (ref 70–99)
GLUCOSE BLDC GLUCOMTR-MCNC: 115 MG/DL (ref 70–99)
GLUCOSE BLDC GLUCOMTR-MCNC: 116 MG/DL (ref 70–99)
GLUCOSE BLDC GLUCOMTR-MCNC: 117 MG/DL (ref 70–99)
GLUCOSE BLDC GLUCOMTR-MCNC: 117 MG/DL (ref 70–99)
GLUCOSE BLDC GLUCOMTR-MCNC: 118 MG/DL (ref 70–99)
GLUCOSE BLDC GLUCOMTR-MCNC: 118 MG/DL (ref 70–99)
GLUCOSE BLDC GLUCOMTR-MCNC: 124 MG/DL (ref 70–99)
GLUCOSE BLDC GLUCOMTR-MCNC: 124 MG/DL (ref 70–99)
GLUCOSE BLDC GLUCOMTR-MCNC: 125 MG/DL (ref 70–99)
GLUCOSE BLDC GLUCOMTR-MCNC: 126 MG/DL (ref 70–99)
GLUCOSE BLDC GLUCOMTR-MCNC: 126 MG/DL (ref 70–99)
GLUCOSE BLDC GLUCOMTR-MCNC: 128 MG/DL (ref 70–99)
GLUCOSE BLDC GLUCOMTR-MCNC: 129 MG/DL (ref 70–99)
GLUCOSE BLDC GLUCOMTR-MCNC: 130 MG/DL (ref 70–99)
GLUCOSE BLDC GLUCOMTR-MCNC: 132 MG/DL (ref 70–99)
GLUCOSE BLDC GLUCOMTR-MCNC: 133 MG/DL (ref 70–99)
GLUCOSE BLDC GLUCOMTR-MCNC: 134 MG/DL (ref 70–99)
GLUCOSE BLDC GLUCOMTR-MCNC: 135 MG/DL (ref 70–99)
GLUCOSE BLDC GLUCOMTR-MCNC: 136 MG/DL (ref 70–99)
GLUCOSE BLDC GLUCOMTR-MCNC: 136 MG/DL (ref 70–99)
GLUCOSE BLDC GLUCOMTR-MCNC: 137 MG/DL (ref 70–99)
GLUCOSE BLDC GLUCOMTR-MCNC: 138 MG/DL (ref 70–99)
GLUCOSE BLDC GLUCOMTR-MCNC: 138 MG/DL (ref 70–99)
GLUCOSE BLDC GLUCOMTR-MCNC: 142 MG/DL (ref 70–99)
GLUCOSE BLDC GLUCOMTR-MCNC: 151 MG/DL (ref 70–99)
GLUCOSE BLDC GLUCOMTR-MCNC: 154 MG/DL (ref 70–99)
GLUCOSE BLDC GLUCOMTR-MCNC: 156 MG/DL (ref 70–99)
GLUCOSE BLDC GLUCOMTR-MCNC: 221 MG/DL (ref 70–99)
GLUCOSE BLDC GLUCOMTR-MCNC: 83 MG/DL (ref 70–99)
GLUCOSE BLDC GLUCOMTR-MCNC: 91 MG/DL (ref 70–99)
GLUCOSE BLDC GLUCOMTR-MCNC: 91 MG/DL (ref 70–99)
GLUCOSE BLDC GLUCOMTR-MCNC: 92 MG/DL (ref 70–99)
GLUCOSE BLDC GLUCOMTR-MCNC: 93 MG/DL (ref 70–99)
GLUCOSE BLDC GLUCOMTR-MCNC: 99 MG/DL (ref 70–99)
GLUCOSE BLDC GLUCOMTR-MCNC: 99 MG/DL (ref 70–99)
GLUCOSE CSF-MCNC: 59 MG/DL (ref 40–70)
GLUCOSE SERPL-MCNC: 100 MG/DL (ref 70–99)
GLUCOSE SERPL-MCNC: 107 MG/DL (ref 70–99)
GLUCOSE SERPL-MCNC: 109 MG/DL (ref 70–99)
GLUCOSE SERPL-MCNC: 110 MG/DL (ref 70–99)
GLUCOSE SERPL-MCNC: 111 MG/DL (ref 70–99)
GLUCOSE SERPL-MCNC: 113 MG/DL (ref 70–99)
GLUCOSE SERPL-MCNC: 115 MG/DL (ref 70–99)
GLUCOSE SERPL-MCNC: 119 MG/DL (ref 70–99)
GLUCOSE SERPL-MCNC: 119 MG/DL (ref 70–99)
GLUCOSE SERPL-MCNC: 120 MG/DL (ref 70–99)
GLUCOSE SERPL-MCNC: 120 MG/DL (ref 70–99)
GLUCOSE SERPL-MCNC: 121 MG/DL (ref 70–99)
GLUCOSE SERPL-MCNC: 122 MG/DL (ref 70–99)
GLUCOSE SERPL-MCNC: 125 MG/DL (ref 70–99)
GLUCOSE SERPL-MCNC: 131 MG/DL (ref 70–99)
GLUCOSE SERPL-MCNC: 133 MG/DL (ref 70–99)
GLUCOSE SERPL-MCNC: 136 MG/DL (ref 70–99)
GLUCOSE SERPL-MCNC: 136 MG/DL (ref 70–99)
GLUCOSE SERPL-MCNC: 144 MG/DL (ref 70–99)
GLUCOSE SERPL-MCNC: 147 MG/DL (ref 70–99)
GLUCOSE SERPL-MCNC: 153 MG/DL (ref 70–99)
GLUCOSE SERPL-MCNC: 155 MG/DL (ref 70–99)
GLUCOSE SERPL-MCNC: 157 MG/DL (ref 70–99)
GLUCOSE SERPL-MCNC: 162 MG/DL (ref 70–99)
GLUCOSE SERPL-MCNC: 177 MG/DL (ref 70–99)
GLUCOSE SERPL-MCNC: 178 MG/DL (ref 70–99)
GLUCOSE SERPL-MCNC: 182 MG/DL (ref 70–99)
GLUCOSE SERPL-MCNC: 252 MG/DL (ref 70–99)
GLUCOSE SERPL-MCNC: 98 MG/DL (ref 70–99)
GLUCOSE UR STRIP-MCNC: NEGATIVE MG/DL
GM1 GANGL IGG SER IA-ACNC: 90 IV
GM1 GANGL IGM SER IA-ACNC: 4 IV
GP B STREP DNA CSF QL NAA+NON-PROBE: NEGATIVE
GQ1B GANGL IGG SER IA-ACNC: 3 IV
GQ1B GANGL IGM SER IA-ACNC: 3 IV
GRAM STAIN RESULT: ABNORMAL
GRAM STAIN RESULT: NORMAL
HADV DNA SPEC QL NAA+PROBE: NOT DETECTED
HAEM INFLU DNA CSF QL NAA+NON-PROBE: NEGATIVE
HBA1C MFR BLD: 5.2 %
HBV SURFACE AG SERPL QL IA: NONREACTIVE
HCO3 BLD-SCNC: 28 MMOL/L (ref 21–28)
HCO3 BLD-SCNC: 29 MMOL/L (ref 21–28)
HCO3 BLD-SCNC: 36 MMOL/L (ref 21–28)
HCO3 BLD-SCNC: 42 MMOL/L (ref 21–28)
HCO3 BLD-SCNC: 42 MMOL/L (ref 21–28)
HCO3 BLD-SCNC: 43 MMOL/L (ref 21–28)
HCO3 BLDV-SCNC: 26 MMOL/L (ref 21–28)
HCO3 BLDV-SCNC: 28 MMOL/L (ref 21–28)
HCO3 BLDV-SCNC: 30 MMOL/L (ref 21–28)
HCO3 BLDV-SCNC: 38 MMOL/L (ref 21–28)
HCO3 BLDV-SCNC: 44 MMOL/L (ref 21–28)
HCOV PNL SPEC NAA+PROBE: NOT DETECTED
HCT VFR BLD AUTO: 20.2 % (ref 35–47)
HCT VFR BLD AUTO: 20.3 % (ref 35–47)
HCT VFR BLD AUTO: 22.4 % (ref 35–47)
HCT VFR BLD AUTO: 22.4 % (ref 35–47)
HCT VFR BLD AUTO: 22.5 % (ref 35–47)
HCT VFR BLD AUTO: 22.5 % (ref 35–47)
HCT VFR BLD AUTO: 22.6 % (ref 35–47)
HCT VFR BLD AUTO: 22.8 % (ref 35–47)
HCT VFR BLD AUTO: 22.9 % (ref 35–47)
HCT VFR BLD AUTO: 23.4 % (ref 35–47)
HCT VFR BLD AUTO: 24.3 % (ref 35–47)
HCT VFR BLD AUTO: 24.7 % (ref 35–47)
HCT VFR BLD AUTO: 24.7 % (ref 35–47)
HCT VFR BLD AUTO: 25.3 % (ref 35–47)
HCT VFR BLD AUTO: 25.5 % (ref 35–47)
HCT VFR BLD AUTO: 25.6 % (ref 35–47)
HCT VFR BLD AUTO: 25.6 % (ref 35–47)
HCT VFR BLD AUTO: 26 % (ref 35–47)
HCT VFR BLD AUTO: 26 % (ref 35–47)
HCT VFR BLD AUTO: 27 % (ref 35–47)
HCT VFR BLD AUTO: 27.5 % (ref 35–47)
HCT VFR BLD AUTO: 28.7 % (ref 35–47)
HCT VFR BLD AUTO: 29.3 % (ref 35–47)
HCT VFR BLD AUTO: 30.6 % (ref 35–47)
HCT VFR BLD AUTO: 30.6 % (ref 35–47)
HCT VFR BLD AUTO: 33.9 % (ref 35–47)
HCT VFR BLD AUTO: 38.3 % (ref 35–47)
HCT VFR BLD AUTO: 39.8 % (ref 35–47)
HCV RNA SERPL NAA+PROBE-ACNC: NOT DETECTED IU/ML
HEPTACARBOXYLATE/CREAT UR-SRTO: 2 UMOL/MOL CRT
HGB BLD-MCNC: 10.1 G/DL (ref 11.7–15.7)
HGB BLD-MCNC: 10.7 G/DL (ref 11.7–15.7)
HGB BLD-MCNC: 12.3 G/DL (ref 11.7–15.7)
HGB BLD-MCNC: 13.1 G/DL (ref 11.7–15.7)
HGB BLD-MCNC: 6.3 G/DL (ref 11.7–15.7)
HGB BLD-MCNC: 6.8 G/DL (ref 11.7–15.7)
HGB BLD-MCNC: 7.2 G/DL (ref 11.7–15.7)
HGB BLD-MCNC: 7.3 G/DL (ref 11.7–15.7)
HGB BLD-MCNC: 7.3 G/DL (ref 11.7–15.7)
HGB BLD-MCNC: 7.5 G/DL (ref 11.7–15.7)
HGB BLD-MCNC: 7.5 G/DL (ref 11.7–15.7)
HGB BLD-MCNC: 7.6 G/DL (ref 11.7–15.7)
HGB BLD-MCNC: 7.7 G/DL (ref 11.7–15.7)
HGB BLD-MCNC: 7.9 G/DL (ref 11.7–15.7)
HGB BLD-MCNC: 7.9 G/DL (ref 11.7–15.7)
HGB BLD-MCNC: 8 G/DL (ref 11.7–15.7)
HGB BLD-MCNC: 8 G/DL (ref 11.7–15.7)
HGB BLD-MCNC: 8.1 G/DL (ref 11.7–15.7)
HGB BLD-MCNC: 8.4 G/DL (ref 11.7–15.7)
HGB BLD-MCNC: 8.5 G/DL (ref 11.7–15.7)
HGB BLD-MCNC: 8.6 G/DL (ref 11.7–15.7)
HGB BLD-MCNC: 9.1 G/DL (ref 11.7–15.7)
HGB BLD-MCNC: 9.1 G/DL (ref 11.7–15.7)
HGB BLD-MCNC: 9.5 G/DL (ref 11.7–15.7)
HGB BLD-MCNC: 9.8 G/DL (ref 11.7–15.7)
HGB UR QL STRIP: ABNORMAL
HHV6 DNA CSF QL NAA+NON-PROBE: NEGATIVE
HIV 1+2 AB+HIV1 P24 AG SERPL QL IA: NONREACTIVE
HIV 1+2 AB+HIV1 P24 AG SERPL QL IA: NONREACTIVE
HIV 1+2 AB+HIV1P24 AG SERPLBLD IA.RAPID: NON REACTIVE
HIV 1+2 AB+HIV1P24 AG SERPLBLD IA.RAPID: NON REACTIVE
HIV INTERPRETATION: NORMAL
HMPV RNA SPEC QL NAA+PROBE: NOT DETECTED
HOLD SPECIMEN: NORMAL
HOLD SPECIMEN: NORMAL
HPIV1 RNA SPEC QL NAA+PROBE: NOT DETECTED
HPIV2 RNA SPEC QL NAA+PROBE: NOT DETECTED
HPIV3 RNA SPEC QL NAA+PROBE: NOT DETECTED
HPIV4 RNA SPEC QL NAA+PROBE: NOT DETECTED
HSV1 DNA CSF QL NAA+NON-PROBE: NEGATIVE
HSV1 DNA CSF QL NAA+PROBE: NOT DETECTED
HSV2 DNA CSF QL NAA+NON-PROBE: NEGATIVE
HSV2 DNA CSF QL NAA+PROBE: NOT DETECTED
IMM GRANULOCYTES # BLD: 0.1 10E3/UL
IMM GRANULOCYTES NFR BLD: 1 %
IMMUNOLOGIST REVIEW: NORMAL
INTERPRETATION ECG - MUSE: NORMAL
ISSUE DATE AND TIME: NORMAL
ISSUE DATE AND TIME: NORMAL
KETONES UR STRIP-MCNC: 10 MG/DL
KOH PREPARATION: NORMAL
L MONOCYTOG DNA CSF QL NAA+NON-PROBE: NEGATIVE
LACTATE BLD-SCNC: 3.1 MMOL/L
LACTATE SERPL-SCNC: 0.8 MMOL/L (ref 0.7–2)
LACTATE SERPL-SCNC: 1.4 MMOL/L (ref 0.7–2)
LACTATE SERPL-SCNC: 1.5 MMOL/L (ref 0.7–2)
LACTATE SERPL-SCNC: 1.6 MMOL/L (ref 0.7–2)
LACTATE SERPL-SCNC: 1.6 MMOL/L (ref 0.7–2)
LDH SERPL L TO P-CCNC: 203 U/L (ref 0–250)
LEUKOCYTE ESTERASE UR QL STRIP: ABNORMAL
LMWH PPP CHRO-ACNC: 0.54 IU/ML
LMWH PPP CHRO-ACNC: 0.56 IU/ML
LMWH PPP CHRO-ACNC: NORMAL
LVEF ECHO: NORMAL
LVEF ECHO: NORMAL
LYMPHOCYTE SUBSET BRONCHIAL EXTERNAL COMMENT: NORMAL
LYMPHOCYTES # BLD AUTO: 0.9 10E3/UL (ref 0.8–5.3)
LYMPHOCYTES # BLD AUTO: 1 10E3/UL (ref 0.8–5.3)
LYMPHOCYTES # BLD AUTO: 1.3 10E3/UL (ref 0.8–5.3)
LYMPHOCYTES # BLD AUTO: 1.4 10E3/UL (ref 0.8–5.3)
LYMPHOCYTES # BLD AUTO: 1.5 10E3/UL (ref 0.8–5.3)
LYMPHOCYTES # BLD AUTO: 1.7 10E3/UL (ref 0.8–5.3)
LYMPHOCYTES # BLD AUTO: 2 10E3/UL (ref 0.8–5.3)
LYMPHOCYTES NFR BLD AUTO: 10 %
LYMPHOCYTES NFR BLD AUTO: 10 %
LYMPHOCYTES NFR BLD AUTO: 11 %
LYMPHOCYTES NFR BLD AUTO: 15 %
LYMPHOCYTES NFR BLD AUTO: 15 %
LYMPHOCYTES NFR BLD AUTO: 16 %
LYMPHOCYTES NFR BLD AUTO: 16 %
LYMPHOCYTES NFR BLD AUTO: 5 %
LYMPHOCYTES NFR BLD AUTO: 9 %
LYMPHOCYTES NFR FLD MANUAL: 5 %
M PNEUMO DNA SPEC QL NAA+PROBE: NOT DETECTED
M TB DNA SPEC QL NAA+PROBE: NOT DETECTED
M TB DNA SPEC QL NAA+PROBE: NOT DETECTED
MAGNESIUM SERPL-MCNC: 1.4 MG/DL (ref 1.7–2.3)
MAGNESIUM SERPL-MCNC: 1.6 MG/DL (ref 1.7–2.3)
MAGNESIUM SERPL-MCNC: 1.6 MG/DL (ref 1.7–2.3)
MAGNESIUM SERPL-MCNC: 1.7 MG/DL (ref 1.7–2.3)
MAGNESIUM SERPL-MCNC: 1.8 MG/DL (ref 1.7–2.3)
MAGNESIUM SERPL-MCNC: 1.9 MG/DL (ref 1.7–2.3)
MAGNESIUM SERPL-MCNC: 2 MG/DL (ref 1.7–2.3)
MAGNESIUM SERPL-MCNC: 2.1 MG/DL (ref 1.7–2.3)
MAGNESIUM SERPL-MCNC: 2.2 MG/DL (ref 1.7–2.3)
MAGNESIUM SERPL-MCNC: 2.2 MG/DL (ref 1.7–2.3)
MAGNESIUM SERPL-MCNC: 2.3 MG/DL (ref 1.7–2.3)
MAGNESIUM SERPL-MCNC: 2.4 MG/DL (ref 1.7–2.3)
MAGNESIUM SERPL-MCNC: 2.5 MG/DL (ref 1.7–2.3)
MAGNESIUM SERPL-MCNC: 2.5 MG/DL (ref 1.7–2.3)
MAGNESIUM SERPL-MCNC: 2.6 MG/DL (ref 1.7–2.3)
MAGNESIUM SERPL-MCNC: 2.7 MG/DL (ref 1.7–2.3)
MAGNESIUM SERPL-MCNC: 2.8 MG/DL (ref 1.7–2.3)
MAGNESIUM SERPL-MCNC: 3 MG/DL (ref 1.7–2.3)
MAGNESIUM SERPL-MCNC: 3 MG/DL (ref 1.7–2.3)
MAGNESIUM SERPL-MCNC: 3.1 MG/DL (ref 1.7–2.3)
MAGNESIUM SERPL-MCNC: 3.2 MG/DL (ref 1.7–2.3)
MAGNESIUM SERPL-MCNC: 3.3 MG/DL (ref 1.7–2.3)
MAGNESIUM SERPL-MCNC: 3.4 MG/DL (ref 1.7–2.3)
MAYO MISC RESULT: NORMAL
MCH RBC QN AUTO: 28.3 PG (ref 26.5–33)
MCH RBC QN AUTO: 28.4 PG (ref 26.5–33)
MCH RBC QN AUTO: 28.6 PG (ref 26.5–33)
MCH RBC QN AUTO: 28.8 PG (ref 26.5–33)
MCH RBC QN AUTO: 28.9 PG (ref 26.5–33)
MCH RBC QN AUTO: 29 PG (ref 26.5–33)
MCH RBC QN AUTO: 29.1 PG (ref 26.5–33)
MCH RBC QN AUTO: 29.1 PG (ref 26.5–33)
MCH RBC QN AUTO: 29.2 PG (ref 26.5–33)
MCH RBC QN AUTO: 29.3 PG (ref 26.5–33)
MCH RBC QN AUTO: 29.4 PG (ref 26.5–33)
MCH RBC QN AUTO: 29.5 PG (ref 26.5–33)
MCH RBC QN AUTO: 29.5 PG (ref 26.5–33)
MCH RBC QN AUTO: 29.7 PG (ref 26.5–33)
MCH RBC QN AUTO: 29.7 PG (ref 26.5–33)
MCH RBC QN AUTO: 29.8 PG (ref 26.5–33)
MCH RBC QN AUTO: 30.1 PG (ref 26.5–33)
MCHC RBC AUTO-ENTMCNC: 29.3 G/DL (ref 31.5–36.5)
MCHC RBC AUTO-ENTMCNC: 30.8 G/DL (ref 31.5–36.5)
MCHC RBC AUTO-ENTMCNC: 31 G/DL (ref 31.5–36.5)
MCHC RBC AUTO-ENTMCNC: 31 G/DL (ref 31.5–36.5)
MCHC RBC AUTO-ENTMCNC: 31.2 G/DL (ref 31.5–36.5)
MCHC RBC AUTO-ENTMCNC: 31.6 G/DL (ref 31.5–36.5)
MCHC RBC AUTO-ENTMCNC: 31.6 G/DL (ref 31.5–36.5)
MCHC RBC AUTO-ENTMCNC: 31.9 G/DL (ref 31.5–36.5)
MCHC RBC AUTO-ENTMCNC: 32 G/DL (ref 31.5–36.5)
MCHC RBC AUTO-ENTMCNC: 32.1 G/DL (ref 31.5–36.5)
MCHC RBC AUTO-ENTMCNC: 32.4 G/DL (ref 31.5–36.5)
MCHC RBC AUTO-ENTMCNC: 32.4 G/DL (ref 31.5–36.5)
MCHC RBC AUTO-ENTMCNC: 32.5 G/DL (ref 31.5–36.5)
MCHC RBC AUTO-ENTMCNC: 32.6 G/DL (ref 31.5–36.5)
MCHC RBC AUTO-ENTMCNC: 32.8 G/DL (ref 31.5–36.5)
MCHC RBC AUTO-ENTMCNC: 32.8 G/DL (ref 31.5–36.5)
MCHC RBC AUTO-ENTMCNC: 32.9 G/DL (ref 31.5–36.5)
MCHC RBC AUTO-ENTMCNC: 33 G/DL (ref 31.5–36.5)
MCHC RBC AUTO-ENTMCNC: 33.1 G/DL (ref 31.5–36.5)
MCHC RBC AUTO-ENTMCNC: 33.1 G/DL (ref 31.5–36.5)
MCHC RBC AUTO-ENTMCNC: 33.3 G/DL (ref 31.5–36.5)
MCHC RBC AUTO-ENTMCNC: 33.3 G/DL (ref 31.5–36.5)
MCHC RBC AUTO-ENTMCNC: 33.5 G/DL (ref 31.5–36.5)
MCHC RBC AUTO-ENTMCNC: 33.6 G/DL (ref 31.5–36.5)
MCHC RBC AUTO-ENTMCNC: 33.6 G/DL (ref 31.5–36.5)
MCHC RBC AUTO-ENTMCNC: 33.7 G/DL (ref 31.5–36.5)
MCHC RBC AUTO-ENTMCNC: 33.7 G/DL (ref 31.5–36.5)
MCHC RBC AUTO-ENTMCNC: 34.2 G/DL (ref 31.5–36.5)
MCV RBC AUTO: 88 FL (ref 78–100)
MCV RBC AUTO: 89 FL (ref 78–100)
MCV RBC AUTO: 90 FL (ref 78–100)
MCV RBC AUTO: 91 FL (ref 78–100)
MCV RBC AUTO: 91 FL (ref 78–100)
MCV RBC AUTO: 94 FL (ref 78–100)
MCV RBC AUTO: 95 FL (ref 78–100)
MCV RBC AUTO: 99 FL (ref 78–100)
MONOCYTES # BLD AUTO: 0.3 10E3/UL (ref 0–1.3)
MONOCYTES # BLD AUTO: 0.3 10E3/UL (ref 0–1.3)
MONOCYTES # BLD AUTO: 0.4 10E3/UL (ref 0–1.3)
MONOCYTES # BLD AUTO: 0.5 10E3/UL (ref 0–1.3)
MONOCYTES # BLD AUTO: 0.5 10E3/UL (ref 0–1.3)
MONOCYTES # BLD AUTO: 0.6 10E3/UL (ref 0–1.3)
MONOCYTES # BLD AUTO: 0.6 10E3/UL (ref 0–1.3)
MONOCYTES # BLD AUTO: 0.7 10E3/UL (ref 0–1.3)
MONOCYTES # BLD AUTO: 0.9 10E3/UL (ref 0–1.3)
MONOCYTES NFR BLD AUTO: 3 %
MONOCYTES NFR BLD AUTO: 5 %
MONOCYTES NFR BLD AUTO: 6 %
MONOCYTES NFR BLD AUTO: 6 %
MONOCYTES NFR BLD AUTO: 7 %
MONOS+MACROS NFR FLD MANUAL: 8 %
MRSA DNA SPEC QL NAA+PROBE: NEGATIVE
MUCOUS THREADS #/AREA URNS LPF: PRESENT /LPF
N MEN DNA CSF QL NAA+NON-PROBE: NEGATIVE
NEUTROPHILS # BLD AUTO: 10.9 10E3/UL (ref 1.6–8.3)
NEUTROPHILS # BLD AUTO: 11.2 10E3/UL (ref 1.6–8.3)
NEUTROPHILS # BLD AUTO: 15.1 10E3/UL (ref 1.6–8.3)
NEUTROPHILS # BLD AUTO: 17.2 10E3/UL (ref 1.6–8.3)
NEUTROPHILS # BLD AUTO: 6.7 10E3/UL (ref 1.6–8.3)
NEUTROPHILS # BLD AUTO: 7.4 10E3/UL (ref 1.6–8.3)
NEUTROPHILS # BLD AUTO: 7.8 10E3/UL (ref 1.6–8.3)
NEUTROPHILS # BLD AUTO: 9 10E3/UL (ref 1.6–8.3)
NEUTROPHILS # BLD AUTO: 9.9 10E3/UL (ref 1.6–8.3)
NEUTROPHILS NFR BLD AUTO: 76 %
NEUTROPHILS NFR BLD AUTO: 77 %
NEUTROPHILS NFR BLD AUTO: 81 %
NEUTROPHILS NFR BLD AUTO: 82 %
NEUTROPHILS NFR BLD AUTO: 84 %
NEUTROPHILS NFR BLD AUTO: 87 %
NEUTROPHILS NFR BLD AUTO: 91 %
NEUTS BAND NFR FLD MANUAL: 87 %
NITRATE UR QL: POSITIVE
NRBC # BLD AUTO: 0 10E3/UL
NRBC BLD AUTO-RTO: 0 /100
NT-PROBNP SERPL-MCNC: 3570 PG/ML (ref 0–1800)
NT-PROBNP SERPL-MCNC: 5185 PG/ML (ref 0–1800)
NT-PROBNP SERPL-MCNC: 5328 PG/ML (ref 0–1800)
NT-PROBNP SERPL-MCNC: 7177 PG/ML (ref 0–1800)
NT-PROBNP SERPL-MCNC: ABNORMAL PG/ML (ref 0–1800)
O2/TOTAL GAS SETTING VFR VENT: 100 %
O2/TOTAL GAS SETTING VFR VENT: 25 %
O2/TOTAL GAS SETTING VFR VENT: 50 %
O2/TOTAL GAS SETTING VFR VENT: 55 %
O2/TOTAL GAS SETTING VFR VENT: 55 %
O2/TOTAL GAS SETTING VFR VENT: 60 %
O2/TOTAL GAS SETTING VFR VENT: 80 %
O2/TOTAL GAS SETTING VFR VENT: 80 %
OSMOLALITY SERPL: 349 MMOL/KG (ref 280–301)
OSMOLALITY UR: 493 MMOL/KG (ref 100–1200)
OXYHGB MFR BLDV: 67 % (ref 70–75)
OXYHGB MFR BLDV: 74 % (ref 70–75)
OXYHGB MFR BLDV: 76 % (ref 70–75)
OXYHGB MFR BLDV: 85 % (ref 70–75)
P AXIS - MUSE: 110 DEGREES
P AXIS - MUSE: 40 DEGREES
P AXIS - MUSE: 45 DEGREES
P AXIS - MUSE: 56 DEGREES
P AXIS - MUSE: 77 DEGREES
P AXIS - MUSE: 82 DEGREES
P AXIS - MUSE: 82 DEGREES
P AXIS - MUSE: 86 DEGREES
P AXIS - MUSE: 87 DEGREES
P AXIS - MUSE: NORMAL DEGREES
PARECHOVIRUS A RNA CSF QL NAA+NON-PROBE: NEGATIVE
PATH REPORT.COMMENTS IMP SPEC: NORMAL
PATH REPORT.FINAL DX SPEC: NORMAL
PATH REPORT.GROSS SPEC: NORMAL
PATH REPORT.MICROSCOPIC SPEC OTHER STN: NORMAL
PATH REPORT.RELEVANT HX SPEC: NORMAL
PBG 24H UR-SRATE: ABNORMAL UMOL/D
PBG UR-SCNC: < 5 UMOL/L
PCO2 BLD: 42 MM HG (ref 35–45)
PCO2 BLD: 49 MM HG (ref 35–45)
PCO2 BLD: 60 MM HG (ref 35–45)
PCO2 BLD: 61 MM HG (ref 35–45)
PCO2 BLD: 64 MM HG (ref 35–45)
PCO2 BLD: 86 MM HG (ref 35–45)
PCO2 BLDV: 43 MM HG (ref 40–50)
PCO2 BLDV: 49 MM HG (ref 40–50)
PCO2 BLDV: 52 MM HG (ref 40–50)
PCO2 BLDV: 55 MM HG (ref 40–50)
PCO2 BLDV: 68 MM HG (ref 40–50)
PEEP: 14 CM H2O
PEEP: 14 CM H2O
PEEP: 5 CM H2O
PERFORMING LABORATORY: NORMAL
PH BLD: 7.29 [PH] (ref 7.35–7.45)
PH BLD: 7.38 [PH] (ref 7.35–7.45)
PH BLD: 7.38 [PH] (ref 7.35–7.45)
PH BLD: 7.43 [PH] (ref 7.35–7.45)
PH BLD: 7.43 [PH] (ref 7.35–7.45)
PH BLD: 7.46 [PH] (ref 7.35–7.45)
PH BLDV: 7.34 [PH] (ref 7.32–7.43)
PH BLDV: 7.37 [PH] (ref 7.32–7.43)
PH BLDV: 7.4 [PH] (ref 7.32–7.43)
PH BLDV: 7.42 [PH] (ref 7.32–7.43)
PH BLDV: 7.48 [PH] (ref 7.32–7.43)
PH UR STRIP: 5.5 [PH] (ref 5–7)
PHOSPHATE SERPL-MCNC: 1.8 MG/DL (ref 2.5–4.5)
PHOSPHATE SERPL-MCNC: 1.9 MG/DL (ref 2.5–4.5)
PHOSPHATE SERPL-MCNC: 1.9 MG/DL (ref 2.5–4.5)
PHOSPHATE SERPL-MCNC: 2 MG/DL (ref 2.5–4.5)
PHOSPHATE SERPL-MCNC: 2 MG/DL (ref 2.5–4.5)
PHOSPHATE SERPL-MCNC: 2.1 MG/DL (ref 2.5–4.5)
PHOSPHATE SERPL-MCNC: 2.1 MG/DL (ref 2.5–4.5)
PHOSPHATE SERPL-MCNC: 2.2 MG/DL (ref 2.5–4.5)
PHOSPHATE SERPL-MCNC: 2.4 MG/DL (ref 2.5–4.5)
PHOSPHATE SERPL-MCNC: 2.5 MG/DL (ref 2.5–4.5)
PHOSPHATE SERPL-MCNC: 2.5 MG/DL (ref 2.5–4.5)
PHOSPHATE SERPL-MCNC: 2.6 MG/DL (ref 2.5–4.5)
PHOSPHATE SERPL-MCNC: 2.8 MG/DL (ref 2.5–4.5)
PHOSPHATE SERPL-MCNC: 2.9 MG/DL (ref 2.5–4.5)
PHOSPHATE SERPL-MCNC: 2.9 MG/DL (ref 2.5–4.5)
PHOSPHATE SERPL-MCNC: 3 MG/DL (ref 2.5–4.5)
PHOSPHATE SERPL-MCNC: 3.2 MG/DL (ref 2.5–4.5)
PHOSPHATE SERPL-MCNC: 3.5 MG/DL (ref 2.5–4.5)
PHOSPHATE SERPL-MCNC: 3.7 MG/DL (ref 2.5–4.5)
PHOSPHATE SERPL-MCNC: 3.8 MG/DL (ref 2.5–4.5)
PHOSPHATE SERPL-MCNC: 4.2 MG/DL (ref 2.5–4.5)
PHOSPHATE SERPL-MCNC: 4.3 MG/DL (ref 2.5–4.5)
PHOSPHATE SERPL-MCNC: 4.4 MG/DL (ref 2.5–4.5)
PHOSPHATE SERPL-MCNC: 6.4 MG/DL (ref 2.5–4.5)
PLATELET # BLD AUTO: 165 10E3/UL (ref 150–450)
PLATELET # BLD AUTO: 172 10E3/UL (ref 150–450)
PLATELET # BLD AUTO: 184 10E3/UL (ref 150–450)
PLATELET # BLD AUTO: 190 10E3/UL (ref 150–450)
PLATELET # BLD AUTO: 207 10E3/UL (ref 150–450)
PLATELET # BLD AUTO: 217 10E3/UL (ref 150–450)
PLATELET # BLD AUTO: 226 10E3/UL (ref 150–450)
PLATELET # BLD AUTO: 227 10E3/UL (ref 150–450)
PLATELET # BLD AUTO: 239 10E3/UL (ref 150–450)
PLATELET # BLD AUTO: 240 10E3/UL (ref 150–450)
PLATELET # BLD AUTO: 249 10E3/UL (ref 150–450)
PLATELET # BLD AUTO: 251 10E3/UL (ref 150–450)
PLATELET # BLD AUTO: 252 10E3/UL (ref 150–450)
PLATELET # BLD AUTO: 257 10E3/UL (ref 150–450)
PLATELET # BLD AUTO: 258 10E3/UL (ref 150–450)
PLATELET # BLD AUTO: 278 10E3/UL (ref 150–450)
PLATELET # BLD AUTO: 281 10E3/UL (ref 150–450)
PLATELET # BLD AUTO: 290 10E3/UL (ref 150–450)
PLATELET # BLD AUTO: 293 10E3/UL (ref 150–450)
PLATELET # BLD AUTO: 302 10E3/UL (ref 150–450)
PLATELET # BLD AUTO: 308 10E3/UL (ref 150–450)
PLATELET # BLD AUTO: 310 10E3/UL (ref 150–450)
PLATELET # BLD AUTO: 315 10E3/UL (ref 150–450)
PLATELET # BLD AUTO: 328 10E3/UL (ref 150–450)
PLATELET # BLD AUTO: 345 10E3/UL (ref 150–450)
PLATELET # BLD AUTO: 352 10E3/UL (ref 150–450)
PLATELET # BLD AUTO: 371 10E3/UL (ref 150–450)
PLATELET # BLD AUTO: 382 10E3/UL (ref 150–450)
PLATELET # BLD AUTO: 393 10E3/UL (ref 150–450)
PO2 BLD: 214 MM HG (ref 80–105)
PO2 BLD: 52 MM HG (ref 80–105)
PO2 BLD: 58 MM HG (ref 80–105)
PO2 BLD: 58 MM HG (ref 80–105)
PO2 BLD: 60 MM HG (ref 80–105)
PO2 BLD: 85 MM HG (ref 80–105)
PO2 BLDV: 25 MM HG (ref 25–47)
PO2 BLDV: 36 MM HG (ref 25–47)
PO2 BLDV: 42 MM HG (ref 25–47)
PO2 BLDV: 43 MM HG (ref 25–47)
PO2 BLDV: 53 MM HG (ref 25–47)
PORPHYRIN FRACT 24H UR-IMP: ABNORMAL
POTASSIUM SERPL-SCNC: 2.6 MMOL/L (ref 3.4–5.3)
POTASSIUM SERPL-SCNC: 2.8 MMOL/L (ref 3.4–5.3)
POTASSIUM SERPL-SCNC: 2.8 MMOL/L (ref 3.4–5.3)
POTASSIUM SERPL-SCNC: 2.9 MMOL/L (ref 3.4–5.3)
POTASSIUM SERPL-SCNC: 3 MMOL/L (ref 3.4–5.3)
POTASSIUM SERPL-SCNC: 3.1 MMOL/L (ref 3.4–5.3)
POTASSIUM SERPL-SCNC: 3.1 MMOL/L (ref 3.4–5.3)
POTASSIUM SERPL-SCNC: 3.2 MMOL/L (ref 3.4–5.3)
POTASSIUM SERPL-SCNC: 3.2 MMOL/L (ref 3.4–5.3)
POTASSIUM SERPL-SCNC: 3.3 MMOL/L (ref 3.4–5.3)
POTASSIUM SERPL-SCNC: 3.4 MMOL/L (ref 3.4–5.3)
POTASSIUM SERPL-SCNC: 3.5 MMOL/L (ref 3.4–5.3)
POTASSIUM SERPL-SCNC: 3.6 MMOL/L (ref 3.4–5.3)
POTASSIUM SERPL-SCNC: 3.7 MMOL/L (ref 3.4–5.3)
POTASSIUM SERPL-SCNC: 3.8 MMOL/L (ref 3.4–5.3)
POTASSIUM SERPL-SCNC: 3.9 MMOL/L (ref 3.4–5.3)
POTASSIUM SERPL-SCNC: 4 MMOL/L (ref 3.4–5.3)
POTASSIUM SERPL-SCNC: 4.1 MMOL/L (ref 3.4–5.3)
POTASSIUM SERPL-SCNC: 4.2 MMOL/L (ref 3.4–5.3)
POTASSIUM SERPL-SCNC: 4.3 MMOL/L (ref 3.4–5.3)
POTASSIUM SERPL-SCNC: 4.4 MMOL/L (ref 3.4–5.3)
POTASSIUM SERPL-SCNC: 4.5 MMOL/L (ref 3.4–5.3)
POTASSIUM SERPL-SCNC: 4.5 MMOL/L (ref 3.4–5.3)
POTASSIUM SERPL-SCNC: 4.7 MMOL/L (ref 3.4–5.3)
POTASSIUM SERPL-SCNC: 4.7 MMOL/L (ref 3.4–5.3)
POTASSIUM SERPL-SCNC: 5.2 MMOL/L (ref 3.4–5.3)
POTASSIUM SERPL-SCNC: 5.7 MMOL/L (ref 3.4–5.3)
PR INTERVAL - MUSE: 138 MS
PR INTERVAL - MUSE: 138 MS
PR INTERVAL - MUSE: 140 MS
PR INTERVAL - MUSE: 150 MS
PR INTERVAL - MUSE: 158 MS
PR INTERVAL - MUSE: 158 MS
PR INTERVAL - MUSE: 166 MS
PR INTERVAL - MUSE: 248 MS
PR INTERVAL - MUSE: 90 MS
PR INTERVAL - MUSE: NORMAL MS
PROCALCITONIN SERPL IA-MCNC: 0.13 NG/ML
PROCALCITONIN SERPL IA-MCNC: 0.32 NG/ML
PROCALCITONIN SERPL IA-MCNC: 0.7 NG/ML
PROCALCITONIN SERPL IA-MCNC: 1 NG/ML
PROCALCITONIN SERPL IA-MCNC: 1.11 NG/ML
PROCALCITONIN SERPL IA-MCNC: 1.16 NG/ML
PROCALCITONIN SERPL IA-MCNC: 1.17 NG/ML
PROCALCITONIN SERPL IA-MCNC: 1.44 NG/ML
PROT CSF-MCNC: 55.3 MG/DL (ref 15–45)
PROT SERPL-MCNC: 4.8 G/DL (ref 6.4–8.3)
PROT SERPL-MCNC: 4.9 G/DL (ref 6.4–8.3)
PROT SERPL-MCNC: 5 G/DL (ref 6.4–8.3)
PROT SERPL-MCNC: 5 G/DL (ref 6.4–8.3)
PROT SERPL-MCNC: 5.4 G/DL (ref 6.4–8.3)
PROT SERPL-MCNC: 5.4 G/DL (ref 6.4–8.3)
PROT SERPL-MCNC: 5.5 G/DL (ref 6.4–8.3)
PROT SERPL-MCNC: 5.7 G/DL (ref 6.4–8.3)
PROT SERPL-MCNC: 5.7 G/DL (ref 6.4–8.3)
PROT SERPL-MCNC: 5.8 G/DL (ref 6.4–8.3)
PROT SERPL-MCNC: 5.8 G/DL (ref 6.4–8.3)
PROT SERPL-MCNC: 6 G/DL (ref 6.4–8.3)
PROT SERPL-MCNC: 6 G/DL (ref 6.4–8.3)
PROT SERPL-MCNC: 6.1 G/DL (ref 6.4–8.3)
PROT SERPL-MCNC: 6.2 G/DL (ref 6.4–8.3)
PROT SERPL-MCNC: 6.2 G/DL (ref 6.4–8.3)
PROT SERPL-MCNC: 6.3 G/DL (ref 6.4–8.3)
PROT SERPL-MCNC: 6.7 G/DL (ref 6.4–8.3)
PROT SERPL-MCNC: 7.1 G/DL (ref 6.4–8.3)
PROT SERPL-MCNC: 7.1 G/DL (ref 6.4–8.3)
PROT SERPL-MCNC: 7.4 G/DL (ref 6.4–8.3)
PROT SERPL-MCNC: 7.4 G/DL (ref 6.4–8.3)
QRS DURATION - MUSE: 80 MS
QRS DURATION - MUSE: 82 MS
QRS DURATION - MUSE: 84 MS
QRS DURATION - MUSE: 86 MS
QRS DURATION - MUSE: 88 MS
QRS DURATION - MUSE: 94 MS
QT - MUSE: 298 MS
QT - MUSE: 330 MS
QT - MUSE: 332 MS
QT - MUSE: 360 MS
QT - MUSE: 378 MS
QT - MUSE: 390 MS
QT - MUSE: 398 MS
QT - MUSE: 398 MS
QT - MUSE: 458 MS
QT - MUSE: 460 MS
QT - MUSE: 476 MS
QT - MUSE: 588 MS
QTC - MUSE: 391 MS
QTC - MUSE: 462 MS
QTC - MUSE: 486 MS
QTC - MUSE: 486 MS
QTC - MUSE: 488 MS
QTC - MUSE: 516 MS
QTC - MUSE: 520 MS
QTC - MUSE: 520 MS
QTC - MUSE: 528 MS
QTC - MUSE: 551 MS
QTC - MUSE: 555 MS
QTC - MUSE: 564 MS
R AXIS - MUSE: -41 DEGREES
R AXIS - MUSE: -6 DEGREES
R AXIS - MUSE: 116 DEGREES
R AXIS - MUSE: 28 DEGREES
R AXIS - MUSE: 45 DEGREES
R AXIS - MUSE: 73 DEGREES
R AXIS - MUSE: 73 DEGREES
R AXIS - MUSE: 76 DEGREES
R AXIS - MUSE: 8 DEGREES
R AXIS - MUSE: 8 DEGREES
R AXIS - MUSE: 84 DEGREES
R AXIS - MUSE: 95 DEGREES
RBC # BLD AUTO: 2.15 10E6/UL (ref 3.8–5.2)
RBC # BLD AUTO: 2.28 10E6/UL (ref 3.8–5.2)
RBC # BLD AUTO: 2.51 10E6/UL (ref 3.8–5.2)
RBC # BLD AUTO: 2.52 10E6/UL (ref 3.8–5.2)
RBC # BLD AUTO: 2.54 10E6/UL (ref 3.8–5.2)
RBC # BLD AUTO: 2.56 10E6/UL (ref 3.8–5.2)
RBC # BLD AUTO: 2.59 10E6/UL (ref 3.8–5.2)
RBC # BLD AUTO: 2.6 10E6/UL (ref 3.8–5.2)
RBC # BLD AUTO: 2.66 10E6/UL (ref 3.8–5.2)
RBC # BLD AUTO: 2.7 10E6/UL (ref 3.8–5.2)
RBC # BLD AUTO: 2.73 10E6/UL (ref 3.8–5.2)
RBC # BLD AUTO: 2.74 10E6/UL (ref 3.8–5.2)
RBC # BLD AUTO: 2.78 10E6/UL (ref 3.8–5.2)
RBC # BLD AUTO: 2.79 10E6/UL (ref 3.8–5.2)
RBC # BLD AUTO: 2.86 10E6/UL (ref 3.8–5.2)
RBC # BLD AUTO: 2.89 10E6/UL (ref 3.8–5.2)
RBC # BLD AUTO: 2.91 10E6/UL (ref 3.8–5.2)
RBC # BLD AUTO: 2.93 10E6/UL (ref 3.8–5.2)
RBC # BLD AUTO: 3.02 10E6/UL (ref 3.8–5.2)
RBC # BLD AUTO: 3.06 10E6/UL (ref 3.8–5.2)
RBC # BLD AUTO: 3.22 10E6/UL (ref 3.8–5.2)
RBC # BLD AUTO: 3.38 10E6/UL (ref 3.8–5.2)
RBC # BLD AUTO: 3.39 10E6/UL (ref 3.8–5.2)
RBC # BLD AUTO: 3.78 10E6/UL (ref 3.8–5.2)
RBC # BLD AUTO: 4.33 10E6/UL (ref 3.8–5.2)
RBC # BLD AUTO: 4.51 10E6/UL (ref 3.8–5.2)
RBC # CSF MANUAL: 276 /UL (ref 0–2)
RBC # CSF MANUAL: 3145 /UL (ref 0–2)
RBC URINE: 1 /HPF
RSV RNA SPEC NAA+PROBE: NEGATIVE
RSV RNA SPEC QL NAA+PROBE: NOT DETECTED
RSV RNA SPEC QL NAA+PROBE: NOT DETECTED
RV+EV RNA SPEC QL NAA+PROBE: NOT DETECTED
S PNEUM AG SPEC QL: NEGATIVE
S PNEUM DNA CSF QL NAA+NON-PROBE: NEGATIVE
SA TARGET DNA: NEGATIVE
SAO2 % BLDA: 86 % (ref 92–100)
SAO2 % BLDA: 86 % (ref 92–100)
SAO2 % BLDA: 88 % (ref 92–100)
SAO2 % BLDA: 89 % (ref 92–100)
SAO2 % BLDA: 96 % (ref 92–100)
SAO2 % BLDA: 99 % (ref 92–100)
SAO2 % BLDV: 45 % (ref 70–75)
SAO2 % BLDV: 69 % (ref 70–75)
SAO2 % BLDV: 75.6 % (ref 70–75)
SAO2 % BLDV: 77.7 % (ref 70–75)
SAO2 % BLDV: 86.3 % (ref 70–75)
SARS-COV-2 RNA RESP QL NAA+PROBE: NEGATIVE
SCANNED LAB RESULT: NORMAL
SODIUM SERPL-SCNC: 136 MMOL/L (ref 135–145)
SODIUM SERPL-SCNC: 139 MMOL/L (ref 135–145)
SODIUM SERPL-SCNC: 139 MMOL/L (ref 135–145)
SODIUM SERPL-SCNC: 140 MMOL/L (ref 135–145)
SODIUM SERPL-SCNC: 141 MMOL/L (ref 135–145)
SODIUM SERPL-SCNC: 142 MMOL/L (ref 135–145)
SODIUM SERPL-SCNC: 143 MMOL/L (ref 135–145)
SODIUM SERPL-SCNC: 144 MMOL/L (ref 135–145)
SODIUM SERPL-SCNC: 144 MMOL/L (ref 135–145)
SODIUM SERPL-SCNC: 145 MMOL/L (ref 135–145)
SODIUM SERPL-SCNC: 146 MMOL/L (ref 135–145)
SODIUM SERPL-SCNC: 148 MMOL/L (ref 135–145)
SODIUM SERPL-SCNC: 150 MMOL/L (ref 135–145)
SODIUM SERPL-SCNC: 150 MMOL/L (ref 135–145)
SODIUM SERPL-SCNC: 152 MMOL/L (ref 135–145)
SODIUM SERPL-SCNC: 152 MMOL/L (ref 135–145)
SODIUM SERPL-SCNC: 154 MMOL/L (ref 135–145)
SODIUM SERPL-SCNC: 154 MMOL/L (ref 135–145)
SODIUM SERPL-SCNC: 155 MMOL/L (ref 135–145)
SODIUM SERPL-SCNC: 156 MMOL/L (ref 135–145)
SODIUM UR-SCNC: 20 MMOL/L
SP GR UR STRIP: 1.02 (ref 1–1.03)
SPECIMEN EXPIRATION DATE: NORMAL
SPECIMEN EXPIRATION DATE: NORMAL
SYSTOLIC BLOOD PRESSURE - MUSE: NORMAL MMHG
T AXIS - MUSE: -24 DEGREES
T AXIS - MUSE: -47 DEGREES
T AXIS - MUSE: 147 DEGREES
T AXIS - MUSE: 154 DEGREES
T AXIS - MUSE: 200 DEGREES
T AXIS - MUSE: 237 DEGREES
T AXIS - MUSE: 256 DEGREES
T AXIS - MUSE: 258 DEGREES
T AXIS - MUSE: 41 DEGREES
T AXIS - MUSE: 41 DEGREES
T AXIS - MUSE: 50 DEGREES
T AXIS - MUSE: 83 DEGREES
TEST NAME: NORMAL
TROPONIN T SERPL HS-MCNC: 29 NG/L
TROPONIN T SERPL HS-MCNC: 585 NG/L
TSH SERPL DL<=0.005 MIU/L-ACNC: 1.32 UIU/ML (ref 0.3–4.2)
TSH SERPL DL<=0.005 MIU/L-ACNC: 1.54 UIU/ML (ref 0.3–4.2)
TUBE # CSF: 1
TUBE # CSF: 4
UFH PPP CHRO-ACNC: 0.13 IU/ML
UFH PPP CHRO-ACNC: 0.14 IU/ML
UFH PPP CHRO-ACNC: 0.19 IU/ML
UFH PPP CHRO-ACNC: 0.21 IU/ML
UFH PPP CHRO-ACNC: 0.27 IU/ML
UFH PPP CHRO-ACNC: 0.28 IU/ML
UFH PPP CHRO-ACNC: 0.32 IU/ML
UFH PPP CHRO-ACNC: 0.33 IU/ML
UFH PPP CHRO-ACNC: 0.39 IU/ML
UFH PPP CHRO-ACNC: 0.44 IU/ML
UFH PPP CHRO-ACNC: 0.47 IU/ML
UFH PPP CHRO-ACNC: 0.68 IU/ML
UFH PPP CHRO-ACNC: 0.88 IU/ML
UFH PPP CHRO-ACNC: 0.95 IU/ML
UFH PPP CHRO-ACNC: <0.1 IU/ML
UNIT ABO/RH: NORMAL
UNIT ABO/RH: NORMAL
UNIT NUMBER: NORMAL
UNIT NUMBER: NORMAL
UNIT STATUS: NORMAL
UNIT STATUS: NORMAL
UNIT TYPE ISBT: 5100
UNIT TYPE ISBT: 5100
UROBILINOGEN UR STRIP-MCNC: NORMAL MG/DL
UROPOR/CREAT 24H UR-SRTO: 3 UMOL/MOL CRT
VANCOMYCIN SERPL-MCNC: 21.4 UG/ML
VDRL CSF QL: NON REACTIVE
VENTRICULAR RATE- MUSE: 104 BPM
VENTRICULAR RATE- MUSE: 122 BPM
VENTRICULAR RATE- MUSE: 130 BPM
VENTRICULAR RATE- MUSE: 134 BPM
VENTRICULAR RATE- MUSE: 147 BPM
VENTRICULAR RATE- MUSE: 47 BPM
VENTRICULAR RATE- MUSE: 74 BPM
VENTRICULAR RATE- MUSE: 77 BPM
VENTRICULAR RATE- MUSE: 87 BPM
VENTRICULAR RATE- MUSE: 90 BPM
VENTRICULAR RATE- MUSE: 90 BPM
VENTRICULAR RATE- MUSE: 99 BPM
VZV DNA CSF QL NAA+NON-PROBE: NEGATIVE
VZV DNA SPEC QL NAA+PROBE: NOT DETECTED
WBC # BLD AUTO: 10.3 10E3/UL (ref 4–11)
WBC # BLD AUTO: 10.7 10E3/UL (ref 4–11)
WBC # BLD AUTO: 11.2 10E3/UL (ref 4–11)
WBC # BLD AUTO: 11.7 10E3/UL (ref 4–11)
WBC # BLD AUTO: 12.2 10E3/UL (ref 4–11)
WBC # BLD AUTO: 12.5 10E3/UL (ref 4–11)
WBC # BLD AUTO: 12.7 10E3/UL (ref 4–11)
WBC # BLD AUTO: 13.2 10E3/UL (ref 4–11)
WBC # BLD AUTO: 13.3 10E3/UL (ref 4–11)
WBC # BLD AUTO: 13.6 10E3/UL (ref 4–11)
WBC # BLD AUTO: 13.8 10E3/UL (ref 4–11)
WBC # BLD AUTO: 14.3 10E3/UL (ref 4–11)
WBC # BLD AUTO: 14.3 10E3/UL (ref 4–11)
WBC # BLD AUTO: 14.5 10E3/UL (ref 4–11)
WBC # BLD AUTO: 14.5 10E3/UL (ref 4–11)
WBC # BLD AUTO: 15 10E3/UL (ref 4–11)
WBC # BLD AUTO: 15.9 10E3/UL (ref 4–11)
WBC # BLD AUTO: 16.8 10E3/UL (ref 4–11)
WBC # BLD AUTO: 17.3 10E3/UL (ref 4–11)
WBC # BLD AUTO: 18.8 10E3/UL (ref 4–11)
WBC # BLD AUTO: 8.5 10E3/UL (ref 4–11)
WBC # BLD AUTO: 8.8 10E3/UL (ref 4–11)
WBC # BLD AUTO: 8.9 10E3/UL (ref 4–11)
WBC # BLD AUTO: 9.1 10E3/UL (ref 4–11)
WBC # BLD AUTO: 9.4 10E3/UL (ref 4–11)
WBC # BLD AUTO: 9.5 10E3/UL (ref 4–11)
WBC # CSF MANUAL: 3 /UL (ref 0–5)
WBC # CSF MANUAL: 5 /UL (ref 0–5)
WBC # FLD AUTO: 124 /UL
WBC CLUMPS #/AREA URNS HPF: PRESENT /HPF
WBC URINE: 24 /HPF
WNV IGG CSF IA-ACNC: 0.15 IV
WNV IGM CSF IA-ACNC: 0 IV
WNV RNA SPEC QL NAA+PROBE: NOT DETECTED

## 2024-01-01 PROCEDURE — 94640 AIRWAY INHALATION TREATMENT: CPT

## 2024-01-01 PROCEDURE — 94003 VENT MGMT INPAT SUBQ DAY: CPT

## 2024-01-01 PROCEDURE — 87556 M.TUBERCULO DNA AMP PROBE: CPT | Performed by: INTERNAL MEDICINE

## 2024-01-01 PROCEDURE — 85027 COMPLETE CBC AUTOMATED: CPT | Performed by: INTERNAL MEDICINE

## 2024-01-01 PROCEDURE — 250N000009 HC RX 250: Performed by: PHYSICIAN ASSISTANT

## 2024-01-01 PROCEDURE — 83735 ASSAY OF MAGNESIUM: CPT | Performed by: PHYSICIAN ASSISTANT

## 2024-01-01 PROCEDURE — 258N000003 HC RX IP 258 OP 636: Performed by: INTERNAL MEDICINE

## 2024-01-01 PROCEDURE — 250N000013 HC RX MED GY IP 250 OP 250 PS 637: Performed by: STUDENT IN AN ORGANIZED HEALTH CARE EDUCATION/TRAINING PROGRAM

## 2024-01-01 PROCEDURE — 80053 COMPREHEN METABOLIC PANEL: CPT | Performed by: STUDENT IN AN ORGANIZED HEALTH CARE EDUCATION/TRAINING PROGRAM

## 2024-01-01 PROCEDURE — 70553 MRI BRAIN STEM W/O & W/DYE: CPT

## 2024-01-01 PROCEDURE — 999N000009 HC STATISTIC AIRWAY CARE

## 2024-01-01 PROCEDURE — 84132 ASSAY OF SERUM POTASSIUM: CPT | Performed by: INTERNAL MEDICINE

## 2024-01-01 PROCEDURE — 94640 AIRWAY INHALATION TREATMENT: CPT | Mod: 76

## 2024-01-01 PROCEDURE — 210N000002 HC R&B HEART CARE

## 2024-01-01 PROCEDURE — 258N000003 HC RX IP 258 OP 636: Performed by: STUDENT IN AN ORGANIZED HEALTH CARE EDUCATION/TRAINING PROGRAM

## 2024-01-01 PROCEDURE — 250N000009 HC RX 250: Performed by: INTERNAL MEDICINE

## 2024-01-01 PROCEDURE — 999N000157 HC STATISTIC RCP TIME EA 10 MIN

## 2024-01-01 PROCEDURE — 83735 ASSAY OF MAGNESIUM: CPT | Performed by: INTERNAL MEDICINE

## 2024-01-01 PROCEDURE — 86140 C-REACTIVE PROTEIN: CPT | Performed by: INTERNAL MEDICINE

## 2024-01-01 PROCEDURE — 83605 ASSAY OF LACTIC ACID: CPT | Performed by: INTERNAL MEDICINE

## 2024-01-01 PROCEDURE — 200N000001 HC R&B ICU

## 2024-01-01 PROCEDURE — 80053 COMPREHEN METABOLIC PANEL: CPT | Performed by: PHYSICIAN ASSISTANT

## 2024-01-01 PROCEDURE — 84155 ASSAY OF PROTEIN SERUM: CPT | Performed by: PHYSICIAN ASSISTANT

## 2024-01-01 PROCEDURE — 250N000011 HC RX IP 250 OP 636: Performed by: INTERNAL MEDICINE

## 2024-01-01 PROCEDURE — 97165 OT EVAL LOW COMPLEX 30 MIN: CPT | Mod: GO

## 2024-01-01 PROCEDURE — 250N000009 HC RX 250: Performed by: STUDENT IN AN ORGANIZED HEALTH CARE EDUCATION/TRAINING PROGRAM

## 2024-01-01 PROCEDURE — 83880 ASSAY OF NATRIURETIC PEPTIDE: CPT | Performed by: INTERNAL MEDICINE

## 2024-01-01 PROCEDURE — G0500 MOD SEDAT ENDO SERVICE >5YRS: HCPCS | Performed by: INTERNAL MEDICINE

## 2024-01-01 PROCEDURE — 36415 COLL VENOUS BLD VENIPUNCTURE: CPT | Performed by: INTERNAL MEDICINE

## 2024-01-01 PROCEDURE — 97530 THERAPEUTIC ACTIVITIES: CPT | Mod: GO

## 2024-01-01 PROCEDURE — 85520 HEPARIN ASSAY: CPT | Performed by: INTERNAL MEDICINE

## 2024-01-01 PROCEDURE — 97530 THERAPEUTIC ACTIVITIES: CPT | Mod: GP | Performed by: PHYSICAL THERAPIST

## 2024-01-01 PROCEDURE — 99291 CRITICAL CARE FIRST HOUR: CPT | Mod: 25 | Performed by: INTERNAL MEDICINE

## 2024-01-01 PROCEDURE — 258N000003 HC RX IP 258 OP 636: Performed by: SURGERY

## 2024-01-01 PROCEDURE — 82805 BLOOD GASES W/O2 SATURATION: CPT | Performed by: STUDENT IN AN ORGANIZED HEALTH CARE EDUCATION/TRAINING PROGRAM

## 2024-01-01 PROCEDURE — 250N000011 HC RX IP 250 OP 636: Performed by: SURGERY

## 2024-01-01 PROCEDURE — 82805 BLOOD GASES W/O2 SATURATION: CPT | Performed by: INTERNAL MEDICINE

## 2024-01-01 PROCEDURE — 84100 ASSAY OF PHOSPHORUS: CPT | Performed by: INTERNAL MEDICINE

## 2024-01-01 PROCEDURE — 99291 CRITICAL CARE FIRST HOUR: CPT | Mod: GC | Performed by: PSYCHIATRY & NEUROLOGY

## 2024-01-01 PROCEDURE — 80053 COMPREHEN METABOLIC PANEL: CPT | Performed by: INTERNAL MEDICINE

## 2024-01-01 PROCEDURE — 250N000009 HC RX 250: Performed by: THORACIC SURGERY (CARDIOTHORACIC VASCULAR SURGERY)

## 2024-01-01 PROCEDURE — 250N000011 HC RX IP 250 OP 636: Mod: JZ | Performed by: INTERNAL MEDICINE

## 2024-01-01 PROCEDURE — 0B9C8ZX DRAINAGE OF RIGHT UPPER LUNG LOBE, VIA NATURAL OR ARTIFICIAL OPENING ENDOSCOPIC, DIAGNOSTIC: ICD-10-PCS | Performed by: INTERNAL MEDICINE

## 2024-01-01 PROCEDURE — 43246 EGD PLACE GASTROSTOMY TUBE: CPT | Performed by: NURSE ANESTHETIST, CERTIFIED REGISTERED

## 2024-01-01 PROCEDURE — 999N000065 XR CHEST PORT 1 VIEW

## 2024-01-01 PROCEDURE — 250N000013 HC RX MED GY IP 250 OP 250 PS 637: Performed by: INTERNAL MEDICINE

## 2024-01-01 PROCEDURE — 36415 COLL VENOUS BLD VENIPUNCTURE: CPT

## 2024-01-01 PROCEDURE — 99233 SBSQ HOSP IP/OBS HIGH 50: CPT | Performed by: INTERNAL MEDICINE

## 2024-01-01 PROCEDURE — P9045 ALBUMIN (HUMAN), 5%, 250 ML: HCPCS | Mod: JZ | Performed by: INTERNAL MEDICINE

## 2024-01-01 PROCEDURE — 80048 BASIC METABOLIC PNL TOTAL CA: CPT | Performed by: EMERGENCY MEDICINE

## 2024-01-01 PROCEDURE — 85049 AUTOMATED PLATELET COUNT: CPT | Performed by: STUDENT IN AN ORGANIZED HEALTH CARE EDUCATION/TRAINING PROGRAM

## 2024-01-01 PROCEDURE — 99232 SBSQ HOSP IP/OBS MODERATE 35: CPT | Performed by: INTERNAL MEDICINE

## 2024-01-01 PROCEDURE — 272N000001 HC OR GENERAL SUPPLY STERILE: Performed by: THORACIC SURGERY (CARDIOTHORACIC VASCULAR SURGERY)

## 2024-01-01 PROCEDURE — 250N000013 HC RX MED GY IP 250 OP 250 PS 637: Performed by: PHYSICIAN ASSISTANT

## 2024-01-01 PROCEDURE — 999N000104 HC STATISTIC NO CHARGE: Performed by: INTERNAL MEDICINE

## 2024-01-01 PROCEDURE — 93005 ELECTROCARDIOGRAM TRACING: CPT

## 2024-01-01 PROCEDURE — 83516 IMMUNOASSAY NONANTIBODY: CPT | Performed by: STUDENT IN AN ORGANIZED HEALTH CARE EDUCATION/TRAINING PROGRAM

## 2024-01-01 PROCEDURE — 99223 1ST HOSP IP/OBS HIGH 75: CPT | Mod: 25 | Performed by: INTERNAL MEDICINE

## 2024-01-01 PROCEDURE — 87070 CULTURE OTHR SPECIMN AEROBIC: CPT | Performed by: STUDENT IN AN ORGANIZED HEALTH CARE EDUCATION/TRAINING PROGRAM

## 2024-01-01 PROCEDURE — 250N000011 HC RX IP 250 OP 636: Performed by: STUDENT IN AN ORGANIZED HEALTH CARE EDUCATION/TRAINING PROGRAM

## 2024-01-01 PROCEDURE — 255N000002 HC RX 255 OP 636: Performed by: INTERNAL MEDICINE

## 2024-01-01 PROCEDURE — 84100 ASSAY OF PHOSPHORUS: CPT | Performed by: PSYCHIATRY & NEUROLOGY

## 2024-01-01 PROCEDURE — 36514 APHERESIS PLASMA: CPT

## 2024-01-01 PROCEDURE — 85027 COMPLETE CBC AUTOMATED: CPT | Performed by: PHYSICIAN ASSISTANT

## 2024-01-01 PROCEDURE — 84443 ASSAY THYROID STIM HORMONE: CPT | Performed by: PSYCHIATRY & NEUROLOGY

## 2024-01-01 PROCEDURE — 97530 THERAPEUTIC ACTIVITIES: CPT | Mod: GP

## 2024-01-01 PROCEDURE — 86038 ANTINUCLEAR ANTIBODIES: CPT | Performed by: STUDENT IN AN ORGANIZED HEALTH CARE EDUCATION/TRAINING PROGRAM

## 2024-01-01 PROCEDURE — 83605 ASSAY OF LACTIC ACID: CPT

## 2024-01-01 PROCEDURE — 84132 ASSAY OF SERUM POTASSIUM: CPT | Performed by: PHYSICIAN ASSISTANT

## 2024-01-01 PROCEDURE — 87529 HSV DNA AMP PROBE: CPT | Performed by: STUDENT IN AN ORGANIZED HEALTH CARE EDUCATION/TRAINING PROGRAM

## 2024-01-01 PROCEDURE — 93010 ELECTROCARDIOGRAM REPORT: CPT | Performed by: INTERNAL MEDICINE

## 2024-01-01 PROCEDURE — 82803 BLOOD GASES ANY COMBINATION: CPT

## 2024-01-01 PROCEDURE — 87106 FUNGI IDENTIFICATION YEAST: CPT | Performed by: INTERNAL MEDICINE

## 2024-01-01 PROCEDURE — 85025 COMPLETE CBC W/AUTO DIFF WBC: CPT | Performed by: INTERNAL MEDICINE

## 2024-01-01 PROCEDURE — 94660 CPAP INITIATION&MGMT: CPT

## 2024-01-01 PROCEDURE — 250N000025 HC SEVOFLURANE, PER MIN: Performed by: THORACIC SURGERY (CARDIOTHORACIC VASCULAR SURGERY)

## 2024-01-01 PROCEDURE — 255N000002 HC RX 255 OP 636: Performed by: STUDENT IN AN ORGANIZED HEALTH CARE EDUCATION/TRAINING PROGRAM

## 2024-01-01 PROCEDURE — 250N000013 HC RX MED GY IP 250 OP 250 PS 637: Performed by: EMERGENCY MEDICINE

## 2024-01-01 PROCEDURE — 84999 UNLISTED CHEMISTRY PROCEDURE: CPT | Performed by: STUDENT IN AN ORGANIZED HEALTH CARE EDUCATION/TRAINING PROGRAM

## 2024-01-01 PROCEDURE — 99153 MOD SED SAME PHYS/QHP EA: CPT | Performed by: INTERNAL MEDICINE

## 2024-01-01 PROCEDURE — 0DH63UZ INSERTION OF FEEDING DEVICE INTO STOMACH, PERCUTANEOUS APPROACH: ICD-10-PCS | Performed by: SURGERY

## 2024-01-01 PROCEDURE — 84443 ASSAY THYROID STIM HORMONE: CPT | Performed by: INTERNAL MEDICINE

## 2024-01-01 PROCEDURE — 84120 ASSAY OF URINE PORPHYRINS: CPT | Performed by: STUDENT IN AN ORGANIZED HEALTH CARE EDUCATION/TRAINING PROGRAM

## 2024-01-01 PROCEDURE — 86923 COMPATIBILITY TEST ELECTRIC: CPT | Performed by: INTERNAL MEDICINE

## 2024-01-01 PROCEDURE — 71045 X-RAY EXAM CHEST 1 VIEW: CPT

## 2024-01-01 PROCEDURE — 82550 ASSAY OF CK (CPK): CPT | Performed by: INTERNAL MEDICINE

## 2024-01-01 PROCEDURE — 82805 BLOOD GASES W/O2 SATURATION: CPT

## 2024-01-01 PROCEDURE — C9113 INJ PANTOPRAZOLE SODIUM, VIA: HCPCS | Performed by: PHYSICIAN ASSISTANT

## 2024-01-01 PROCEDURE — 97110 THERAPEUTIC EXERCISES: CPT | Mod: GP | Performed by: PHYSICAL THERAPIST

## 2024-01-01 PROCEDURE — 81001 URINALYSIS AUTO W/SCOPE: CPT | Performed by: EMERGENCY MEDICINE

## 2024-01-01 PROCEDURE — 70496 CT ANGIOGRAPHY HEAD: CPT

## 2024-01-01 PROCEDURE — 82805 BLOOD GASES W/O2 SATURATION: CPT | Performed by: PHYSICIAN ASSISTANT

## 2024-01-01 PROCEDURE — 82330 ASSAY OF CALCIUM: CPT | Performed by: INTERNAL MEDICINE

## 2024-01-01 PROCEDURE — 80202 ASSAY OF VANCOMYCIN: CPT | Performed by: STUDENT IN AN ORGANIZED HEALTH CARE EDUCATION/TRAINING PROGRAM

## 2024-01-01 PROCEDURE — 99291 CRITICAL CARE FIRST HOUR: CPT | Performed by: SURGERY

## 2024-01-01 PROCEDURE — 258N000003 HC RX IP 258 OP 636

## 2024-01-01 PROCEDURE — 99233 SBSQ HOSP IP/OBS HIGH 50: CPT | Mod: 25 | Performed by: INTERNAL MEDICINE

## 2024-01-01 PROCEDURE — 87116 MYCOBACTERIA CULTURE: CPT | Performed by: INTERNAL MEDICINE

## 2024-01-01 PROCEDURE — 85520 HEPARIN ASSAY: CPT | Performed by: STUDENT IN AN ORGANIZED HEALTH CARE EDUCATION/TRAINING PROGRAM

## 2024-01-01 PROCEDURE — 250N000012 HC RX MED GY IP 250 OP 636 PS 637: Performed by: SURGERY

## 2024-01-01 PROCEDURE — G0463 HOSPITAL OUTPT CLINIC VISIT: HCPCS | Mod: 25

## 2024-01-01 PROCEDURE — C9113 INJ PANTOPRAZOLE SODIUM, VIA: HCPCS | Performed by: INTERNAL MEDICINE

## 2024-01-01 PROCEDURE — 87486 CHLMYD PNEUM DNA AMP PROBE: CPT | Performed by: INTERNAL MEDICINE

## 2024-01-01 PROCEDURE — 93454 CORONARY ARTERY ANGIO S&I: CPT | Performed by: INTERNAL MEDICINE

## 2024-01-01 PROCEDURE — 0BJ08ZZ INSPECTION OF TRACHEOBRONCHIAL TREE, VIA NATURAL OR ARTIFICIAL OPENING ENDOSCOPIC: ICD-10-PCS | Performed by: INTERNAL MEDICINE

## 2024-01-01 PROCEDURE — 97110 THERAPEUTIC EXERCISES: CPT | Mod: GO

## 2024-01-01 PROCEDURE — 94645 CONT INHLJ TX EACH ADDL HOUR: CPT

## 2024-01-01 PROCEDURE — 86356 MONONUCLEAR CELL ANTIGEN: CPT | Performed by: INTERNAL MEDICINE

## 2024-01-01 PROCEDURE — 84484 ASSAY OF TROPONIN QUANT: CPT | Performed by: EMERGENCY MEDICINE

## 2024-01-01 PROCEDURE — 82550 ASSAY OF CK (CPK): CPT | Performed by: STUDENT IN AN ORGANIZED HEALTH CARE EDUCATION/TRAINING PROGRAM

## 2024-01-01 PROCEDURE — 99207 PR NO BILLABLE SERVICE THIS VISIT: CPT | Performed by: INTERNAL MEDICINE

## 2024-01-01 PROCEDURE — 99291 CRITICAL CARE FIRST HOUR: CPT | Performed by: INTERNAL MEDICINE

## 2024-01-01 PROCEDURE — 97163 PT EVAL HIGH COMPLEX 45 MIN: CPT | Mod: GP

## 2024-01-01 PROCEDURE — 99152 MOD SED SAME PHYS/QHP 5/>YRS: CPT | Mod: GC | Performed by: INTERNAL MEDICINE

## 2024-01-01 PROCEDURE — 87205 SMEAR GRAM STAIN: CPT | Performed by: INTERNAL MEDICINE

## 2024-01-01 PROCEDURE — 84145 PROCALCITONIN (PCT): CPT | Performed by: INTERNAL MEDICINE

## 2024-01-01 PROCEDURE — 82565 ASSAY OF CREATININE: CPT | Performed by: INTERNAL MEDICINE

## 2024-01-01 PROCEDURE — 93306 TTE W/DOPPLER COMPLETE: CPT | Mod: 26 | Performed by: INTERNAL MEDICINE

## 2024-01-01 PROCEDURE — 87102 FUNGUS ISOLATION CULTURE: CPT | Performed by: INTERNAL MEDICINE

## 2024-01-01 PROCEDURE — 82533 TOTAL CORTISOL: CPT | Performed by: INTERNAL MEDICINE

## 2024-01-01 PROCEDURE — 31624 DX BRONCHOSCOPE/LAVAGE: CPT | Performed by: INTERNAL MEDICINE

## 2024-01-01 PROCEDURE — 84100 ASSAY OF PHOSPHORUS: CPT | Performed by: STUDENT IN AN ORGANIZED HEALTH CARE EDUCATION/TRAINING PROGRAM

## 2024-01-01 PROCEDURE — 72158 MRI LUMBAR SPINE W/O & W/DYE: CPT

## 2024-01-01 PROCEDURE — 80048 BASIC METABOLIC PNL TOTAL CA: CPT | Performed by: INTERNAL MEDICINE

## 2024-01-01 PROCEDURE — P9045 ALBUMIN (HUMAN), 5%, 250 ML: HCPCS | Mod: JZ | Performed by: SURGERY

## 2024-01-01 PROCEDURE — 87102 FUNGUS ISOLATION CULTURE: CPT | Performed by: STUDENT IN AN ORGANIZED HEALTH CARE EDUCATION/TRAINING PROGRAM

## 2024-01-01 PROCEDURE — 72156 MRI NECK SPINE W/O & W/DYE: CPT

## 2024-01-01 PROCEDURE — 93308 TTE F-UP OR LMTD: CPT | Mod: 26 | Performed by: INTERNAL MEDICINE

## 2024-01-01 PROCEDURE — C8929 TTE W OR WO FOL WCON,DOPPLER: HCPCS

## 2024-01-01 PROCEDURE — 99291 CRITICAL CARE FIRST HOUR: CPT

## 2024-01-01 PROCEDURE — 88108 CYTOPATH CONCENTRATE TECH: CPT | Mod: 26 | Performed by: PATHOLOGY

## 2024-01-01 PROCEDURE — 250N000011 HC RX IP 250 OP 636: Performed by: PHYSICIAN ASSISTANT

## 2024-01-01 PROCEDURE — 5A1955Z RESPIRATORY VENTILATION, GREATER THAN 96 CONSECUTIVE HOURS: ICD-10-PCS | Performed by: INTERNAL MEDICINE

## 2024-01-01 PROCEDURE — 999N000253 HC STATISTIC WEANING TRIALS

## 2024-01-01 PROCEDURE — 250N000011 HC RX IP 250 OP 636: Performed by: NURSE ANESTHETIST, CERTIFIED REGISTERED

## 2024-01-01 PROCEDURE — 71260 CT THORAX DX C+: CPT

## 2024-01-01 PROCEDURE — 250N000011 HC RX IP 250 OP 636: Mod: JZ

## 2024-01-01 PROCEDURE — 84295 ASSAY OF SERUM SODIUM: CPT | Performed by: SURGERY

## 2024-01-01 PROCEDURE — 99100 ANES PT EXTEME AGE<1 YR&>70: CPT | Performed by: NURSE ANESTHETIST, CERTIFIED REGISTERED

## 2024-01-01 PROCEDURE — 83615 LACTATE (LD) (LDH) ENZYME: CPT | Performed by: INTERNAL MEDICINE

## 2024-01-01 PROCEDURE — 82085 ASSAY OF ALDOLASE: CPT | Performed by: INTERNAL MEDICINE

## 2024-01-01 PROCEDURE — 85048 AUTOMATED LEUKOCYTE COUNT: CPT | Performed by: STUDENT IN AN ORGANIZED HEALTH CARE EDUCATION/TRAINING PROGRAM

## 2024-01-01 PROCEDURE — 999N000254 HC STATISTIC VENTILATOR TRANSFER

## 2024-01-01 PROCEDURE — 86789 WEST NILE VIRUS ANTIBODY: CPT | Performed by: STUDENT IN AN ORGANIZED HEALTH CARE EDUCATION/TRAINING PROGRAM

## 2024-01-01 PROCEDURE — 83930 ASSAY OF BLOOD OSMOLALITY: CPT | Performed by: SURGERY

## 2024-01-01 PROCEDURE — 99232 SBSQ HOSP IP/OBS MODERATE 35: CPT | Mod: 25 | Performed by: INTERNAL MEDICINE

## 2024-01-01 PROCEDURE — 94002 VENT MGMT INPAT INIT DAY: CPT

## 2024-01-01 PROCEDURE — 272N000272 HC CONTINUOUS NEBULIZER MICRO PUMP

## 2024-01-01 PROCEDURE — 97535 SELF CARE MNGMENT TRAINING: CPT | Mod: GO

## 2024-01-01 PROCEDURE — 87899 AGENT NOS ASSAY W/OPTIC: CPT | Performed by: STUDENT IN AN ORGANIZED HEALTH CARE EDUCATION/TRAINING PROGRAM

## 2024-01-01 PROCEDURE — 72131 CT LUMBAR SPINE W/O DYE: CPT

## 2024-01-01 PROCEDURE — 86617 LYME DISEASE ANTIBODY: CPT | Performed by: STUDENT IN AN ORGANIZED HEALTH CARE EDUCATION/TRAINING PROGRAM

## 2024-01-01 PROCEDURE — 99231 SBSQ HOSP IP/OBS SF/LOW 25: CPT | Mod: GC | Performed by: PSYCHIATRY & NEUROLOGY

## 2024-01-01 PROCEDURE — 999N000287 HC ICU ADULT ROUNDING, EACH 10 MINS

## 2024-01-01 PROCEDURE — 93454 CORONARY ARTERY ANGIO S&I: CPT | Mod: 26 | Performed by: INTERNAL MEDICINE

## 2024-01-01 PROCEDURE — 87556 M.TUBERCULO DNA AMP PROBE: CPT | Performed by: STUDENT IN AN ORGANIZED HEALTH CARE EDUCATION/TRAINING PROGRAM

## 2024-01-01 PROCEDURE — 250N000011 HC RX IP 250 OP 636: Mod: JZ | Performed by: SURGERY

## 2024-01-01 PROCEDURE — 85014 HEMATOCRIT: CPT | Performed by: INTERNAL MEDICINE

## 2024-01-01 PROCEDURE — 87206 SMEAR FLUORESCENT/ACID STAI: CPT | Performed by: INTERNAL MEDICINE

## 2024-01-01 PROCEDURE — 999N000040 HC STATISTIC CONSULT NO CHARGE VASC ACCESS

## 2024-01-01 PROCEDURE — 87476 LYME DIS DNA AMP PROBE: CPT | Performed by: STUDENT IN AN ORGANIZED HEALTH CARE EDUCATION/TRAINING PROGRAM

## 2024-01-01 PROCEDURE — 82550 ASSAY OF CK (CPK): CPT | Performed by: EMERGENCY MEDICINE

## 2024-01-01 PROCEDURE — 84100 ASSAY OF PHOSPHORUS: CPT | Performed by: PHYSICIAN ASSISTANT

## 2024-01-01 PROCEDURE — 31622 DX BRONCHOSCOPE/WASH: CPT | Performed by: INTERNAL MEDICINE

## 2024-01-01 PROCEDURE — 250N000013 HC RX MED GY IP 250 OP 250 PS 637: Performed by: SURGERY

## 2024-01-01 PROCEDURE — 360N000075 HC SURGERY LEVEL 2, PER MIN: Performed by: THORACIC SURGERY (CARDIOTHORACIC VASCULAR SURGERY)

## 2024-01-01 PROCEDURE — 36600 WITHDRAWAL OF ARTERIAL BLOOD: CPT

## 2024-01-01 PROCEDURE — 43246 EGD PLACE GASTROSTOMY TUBE: CPT | Performed by: ANESTHESIOLOGY

## 2024-01-01 PROCEDURE — 3E043XZ INTRODUCTION OF VASOPRESSOR INTO CENTRAL VEIN, PERCUTANEOUS APPROACH: ICD-10-PCS | Performed by: PSYCHIATRY & NEUROLOGY

## 2024-01-01 PROCEDURE — 84145 PROCALCITONIN (PCT): CPT | Performed by: EMERGENCY MEDICINE

## 2024-01-01 PROCEDURE — 99292 CRITICAL CARE ADDL 30 MIN: CPT | Performed by: INTERNAL MEDICINE

## 2024-01-01 PROCEDURE — 36415 COLL VENOUS BLD VENIPUNCTURE: CPT | Performed by: STUDENT IN AN ORGANIZED HEALTH CARE EDUCATION/TRAINING PROGRAM

## 2024-01-01 PROCEDURE — 87483 CNS DNA AMP PROBE TYPE 12-25: CPT | Performed by: STUDENT IN AN ORGANIZED HEALTH CARE EDUCATION/TRAINING PROGRAM

## 2024-01-01 PROCEDURE — 71046 X-RAY EXAM CHEST 2 VIEWS: CPT

## 2024-01-01 PROCEDURE — 86900 BLOOD TYPING SEROLOGIC ABO: CPT | Performed by: INTERNAL MEDICINE

## 2024-01-01 PROCEDURE — 99231 SBSQ HOSP IP/OBS SF/LOW 25: CPT | Performed by: INTERNAL MEDICINE

## 2024-01-01 PROCEDURE — 84145 PROCALCITONIN (PCT): CPT | Performed by: SURGERY

## 2024-01-01 PROCEDURE — P9016 RBC LEUKOCYTES REDUCED: HCPCS | Performed by: INTERNAL MEDICINE

## 2024-01-01 PROCEDURE — 87637 SARSCOV2&INF A&B&RSV AMP PRB: CPT | Performed by: EMERGENCY MEDICINE

## 2024-01-01 PROCEDURE — 258N000003 HC RX IP 258 OP 636: Performed by: PHYSICIAN ASSISTANT

## 2024-01-01 PROCEDURE — 272N000452 HC KIT SHRLOCK 5FR POWER PICC TRIPLE LUMEN

## 2024-01-01 PROCEDURE — 87205 SMEAR GRAM STAIN: CPT | Performed by: STUDENT IN AN ORGANIZED HEALTH CARE EDUCATION/TRAINING PROGRAM

## 2024-01-01 PROCEDURE — 87210 SMEAR WET MOUNT SALINE/INK: CPT | Performed by: INTERNAL MEDICINE

## 2024-01-01 PROCEDURE — 36415 COLL VENOUS BLD VENIPUNCTURE: CPT | Performed by: EMERGENCY MEDICINE

## 2024-01-01 PROCEDURE — B2111ZZ FLUOROSCOPY OF MULTIPLE CORONARY ARTERIES USING LOW OSMOLAR CONTRAST: ICD-10-PCS | Performed by: INTERNAL MEDICINE

## 2024-01-01 PROCEDURE — 87641 MR-STAPH DNA AMP PROBE: CPT | Performed by: INTERNAL MEDICINE

## 2024-01-01 PROCEDURE — 999N000208 ECHOCARDIOGRAM COMPLETE

## 2024-01-01 PROCEDURE — 87081 CULTURE SCREEN ONLY: CPT | Performed by: INTERNAL MEDICINE

## 2024-01-01 PROCEDURE — 87798 DETECT AGENT NOS DNA AMP: CPT | Performed by: STUDENT IN AN ORGANIZED HEALTH CARE EDUCATION/TRAINING PROGRAM

## 2024-01-01 PROCEDURE — 87389 HIV-1 AG W/HIV-1&-2 AB AG IA: CPT | Performed by: STUDENT IN AN ORGANIZED HEALTH CARE EDUCATION/TRAINING PROGRAM

## 2024-01-01 PROCEDURE — 258N000003 HC RX IP 258 OP 636: Performed by: ANESTHESIOLOGY

## 2024-01-01 PROCEDURE — 0B9G8ZX DRAINAGE OF LEFT UPPER LUNG LOBE, VIA NATURAL OR ARTIFICIAL OPENING ENDOSCOPIC, DIAGNOSTIC: ICD-10-PCS | Performed by: INTERNAL MEDICINE

## 2024-01-01 PROCEDURE — C1894 INTRO/SHEATH, NON-LASER: HCPCS | Performed by: INTERNAL MEDICINE

## 2024-01-01 PROCEDURE — 93321 DOPPLER ECHO F-UP/LMTD STD: CPT | Mod: 26 | Performed by: INTERNAL MEDICINE

## 2024-01-01 PROCEDURE — 72157 MRI CHEST SPINE W/O & W/DYE: CPT

## 2024-01-01 PROCEDURE — 370N000017 HC ANESTHESIA TECHNICAL FEE, PER MIN: Performed by: THORACIC SURGERY (CARDIOTHORACIC VASCULAR SURGERY)

## 2024-01-01 PROCEDURE — 86738 MYCOPLASMA ANTIBODY: CPT | Performed by: STUDENT IN AN ORGANIZED HEALTH CARE EDUCATION/TRAINING PROGRAM

## 2024-01-01 PROCEDURE — 99152 MOD SED SAME PHYS/QHP 5/>YRS: CPT | Performed by: INTERNAL MEDICINE

## 2024-01-01 PROCEDURE — A9585 GADOBUTROL INJECTION: HCPCS | Performed by: STUDENT IN AN ORGANIZED HEALTH CARE EDUCATION/TRAINING PROGRAM

## 2024-01-01 PROCEDURE — 250N000009 HC RX 250: Performed by: EMERGENCY MEDICINE

## 2024-01-01 PROCEDURE — 93325 DOPPLER ECHO COLOR FLOW MAPG: CPT | Mod: 26 | Performed by: INTERNAL MEDICINE

## 2024-01-01 PROCEDURE — 86037 ANCA TITER EACH ANTIBODY: CPT | Performed by: STUDENT IN AN ORGANIZED HEALTH CARE EDUCATION/TRAINING PROGRAM

## 2024-01-01 PROCEDURE — 0B110F4 BYPASS TRACHEA TO CUTANEOUS WITH TRACHEOSTOMY DEVICE, OPEN APPROACH: ICD-10-PCS | Performed by: THORACIC SURGERY (CARDIOTHORACIC VASCULAR SURGERY)

## 2024-01-01 PROCEDURE — 85520 HEPARIN ASSAY: CPT | Performed by: PEDIATRICS

## 2024-01-01 PROCEDURE — 82248 BILIRUBIN DIRECT: CPT | Performed by: EMERGENCY MEDICINE

## 2024-01-01 PROCEDURE — 82310 ASSAY OF CALCIUM: CPT | Performed by: INTERNAL MEDICINE

## 2024-01-01 PROCEDURE — 99223 1ST HOSP IP/OBS HIGH 75: CPT | Performed by: STUDENT IN AN ORGANIZED HEALTH CARE EDUCATION/TRAINING PROGRAM

## 2024-01-01 PROCEDURE — 89050 BODY FLUID CELL COUNT: CPT | Performed by: INTERNAL MEDICINE

## 2024-01-01 PROCEDURE — 83497 ASSAY OF 5-HIAA: CPT | Performed by: INTERNAL MEDICINE

## 2024-01-01 PROCEDURE — 88305 TISSUE EXAM BY PATHOLOGIST: CPT | Mod: TC | Performed by: INTERNAL MEDICINE

## 2024-01-01 PROCEDURE — 62328 DX LMBR SPI PNXR W/FLUOR/CT: CPT

## 2024-01-01 PROCEDURE — 250N000011 HC RX IP 250 OP 636: Performed by: EMERGENCY MEDICINE

## 2024-01-01 PROCEDURE — 999N000208 ECHOCARDIOGRAM LIMITED

## 2024-01-01 PROCEDURE — 87305 ASPERGILLUS AG IA: CPT | Performed by: INTERNAL MEDICINE

## 2024-01-01 PROCEDURE — 999N000128 HC STATISTIC PERIPHERAL IV START W/O US GUIDANCE

## 2024-01-01 PROCEDURE — 88108 CYTOPATH CONCENTRATE TECH: CPT | Mod: TC | Performed by: INTERNAL MEDICINE

## 2024-01-01 PROCEDURE — 87075 CULTR BACTERIA EXCEPT BLOOD: CPT | Performed by: STUDENT IN AN ORGANIZED HEALTH CARE EDUCATION/TRAINING PROGRAM

## 2024-01-01 PROCEDURE — 84295 ASSAY OF SERUM SODIUM: CPT | Performed by: INTERNAL MEDICINE

## 2024-01-01 PROCEDURE — 255N000002 HC RX 255 OP 636: Performed by: EMERGENCY MEDICINE

## 2024-01-01 PROCEDURE — 87070 CULTURE OTHR SPECIMN AEROBIC: CPT | Performed by: INTERNAL MEDICINE

## 2024-01-01 PROCEDURE — 009U3ZX DRAINAGE OF SPINAL CANAL, PERCUTANEOUS APPROACH, DIAGNOSTIC: ICD-10-PCS | Performed by: PHYSICIAN ASSISTANT

## 2024-01-01 PROCEDURE — 84100 ASSAY OF PHOSPHORUS: CPT | Performed by: SURGERY

## 2024-01-01 PROCEDURE — 83036 HEMOGLOBIN GLYCOSYLATED A1C: CPT | Performed by: PSYCHIATRY & NEUROLOGY

## 2024-01-01 PROCEDURE — 84157 ASSAY OF PROTEIN OTHER: CPT | Performed by: STUDENT IN AN ORGANIZED HEALTH CARE EDUCATION/TRAINING PROGRAM

## 2024-01-01 PROCEDURE — 89050 BODY FLUID CELL COUNT: CPT | Performed by: STUDENT IN AN ORGANIZED HEALTH CARE EDUCATION/TRAINING PROGRAM

## 2024-01-01 PROCEDURE — 88108 CYTOPATH CONCENTRATE TECH: CPT | Mod: TC | Performed by: STUDENT IN AN ORGANIZED HEALTH CARE EDUCATION/TRAINING PROGRAM

## 2024-01-01 PROCEDURE — G0463 HOSPITAL OUTPT CLINIC VISIT: HCPCS

## 2024-01-01 PROCEDURE — 83935 ASSAY OF URINE OSMOLALITY: CPT | Performed by: SURGERY

## 2024-01-01 PROCEDURE — 99207 PR NO BILLABLE SERVICE THIS VISIT: CPT | Performed by: STUDENT IN AN ORGANIZED HEALTH CARE EDUCATION/TRAINING PROGRAM

## 2024-01-01 PROCEDURE — 99292 CRITICAL CARE ADDL 30 MIN: CPT | Mod: 25 | Performed by: INTERNAL MEDICINE

## 2024-01-01 PROCEDURE — 87040 BLOOD CULTURE FOR BACTERIA: CPT | Performed by: EMERGENCY MEDICINE

## 2024-01-01 PROCEDURE — 250N000013 HC RX MED GY IP 250 OP 250 PS 637

## 2024-01-01 PROCEDURE — 93970 EXTREMITY STUDY: CPT

## 2024-01-01 PROCEDURE — 84484 ASSAY OF TROPONIN QUANT: CPT | Performed by: INTERNAL MEDICINE

## 2024-01-01 PROCEDURE — 87633 RESP VIRUS 12-25 TARGETS: CPT | Performed by: INTERNAL MEDICINE

## 2024-01-01 PROCEDURE — 88305 TISSUE EXAM BY PATHOLOGIST: CPT | Mod: 26 | Performed by: PATHOLOGY

## 2024-01-01 PROCEDURE — 250N000009 HC RX 250: Performed by: NURSE ANESTHETIST, CERTIFIED REGISTERED

## 2024-01-01 PROCEDURE — 6A551Z3 PHERESIS OF PLASMA, MULTIPLE: ICD-10-PCS | Performed by: INTERNAL MEDICINE

## 2024-01-01 PROCEDURE — 85018 HEMOGLOBIN: CPT | Performed by: INTERNAL MEDICINE

## 2024-01-01 PROCEDURE — 99291 CRITICAL CARE FIRST HOUR: CPT | Performed by: STUDENT IN AN ORGANIZED HEALTH CARE EDUCATION/TRAINING PROGRAM

## 2024-01-01 PROCEDURE — 86592 SYPHILIS TEST NON-TREP QUAL: CPT | Performed by: STUDENT IN AN ORGANIZED HEALTH CARE EDUCATION/TRAINING PROGRAM

## 2024-01-01 PROCEDURE — 36556 INSERT NON-TUNNEL CV CATH: CPT | Performed by: INTERNAL MEDICINE

## 2024-01-01 PROCEDURE — 82945 GLUCOSE OTHER FLUID: CPT | Performed by: STUDENT IN AN ORGANIZED HEALTH CARE EDUCATION/TRAINING PROGRAM

## 2024-01-01 PROCEDURE — 85018 HEMOGLOBIN: CPT | Performed by: PSYCHIATRY & NEUROLOGY

## 2024-01-01 PROCEDURE — 999N000197 HC STATISTIC WOC PT EDUCATION, 0-15 MIN

## 2024-01-01 PROCEDURE — 70450 CT HEAD/BRAIN W/O DYE: CPT

## 2024-01-01 PROCEDURE — 85014 HEMATOCRIT: CPT | Performed by: PHYSICIAN ASSISTANT

## 2024-01-01 PROCEDURE — 99221 1ST HOSP IP/OBS SF/LOW 40: CPT | Performed by: SURGERY

## 2024-01-01 PROCEDURE — 36569 INSJ PICC 5 YR+ W/O IMAGING: CPT

## 2024-01-01 PROCEDURE — 85025 COMPLETE CBC W/AUTO DIFF WBC: CPT | Performed by: EMERGENCY MEDICINE

## 2024-01-01 PROCEDURE — 71250 CT THORAX DX C-: CPT

## 2024-01-01 PROCEDURE — 85018 HEMOGLOBIN: CPT | Performed by: STUDENT IN AN ORGANIZED HEALTH CARE EDUCATION/TRAINING PROGRAM

## 2024-01-01 PROCEDURE — 84132 ASSAY OF SERUM POTASSIUM: CPT | Performed by: STUDENT IN AN ORGANIZED HEALTH CARE EDUCATION/TRAINING PROGRAM

## 2024-01-01 PROCEDURE — 5A09357 ASSISTANCE WITH RESPIRATORY VENTILATION, LESS THAN 24 CONSECUTIVE HOURS, CONTINUOUS POSITIVE AIRWAY PRESSURE: ICD-10-PCS | Performed by: INTERNAL MEDICINE

## 2024-01-01 PROCEDURE — 72125 CT NECK SPINE W/O DYE: CPT

## 2024-01-01 PROCEDURE — 258N000003 HC RX IP 258 OP 636: Performed by: EMERGENCY MEDICINE

## 2024-01-01 PROCEDURE — 94644 CONT INHLJ TX 1ST HOUR: CPT

## 2024-01-01 PROCEDURE — 272N000001 HC OR GENERAL SUPPLY STERILE: Performed by: INTERNAL MEDICINE

## 2024-01-01 PROCEDURE — 250N000011 HC RX IP 250 OP 636: Performed by: ANESTHESIOLOGY

## 2024-01-01 PROCEDURE — 86041 ACETYLCHOLN RCPTR BNDNG ANTB: CPT | Performed by: STUDENT IN AN ORGANIZED HEALTH CARE EDUCATION/TRAINING PROGRAM

## 2024-01-01 PROCEDURE — 31500 INSERT EMERGENCY AIRWAY: CPT | Performed by: INTERNAL MEDICINE

## 2024-01-01 PROCEDURE — 84300 ASSAY OF URINE SODIUM: CPT | Performed by: SURGERY

## 2024-01-01 PROCEDURE — 84450 TRANSFERASE (AST) (SGOT): CPT | Performed by: PHYSICIAN ASSISTANT

## 2024-01-01 PROCEDURE — 99223 1ST HOSP IP/OBS HIGH 75: CPT | Performed by: INTERNAL MEDICINE

## 2024-01-01 PROCEDURE — 83605 ASSAY OF LACTIC ACID: CPT | Performed by: SURGERY

## 2024-01-01 RX ORDER — TRAMADOL HYDROCHLORIDE 50 MG/1
TABLET ORAL
Qty: 90 TABLET | Refills: 0 | Status: SHIPPED | OUTPATIENT
Start: 2024-01-01

## 2024-01-01 RX ORDER — POTASSIUM CHLORIDE 1.5 G/1.58G
20 POWDER, FOR SOLUTION ORAL ONCE
Status: COMPLETED | OUTPATIENT
Start: 2024-01-01 | End: 2024-01-01

## 2024-01-01 RX ORDER — POTASSIUM CHLORIDE 1.5 G/1.58G
40 POWDER, FOR SOLUTION ORAL ONCE
Status: COMPLETED | OUTPATIENT
Start: 2024-01-01 | End: 2024-01-01

## 2024-01-01 RX ORDER — AMOXICILLIN 250 MG
1 CAPSULE ORAL 2 TIMES DAILY PRN
Status: DISCONTINUED | OUTPATIENT
Start: 2024-01-01 | End: 2024-01-01

## 2024-01-01 RX ORDER — GLYCOPYRROLATE 0.2 MG/ML
0.1 INJECTION, SOLUTION INTRAMUSCULAR; INTRAVENOUS EVERY 4 HOURS PRN
Status: DISCONTINUED | OUTPATIENT
Start: 2024-01-01 | End: 2024-01-01 | Stop reason: HOSPADM

## 2024-01-01 RX ORDER — CHLORHEXIDINE GLUCONATE ORAL RINSE 1.2 MG/ML
15 SOLUTION DENTAL EVERY 12 HOURS
Status: DISCONTINUED | OUTPATIENT
Start: 2024-01-01 | End: 2024-01-01

## 2024-01-01 RX ORDER — AMIODARONE HYDROCHLORIDE 200 MG/1
200 TABLET ORAL DAILY
Status: DISCONTINUED | OUTPATIENT
Start: 2024-01-01 | End: 2024-01-01

## 2024-01-01 RX ORDER — LIDOCAINE HYDROCHLORIDE 10 MG/ML
5 INJECTION, SOLUTION EPIDURAL; INFILTRATION; INTRACAUDAL; PERINEURAL ONCE
Status: COMPLETED | OUTPATIENT
Start: 2024-01-01 | End: 2024-01-01

## 2024-01-01 RX ORDER — OXYCODONE HYDROCHLORIDE 5 MG/1
5 TABLET ORAL EVERY 4 HOURS PRN
Status: DISCONTINUED | OUTPATIENT
Start: 2024-01-01 | End: 2024-01-01 | Stop reason: ALTCHOICE

## 2024-01-01 RX ORDER — OXYCODONE HCL 5 MG/5 ML
10 SOLUTION, ORAL ORAL EVERY 4 HOURS PRN
Status: DISCONTINUED | OUTPATIENT
Start: 2024-01-01 | End: 2024-01-01

## 2024-01-01 RX ORDER — ACETAMINOPHEN 325 MG/10.15ML
650 LIQUID ORAL EVERY 4 HOURS PRN
Status: DISCONTINUED | OUTPATIENT
Start: 2024-01-01 | End: 2024-01-01

## 2024-01-01 RX ORDER — LOSARTAN POTASSIUM 25 MG/1
25 TABLET ORAL DAILY
Status: DISCONTINUED | OUTPATIENT
Start: 2024-01-01 | End: 2024-01-01

## 2024-01-01 RX ORDER — PROCHLORPERAZINE 25 MG
12.5 SUPPOSITORY, RECTAL RECTAL EVERY 12 HOURS PRN
Status: DISCONTINUED | OUTPATIENT
Start: 2024-01-01 | End: 2024-01-01 | Stop reason: HOSPADM

## 2024-01-01 RX ORDER — ONDANSETRON 4 MG/1
4 TABLET, ORALLY DISINTEGRATING ORAL EVERY 6 HOURS PRN
Status: DISCONTINUED | OUTPATIENT
Start: 2024-01-01 | End: 2024-01-01

## 2024-01-01 RX ORDER — MIDAZOLAM HCL IN 0.9 % NACL/PF 1 MG/ML
1-8 PLASTIC BAG, INJECTION (ML) INTRAVENOUS CONTINUOUS
Status: DISCONTINUED | OUTPATIENT
Start: 2024-01-01 | End: 2024-01-01

## 2024-01-01 RX ORDER — ASPIRIN 325 MG
325 TABLET ORAL ONCE
Status: CANCELLED | OUTPATIENT
Start: 2024-01-01 | End: 2024-01-01

## 2024-01-01 RX ORDER — NOREPINEPHRINE BITARTRATE 0.06 MG/ML
.01-.6 INJECTION, SOLUTION INTRAVENOUS CONTINUOUS
Status: DISCONTINUED | OUTPATIENT
Start: 2024-01-01 | End: 2024-01-01

## 2024-01-01 RX ORDER — IOPAMIDOL 755 MG/ML
101 INJECTION, SOLUTION INTRAVASCULAR ONCE
Status: COMPLETED | OUTPATIENT
Start: 2024-01-01 | End: 2024-01-01

## 2024-01-01 RX ORDER — LIDOCAINE 40 MG/G
CREAM TOPICAL
Status: ACTIVE | OUTPATIENT
Start: 2024-01-01 | End: 2024-01-01

## 2024-01-01 RX ORDER — HEPARIN SODIUM 1000 [USP'U]/ML
1.5 INJECTION, SOLUTION INTRAVENOUS; SUBCUTANEOUS
Status: COMPLETED | OUTPATIENT
Start: 2024-01-01 | End: 2024-01-01

## 2024-01-01 RX ORDER — MAGNESIUM HYDROXIDE 1200 MG/15ML
LIQUID ORAL PRN
Status: DISCONTINUED | OUTPATIENT
Start: 2024-01-01 | End: 2024-01-01 | Stop reason: HOSPADM

## 2024-01-01 RX ORDER — POTASSIUM CHLORIDE 20MEQ/15ML
40 LIQUID (ML) ORAL ONCE
Status: COMPLETED | OUTPATIENT
Start: 2024-01-01 | End: 2024-01-01

## 2024-01-01 RX ORDER — MAGNESIUM SULFATE HEPTAHYDRATE 40 MG/ML
2 INJECTION, SOLUTION INTRAVENOUS ONCE
Status: DISCONTINUED | OUTPATIENT
Start: 2024-01-01 | End: 2024-01-01

## 2024-01-01 RX ORDER — ALBUMIN HUMAN 25 %
3250 INTRAVENOUS SOLUTION INTRAVENOUS ONCE
Status: COMPLETED | OUTPATIENT
Start: 2024-01-01 | End: 2024-01-01

## 2024-01-01 RX ORDER — LORAZEPAM 2 MG/ML
0.5 INJECTION INTRAMUSCULAR
Status: CANCELLED | OUTPATIENT
Start: 2024-01-01

## 2024-01-01 RX ORDER — ALBUTEROL SULFATE 0.83 MG/ML
2.5 SOLUTION RESPIRATORY (INHALATION)
Status: DISCONTINUED | OUTPATIENT
Start: 2024-01-01 | End: 2024-01-01

## 2024-01-01 RX ORDER — SENNOSIDES 8.6 MG
8.6 TABLET ORAL 2 TIMES DAILY
Status: DISCONTINUED | OUTPATIENT
Start: 2024-01-01 | End: 2024-01-01

## 2024-01-01 RX ORDER — FUROSEMIDE 10 MG/ML
20 INJECTION INTRAMUSCULAR; INTRAVENOUS ONCE
Status: COMPLETED | OUTPATIENT
Start: 2024-01-01 | End: 2024-01-01

## 2024-01-01 RX ORDER — HYDROXYZINE HYDROCHLORIDE 25 MG/1
25 TABLET, FILM COATED ORAL EVERY 6 HOURS PRN
Status: DISCONTINUED | OUTPATIENT
Start: 2024-01-01 | End: 2024-01-01

## 2024-01-01 RX ORDER — LIDOCAINE 40 MG/G
CREAM TOPICAL
Status: CANCELLED | OUTPATIENT
Start: 2024-01-01

## 2024-01-01 RX ORDER — DEXAMETHASONE SODIUM PHOSPHATE 10 MG/ML
20 INJECTION, SOLUTION INTRAMUSCULAR; INTRAVENOUS EVERY 24 HOURS
Status: DISCONTINUED | OUTPATIENT
Start: 2024-01-01 | End: 2024-01-01

## 2024-01-01 RX ORDER — NOREPINEPHRINE BITARTRATE 0.02 MG/ML
.01-.6 INJECTION, SOLUTION INTRAVENOUS CONTINUOUS
Status: DISCONTINUED | OUTPATIENT
Start: 2024-01-01 | End: 2024-01-01

## 2024-01-01 RX ORDER — CALCIUM CARBONATE 500 MG/1
1000 TABLET, CHEWABLE ORAL 4 TIMES DAILY PRN
Status: DISCONTINUED | OUTPATIENT
Start: 2024-01-01 | End: 2024-01-01

## 2024-01-01 RX ORDER — MAGNESIUM SULFATE HEPTAHYDRATE 40 MG/ML
2 INJECTION, SOLUTION INTRAVENOUS ONCE
Status: COMPLETED | OUTPATIENT
Start: 2024-01-01 | End: 2024-01-01

## 2024-01-01 RX ORDER — PROCHLORPERAZINE MALEATE 5 MG
5 TABLET ORAL EVERY 6 HOURS PRN
Status: DISCONTINUED | OUTPATIENT
Start: 2024-01-01 | End: 2024-01-01 | Stop reason: HOSPADM

## 2024-01-01 RX ORDER — POTASSIUM CHLORIDE 29.8 MG/ML
20 INJECTION INTRAVENOUS ONCE
Status: COMPLETED | OUTPATIENT
Start: 2024-01-01 | End: 2024-01-01

## 2024-01-01 RX ORDER — ETOMIDATE 2 MG/ML
30 INJECTION INTRAVENOUS ONCE
Status: COMPLETED | OUTPATIENT
Start: 2024-01-01 | End: 2024-01-01

## 2024-01-01 RX ORDER — ACETYLCYSTEINE 200 MG/ML
2 SOLUTION ORAL; RESPIRATORY (INHALATION) EVERY 4 HOURS
Status: DISCONTINUED | OUTPATIENT
Start: 2024-01-01 | End: 2024-01-01

## 2024-01-01 RX ORDER — ACETAMINOPHEN 325 MG/1
650 TABLET ORAL EVERY 4 HOURS PRN
Status: DISCONTINUED | OUTPATIENT
Start: 2024-01-01 | End: 2024-01-01 | Stop reason: ALTCHOICE

## 2024-01-01 RX ORDER — HEPARIN SODIUM 10000 [USP'U]/100ML
0-5000 INJECTION, SOLUTION INTRAVENOUS CONTINUOUS
Status: DISCONTINUED | OUTPATIENT
Start: 2024-01-01 | End: 2024-01-01 | Stop reason: DRUGHIGH

## 2024-01-01 RX ORDER — FUROSEMIDE 10 MG/ML
80 INJECTION INTRAMUSCULAR; INTRAVENOUS ONCE
Status: COMPLETED | OUTPATIENT
Start: 2024-01-01 | End: 2024-01-01

## 2024-01-01 RX ORDER — OXYCODONE HYDROCHLORIDE 5 MG/1
10 TABLET ORAL EVERY 4 HOURS PRN
Status: DISCONTINUED | OUTPATIENT
Start: 2024-01-01 | End: 2024-01-01 | Stop reason: ALTCHOICE

## 2024-01-01 RX ORDER — ATROPINE SULFATE 10 MG/ML
1 SOLUTION/ DROPS OPHTHALMIC
Status: DISCONTINUED | OUTPATIENT
Start: 2024-01-01 | End: 2024-01-01 | Stop reason: HOSPADM

## 2024-01-01 RX ORDER — HEPARIN SODIUM 1000 [USP'U]/ML
1-6 INJECTION, SOLUTION INTRAVENOUS; SUBCUTANEOUS
Status: COMPLETED | OUTPATIENT
Start: 2024-01-01 | End: 2024-01-01

## 2024-01-01 RX ORDER — DEXTROSE MONOHYDRATE 25 G/50ML
25-50 INJECTION, SOLUTION INTRAVENOUS
Status: DISCONTINUED | OUTPATIENT
Start: 2024-01-01 | End: 2024-01-01

## 2024-01-01 RX ORDER — MEROPENEM 1 G/1
1 INJECTION, POWDER, FOR SOLUTION INTRAVENOUS EVERY 8 HOURS
Status: DISCONTINUED | OUTPATIENT
Start: 2024-01-01 | End: 2024-01-01

## 2024-01-01 RX ORDER — ATROPINE SULFATE 0.1 MG/ML
INJECTION INTRAVENOUS
Status: COMPLETED
Start: 2024-01-01 | End: 2024-01-01

## 2024-01-01 RX ORDER — METOPROLOL SUCCINATE 25 MG/1
25 TABLET, EXTENDED RELEASE ORAL DAILY
Status: DISCONTINUED | OUTPATIENT
Start: 2024-01-01 | End: 2024-01-01

## 2024-01-01 RX ORDER — PROPOFOL 10 MG/ML
10-30 INJECTION, EMULSION INTRAVENOUS CONTINUOUS
Status: DISCONTINUED | OUTPATIENT
Start: 2024-01-01 | End: 2024-01-01 | Stop reason: HOSPADM

## 2024-01-01 RX ORDER — DIPHENHYDRAMINE HYDROCHLORIDE 50 MG/ML
50 INJECTION INTRAMUSCULAR; INTRAVENOUS
Status: DISCONTINUED | OUTPATIENT
Start: 2024-01-01 | End: 2024-01-01

## 2024-01-01 RX ORDER — ALBUTEROL SULFATE 5 MG/ML
2.5 SOLUTION RESPIRATORY (INHALATION)
Status: DISCONTINUED | OUTPATIENT
Start: 2024-01-01 | End: 2024-01-01 | Stop reason: DRUGHIGH

## 2024-01-01 RX ORDER — ONDANSETRON 4 MG/1
4 TABLET, ORALLY DISINTEGRATING ORAL EVERY 6 HOURS PRN
Status: DISCONTINUED | OUTPATIENT
Start: 2024-01-01 | End: 2024-01-01 | Stop reason: HOSPADM

## 2024-01-01 RX ORDER — FENTANYL CITRATE 50 UG/ML
INJECTION, SOLUTION INTRAMUSCULAR; INTRAVENOUS
Status: DISCONTINUED | OUTPATIENT
Start: 2024-01-01 | End: 2024-01-01 | Stop reason: HOSPADM

## 2024-01-01 RX ORDER — POTASSIUM CHLORIDE 20MEQ/15ML
20 LIQUID (ML) ORAL ONCE
Status: COMPLETED | OUTPATIENT
Start: 2024-01-01 | End: 2024-01-01

## 2024-01-01 RX ORDER — ATROPINE SULFATE 0.1 MG/ML
0.5 INJECTION INTRAVENOUS
Status: ACTIVE | OUTPATIENT
Start: 2024-01-01 | End: 2024-01-01

## 2024-01-01 RX ORDER — HYDROMORPHONE HCL IN WATER/PF 6 MG/30 ML
0.2 PATIENT CONTROLLED ANALGESIA SYRINGE INTRAVENOUS
Status: DISCONTINUED | OUTPATIENT
Start: 2024-01-01 | End: 2024-01-01

## 2024-01-01 RX ORDER — FUROSEMIDE 10 MG/ML
40 INJECTION INTRAMUSCULAR; INTRAVENOUS EVERY 8 HOURS
Status: DISCONTINUED | OUTPATIENT
Start: 2024-01-01 | End: 2024-01-01

## 2024-01-01 RX ORDER — FUROSEMIDE 10 MG/ML
40 INJECTION INTRAMUSCULAR; INTRAVENOUS ONCE
Status: COMPLETED | OUTPATIENT
Start: 2024-01-01 | End: 2024-01-01

## 2024-01-01 RX ORDER — LIDOCAINE HYDROCHLORIDE 40 MG/ML
INJECTION, SOLUTION RETROBULBAR PRN
Status: DISCONTINUED | OUTPATIENT
Start: 2024-01-01 | End: 2024-01-01 | Stop reason: HOSPADM

## 2024-01-01 RX ORDER — DEXAMETHASONE SODIUM PHOSPHATE 10 MG/ML
10 INJECTION, SOLUTION INTRAMUSCULAR; INTRAVENOUS EVERY 24 HOURS
Status: DISCONTINUED | OUTPATIENT
Start: 2024-04-07 | End: 2024-01-01

## 2024-01-01 RX ORDER — HEPARIN SODIUM 10000 [USP'U]/100ML
100-1000 INJECTION, SOLUTION INTRAVENOUS CONTINUOUS PRN
Status: DISCONTINUED | OUTPATIENT
Start: 2024-01-01 | End: 2024-01-01 | Stop reason: HOSPADM

## 2024-01-01 RX ORDER — ACETYLCYSTEINE 200 MG/ML
2 SOLUTION ORAL; RESPIRATORY (INHALATION) EVERY 6 HOURS
Status: DISCONTINUED | OUTPATIENT
Start: 2024-01-01 | End: 2024-01-01

## 2024-01-01 RX ORDER — FENTANYL CITRATE 50 UG/ML
INJECTION, SOLUTION INTRAMUSCULAR; INTRAVENOUS PRN
Status: DISCONTINUED | OUTPATIENT
Start: 2024-01-01 | End: 2024-01-01

## 2024-01-01 RX ORDER — LIDOCAINE HYDROCHLORIDE 10 MG/ML
INJECTION, SOLUTION INFILTRATION; PERINEURAL PRN
Status: DISCONTINUED | OUTPATIENT
Start: 2024-01-01 | End: 2024-01-01

## 2024-01-01 RX ORDER — CARBOXYMETHYLCELLULOSE SODIUM 5 MG/ML
1 SOLUTION/ DROPS OPHTHALMIC
Status: DISCONTINUED | OUTPATIENT
Start: 2024-01-01 | End: 2024-01-01

## 2024-01-01 RX ORDER — CALCIUM GLUCONATE 94 MG/ML
1 INJECTION, SOLUTION INTRAVENOUS
Status: DISCONTINUED | OUTPATIENT
Start: 2024-01-01 | End: 2024-01-01

## 2024-01-01 RX ORDER — HEPARIN SODIUM 1000 [USP'U]/ML
2-4 INJECTION, SOLUTION INTRAVENOUS; SUBCUTANEOUS
Status: COMPLETED | OUTPATIENT
Start: 2024-01-01 | End: 2024-01-01

## 2024-01-01 RX ORDER — SODIUM CHLORIDE 9 MG/ML
75 INJECTION, SOLUTION INTRAVENOUS CONTINUOUS
Status: ACTIVE | OUTPATIENT
Start: 2024-01-01 | End: 2024-01-01

## 2024-01-01 RX ORDER — HEPARIN SODIUM 10000 [USP'U]/100ML
0-5000 INJECTION, SOLUTION INTRAVENOUS CONTINUOUS
Status: DISCONTINUED | OUTPATIENT
Start: 2024-01-01 | End: 2024-01-01 | Stop reason: ALTCHOICE

## 2024-01-01 RX ORDER — GADOBUTROL 604.72 MG/ML
9 INJECTION INTRAVENOUS ONCE
Status: COMPLETED | OUTPATIENT
Start: 2024-01-01 | End: 2024-01-01

## 2024-01-01 RX ORDER — GADOBUTROL 604.72 MG/ML
10 INJECTION INTRAVENOUS ONCE
Status: COMPLETED | OUTPATIENT
Start: 2024-01-01 | End: 2024-01-01

## 2024-01-01 RX ORDER — LIDOCAINE 40 MG/G
CREAM TOPICAL
Status: DISCONTINUED | OUTPATIENT
Start: 2024-01-01 | End: 2024-01-01

## 2024-01-01 RX ORDER — ASPIRIN 81 MG/1
81 TABLET, CHEWABLE ORAL DAILY
Status: DISCONTINUED | OUTPATIENT
Start: 2024-01-01 | End: 2024-01-01

## 2024-01-01 RX ORDER — ONDANSETRON 2 MG/ML
4 INJECTION INTRAMUSCULAR; INTRAVENOUS EVERY 6 HOURS PRN
Status: DISCONTINUED | OUTPATIENT
Start: 2024-01-01 | End: 2024-01-01

## 2024-01-01 RX ORDER — METOPROLOL TARTRATE 1 MG/ML
5 INJECTION, SOLUTION INTRAVENOUS EVERY 5 MIN PRN
Status: DISCONTINUED | OUTPATIENT
Start: 2024-01-01 | End: 2024-01-01

## 2024-01-01 RX ORDER — LOPERAMIDE HCL 1 MG/7.5ML
2 SOLUTION ORAL 4 TIMES DAILY PRN
Status: DISCONTINUED | OUTPATIENT
Start: 2024-01-01 | End: 2024-01-01

## 2024-01-01 RX ORDER — PHENYLEPHRINE HCL IN 0.9% NACL 50MG/250ML
.1-6 PLASTIC BAG, INJECTION (ML) INTRAVENOUS CONTINUOUS
Status: DISCONTINUED | OUTPATIENT
Start: 2024-01-01 | End: 2024-01-01

## 2024-01-01 RX ORDER — CALCIUM GLUCONATE 20 MG/ML
1 INJECTION, SOLUTION INTRAVENOUS ONCE
Status: COMPLETED | OUTPATIENT
Start: 2024-01-01 | End: 2024-01-01

## 2024-01-01 RX ORDER — ATROPINE SULFATE 0.1 MG/ML
0.5 INJECTION INTRAVENOUS ONCE
Status: DISCONTINUED | OUTPATIENT
Start: 2024-01-01 | End: 2024-01-01

## 2024-01-01 RX ORDER — FUROSEMIDE 10 MG/ML
40 INJECTION INTRAMUSCULAR; INTRAVENOUS EVERY 12 HOURS
Status: DISCONTINUED | OUTPATIENT
Start: 2024-01-01 | End: 2024-01-01

## 2024-01-01 RX ORDER — DEXMEDETOMIDINE HYDROCHLORIDE 4 UG/ML
.1-1.2 INJECTION, SOLUTION INTRAVENOUS CONTINUOUS
Status: DISCONTINUED | OUTPATIENT
Start: 2024-01-01 | End: 2024-01-01

## 2024-01-01 RX ORDER — AMOXICILLIN 250 MG
2 CAPSULE ORAL 2 TIMES DAILY PRN
Status: DISCONTINUED | OUTPATIENT
Start: 2024-01-01 | End: 2024-01-01

## 2024-01-01 RX ORDER — POLYETHYLENE GLYCOL 3350 17 G/17G
17 POWDER, FOR SOLUTION ORAL 2 TIMES DAILY
Status: DISCONTINUED | OUTPATIENT
Start: 2024-01-01 | End: 2024-01-01

## 2024-01-01 RX ORDER — FLUMAZENIL 0.1 MG/ML
0.2 INJECTION, SOLUTION INTRAVENOUS
Status: ACTIVE | OUTPATIENT
Start: 2024-01-01 | End: 2024-01-01

## 2024-01-01 RX ORDER — BISACODYL 10 MG
10 SUPPOSITORY, RECTAL RECTAL DAILY PRN
Status: DISCONTINUED | OUTPATIENT
Start: 2024-01-01 | End: 2024-01-01

## 2024-01-01 RX ORDER — PIPERACILLIN SODIUM, TAZOBACTAM SODIUM 4; .5 G/20ML; G/20ML
4.5 INJECTION, POWDER, LYOPHILIZED, FOR SOLUTION INTRAVENOUS EVERY 6 HOURS
Status: DISCONTINUED | OUTPATIENT
Start: 2024-01-01 | End: 2024-01-01

## 2024-01-01 RX ORDER — ACETAMINOPHEN 325 MG/1
650 TABLET ORAL EVERY 8 HOURS PRN
Status: DISCONTINUED | OUTPATIENT
Start: 2024-01-01 | End: 2024-01-01

## 2024-01-01 RX ORDER — QUETIAPINE FUMARATE 50 MG/1
50 TABLET, FILM COATED ORAL 2 TIMES DAILY
Status: DISCONTINUED | OUTPATIENT
Start: 2024-01-01 | End: 2024-01-01

## 2024-01-01 RX ORDER — NALOXONE HYDROCHLORIDE 0.4 MG/ML
0.4 INJECTION, SOLUTION INTRAMUSCULAR; INTRAVENOUS; SUBCUTANEOUS
Status: DISCONTINUED | OUTPATIENT
Start: 2024-01-01 | End: 2024-01-01

## 2024-01-01 RX ORDER — NICOTINE POLACRILEX 4 MG
15-30 LOZENGE BUCCAL
Status: DISCONTINUED | OUTPATIENT
Start: 2024-01-01 | End: 2024-01-01

## 2024-01-01 RX ORDER — LORAZEPAM 0.5 MG/1
0.5 TABLET ORAL
Status: CANCELLED | OUTPATIENT
Start: 2024-01-01

## 2024-01-01 RX ORDER — DEXTROSE MONOHYDRATE 50 MG/ML
INJECTION, SOLUTION INTRAVENOUS CONTINUOUS
Status: DISCONTINUED | OUTPATIENT
Start: 2024-01-01 | End: 2024-01-01

## 2024-01-01 RX ORDER — OXYCODONE HCL 5 MG/5 ML
5 SOLUTION, ORAL ORAL EVERY 4 HOURS PRN
Status: DISCONTINUED | OUTPATIENT
Start: 2024-01-01 | End: 2024-01-01

## 2024-01-01 RX ORDER — FUROSEMIDE 10 MG/ML
40 INJECTION INTRAMUSCULAR; INTRAVENOUS EVERY 8 HOURS
Status: COMPLETED | OUTPATIENT
Start: 2024-01-01 | End: 2024-01-01

## 2024-01-01 RX ORDER — HYDROXYZINE HYDROCHLORIDE 25 MG/1
50 TABLET, FILM COATED ORAL EVERY 6 HOURS PRN
Status: DISCONTINUED | OUTPATIENT
Start: 2024-01-01 | End: 2024-01-01

## 2024-01-01 RX ORDER — POTASSIUM CHLORIDE 1500 MG/1
20 TABLET, EXTENDED RELEASE ORAL
Status: CANCELLED | OUTPATIENT
Start: 2024-01-01

## 2024-01-01 RX ORDER — DEXAMETHASONE SODIUM PHOSPHATE 4 MG/ML
10 INJECTION, SOLUTION INTRA-ARTICULAR; INTRALESIONAL; INTRAMUSCULAR; INTRAVENOUS; SOFT TISSUE EVERY 24 HOURS
Qty: 15 ML | Refills: 0 | Status: DISCONTINUED | OUTPATIENT
Start: 2024-04-07 | End: 2024-01-01

## 2024-01-01 RX ORDER — METOPROLOL TARTRATE 1 MG/ML
5-10 INJECTION, SOLUTION INTRAVENOUS EVERY 6 HOURS PRN
Status: DISCONTINUED | OUTPATIENT
Start: 2024-01-01 | End: 2024-01-01

## 2024-01-01 RX ORDER — NALOXONE HYDROCHLORIDE 0.4 MG/ML
0.2 INJECTION, SOLUTION INTRAMUSCULAR; INTRAVENOUS; SUBCUTANEOUS
Status: DISCONTINUED | OUTPATIENT
Start: 2024-01-01 | End: 2024-01-01

## 2024-01-01 RX ORDER — ALBUMIN HUMAN 25 %
3000 INTRAVENOUS SOLUTION INTRAVENOUS ONCE
Status: DISCONTINUED | OUTPATIENT
Start: 2024-01-01 | End: 2024-01-01

## 2024-01-01 RX ORDER — LOSARTAN POTASSIUM 50 MG/1
50 TABLET ORAL DAILY
Status: DISCONTINUED | OUTPATIENT
Start: 2024-01-01 | End: 2024-01-01

## 2024-01-01 RX ORDER — POLYETHYLENE GLYCOL 3350 17 G/17G
17 POWDER, FOR SOLUTION ORAL DAILY
Status: DISCONTINUED | OUTPATIENT
Start: 2024-01-01 | End: 2024-01-01

## 2024-01-01 RX ORDER — ALBUMIN HUMAN 25 %
3500 INTRAVENOUS SOLUTION INTRAVENOUS ONCE
Status: COMPLETED | OUTPATIENT
Start: 2024-01-01 | End: 2024-01-01

## 2024-01-01 RX ORDER — ATROPINE SULFATE 0.1 MG/ML
1 INJECTION INTRAVENOUS
Status: DISCONTINUED | OUTPATIENT
Start: 2024-01-01 | End: 2024-01-01

## 2024-01-01 RX ORDER — HYDROMORPHONE HCL IN WATER/PF 6 MG/30 ML
.2-.3 PATIENT CONTROLLED ANALGESIA SYRINGE INTRAVENOUS
Status: DISCONTINUED | OUTPATIENT
Start: 2024-01-01 | End: 2024-01-01

## 2024-01-01 RX ORDER — DIPHENHYDRAMINE HYDROCHLORIDE 50 MG/ML
25-50 INJECTION INTRAMUSCULAR; INTRAVENOUS EVERY 6 HOURS PRN
Status: DISCONTINUED | OUTPATIENT
Start: 2024-01-01 | End: 2024-01-01

## 2024-01-01 RX ORDER — POTASSIUM CHLORIDE 1.5 G/1.58G
40 POWDER, FOR SOLUTION ORAL ONCE
Status: DISCONTINUED | OUTPATIENT
Start: 2024-01-01 | End: 2024-01-01

## 2024-01-01 RX ORDER — PROPOFOL 10 MG/ML
5-75 INJECTION, EMULSION INTRAVENOUS CONTINUOUS
Status: DISCONTINUED | OUTPATIENT
Start: 2024-01-01 | End: 2024-01-01

## 2024-01-01 RX ORDER — SODIUM CHLORIDE 9 MG/ML
INJECTION, SOLUTION INTRAVENOUS CONTINUOUS
Status: CANCELLED | OUTPATIENT
Start: 2024-01-01

## 2024-01-01 RX ORDER — DEXMEDETOMIDINE HYDROCHLORIDE 4 UG/ML
.1-.7 INJECTION, SOLUTION INTRAVENOUS CONTINUOUS
Status: DISCONTINUED | OUTPATIENT
Start: 2024-01-01 | End: 2024-01-01

## 2024-01-01 RX ORDER — ASPIRIN 81 MG/1
243 TABLET, CHEWABLE ORAL ONCE
Status: CANCELLED | OUTPATIENT
Start: 2024-01-01

## 2024-01-01 RX ORDER — FUROSEMIDE 10 MG/ML
60 INJECTION INTRAMUSCULAR; INTRAVENOUS ONCE
Status: COMPLETED | OUTPATIENT
Start: 2024-01-01 | End: 2024-01-01

## 2024-01-01 RX ORDER — ENOXAPARIN SODIUM 100 MG/ML
0.75 INJECTION SUBCUTANEOUS EVERY 12 HOURS
Status: DISCONTINUED | OUTPATIENT
Start: 2024-01-01 | End: 2024-01-01

## 2024-01-01 RX ORDER — POLYETHYLENE GLYCOL 3350 17 G/17G
17 POWDER, FOR SOLUTION ORAL 2 TIMES DAILY PRN
Status: DISCONTINUED | OUTPATIENT
Start: 2024-01-01 | End: 2024-01-01

## 2024-01-01 RX ORDER — PROPOFOL 10 MG/ML
INJECTION, EMULSION INTRAVENOUS CONTINUOUS PRN
Status: DISCONTINUED | OUTPATIENT
Start: 2024-01-01 | End: 2024-01-01

## 2024-01-01 RX ORDER — QUETIAPINE FUMARATE 50 MG/1
50 TABLET, FILM COATED ORAL 2 TIMES DAILY PRN
Status: DISCONTINUED | OUTPATIENT
Start: 2024-01-01 | End: 2024-01-01

## 2024-01-01 RX ORDER — ASPIRIN 81 MG/1
81 TABLET ORAL DAILY
Status: DISCONTINUED | OUTPATIENT
Start: 2024-01-01 | End: 2024-01-01

## 2024-01-01 RX ORDER — HEPARIN SODIUM 10000 [USP'U]/100ML
0-5000 INJECTION, SOLUTION INTRAVENOUS CONTINUOUS
Status: DISCONTINUED | OUTPATIENT
Start: 2024-01-01 | End: 2024-01-01

## 2024-01-01 RX ORDER — SODIUM CHLORIDE 9 MG/ML
INJECTION, SOLUTION INTRAVENOUS CONTINUOUS
Status: DISCONTINUED | OUTPATIENT
Start: 2024-01-01 | End: 2024-01-01

## 2024-01-01 RX ORDER — PIPERACILLIN SODIUM, TAZOBACTAM SODIUM 4; .5 G/20ML; G/20ML
4.5 INJECTION, POWDER, LYOPHILIZED, FOR SOLUTION INTRAVENOUS ONCE
Status: COMPLETED | OUTPATIENT
Start: 2024-01-01 | End: 2024-01-01

## 2024-01-01 RX ORDER — POTASSIUM CHLORIDE 29.8 MG/ML
20 INJECTION INTRAVENOUS ONCE
Status: DISCONTINUED | OUTPATIENT
Start: 2024-01-01 | End: 2024-01-01 | Stop reason: ALTCHOICE

## 2024-01-01 RX ORDER — MAGNESIUM SULFATE HEPTAHYDRATE 40 MG/ML
4 INJECTION, SOLUTION INTRAVENOUS ONCE
Status: COMPLETED | OUTPATIENT
Start: 2024-01-01 | End: 2024-01-01

## 2024-01-01 RX ORDER — CEFTAZIDIME 2 G/1
2 INJECTION, POWDER, FOR SOLUTION INTRAVENOUS EVERY 8 HOURS
Status: DISCONTINUED | OUTPATIENT
Start: 2024-01-01 | End: 2024-01-01

## 2024-01-01 RX ORDER — IOPAMIDOL 755 MG/ML
INJECTION, SOLUTION INTRAVASCULAR
Status: DISCONTINUED | OUTPATIENT
Start: 2024-01-01 | End: 2024-01-01 | Stop reason: HOSPADM

## 2024-01-01 RX ORDER — LIDOCAINE HYDROCHLORIDE 20 MG/ML
JELLY TOPICAL
Status: COMPLETED
Start: 2024-01-01 | End: 2024-01-01

## 2024-01-01 RX ORDER — ASPIRIN 81 MG/1
81 TABLET ORAL DAILY
COMMUNITY

## 2024-01-01 RX ORDER — GLYCOPYRROLATE 0.2 MG/ML
0.2 INJECTION, SOLUTION INTRAMUSCULAR; INTRAVENOUS ONCE
Status: COMPLETED | OUTPATIENT
Start: 2024-01-01 | End: 2024-01-01

## 2024-01-01 RX ORDER — ONDANSETRON 2 MG/ML
4 INJECTION INTRAMUSCULAR; INTRAVENOUS EVERY 6 HOURS PRN
Status: DISCONTINUED | OUTPATIENT
Start: 2024-01-01 | End: 2024-01-01 | Stop reason: HOSPADM

## 2024-01-01 RX ORDER — ATROPINE SULFATE 1 MG/ML
0.5 INJECTION, SOLUTION INTRAVENOUS ONCE
Status: DISCONTINUED | OUTPATIENT
Start: 2024-01-01 | End: 2024-01-01

## 2024-01-01 RX ORDER — FUROSEMIDE 10 MG/ML
40 INJECTION INTRAMUSCULAR; INTRAVENOUS ONCE
Qty: 4 ML | Refills: 0 | Status: COMPLETED | OUTPATIENT
Start: 2024-01-01 | End: 2024-01-01

## 2024-01-01 RX ORDER — PIPERACILLIN SODIUM, TAZOBACTAM SODIUM 3; .375 G/15ML; G/15ML
3.38 INJECTION, POWDER, LYOPHILIZED, FOR SOLUTION INTRAVENOUS EVERY 8 HOURS
Status: DISCONTINUED | OUTPATIENT
Start: 2024-01-01 | End: 2024-01-01

## 2024-01-01 RX ORDER — DIPHENHYDRAMINE HYDROCHLORIDE 50 MG/ML
50 INJECTION INTRAMUSCULAR; INTRAVENOUS ONCE
Status: COMPLETED | OUTPATIENT
Start: 2024-01-01 | End: 2024-01-01

## 2024-01-01 RX ORDER — HEPARIN SODIUM 1000 [USP'U]/ML
1-5 INJECTION, SOLUTION INTRAVENOUS; SUBCUTANEOUS
Status: DISCONTINUED | OUTPATIENT
Start: 2024-01-01 | End: 2024-01-01

## 2024-01-01 RX ORDER — HEPARIN SODIUM 5000 [USP'U]/.5ML
5000 INJECTION, SOLUTION INTRAVENOUS; SUBCUTANEOUS EVERY 8 HOURS
Status: DISCONTINUED | OUTPATIENT
Start: 2024-01-01 | End: 2024-01-01

## 2024-01-01 RX ORDER — FUROSEMIDE 10 MG/ML
20 INJECTION INTRAMUSCULAR; INTRAVENOUS EVERY 12 HOURS
Status: COMPLETED | OUTPATIENT
Start: 2024-01-01 | End: 2024-01-01

## 2024-01-01 RX ORDER — METOPROLOL SUCCINATE 50 MG/1
50 TABLET, EXTENDED RELEASE ORAL DAILY
Status: DISCONTINUED | OUTPATIENT
Start: 2024-01-01 | End: 2024-01-01

## 2024-01-01 RX ORDER — DEXTROSE MONOHYDRATE 100 MG/ML
INJECTION, SOLUTION INTRAVENOUS CONTINUOUS PRN
Status: DISCONTINUED | OUTPATIENT
Start: 2024-01-01 | End: 2024-01-01

## 2024-01-01 RX ORDER — DEXAMETHASONE SODIUM PHOSPHATE 4 MG/ML
20 INJECTION, SOLUTION INTRA-ARTICULAR; INTRALESIONAL; INTRAMUSCULAR; INTRAVENOUS; SOFT TISSUE EVERY 24 HOURS
Qty: 25 ML | Refills: 0 | Status: DISCONTINUED | OUTPATIENT
Start: 2024-01-01 | End: 2024-01-01

## 2024-01-01 RX ORDER — LIDOCAINE 40 MG/G
CREAM TOPICAL
Status: DISCONTINUED | OUTPATIENT
Start: 2024-01-01 | End: 2024-01-01 | Stop reason: HOSPADM

## 2024-01-01 RX ORDER — ASPIRIN 81 MG/1
324 TABLET, CHEWABLE ORAL ONCE
Status: COMPLETED | OUTPATIENT
Start: 2024-01-01 | End: 2024-01-01

## 2024-01-01 RX ORDER — ALBUMIN HUMAN 25 %
4500 INTRAVENOUS SOLUTION INTRAVENOUS ONCE
Status: COMPLETED | OUTPATIENT
Start: 2024-01-01 | End: 2024-01-01

## 2024-01-01 RX ORDER — DILTIAZEM HYDROCHLORIDE 5 MG/ML
5 INJECTION INTRAVENOUS EVERY 5 MIN PRN
Status: DISCONTINUED | OUTPATIENT
Start: 2024-01-01 | End: 2024-01-01

## 2024-01-01 RX ORDER — FENTANYL CITRATE 50 UG/ML
25 INJECTION, SOLUTION INTRAMUSCULAR; INTRAVENOUS
Status: DISCONTINUED | OUTPATIENT
Start: 2024-01-01 | End: 2024-01-01

## 2024-01-01 RX ORDER — HEPARIN SODIUM 1000 [USP'U]/ML
2000-6000 INJECTION, SOLUTION INTRAVENOUS; SUBCUTANEOUS
Status: DISCONTINUED | OUTPATIENT
Start: 2024-01-01 | End: 2024-01-01

## 2024-01-01 RX ORDER — LIDOCAINE HYDROCHLORIDE 10 MG/ML
INJECTION, SOLUTION INFILTRATION; PERINEURAL PRN
Status: DISCONTINUED | OUTPATIENT
Start: 2024-01-01 | End: 2024-01-01 | Stop reason: HOSPADM

## 2024-01-01 RX ORDER — ATROPINE SULFATE 0.1 MG/ML
0.5 INJECTION INTRAVENOUS ONCE
Status: COMPLETED | OUTPATIENT
Start: 2024-01-01 | End: 2024-01-01

## 2024-01-01 RX ORDER — IOPAMIDOL 755 MG/ML
67 INJECTION, SOLUTION INTRAVASCULAR ONCE
Status: COMPLETED | OUTPATIENT
Start: 2024-01-01 | End: 2024-01-01

## 2024-01-01 RX ADMIN — TRAMADOL HYDROCHLORIDE 50 MG: 50 TABLET, COATED ORAL at 22:36

## 2024-01-01 RX ADMIN — POTASSIUM & SODIUM PHOSPHATES POWDER PACK 280-160-250 MG 1 PACKET: 280-160-250 PACK at 15:52

## 2024-01-01 RX ADMIN — Medication 50 MCG: at 06:31

## 2024-01-01 RX ADMIN — PIPERACILLIN AND TAZOBACTAM 4.5 G: 4; .5 INJECTION, POWDER, FOR SOLUTION INTRAVENOUS at 20:09

## 2024-01-01 RX ADMIN — SODIUM PHOSPHATE, MONOBASIC, MONOHYDRATE AND SODIUM PHOSPHATE, DIBASIC, ANHYDROUS 15 MMOL: 142; 276 INJECTION, SOLUTION INTRAVENOUS at 09:32

## 2024-01-01 RX ADMIN — PROPOFOL 30 MCG/KG/MIN: 10 INJECTION, EMULSION INTRAVENOUS at 21:17

## 2024-01-01 RX ADMIN — ALBUTEROL SULFATE 2.5 MG: 2.5 SOLUTION RESPIRATORY (INHALATION) at 07:15

## 2024-01-01 RX ADMIN — SODIUM CHLORIDE, POTASSIUM CHLORIDE, SODIUM LACTATE AND CALCIUM CHLORIDE 250 ML: 600; 310; 30; 20 INJECTION, SOLUTION INTRAVENOUS at 18:56

## 2024-01-01 RX ADMIN — SODIUM PHOSPHATE, MONOBASIC, MONOHYDRATE AND SODIUM PHOSPHATE, DIBASIC, ANHYDROUS 15 MMOL: 142; 276 INJECTION, SOLUTION INTRAVENOUS at 08:40

## 2024-01-01 RX ADMIN — HEPARIN SODIUM 5000 UNITS: 5000 INJECTION, SOLUTION INTRAVENOUS; SUBCUTANEOUS at 18:19

## 2024-01-01 RX ADMIN — MEROPENEM 1 G: 1 INJECTION, POWDER, FOR SOLUTION INTRAVENOUS at 05:49

## 2024-01-01 RX ADMIN — AMIODARONE HYDROCHLORIDE 200 MG: 200 TABLET ORAL at 08:58

## 2024-01-01 RX ADMIN — ALBUTEROL SULFATE 2.5 MG: 2.5 SOLUTION RESPIRATORY (INHALATION) at 02:16

## 2024-01-01 RX ADMIN — POTASSIUM CHLORIDE 40 MEQ: 20 SOLUTION ORAL at 16:04

## 2024-01-01 RX ADMIN — EPOPROSTENOL 20 NG/KG/MIN: 1.5 INJECTION, POWDER, LYOPHILIZED, FOR SOLUTION INTRAVENOUS at 01:36

## 2024-01-01 RX ADMIN — METOPROLOL TARTRATE 12.5 MG: 25 TABLET, FILM COATED ORAL at 08:40

## 2024-01-01 RX ADMIN — MIDAZOLAM 2 MG: 1 INJECTION INTRAMUSCULAR; INTRAVENOUS at 00:29

## 2024-01-01 RX ADMIN — GLYCOPYRROLATE 0.2 MG: 0.2 INJECTION, SOLUTION INTRAMUSCULAR; INTRAVENOUS at 14:12

## 2024-01-01 RX ADMIN — OXYCODONE HYDROCHLORIDE 5 MG: 5 SOLUTION ORAL at 16:34

## 2024-01-01 RX ADMIN — ACETAMINOPHEN 650 MG: 325 TABLET, FILM COATED ORAL at 13:55

## 2024-01-01 RX ADMIN — HYDROMORPHONE HYDROCHLORIDE 0.2 MG: 0.2 INJECTION, SOLUTION INTRAMUSCULAR; INTRAVENOUS; SUBCUTANEOUS at 18:49

## 2024-01-01 RX ADMIN — HYDROMORPHONE HYDROCHLORIDE 0.2 MG: 0.2 INJECTION, SOLUTION INTRAMUSCULAR; INTRAVENOUS; SUBCUTANEOUS at 11:55

## 2024-01-01 RX ADMIN — ALBUTEROL SULFATE 2.5 MG: 2.5 SOLUTION RESPIRATORY (INHALATION) at 13:16

## 2024-01-01 RX ADMIN — ALBUTEROL SULFATE 2.5 MG: 2.5 SOLUTION RESPIRATORY (INHALATION) at 20:34

## 2024-01-01 RX ADMIN — MIDAZOLAM 1 MG: 1 INJECTION INTRAMUSCULAR; INTRAVENOUS at 13:30

## 2024-01-01 RX ADMIN — Medication 1 MG: at 23:06

## 2024-01-01 RX ADMIN — TRAMADOL HYDROCHLORIDE 50 MG: 50 TABLET, COATED ORAL at 05:17

## 2024-01-01 RX ADMIN — ALBUTEROL SULFATE 2.5 MG: 2.5 SOLUTION RESPIRATORY (INHALATION) at 12:00

## 2024-01-01 RX ADMIN — ENOXAPARIN SODIUM 80 MG: 80 INJECTION SUBCUTANEOUS at 23:53

## 2024-01-01 RX ADMIN — CHLORHEXIDINE GLUCONATE 0.12% ORAL RINSE 15 ML: 1.2 LIQUID ORAL at 19:24

## 2024-01-01 RX ADMIN — MEROPENEM 1 G: 1 INJECTION, POWDER, FOR SOLUTION INTRAVENOUS at 21:54

## 2024-01-01 RX ADMIN — CHLORHEXIDINE GLUCONATE 0.12% ORAL RINSE 15 ML: 1.2 LIQUID ORAL at 08:22

## 2024-01-01 RX ADMIN — PROPOFOL 20 MCG/KG/MIN: 10 INJECTION, EMULSION INTRAVENOUS at 11:23

## 2024-01-01 RX ADMIN — TRAMADOL HYDROCHLORIDE 50 MG: 50 TABLET, COATED ORAL at 10:50

## 2024-01-01 RX ADMIN — EPOPROSTENOL 20 NG/KG/MIN: 1.5 INJECTION, POWDER, LYOPHILIZED, FOR SOLUTION INTRAVENOUS at 08:37

## 2024-01-01 RX ADMIN — AMIODARONE HYDROCHLORIDE 200 MG: 200 TABLET ORAL at 08:02

## 2024-01-01 RX ADMIN — ACETYLCYSTEINE 2 ML: 200 SOLUTION ORAL; RESPIRATORY (INHALATION) at 07:29

## 2024-01-01 RX ADMIN — ACETAMINOPHEN 650 MG: 325 SUSPENSION ORAL at 07:48

## 2024-01-01 RX ADMIN — POTASSIUM CHLORIDE 40 MEQ: 1.5 POWDER, FOR SOLUTION ORAL at 08:24

## 2024-01-01 RX ADMIN — Medication 50 MCG: at 14:32

## 2024-01-01 RX ADMIN — QUETIAPINE FUMARATE 50 MG: 50 TABLET ORAL at 23:06

## 2024-01-01 RX ADMIN — MEROPENEM 1 G: 1 INJECTION, POWDER, FOR SOLUTION INTRAVENOUS at 06:02

## 2024-01-01 RX ADMIN — FUROSEMIDE 40 MG: 10 INJECTION, SOLUTION INTRAVENOUS at 03:59

## 2024-01-01 RX ADMIN — PROPOFOL 25 MCG/KG/MIN: 10 INJECTION, EMULSION INTRAVENOUS at 17:55

## 2024-01-01 RX ADMIN — ALBUTEROL SULFATE 2.5 MG: 2.5 SOLUTION RESPIRATORY (INHALATION) at 07:23

## 2024-01-01 RX ADMIN — EPOPROSTENOL 20 NG/KG/MIN: 1.5 INJECTION, POWDER, LYOPHILIZED, FOR SOLUTION INTRAVENOUS at 11:59

## 2024-01-01 RX ADMIN — HYDROCORTISONE SODIUM SUCCINATE 50 MG: 100 INJECTION, POWDER, FOR SOLUTION INTRAMUSCULAR; INTRAVENOUS at 07:48

## 2024-01-01 RX ADMIN — ALBUMIN HUMAN 25 G: 0.05 INJECTION, SOLUTION INTRAVENOUS at 14:59

## 2024-01-01 RX ADMIN — ACETYLCYSTEINE 2 ML: 200 SOLUTION ORAL; RESPIRATORY (INHALATION) at 00:01

## 2024-01-01 RX ADMIN — Medication 0.3 MG/HR: at 18:15

## 2024-01-01 RX ADMIN — ENOXAPARIN SODIUM 80 MG: 80 INJECTION SUBCUTANEOUS at 20:34

## 2024-01-01 RX ADMIN — OXYCODONE HYDROCHLORIDE 10 MG: 5 TABLET ORAL at 17:46

## 2024-01-01 RX ADMIN — ALBUTEROL SULFATE 2.5 MG: 2.5 SOLUTION RESPIRATORY (INHALATION) at 13:38

## 2024-01-01 RX ADMIN — CHLORHEXIDINE GLUCONATE 0.12% ORAL RINSE 15 ML: 1.2 LIQUID ORAL at 19:49

## 2024-01-01 RX ADMIN — POTASSIUM CHLORIDE 20 MEQ: 29.8 INJECTION, SOLUTION INTRAVENOUS at 00:56

## 2024-01-01 RX ADMIN — FUROSEMIDE 20 MG: 10 INJECTION, SOLUTION INTRAMUSCULAR; INTRAVENOUS at 08:55

## 2024-01-01 RX ADMIN — SODIUM CHLORIDE: 9 INJECTION, SOLUTION INTRAVENOUS at 05:02

## 2024-01-01 RX ADMIN — DEXMEDETOMIDINE HYDROCHLORIDE 0.2 MCG/KG/HR: 400 INJECTION INTRAVENOUS at 10:22

## 2024-01-01 RX ADMIN — HYDROMORPHONE HYDROCHLORIDE 0.2 MG: 0.2 INJECTION, SOLUTION INTRAMUSCULAR; INTRAVENOUS; SUBCUTANEOUS at 06:14

## 2024-01-01 RX ADMIN — Medication 50 MCG: at 20:47

## 2024-01-01 RX ADMIN — QUETIAPINE FUMARATE 50 MG: 50 TABLET ORAL at 21:53

## 2024-01-01 RX ADMIN — ACETYLCYSTEINE 2 ML: 200 SOLUTION ORAL; RESPIRATORY (INHALATION) at 19:31

## 2024-01-01 RX ADMIN — Medication 40 MG: at 07:48

## 2024-01-01 RX ADMIN — ACETYLCYSTEINE 2 ML: 200 SOLUTION ORAL; RESPIRATORY (INHALATION) at 13:16

## 2024-01-01 RX ADMIN — CEFTAZIDIME 2 G: 2 INJECTION, POWDER, FOR SOLUTION INTRAVENOUS at 16:27

## 2024-01-01 RX ADMIN — PIPERACILLIN AND TAZOBACTAM 4.5 G: 4; .5 INJECTION, POWDER, FOR SOLUTION INTRAVENOUS at 11:59

## 2024-01-01 RX ADMIN — MEROPENEM 1 G: 1 INJECTION, POWDER, FOR SOLUTION INTRAVENOUS at 05:17

## 2024-01-01 RX ADMIN — QUETIAPINE FUMARATE 50 MG: 50 TABLET ORAL at 08:00

## 2024-01-01 RX ADMIN — PIPERACILLIN AND TAZOBACTAM 4.5 G: 4; .5 INJECTION, POWDER, FOR SOLUTION INTRAVENOUS at 12:36

## 2024-01-01 RX ADMIN — EPOPROSTENOL 20 NG/KG/MIN: 1.5 INJECTION, POWDER, LYOPHILIZED, FOR SOLUTION INTRAVENOUS at 09:20

## 2024-01-01 RX ADMIN — FUROSEMIDE 10 MG/HR: 10 INJECTION, SOLUTION INTRAMUSCULAR; INTRAVENOUS at 12:35

## 2024-01-01 RX ADMIN — OXYCODONE HYDROCHLORIDE 10 MG: 5 TABLET ORAL at 12:33

## 2024-01-01 RX ADMIN — AMIODARONE HYDROCHLORIDE 150 MG: 1.5 INJECTION, SOLUTION INTRAVENOUS at 23:25

## 2024-01-01 RX ADMIN — FUROSEMIDE 40 MG: 10 INJECTION, SOLUTION INTRAMUSCULAR; INTRAVENOUS at 00:54

## 2024-01-01 RX ADMIN — ANTICOAGULANT CITRATE DEXTROSE SOLUTION FORMULA A 1000 ML: 12.25; 11; 3.65 SOLUTION INTRAVENOUS at 15:30

## 2024-01-01 RX ADMIN — MAGNESIUM SULFATE IN WATER FOR 4 G: 40 INJECTION INTRAVENOUS at 05:47

## 2024-01-01 RX ADMIN — CHLORHEXIDINE GLUCONATE 0.12% ORAL RINSE 15 ML: 1.2 LIQUID ORAL at 20:11

## 2024-01-01 RX ADMIN — HEPARIN SODIUM 1800 UNITS/HR: 10000 INJECTION, SOLUTION INTRAVENOUS at 20:19

## 2024-01-01 RX ADMIN — Medication 40 MG: at 08:08

## 2024-01-01 RX ADMIN — Medication 40 MG: at 08:53

## 2024-01-01 RX ADMIN — ACETYLCYSTEINE 2 ML: 200 SOLUTION ORAL; RESPIRATORY (INHALATION) at 07:28

## 2024-01-01 RX ADMIN — QUETIAPINE FUMARATE 50 MG: 50 TABLET ORAL at 08:40

## 2024-01-01 RX ADMIN — HYDROCORTISONE SODIUM SUCCINATE 50 MG: 100 INJECTION, POWDER, FOR SOLUTION INTRAMUSCULAR; INTRAVENOUS at 21:12

## 2024-01-01 RX ADMIN — SODIUM PHOSPHATE, MONOBASIC, MONOHYDRATE AND SODIUM PHOSPHATE, DIBASIC, ANHYDROUS 15 MMOL: 142; 276 INJECTION, SOLUTION INTRAVENOUS at 07:05

## 2024-01-01 RX ADMIN — ACETYLCYSTEINE 2 ML: 200 SOLUTION ORAL; RESPIRATORY (INHALATION) at 07:18

## 2024-01-01 RX ADMIN — TRAMADOL HYDROCHLORIDE 50 MG: 50 TABLET, COATED ORAL at 16:38

## 2024-01-01 RX ADMIN — MEROPENEM 1 G: 1 INJECTION, POWDER, FOR SOLUTION INTRAVENOUS at 15:44

## 2024-01-01 RX ADMIN — ALBUMIN HUMAN 3500 ML: 0.05 INJECTION, SOLUTION INTRAVENOUS at 14:54

## 2024-01-01 RX ADMIN — EPOPROSTENOL 20 NG/KG/MIN: 1.5 INJECTION, POWDER, LYOPHILIZED, FOR SOLUTION INTRAVENOUS at 19:26

## 2024-01-01 RX ADMIN — ALBUTEROL SULFATE 2.5 MG: 2.5 SOLUTION RESPIRATORY (INHALATION) at 00:23

## 2024-01-01 RX ADMIN — NOREPINEPHRINE BITARTRATE 0.03 MCG/KG/MIN: 0.06 INJECTION, SOLUTION INTRAVENOUS at 01:38

## 2024-01-01 RX ADMIN — MICONAZOLE NITRATE: 2 POWDER TOPICAL at 08:41

## 2024-01-01 RX ADMIN — METOPROLOL TARTRATE 5 MG: 5 INJECTION INTRAVENOUS at 00:31

## 2024-01-01 RX ADMIN — POTASSIUM & SODIUM PHOSPHATES POWDER PACK 280-160-250 MG 1 PACKET: 280-160-250 PACK at 12:22

## 2024-01-01 RX ADMIN — HEPARIN SODIUM 5000 UNITS: 5000 INJECTION, SOLUTION INTRAVENOUS; SUBCUTANEOUS at 02:13

## 2024-01-01 RX ADMIN — ALBUTEROL SULFATE 2.5 MG: 2.5 SOLUTION RESPIRATORY (INHALATION) at 07:33

## 2024-01-01 RX ADMIN — TRAMADOL HYDROCHLORIDE 50 MG: 50 TABLET, COATED ORAL at 23:03

## 2024-01-01 RX ADMIN — CHLORHEXIDINE GLUCONATE 0.12% ORAL RINSE 15 ML: 1.2 LIQUID ORAL at 20:09

## 2024-01-01 RX ADMIN — PIPERACILLIN AND TAZOBACTAM 4.5 G: 4; .5 INJECTION, POWDER, FOR SOLUTION INTRAVENOUS at 12:07

## 2024-01-01 RX ADMIN — OXYCODONE HYDROCHLORIDE 10 MG: 5 TABLET ORAL at 16:31

## 2024-01-01 RX ADMIN — ALBUTEROL SULFATE 2.5 MG: 2.5 SOLUTION RESPIRATORY (INHALATION) at 20:47

## 2024-01-01 RX ADMIN — SODIUM PHOSPHATE, MONOBASIC, MONOHYDRATE AND SODIUM PHOSPHATE, DIBASIC, ANHYDROUS 15 MMOL: 142; 276 INJECTION, SOLUTION INTRAVENOUS at 06:29

## 2024-01-01 RX ADMIN — ACETYLCYSTEINE 2 ML: 200 SOLUTION ORAL; RESPIRATORY (INHALATION) at 06:59

## 2024-01-01 RX ADMIN — OXYCODONE HYDROCHLORIDE 10 MG: 5 TABLET ORAL at 15:14

## 2024-01-01 RX ADMIN — HEPARIN SODIUM 5000 UNITS: 5000 INJECTION, SOLUTION INTRAVENOUS; SUBCUTANEOUS at 18:35

## 2024-01-01 RX ADMIN — ACETAMINOPHEN 650 MG: 325 TABLET, FILM COATED ORAL at 06:01

## 2024-01-01 RX ADMIN — CHLORHEXIDINE GLUCONATE 0.12% ORAL RINSE 15 ML: 1.2 LIQUID ORAL at 08:27

## 2024-01-01 RX ADMIN — EPOPROSTENOL 20 NG/KG/MIN: 1.5 INJECTION, POWDER, LYOPHILIZED, FOR SOLUTION INTRAVENOUS at 15:54

## 2024-01-01 RX ADMIN — VANCOMYCIN HYDROCHLORIDE 1250 MG: 10 INJECTION, POWDER, LYOPHILIZED, FOR SOLUTION INTRAVENOUS at 11:02

## 2024-01-01 RX ADMIN — ALBUTEROL SULFATE 2.5 MG: 2.5 SOLUTION RESPIRATORY (INHALATION) at 06:51

## 2024-01-01 RX ADMIN — ALBUTEROL SULFATE 2.5 MG: 2.5 SOLUTION RESPIRATORY (INHALATION) at 07:28

## 2024-01-01 RX ADMIN — ALBUTEROL SULFATE 2.5 MG: 2.5 SOLUTION RESPIRATORY (INHALATION) at 01:00

## 2024-01-01 RX ADMIN — POTASSIUM & SODIUM PHOSPHATES POWDER PACK 280-160-250 MG 2 PACKET: 280-160-250 PACK at 13:31

## 2024-01-01 RX ADMIN — HYDROCORTISONE SODIUM SUCCINATE 50 MG: 100 INJECTION, POWDER, FOR SOLUTION INTRAMUSCULAR; INTRAVENOUS at 07:43

## 2024-01-01 RX ADMIN — ATROPINE SULFATE 0.5 MG: 0.1 INJECTION INTRAVENOUS at 01:55

## 2024-01-01 RX ADMIN — ACETYLCYSTEINE 2 ML: 200 SOLUTION ORAL; RESPIRATORY (INHALATION) at 08:11

## 2024-01-01 RX ADMIN — ALBUMIN HUMAN 3250 ML: 0.05 INJECTION, SOLUTION INTRAVENOUS at 15:27

## 2024-01-01 RX ADMIN — ACETYLCYSTEINE 2 ML: 200 SOLUTION ORAL; RESPIRATORY (INHALATION) at 18:42

## 2024-01-01 RX ADMIN — Medication 40 MG: at 08:27

## 2024-01-01 RX ADMIN — ACETYLCYSTEINE 2 ML: 200 SOLUTION ORAL; RESPIRATORY (INHALATION) at 18:53

## 2024-01-01 RX ADMIN — DEXMEDETOMIDINE HYDROCHLORIDE 0.5 MCG/KG/HR: 400 INJECTION INTRAVENOUS at 20:56

## 2024-01-01 RX ADMIN — ACETAMINOPHEN 650 MG: 325 TABLET, FILM COATED ORAL at 19:37

## 2024-01-01 RX ADMIN — POTASSIUM CHLORIDE 20 MEQ: 1.5 POWDER, FOR SOLUTION ORAL at 10:18

## 2024-01-01 RX ADMIN — PROPOFOL 5 MCG/KG/MIN: 10 INJECTION, EMULSION INTRAVENOUS at 06:50

## 2024-01-01 RX ADMIN — CHLORHEXIDINE GLUCONATE 0.12% ORAL RINSE 15 ML: 1.2 LIQUID ORAL at 20:28

## 2024-01-01 RX ADMIN — PIPERACILLIN AND TAZOBACTAM 4.5 G: 4; .5 INJECTION, POWDER, FOR SOLUTION INTRAVENOUS at 05:19

## 2024-01-01 RX ADMIN — Medication 0.6 MG/HR: at 02:41

## 2024-01-01 RX ADMIN — VANCOMYCIN HYDROCHLORIDE 1750 MG: 10 INJECTION, POWDER, LYOPHILIZED, FOR SOLUTION INTRAVENOUS at 09:51

## 2024-01-01 RX ADMIN — AMIODARONE HYDROCHLORIDE 200 MG: 200 TABLET ORAL at 09:33

## 2024-01-01 RX ADMIN — CALCIUM GLUCONATE 1 G: 20 INJECTION, SOLUTION INTRAVENOUS at 18:09

## 2024-01-01 RX ADMIN — FUROSEMIDE 10 MG/HR: 10 INJECTION, SOLUTION INTRAMUSCULAR; INTRAVENOUS at 03:53

## 2024-01-01 RX ADMIN — PANTOPRAZOLE SODIUM 40 MG: 40 INJECTION, POWDER, FOR SOLUTION INTRAVENOUS at 08:00

## 2024-01-01 RX ADMIN — TRAMADOL HYDROCHLORIDE 50 MG: 50 TABLET, COATED ORAL at 08:20

## 2024-01-01 RX ADMIN — CEFTAZIDIME 2 G: 2 INJECTION, POWDER, FOR SOLUTION INTRAVENOUS at 18:34

## 2024-01-01 RX ADMIN — DEXMEDETOMIDINE HYDROCHLORIDE 0.4 MCG/KG/HR: 400 INJECTION INTRAVENOUS at 12:46

## 2024-01-01 RX ADMIN — DIPHENHYDRAMINE HYDROCHLORIDE 50 MG: 50 INJECTION, SOLUTION INTRAMUSCULAR; INTRAVENOUS at 12:40

## 2024-01-01 RX ADMIN — ENOXAPARIN SODIUM 80 MG: 80 INJECTION SUBCUTANEOUS at 20:27

## 2024-01-01 RX ADMIN — ACETAMINOPHEN 650 MG: 325 SUSPENSION ORAL at 16:34

## 2024-01-01 RX ADMIN — HYDROMORPHONE HYDROCHLORIDE 0.3 MG: 0.2 INJECTION, SOLUTION INTRAMUSCULAR; INTRAVENOUS; SUBCUTANEOUS at 17:30

## 2024-01-01 RX ADMIN — PIPERACILLIN AND TAZOBACTAM 4.5 G: 4; .5 INJECTION, POWDER, FOR SOLUTION INTRAVENOUS at 07:56

## 2024-01-01 RX ADMIN — MICONAZOLE NITRATE: 2 POWDER TOPICAL at 20:09

## 2024-01-01 RX ADMIN — ENOXAPARIN SODIUM 80 MG: 80 INJECTION SUBCUTANEOUS at 20:02

## 2024-01-01 RX ADMIN — MAGNESIUM SULFATE HEPTAHYDRATE 2 G: 40 INJECTION, SOLUTION INTRAVENOUS at 15:46

## 2024-01-01 RX ADMIN — Medication 40 MG: at 07:41

## 2024-01-01 RX ADMIN — CHLORHEXIDINE GLUCONATE 0.12% ORAL RINSE 15 ML: 1.2 LIQUID ORAL at 07:45

## 2024-01-01 RX ADMIN — ROCURONIUM BROMIDE 70 MG: 10 INJECTION, SOLUTION INTRAVENOUS at 06:39

## 2024-01-01 RX ADMIN — IOPAMIDOL 67 ML: 755 INJECTION, SOLUTION INTRAVENOUS at 12:37

## 2024-01-01 RX ADMIN — SENNOSIDES 8.6 MG: 8.6 TABLET, FILM COATED ORAL at 20:39

## 2024-01-01 RX ADMIN — Medication 40 MG: at 09:13

## 2024-01-01 RX ADMIN — Medication 50 MCG/HR: at 06:57

## 2024-01-01 RX ADMIN — MICONAZOLE NITRATE: 2 POWDER TOPICAL at 11:59

## 2024-01-01 RX ADMIN — ACETAMINOPHEN 650 MG: 325 TABLET, FILM COATED ORAL at 13:20

## 2024-01-01 RX ADMIN — VANCOMYCIN HYDROCHLORIDE 1250 MG: 10 INJECTION, POWDER, LYOPHILIZED, FOR SOLUTION INTRAVENOUS at 12:00

## 2024-01-01 RX ADMIN — Medication 0.5 MG/HR: at 02:17

## 2024-01-01 RX ADMIN — ALBUTEROL SULFATE 2.5 MG: 2.5 SOLUTION RESPIRATORY (INHALATION) at 12:23

## 2024-01-01 RX ADMIN — ROCURONIUM BROMIDE 60 MG: 10 INJECTION, SOLUTION INTRAVENOUS at 10:20

## 2024-01-01 RX ADMIN — OXYCODONE HYDROCHLORIDE 10 MG: 5 TABLET ORAL at 02:45

## 2024-01-01 RX ADMIN — MEROPENEM 1 G: 1 INJECTION, POWDER, FOR SOLUTION INTRAVENOUS at 05:54

## 2024-01-01 RX ADMIN — MICONAZOLE NITRATE: 2 POWDER TOPICAL at 21:26

## 2024-01-01 RX ADMIN — PROPOFOL 25 MCG/KG/MIN: 10 INJECTION, EMULSION INTRAVENOUS at 00:46

## 2024-01-01 RX ADMIN — ACETAMINOPHEN 650 MG: 325 TABLET, FILM COATED ORAL at 20:15

## 2024-01-01 RX ADMIN — ALBUTEROL SULFATE 2.5 MG: 2.5 SOLUTION RESPIRATORY (INHALATION) at 07:03

## 2024-01-01 RX ADMIN — EPOPROSTENOL 20 NG/KG/MIN: 1.5 INJECTION, POWDER, LYOPHILIZED, FOR SOLUTION INTRAVENOUS at 02:11

## 2024-01-01 RX ADMIN — AMIODARONE HYDROCHLORIDE 200 MG: 200 TABLET ORAL at 08:40

## 2024-01-01 RX ADMIN — MEROPENEM 1 G: 1 INJECTION, POWDER, FOR SOLUTION INTRAVENOUS at 21:40

## 2024-01-01 RX ADMIN — PANTOPRAZOLE SODIUM 40 MG: 40 INJECTION, POWDER, FOR SOLUTION INTRAVENOUS at 09:04

## 2024-01-01 RX ADMIN — EPOPROSTENOL 20 NG/KG/MIN: 1.5 INJECTION, POWDER, LYOPHILIZED, FOR SOLUTION INTRAVENOUS at 22:34

## 2024-01-01 RX ADMIN — Medication 0.6 MG/HR: at 12:12

## 2024-01-01 RX ADMIN — Medication 50 MCG: at 13:21

## 2024-01-01 RX ADMIN — PROPOFOL 25 MCG/KG/MIN: 10 INJECTION, EMULSION INTRAVENOUS at 04:22

## 2024-01-01 RX ADMIN — HEPARIN SODIUM 5000 UNITS: 5000 INJECTION, SOLUTION INTRAVENOUS; SUBCUTANEOUS at 02:47

## 2024-01-01 RX ADMIN — PANTOPRAZOLE SODIUM 40 MG: 40 INJECTION, POWDER, FOR SOLUTION INTRAVENOUS at 07:51

## 2024-01-01 RX ADMIN — PIPERACILLIN AND TAZOBACTAM 4.5 G: 4; .5 INJECTION, POWDER, FOR SOLUTION INTRAVENOUS at 23:54

## 2024-01-01 RX ADMIN — OXYCODONE HYDROCHLORIDE 10 MG: 5 TABLET ORAL at 16:43

## 2024-01-01 RX ADMIN — HUMAN ALBUMIN MICROSPHERES AND PERFLUTREN 3 ML: 10; .22 INJECTION, SOLUTION INTRAVENOUS at 12:14

## 2024-01-01 RX ADMIN — TRAMADOL HYDROCHLORIDE 50 MG: 50 TABLET, COATED ORAL at 14:42

## 2024-01-01 RX ADMIN — SODIUM CHLORIDE: 9 INJECTION, SOLUTION INTRAVENOUS at 19:00

## 2024-01-01 RX ADMIN — OXYCODONE HYDROCHLORIDE 10 MG: 5 SOLUTION ORAL at 20:08

## 2024-01-01 RX ADMIN — CHLORHEXIDINE GLUCONATE 0.12% ORAL RINSE 15 ML: 1.2 LIQUID ORAL at 08:46

## 2024-01-01 RX ADMIN — EPOPROSTENOL 20 NG/KG/MIN: 1.5 INJECTION, POWDER, LYOPHILIZED, FOR SOLUTION INTRAVENOUS at 14:07

## 2024-01-01 RX ADMIN — ALBUTEROL SULFATE 2.5 MG: 2.5 SOLUTION RESPIRATORY (INHALATION) at 19:03

## 2024-01-01 RX ADMIN — EPOPROSTENOL 20 NG/KG/MIN: 1.5 INJECTION, POWDER, LYOPHILIZED, FOR SOLUTION INTRAVENOUS at 00:46

## 2024-01-01 RX ADMIN — ALBUTEROL SULFATE 2.5 MG: 2.5 SOLUTION RESPIRATORY (INHALATION) at 13:45

## 2024-01-01 RX ADMIN — HYDROMORPHONE HYDROCHLORIDE 0.2 MG: 0.2 INJECTION, SOLUTION INTRAMUSCULAR; INTRAVENOUS; SUBCUTANEOUS at 13:56

## 2024-01-01 RX ADMIN — ENOXAPARIN SODIUM 80 MG: 80 INJECTION SUBCUTANEOUS at 08:53

## 2024-01-01 RX ADMIN — ENOXAPARIN SODIUM 80 MG: 80 INJECTION SUBCUTANEOUS at 08:13

## 2024-01-01 RX ADMIN — AMIODARONE HYDROCHLORIDE 0.5 MG/MIN: 50 INJECTION, SOLUTION INTRAVENOUS at 22:40

## 2024-01-01 RX ADMIN — ENOXAPARIN SODIUM 80 MG: 80 INJECTION SUBCUTANEOUS at 09:28

## 2024-01-01 RX ADMIN — MEROPENEM 1 G: 1 INJECTION, POWDER, FOR SOLUTION INTRAVENOUS at 06:06

## 2024-01-01 RX ADMIN — Medication 50 MCG: at 13:42

## 2024-01-01 RX ADMIN — AMIODARONE HYDROCHLORIDE 0.5 MG/MIN: 50 INJECTION, SOLUTION INTRAVENOUS at 18:07

## 2024-01-01 RX ADMIN — FUROSEMIDE 10 MG/HR: 10 INJECTION, SOLUTION INTRAMUSCULAR; INTRAVENOUS at 21:27

## 2024-01-01 RX ADMIN — ACETYLCYSTEINE 2 ML: 200 SOLUTION ORAL; RESPIRATORY (INHALATION) at 12:37

## 2024-01-01 RX ADMIN — Medication 25 MCG: at 12:20

## 2024-01-01 RX ADMIN — ALBUMIN HUMAN 25 G: 0.05 INJECTION, SOLUTION INTRAVENOUS at 14:49

## 2024-01-01 RX ADMIN — MEROPENEM 1 G: 1 INJECTION, POWDER, FOR SOLUTION INTRAVENOUS at 14:33

## 2024-01-01 RX ADMIN — ALBUMIN HUMAN 3250 ML: 0.05 INJECTION, SOLUTION INTRAVENOUS at 08:11

## 2024-01-01 RX ADMIN — HEPARIN SODIUM 1650 UNITS/HR: 10000 INJECTION, SOLUTION INTRAVENOUS at 16:09

## 2024-01-01 RX ADMIN — ENOXAPARIN SODIUM 80 MG: 80 INJECTION SUBCUTANEOUS at 08:04

## 2024-01-01 RX ADMIN — AMIODARONE HYDROCHLORIDE 200 MG: 200 TABLET ORAL at 09:29

## 2024-01-01 RX ADMIN — DIPHENHYDRAMINE HYDROCHLORIDE 25 MG: 50 INJECTION, SOLUTION INTRAMUSCULAR; INTRAVENOUS at 14:43

## 2024-01-01 RX ADMIN — Medication 40 MG: at 08:03

## 2024-01-01 RX ADMIN — ASPIRIN 81 MG CHEWABLE TABLET 81 MG: 81 TABLET CHEWABLE at 08:35

## 2024-01-01 RX ADMIN — MEROPENEM 1 G: 1 INJECTION, POWDER, FOR SOLUTION INTRAVENOUS at 21:12

## 2024-01-01 RX ADMIN — Medication 40 MG: at 08:20

## 2024-01-01 RX ADMIN — HYDROCORTISONE SODIUM SUCCINATE 50 MG: 100 INJECTION, POWDER, FOR SOLUTION INTRAMUSCULAR; INTRAVENOUS at 08:58

## 2024-01-01 RX ADMIN — CEFTAZIDIME 2 G: 2 INJECTION, POWDER, FOR SOLUTION INTRAVENOUS at 16:18

## 2024-01-01 RX ADMIN — ALBUTEROL SULFATE 2.5 MG: 2.5 SOLUTION RESPIRATORY (INHALATION) at 19:51

## 2024-01-01 RX ADMIN — MEROPENEM 1 G: 1 INJECTION, POWDER, FOR SOLUTION INTRAVENOUS at 22:19

## 2024-01-01 RX ADMIN — HYDROCORTISONE SODIUM SUCCINATE 50 MG: 100 INJECTION, POWDER, FOR SOLUTION INTRAMUSCULAR; INTRAVENOUS at 16:11

## 2024-01-01 RX ADMIN — Medication 40 MG: at 07:55

## 2024-01-01 RX ADMIN — MEROPENEM 1 G: 1 INJECTION, POWDER, FOR SOLUTION INTRAVENOUS at 06:18

## 2024-01-01 RX ADMIN — TRAMADOL HYDROCHLORIDE 50 MG: 50 TABLET, COATED ORAL at 23:54

## 2024-01-01 RX ADMIN — ENOXAPARIN SODIUM 80 MG: 80 INJECTION SUBCUTANEOUS at 00:17

## 2024-01-01 RX ADMIN — FUROSEMIDE 20 MG: 10 INJECTION, SOLUTION INTRAMUSCULAR; INTRAVENOUS at 18:19

## 2024-01-01 RX ADMIN — AMIODARONE HYDROCHLORIDE 1 MG/MIN: 50 INJECTION, SOLUTION INTRAVENOUS at 16:12

## 2024-01-01 RX ADMIN — CHLORHEXIDINE GLUCONATE 0.12% ORAL RINSE 15 ML: 1.2 LIQUID ORAL at 21:06

## 2024-01-01 RX ADMIN — ACETYLCYSTEINE 2 ML: 200 SOLUTION ORAL; RESPIRATORY (INHALATION) at 13:45

## 2024-01-01 RX ADMIN — OXYCODONE HYDROCHLORIDE 5 MG: 5 TABLET ORAL at 21:36

## 2024-01-01 RX ADMIN — MEROPENEM 1 G: 1 INJECTION, POWDER, FOR SOLUTION INTRAVENOUS at 14:08

## 2024-01-01 RX ADMIN — CHLORHEXIDINE GLUCONATE 0.12% ORAL RINSE 15 ML: 1.2 LIQUID ORAL at 07:48

## 2024-01-01 RX ADMIN — ALBUTEROL SULFATE 2.5 MG: 2.5 SOLUTION RESPIRATORY (INHALATION) at 18:53

## 2024-01-01 RX ADMIN — ACETYLCYSTEINE 2 ML: 200 SOLUTION ORAL; RESPIRATORY (INHALATION) at 12:09

## 2024-01-01 RX ADMIN — Medication 50 MCG: at 13:18

## 2024-01-01 RX ADMIN — SENNOSIDES 8.6 MG: 8.6 TABLET, FILM COATED ORAL at 09:09

## 2024-01-01 RX ADMIN — ETOMIDATE 30 MG: 2 INJECTION INTRAVENOUS at 10:20

## 2024-01-01 RX ADMIN — CHLORHEXIDINE GLUCONATE 0.12% ORAL RINSE 15 ML: 1.2 LIQUID ORAL at 20:31

## 2024-01-01 RX ADMIN — ALBUTEROL SULFATE 2.5 MG: 2.5 SOLUTION RESPIRATORY (INHALATION) at 13:20

## 2024-01-01 RX ADMIN — PIPERACILLIN AND TAZOBACTAM 4.5 G: 4; .5 INJECTION, POWDER, FOR SOLUTION INTRAVENOUS at 00:32

## 2024-01-01 RX ADMIN — CALCIUM GLUCONATE 3 G: 98 INJECTION, SOLUTION INTRAVENOUS at 15:28

## 2024-01-01 RX ADMIN — NOREPINEPHRINE BITARTRATE 0.04 MCG/KG/MIN: 0.02 INJECTION, SOLUTION INTRAVENOUS at 21:24

## 2024-01-01 RX ADMIN — TRAMADOL HYDROCHLORIDE 25 MG: 50 TABLET, COATED ORAL at 08:49

## 2024-01-01 RX ADMIN — AMIODARONE HYDROCHLORIDE 200 MG: 200 TABLET ORAL at 08:22

## 2024-01-01 RX ADMIN — OXYCODONE HYDROCHLORIDE 10 MG: 5 TABLET ORAL at 20:32

## 2024-01-01 RX ADMIN — HEPARIN SODIUM 5000 UNITS: 5000 INJECTION, SOLUTION INTRAVENOUS; SUBCUTANEOUS at 17:55

## 2024-01-01 RX ADMIN — ROCURONIUM BROMIDE 10 MG: 50 INJECTION, SOLUTION INTRAVENOUS at 11:06

## 2024-01-01 RX ADMIN — PROPOFOL 30 MCG/KG/MIN: 10 INJECTION, EMULSION INTRAVENOUS at 13:03

## 2024-01-01 RX ADMIN — HUMAN ALBUMIN MICROSPHERES AND PERFLUTREN 3 ML: 10; .22 INJECTION, SOLUTION INTRAVENOUS at 13:15

## 2024-01-01 RX ADMIN — CHLORHEXIDINE GLUCONATE 0.12% ORAL RINSE 15 ML: 1.2 LIQUID ORAL at 11:51

## 2024-01-01 RX ADMIN — POTASSIUM CHLORIDE 40 MEQ: 20 SOLUTION ORAL at 06:14

## 2024-01-01 RX ADMIN — ACETAMINOPHEN 650 MG: 325 TABLET, FILM COATED ORAL at 02:52

## 2024-01-01 RX ADMIN — HYDROMORPHONE HYDROCHLORIDE 0.2 MG: 0.2 INJECTION, SOLUTION INTRAMUSCULAR; INTRAVENOUS; SUBCUTANEOUS at 14:06

## 2024-01-01 RX ADMIN — HYDROXYZINE HYDROCHLORIDE 50 MG: 25 TABLET, FILM COATED ORAL at 20:08

## 2024-01-01 RX ADMIN — TRAMADOL HYDROCHLORIDE 50 MG: 50 TABLET, COATED ORAL at 23:44

## 2024-01-01 RX ADMIN — OXYCODONE HYDROCHLORIDE 10 MG: 5 TABLET ORAL at 10:28

## 2024-01-01 RX ADMIN — MIDAZOLAM 1 MG: 1 INJECTION INTRAMUSCULAR; INTRAVENOUS at 13:21

## 2024-01-01 RX ADMIN — ALBUTEROL SULFATE 2.5 MG: 2.5 SOLUTION RESPIRATORY (INHALATION) at 23:38

## 2024-01-01 RX ADMIN — ALBUTEROL SULFATE 2.5 MG: 2.5 SOLUTION RESPIRATORY (INHALATION) at 00:13

## 2024-01-01 RX ADMIN — POTASSIUM CHLORIDE 20 MEQ: 1.5 POWDER, FOR SOLUTION ORAL at 00:07

## 2024-01-01 RX ADMIN — ACETYLCYSTEINE 2 ML: 200 SOLUTION ORAL; RESPIRATORY (INHALATION) at 07:23

## 2024-01-01 RX ADMIN — VANCOMYCIN HYDROCHLORIDE 1500 MG: 10 INJECTION, POWDER, LYOPHILIZED, FOR SOLUTION INTRAVENOUS at 22:23

## 2024-01-01 RX ADMIN — ALBUTEROL SULFATE 2.5 MG: 2.5 SOLUTION RESPIRATORY (INHALATION) at 07:53

## 2024-01-01 RX ADMIN — SODIUM CHLORIDE: 9 INJECTION, SOLUTION INTRAVENOUS at 08:35

## 2024-01-01 RX ADMIN — FUROSEMIDE 10 MG/HR: 10 INJECTION, SOLUTION INTRAMUSCULAR; INTRAVENOUS at 21:20

## 2024-01-01 RX ADMIN — ACETAMINOPHEN 650 MG: 325 TABLET, FILM COATED ORAL at 12:15

## 2024-01-01 RX ADMIN — HEPARIN SODIUM 1100 UNITS/HR: 10000 INJECTION, SOLUTION INTRAVENOUS at 13:43

## 2024-01-01 RX ADMIN — TRAMADOL HYDROCHLORIDE 50 MG: 50 TABLET, COATED ORAL at 03:42

## 2024-01-01 RX ADMIN — AMIODARONE HYDROCHLORIDE 200 MG: 200 TABLET ORAL at 11:15

## 2024-01-01 RX ADMIN — CEFTAZIDIME 2 G: 2 INJECTION, POWDER, FOR SOLUTION INTRAVENOUS at 23:58

## 2024-01-01 RX ADMIN — OXYCODONE HYDROCHLORIDE 10 MG: 5 TABLET ORAL at 19:36

## 2024-01-01 RX ADMIN — HEPARIN SODIUM 5000 UNITS: 5000 INJECTION, SOLUTION INTRAVENOUS; SUBCUTANEOUS at 02:04

## 2024-01-01 RX ADMIN — ALBUTEROL SULFATE 2.5 MG: 2.5 SOLUTION RESPIRATORY (INHALATION) at 07:10

## 2024-01-01 RX ADMIN — OXYCODONE HYDROCHLORIDE 10 MG: 5 TABLET ORAL at 00:32

## 2024-01-01 RX ADMIN — MEROPENEM 1 G: 1 INJECTION, POWDER, FOR SOLUTION INTRAVENOUS at 21:58

## 2024-01-01 RX ADMIN — MICONAZOLE NITRATE: 2 POWDER TOPICAL at 08:12

## 2024-01-01 RX ADMIN — OXYCODONE HYDROCHLORIDE 10 MG: 5 TABLET ORAL at 08:35

## 2024-01-01 RX ADMIN — HYDROMORPHONE HYDROCHLORIDE 0.2 MG: 0.2 INJECTION, SOLUTION INTRAMUSCULAR; INTRAVENOUS; SUBCUTANEOUS at 17:55

## 2024-01-01 RX ADMIN — VANCOMYCIN HYDROCHLORIDE 1500 MG: 10 INJECTION, POWDER, LYOPHILIZED, FOR SOLUTION INTRAVENOUS at 21:54

## 2024-01-01 RX ADMIN — FUROSEMIDE 80 MG: 10 INJECTION, SOLUTION INTRAMUSCULAR; INTRAVENOUS at 07:52

## 2024-01-01 RX ADMIN — DEXMEDETOMIDINE HYDROCHLORIDE 0.2 MCG/KG/HR: 400 INJECTION INTRAVENOUS at 10:34

## 2024-01-01 RX ADMIN — OXYCODONE HYDROCHLORIDE 10 MG: 5 TABLET ORAL at 04:42

## 2024-01-01 RX ADMIN — CEFTAZIDIME 2 G: 2 INJECTION, POWDER, FOR SOLUTION INTRAVENOUS at 00:54

## 2024-01-01 RX ADMIN — HEPARIN SODIUM 2600 UNITS: 1000 INJECTION INTRAVENOUS; SUBCUTANEOUS at 15:32

## 2024-01-01 RX ADMIN — ANTICOAGULANT CITRATE DEXTROSE SOLUTION FORMULA A 1000 ML: 12.25; 11; 3.65 SOLUTION INTRAVENOUS at 08:14

## 2024-01-01 RX ADMIN — ALBUTEROL SULFATE 2.5 MG: 2.5 SOLUTION RESPIRATORY (INHALATION) at 19:41

## 2024-01-01 RX ADMIN — AMIODARONE HYDROCHLORIDE 200 MG: 200 TABLET ORAL at 08:53

## 2024-01-01 RX ADMIN — ALBUTEROL SULFATE 2.5 MG: 2.5 SOLUTION RESPIRATORY (INHALATION) at 07:19

## 2024-01-01 RX ADMIN — CHLORHEXIDINE GLUCONATE 0.12% ORAL RINSE 15 ML: 1.2 LIQUID ORAL at 09:03

## 2024-01-01 RX ADMIN — POTASSIUM CHLORIDE 20 MEQ: 20 SOLUTION ORAL at 17:45

## 2024-01-01 RX ADMIN — HYDROCORTISONE SODIUM SUCCINATE 50 MG: 100 INJECTION, POWDER, FOR SOLUTION INTRAMUSCULAR; INTRAVENOUS at 14:24

## 2024-01-01 RX ADMIN — INSULIN ASPART 1 UNITS: 100 INJECTION, SOLUTION INTRAVENOUS; SUBCUTANEOUS at 20:26

## 2024-01-01 RX ADMIN — SODIUM CHLORIDE: 9 INJECTION, SOLUTION INTRAVENOUS at 22:22

## 2024-01-01 RX ADMIN — EPOPROSTENOL 20 NG/KG/MIN: 1.5 INJECTION, POWDER, LYOPHILIZED, FOR SOLUTION INTRAVENOUS at 20:43

## 2024-01-01 RX ADMIN — HEPARIN SODIUM 5000 UNITS: 5000 INJECTION, SOLUTION INTRAVENOUS; SUBCUTANEOUS at 18:16

## 2024-01-01 RX ADMIN — MEROPENEM 1 G: 1 INJECTION, POWDER, FOR SOLUTION INTRAVENOUS at 13:56

## 2024-01-01 RX ADMIN — Medication 50 MCG: at 08:34

## 2024-01-01 RX ADMIN — POTASSIUM CHLORIDE 20 MEQ: 1.5 POWDER, FOR SOLUTION ORAL at 06:36

## 2024-01-01 RX ADMIN — TRAMADOL HYDROCHLORIDE 25 MG: 50 TABLET, COATED ORAL at 08:52

## 2024-01-01 RX ADMIN — SODIUM CHLORIDE: 9 INJECTION, SOLUTION INTRAVENOUS at 21:51

## 2024-01-01 RX ADMIN — FUROSEMIDE 20 MG: 10 INJECTION, SOLUTION INTRAMUSCULAR; INTRAVENOUS at 13:44

## 2024-01-01 RX ADMIN — AMIODARONE HYDROCHLORIDE 150 MG: 1.5 INJECTION, SOLUTION INTRAVENOUS at 15:47

## 2024-01-01 RX ADMIN — IOPAMIDOL 101 ML: 755 INJECTION, SOLUTION INTRAVENOUS at 21:29

## 2024-01-01 RX ADMIN — PROPOFOL 25 MCG/KG/MIN: 10 INJECTION, EMULSION INTRAVENOUS at 04:57

## 2024-01-01 RX ADMIN — Medication 100 MCG/HR: at 09:06

## 2024-01-01 RX ADMIN — MICONAZOLE NITRATE: 2 POWDER TOPICAL at 08:53

## 2024-01-01 RX ADMIN — POTASSIUM CHLORIDE 20 MEQ: 20 SOLUTION ORAL at 08:58

## 2024-01-01 RX ADMIN — AMIODARONE HYDROCHLORIDE 1 MG/MIN: 50 INJECTION, SOLUTION INTRAVENOUS at 15:44

## 2024-01-01 RX ADMIN — TRAMADOL HYDROCHLORIDE 25 MG: 50 TABLET, COATED ORAL at 16:15

## 2024-01-01 RX ADMIN — NOREPINEPHRINE BITARTRATE 0.12 MCG/KG/MIN: 0.06 INJECTION, SOLUTION INTRAVENOUS at 14:51

## 2024-01-01 RX ADMIN — ENOXAPARIN SODIUM 80 MG: 80 INJECTION SUBCUTANEOUS at 20:09

## 2024-01-01 RX ADMIN — PIPERACILLIN AND TAZOBACTAM 4.5 G: 4; .5 INJECTION, POWDER, FOR SOLUTION INTRAVENOUS at 19:47

## 2024-01-01 RX ADMIN — MICONAZOLE NITRATE: 2 POWDER TOPICAL at 00:11

## 2024-01-01 RX ADMIN — HYDROCORTISONE SODIUM SUCCINATE 50 MG: 100 INJECTION, POWDER, FOR SOLUTION INTRAMUSCULAR; INTRAVENOUS at 20:26

## 2024-01-01 RX ADMIN — ALBUTEROL SULFATE 2.5 MG: 2.5 SOLUTION RESPIRATORY (INHALATION) at 07:13

## 2024-01-01 RX ADMIN — MEROPENEM 1 G: 1 INJECTION, POWDER, FOR SOLUTION INTRAVENOUS at 13:53

## 2024-01-01 RX ADMIN — ALBUTEROL SULFATE 2.5 MG: 2.5 SOLUTION RESPIRATORY (INHALATION) at 00:49

## 2024-01-01 RX ADMIN — ASPIRIN 81 MG: 81 TABLET, COATED ORAL at 08:13

## 2024-01-01 RX ADMIN — ACETAMINOPHEN 650 MG: 325 TABLET, FILM COATED ORAL at 10:52

## 2024-01-01 RX ADMIN — ALBUTEROL SULFATE 2.5 MG: 2.5 SOLUTION RESPIRATORY (INHALATION) at 02:01

## 2024-01-01 RX ADMIN — CHLORHEXIDINE GLUCONATE 0.12% ORAL RINSE 15 ML: 1.2 LIQUID ORAL at 08:40

## 2024-01-01 RX ADMIN — PIPERACILLIN AND TAZOBACTAM 4.5 G: 4; .5 INJECTION, POWDER, FOR SOLUTION INTRAVENOUS at 06:21

## 2024-01-01 RX ADMIN — QUETIAPINE FUMARATE 50 MG: 50 TABLET ORAL at 20:47

## 2024-01-01 RX ADMIN — ACETAMINOPHEN 650 MG: 325 TABLET, FILM COATED ORAL at 14:42

## 2024-01-01 RX ADMIN — ALBUTEROL SULFATE 2.5 MG: 2.5 SOLUTION RESPIRATORY (INHALATION) at 13:56

## 2024-01-01 RX ADMIN — MEROPENEM 1 G: 1 INJECTION, POWDER, FOR SOLUTION INTRAVENOUS at 23:09

## 2024-01-01 RX ADMIN — FUROSEMIDE 20 MG: 10 INJECTION, SOLUTION INTRAMUSCULAR; INTRAVENOUS at 21:23

## 2024-01-01 RX ADMIN — PROPOFOL 10 MCG/KG/MIN: 10 INJECTION, EMULSION INTRAVENOUS at 00:25

## 2024-01-01 RX ADMIN — ALBUTEROL SULFATE 2.5 MG: 2.5 SOLUTION RESPIRATORY (INHALATION) at 00:01

## 2024-01-01 RX ADMIN — POTASSIUM CHLORIDE 40 MEQ: 1.5 POWDER, FOR SOLUTION ORAL at 05:58

## 2024-01-01 RX ADMIN — POTASSIUM CHLORIDE 40 MEQ: 1.5 POWDER, FOR SOLUTION ORAL at 04:59

## 2024-01-01 RX ADMIN — HYDROXYZINE HYDROCHLORIDE 25 MG: 25 TABLET, FILM COATED ORAL at 16:34

## 2024-01-01 RX ADMIN — HYDROXYZINE HYDROCHLORIDE 25 MG: 25 TABLET, FILM COATED ORAL at 08:54

## 2024-01-01 RX ADMIN — OXYCODONE HYDROCHLORIDE 10 MG: 5 TABLET ORAL at 22:25

## 2024-01-01 RX ADMIN — CEFTAZIDIME 2 G: 2 INJECTION, POWDER, FOR SOLUTION INTRAVENOUS at 23:50

## 2024-01-01 RX ADMIN — VANCOMYCIN HYDROCHLORIDE 1500 MG: 10 INJECTION, POWDER, LYOPHILIZED, FOR SOLUTION INTRAVENOUS at 09:37

## 2024-01-01 RX ADMIN — MICONAZOLE NITRATE: 2 POWDER TOPICAL at 20:02

## 2024-01-01 RX ADMIN — Medication 1 MG: at 20:28

## 2024-01-01 RX ADMIN — POTASSIUM CHLORIDE 20 MEQ: 1.5 POWDER, FOR SOLUTION ORAL at 06:01

## 2024-01-01 RX ADMIN — Medication 1 MG: at 22:25

## 2024-01-01 RX ADMIN — SODIUM CHLORIDE, POTASSIUM CHLORIDE, SODIUM LACTATE AND CALCIUM CHLORIDE 500 ML: 600; 310; 30; 20 INJECTION, SOLUTION INTRAVENOUS at 16:07

## 2024-01-01 RX ADMIN — OXYCODONE HYDROCHLORIDE 10 MG: 5 SOLUTION ORAL at 14:02

## 2024-01-01 RX ADMIN — DEXMEDETOMIDINE HYDROCHLORIDE 0.4 MCG/KG/HR: 400 INJECTION INTRAVENOUS at 06:49

## 2024-01-01 RX ADMIN — Medication 50 MCG/HR: at 20:19

## 2024-01-01 RX ADMIN — PIPERACILLIN AND TAZOBACTAM 4.5 G: 4; .5 INJECTION, POWDER, FOR SOLUTION INTRAVENOUS at 02:40

## 2024-01-01 RX ADMIN — MEROPENEM 1 G: 1 INJECTION, POWDER, FOR SOLUTION INTRAVENOUS at 05:18

## 2024-01-01 RX ADMIN — OXYCODONE HYDROCHLORIDE 10 MG: 5 TABLET ORAL at 04:14

## 2024-01-01 RX ADMIN — POTASSIUM CHLORIDE 20 MEQ: 1.5 POWDER, FOR SOLUTION ORAL at 09:13

## 2024-01-01 RX ADMIN — ACETYLCYSTEINE 2 ML: 200 SOLUTION ORAL; RESPIRATORY (INHALATION) at 19:17

## 2024-01-01 RX ADMIN — MIDAZOLAM 2 MG: 1 INJECTION INTRAMUSCULAR; INTRAVENOUS at 10:48

## 2024-01-01 RX ADMIN — DIPHENHYDRAMINE HYDROCHLORIDE 25 MG: 50 INJECTION, SOLUTION INTRAMUSCULAR; INTRAVENOUS at 00:33

## 2024-01-01 RX ADMIN — GADOBUTROL 10 ML: 604.72 INJECTION INTRAVENOUS at 17:57

## 2024-01-01 RX ADMIN — ANTICOAGULANT CITRATE DEXTROSE SOLUTION FORMULA A 2000 ML: 12.25; 11; 3.65 SOLUTION INTRAVENOUS at 13:01

## 2024-01-01 RX ADMIN — FUROSEMIDE 40 MG: 10 INJECTION, SOLUTION INTRAVENOUS at 11:02

## 2024-01-01 RX ADMIN — PIPERACILLIN AND TAZOBACTAM 4.5 G: 4; .5 INJECTION, POWDER, FOR SOLUTION INTRAVENOUS at 17:57

## 2024-01-01 RX ADMIN — CHLORHEXIDINE GLUCONATE 0.12% ORAL RINSE 15 ML: 1.2 LIQUID ORAL at 20:34

## 2024-01-01 RX ADMIN — CHLORHEXIDINE GLUCONATE 0.12% ORAL RINSE 15 ML: 1.2 LIQUID ORAL at 08:06

## 2024-01-01 RX ADMIN — OXYCODONE HYDROCHLORIDE 10 MG: 5 TABLET ORAL at 15:02

## 2024-01-01 RX ADMIN — Medication 1 MG: at 21:36

## 2024-01-01 RX ADMIN — CHLORHEXIDINE GLUCONATE 0.12% ORAL RINSE 15 ML: 1.2 LIQUID ORAL at 08:36

## 2024-01-01 RX ADMIN — DIPHENHYDRAMINE HYDROCHLORIDE 50 MG: 50 INJECTION, SOLUTION INTRAMUSCULAR; INTRAVENOUS at 12:52

## 2024-01-01 RX ADMIN — ALBUTEROL SULFATE 2.5 MG: 2.5 SOLUTION RESPIRATORY (INHALATION) at 19:59

## 2024-01-01 RX ADMIN — MAGNESIUM SULFATE HEPTAHYDRATE 2 G: 40 INJECTION, SOLUTION INTRAVENOUS at 15:29

## 2024-01-01 RX ADMIN — FUROSEMIDE 10 MG/HR: 10 INJECTION, SOLUTION INTRAMUSCULAR; INTRAVENOUS at 08:04

## 2024-01-01 RX ADMIN — ACETAMINOPHEN 650 MG: 325 SUSPENSION ORAL at 19:24

## 2024-01-01 RX ADMIN — AMIODARONE HYDROCHLORIDE 1 MG/MIN: 50 INJECTION, SOLUTION INTRAVENOUS at 07:36

## 2024-01-01 RX ADMIN — ASPIRIN 81 MG CHEWABLE TABLET 81 MG: 81 TABLET CHEWABLE at 09:13

## 2024-01-01 RX ADMIN — Medication 50 MCG/HR: at 09:25

## 2024-01-01 RX ADMIN — NOREPINEPHRINE BITARTRATE 0.08 MCG/KG/MIN: 0.02 INJECTION, SOLUTION INTRAVENOUS at 08:00

## 2024-01-01 RX ADMIN — ALBUTEROL SULFATE 2.5 MG: 2.5 SOLUTION RESPIRATORY (INHALATION) at 07:22

## 2024-01-01 RX ADMIN — POTASSIUM CHLORIDE 20 MEQ: 29.8 INJECTION, SOLUTION INTRAVENOUS at 23:46

## 2024-01-01 RX ADMIN — TRAMADOL HYDROCHLORIDE 50 MG: 50 TABLET, COATED ORAL at 18:22

## 2024-01-01 RX ADMIN — CHLORHEXIDINE GLUCONATE 0.12% ORAL RINSE 15 ML: 1.2 LIQUID ORAL at 08:00

## 2024-01-01 RX ADMIN — HYDROMORPHONE HYDROCHLORIDE 0.2 MG: 0.2 INJECTION, SOLUTION INTRAMUSCULAR; INTRAVENOUS; SUBCUTANEOUS at 05:31

## 2024-01-01 RX ADMIN — FUROSEMIDE 10 MG/HR: 10 INJECTION, SOLUTION INTRAMUSCULAR; INTRAVENOUS at 06:54

## 2024-01-01 RX ADMIN — ALBUTEROL SULFATE 2.5 MG: 2.5 SOLUTION RESPIRATORY (INHALATION) at 12:09

## 2024-01-01 RX ADMIN — MEROPENEM 1 G: 1 INJECTION, POWDER, FOR SOLUTION INTRAVENOUS at 21:50

## 2024-01-01 RX ADMIN — MICONAZOLE NITRATE: 2 POWDER TOPICAL at 08:57

## 2024-01-01 RX ADMIN — OXYCODONE HYDROCHLORIDE 10 MG: 5 TABLET ORAL at 17:00

## 2024-01-01 RX ADMIN — AMIODARONE HYDROCHLORIDE 0.5 MG/MIN: 50 INJECTION, SOLUTION INTRAVENOUS at 04:50

## 2024-01-01 RX ADMIN — ALBUTEROL SULFATE 2.5 MG: 2.5 SOLUTION RESPIRATORY (INHALATION) at 02:00

## 2024-01-01 RX ADMIN — TRAMADOL HYDROCHLORIDE 50 MG: 50 TABLET, COATED ORAL at 05:26

## 2024-01-01 RX ADMIN — OXYCODONE HYDROCHLORIDE 10 MG: 5 TABLET ORAL at 22:35

## 2024-01-01 RX ADMIN — TRAMADOL HYDROCHLORIDE 50 MG: 50 TABLET, COATED ORAL at 12:14

## 2024-01-01 RX ADMIN — ALBUTEROL SULFATE 2.5 MG: 2.5 SOLUTION RESPIRATORY (INHALATION) at 12:04

## 2024-01-01 RX ADMIN — ACETYLCYSTEINE 2 ML: 200 SOLUTION ORAL; RESPIRATORY (INHALATION) at 12:00

## 2024-01-01 RX ADMIN — CALCIUM GLUCONATE 5 G: 98 INJECTION, SOLUTION INTRAVENOUS at 15:30

## 2024-01-01 RX ADMIN — ALBUTEROL SULFATE 2.5 MG: 2.5 SOLUTION RESPIRATORY (INHALATION) at 19:17

## 2024-01-01 RX ADMIN — POTASSIUM & SODIUM PHOSPHATES POWDER PACK 280-160-250 MG 1 PACKET: 280-160-250 PACK at 20:06

## 2024-01-01 RX ADMIN — SODIUM PHOSPHATE, MONOBASIC, MONOHYDRATE AND SODIUM PHOSPHATE, DIBASIC, ANHYDROUS 15 MMOL: 142; 276 INJECTION, SOLUTION INTRAVENOUS at 09:13

## 2024-01-01 RX ADMIN — ENOXAPARIN SODIUM 80 MG: 80 INJECTION SUBCUTANEOUS at 21:14

## 2024-01-01 RX ADMIN — ACETYLCYSTEINE 2 ML: 200 SOLUTION ORAL; RESPIRATORY (INHALATION) at 12:04

## 2024-01-01 RX ADMIN — HYDROMORPHONE HYDROCHLORIDE 0.2 MG: 0.2 INJECTION, SOLUTION INTRAMUSCULAR; INTRAVENOUS; SUBCUTANEOUS at 10:09

## 2024-01-01 RX ADMIN — PIPERACILLIN AND TAZOBACTAM 4.5 G: 4; .5 INJECTION, POWDER, FOR SOLUTION INTRAVENOUS at 05:28

## 2024-01-01 RX ADMIN — EPOPROSTENOL 20 NG/KG/MIN: 1.5 INJECTION, POWDER, LYOPHILIZED, FOR SOLUTION INTRAVENOUS at 18:18

## 2024-01-01 RX ADMIN — PIPERACILLIN AND TAZOBACTAM 4.5 G: 4; .5 INJECTION, POWDER, FOR SOLUTION INTRAVENOUS at 18:49

## 2024-01-01 RX ADMIN — ACETAMINOPHEN 650 MG: 325 TABLET, FILM COATED ORAL at 02:31

## 2024-01-01 RX ADMIN — NOREPINEPHRINE BITARTRATE 0.09 MCG/KG/MIN: 0.02 INJECTION, SOLUTION INTRAVENOUS at 03:19

## 2024-01-01 RX ADMIN — CHLORHEXIDINE GLUCONATE 0.12% ORAL RINSE 15 ML: 1.2 LIQUID ORAL at 20:58

## 2024-01-01 RX ADMIN — OXYCODONE HYDROCHLORIDE 10 MG: 5 TABLET ORAL at 13:55

## 2024-01-01 RX ADMIN — TRAMADOL HYDROCHLORIDE 50 MG: 50 TABLET, COATED ORAL at 06:24

## 2024-01-01 RX ADMIN — CHLORHEXIDINE GLUCONATE 0.12% ORAL RINSE 15 ML: 1.2 LIQUID ORAL at 08:58

## 2024-01-01 RX ADMIN — ALBUTEROL SULFATE 2.5 MG: 2.5 SOLUTION RESPIRATORY (INHALATION) at 19:50

## 2024-01-01 RX ADMIN — ALBUTEROL SULFATE 2.5 MG: 2.5 SOLUTION RESPIRATORY (INHALATION) at 08:11

## 2024-01-01 RX ADMIN — MEROPENEM 1 G: 1 INJECTION, POWDER, FOR SOLUTION INTRAVENOUS at 13:38

## 2024-01-01 RX ADMIN — ACETAMINOPHEN 650 MG: 325 TABLET, FILM COATED ORAL at 21:36

## 2024-01-01 RX ADMIN — HEPARIN SODIUM 5000 UNITS: 5000 INJECTION, SOLUTION INTRAVENOUS; SUBCUTANEOUS at 09:09

## 2024-01-01 RX ADMIN — Medication 40 MG: at 08:22

## 2024-01-01 RX ADMIN — ALBUTEROL SULFATE 2.5 MG: 2.5 SOLUTION RESPIRATORY (INHALATION) at 01:13

## 2024-01-01 RX ADMIN — ACETAMINOPHEN 650 MG: 325 TABLET, FILM COATED ORAL at 18:16

## 2024-01-01 RX ADMIN — ALBUTEROL SULFATE 2.5 MG: 2.5 SOLUTION RESPIRATORY (INHALATION) at 07:25

## 2024-01-01 RX ADMIN — ALBUTEROL SULFATE 2.5 MG: 2.5 SOLUTION RESPIRATORY (INHALATION) at 19:31

## 2024-01-01 RX ADMIN — Medication 1 MG: at 22:10

## 2024-01-01 RX ADMIN — ALBUTEROL SULFATE 2.5 MG: 2.5 SOLUTION RESPIRATORY (INHALATION) at 19:09

## 2024-01-01 RX ADMIN — GADOBUTROL 10 ML: 604.72 INJECTION INTRAVENOUS at 22:00

## 2024-01-01 RX ADMIN — CEFTAZIDIME 2 G: 2 INJECTION, POWDER, FOR SOLUTION INTRAVENOUS at 16:01

## 2024-01-01 RX ADMIN — Medication 150 MCG/HR: at 15:24

## 2024-01-01 RX ADMIN — FUROSEMIDE 20 MG: 10 INJECTION, SOLUTION INTRAMUSCULAR; INTRAVENOUS at 12:58

## 2024-01-01 RX ADMIN — ENOXAPARIN SODIUM 80 MG: 80 INJECTION SUBCUTANEOUS at 20:36

## 2024-01-01 RX ADMIN — ALBUTEROL SULFATE 2.5 MG: 2.5 SOLUTION RESPIRATORY (INHALATION) at 12:07

## 2024-01-01 RX ADMIN — ALBUTEROL SULFATE 2.5 MG: 2.5 SOLUTION RESPIRATORY (INHALATION) at 00:51

## 2024-01-01 RX ADMIN — DOCUSATE SODIUM 50 MG AND SENNOSIDES 8.6 MG 2 TABLET: 8.6; 5 TABLET, FILM COATED ORAL at 05:29

## 2024-01-01 RX ADMIN — EPOPROSTENOL 20 NG/KG/MIN: 1.5 INJECTION, POWDER, LYOPHILIZED, FOR SOLUTION INTRAVENOUS at 05:30

## 2024-01-01 RX ADMIN — PIPERACILLIN AND TAZOBACTAM 4.5 G: 4; .5 INJECTION, POWDER, FOR SOLUTION INTRAVENOUS at 18:13

## 2024-01-01 RX ADMIN — MIDAZOLAM 2 MG: 1 INJECTION INTRAMUSCULAR; INTRAVENOUS at 06:37

## 2024-01-01 RX ADMIN — HYDROCORTISONE SODIUM SUCCINATE 100 MG: 100 INJECTION, POWDER, FOR SOLUTION INTRAMUSCULAR; INTRAVENOUS at 21:12

## 2024-01-01 RX ADMIN — AMIODARONE HYDROCHLORIDE 1 MG/MIN: 50 INJECTION, SOLUTION INTRAVENOUS at 01:14

## 2024-01-01 RX ADMIN — ALBUMIN HUMAN 25 G: 0.05 INJECTION, SOLUTION INTRAVENOUS at 12:48

## 2024-01-01 RX ADMIN — ACETAMINOPHEN 650 MG: 325 TABLET, FILM COATED ORAL at 08:00

## 2024-01-01 RX ADMIN — FUROSEMIDE 40 MG: 10 INJECTION, SOLUTION INTRAVENOUS at 15:44

## 2024-01-01 RX ADMIN — PHENYLEPHRINE HYDROCHLORIDE 150 MCG: 10 INJECTION INTRAVENOUS at 11:30

## 2024-01-01 RX ADMIN — ANTICOAGULANT CITRATE DEXTROSE SOLUTION FORMULA A 1000 ML: 12.25; 11; 3.65 SOLUTION INTRAVENOUS at 15:27

## 2024-01-01 RX ADMIN — ASPIRIN 81 MG CHEWABLE TABLET 81 MG: 81 TABLET CHEWABLE at 08:11

## 2024-01-01 RX ADMIN — HYDROCORTISONE SODIUM SUCCINATE 50 MG: 100 INJECTION, POWDER, FOR SOLUTION INTRAMUSCULAR; INTRAVENOUS at 03:22

## 2024-01-01 RX ADMIN — HYDROCORTISONE SODIUM SUCCINATE 50 MG: 100 INJECTION, POWDER, FOR SOLUTION INTRAMUSCULAR; INTRAVENOUS at 20:34

## 2024-01-01 RX ADMIN — ACETAMINOPHEN 650 MG: 325 SUSPENSION ORAL at 11:29

## 2024-01-01 RX ADMIN — Medication 50 MCG: at 05:01

## 2024-01-01 RX ADMIN — Medication 50 MCG: at 06:00

## 2024-01-01 RX ADMIN — FUROSEMIDE 10 MG/HR: 10 INJECTION, SOLUTION INTRAMUSCULAR; INTRAVENOUS at 12:00

## 2024-01-01 RX ADMIN — ALBUTEROL SULFATE 2.5 MG: 2.5 SOLUTION RESPIRATORY (INHALATION) at 06:59

## 2024-01-01 RX ADMIN — MICONAZOLE NITRATE: 2 POWDER TOPICAL at 08:44

## 2024-01-01 RX ADMIN — EPOPROSTENOL 20 NG/KG/MIN: 1.5 INJECTION, POWDER, LYOPHILIZED, FOR SOLUTION INTRAVENOUS at 07:24

## 2024-01-01 RX ADMIN — CEFTAZIDIME 2 G: 2 INJECTION, POWDER, FOR SOLUTION INTRAVENOUS at 00:30

## 2024-01-01 RX ADMIN — NOREPINEPHRINE BITARTRATE 0.03 MCG/KG/MIN: 0.02 INJECTION, SOLUTION INTRAVENOUS at 06:56

## 2024-01-01 RX ADMIN — GADOBUTROL 9 ML: 604.72 INJECTION INTRAVENOUS at 23:10

## 2024-01-01 RX ADMIN — CHLORHEXIDINE GLUCONATE 0.12% ORAL RINSE 15 ML: 1.2 LIQUID ORAL at 20:50

## 2024-01-01 RX ADMIN — FENTANYL CITRATE 50 MCG: 50 INJECTION INTRAMUSCULAR; INTRAVENOUS at 10:58

## 2024-01-01 RX ADMIN — SODIUM CHLORIDE 100 ML: 9 INJECTION, SOLUTION INTRAVENOUS at 12:37

## 2024-01-01 RX ADMIN — ALBUTEROL SULFATE 2.5 MG: 2.5 SOLUTION RESPIRATORY (INHALATION) at 08:06

## 2024-01-01 RX ADMIN — ACETYLCYSTEINE 2 ML: 200 SOLUTION ORAL; RESPIRATORY (INHALATION) at 07:22

## 2024-01-01 RX ADMIN — OXYCODONE HYDROCHLORIDE 10 MG: 5 TABLET ORAL at 18:16

## 2024-01-01 RX ADMIN — HYDROCORTISONE SODIUM SUCCINATE 50 MG: 100 INJECTION, POWDER, FOR SOLUTION INTRAMUSCULAR; INTRAVENOUS at 02:34

## 2024-01-01 RX ADMIN — ACETYLCYSTEINE 2 ML: 200 SOLUTION ORAL; RESPIRATORY (INHALATION) at 00:23

## 2024-01-01 RX ADMIN — PHENYLEPHRINE HYDROCHLORIDE 0.5 MCG/KG/MIN: 10 INJECTION INTRAVENOUS at 16:28

## 2024-01-01 RX ADMIN — FUROSEMIDE 40 MG: 10 INJECTION, SOLUTION INTRAVENOUS at 21:11

## 2024-01-01 RX ADMIN — OXYCODONE HYDROCHLORIDE 10 MG: 5 SOLUTION ORAL at 21:11

## 2024-01-01 RX ADMIN — Medication 50 MCG: at 00:20

## 2024-01-01 RX ADMIN — SODIUM PHOSPHATE, MONOBASIC, MONOHYDRATE AND SODIUM PHOSPHATE, DIBASIC, ANHYDROUS 15 MMOL: 142; 276 INJECTION, SOLUTION INTRAVENOUS at 05:23

## 2024-01-01 RX ADMIN — FUROSEMIDE 60 MG: 10 INJECTION, SOLUTION INTRAMUSCULAR; INTRAVENOUS at 11:15

## 2024-01-01 RX ADMIN — Medication 50 MCG: at 00:04

## 2024-01-01 RX ADMIN — CHLORHEXIDINE GLUCONATE 0.12% ORAL RINSE 15 ML: 1.2 LIQUID ORAL at 07:59

## 2024-01-01 RX ADMIN — ACETYLCYSTEINE 2 ML: 200 SOLUTION ORAL; RESPIRATORY (INHALATION) at 02:01

## 2024-01-01 RX ADMIN — ALBUMIN HUMAN 12.5 G: 0.05 INJECTION, SOLUTION INTRAVENOUS at 13:07

## 2024-01-01 RX ADMIN — HYDROMORPHONE HYDROCHLORIDE 0.2 MG: 0.2 INJECTION, SOLUTION INTRAMUSCULAR; INTRAVENOUS; SUBCUTANEOUS at 21:20

## 2024-01-01 RX ADMIN — PROPOFOL 35 MCG/KG/MIN: 10 INJECTION, EMULSION INTRAVENOUS at 13:36

## 2024-01-01 RX ADMIN — DIPHENHYDRAMINE HYDROCHLORIDE 50 MG: 50 INJECTION, SOLUTION INTRAMUSCULAR; INTRAVENOUS at 13:55

## 2024-01-01 RX ADMIN — POTASSIUM & SODIUM PHOSPHATES POWDER PACK 280-160-250 MG 1 PACKET: 280-160-250 PACK at 16:30

## 2024-01-01 RX ADMIN — MAGNESIUM SULFATE HEPTAHYDRATE 2 G: 40 INJECTION, SOLUTION INTRAVENOUS at 23:29

## 2024-01-01 RX ADMIN — Medication 40 MG: at 09:00

## 2024-01-01 RX ADMIN — ACETAMINOPHEN 650 MG: 325 TABLET, FILM COATED ORAL at 02:12

## 2024-01-01 RX ADMIN — PIPERACILLIN AND TAZOBACTAM 4.5 G: 4; .5 INJECTION, POWDER, FOR SOLUTION INTRAVENOUS at 08:04

## 2024-01-01 RX ADMIN — NOREPINEPHRINE BITARTRATE 0.2 MCG/KG/MIN: 0.06 INJECTION, SOLUTION INTRAVENOUS at 19:02

## 2024-01-01 RX ADMIN — ACETYLCYSTEINE 2 ML: 200 SOLUTION ORAL; RESPIRATORY (INHALATION) at 07:15

## 2024-01-01 RX ADMIN — ALBUTEROL SULFATE 2.5 MG: 2.5 SOLUTION RESPIRATORY (INHALATION) at 07:48

## 2024-01-01 RX ADMIN — AMIODARONE HYDROCHLORIDE 0.5 MG/MIN: 50 INJECTION, SOLUTION INTRAVENOUS at 05:28

## 2024-01-01 RX ADMIN — LIDOCAINE HYDROCHLORIDE 2 ML: 10 INJECTION, SOLUTION EPIDURAL; INFILTRATION; INTRACAUDAL; PERINEURAL at 17:15

## 2024-01-01 RX ADMIN — Medication 40 MG: at 09:09

## 2024-01-01 RX ADMIN — NOREPINEPHRINE BITARTRATE 0.03 MCG/KG/MIN: 0.02 INJECTION, SOLUTION INTRAVENOUS at 16:59

## 2024-01-01 RX ADMIN — QUETIAPINE FUMARATE 50 MG: 50 TABLET ORAL at 09:29

## 2024-01-01 RX ADMIN — SODIUM CHLORIDE: 9 INJECTION, SOLUTION INTRAVENOUS at 11:28

## 2024-01-01 RX ADMIN — NOREPINEPHRINE BITARTRATE 0.03 MCG/KG/MIN: 0.02 INJECTION, SOLUTION INTRAVENOUS at 12:53

## 2024-01-01 RX ADMIN — ALBUMIN HUMAN 3500 ML: 0.05 INJECTION, SOLUTION INTRAVENOUS at 15:31

## 2024-01-01 RX ADMIN — CHLORHEXIDINE GLUCONATE 0.12% ORAL RINSE 15 ML: 1.2 LIQUID ORAL at 08:03

## 2024-01-01 RX ADMIN — CHLORHEXIDINE GLUCONATE 0.12% ORAL RINSE 15 ML: 1.2 LIQUID ORAL at 08:53

## 2024-01-01 RX ADMIN — Medication 50 MCG: at 09:16

## 2024-01-01 RX ADMIN — POTASSIUM CHLORIDE 40 MEQ: 1.5 POWDER, FOR SOLUTION ORAL at 06:19

## 2024-01-01 RX ADMIN — ACETYLCYSTEINE 2 ML: 200 SOLUTION ORAL; RESPIRATORY (INHALATION) at 02:00

## 2024-01-01 RX ADMIN — CHLORHEXIDINE GLUCONATE 0.12% ORAL RINSE 15 ML: 1.2 LIQUID ORAL at 20:06

## 2024-01-01 RX ADMIN — CEFTAZIDIME 2 G: 2 INJECTION, POWDER, FOR SOLUTION INTRAVENOUS at 08:25

## 2024-01-01 RX ADMIN — ASPIRIN 81 MG CHEWABLE TABLET 81 MG: 81 TABLET CHEWABLE at 09:09

## 2024-01-01 RX ADMIN — POTASSIUM CHLORIDE 40 MEQ: 20 SOLUTION ORAL at 23:06

## 2024-01-01 RX ADMIN — DIPHENHYDRAMINE HYDROCHLORIDE 25 MG: 50 INJECTION, SOLUTION INTRAMUSCULAR; INTRAVENOUS at 11:34

## 2024-01-01 RX ADMIN — HEPARIN SODIUM 1650 UNITS/HR: 10000 INJECTION, SOLUTION INTRAVENOUS at 23:18

## 2024-01-01 RX ADMIN — ACETYLCYSTEINE 2 ML: 200 SOLUTION ORAL; RESPIRATORY (INHALATION) at 23:37

## 2024-01-01 RX ADMIN — HEPARIN SODIUM 5000 UNITS: 5000 INJECTION, SOLUTION INTRAVENOUS; SUBCUTANEOUS at 02:19

## 2024-01-01 RX ADMIN — CHLORHEXIDINE GLUCONATE 0.12% ORAL RINSE 15 ML: 1.2 LIQUID ORAL at 20:47

## 2024-01-01 RX ADMIN — ALBUTEROL SULFATE 2.5 MG: 2.5 SOLUTION RESPIRATORY (INHALATION) at 07:30

## 2024-01-01 RX ADMIN — PIPERACILLIN AND TAZOBACTAM 4.5 G: 4; .5 INJECTION, POWDER, FOR SOLUTION INTRAVENOUS at 23:44

## 2024-01-01 RX ADMIN — NOREPINEPHRINE BITARTRATE 0.03 MCG/KG/MIN: 0.06 INJECTION, SOLUTION INTRAVENOUS at 13:02

## 2024-01-01 RX ADMIN — Medication 0.4 MG/HR: at 14:41

## 2024-01-01 RX ADMIN — Medication 50 MCG: at 06:20

## 2024-01-01 RX ADMIN — PROPOFOL 30 MCG/KG/MIN: 10 INJECTION, EMULSION INTRAVENOUS at 21:59

## 2024-01-01 RX ADMIN — CHLORHEXIDINE GLUCONATE 0.12% ORAL RINSE 15 ML: 1.2 LIQUID ORAL at 20:04

## 2024-01-01 RX ADMIN — VANCOMYCIN HYDROCHLORIDE 1250 MG: 10 INJECTION, POWDER, LYOPHILIZED, FOR SOLUTION INTRAVENOUS at 21:09

## 2024-01-01 RX ADMIN — MEROPENEM 1 G: 1 INJECTION, POWDER, FOR SOLUTION INTRAVENOUS at 05:46

## 2024-01-01 RX ADMIN — ACETYLCYSTEINE 2 ML: 200 SOLUTION ORAL; RESPIRATORY (INHALATION) at 20:33

## 2024-01-01 RX ADMIN — QUETIAPINE FUMARATE 50 MG: 50 TABLET ORAL at 20:59

## 2024-01-01 RX ADMIN — Medication 40 MG: at 08:35

## 2024-01-01 RX ADMIN — OXYCODONE HYDROCHLORIDE 10 MG: 5 TABLET ORAL at 09:15

## 2024-01-01 RX ADMIN — ACETAMINOPHEN 650 MG: 325 TABLET, FILM COATED ORAL at 17:47

## 2024-01-01 RX ADMIN — HYDROMORPHONE HYDROCHLORIDE 0.3 MG: 0.2 INJECTION, SOLUTION INTRAMUSCULAR; INTRAVENOUS; SUBCUTANEOUS at 15:50

## 2024-01-01 RX ADMIN — QUETIAPINE FUMARATE 50 MG: 50 TABLET ORAL at 08:35

## 2024-01-01 RX ADMIN — CHLORHEXIDINE GLUCONATE 0.12% ORAL RINSE 15 ML: 1.2 LIQUID ORAL at 09:13

## 2024-01-01 RX ADMIN — ACETYLCYSTEINE 2 ML: 200 SOLUTION ORAL; RESPIRATORY (INHALATION) at 19:04

## 2024-01-01 RX ADMIN — CHLORHEXIDINE GLUCONATE 0.12% ORAL RINSE 15 ML: 1.2 LIQUID ORAL at 07:39

## 2024-01-01 RX ADMIN — MEROPENEM 1 G: 1 INJECTION, POWDER, FOR SOLUTION INTRAVENOUS at 05:50

## 2024-01-01 RX ADMIN — HEPARIN SODIUM 1950 UNITS/HR: 10000 INJECTION, SOLUTION INTRAVENOUS at 04:06

## 2024-01-01 RX ADMIN — MEROPENEM 1 G: 1 INJECTION, POWDER, FOR SOLUTION INTRAVENOUS at 14:20

## 2024-01-01 RX ADMIN — AMIODARONE HYDROCHLORIDE 0.5 MG/MIN: 50 INJECTION, SOLUTION INTRAVENOUS at 10:30

## 2024-01-01 RX ADMIN — HEPARIN SODIUM 5000 UNITS: 5000 INJECTION, SOLUTION INTRAVENOUS; SUBCUTANEOUS at 10:51

## 2024-01-01 RX ADMIN — ALBUTEROL SULFATE 2.5 MG: 2.5 SOLUTION RESPIRATORY (INHALATION) at 15:16

## 2024-01-01 RX ADMIN — CHLORHEXIDINE GLUCONATE 0.12% ORAL RINSE 15 ML: 1.2 LIQUID ORAL at 08:11

## 2024-01-01 RX ADMIN — NOREPINEPHRINE BITARTRATE 0.06 MCG/KG/MIN: 0.02 INJECTION, SOLUTION INTRAVENOUS at 18:26

## 2024-01-01 RX ADMIN — OXYCODONE HYDROCHLORIDE 10 MG: 5 TABLET ORAL at 00:16

## 2024-01-01 RX ADMIN — CARBOXYMETHYLCELLULOSE SODIUM 1 DROP: 5 SOLUTION/ DROPS OPHTHALMIC at 07:48

## 2024-01-01 RX ADMIN — OXYCODONE HYDROCHLORIDE 10 MG: 5 SOLUTION ORAL at 23:54

## 2024-01-01 RX ADMIN — ACETYLCYSTEINE 2 ML: 200 SOLUTION ORAL; RESPIRATORY (INHALATION) at 13:20

## 2024-01-01 RX ADMIN — EPOPROSTENOL 20 NG/KG/MIN: 1.5 INJECTION, POWDER, LYOPHILIZED, FOR SOLUTION INTRAVENOUS at 02:35

## 2024-01-01 RX ADMIN — ACETAMINOPHEN 650 MG: 325 TABLET, FILM COATED ORAL at 16:44

## 2024-01-01 RX ADMIN — SODIUM PHOSPHATE, MONOBASIC, MONOHYDRATE AND SODIUM PHOSPHATE, DIBASIC, ANHYDROUS 15 MMOL: 142; 276 INJECTION, SOLUTION INTRAVENOUS at 06:18

## 2024-01-01 RX ADMIN — MICONAZOLE NITRATE: 2 POWDER TOPICAL at 09:44

## 2024-01-01 RX ADMIN — OXYCODONE HYDROCHLORIDE 5 MG: 5 TABLET ORAL at 08:35

## 2024-01-01 RX ADMIN — ACETAMINOPHEN 650 MG: 325 TABLET, FILM COATED ORAL at 01:56

## 2024-01-01 RX ADMIN — ACETAMINOPHEN 650 MG: 325 TABLET, FILM COATED ORAL at 02:24

## 2024-01-01 RX ADMIN — ENOXAPARIN SODIUM 80 MG: 80 INJECTION SUBCUTANEOUS at 09:01

## 2024-01-01 RX ADMIN — ACETYLCYSTEINE 2 ML: 200 SOLUTION ORAL; RESPIRATORY (INHALATION) at 07:33

## 2024-01-01 RX ADMIN — ACETYLCYSTEINE 2 ML: 200 SOLUTION ORAL; RESPIRATORY (INHALATION) at 20:37

## 2024-01-01 RX ADMIN — MIDAZOLAM 2 MG: 1 INJECTION INTRAMUSCULAR; INTRAVENOUS at 14:17

## 2024-01-01 RX ADMIN — POTASSIUM & SODIUM PHOSPHATES POWDER PACK 280-160-250 MG 1 PACKET: 280-160-250 PACK at 08:25

## 2024-01-01 RX ADMIN — LOSARTAN POTASSIUM 25 MG: 25 TABLET, FILM COATED ORAL at 12:34

## 2024-01-01 RX ADMIN — HEPARIN SODIUM 1200 UNITS/HR: 10000 INJECTION, SOLUTION INTRAVENOUS at 20:04

## 2024-01-01 RX ADMIN — ENOXAPARIN SODIUM 80 MG: 80 INJECTION SUBCUTANEOUS at 13:38

## 2024-01-01 RX ADMIN — NOREPINEPHRINE BITARTRATE 0.04 MCG/KG/MIN: 0.02 INJECTION, SOLUTION INTRAVENOUS at 02:04

## 2024-01-01 RX ADMIN — OXYCODONE HYDROCHLORIDE 10 MG: 5 TABLET ORAL at 02:12

## 2024-01-01 RX ADMIN — OXYCODONE HYDROCHLORIDE 10 MG: 5 TABLET ORAL at 06:01

## 2024-01-01 RX ADMIN — ACETYLCYSTEINE 2 ML: 200 SOLUTION ORAL; RESPIRATORY (INHALATION) at 12:24

## 2024-01-01 RX ADMIN — ACETYLCYSTEINE 2 ML: 200 SOLUTION ORAL; RESPIRATORY (INHALATION) at 12:05

## 2024-01-01 RX ADMIN — OXYCODONE HYDROCHLORIDE 10 MG: 5 TABLET ORAL at 19:37

## 2024-01-01 RX ADMIN — PIPERACILLIN AND TAZOBACTAM 4.5 G: 4; .5 INJECTION, POWDER, FOR SOLUTION INTRAVENOUS at 11:10

## 2024-01-01 RX ADMIN — ACETAMINOPHEN 650 MG: 325 TABLET, FILM COATED ORAL at 00:23

## 2024-01-01 RX ADMIN — ROCURONIUM BROMIDE 20 MG: 50 INJECTION, SOLUTION INTRAVENOUS at 10:57

## 2024-01-01 RX ADMIN — POLYETHYLENE GLYCOL 3350 17 G: 17 POWDER, FOR SOLUTION ORAL at 08:20

## 2024-01-01 RX ADMIN — CHLORHEXIDINE GLUCONATE 0.12% ORAL RINSE 15 ML: 1.2 LIQUID ORAL at 19:47

## 2024-01-01 RX ADMIN — Medication 1.5 MG/HR: at 14:15

## 2024-01-01 RX ADMIN — Medication 0.4 MG/HR: at 14:15

## 2024-01-01 RX ADMIN — SODIUM CHLORIDE 75 ML/HR: 9 INJECTION, SOLUTION INTRAVENOUS at 18:49

## 2024-01-01 RX ADMIN — AMIODARONE HYDROCHLORIDE 150 MG: 1.5 INJECTION, SOLUTION INTRAVENOUS at 15:58

## 2024-01-01 RX ADMIN — SODIUM CHLORIDE 10 ML: 9 INJECTION, SOLUTION INTRAVENOUS at 21:50

## 2024-01-01 RX ADMIN — ENOXAPARIN SODIUM 80 MG: 80 INJECTION SUBCUTANEOUS at 08:46

## 2024-01-01 RX ADMIN — HEPARIN SODIUM 5000 UNITS: 5000 INJECTION, SOLUTION INTRAVENOUS; SUBCUTANEOUS at 17:57

## 2024-01-01 RX ADMIN — PIPERACILLIN AND TAZOBACTAM 4.5 G: 4; .5 INJECTION, POWDER, FOR SOLUTION INTRAVENOUS at 12:13

## 2024-01-01 RX ADMIN — HEPARIN SODIUM 2500 UNITS: 1000 INJECTION INTRAVENOUS; SUBCUTANEOUS at 14:54

## 2024-01-01 RX ADMIN — OXYCODONE HYDROCHLORIDE 10 MG: 5 TABLET ORAL at 06:41

## 2024-01-01 RX ADMIN — MEROPENEM 1 G: 1 INJECTION, POWDER, FOR SOLUTION INTRAVENOUS at 14:27

## 2024-01-01 RX ADMIN — SODIUM CHLORIDE 1000 ML: 9 INJECTION, SOLUTION INTRAVENOUS at 11:58

## 2024-01-01 RX ADMIN — MICONAZOLE NITRATE: 2 POWDER TOPICAL at 08:01

## 2024-01-01 RX ADMIN — ALBUTEROL SULFATE 2.5 MG: 2.5 SOLUTION RESPIRATORY (INHALATION) at 02:31

## 2024-01-01 RX ADMIN — PHENYLEPHRINE HYDROCHLORIDE 0.8 MCG/KG/MIN: 10 INJECTION INTRAVENOUS at 14:59

## 2024-01-01 RX ADMIN — QUETIAPINE FUMARATE 50 MG: 50 TABLET ORAL at 08:25

## 2024-01-01 RX ADMIN — HEPARIN SODIUM 5000 UNITS: 5000 INJECTION, SOLUTION INTRAVENOUS; SUBCUTANEOUS at 02:46

## 2024-01-01 RX ADMIN — HYDROMORPHONE HYDROCHLORIDE 0.2 MG: 0.2 INJECTION, SOLUTION INTRAMUSCULAR; INTRAVENOUS; SUBCUTANEOUS at 00:03

## 2024-01-01 RX ADMIN — POTASSIUM CHLORIDE 40 MEQ: 20 SOLUTION ORAL at 15:47

## 2024-01-01 RX ADMIN — POTASSIUM CHLORIDE 40 MEQ: 20 SOLUTION ORAL at 08:27

## 2024-01-01 RX ADMIN — PIPERACILLIN AND TAZOBACTAM 4.5 G: 4; .5 INJECTION, POWDER, FOR SOLUTION INTRAVENOUS at 01:06

## 2024-01-01 RX ADMIN — Medication 40 MG: at 08:25

## 2024-01-01 RX ADMIN — Medication 50 MCG: at 16:51

## 2024-01-01 RX ADMIN — QUETIAPINE FUMARATE 50 MG: 50 TABLET ORAL at 09:18

## 2024-01-01 RX ADMIN — FUROSEMIDE 40 MG: 10 INJECTION, SOLUTION INTRAVENOUS at 14:53

## 2024-01-01 RX ADMIN — ALBUTEROL SULFATE 2.5 MG: 2.5 SOLUTION RESPIRATORY (INHALATION) at 18:52

## 2024-01-01 RX ADMIN — QUETIAPINE FUMARATE 50 MG: 50 TABLET ORAL at 08:08

## 2024-01-01 RX ADMIN — Medication 50 MCG: at 20:00

## 2024-01-01 RX ADMIN — POTASSIUM CHLORIDE 40 MEQ: 1.5 POWDER, FOR SOLUTION ORAL at 22:28

## 2024-01-01 RX ADMIN — CHLORHEXIDINE GLUCONATE 0.12% ORAL RINSE 15 ML: 1.2 LIQUID ORAL at 21:14

## 2024-01-01 RX ADMIN — HEPARIN SODIUM 1500 UNITS: 1000 INJECTION INTRAVENOUS; SUBCUTANEOUS at 13:03

## 2024-01-01 RX ADMIN — PROPOFOL 30 MCG/KG/MIN: 10 INJECTION, EMULSION INTRAVENOUS at 05:18

## 2024-01-01 RX ADMIN — ASPIRIN 81 MG CHEWABLE TABLET 81 MG: 81 TABLET CHEWABLE at 08:20

## 2024-01-01 RX ADMIN — ALBUTEROL SULFATE 2.5 MG: 2.5 SOLUTION RESPIRATORY (INHALATION) at 07:04

## 2024-01-01 RX ADMIN — Medication 50 MCG: at 21:54

## 2024-01-01 RX ADMIN — OXYCODONE HYDROCHLORIDE 10 MG: 5 TABLET ORAL at 05:29

## 2024-01-01 RX ADMIN — MICONAZOLE NITRATE: 2 POWDER TOPICAL at 18:28

## 2024-01-01 RX ADMIN — CEFTAZIDIME 2 G: 2 INJECTION, POWDER, FOR SOLUTION INTRAVENOUS at 09:02

## 2024-01-01 RX ADMIN — QUETIAPINE FUMARATE 50 MG: 50 TABLET ORAL at 20:26

## 2024-01-01 RX ADMIN — CHLORHEXIDINE GLUCONATE 0.12% ORAL RINSE 15 ML: 1.2 LIQUID ORAL at 20:24

## 2024-01-01 RX ADMIN — OXYCODONE HYDROCHLORIDE 10 MG: 5 TABLET ORAL at 00:09

## 2024-01-01 RX ADMIN — DEXTROSE MONOHYDRATE: 50 INJECTION, SOLUTION INTRAVENOUS at 14:53

## 2024-01-01 RX ADMIN — MEROPENEM 1 G: 1 INJECTION, POWDER, FOR SOLUTION INTRAVENOUS at 14:11

## 2024-01-01 RX ADMIN — OXYCODONE HYDROCHLORIDE 10 MG: 5 TABLET ORAL at 02:24

## 2024-01-01 RX ADMIN — PROPOFOL 15 MCG/KG/MIN: 10 INJECTION, EMULSION INTRAVENOUS at 09:44

## 2024-01-01 RX ADMIN — POTASSIUM CHLORIDE 40 MEQ: 20 SOLUTION ORAL at 08:52

## 2024-01-01 RX ADMIN — CHLORHEXIDINE GLUCONATE 0.12% ORAL RINSE 15 ML: 1.2 LIQUID ORAL at 08:35

## 2024-01-01 RX ADMIN — POTASSIUM CHLORIDE 40 MEQ: 20 SOLUTION ORAL at 11:15

## 2024-01-01 RX ADMIN — OXYCODONE HYDROCHLORIDE 10 MG: 5 TABLET ORAL at 05:45

## 2024-01-01 RX ADMIN — SODIUM CHLORIDE 71 ML: 9 INJECTION, SOLUTION INTRAVENOUS at 21:29

## 2024-01-01 RX ADMIN — POTASSIUM & SODIUM PHOSPHATES POWDER PACK 280-160-250 MG 2 PACKET: 280-160-250 PACK at 08:46

## 2024-01-01 RX ADMIN — PIPERACILLIN AND TAZOBACTAM 4.5 G: 4; .5 INJECTION, POWDER, FOR SOLUTION INTRAVENOUS at 14:10

## 2024-01-01 RX ADMIN — DEXMEDETOMIDINE HYDROCHLORIDE 0.4 MCG/KG/HR: 400 INJECTION INTRAVENOUS at 20:09

## 2024-01-01 RX ADMIN — ENOXAPARIN SODIUM 80 MG: 80 INJECTION SUBCUTANEOUS at 08:58

## 2024-01-01 RX ADMIN — HYDROMORPHONE HYDROCHLORIDE 0.2 MG: 0.2 INJECTION, SOLUTION INTRAMUSCULAR; INTRAVENOUS; SUBCUTANEOUS at 10:46

## 2024-01-01 RX ADMIN — ACETAMINOPHEN 650 MG: 325 TABLET, FILM COATED ORAL at 16:31

## 2024-01-01 RX ADMIN — DILTIAZEM HYDROCHLORIDE 5 MG: 5 INJECTION INTRAVENOUS at 16:44

## 2024-01-01 RX ADMIN — PIPERACILLIN AND TAZOBACTAM 4.5 G: 4; .5 INJECTION, POWDER, FOR SOLUTION INTRAVENOUS at 14:55

## 2024-01-01 RX ADMIN — DIPHENHYDRAMINE HYDROCHLORIDE 25 MG: 50 INJECTION, SOLUTION INTRAMUSCULAR; INTRAVENOUS at 14:26

## 2024-01-01 RX ADMIN — Medication 50 MCG: at 19:23

## 2024-01-01 RX ADMIN — ALBUTEROL SULFATE 2.5 MG: 2.5 SOLUTION RESPIRATORY (INHALATION) at 01:37

## 2024-01-01 RX ADMIN — ALBUTEROL SULFATE 2.5 MG: 2.5 SOLUTION RESPIRATORY (INHALATION) at 00:33

## 2024-01-01 RX ADMIN — ACETAMINOPHEN 650 MG: 325 TABLET, FILM COATED ORAL at 07:56

## 2024-01-01 RX ADMIN — FUROSEMIDE 10 MG/HR: 10 INJECTION, SOLUTION INTRAMUSCULAR; INTRAVENOUS at 16:56

## 2024-01-01 RX ADMIN — HEPARIN SODIUM 1650 UNITS/HR: 10000 INJECTION, SOLUTION INTRAVENOUS at 07:48

## 2024-01-01 RX ADMIN — CHLORHEXIDINE GLUCONATE 0.12% ORAL RINSE 15 ML: 1.2 LIQUID ORAL at 20:27

## 2024-01-01 RX ADMIN — ACETYLCYSTEINE 2 ML: 200 SOLUTION ORAL; RESPIRATORY (INHALATION) at 01:37

## 2024-01-01 RX ADMIN — PANTOPRAZOLE SODIUM 40 MG: 40 INJECTION, POWDER, FOR SOLUTION INTRAVENOUS at 07:59

## 2024-01-01 RX ADMIN — NOREPINEPHRINE BITARTRATE 0.06 MCG/KG/MIN: 0.02 INJECTION, SOLUTION INTRAVENOUS at 00:59

## 2024-01-01 RX ADMIN — ALBUTEROL SULFATE 2.5 MG: 2.5 SOLUTION RESPIRATORY (INHALATION) at 19:33

## 2024-01-01 RX ADMIN — Medication 50 MCG: at 12:34

## 2024-01-01 RX ADMIN — Medication 40 MG: at 08:11

## 2024-01-01 RX ADMIN — PROPOFOL 25 MCG/KG/MIN: 10 INJECTION, EMULSION INTRAVENOUS at 18:15

## 2024-01-01 RX ADMIN — EPOPROSTENOL 20 NG/KG/MIN: 1.5 INJECTION, POWDER, LYOPHILIZED, FOR SOLUTION INTRAVENOUS at 12:47

## 2024-01-01 RX ADMIN — CHLORHEXIDINE GLUCONATE 0.12% ORAL RINSE 15 ML: 1.2 LIQUID ORAL at 20:26

## 2024-01-01 RX ADMIN — Medication 50 MCG/HR: at 10:17

## 2024-01-01 RX ADMIN — HUMAN ALBUMIN MICROSPHERES AND PERFLUTREN 3 ML: 10; .22 INJECTION, SOLUTION INTRAVENOUS at 09:17

## 2024-01-01 RX ADMIN — FUROSEMIDE 40 MG: 10 INJECTION, SOLUTION INTRAVENOUS at 12:52

## 2024-01-01 RX ADMIN — CEFTAZIDIME 2 G: 2 INJECTION, POWDER, FOR SOLUTION INTRAVENOUS at 12:32

## 2024-01-01 RX ADMIN — Medication 0.4 MG/HR: at 15:12

## 2024-01-01 RX ADMIN — CHLORHEXIDINE GLUCONATE 0.12% ORAL RINSE 15 ML: 1.2 LIQUID ORAL at 21:11

## 2024-01-01 RX ADMIN — OXYCODONE HYDROCHLORIDE 10 MG: 5 SOLUTION ORAL at 19:24

## 2024-01-01 RX ADMIN — OXYCODONE HYDROCHLORIDE 10 MG: 5 TABLET ORAL at 20:39

## 2024-01-01 RX ADMIN — QUETIAPINE FUMARATE 50 MG: 50 TABLET ORAL at 20:04

## 2024-01-01 RX ADMIN — CEFTAZIDIME 2 G: 2 INJECTION, POWDER, FOR SOLUTION INTRAVENOUS at 08:27

## 2024-01-01 RX ADMIN — SODIUM PHOSPHATE, MONOBASIC, MONOHYDRATE AND SODIUM PHOSPHATE, DIBASIC, ANHYDROUS 15 MMOL: 142; 276 INJECTION, SOLUTION INTRAVENOUS at 22:35

## 2024-01-01 RX ADMIN — FUROSEMIDE 10 MG/HR: 10 INJECTION, SOLUTION INTRAMUSCULAR; INTRAVENOUS at 05:07

## 2024-01-01 RX ADMIN — ALBUTEROL SULFATE 2.5 MG: 2.5 SOLUTION RESPIRATORY (INHALATION) at 01:17

## 2024-01-01 RX ADMIN — POTASSIUM CHLORIDE 40 MEQ: 1.5 POWDER, FOR SOLUTION ORAL at 21:13

## 2024-01-01 RX ADMIN — HYDROCORTISONE SODIUM SUCCINATE 50 MG: 100 INJECTION, POWDER, FOR SOLUTION INTRAMUSCULAR; INTRAVENOUS at 14:50

## 2024-01-01 RX ADMIN — CHLORHEXIDINE GLUCONATE 0.12% ORAL RINSE 15 ML: 1.2 LIQUID ORAL at 20:39

## 2024-01-01 RX ADMIN — OXYCODONE HYDROCHLORIDE 10 MG: 5 TABLET ORAL at 08:30

## 2024-01-01 RX ADMIN — PIPERACILLIN AND TAZOBACTAM 4.5 G: 4; .5 INJECTION, POWDER, FOR SOLUTION INTRAVENOUS at 02:34

## 2024-01-01 RX ADMIN — ALBUTEROL SULFATE 2.5 MG: 2.5 SOLUTION RESPIRATORY (INHALATION) at 19:01

## 2024-01-01 RX ADMIN — OXYCODONE HYDROCHLORIDE 10 MG: 5 TABLET ORAL at 13:20

## 2024-01-01 RX ADMIN — CHLORHEXIDINE GLUCONATE 0.12% ORAL RINSE 15 ML: 1.2 LIQUID ORAL at 08:24

## 2024-01-01 RX ADMIN — DIPHENHYDRAMINE HYDROCHLORIDE 50 MG: 50 INJECTION, SOLUTION INTRAMUSCULAR; INTRAVENOUS at 06:00

## 2024-01-01 RX ADMIN — CEFTAZIDIME 2 G: 2 INJECTION, POWDER, FOR SOLUTION INTRAVENOUS at 08:35

## 2024-01-01 RX ADMIN — HEPARIN SODIUM 1650 UNITS/HR: 10000 INJECTION, SOLUTION INTRAVENOUS at 16:01

## 2024-01-01 RX ADMIN — CALCIUM GLUCONATE 5 G: 98 INJECTION, SOLUTION INTRAVENOUS at 14:40

## 2024-01-01 RX ADMIN — ALBUTEROL SULFATE 2.5 MG: 2.5 SOLUTION RESPIRATORY (INHALATION) at 02:44

## 2024-01-01 RX ADMIN — PIPERACILLIN AND TAZOBACTAM 4.5 G: 4; .5 INJECTION, POWDER, FOR SOLUTION INTRAVENOUS at 18:31

## 2024-01-01 RX ADMIN — QUETIAPINE FUMARATE 50 MG: 50 TABLET ORAL at 20:11

## 2024-01-01 RX ADMIN — OXYCODONE HYDROCHLORIDE 10 MG: 5 TABLET ORAL at 20:29

## 2024-01-01 RX ADMIN — ACETYLCYSTEINE 2 ML: 200 SOLUTION ORAL; RESPIRATORY (INHALATION) at 00:13

## 2024-01-01 RX ADMIN — Medication 40 MG: at 08:46

## 2024-01-01 RX ADMIN — NOREPINEPHRINE BITARTRATE 0.07 MCG/KG/MIN: 0.02 INJECTION, SOLUTION INTRAVENOUS at 15:38

## 2024-01-01 RX ADMIN — ACETYLCYSTEINE 2 ML: 200 SOLUTION ORAL; RESPIRATORY (INHALATION) at 19:51

## 2024-01-01 RX ADMIN — Medication 50 MCG: at 16:17

## 2024-01-01 RX ADMIN — DEXMEDETOMIDINE HYDROCHLORIDE 0.3 MCG/KG/HR: 400 INJECTION INTRAVENOUS at 18:45

## 2024-01-01 RX ADMIN — FUROSEMIDE 10 MG/HR: 10 INJECTION, SOLUTION INTRAMUSCULAR; INTRAVENOUS at 18:16

## 2024-01-01 RX ADMIN — HEPARIN SODIUM 5000 UNITS: 5000 INJECTION, SOLUTION INTRAVENOUS; SUBCUTANEOUS at 09:13

## 2024-01-01 RX ADMIN — QUETIAPINE FUMARATE 50 MG: 50 TABLET ORAL at 20:13

## 2024-01-01 RX ADMIN — ALBUMIN HUMAN 25 G: 0.05 INJECTION, SOLUTION INTRAVENOUS at 22:59

## 2024-01-01 RX ADMIN — ALBUTEROL SULFATE 2.5 MG: 2.5 SOLUTION RESPIRATORY (INHALATION) at 20:36

## 2024-01-01 RX ADMIN — HEPARIN SODIUM 1500 UNITS/HR: 10000 INJECTION, SOLUTION INTRAVENOUS at 22:09

## 2024-01-01 RX ADMIN — PHENYLEPHRINE HYDROCHLORIDE 100 MCG: 10 INJECTION INTRAVENOUS at 11:28

## 2024-01-01 RX ADMIN — PHENYLEPHRINE HYDROCHLORIDE 100 MCG: 10 INJECTION INTRAVENOUS at 11:23

## 2024-01-01 RX ADMIN — MIDAZOLAM HYDROCHLORIDE 2 MG/HR: 1 INJECTION, SOLUTION INTRAVENOUS at 01:11

## 2024-01-01 RX ADMIN — LIDOCAINE HYDROCHLORIDE 3 ML: 10 INJECTION, SOLUTION EPIDURAL; INFILTRATION; INTRACAUDAL; PERINEURAL at 11:14

## 2024-01-01 RX ADMIN — OXYCODONE HYDROCHLORIDE 10 MG: 5 TABLET ORAL at 09:09

## 2024-01-01 RX ADMIN — Medication 40 MG: at 08:36

## 2024-01-01 RX ADMIN — CHLORHEXIDINE GLUCONATE 0.12% ORAL RINSE 15 ML: 1.2 LIQUID ORAL at 07:56

## 2024-01-01 RX ADMIN — CHLORHEXIDINE GLUCONATE 0.12% ORAL RINSE 15 ML: 1.2 LIQUID ORAL at 20:13

## 2024-01-01 RX ADMIN — QUETIAPINE FUMARATE 50 MG: 50 TABLET ORAL at 08:27

## 2024-01-01 RX ADMIN — DIPHENHYDRAMINE HYDROCHLORIDE 50 MG: 50 INJECTION, SOLUTION INTRAMUSCULAR; INTRAVENOUS at 01:49

## 2024-01-01 RX ADMIN — PIPERACILLIN AND TAZOBACTAM 4.5 G: 4; .5 INJECTION, POWDER, FOR SOLUTION INTRAVENOUS at 09:00

## 2024-01-01 RX ADMIN — ENOXAPARIN SODIUM 80 MG: 80 INJECTION SUBCUTANEOUS at 00:12

## 2024-01-01 RX ADMIN — FUROSEMIDE 15 MG/HR: 10 INJECTION, SOLUTION INTRAMUSCULAR; INTRAVENOUS at 14:32

## 2024-01-01 RX ADMIN — POTASSIUM & SODIUM PHOSPHATES POWDER PACK 280-160-250 MG 2 PACKET: 280-160-250 PACK at 05:28

## 2024-01-01 RX ADMIN — HEPARIN SODIUM 1200 UNITS/HR: 10000 INJECTION, SOLUTION INTRAVENOUS at 08:51

## 2024-01-01 RX ADMIN — SODIUM PHOSPHATE, MONOBASIC, MONOHYDRATE AND SODIUM PHOSPHATE, DIBASIC, ANHYDROUS 15 MMOL: 142; 276 INJECTION, SOLUTION INTRAVENOUS at 09:08

## 2024-01-01 RX ADMIN — POTASSIUM & SODIUM PHOSPHATES POWDER PACK 280-160-250 MG 1 PACKET: 280-160-250 PACK at 12:33

## 2024-01-01 RX ADMIN — PROPOFOL 25 MCG/KG/MIN: 10 INJECTION, EMULSION INTRAVENOUS at 10:31

## 2024-01-01 RX ADMIN — AMIODARONE HYDROCHLORIDE 200 MG: 200 TABLET ORAL at 08:54

## 2024-01-01 RX ADMIN — ALBUMIN HUMAN 4500 ML: 0.05 INJECTION, SOLUTION INTRAVENOUS at 13:02

## 2024-01-01 RX ADMIN — MEROPENEM 1 G: 1 INJECTION, POWDER, FOR SOLUTION INTRAVENOUS at 21:17

## 2024-01-01 RX ADMIN — DEXAMETHASONE SODIUM PHOSPHATE 20 MG: 10 INJECTION INTRAMUSCULAR; INTRAVENOUS at 13:44

## 2024-01-01 RX ADMIN — ALBUTEROL SULFATE 2.5 MG: 2.5 SOLUTION RESPIRATORY (INHALATION) at 12:40

## 2024-01-01 RX ADMIN — ENOXAPARIN SODIUM 80 MG: 80 INJECTION SUBCUTANEOUS at 13:53

## 2024-01-01 RX ADMIN — PROPOFOL 30 MCG/KG/MIN: 10 INJECTION, EMULSION INTRAVENOUS at 08:53

## 2024-01-01 RX ADMIN — ALBUTEROL SULFATE 2.5 MG: 2.5 SOLUTION RESPIRATORY (INHALATION) at 18:42

## 2024-01-01 RX ADMIN — MIDAZOLAM 2 MG: 1 INJECTION INTRAMUSCULAR; INTRAVENOUS at 04:14

## 2024-01-01 RX ADMIN — Medication 50 MCG: at 13:02

## 2024-01-01 RX ADMIN — Medication 50 MCG: at 13:41

## 2024-01-01 RX ADMIN — OXYCODONE HYDROCHLORIDE 5 MG: 5 TABLET ORAL at 10:52

## 2024-01-01 RX ADMIN — ALBUTEROL SULFATE 2.5 MG: 2.5 SOLUTION RESPIRATORY (INHALATION) at 00:29

## 2024-01-01 RX ADMIN — PIPERACILLIN AND TAZOBACTAM 4.5 G: 4; .5 INJECTION, POWDER, FOR SOLUTION INTRAVENOUS at 06:11

## 2024-01-01 RX ADMIN — POTASSIUM CHLORIDE 20 MEQ: 20 SOLUTION ORAL at 00:32

## 2024-01-01 RX ADMIN — CHLORHEXIDINE GLUCONATE 0.12% ORAL RINSE 15 ML: 1.2 LIQUID ORAL at 07:54

## 2024-01-01 RX ADMIN — METOPROLOL SUCCINATE 25 MG: 25 TABLET, EXTENDED RELEASE ORAL at 11:16

## 2024-01-01 RX ADMIN — HEPARIN SODIUM 1500 UNITS/HR: 10000 INJECTION, SOLUTION INTRAVENOUS at 16:39

## 2024-01-01 RX ADMIN — METOPROLOL TARTRATE 12.5 MG: 25 TABLET, FILM COATED ORAL at 20:13

## 2024-01-01 RX ADMIN — ALBUTEROL SULFATE 2.5 MG: 2.5 SOLUTION RESPIRATORY (INHALATION) at 19:13

## 2024-01-01 RX ADMIN — DEXMEDETOMIDINE HYDROCHLORIDE 0.3 MCG/KG/HR: 400 INJECTION INTRAVENOUS at 22:09

## 2024-01-01 RX ADMIN — MICONAZOLE NITRATE: 2 POWDER TOPICAL at 20:27

## 2024-01-01 RX ADMIN — Medication 40 MG: at 08:40

## 2024-01-01 RX ADMIN — POTASSIUM CHLORIDE 40 MEQ: 20 SOLUTION ORAL at 05:28

## 2024-01-01 RX ADMIN — ENOXAPARIN SODIUM 80 MG: 80 INJECTION SUBCUTANEOUS at 11:59

## 2024-01-01 RX ADMIN — HEPARIN SODIUM 5000 UNITS: 5000 INJECTION, SOLUTION INTRAVENOUS; SUBCUTANEOUS at 10:40

## 2024-01-01 RX ADMIN — AMIODARONE HYDROCHLORIDE 200 MG: 200 TABLET ORAL at 08:45

## 2024-01-01 RX ADMIN — SODIUM PHOSPHATE, MONOBASIC, MONOHYDRATE AND SODIUM PHOSPHATE, DIBASIC, ANHYDROUS 15 MMOL: 142; 276 INJECTION, SOLUTION INTRAVENOUS at 01:15

## 2024-01-01 RX ADMIN — EPOPROSTENOL 20 NG/KG/MIN: 1.5 INJECTION, POWDER, LYOPHILIZED, FOR SOLUTION INTRAVENOUS at 06:45

## 2024-01-01 RX ADMIN — CALCIUM GLUCONATE 4.5 G: 98 INJECTION, SOLUTION INTRAVENOUS at 13:00

## 2024-01-01 RX ADMIN — ASPIRIN 81 MG CHEWABLE TABLET 324 MG: 81 TABLET CHEWABLE at 13:42

## 2024-01-01 RX ADMIN — CHLORHEXIDINE GLUCONATE 0.12% ORAL RINSE 15 ML: 1.2 LIQUID ORAL at 19:37

## 2024-01-01 RX ADMIN — SODIUM PHOSPHATE, MONOBASIC, MONOHYDRATE AND SODIUM PHOSPHATE, DIBASIC, ANHYDROUS 15 MMOL: 142; 276 INJECTION, SOLUTION INTRAVENOUS at 11:24

## 2024-01-01 RX ADMIN — AMIODARONE HYDROCHLORIDE 200 MG: 200 TABLET ORAL at 08:08

## 2024-01-01 RX ADMIN — DEXMEDETOMIDINE HYDROCHLORIDE 0.3 MCG/KG/HR: 400 INJECTION INTRAVENOUS at 07:54

## 2024-01-01 RX ADMIN — OXYCODONE HYDROCHLORIDE 10 MG: 5 TABLET ORAL at 21:13

## 2024-01-01 RX ADMIN — MEROPENEM 1 G: 1 INJECTION, POWDER, FOR SOLUTION INTRAVENOUS at 16:12

## 2024-01-01 RX ADMIN — ACETYLCYSTEINE 2 ML: 200 SOLUTION ORAL; RESPIRATORY (INHALATION) at 19:50

## 2024-01-01 RX ADMIN — HYDROCORTISONE SODIUM SUCCINATE 50 MG: 100 INJECTION, POWDER, FOR SOLUTION INTRAMUSCULAR; INTRAVENOUS at 02:23

## 2024-01-01 RX ADMIN — FUROSEMIDE 15 MG/HR: 10 INJECTION, SOLUTION INTRAMUSCULAR; INTRAVENOUS at 21:57

## 2024-01-01 RX ADMIN — PROPOFOL 50 MCG/KG/MIN: 10 INJECTION, EMULSION INTRAVENOUS at 11:04

## 2024-01-01 RX ADMIN — POTASSIUM CHLORIDE 20 MEQ: 1.5 POWDER, FOR SOLUTION ORAL at 23:50

## 2024-01-01 RX ADMIN — Medication 50 MCG: at 02:19

## 2024-01-01 RX ADMIN — MEROPENEM 1 G: 1 INJECTION, POWDER, FOR SOLUTION INTRAVENOUS at 05:27

## 2024-01-01 RX ADMIN — FUROSEMIDE 40 MG: 10 INJECTION, SOLUTION INTRAMUSCULAR; INTRAVENOUS at 13:35

## 2024-01-01 RX ADMIN — MIDAZOLAM 2 MG: 1 INJECTION INTRAMUSCULAR; INTRAVENOUS at 12:40

## 2024-01-01 RX ADMIN — MICONAZOLE NITRATE: 2 POWDER TOPICAL at 20:29

## 2024-01-01 RX ADMIN — ALBUTEROL SULFATE 2.5 MG: 2.5 SOLUTION RESPIRATORY (INHALATION) at 18:54

## 2024-01-01 RX ADMIN — MIDAZOLAM HYDROCHLORIDE 1 MG/HR: 1 INJECTION, SOLUTION INTRAVENOUS at 11:11

## 2024-01-01 RX ADMIN — ACETAMINOPHEN 650 MG: 325 TABLET, FILM COATED ORAL at 19:36

## 2024-01-01 RX ADMIN — ALBUTEROL SULFATE 2.5 MG: 2.5 SOLUTION RESPIRATORY (INHALATION) at 01:22

## 2024-01-01 RX ADMIN — CHLORHEXIDINE GLUCONATE 0.12% ORAL RINSE 15 ML: 1.2 LIQUID ORAL at 09:09

## 2024-01-01 RX ADMIN — ACETAMINOPHEN 650 MG: 325 TABLET, FILM COATED ORAL at 17:00

## 2024-01-01 RX ADMIN — Medication 50 MCG: at 03:02

## 2024-01-01 RX ADMIN — OXYCODONE HYDROCHLORIDE 10 MG: 5 TABLET ORAL at 02:52

## 2024-01-01 RX ADMIN — POTASSIUM CHLORIDE 40 MEQ: 1.5 POWDER, FOR SOLUTION ORAL at 22:08

## 2024-01-01 RX ADMIN — CHLORHEXIDINE GLUCONATE 0.12% ORAL RINSE 15 ML: 1.2 LIQUID ORAL at 08:19

## 2024-01-01 RX ADMIN — ALBUTEROL SULFATE 2.5 MG: 2.5 SOLUTION RESPIRATORY (INHALATION) at 12:37

## 2024-01-01 RX ADMIN — ACETYLCYSTEINE 2 ML: 200 SOLUTION ORAL; RESPIRATORY (INHALATION) at 02:16

## 2024-01-01 RX ADMIN — HEPARIN SODIUM 1650 UNITS/HR: 10000 INJECTION, SOLUTION INTRAVENOUS at 13:40

## 2024-01-01 RX ADMIN — AMIODARONE HYDROCHLORIDE 1 MG/MIN: 50 INJECTION, SOLUTION INTRAVENOUS at 16:19

## 2024-01-01 RX ADMIN — SENNOSIDES 8.6 MG: 8.6 TABLET, FILM COATED ORAL at 09:13

## 2024-01-01 RX ADMIN — MICONAZOLE NITRATE: 2 POWDER TOPICAL at 09:42

## 2024-01-01 RX ADMIN — MICONAZOLE NITRATE: 2 POWDER TOPICAL at 21:50

## 2024-01-01 RX ADMIN — Medication 40 MG: at 06:36

## 2024-01-01 RX ADMIN — CALCIUM GLUCONATE 5 G: 98 INJECTION, SOLUTION INTRAVENOUS at 08:11

## 2024-01-01 ASSESSMENT — ACTIVITIES OF DAILY LIVING (ADL)
ADLS_ACUITY_SCORE: 55
ADLS_ACUITY_SCORE: 59
ADLS_ACUITY_SCORE: 59
ADLS_ACUITY_SCORE: 55
ADLS_ACUITY_SCORE: 59
ADLS_ACUITY_SCORE: 59
ADLS_ACUITY_SCORE: 57
ADLS_ACUITY_SCORE: 65
ADLS_ACUITY_SCORE: 55
ADLS_ACUITY_SCORE: 59
ADLS_ACUITY_SCORE: 61
ADLS_ACUITY_SCORE: 37
ADLS_ACUITY_SCORE: 55
ADLS_ACUITY_SCORE: 55
ADLS_ACUITY_SCORE: 57
ADLS_ACUITY_SCORE: 55
ADLS_ACUITY_SCORE: 57
ADLS_ACUITY_SCORE: 61
ADLS_ACUITY_SCORE: 55
ADLS_ACUITY_SCORE: 57
ADLS_ACUITY_SCORE: 63
ADLS_ACUITY_SCORE: 57
ADLS_ACUITY_SCORE: 57
ADLS_ACUITY_SCORE: 59
ADLS_ACUITY_SCORE: 59
ADLS_ACUITY_SCORE: 65
ADLS_ACUITY_SCORE: 65
ADLS_ACUITY_SCORE: 55
ADLS_ACUITY_SCORE: 61
ADLS_ACUITY_SCORE: 55
ADLS_ACUITY_SCORE: 59
ADLS_ACUITY_SCORE: 65
ADLS_ACUITY_SCORE: 59
ADLS_ACUITY_SCORE: 59
ADLS_ACUITY_SCORE: 61
ADLS_ACUITY_SCORE: 55
ADLS_ACUITY_SCORE: 61
ADLS_ACUITY_SCORE: 55
ADLS_ACUITY_SCORE: 65
ADLS_ACUITY_SCORE: 57
ADLS_ACUITY_SCORE: 55
ADLS_ACUITY_SCORE: 59
ADLS_ACUITY_SCORE: 55
ADLS_ACUITY_SCORE: 55
ADLS_ACUITY_SCORE: 59
ADLS_ACUITY_SCORE: 61
ADLS_ACUITY_SCORE: 55
ADLS_ACUITY_SCORE: 57
ADLS_ACUITY_SCORE: 55
ADLS_ACUITY_SCORE: 61
ADLS_ACUITY_SCORE: 65
ADLS_ACUITY_SCORE: 53
ADLS_ACUITY_SCORE: 57
ADLS_ACUITY_SCORE: 55
ADLS_ACUITY_SCORE: 57
ADLS_ACUITY_SCORE: 65
ADLS_ACUITY_SCORE: 59
ADLS_ACUITY_SCORE: 59
ADLS_ACUITY_SCORE: 55
ADLS_ACUITY_SCORE: 53
ADLS_ACUITY_SCORE: 63
ADLS_ACUITY_SCORE: 55
ADLS_ACUITY_SCORE: 63
ADLS_ACUITY_SCORE: 65
ADLS_ACUITY_SCORE: 61
ADLS_ACUITY_SCORE: 55
ADLS_ACUITY_SCORE: 65
ADLS_ACUITY_SCORE: 57
ADLS_ACUITY_SCORE: 57
ADLS_ACUITY_SCORE: 59
ADLS_ACUITY_SCORE: 55
ADLS_ACUITY_SCORE: 55
ADLS_ACUITY_SCORE: 61
ADLS_ACUITY_SCORE: 57
ADLS_ACUITY_SCORE: 57
ADLS_ACUITY_SCORE: 55
ADLS_ACUITY_SCORE: 57
ADLS_ACUITY_SCORE: 63
ADLS_ACUITY_SCORE: 55
ADLS_ACUITY_SCORE: 65
ADLS_ACUITY_SCORE: 63
ADLS_ACUITY_SCORE: 55
ADLS_ACUITY_SCORE: 53
ADLS_ACUITY_SCORE: 59
ADLS_ACUITY_SCORE: 55
ADLS_ACUITY_SCORE: 59
ADLS_ACUITY_SCORE: 51
ADLS_ACUITY_SCORE: 63
ADLS_ACUITY_SCORE: 55
ADLS_ACUITY_SCORE: 59
ADLS_ACUITY_SCORE: 55
ADLS_ACUITY_SCORE: 59
ADLS_ACUITY_SCORE: 65
ADLS_ACUITY_SCORE: 59
ADLS_ACUITY_SCORE: 63
ADLS_ACUITY_SCORE: 55
ADLS_ACUITY_SCORE: 59
ADLS_ACUITY_SCORE: 61
ADLS_ACUITY_SCORE: 55
ADLS_ACUITY_SCORE: 61
ADLS_ACUITY_SCORE: 55
ADLS_ACUITY_SCORE: 61
ADLS_ACUITY_SCORE: 59
ADLS_ACUITY_SCORE: 65
ADLS_ACUITY_SCORE: 61
ADLS_ACUITY_SCORE: 55
ADLS_ACUITY_SCORE: 55
ADLS_ACUITY_SCORE: 57
ADLS_ACUITY_SCORE: 55
ADLS_ACUITY_SCORE: 61
ADLS_ACUITY_SCORE: 37
ADLS_ACUITY_SCORE: 59
ADLS_ACUITY_SCORE: 59
ADLS_ACUITY_SCORE: 55
ADLS_ACUITY_SCORE: 37
ADLS_ACUITY_SCORE: 61
ADLS_ACUITY_SCORE: 65
ADLS_ACUITY_SCORE: 55
ADLS_ACUITY_SCORE: 59
ADLS_ACUITY_SCORE: 55
ADLS_ACUITY_SCORE: 57
ADLS_ACUITY_SCORE: 55
ADLS_ACUITY_SCORE: 59
ADLS_ACUITY_SCORE: 59
ADLS_ACUITY_SCORE: 55
ADLS_ACUITY_SCORE: 59
ADLS_ACUITY_SCORE: 55
ADLS_ACUITY_SCORE: 57
ADLS_ACUITY_SCORE: 55
ADLS_ACUITY_SCORE: 61
ADLS_ACUITY_SCORE: 37
ADLS_ACUITY_SCORE: 51
ADLS_ACUITY_SCORE: 61
ADLS_ACUITY_SCORE: 59
ADLS_ACUITY_SCORE: 61
ADLS_ACUITY_SCORE: 59
ADLS_ACUITY_SCORE: 57
ADLS_ACUITY_SCORE: 59
ADLS_ACUITY_SCORE: 61
ADLS_ACUITY_SCORE: 55
ADLS_ACUITY_SCORE: 59
ADLS_ACUITY_SCORE: 57
ADLS_ACUITY_SCORE: 57
ADLS_ACUITY_SCORE: 59
ADLS_ACUITY_SCORE: 65
ADLS_ACUITY_SCORE: 57
ADLS_ACUITY_SCORE: 59
ADLS_ACUITY_SCORE: 57
ADLS_ACUITY_SCORE: 55
ADLS_ACUITY_SCORE: 61
ADLS_ACUITY_SCORE: 55
ADLS_ACUITY_SCORE: 57
ADLS_ACUITY_SCORE: 61
ADLS_ACUITY_SCORE: 55
ADLS_ACUITY_SCORE: 61
ADLS_ACUITY_SCORE: 61
ADLS_ACUITY_SCORE: 59
ADLS_ACUITY_SCORE: 57
ADLS_ACUITY_SCORE: 37
ADLS_ACUITY_SCORE: 55
ADLS_ACUITY_SCORE: 55
ADLS_ACUITY_SCORE: 65
ADLS_ACUITY_SCORE: 55
ADLS_ACUITY_SCORE: 57
ADLS_ACUITY_SCORE: 55
ADLS_ACUITY_SCORE: 59
ADLS_ACUITY_SCORE: 55
ADLS_ACUITY_SCORE: 53
ADLS_ACUITY_SCORE: 57
ADLS_ACUITY_SCORE: 55
ADLS_ACUITY_SCORE: 55
ADLS_ACUITY_SCORE: 59
ADLS_ACUITY_SCORE: 57
ADLS_ACUITY_SCORE: 59
ADLS_ACUITY_SCORE: 55
ADLS_ACUITY_SCORE: 57
ADLS_ACUITY_SCORE: 55
ADLS_ACUITY_SCORE: 55
ADLS_ACUITY_SCORE: 59
ADLS_ACUITY_SCORE: 61
ADLS_ACUITY_SCORE: 61
ADLS_ACUITY_SCORE: 63
ADLS_ACUITY_SCORE: 65
ADLS_ACUITY_SCORE: 57
ADLS_ACUITY_SCORE: 57
ADLS_ACUITY_SCORE: 55
ADLS_ACUITY_SCORE: 59
ADLS_ACUITY_SCORE: 55
ADLS_ACUITY_SCORE: 53
ADLS_ACUITY_SCORE: 55
ADLS_ACUITY_SCORE: 57
ADLS_ACUITY_SCORE: 37
ADLS_ACUITY_SCORE: 55
ADLS_ACUITY_SCORE: 59
ADLS_ACUITY_SCORE: 59
ADLS_ACUITY_SCORE: 61
ADLS_ACUITY_SCORE: 65
ADLS_ACUITY_SCORE: 57
ADLS_ACUITY_SCORE: 57
ADLS_ACUITY_SCORE: 59
ADLS_ACUITY_SCORE: 57
ADLS_ACUITY_SCORE: 55
ADLS_ACUITY_SCORE: 63
ADLS_ACUITY_SCORE: 57
DEPENDENT_IADLS:: INDEPENDENT
ADLS_ACUITY_SCORE: 59
ADLS_ACUITY_SCORE: 61
ADLS_ACUITY_SCORE: 61
ADLS_ACUITY_SCORE: 55
ADLS_ACUITY_SCORE: 59
ADLS_ACUITY_SCORE: 59
ADLS_ACUITY_SCORE: 63
ADLS_ACUITY_SCORE: 61
ADLS_ACUITY_SCORE: 55
ADLS_ACUITY_SCORE: 57
ADLS_ACUITY_SCORE: 57
ADLS_ACUITY_SCORE: 59
ADLS_ACUITY_SCORE: 63
ADLS_ACUITY_SCORE: 59
ADLS_ACUITY_SCORE: 59
ADLS_ACUITY_SCORE: 55
ADLS_ACUITY_SCORE: 57
ADLS_ACUITY_SCORE: 55
ADLS_ACUITY_SCORE: 55
ADLS_ACUITY_SCORE: 37
ADLS_ACUITY_SCORE: 59
ADLS_ACUITY_SCORE: 57
ADLS_ACUITY_SCORE: 55
ADLS_ACUITY_SCORE: 59
ADLS_ACUITY_SCORE: 61
ADLS_ACUITY_SCORE: 59
ADLS_ACUITY_SCORE: 57
ADLS_ACUITY_SCORE: 61
ADLS_ACUITY_SCORE: 61
ADLS_ACUITY_SCORE: 65
ADLS_ACUITY_SCORE: 57
ADLS_ACUITY_SCORE: 55
ADLS_ACUITY_SCORE: 57
ADLS_ACUITY_SCORE: 55
ADLS_ACUITY_SCORE: 59
ADLS_ACUITY_SCORE: 61
ADLS_ACUITY_SCORE: 55
ADLS_ACUITY_SCORE: 57
ADLS_ACUITY_SCORE: 59
ADLS_ACUITY_SCORE: 59
ADLS_ACUITY_SCORE: 57
ADLS_ACUITY_SCORE: 55
ADLS_ACUITY_SCORE: 55
ADLS_ACUITY_SCORE: 57
ADLS_ACUITY_SCORE: 59
ADLS_ACUITY_SCORE: 57
ADLS_ACUITY_SCORE: 55
ADLS_ACUITY_SCORE: 55
ADLS_ACUITY_SCORE: 57
ADLS_ACUITY_SCORE: 57
ADLS_ACUITY_SCORE: 55
ADLS_ACUITY_SCORE: 61
ADLS_ACUITY_SCORE: 59
ADLS_ACUITY_SCORE: 63
ADLS_ACUITY_SCORE: 59
ADLS_ACUITY_SCORE: 57
ADLS_ACUITY_SCORE: 59
ADLS_ACUITY_SCORE: 61
ADLS_ACUITY_SCORE: 55
ADLS_ACUITY_SCORE: 55
ADLS_ACUITY_SCORE: 63
ADLS_ACUITY_SCORE: 61
ADLS_ACUITY_SCORE: 55
ADLS_ACUITY_SCORE: 61
ADLS_ACUITY_SCORE: 57
ADLS_ACUITY_SCORE: 57
ADLS_ACUITY_SCORE: 55
ADLS_ACUITY_SCORE: 65
ADLS_ACUITY_SCORE: 55
ADLS_ACUITY_SCORE: 61
ADLS_ACUITY_SCORE: 55
ADLS_ACUITY_SCORE: 61
ADLS_ACUITY_SCORE: 63
ADLS_ACUITY_SCORE: 57
ADLS_ACUITY_SCORE: 51
ADLS_ACUITY_SCORE: 65
ADLS_ACUITY_SCORE: 57
ADLS_ACUITY_SCORE: 61
ADLS_ACUITY_SCORE: 61
ADLS_ACUITY_SCORE: 57
ADLS_ACUITY_SCORE: 47
ADLS_ACUITY_SCORE: 57
ADLS_ACUITY_SCORE: 55
ADLS_ACUITY_SCORE: 59
ADLS_ACUITY_SCORE: 59
ADLS_ACUITY_SCORE: 55
ADLS_ACUITY_SCORE: 55
ADLS_ACUITY_SCORE: 59
ADLS_ACUITY_SCORE: 55
ADLS_ACUITY_SCORE: 61
ADLS_ACUITY_SCORE: 57
ADLS_ACUITY_SCORE: 37
ADLS_ACUITY_SCORE: 57
ADLS_ACUITY_SCORE: 59
ADLS_ACUITY_SCORE: 59
ADLS_ACUITY_SCORE: 57
ADLS_ACUITY_SCORE: 59
ADLS_ACUITY_SCORE: 57
ADLS_ACUITY_SCORE: 59
ADLS_ACUITY_SCORE: 55
ADLS_ACUITY_SCORE: 55
ADLS_ACUITY_SCORE: 57
ADLS_ACUITY_SCORE: 55
ADLS_ACUITY_SCORE: 57
ADLS_ACUITY_SCORE: 55
ADLS_ACUITY_SCORE: 55
ADLS_ACUITY_SCORE: 59
ADLS_ACUITY_SCORE: 63
ADLS_ACUITY_SCORE: 55
ADLS_ACUITY_SCORE: 59
ADLS_ACUITY_SCORE: 55
ADLS_ACUITY_SCORE: 59
ADLS_ACUITY_SCORE: 61
ADLS_ACUITY_SCORE: 57
ADLS_ACUITY_SCORE: 61
ADLS_ACUITY_SCORE: 59
ADLS_ACUITY_SCORE: 55
ADLS_ACUITY_SCORE: 57
ADLS_ACUITY_SCORE: 55
ADLS_ACUITY_SCORE: 61
ADLS_ACUITY_SCORE: 65
ADLS_ACUITY_SCORE: 59
ADLS_ACUITY_SCORE: 37
ADLS_ACUITY_SCORE: 55
ADLS_ACUITY_SCORE: 59
ADLS_ACUITY_SCORE: 55
ADLS_ACUITY_SCORE: 57
ADLS_ACUITY_SCORE: 59
ADLS_ACUITY_SCORE: 65
ADLS_ACUITY_SCORE: 55
ADLS_ACUITY_SCORE: 63
ADLS_ACUITY_SCORE: 59
ADLS_ACUITY_SCORE: 53
ADLS_ACUITY_SCORE: 57
ADLS_ACUITY_SCORE: 57
ADLS_ACUITY_SCORE: 55
ADLS_ACUITY_SCORE: 37
ADLS_ACUITY_SCORE: 57
ADLS_ACUITY_SCORE: 63
ADLS_ACUITY_SCORE: 59
ADLS_ACUITY_SCORE: 51
ADLS_ACUITY_SCORE: 59
ADLS_ACUITY_SCORE: 65
ADLS_ACUITY_SCORE: 61
ADLS_ACUITY_SCORE: 55
ADLS_ACUITY_SCORE: 61
ADLS_ACUITY_SCORE: 57
ADLS_ACUITY_SCORE: 65
ADLS_ACUITY_SCORE: 55
ADLS_ACUITY_SCORE: 59
ADLS_ACUITY_SCORE: 57
ADLS_ACUITY_SCORE: 61
ADLS_ACUITY_SCORE: 61
ADLS_ACUITY_SCORE: 55
ADLS_ACUITY_SCORE: 65
ADLS_ACUITY_SCORE: 61
ADLS_ACUITY_SCORE: 61
ADLS_ACUITY_SCORE: 55
ADLS_ACUITY_SCORE: 61
ADLS_ACUITY_SCORE: 55
ADLS_ACUITY_SCORE: 57
ADLS_ACUITY_SCORE: 57
ADLS_ACUITY_SCORE: 65
ADLS_ACUITY_SCORE: 61
ADLS_ACUITY_SCORE: 59
ADLS_ACUITY_SCORE: 55
ADLS_ACUITY_SCORE: 57
ADLS_ACUITY_SCORE: 61
ADLS_ACUITY_SCORE: 55
ADLS_ACUITY_SCORE: 55
ADLS_ACUITY_SCORE: 57
ADLS_ACUITY_SCORE: 55
ADLS_ACUITY_SCORE: 59
ADLS_ACUITY_SCORE: 59
ADLS_ACUITY_SCORE: 55
ADLS_ACUITY_SCORE: 59
ADLS_ACUITY_SCORE: 55
ADLS_ACUITY_SCORE: 65
ADLS_ACUITY_SCORE: 37
ADLS_ACUITY_SCORE: 37
ADLS_ACUITY_SCORE: 57
ADLS_ACUITY_SCORE: 59
ADLS_ACUITY_SCORE: 55
ADLS_ACUITY_SCORE: 57
ADLS_ACUITY_SCORE: 61
ADLS_ACUITY_SCORE: 61
ADLS_ACUITY_SCORE: 57
ADLS_ACUITY_SCORE: 59
ADLS_ACUITY_SCORE: 55
ADLS_ACUITY_SCORE: 55
ADLS_ACUITY_SCORE: 37
ADLS_ACUITY_SCORE: 59
ADLS_ACUITY_SCORE: 59
ADLS_ACUITY_SCORE: 55
ADLS_ACUITY_SCORE: 65
ADLS_ACUITY_SCORE: 53
ADLS_ACUITY_SCORE: 55
ADLS_ACUITY_SCORE: 57
ADLS_ACUITY_SCORE: 59
ADLS_ACUITY_SCORE: 57
ADLS_ACUITY_SCORE: 55
ADLS_ACUITY_SCORE: 59
ADLS_ACUITY_SCORE: 55
ADLS_ACUITY_SCORE: 59
ADLS_ACUITY_SCORE: 57
ADLS_ACUITY_SCORE: 59
ADLS_ACUITY_SCORE: 63
ADLS_ACUITY_SCORE: 59
ADLS_ACUITY_SCORE: 61
ADLS_ACUITY_SCORE: 59
ADLS_ACUITY_SCORE: 55
ADLS_ACUITY_SCORE: 57
ADLS_ACUITY_SCORE: 57
ADLS_ACUITY_SCORE: 55
ADLS_ACUITY_SCORE: 59
ADLS_ACUITY_SCORE: 59
ADLS_ACUITY_SCORE: 57
ADLS_ACUITY_SCORE: 59
ADLS_ACUITY_SCORE: 65
ADLS_ACUITY_SCORE: 51
ADLS_ACUITY_SCORE: 55
ADLS_ACUITY_SCORE: 53
ADLS_ACUITY_SCORE: 63
ADLS_ACUITY_SCORE: 55
ADLS_ACUITY_SCORE: 59
ADLS_ACUITY_SCORE: 57
ADLS_ACUITY_SCORE: 59
ADLS_ACUITY_SCORE: 55
ADLS_ACUITY_SCORE: 55
ADLS_ACUITY_SCORE: 59
ADLS_ACUITY_SCORE: 57
ADLS_ACUITY_SCORE: 57
ADLS_ACUITY_SCORE: 61
ADLS_ACUITY_SCORE: 51
ADLS_ACUITY_SCORE: 55
ADLS_ACUITY_SCORE: 57
ADLS_ACUITY_SCORE: 57
ADLS_ACUITY_SCORE: 61
ADLS_ACUITY_SCORE: 55
ADLS_ACUITY_SCORE: 61
ADLS_ACUITY_SCORE: 63
ADLS_ACUITY_SCORE: 57
ADLS_ACUITY_SCORE: 59
ADLS_ACUITY_SCORE: 37
ADLS_ACUITY_SCORE: 55
ADLS_ACUITY_SCORE: 57
ADLS_ACUITY_SCORE: 55
ADLS_ACUITY_SCORE: 61
ADLS_ACUITY_SCORE: 63
ADLS_ACUITY_SCORE: 55
ADLS_ACUITY_SCORE: 61
ADLS_ACUITY_SCORE: 55
ADLS_ACUITY_SCORE: 57
ADLS_ACUITY_SCORE: 61
ADLS_ACUITY_SCORE: 55
ADLS_ACUITY_SCORE: 61
ADLS_ACUITY_SCORE: 57
ADLS_ACUITY_SCORE: 37
ADLS_ACUITY_SCORE: 61
ADLS_ACUITY_SCORE: 51
ADLS_ACUITY_SCORE: 61
ADLS_ACUITY_SCORE: 61
ADLS_ACUITY_SCORE: 55
ADLS_ACUITY_SCORE: 63
ADLS_ACUITY_SCORE: 61
ADLS_ACUITY_SCORE: 55
ADLS_ACUITY_SCORE: 59
ADLS_ACUITY_SCORE: 55
ADLS_ACUITY_SCORE: 57
ADLS_ACUITY_SCORE: 55
ADLS_ACUITY_SCORE: 63
ADLS_ACUITY_SCORE: 57
ADLS_ACUITY_SCORE: 55
ADLS_ACUITY_SCORE: 57
ADLS_ACUITY_SCORE: 59
ADLS_ACUITY_SCORE: 55
ADLS_ACUITY_SCORE: 57
ADLS_ACUITY_SCORE: 55
ADLS_ACUITY_SCORE: 57
ADLS_ACUITY_SCORE: 59
ADLS_ACUITY_SCORE: 57
ADLS_ACUITY_SCORE: 55
ADLS_ACUITY_SCORE: 65
ADLS_ACUITY_SCORE: 59
ADLS_ACUITY_SCORE: 57
ADLS_ACUITY_SCORE: 55
ADLS_ACUITY_SCORE: 61
ADLS_ACUITY_SCORE: 57
ADLS_ACUITY_SCORE: 55
ADLS_ACUITY_SCORE: 61
ADLS_ACUITY_SCORE: 57
ADLS_ACUITY_SCORE: 65
ADLS_ACUITY_SCORE: 65
ADLS_ACUITY_SCORE: 55
ADLS_ACUITY_SCORE: 55
ADLS_ACUITY_SCORE: 61
ADLS_ACUITY_SCORE: 57
ADLS_ACUITY_SCORE: 37
ADLS_ACUITY_SCORE: 61
ADLS_ACUITY_SCORE: 59
ADLS_ACUITY_SCORE: 55
ADLS_ACUITY_SCORE: 55
PREVIOUS_RESPONSIBILITIES: DRIVING;MEAL PREP;HOUSEKEEPING;LAUNDRY;SHOPPING;MEDICATION MANAGEMENT
ADLS_ACUITY_SCORE: 65
ADLS_ACUITY_SCORE: 55
ADLS_ACUITY_SCORE: 55
ADLS_ACUITY_SCORE: 57
ADLS_ACUITY_SCORE: 55
ADLS_ACUITY_SCORE: 59
ADLS_ACUITY_SCORE: 57
ADLS_ACUITY_SCORE: 61
ADLS_ACUITY_SCORE: 57
ADLS_ACUITY_SCORE: 37
ADLS_ACUITY_SCORE: 57
ADLS_ACUITY_SCORE: 53
ADLS_ACUITY_SCORE: 55
ADLS_ACUITY_SCORE: 37
ADLS_ACUITY_SCORE: 61
ADLS_ACUITY_SCORE: 57
ADLS_ACUITY_SCORE: 61
ADLS_ACUITY_SCORE: 57
ADLS_ACUITY_SCORE: 55
ADLS_ACUITY_SCORE: 37
ADLS_ACUITY_SCORE: 55
ADLS_ACUITY_SCORE: 55
ADLS_ACUITY_SCORE: 57
ADLS_ACUITY_SCORE: 55
ADLS_ACUITY_SCORE: 55
ADLS_ACUITY_SCORE: 57
ADLS_ACUITY_SCORE: 55
ADLS_ACUITY_SCORE: 37
ADLS_ACUITY_SCORE: 59
ADLS_ACUITY_SCORE: 57
ADLS_ACUITY_SCORE: 57
ADLS_ACUITY_SCORE: 61
ADLS_ACUITY_SCORE: 55
ADLS_ACUITY_SCORE: 57
ADLS_ACUITY_SCORE: 59
ADLS_ACUITY_SCORE: 57
ADLS_ACUITY_SCORE: 61
ADLS_ACUITY_SCORE: 59
ADLS_ACUITY_SCORE: 55
ADLS_ACUITY_SCORE: 55
ADLS_ACUITY_SCORE: 57
ADLS_ACUITY_SCORE: 55
ADLS_ACUITY_SCORE: 59
ADLS_ACUITY_SCORE: 59
ADLS_ACUITY_SCORE: 53
ADLS_ACUITY_SCORE: 55
ADLS_ACUITY_SCORE: 57

## 2024-01-01 ASSESSMENT — ENCOUNTER SYMPTOMS: DYSRHYTHMIAS: 1

## 2024-01-01 ASSESSMENT — COLUMBIA-SUICIDE SEVERITY RATING SCALE - C-SSRS
2. HAVE YOU ACTUALLY HAD ANY THOUGHTS OF KILLING YOURSELF IN THE PAST MONTH?: NO
1. IN THE PAST MONTH, HAVE YOU WISHED YOU WERE DEAD OR WISHED YOU COULD GO TO SLEEP AND NOT WAKE UP?: NO
6. HAVE YOU EVER DONE ANYTHING, STARTED TO DO ANYTHING, OR PREPARED TO DO ANYTHING TO END YOUR LIFE?: NO

## 2024-03-08 PROBLEM — A41.9 SEVERE SEPSIS (H): Status: ACTIVE | Noted: 2024-01-01

## 2024-03-08 PROBLEM — R65.20 SEVERE SEPSIS (H): Status: ACTIVE | Noted: 2024-01-01

## 2024-03-08 PROBLEM — I24.9 ACS (ACUTE CORONARY SYNDROME) (H): Status: ACTIVE | Noted: 2024-01-01

## 2024-03-08 PROBLEM — R53.1 WEAKNESS: Status: ACTIVE | Noted: 2024-01-01

## 2024-03-08 NOTE — ED PROVIDER NOTES
History   Chief Complaint:  Fall and Generalized Weakness     HPI   Yohana Marques is a 78 year old female with a history of hypertension and chronic back pain presenting to the emergency department for a fall and associated generalized weakness. She was at home by herself, when she woke up to get a drink at 4:00 AM. Yohana fell between the dresser and bed, unable to get up. At present, her  is out of town and her neighbor went to check on her. Yohana remembers being dizzy this morning but has been experiencing weakness for the past three days, using a walker. The patient has additional global weakness, denying heart disease, hyperlipidemia, diabetes or chest pain. She has had stress tests before which have been normal.     Independent Historian:   The nursing staff and patient both report, contribute to the history as described above.    Medications:    No current outpatient medications on file.    Past Medical History:    Past Medical History:   Diagnosis Date    HTN (hypertension)     Hyperlipidemia     Leiomyoma of uterus, unspecified     Numbness and tingling     Osteoarthrosis, unspecified whether generalized or localized, unspecified site     Other and unspecified disc disorder of lumbar region     Other chronic pain     Primary osteoarthritis of left hip 12/18/2016    Spinal stenosis      Past Surgical History:    Past Surgical History:   Procedure Laterality Date    ARTHROPLASTY HIP Left 12/12/2016    Procedure: ARTHROPLASTY HIP;  Surgeon: Leonid Morfin MD;  Location:  OR    BACK SURGERY      COLONOSCOPY N/A 07/20/2017    Procedure: COMBINED COLONOSCOPY, SINGLE OR MULTIPLE BIOPSY/POLYPECTOMY BY BIOPSY;  colonoscopy;  Surgeon: Catarino Salas MD;  Location:  GI    CV CORONARY ANGIOGRAM N/A 3/8/2024    Procedure: Coronary Angiogram;  Surgeon: Alicia Dai MD;  Location:  [facility] CATH LAB    DISCECTOMY LUMBAR POSTERIOR MICROSCOPIC TWO LEVELS  08/11/2014     Procedure: DISCECTOMY LUMBAR POSTERIOR MICROSCOPIC TWO LEVELS;  Surgeon: Warren Herring MD;  Location: [facility] OR    ENDOSCOPIC RETROGRADE CHOLANGIOPANCREATOGRAM N/A 10/14/2022    Procedure: ENDOSCOPIC RETROGRADE CHOLANGIOPANCREATOGRAPHY with biliary spincterotomy, biliary stent placement, cholangioscopy;  Surgeon: Carson Franklin MD;  Location: [facility] OR    ENDOSCOPIC RETROGRADE CHOLANGIOPANCREATOGRAM N/A 5/2/2023    Procedure: ENDOSCOPIC RETROGRADE CHOLANGIOPANCREATOGRAPHY with biliary stone and stent removal;  Surgeon: Carson Franklin MD;  Location: [facility] OR    ENDOSCOPIC RETROGRADE CHOLANGIOPANCREATOGRAPHY, EXCHANGE TUBE/STENT N/A 1/25/2023    Procedure: ENDOSCOPIC RETROGRADE CHOLANGIOPANCREATOGRAPHY WITH BILIARY STENT EXCHANGE AND DEBRIS REMOVAL;  Surgeon: Carson Franklin MD;  Location: [facility] OR    EXPLORE COMMON BILE DUCT N/A 12/01/2022    Procedure: COMMON BILE DUCT REPAIR;  Surgeon: Thai Garcia MD;  Location: [facility]    LAPAROSCOPIC CHOLECYSTECTOMY WITH CHOLANGIOGRAMS N/A 12/01/2022    Procedure: EXPLORATORY LAPAROSCOPY, LAPAROSCOPIC PARTIAL CHOLECYSTECTOMY WITH RETREVIAL OF STONES WITH CHOLANGIOGRAM; choledochoscopy, INTRAOPERATIVE ULTRASOUND;  Surgeon: Thai Garcia MD;  Location: [facility] OR    LAPAROSCOPY DIAGNOSTIC (GENERAL) N/A 10/06/2022    Procedure: Diagnostic laparoscopy with liver biopsy;  Surgeon: Warren Zhang MD;  Location:  OR    ORTHOPEDIC SURGERY      TOTAL KNEE ARTHROPLASTY Left     [facility] NONSPECIFIC PROCEDURE  1996    lumbar discectomy    [facility] NONSPECIFIC PROCEDURE      cervical cone biopsy    [facility] NONSPECIFIC PROCEDURE      Left knee replacement      Physical Exam   Patient Vitals for the past 24 hrs:   BP Temp Temp src Pulse Resp SpO2 Height Weight   03/08/24 1615 -- -- -- -- 16 -- -- --   03/08/24 1555 -- -- -- -- -- 100 % -- --   03/08/24 1550 -- -- -- -- -- 100 % -- --   03/08/24 1545 118/83 -- -- 88 -- 99 % -- --   03/08/24 1540 -- -- -- -- -- 100 % -- --   03/08/24 1535  "(!) 120/90 -- -- 91 -- 100 % -- --   03/08/24 1530 -- -- -- -- -- 97 % -- --   03/08/24 1516 -- -- -- -- 14 -- -- --   03/08/24 1506 -- -- -- -- 16 -- -- --   03/08/24 1457 -- -- -- -- 16 -- -- --   03/08/24 1446 -- -- -- -- 16 -- -- --   03/08/24 1059 129/78 98.1  F (36.7  C) Temporal 102 20 97 % 1.727 m (5' 8\") 93.4 kg (206 lb)      General: Alert, appears frail and elderly. Cooperative.     In moderate distress  HEENT:  Head:  Atraumatic  Ears:  External ears are normal  Mouth/Throat:  Oropharynx is without erythema or exudate and mucous membranes are dry.   Eyes:   Conjunctivae normal and EOM are normal. No scleral icterus.  CV:  Tachycardic rate, regular rhythm, normal heart sounds and radial pulses are 2+ and symmetric.  No murmur.  Resp:  Breath sounds are clear bilaterally    Non-labored, no retractions or accessory muscle use  GI:  Abdomen is soft, no distension, no tenderness. No rebound or guarding  No CVA tenderness bilaterally  MS:  Normal range of motion. No edema.    Back atraumatic.  Skin:  Warm and dry.  No rash or lesions noted.  Neuro:   Alert. Globally weak.  Unable to sit up on own.  Unable to lift legs off bed independently.  GCS: 15  Psych: Normal mood and affect.    Emergency Department Course     ECG  ECG taken at 1055, ECG read at 1116  Normal sinus rhythm  Low voltage QRS   ST elevation, consider lateral injury or acute infarct  Abnormal ECG  J point elevation  Changes of note as compared to prior, dated 10/13/22.  Rate 99 bpm. HI interval 150 ms. QRS duration 86 ms. QT/QTc 360/462 ms. P-R-T axes 110 -6 50.     ECG  ECG taken at 1212, ECG read at 1311  Normal sinus rhythm  Low voltage QRS  ST elevation, consider lateral injury or acute infarct  Abnormal ECG   No significant changes as compared to prior, dated 3/8/24.  ST elevation in I, arL, anterior & lateral leads.  Rate 90 bpm. HI interval 158 ms. QRS duration 82 ms. QT/QTc 398/486 ms. P-R-T axes 82 8 41.     Imaging:  Cardiac " Catheterization   Final Result      CT Lumbar Spine w/o Contrast   Final Result   IMPRESSION:   1. No acute fracture or posttraumatic subluxation.   2. Multilevel lumbar spondylosis, as above.   3. Markedly distended bladder and bilateral distended renal pelvis and   ureters. Correlate for pathologic urinary retention bladder outlet   obstruction.      JACKIE MCKNIGHT DO            SYSTEM ID:  [id number]      CT Head w/o Contrast   Final Result   IMPRESSION:    1. No evidence for acute intracranial process.   2. Brain atrophy and presumed sequela of chronic microvascular   ischemic disease.      JACKIE MCKNIGHT DO            SYSTEM ID:  [id number]      CTA Head Neck w Contrast   Final Result   IMPRESSION:    1. Patent arteries in the head and neck without vascular cutoff. No   evidence of dissection. No aneurysm identified. No significant   stenosis.    2. Biapical groundglass opacities secondary to infection or pulmonary   edema.      JACKIE MCKNIGHT DO            SYSTEM ID:  [id number]      Echocardiogram Complete   Final Result      XR Chest 2 Views   Final Result   IMPRESSION: Increased hazy bilateral upper lung opacities. Bilateral   vascular congestion increased in the interval. Correlate with   pulmonary edema versus an atypical infectious etiology. Normal cardiac   silhouette.       MCKAYLA JOYNER MD            SYSTEM ID:  [id number]      CT Cervical Spine w/o Contrast    (Results Pending)      Report per radiology    Laboratory:  Labs Ordered and Resulted from Time of ED Arrival to Time of ED Departure   BASIC METABOLIC PANEL - Abnormal       Result Value    Sodium 140      Potassium 4.2      Chloride 100      Carbon Dioxide (CO2) 23      Anion Gap 17 (*)     Urea Nitrogen 9.5      Creatinine 0.75      GFR Estimate 82      Calcium 9.5      Glucose 100 (*)    TROPONIN T, HIGH SENSITIVITY - Abnormal    Troponin T, High Sensitivity 585 (*)    CK TOTAL - Abnormal     (*)    CBC WITH PLATELETS AND DIFFERENTIAL -  Abnormal    WBC Count 18.8 (*)     RBC Count 4.51      Hemoglobin 13.1      Hematocrit 39.8      MCV 88      MCH 29.0      MCHC 32.9      RDW 15.2 (*)     Platelet Count 352      % Neutrophils 91      % Lymphocytes 5      % Monocytes 3      % Eosinophils 0      % Basophils 0      % Immature Granulocytes 1      NRBCs per 100 WBC 0      Absolute Neutrophils 17.2 (*)     Absolute Lymphocytes 0.9      Absolute Monocytes 0.6      Absolute Eosinophils 0.0      Absolute Basophils 0.0      Absolute Immature Granulocytes 0.1      Absolute NRBCs 0.0     ISTAT GASES LACTATE VENOUS POCT - Abnormal    Lactic Acid POCT 3.1 (*)     Bicarbonate Venous POCT 26      O2 Sat, Venous POCT 45 (*)     pCO2 Venous POCT 43      pH Venous POCT 7.40      pO2 Venous POCT 25     HEPATIC FUNCTION PANEL - Normal    Protein Total 7.4      Albumin 3.6      Bilirubin Total 0.5      Alkaline Phosphatase 103      AST 31      ALT 13      Bilirubin Direct <0.20     PROCALCITONIN - Normal    Procalcitonin 0.32     INFLUENZA A/B, RSV, & SARS-COV2 PCR - Normal    Influenza A PCR Negative      Influenza B PCR Negative      RSV PCR Negative      SARS CoV2 PCR Negative     LACTIC ACID WHOLE BLOOD   BLOOD CULTURE   BLOOD CULTURE        Procedures     Emergency Department Course & Assessments:       Interventions:  Medications   sodium chloride (PF) 0.9% PF flush 3 mL (has no administration in time range)   sodium chloride (PF) 0.9% PF flush 3 mL (3 mLs Intracatheter Not Given 3/8/24 1308)   heparin infusion 25,000 units in D5W 250 mL ANTICOAGULANT (0 Units/hr Intravenous Stopped 3/8/24 1441)   aspirin EC tablet 81 mg (has no administration in time range)   traMADol (ULTRAM) half-tab 25-50 mg (25 mg Oral $Given 3/8/24 1615)   lidocaine 1 % 0.1-1 mL (has no administration in time range)   lidocaine (LMX4) cream (has no administration in time range)   sodium chloride (PF) 0.9% PF flush 3 mL (3 mLs Intracatheter $Given 3/8/24 1615)   sodium chloride (PF) 0.9% PF  flush 3 mL (has no administration in time range)   senna-docusate (SENOKOT-S/PERICOLACE) 8.6-50 MG per tablet 1 tablet (has no administration in time range)     Or   senna-docusate (SENOKOT-S/PERICOLACE) 8.6-50 MG per tablet 2 tablet (has no administration in time range)   calcium carbonate (TUMS) chewable tablet 1,000 mg (has no administration in time range)   Patient is already receiving anticoagulation with heparin, enoxaparin (LOVENOX), warfarin (COUMADIN)  or other anticoagulant medication (has no administration in time range)   HYDROmorphone (DILAUDID) injection 0.2 mg (has no administration in time range)   melatonin tablet 1 mg (has no administration in time range)   polyethylene glycol (MIRALAX) Packet 17 g (has no administration in time range)   ondansetron (ZOFRAN ODT) ODT tab 4 mg (has no administration in time range)     Or   ondansetron (ZOFRAN) injection 4 mg (has no administration in time range)   sodium chloride 0.9% BOLUS 250 mL (has no administration in time range)   atropine injection 0.5 mg (has no administration in time range)   fentaNYL (PF) (SUBLIMAZE) injection 25 mcg (has no administration in time range)   midazolam (VERSED) injection 0.5 mg (has no administration in time range)   flumazenil (ROMAZICON) injection 0.2 mg (has no administration in time range)   HOLD:  Metformin and metformin containing medications if patient received IV contrast with acute kidney injury or severe chronic kidney disease (stage IV or stage V; i.e., eGFR less than 30) (has no administration in time range)   sodium chloride 0.9 % infusion (has no administration in time range)   acetaminophen (TYLENOL) tablet 650 mg (has no administration in time range)   oxyCODONE (ROXICODONE) tablet 5 mg (has no administration in time range)     Or   oxyCODONE (ROXICODONE) tablet 10 mg (has no administration in time range)   naloxone (NARCAN) injection 0.2 mg (has no administration in time range)     Or   naloxone (NARCAN)  injection 0.4 mg (has no administration in time range)     Or   naloxone (NARCAN) injection 0.2 mg (has no administration in time range)     Or   naloxone (NARCAN) injection 0.4 mg (has no administration in time range)   piperacillin-tazobactam (ZOSYN) 4.5 g vial to attach to  mL bag (has no administration in time range)   sodium chloride 0.9% BOLUS 1,000 mL (1,000 mLs Intravenous $New Bag 3/8/24 1158)   piperacillin-tazobactam (ZOSYN) 4.5 g vial to attach to  mL bag (4.5 g Intravenous $New Bag 3/8/24 1159)   perflutren diluted 1mL to 2mL with saline (OPTISON) diluted injection 3 mL (3 mLs Intravenous $Given 3/8/24 1214)   sodium chloride (PF) 0.9% PF flush 10 mL (10 mLs Intravenous $Given 3/8/24 1214)   Saline (100 mLs As instructed $Given 3/8/24 1237)   iopamidol (ISOVUE-370) solution 67 mL (67 mLs Intravenous $Given 3/8/24 1237)   aspirin (ASA) chewable tablet 324 mg (324 mg Oral $Given 3/8/24 1342)   heparin loading dose for LOW INTENSITY TREATMENT * Give BEFORE starting heparin infusion (5,600 Units Intravenous $Given 3/8/24 1342)        Independent Interpretation (X-rays, CTs, rhythm strip):  I independently reviewed CT imaging of the head which shows no acute intracranial hemorrhage    Consultations/Discussion of Management or Tests:    ED Course as of 03/08/24 1820   Fri Mar 08, 2024   1109 I met with this patient for an initial evaluation and exam.   1115 I spoke with Dr. Reilly from Cardiology about the patient's presentation, findings, and plan of care.     1246 I spoke with Dr. Reilly from Cardiology about the patient's presentation, findings, and plan of care.     1329 I spoke with Dr. Orellana from Internal Medicine about the patient's presentation, findings, and plan of care.       Social Determinants of Health affecting care:   None    Disposition:  The patient was admitted to the hospital under the care of Dr. Orellana.     Impression & Plan    CMS Diagnoses: The patient has signs of Severe  "Sepsis   The patient has signs of Severe Sepsis as evidenced by:    1. 2 SIRS criteria, AND  2. Suspected infection, AND   3. Organ dysfunction: Lactic Acidosis with value >2.0    Time severe sepsis diagnosis confirmed: 1113  03/08/24 as this was the time when Lactate resulted, and the level was > 2.0    3 Hour Severe Sepsis Bundle Completion:    1. Initial Lactic Acid Result:   Recent Labs   Lab Test 03/08/24  1113   LACT 3.1*     2. Blood Cultures before Antibiotics: Yes  3. Broad Spectrum Antibiotics Administered:  yes       Anti-infectives (From admission through now)      Start     Dose/Rate Route Frequency Ordered Stop    03/08/24 1130  piperacillin-tazobactam (ZOSYN) 4.5 g vial to attach to  mL bag        Note to Pharmacy: For [facility], [facility] and [facility]: For Zosyn-naive patients, use the \"Zosyn initial dose + extended infusion\" order panel.    4.5 g  over 30 Minutes Intravenous ONCE 03/08/24 1128 03/08/24 1229          4. Is initial hypotension present?   No (IV fluid bolus NOT required). IV Fluid volume administered: 1000mL                Medical Decision Making:  Patient is a 78-year-old female who presents after falling while attempting to get out of bed and use the bathroom earlier this morning.  She has been unfortunate on the ground ever since with significant global weakness.  Initial EKG was initially read by the computer as STEMI although lower suspicion for ST elevation myocardial infarction based on my initial review of the EKG.  I did notice that there was some ST elevation but no significant depression and so I did speak with cardiology briefly in regards to this initial EKG.  There was certainly ST segment changes but given that the patient was having no active chest pain or shortness of breath symptoms but rather just global weakness we elected to obtain a troponin and serial EKGs while in the emergency department.  We also put in for a stat echocardiogram which occurred while in the ED.  Patient's " troponin did return significantly elevated at greater than 500.  Patient did receive aspirin and heparin out of concern for NSTEMI/ACS.  I did rediscuss the patient with Dr. Reilly with cardiology given the elevated troponin and EKG which shows no acute changes but continued ST segment elevation, most significantly seen in I and aVL.  Please see Dr. Reilly's cardiology consultation note.  Patient was sent for CT imaging of the head which was negative for acute intracranial hemorrhage.  Patient did develop some  strength weakness while in the emergency department so I did elect to add on CT angiography of the head and neck to ensure no significant or severe vascular stenosis.  Thankfully CTA imaging of the head and neck showed patent arteries within the head neck without evidence of vascular cutoff or dissection.  No aneurysm.  Additionally patient had some lower back pain after the fall earlier today and CT imaging of the L-spine shows no acute fracture or posttraumatic subluxation.  Echocardiogram returned showing severe hypokinesis in the left ventricle.  Concerns for potential stress-induced cardiomyopathy with LV EF of 23%.  Patient will be admitted under the care of the hospitalist service.  I spoke with Dr. Orellana of the hospitalist who agreed to admission.  Cardiology ultimately recommended cardiac catheterization investigation this afternoon and so patient was sent to the Cath Lab from the emergency department for further evaluation.    Critical Care time was 45 minutes for this patient excluding procedures.      Diagnosis:    ICD-10-CM    1. Stress-induced cardiomyopathy  I51.81 Case Request Cath Lab: Coronary Angiogram, Percutaneous Coronary Intervention     Case Request Cath Lab: Coronary Angiogram, Percutaneous Coronary Intervention     Cardiac Catheterization     Cardiac Catheterization      2. Weakness  R53.1       3. Severe sepsis (H)  A41.9     R65.20       4. ACS (acute coronary syndrome) (H)  I24.9  Case Request Cath Lab: Coronary Angiogram, Percutaneous Coronary Intervention     Case Request Cath Lab: Coronary Angiogram, Percutaneous Coronary Intervention     Cardiac Catheterization     Cardiac Catheterization           Discharge Medications:  Current Discharge Medication List         3/8/2024   Jerald Jonas MD White, Scott, MD  03/08/24 6915

## 2024-03-08 NOTE — PHARMACY-ADMISSION MEDICATION HISTORY
Pharmacist Admission Medication History    Admission medication history is complete. The information provided in this note is only as accurate as the sources available at the time of the update.    Information Source(s): Patient, Family member, and CareEverywhere/SureScripts via in-person    Pertinent Information: None    Changes made to PTA medication list:  Added: None  Deleted: None  Changed: None    Allergies reviewed with patient and updates made in EHR: yes    Medication History Completed By: Jadyn Rudolph RPH 3/8/2024 2:09 PM    PTA Med List   Medication Sig Last Dose    acetaminophen (TYLENOL) 325 MG tablet Take 2 tablets (650 mg) by mouth every 8 hours as needed for mild pain  at prn    aspirin 81 MG EC tablet Take 81 mg by mouth daily 3/8/2024 at in ED x324 mg    calcium-vitamin D (CALTRATE) 600-400 MG-UNIT per tablet Take 1 tablet by mouth 2 times daily 3/7/2024    celecoxib (CELEBREX) 200 MG capsule Take 1 capsule (200 mg) by mouth daily 3/7/2024    lisinopril (ZESTRIL) 10 MG tablet Take 1 tablet (10 mg) by mouth daily 3/7/2024    Multiple Vitamins-Minerals (CENTRUM SILVER) per tablet Take 1 tablet by mouth daily 3/7/2024    traMADol (ULTRAM) 50 MG tablet TAKE 1 TO 2 TABLETS BY MOUTH EVERY DAY FOR SEVERE PAIN 3/7/2024    vitamin D3 (CHOLECALCIFEROL) 50 mcg (2000 units) tablet Take 1 tablet (50 mcg) by mouth daily 3/7/2024

## 2024-03-08 NOTE — CONSULTS
Cardiology Consultation      Yohana Marques MRN# [id number]   YOB: 1945 Age: 78 year old   Date of Admission: 3/8/2024     Reason for consult: Elevated troponin, cardiomyopathy           Assessment and Plan:     Elevated troponin, cardiomyopathy and nonspecific ECG changes  No chest pain or shortness of breath.  I discussed with patient and the family that her presentation could be consistent with stress cardiomyopathy given her fall and inability to get up for a number of hours but definition of her anatomy is very reasonable to not miss a window of opportunity in case there was obstructive coronary artery disease contributing to presentation.  They are fully in agreement and wished to proceed with cardiac catheterization.  They have no contraindication to drug-eluting stent or percutaneous revascularization.  Management to depend on outcome of cardiac catheterization.          80 minutes spent today reviewing chart, discussing with patient and her family, coordinating with emergency physician and staff.           Chief Complaint:   Fall and Generalized Weakness           History of Present Illness:   This patient is a 78 year old female with history of spinal stenosis and back pain, normal coronaries on cardiac catheterization 2004 who has been feeling weak and tired over the last 4 days.    She was weak and dizzy and tried to go to the bathroom and fell and got wedged in between 2 pieces of furniture and could not get any help for a number of hours.    She comes in and has J-point elevation anteriorly on ECG and 1 mm ST elevation in aVL and half a millimeter ST elevation in I.    She denies any chest discomfort or dyspnea on exertion.    Troponin was elevated greater than 500.    No bleeding issues or upcoming surgeries planned.    Echocardiogram with mid to distal hypokinesis in all territories, basal sparing.  This morphology could be consistent with stress cardiomyopathy.         Physical  "Exam:   Vitals were reviewed  Blood pressure 129/78, pulse 102, temperature 98.1  F (36.7  C), temperature source Temporal, resp. rate 20, height 1.727 m (5' 8\"), weight 93.4 kg (206 lb), SpO2 97%, not currently breastfeeding.  Temperatures:  Current - Temp: 98.1  F (36.7  C); Max - Temp  Av.1  F (36.7  C)  Min: 98.1  F (36.7  C)  Max: 98.1  F (36.7  C)  Respiration range: Resp  Av  Min: 20  Max: 20  Pulse range: Pulse  Av  Min: 102  Max: 102  Blood pressure range: Systolic (24hrs), Av , Min:129 , Max:129   ; Diastolic (24hrs), Av, Min:78, Max:78    Pulse oximetry range: SpO2  Av %  Min: 97 %  Max: 97 %  No intake or output data in the 24 hours ending 24 1335  Constitutional:   awake, alert, cooperative, no apparent distress, and appears stated age     Eyes:   Lids and lashes normal, pupils equal, round and reactive to light, extra ocular muscles intact, sclera clear, conjunctiva normal     Neck:   supple, symmetrical, trachea midline,      Back:   symmetric     Lungs:   Clear     Cardiovascular:   Regular, borderline tacky     Abdomen:   non-tender     Musculoskeletal:   motor strength is 5 out of 5 all extremities bilaterally     Neurologic:   Grossly nonfocal     Skin:   normal skin color, texture, turgor     Additional findings:                    Past Medical History:   I have reviewed this patient's past medical history  Past Medical History:   Diagnosis Date    HTN (hypertension)     Hyperlipidemia     Leiomyoma of uterus, unspecified     Numbness and tingling     spinal stenosis causes numbness and tingling on feet and knees bilaterally    Osteoarthrosis, unspecified whether generalized or localized, unspecified site     Other and unspecified disc disorder of lumbar region     Other chronic pain     Primary osteoarthritis of left hip 2016    Spinal stenosis     S/P diskectomy x 2, most recently 2014             Past Surgical History:   I have reviewed this " patient's past surgical history  Past Surgical History:   Procedure Laterality Date    ARTHROPLASTY HIP Left 12/12/2016    Procedure: ARTHROPLASTY HIP;  Surgeon: Leonid Morfin MD;  Location:  OR    BACK SURGERY      COLONOSCOPY N/A 07/20/2017    Procedure: COMBINED COLONOSCOPY, SINGLE OR MULTIPLE BIOPSY/POLYPECTOMY BY BIOPSY;  colonoscopy;  Surgeon: Catarino Salas MD;  Location:  GI    DISCECTOMY LUMBAR POSTERIOR MICROSCOPIC TWO LEVELS  08/11/2014    Procedure: DISCECTOMY LUMBAR POSTERIOR MICROSCOPIC TWO LEVELS;  Surgeon: Warren Herring MD;  Location:  OR    ENDOSCOPIC RETROGRADE CHOLANGIOPANCREATOGRAM N/A 10/14/2022    Procedure: ENDOSCOPIC RETROGRADE CHOLANGIOPANCREATOGRAPHY with biliary spincterotomy, biliary stent placement, cholangioscopy;  Surgeon: Carson Franklin MD;  Location: [facility]    ENDOSCOPIC RETROGRADE CHOLANGIOPANCREATOGRAM N/A 5/2/2023    Procedure: ENDOSCOPIC RETROGRADE CHOLANGIOPANCREATOGRAPHY with biliary stone and stent removal;  Surgeon: Carson Franklin MD;  Location: [facility]    ENDOSCOPIC RETROGRADE CHOLANGIOPANCREATOGRAPHY, EXCHANGE TUBE/STENT N/A 1/25/2023    Procedure: ENDOSCOPIC RETROGRADE CHOLANGIOPANCREATOGRAPHY WITH BILIARY STENT EXCHANGE AND DEBRIS REMOVAL;  Surgeon: Carson Franklin MD;  Location: [facility] OR    EXPLORE COMMON BILE DUCT N/A 12/01/2022    Procedure: COMMON BILE DUCT REPAIR;  Surgeon: Thai Garcia MD;  Location: [facility]    LAPAROSCOPIC CHOLECYSTECTOMY WITH CHOLANGIOGRAMS N/A 12/01/2022    Procedure: EXPLORATORY LAPAROSCOPY, LAPAROSCOPIC PARTIAL CHOLECYSTECTOMY WITH RETREVIAL OF STONES WITH CHOLANGIOGRAM; choledochoscopy, INTRAOPERATIVE ULTRASOUND;  Surgeon: Thai Garcia MD;  Location: [facility]    LAPAROSCOPY DIAGNOSTIC (GENERAL) N/A 10/06/2022    Procedure: Diagnostic laparoscopy with liver biopsy;  Surgeon: Warren Zhang MD;  Location:  OR    ORTHOPEDIC SURGERY      TOTAL KNEE ARTHROPLASTY Left     [facility] NONSPECIFIC  PROCEDURE  1996    lumbar discectomy    [facility] NONSPECIFIC PROCEDURE      cervical cone biopsy    [facility] NONSPECIFIC PROCEDURE      Left knee replacement               Social History:   I have reviewed this patient's social history  Social History     Tobacco Use    Smoking status: Never    Smokeless tobacco: Never   Substance Use Topics    Alcohol use: Yes     Alcohol/week: 0.0 standard drinks of alcohol     Comment: social             Family History:   I have reviewed this patient's family history  Family History   Problem Relation Age of Onset    Family History Negative Mother          age 64 mva    Cancer Father          age 84 lung ca    Family History Negative Sister     Breast Cancer Paternal Grandmother     Genetic Disorder Other         daughter has Autoimmune disease             Allergies:   No Known Allergies          Medications:   I have reviewed this patient's current medications  (Not in a hospital admission)    Current Facility-Administered Medications Ordered in Epic   Medication Dose Route Frequency Last Rate Last Admin    aspirin (ASA) chewable tablet 324 mg  324 mg Oral Once        heparin infusion 25,000 units in D5W 250 mL ANTICOAGULANT  0-5,000 Units/hr Intravenous Continuous        heparin loading dose for LOW INTENSITY TREATMENT * Give BEFORE starting heparin infusion  60 Units/kg Intravenous Once        sodium chloride (PF) 0.9% PF flush 3 mL  3 mL Intracatheter q1 min prn        sodium chloride (PF) 0.9% PF flush 3 mL  3 mL Intracatheter Q8H         Current Outpatient Medications Ordered in Epic   Medication    acetaminophen (TYLENOL) 325 MG tablet    ASPIRIN EC PO    calcium-vitamin D (CALTRATE) 600-400 MG-UNIT per tablet    celecoxib (CELEBREX) 200 MG capsule    lisinopril (ZESTRIL) 10 MG tablet    Multiple Vitamins-Minerals (CENTRUM SILVER) per tablet    polyethylene glycol (MIRALAX) 17 GM/Dose powder    traMADol (ULTRAM) 50 MG tablet    vitamin D3 (CHOLECALCIFEROL) 50 mcg  (2000 units) tablet             Review of Systems:     The 10 point Review of Systems is negative other than noted in the HPI            Data:   All laboratory data reviewed  Results for orders placed or performed during the hospital encounter of 03/08/24 (from the past 24 hour(s))   CBC with platelets differential    Narrative    The following orders were created for panel order CBC with platelets differential.  Procedure                               Abnormality         Status                     ---------                               -----------         ------                     CBC with platelets and d...[[id number]]  Abnormal            Final result                 Please view results for these tests on the individual orders.   Basic metabolic panel   Result Value Ref Range    Sodium 140 135 - 145 mmol/L    Potassium 4.2 3.4 - 5.3 mmol/L    Chloride 100 98 - 107 mmol/L    Carbon Dioxide (CO2) 23 22 - 29 mmol/L    Anion Gap 17 (H) 7 - 15 mmol/L    Urea Nitrogen 9.5 8.0 - 23.0 mg/dL    Creatinine 0.75 0.51 - 0.95 mg/dL    GFR Estimate 82 >60 mL/min/1.73m2    Calcium 9.5 8.8 - 10.2 mg/dL    Glucose 100 (H) 70 - 99 mg/dL   Troponin T, High Sensitivity   Result Value Ref Range    Troponin T, High Sensitivity 585 (HH) <=14 ng/L   [place] Draw    Narrative    The following orders were created for panel order [place] Draw.  Procedure                               Abnormality         Status                     ---------                               -----------         ------                     Extra Blue Top Tube[[id number]]                              Final result                 Please view results for these tests on the individual orders.   Hepatic function panel   Result Value Ref Range    Protein Total 7.4 6.4 - 8.3 g/dL    Albumin 3.6 3.5 - 5.2 g/dL    Bilirubin Total 0.5 <=1.2 mg/dL    Alkaline Phosphatase 103 40 - 150 U/L    AST 31 0 - 45 U/L    ALT 13 0 - 50 U/L    Bilirubin Direct <0.20 0.00 - 0.30 mg/dL    CK total   Result Value Ref Range     (H) 26 - 192 U/L   Procalcitonin   Result Value Ref Range    Procalcitonin 0.32 <0.50 ng/mL   CBC with platelets and differential   Result Value Ref Range    WBC Count 18.8 (H) 4.0 - 11.0 10e3/uL    RBC Count 4.51 3.80 - 5.20 10e6/uL    Hemoglobin 13.1 11.7 - 15.7 g/dL    Hematocrit 39.8 35.0 - 47.0 %    MCV 88 78 - 100 fL    MCH 29.0 26.5 - 33.0 pg    MCHC 32.9 31.5 - 36.5 g/dL    RDW 15.2 (H) 10.0 - 15.0 %    Platelet Count 352 150 - 450 10e3/uL    % Neutrophils 91 %    % Lymphocytes 5 %    % Monocytes 3 %    % Eosinophils 0 %    % Basophils 0 %    % Immature Granulocytes 1 %    NRBCs per 100 WBC 0 <1 /100    Absolute Neutrophils 17.2 (H) 1.6 - 8.3 10e3/uL    Absolute Lymphocytes 0.9 0.8 - 5.3 10e3/uL    Absolute Monocytes 0.6 0.0 - 1.3 10e3/uL    Absolute Eosinophils 0.0 0.0 - 0.7 10e3/uL    Absolute Basophils 0.0 0.0 - 0.2 10e3/uL    Absolute Immature Granulocytes 0.1 <=0.4 10e3/uL    Absolute NRBCs 0.0 10e3/uL   Extra Blue Top Tube   Result Value Ref Range    Hold Specimen JIC    iStat Gases (lactate) venous, POCT   Result Value Ref Range    Lactic Acid POCT 3.1 (H) <=2.0 mmol/L    Bicarbonate Venous POCT 26 21 - 28 mmol/L    O2 Sat, Venous POCT 45 (L) 70 - 75 %    pCO2 Venous POCT 43 40 - 50 mm Hg    pH Venous POCT 7.40 7.32 - 7.43    pO2 Venous POCT 25 25 - 47 mm Hg   XR Chest 2 Views    Narrative    CHEST TWO VIEWS   3/8/2024 11:54 AM     HISTORY: Fall, weakness.    COMPARISON: 6/5/2004.      Impression    IMPRESSION: Increased hazy bilateral upper lung opacities. Bilateral  vascular congestion increased in the interval. Correlate with  pulmonary edema versus an atypical infectious etiology. Normal cardiac  silhouette.     MCKAYLA JOYNER MD         SYSTEM ID:  [id number]   Asymptomatic Influenza A/B, RSV, & SARS-CoV2 PCR (COVID-19) Nasopharyngeal    Specimen: Nasopharyngeal; Swab   Result Value Ref Range    Influenza A PCR Negative Negative    Influenza B PCR Negative  Negative    RSV PCR Negative Negative    SARS CoV2 PCR Negative Negative    Narrative    Testing was performed using the Xpert Xpress CoV2/Flu/RSV Assay on the [employer] GeneXpert Instrument. This test should be ordered for the detection of SARS-CoV-2, influenza, and RSV viruses in individuals who meet clinical and/or epidemiological criteria. Test performance is unknown in asymptomatic patients. This test is for in vitro diagnostic use under the FDA EUA for laboratories certified under CLIA to perform high or moderate complexity testing. This test has not been FDA cleared or approved. A negative result does not rule out the presence of PCR inhibitors in the specimen or target RNA in concentration below the limit of detection for the assay. If only one viral target is positive but coinfection with multiple targets is suspected, the sample should be re-tested with another FDA cleared, approved, or authorized test, if coinfection would change clinical management. This test was validated by the [facility]. These laboratories are certified under the Clinical Laboratory Improvement Amendments of 1988 (CLIA-88) as qualified to perform high complexity laboratory testing.   EKG 12-lead, tracing only   Result Value Ref Range    Systolic Blood Pressure  mmHg    Diastolic Blood Pressure  mmHg    Ventricular Rate 90 BPM    Atrial Rate 90 BPM    KS Interval 158 ms    QRS Duration 82 ms     ms    QTc 486 ms    P Axis 82 degrees    R AXIS 8 degrees    T Axis 41 degrees    Interpretation ECG       ** Critical Test Result: STEMI  Sinus rhythm  Low voltage QRS  ST elevation consider lateral injury or acute infarct  ** ** ACUTE MI / STEMI ** **  Abnormal ECG  When compared with ECG of 08-MAR-2024 10:55, (unconfirmed)  No significant change was found     EKG 12 lead   Result Value Ref Range    Systolic Blood Pressure  mmHg    Diastolic Blood Pressure  mmHg    Ventricular Rate 90 BPM    Atrial Rate 90 BPM    KS  Interval 158 ms    QRS Duration 82 ms     ms    QTc 486 ms    P Axis 82 degrees    R AXIS 8 degrees    T Axis 41 degrees    Interpretation ECG       ** Critical Test Result: STEMI  Sinus rhythm  Low voltage QRS  ST elevation consider lateral injury or acute infarct  ** ** ACUTE MI / STEMI ** **  Abnormal ECG  When compared with ECG of 08-MAR-2024 10:55, (unconfirmed)  No significant change was found  Confirmed by GENERATED REPORT, COMPUTER ([id number]),  Jade Rivers ([id number]) on 3/8/2024 12:22:28 PM     Echocardiogram Complete   Result Value Ref Range    Biplane LVEF 23%     Narrative    [id number]  [id number]  [id number]  [id number]^[email or web address]^KAYLEIGH                                                                       Version 2     [facility]  Echocardiography Laboratory  [facility] [place] [address]     Name: CARLY DONALDSON  MRN: [id number]  : 1945  Study Date: 2024 11:50 AM  Age: 78 yrs  Gender: Female  Patient Location: [facility]  Reason For Study: Abn EKG  Ordering Physician: KAYLEIGH MEADOWS  Referring Physician: DIAZ LE  Performed By: Ciara Hernandez     BSA: 2.1 m2  Height: 68 in  Weight: 206 lb  HR: 93  BP: 129/78 mmHg  ______________________________________________________________________________  Procedure  Complete Echo Adult.  ______________________________________________________________________________  Interpretation Summary     BP: 129/78 mmHg.  The left ventricle is normal in size.  Severely decreased left ventricular systolic function. LVEF is 23%.  Entire left ventricle is severely hypokinetic but basal left ventricular  function is less so. Differential diagnosis includes stress-induced  cardiomyopathy or multivessel coronary artery disease.  Grade 1 left ventricular diastolic function.  Normal right ventricular size and systolic function.  No significant valve disease.  Trace mitral and tricuspid valve regurgitation.  Normal inferior vena cava.     There is  no comparison study available.  ______________________________________________________________________________  Left Ventricle  The left ventricle is normal in size. There is normal left ventricular wall  thickness. Severely decreased left ventricular systolic function. Biplane LVEF  is 23%. Grade I or early diastolic dysfunction. There are regional wall motion  abnormalities as specified.     Right Ventricle  The right ventricle is normal in size and function.     Atria  Normal left atrial size. Right atrial size is normal. There is no atrial shunt  seen.     Mitral Valve  The mitral valve leaflets appear normal. There is no evidence of stenosis,  fluttering, or prolapse. There is trace mitral regurgitation. There is no  mitral valve stenosis.     Tricuspid Valve  Normal tricuspid valve. The tricuspid valve is not well visualized, but is  grossly normal. There is trace tricuspid regurgitation. Right ventricular  systolic pressure could not be approximated due to inadequate tricuspid  regurgitation.     Aortic Valve  The aortic valve is trileaflet with aortic valve sclerosis. There is trace  aortic regurgitation. No aortic stenosis is present. The mean AoV pressure  gradient is 3.0 mmHg.     Pulmonic Valve  The pulmonic valve is not well seen, but is grossly normal. There is trace  pulmonic valvular regurgitation. Normal pulmonic valve velocity.     Vessels  The aortic root is normal size. Normal size ascending aorta. The inferior vena  cava is normal.     Pericardium  There is no pericardial effusion.     Rhythm  Sinus rhythm was noted.  ______________________________________________________________________________  MMode/2D Measurements & Calculations  IVSd: 0.78 cm     LVIDd: 4.8 cm  LVIDs: 3.8 cm  LVPWd: 0.93 cm  FS: 21.5 %  LV mass(C)d: 137.8 grams  LV mass(C)dI: 66.6 grams/m2  Ao root diam: 3.5 cm  asc Aorta Diam: 3.8 cm  LVOT diam: 2.1 cm  LVOT area: 3.5 cm2  Ao root diam index Ht(cm/m): 2.0  Ao root diam  "index BSA (cm/m2): 1.7  Asc Ao diam index BSA (cm/m2): 1.8  Asc Ao diam index Ht(cm/m): 2.2  LA Volume (BP): 30.7 ml     LA Volume Index (BP): 14.8 ml/m2  RWT: 0.39  TAPSE: 2.4 cm     Doppler Measurements & Calculations  MV E max neto: 69.7 cm/sec  MV A max neto: 76.5 cm/sec  MV E/A: 0.91  MV dec time: 0.16 sec  Ao V2 max: 124.0 cm/sec  Ao max P.0 mmHg  Ao V2 mean: 85.0 cm/sec  Ao mean PG: 3.0 mmHg  Ao V2 VTI: 20.8 cm  VIANNEY(I,D): 3.0 cm2  VIANNEY(V,D): 2.8 cm2  LV V1 max PG: 3.9 mmHg  LV V1 max: 99.0 cm/sec  LV V1 VTI: 17.6 cm  SV(LVOT): 61.4 ml  SI(LVOT): 29.7 ml/m2  PA V2 max: 87.3 cm/sec  PA max PG: 3.0 mmHg  PA acc time: 0.10 sec  AV Neto Ratio (DI): 0.80  VIANNEY Index (cm2/m2): 1.4  E/E' av.4  Lateral E/e': 6.6     Medial E/e': 10.2  RV S Neto: 13.4 cm/sec     ______________________________________________________________________________  Report approved by: Dr Silvana Ballard 2024 12:42 PM             Lab Results   Component Value Date    CHOL 241 2022    CHOL 220 2021     Lab Results   Component Value Date    HDL 78 2022    HDL 66 2021     Lab Results   Component Value Date     2022     2021     Lab Results   Component Value Date    TRIG 119 2022    TRIG 122 2021     Lab Results   Component Value Date    IRIS 3.9 2015     TSH   Date Value Ref Range Status   2023 1.56 0.30 - 4.20 uIU/mL Final   2005 2.30 0.4 - 5.0 mU/L Final         EKG results:    Echocardiology:    Cardiac stress testing:    Cardiac angiography:    Clinically Significant Risk Factors Present on Admission                # Drug Induced Platelet Defect: home medication list includes an antiplatelet medication   # Hypertension: Noted on problem list      # Obesity: Estimated body mass index is 31.32 kg/m  as calculated from the following:    Height as of this encounter: 1.727 m (5' 8\").    Weight as of this encounter: 93.4 kg (206 lb).       "       Cardiomyopathy    Chronic Fatigue and Other Debilities: Age-related physical debility  Chronic fatigue, unspecified

## 2024-03-08 NOTE — Clinical Note
Removed intact

## 2024-03-08 NOTE — H&P
[facility]    History and Physical - Hospitalist Service       Date of Admission:  3/8/2024    Assessment & Plan      Yohana Marques is a 78 year old female with history of essential HTN, osteoporosis chronic pain with previous laminectomy admitted on 3/8/2024 for a fall. Within the ED, she was found to have elevated troponin and new heart failure.     Fall  NSTEMI  New severe systolic heart failure EF 23% takotsubo vs other  Patient presented to the ED after a fall at home around 4am when getting up for water. She reports getting up and feeling fine for a few steps and then she became dizzy and fell down. She denies LOC. She was on the ground for 4-5 hour prior to being found. Within the ED, troponin returned at 585 and EKG read as a STEMI however after discussion cardiology suspected to be J point elevation. Patient without active chest pain or hypoxia. Echo then returned with global LV dysfunction with reduced EF at 23%, no prior comparison or history of heart failure    - cardiology consulted in the ED and planning to take patient for angiogram later today,  appreciate cardiology' s co management  - continue heparin gtt and asa  - follow angiogram results    Lactic acidosis  Sepsis rule out  Within the ED LA returned at 3.1 with leukocytosis of 18. Patient given 1L fluids and started on broad spectrum antibiotics    - no localizing signs of infection and difficult to determine if elevated lactate due to the fall and dehydration, heart failure or true infection  - trend lactate after fluids, follow cultures and for signs of infection  - continue antibiotics for now  - pending result of next lactate consider starting IVF however will be cautious given new heart failure    Bilateral hand  weakness  Global weakness noted in the ED however during course of ED admission her hands became more weak. CTA of the head and lumbar spine obtained and no acute findings or CVA. Chronic DJD  "and various levels of mild and moderate stenosis with previous laminectomy noted    - patient reports known cervical stenosis  - given bilateral nature of her weakness will obtain CT cervical spine     Urinary retention  Patient denies any dysuria or issues with urination prior to admission. On lumbar CT distended bladder noted with hydroureters  - quiroz placed in the ED, TOV when appropriate  - follow UA    Abnormal CXR  Bilateral upper lobe opacities with vascular congestion noted in the ED. 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 Follow CT of her cervical spine  - PT consults when appropriate    HTN  - hold PTA lisinopril for now    Osteoporosis  - hold PTA calcium and vitamin D        Diet:  NPO for now  DVT Prophylaxis: Heparin   Quiroz Catheter: Not present  Lines: None     Cardiac Monitoring: None  Code Status:  FULL    Clinically Significant Risk Factors Present on Admission                # Drug Induced Platelet Defect: home medication list includes an antiplatelet medication   # Hypertension: Noted on problem list      # Obesity: Estimated body mass index is 31.32 kg/m  as calculated from the following:    Height as of this encounter: 1.727 m (5' 8\").    Weight as of this encounter: 93.4 kg (206 lb).              Disposition Plan      Expected Discharge Date: 03/10/2024                  Juliana Orellana DO  Hospitalist Service  [facility]  Securely " message with Yonis (more info)  Text page via [facility] Paging/Directory     ______________________________________________________________________    Chief Complaint   fall    History is obtained from the patient, ER provider and family    History of Present Illness   Yohana Marques is a 78 year old female who presented to the ED with a fall at home around 4am when getting up for water. She reports getting up and feeling fine for a few steps and then she became dizzy and fell down. She denies LOC. NO palpitations or really any symptoms prior to the fall. She was on the ground for 4-5 hour prior to being found.     Upon my assessment patient is remarkably well appearing. Her only complaints really is acute on chronic low back pain and  weakness. She reports generally before the admission feeling weaker and using a walker but denies fevers or chills. No cough. No GI complaints and no recent illness. She has issues using her legs due to pain and weakness which is chronic but more acutely worse right now and in the last few days.  in the room and several other family members were present and updated on plan      Past Medical History    Past Medical History:   Diagnosis Date    HTN (hypertension)     Hyperlipidemia     Leiomyoma of uterus, unspecified     Numbness and tingling     spinal stenosis causes numbness and tingling on feet and knees bilaterally    Osteoarthrosis, unspecified whether generalized or localized, unspecified site     Other and unspecified disc disorder of lumbar region     Other chronic pain     Primary osteoarthritis of left hip 12/18/2016    Spinal stenosis     S/P diskectomy x 2, most recently 8/2014       Past Surgical History   Past Surgical History:   Procedure Laterality Date    ARTHROPLASTY HIP Left 12/12/2016    Procedure: ARTHROPLASTY HIP;  Surgeon: Leonid Morfin MD;  Location:  OR    BACK SURGERY      COLONOSCOPY N/A 07/20/2017    Procedure: COMBINED  COLONOSCOPY, SINGLE OR MULTIPLE BIOPSY/POLYPECTOMY BY BIOPSY;  colonoscopy;  Surgeon: Catarino Salas MD;  Location:  GI    DISCECTOMY LUMBAR POSTERIOR MICROSCOPIC TWO LEVELS  08/11/2014    Procedure: DISCECTOMY LUMBAR POSTERIOR MICROSCOPIC TWO LEVELS;  Surgeon: Warren Herring MD;  Location:  OR    ENDOSCOPIC RETROGRADE CHOLANGIOPANCREATOGRAM N/A 10/14/2022    Procedure: ENDOSCOPIC RETROGRADE CHOLANGIOPANCREATOGRAPHY with biliary spincterotomy, biliary stent placement, cholangioscopy;  Surgeon: Carson Franklin MD;  Location: [facility]    ENDOSCOPIC RETROGRADE CHOLANGIOPANCREATOGRAM N/A 5/2/2023    Procedure: ENDOSCOPIC RETROGRADE CHOLANGIOPANCREATOGRAPHY with biliary stone and stent removal;  Surgeon: Carson Franklin MD;  Location: [facility] OR    ENDOSCOPIC RETROGRADE CHOLANGIOPANCREATOGRAPHY, EXCHANGE TUBE/STENT N/A 1/25/2023    Procedure: ENDOSCOPIC RETROGRADE CHOLANGIOPANCREATOGRAPHY WITH BILIARY STENT EXCHANGE AND DEBRIS REMOVAL;  Surgeon: Carson Franklin MD;  Location: [facility] OR    EXPLORE COMMON BILE DUCT N/A 12/01/2022    Procedure: COMMON BILE DUCT REPAIR;  Surgeon: Thai Garcia MD;  Location: [facility] OR    LAPAROSCOPIC CHOLECYSTECTOMY WITH CHOLANGIOGRAMS N/A 12/01/2022    Procedure: EXPLORATORY LAPAROSCOPY, LAPAROSCOPIC PARTIAL CHOLECYSTECTOMY WITH RETREVIAL OF STONES WITH CHOLANGIOGRAM; choledochoscopy, INTRAOPERATIVE ULTRASOUND;  Surgeon: Thai Garcia MD;  Location: [facility] OR    LAPAROSCOPY DIAGNOSTIC (GENERAL) N/A 10/06/2022    Procedure: Diagnostic laparoscopy with liver biopsy;  Surgeon: Warren Zhang MD;  Location:  OR    ORTHOPEDIC SURGERY      TOTAL KNEE ARTHROPLASTY Left     Z NONSPECIFIC PROCEDURE  1996    lumbar discectomy    Z NONSPECIFIC PROCEDURE      cervical cone biopsy    Z NONSPECIFIC PROCEDURE      Left knee replacement       Prior to Admission Medications   Prior to Admission Medications   Prescriptions Last Dose Informant Patient Reported? Taking?    Multiple Vitamins-Minerals (CENTRUM SILVER) per tablet 3/7/2024 Self Yes Yes   Sig: Take 1 tablet by mouth daily   acetaminophen (TYLENOL) 325 MG tablet  at prn  No Yes   Sig: Take 2 tablets (650 mg) by mouth every 8 hours as needed for mild pain   aspirin 81 MG EC tablet 3/8/2024 at in ED x324 mg  Yes Yes   Sig: Take 81 mg by mouth daily   calcium-vitamin D (CALTRATE) 600-400 MG-UNIT per tablet 3/7/2024 Self Yes Yes   Sig: Take 1 tablet by mouth 2 times daily   celecoxib (CELEBREX) 200 MG capsule 3/7/2024  No Yes   Sig: Take 1 capsule (200 mg) by mouth daily   lisinopril (ZESTRIL) 10 MG tablet 3/7/2024  No Yes   Sig: Take 1 tablet (10 mg) by mouth daily   traMADol (ULTRAM) 50 MG tablet 3/7/2024  No Yes   Sig: TAKE 1 TO 2 TABLETS BY MOUTH EVERY DAY FOR SEVERE PAIN   vitamin D3 (CHOLECALCIFEROL) 50 mcg (2000 units) tablet 3/7/2024  Yes Yes   Sig: Take 1 tablet (50 mcg) by mouth daily      Facility-Administered Medications: None        Review of Systems    The 10 point Review of Systems is negative other than noted in the HPI or here.      Physical Exam   Vital Signs: Temp: 98.1  F (36.7  C) Temp src: Temporal BP: 129/78 Pulse: 102   Resp: 20 SpO2: 97 %      Weight: 206 lbs 0 oz    Constitutional: Awake, alert, cooperative, no apparent distress.  Eyes: Conjunctiva and pupils examined and normal.  HEENT: dry mucus membranes with some Vaseline on them  Respiratory: Clear to auscultation bilaterally, no crackles or wheezing.  Cardiovascular: Regular rate and rhythm, normal S1 and S2, and no murmur noted.  GI: Soft, non-distended, non-tender, normal bowel sounds.  Skin: No rashes, no cyanosis, no edema.  Musculoskeletal: No joint swelling, erythema or tenderness.  Neurologic: weakness noted in both hands more distally normal strength, unable to lift legs off the bed due to low back pain, moving toes  Psychiatric: Alert, oriented to person, place and time, no obvious anxiety or depression.     Medical Decision Making        80 MINUTES SPENT BY ME on the date of service doing chart review, history, exam, documentation & further activities per the note.      Data     I have personally reviewed the following data over the past 24 hrs:    18.8 (H)  \   13.1   / 352     140 100 9.5 /  100 (H)   4.2 23 0.75 \     ALT: 13 AST: 31 AP: 103 TBILI: 0.5   ALB: 3.6 TOT PROTEIN: 7.4 LIPASE: N/A     Trop: 585 (HH) BNP: N/A     Procal: 0.32 CRP: N/A Lactic Acid: 3.1 (H)         Imaging results reviewed over the past 24 hrs:   Recent Results (from the past 24 hour(s))   XR Chest 2 Views    Narrative    CHEST TWO VIEWS   3/8/2024 11:54 AM     HISTORY: Fall, weakness.    COMPARISON: 2004.      Impression    IMPRESSION: Increased hazy bilateral upper lung opacities. Bilateral  vascular congestion increased in the interval. Correlate with  pulmonary edema versus an atypical infectious etiology. Normal cardiac  silhouette.     MCKAYLA JOYNER MD         SYSTEM ID:  [id number]   Echocardiogram Complete   Result Value    Biplane LVEF 23%    Narrative    [id number]  [id number]  [id number]  [id number]^DORI^KAYLEIGH                                                                       Version 2     [facility]  Echocardiography Laboratory  [address] [place] [address]     Name: CARLY DONALDSON  MRN: [id number]  : 1945  Study Date: 2024 11:50 AM  Age: 78 yrs  Gender: Female  Patient Location: [facility]  Reason For Study: Abn EKG  Ordering Physician: KAYLEIGH MEADOWS  Referring Physician: DIAZ LE  Performed By: Ciara Hernandez     BSA: 2.1 m2  Height: 68 in  Weight: 206 lb  HR: 93  BP: 129/78 mmHg  ______________________________________________________________________________  Procedure  Complete Echo Adult.  ______________________________________________________________________________  Interpretation Summary     BP: 129/78 mmHg.  The left ventricle is normal in size.  Severely decreased left ventricular systolic function.  LVEF is 23%.  Entire left ventricle is severely hypokinetic but basal left ventricular  function is less so. Differential diagnosis includes stress-induced  cardiomyopathy or multivessel coronary artery disease.  Grade 1 left ventricular diastolic function.  Normal right ventricular size and systolic function.  No significant valve disease.  Trace mitral and tricuspid valve regurgitation.  Normal inferior vena cava.     There is no comparison study available.  ______________________________________________________________________________  Left Ventricle  The left ventricle is normal in size. There is normal left ventricular wall  thickness. Severely decreased left ventricular systolic function. Biplane LVEF  is 23%. Grade I or early diastolic dysfunction. There are regional wall motion  abnormalities as specified.     Right Ventricle  The right ventricle is normal in size and function.     Atria  Normal left atrial size. Right atrial size is normal. There is no atrial shunt  seen.     Mitral Valve  The mitral valve leaflets appear normal. There is no evidence of stenosis,  fluttering, or prolapse. There is trace mitral regurgitation. There is no  mitral valve stenosis.     Tricuspid Valve  Normal tricuspid valve. The tricuspid valve is not well visualized, but is  grossly normal. There is trace tricuspid regurgitation. Right ventricular  systolic pressure could not be approximated due to inadequate tricuspid  regurgitation.     Aortic Valve  The aortic valve is trileaflet with aortic valve sclerosis. There is trace  aortic regurgitation. No aortic stenosis is present. The mean AoV pressure  gradient is 3.0 mmHg.     Pulmonic Valve  The pulmonic valve is not well seen, but is grossly normal. There is trace  pulmonic valvular regurgitation. Normal pulmonic valve velocity.     Vessels  The aortic root is normal size. Normal size ascending aorta. The inferior vena  cava is normal.     Pericardium  There is no  pericardial effusion.     Rhythm  Sinus rhythm was noted.  ______________________________________________________________________________  MMode/2D Measurements & Calculations  IVSd: 0.78 cm     LVIDd: 4.8 cm  LVIDs: 3.8 cm  LVPWd: 0.93 cm  FS: 21.5 %  LV mass(C)d: 137.8 grams  LV mass(C)dI: 66.6 grams/m2  Ao root diam: 3.5 cm  asc Aorta Diam: 3.8 cm  LVOT diam: 2.1 cm  LVOT area: 3.5 cm2  Ao root diam index Ht(cm/m): 2.0  Ao root diam index BSA (cm/m2): 1.7  Asc Ao diam index BSA (cm/m2): 1.8  Asc Ao diam index Ht(cm/m): 2.2  LA Volume (BP): 30.7 ml     LA Volume Index (BP): 14.8 ml/m2  RWT: 0.39  TAPSE: 2.4 cm     Doppler Measurements & Calculations  MV E max neto: 69.7 cm/sec  MV A max neto: 76.5 cm/sec  MV E/A: 0.91  MV dec time: 0.16 sec  Ao V2 max: 124.0 cm/sec  Ao max P.0 mmHg  Ao V2 mean: 85.0 cm/sec  Ao mean PG: 3.0 mmHg  Ao V2 VTI: 20.8 cm  VIANNEY(I,D): 3.0 cm2  VIANNEY(V,D): 2.8 cm2  LV V1 max PG: 3.9 mmHg  LV V1 max: 99.0 cm/sec  LV V1 VTI: 17.6 cm  SV(LVOT): 61.4 ml  SI(LVOT): 29.7 ml/m2  PA V2 max: 87.3 cm/sec  PA max PG: 3.0 mmHg  PA acc time: 0.10 sec  AV Neto Ratio (DI): 0.80  VIANNEY Index (cm2/m2): 1.4  E/E' av.4  Lateral E/e': 6.6     Medial E/e': 10.2  RV S Neto: 13.4 cm/sec     ______________________________________________________________________________  Report approved by: Dr Silvana Ballard 2024 12:42 PM         CTA Head Neck w Contrast    Narrative    CT ANGIOGRAM OF THE HEAD AND NECK WITH CONTRAST  3/8/2024 1:13 PM     HISTORY: Global body weakness, bilateral hand  strength weakness.  Concern for stroke vs ICH    TECHNIQUE:  CT angiography with an injection of 67mL [id number]-[id number] IV  with scans through the head and neck. Images were transferred to a  separate 3-D workstation where multiplanar reformations and 3-D images  were created. Estimates of carotid stenoses are made relative to the  distal internal carotid artery diameters except as noted. Radiation  dose for this scan was reduced  using automated exposure control,  adjustment of the mA and/or kV according to patient size, or iterative  reconstruction technique.    COMPARISON: None.     CT HEAD FINDINGS: No contrast enhancing lesions. Cerebral blood flow  is grossly normal.     CT ANGIOGRAM HEAD FINDINGS:  The major intracranial arteries including  the proximal branches of the anterior cerebral, middle cerebral, and  posterior cerebral arteries appear patent without vascular cutoff. No  aneurysm identified. No significant stenosis. Venous circulation is  unremarkable.     CT ANGIOGRAM NECK FINDINGS:   Normal origin of the great vessels from the aortic arch.     Right carotid artery: The right common and internal carotid arteries  are patent. Mild atherosclerotic disease at the carotid bifurcation  without stenosis.     Left carotid artery: The left common and internal carotid arteries are  patent. Mild atherosclerotic disease at the carotid bifurcation  without stenosis.     Vertebral arteries: Vertebral arteries are patent without evidence of  dissection. No significant stenosis.     Other findings: Biapical right greater than left groundglass  bronchocentric opacities.       Impression    IMPRESSION:   1. Patent arteries in the head and neck without vascular cutoff. No  evidence of dissection. No aneurysm identified. No significant  stenosis.   2. Biapical groundglass opacities secondary to infection or pulmonary  edema.    JACKIE MCKNIGHT DO         SYSTEM ID:  [id number]   CT Head w/o Contrast    Narrative    EXAM: CT HEAD W/O CONTRAST  3/8/2024 1:19 PM     HISTORY: Fall, unwitnessed, global weakness       COMPARISON: None    TECHNIQUE: Using multidetector thin collimation helical acquisition  technique, axial, coronal and sagittal CT images from the skull base  to the vertex were obtained without intravenous contrast.   (topogram) image(s) also obtained and reviewed. Dose reduction  techniques were used.    FINDINGS:  No acute  intracranial hemorrhage, mass effect, or midline shift.  Subcortical, periventricular areas of white matter hypoattenuation  most pronounced involving the left periventricular regions frontal and  parietal white matter; mild to moderate parenchymal volume loss and  compensatory ventricular dilatation. No CT findings of acute infarct  or hydrocephalus. Preserved subarachnoid spaces.    Atraumatic calvarium. No substantial paranasal sinus mucosal disease.  Clear mastoid air cells. Nonfocal orbits.       Impression    IMPRESSION:   1. No evidence for acute intracranial process.  2. Brain atrophy and presumed sequela of chronic microvascular  ischemic disease.    JACKIE MCKNIGHT DO         SYSTEM ID:  [id number]   CT Lumbar Spine w/o Contrast    Narrative    EXAM: CT LUMBAR SPINE W/O CONTRAST  3/8/2024 1:19 PM     HISTORY: Acute on chronic low back pain after fall, on ground since 4  am.  Midline L spine pain       COMPARISON: None    TECHNIQUE: Using multidetector thin collimation helical acquisition  technique, axial images were obtained through the lumbar spine without  intravenous contrast. Coronal and sagittal reconstructions were  created, reviewed and archived. Images were viewed in bone and soft  tissue windows. Dose reduction techniques were used.    FINDINGS:  There are 5 lumbar type vertebrae, used for the purposes of this  dictation. No acute fracture or traumatic subluxation. No suspicious  osseous lesion. Markedly distended bladder. Distended renal pelvis and  ureters bilaterally. Distal colonic diverticulosis. Otherwise no focal  extraspinal structures.     Degenerative disc and opposing endplate change at L3-L4, L4-L5 and  L5-S1. Multilevel facet arthropathy. Aforementioned findings  contribute to mild-to-moderate neural foraminal stenosis at L3-L4, 4  axial 5 and L5-S1. L3-L4, L4-L5 laminectomy changes. No high-grade  canal stenosis at any level.      Impression    IMPRESSION:  1. No acute fracture or  posttraumatic subluxation.  2. Multilevel lumbar spondylosis, as above.  3. Markedly distended bladder and bilateral distended renal pelvis and  ureters. Correlate for pathologic urinary retention bladder outlet  obstruction.    JACKIE MCKNIGHT DO         SYSTEM ID:  [id number]

## 2024-03-08 NOTE — Clinical Note
Hemodynamic equipment used: 5 lead ECG, DSO InteractiveK With 3 Leads, Machine BP Cuff and pulse oximeter probe.

## 2024-03-08 NOTE — PROGRESS NOTES
Interventional Cardiology    Coronary angiogram with minimal to no obstructive coronary artery disease, agree with diagnosis of stress cardiomyopathy    Plan    1.  Continue supportive care        Addendum    During case one of the Cath Lab techs had a needlestick.  I have talked to patient's  Pat and he has given permission to draw blood from patient for needlestick protocol.    Alicia Dai MD

## 2024-03-08 NOTE — PRE-PROCEDURE
GENERAL PRE-PROCEDURE:   Procedure:  Coronary angiogram  Date/Time:  3/8/2024 1:52 PM    Verbal consent obtained?: Yes    Written consent obtained?: Yes    Risks and benefits: Risks, benefits and alternatives were discussed    Consent given by:  Patient  Patient states understanding of procedure being performed: Yes    Patient's understanding of procedure matches consent: Yes    Procedure consent matches procedure scheduled: Yes    Expected level of sedation:  Moderate  Appropriately NPO:  Yes  ASA Class:  3  Mallampati  :  Grade 3- soft palate visible, posterior pharyngeal wall not visible  Lungs:  Lungs clear with good breath sounds bilaterally  Heart:  Normal heart sounds and rate  History & Physical reviewed:  History and physical reviewed and no updates needed  Statement of review:  I have reviewed the lab findings, diagnostic data, medications, and the plan for sedation

## 2024-03-08 NOTE — ED TRIAGE NOTES
Pt got dizzy when she got up at 4am and fell on the floor between the dresser and her bed. No loc, no blood thinners, no hitting of head. Pt states she has been needing to use her walker for the past 3 days due to increased weakness.      Triage Assessment (Adult)       Row Name 03/08/24 1055          Triage Assessment    Airway WDL WDL        Respiratory WDL    Respiratory WDL WDL        Cardiac WDL    Cardiac WDL WDL        Cognitive/Neuro/Behavioral WDL    Cognitive/Neuro/Behavioral WDL X

## 2024-03-09 NOTE — PROGRESS NOTES
[facility]  Hospitalist Progress Note  Ty Doshi MD  03/09/2024    Assessment & Plan   Yohana Marques is a 78 year old female with history of essential HTN, osteoporosis chronic pain with previous laminectomy admitted on 3/8/2024 for a fall. Within the ED, she was found to have elevated troponin and new heart failure.      Fall  NSTEMI  New severe systolic heart failure EF 23% takotsubo vs other  Patient presented to the ED after a fall at home around 4am when getting up for water. She reports getting up and feeling fine for a few steps and then she became dizzy and fell down. She denies LOC. She was on the ground for 4-5 hour prior to being found. Within the ED, troponin returned at 585 and EKG read as a STEMI however after discussion cardiology suspected to be J point elevation. Patient without active chest pain or hypoxia. Echo then returned with global LV dysfunction with reduced EF at 23%, no prior comparison or history of heart failure  - cardiology consulted in the ED and planning to take patient for angiogram appreciate cardiology' s co management  - discontinue heparin gtt and asa  - follow angiogram results     Lactic acidosis  Sepsis rule out  Within the ED LA returned at 3.1 with leukocytosis of 18. Patient given 1L fluids and started on broad spectrum antibiotics  - no localizing signs of infection and difficult to determine if elevated lactate due to the fall and dehydration, heart failure or true infection  - trend lactate after fluids, follow cultures and for signs of infection  - continue antibiotics for now  - pending result of next lactate consider starting IVF however will be cautious given new heart failure    Diffuse generalized global weakness metabolic vs neurological  Bilateral hand  weakness  Global weakness noted in the ED however during course of ED admission her hands became more weak. CTA of the head and lumbar spine obtained and no acute  "findings or CVA. Chronic DJD and various levels of mild and moderate stenosis with previous laminectomy noted  - patient reports known cervical stenosis  - given bilateral nature of her weakness CT cervical spine was ordered:  Multilevel spondylosis without high grade canal stenosis. Severe bilateral C3-C4, C4-C5, C5-C6 foraminal stenosis.   - will transfer to 7N  - check TSH/FT4, cortisol level  - MRI  - general neurology consultation     Urinary retention  Patient denies any dysuria or issues with urination prior to admission. On lumbar CT distended bladder noted with hydroureters  - quiroz placed in the ED, TOV when appropriate  - follow UA is abnormal     Abnormal CXR  Bilateral upper lobe opacities with vascular congestion noted in the ED. 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 Follow CT of her cervical spine  - PT consults when appropriate     HTN  - hold PTA lisinopril for now     Osteoporosis  - hold PTA calcium and vitamin D        Diet:  dysphagia diet  DVT Prophylaxis: PCD  Quiroz Catheter: Not present  Lines: None     Cardiac Monitoring:    Code Status:  FULL        Clinically Significant Risk Factors Present on Admission                 # Drug Induced Platelet Defect: home medication list includes an antiplatelet medication   # Hypertension: Noted on problem list      # Obesity: Estimated body mass index is 31.32 kg/m  as calculated from the following:    Height as of this encounter: 1.727 m (5' 8\").    Weight as of " "this encounter: 93.4 kg (206 lb).              Disposition Plan     Expected Discharge Date:  > 2-4 days    Disposition:     Interval History   -- chart reviewed  -- cardiology consult noted and [facility] results noted  -- all imaging studies reviewed  -- today feeling more weak     -Data reviewed today: I reviewed all new labs and imaging over the last 24 hours. I personally reviewed no images or EKG's today.    Physical Exam    , Blood pressure 130/88, pulse 93, temperature 98  F (36.7  C), temperature source Oral, resp. rate 18, height 1.727 m (5' 8\"), weight 94.2 kg (207 lb 10.8 oz), SpO2 97%, not currently breastfeeding.  Vitals:    03/08/24 1059 03/09/24 0612   Weight: 93.4 kg (206 lb) 94.2 kg (207 lb 10.8 oz)     Vital Signs with Ranges  Temp:  [97.8  F (36.6  C)-98  F (36.7  C)] 98  F (36.7  C)  Pulse:  [] 93  Resp:  [18] 18  BP: (123-132)/(85-89) 130/88  SpO2:  [92 %-97 %] 97 %  I/O's Last 24 hours  I/O last 3 completed shifts:  In: 120 [P.O.:120]  Out: 1200 [Urine:1200]    Constitutional: Awake, alert, cooperative, no apparent distress, weak voice  Respiratory: Clear to auscultation bilaterally, no crackles or wheezing  Cardiovascular: Regular rate and rhythm, normal S1 and S2,   GI: Normal bowel sounds, soft, non-distended, non-tender  Skin/Integumen: No rashes, no cyanosis, no edema  Other: Diffusely weak, unable to move against gravity, weak  strength, has movement at feet     Medications   All medications were reviewed.   - MEDICATION INSTRUCTIONS -        aspirin  81 mg Oral Daily    losartan  25 mg Oral Daily    metoprolol succinate ER  25 mg Oral Daily    piperacillin-tazobactam  4.5 g Intravenous Q6H    sodium chloride (PF)  3 mL Intracatheter Q8H        Data   Recent Labs   Lab 03/09/24  0604 03/08/24  1111   WBC 17.3* 18.8*   HGB 12.3 13.1   MCV 89 88    352    140   POTASSIUM 4.2 4.2   CHLORIDE 102 100   CO2 21* 23   BUN 11.5 9.5   CR 0.70 0.75   ANIONGAP 13 17*   YEE 9.0 9.5 "   * 100*   ALBUMIN 3.3* 3.6   PROTTOTAL 7.1 7.4   BILITOTAL 0.4 0.5   ALKPHOS 92 103   ALT 13 13   AST 32 31       Recent Results (from the past 24 hour(s))   CT Cervical Spine w/o Contrast    Narrative    EXAM: CT CERVICAL SPINE W/O CONTRAST  LOCATION: [facility]  DATE: 3/8/2024    INDICATION:  Bilateral hand weakness; Neck pain; Neurologic deficit, non traumatic; UE weakness; No known automatically detected potential contraindications to imaging  COMPARISON: None.  TECHNIQUE: Routine CT Cervical Spine without IV contrast. Multiplanar reformats. Dose reduction techniques were used.    FINDINGS:  VERTEBRA: Normal vertebral body heights and alignment. No acute compression fracture or posttraumatic subluxation.     CANAL/FORAMINA: Multilevel spondylosis without high grade canal stenosis. Severe bilateral C3-C4, C4-C5, C5-C6 foraminal stenosis.    PARASPINAL: No prevertebral edema.      Impression    IMPRESSION:  1.  Multilevel spondylosis without high grade canal stenosis. Severe bilateral C3-C4, C4-C5, C5-C6 foraminal stenosis.       Ty Doshi MD  Text Page  (7am to 6pm)

## 2024-03-09 NOTE — PROGRESS NOTES
"   03/09/24 1000   Appointment Info   Signing Clinician's Name / Credentials (OT) Adriane Arnold, OTR/L   Rehab Comments (OT) Check for updates from medical team   Living Environment   People in Home alone   Current Living Arrangements condominium   Home Accessibility no concerns  (elevator)   Living Environment Comments Pt has spouse who lives up [place] pt used to live up [place] - pt recently just came back to [facility], pt currently lives alone in [facility]. No stairs. Walk in shower, has shower chair. [place] has used [place] for the past week[place]   Self-Care   Usual Activity Tolerance moderate   Current Activity Tolerance poor   Equipment Currently Used at Home shower chair;walker, standard   Fall history within last six months yes   Number of times patient has fallen within last six months 1   Activity/Exercise/Self-Care Comment Ind ADLs baseline.   Instrumental Activities of Daily Living (IADL)   Previous Responsibilities driving;meal prep;housekeeping;laundry;shopping;medication management   IADL Comments pt does not have car in cities but drives   General Information   Onset of Illness/Injury or Date of Surgery 03/08/24   Referring Physician Juliana Orellana, DO   Patient/Family Therapy Goal Statement (OT) Not stated   Additional Occupational Profile Info/Pertinent History of Current Problem Per chart: \"Yohana Marques is a 78 year old female with history of essential HTN, osteoporosis chronic pain with previous laminectomy admitted on 3/8/2024 for a fall. Within the ED, she was found to have elevated troponin and new heart failure.\"   Existing Precautions/Restrictions fall;other (see comments)  (Neck weakness, unable to actively support neck in upright sitting)   General Observations and Info CT 3/8 \"Multilevel spondylosis without high grade canal stenosis. Severe bilateral C3-C4, C4-C5, C5-C6 foraminal stenosis.\"   Cognitive Status Examination   Orientation Status orientation to person, place and time "   Follows Commands follows one-step commands   Cognitive Status Comments Pt appears to follow commands appropriately, will continue to monitor cognition.   Visual Perception   Visual Impairment/Limitations WFL   Impact of Vision Impairment on Function (Vision) Pt denies any changes in vision. Typically wears glasses all the time, does not have at hospital.   Sensory   Sensory Comments Pt has numbness in B feet and hands, the decreased sensation in B hands since current admit - light touch sensation intact in B hands   Pain Assessment   Patient Currently in Pain   (Pain in Lumbar spine - does not rate)   Range of Motion Comprehensive   Comment, General Range of Motion PROM through BUEs WFL   Strength Comprehensive (MMT)   Comment, General Manual Muscle Testing (MMT) Assessment 1/5 through BLEs, 2/5 B wrists, 2/5 B shoulder elevation, 1/5 B elbow flexion, pt unable to actively hold head up sitting EOB - weakness in neck   Coordination   Upper Extremity Coordination Left UE impaired;Right UE impaired   Fine Motor Coordination Difficulty with pressing soft button call light, increased time with task   Bed Mobility   Comment (Bed Mobility) Total A x 2 bed mobility, including for head support in sitting EOB   Transfers   Transfer Comments NT - unable to safely complete, pt with significant BUE, BLE, neck weakness   Balance   Balance Comments Total A x 2 sitting EOB   Activities of Daily Living   BADL Assessment/Intervention upper body dressing   Upper Body Dressing Assessment/Training   Comment, (Upper Body Dressing) Dep A per clinical judgement   Clinical Impression   Criteria for Skilled Therapeutic Interventions Met (OT) Yes, treatment indicated   OT Diagnosis Decreased ind with I/ADLs   OT Problem List-Impairments impacting ADL problems related to;activity tolerance impaired;balance;cognition;mobility;sensation;strength;pain;coordination   Assessment of Occupational Performance 5 or more Performance Deficits    Identified Performance Deficits dressing, bathing, toileting, grooming, feeding, IADLs   Planned Therapy Interventions (OT) ADL retraining;IADL retraining;cognition;transfer training   Clinical Decision Making Complexity (OT) comprehensive assessment/high complexity   Risk & Benefits of therapy have been explained patient   OT Total Evaluation Time   OT Eval, Low Complexity Minutes (01360) 10   OT Goals   Therapy Frequency (OT) 6 times/week   OT Predicted Duration/Target Date for Goal Attainment 03/22/24   OT Goals Hygiene/Grooming;Upper Body Dressing;OT Goal 1;Cognition   OT: Hygiene/Grooming minimal assist  (bed level vs commbolizer as able)   OT: Upper Body Dressing Minimal assist  (bed level vs commbolizer as able)   OT: Cognitive Patient/caregiver will verbalize understanding of cognitive assessment results/recommendations as needed for safe discharge planning  (SBT d/t significant weakness)   OT: Goal 1 Supine in bed with head support, pt will complete AA/PROM BUEs for increased I/ADL ind.   Interventions   Interventions Quick Adds Therapeutic Activity   Therapeutic Activities   Therapeutic Activity Minutes (40795) 28   Symptoms noted during/after treatment fatigue   Treatment Detail/Skilled Intervention Pt greeted in supine, agreeable to PT/OT co-tx. Pt with noted significant weakness through BUEs/BLEs/Neck. Pt reports numbness in B hands is new - pt reports yesterday (3/8/24), pt was able to hug her kids from in bed - pt with significant BUE weakness this day, some wrist flexion/deviation, B shoulder elevation. Pt total A x 2 for supine > sitting EOB, assist with BUEs/BLEs, PT supporting head/neck d/t pt unable to actively hold up head sitting EOB. Pt tolerates sitting EOB briefly, reports increased pain in lumbar spine. Pt returned to supine with total A x 2 for head/neck support, BUEs/BLEs. Pt with Dep A x 2 for boost in bed, repo. Pt unable to press soft touch call light, problem-solving, positioning of  wirst with folded chux pads to support hand to press call light, call light placed in pt's R hand. Pt supine in bed with needs met, alarm set.   OT Discharge Planning   OT Plan co-tx with PT, BUE/BLE PROM bed level, commbolizer when able, SBT cog screen as needed (monitor cog)   OT Discharge Recommendation (DC Rec) [facility] patient will benefit from intensive, interdisciplinary therapy.  Anticipate will be able to tolerate 3 hours of therapy per day   OT Rationale for DC Rec Pt functioning well below baseline, noted deficits include significant BUE/BLE/Neck weakness, diminished sensation, mobility, strength, balance, cognition, impacting I/ADLs. Anticipate pending further medical management, pt would benefit from intensive rehab. OT will continue to follow and update recommendations as appropriate.   OT Brief overview of current status See above   Total Session Time   Timed Code Treatment Minutes 28   Total Session Time (sum of timed and untimed services) 38

## 2024-03-09 NOTE — PROGRESS NOTES
"   03/09/24 1040   Appointment Info   Signing Clinician's Name / Credentials (PT) Allison Jonas, PT, DPT   Living Environment   People in Home alone   Current Living Arrangements condominium   Home Accessibility no concerns   Transportation Anticipated car, drives self   Living Environment Comments Has been using a FWW for the last few days, otherwise does not use device at baseline.  lives up [place]   SelfCare   Usual Activity Tolerance moderate   Current Activity Tolerance poor   Equipment Currently Used at Home shower chair;walker, standard   Fall history within last six months yes   Number of times patient has fallen within last six months 1   Activity/Exercise/Self-Care Comment Independent at baseline, weakness since the last week   General Information   Onset of Illness/Injury or Date of Surgery 03/08/24   Referring Physician Juliana Orellana, DO   Patient/Family Therapy Goals Statement (PT) to get better   Pertinent History of Current Problem (include personal factors and/or comorbidities that impact the POC) \"Yohana Marques is a 78 year old female with history of essential HTN, osteoporosis chronic pain with previous laminectomy (2016 per pt) admitted on 3/8/2024 for a fall. Within the ED, she was found to have elevated troponin and new heart failure. \" Also found to have profound weakness   Existing Precautions/Restrictions fall;other (see comments)   Weight-Bearing Status - LLE full weight-bearing   Weight-Bearing Status - RLE full weight-bearing   General Observations CT 3/8: \"Multilevel spondylosis without high grade canal stenosis. Severe bilateral C3-C4, C4-C5, C5-C6 foraminal stenosis.\"   Cognition   Affect/Mental Status (Cognition) WNL   Pain Assessment   Patient Currently in Pain Yes, see Vital Sign flowsheet  (back pain)   Integumentary/Edema   Integumentary/Edema Comments small rash on arm, no overt edema in B LE   Posture    Posture Comments significantly affected by weakness "   Range of Motion (ROM)   ROM Comment WNL B LE PROM   Strength (Manual Muscle Testing)   Strength (Manual Muscle Testing) MMT: Hip;MMT: Knee;MMT: Ankle   Strength Comments 1/5 B LE in ankles, knees, hips. diminished 1/5  strength bilaterally. 2/5 in B wrists, against gravity shoulder elevation, 1/5 elbow flexors. 0/5 cervical in all directions   Bed Mobility   Comment, (Bed Mobility) total assist of 2   Transfers   Comment, (Transfers) total assist; not recommended to elías to a chair given lack of head/neck control   Gait/Stairs (Locomotion)   Comment, (Gait/Stairs) unable d/t weakness   Balance   Balance Comments total assist seated balance, including support for head/neck   Sensory Examination   Sensory Perception Comments numbness in B feet (dorsum and plantar), has light touch to shins, calves, knees and thighs. Decreased sensation in B hands, which may be baseline.   Coordination   Coordination Comments limited d/t to severe weakness   Muscle Tone   Muscle Tone Comments hypotonic thoughout   Clinical Impression   Criteria for Skilled Therapeutic Intervention Yes, treatment indicated   PT Diagnosis (PT) impaired functional mobility   Influenced by the following impairments decreased strength, sensation, activity tolerance, coordination   Functional limitations due to impairments bed mobility, transfers, ambulation   Clinical Presentation (PT Evaluation Complexity) unstable   Clinical Presentation Rationale clinical judgement   Clinical Decision Making (Complexity) high complexity   Planned Therapy Interventions (PT) balance training;bed mobility training;gait training;home exercise program;motor coordination training;neuromuscular re-education;patient/family education;ROM (range of motion);strengthening;stretching;transfer training   Risk & Benefits of therapy have been explained evaluation/treatment results reviewed;care plan/treatment goals reviewed;risks/benefits reviewed;current/potential barriers  reviewed;participants voiced agreement with care plan;participants included;patient;spouse/significant other   PT Total Evaluation Time   PT Eval, High Complexity Minutes (56555) 10   Physical Therapy Goals   PT Frequency Daily   PT Predicted Duration/Target Date for Goal Attainment 03/16/24   PT Goals Bed Mobility;Transfers;Gait   Interventions   Interventions Quick Adds Therapeutic Activity   Therapeutic Activity   Therapeutic Activities: dynamic activities to improve functional performance Minutes (18828) 18   Symptoms Noted During/After Treatment Increased pain   Treatment Detail/Skilled Intervention cotx with OT d/t complexity and profound weakness. Supine to seated EOB with total assist of 2, needed total assist of 2 to remain upright and for head support. Pt reports back pain in seated. Laid back down with total assist of 2 and assist of 2 supine scoot, set up on R side with pillows. Worked on setting up soft touch call light, which pt was only able to successfully press if hand was support on more solid surfarce (used stack of chux pads).   PT Discharge Planning   PT Plan check w/ medical team; bed mob, combilizer   PT Discharge Recommendation (DC Rec) Acute [facility]-Motivated patient will benefit from intensive, interdisciplinary therapy.  Anticipate will be able to tolerate 3 hours of therapy per day   PT Rationale for DC Rec Patient presents well below baseline with profound global weakness including head/neck. Pending medical stability pt anticipated to benefit from intensive rehab.   PT Brief overview of current status total assist of 2   Total Session Time   Timed Code Treatment Minutes 18   Total Session Time (sum of timed and untimed services) 28

## 2024-03-09 NOTE — PROGRESS NOTES
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 (23% on echo 3/8)    -Reasonable to continue aspirin 81 mg once daily  -She should be initiated on guideline directed medical therapy try to improve her ejection fraction; to this and we will start losartan 25 mg once daily and metoprolol succinate 25 mg once daily and will uptitrate according to her hemodynamics  -She is euvolemic and so at present does not require any diuretic therapy  -She will need repeat echocardiography in 3 to 6 months time to reevaluate her ejection fraction    -Cardiology will continue to follow with eugenie Sethi [facility]    Interval history  Her coronary angiogram yesterday demonstrated no significant obstructive CAD.  Her echocardiographic findings are therefore most in keeping with stress-induced cardiomyopathy.  At rest she is not complaining of any chest pain, shortness of breath or lightheadedness.         Physical Exam:   Vitals were reviewed  Blood pressure 130/88, pulse 93, temperature 98  F (36.7  C), temperature source Oral, resp. rate 18, height 1.727 m (5' 8\"), weight 94.2 kg (207 lb 10.8 oz), SpO2 97%, not currently breastfeeding.    Constitutional:   awake, alert, cooperative, no apparent distress, and appears stated age     Eyes:   lids and lashes normal, pupils equal, round and reactive to light, extra ocular muscles intact, sclera clear, conjunctiva normal     Neck:   supple, symmetrical, trachea midline,      Back:   symmetric     Lungs:   clear to auscultation     Cardiovascular:   regular rate and rhythm     Abdomen:   non-tender "     Skin:   normal skin color, texture, turgor               Data:   All laboratory data reviewed  Results for orders placed or performed during the hospital encounter of 03/08/24 (from the past 24 hour(s))   Lactic acid whole blood   Result Value Ref Range    Lactic Acid 1.6 0.7 - 2.0 mmol/L   CT Cervical Spine w/o Contrast    Narrative    EXAM: CT CERVICAL SPINE W/O CONTRAST  LOCATION: [facility]  DATE: 3/8/2024    INDICATION:  Bilateral hand weakness; Neck pain; Neurologic deficit, non traumatic; UE weakness; No known automatically detected potential contraindications to imaging  COMPARISON: None.  TECHNIQUE: Routine CT Cervical Spine without IV contrast. Multiplanar reformats. Dose reduction techniques were used.    FINDINGS:  VERTEBRA: Normal vertebral body heights and alignment. No acute compression fracture or posttraumatic subluxation.     CANAL/FORAMINA: Multilevel spondylosis without high grade canal stenosis. Severe bilateral C3-C4, C4-C5, C5-C6 foraminal stenosis.    PARASPINAL: No prevertebral edema.      Impression    IMPRESSION:  1.  Multilevel spondylosis without high grade canal stenosis. Severe bilateral C3-C4, C4-C5, C5-C6 foraminal stenosis.   CBC with platelets differential    Narrative    The following orders were created for panel order CBC with platelets differential.  Procedure                               Abnormality         Status                     ---------                               -----------         ------                     CBC with platelets and d...[[id number]]  Abnormal            Final result                 Please view results for these tests on the individual orders.   Comprehensive metabolic panel   Result Value Ref Range    Sodium 136 135 - 145 mmol/L    Potassium 4.2 3.4 - 5.3 mmol/L    Carbon Dioxide (CO2) 21 (L) 22 - 29 mmol/L    Anion Gap 13 7 - 15 mmol/L    Urea Nitrogen 11.5 8.0 - 23.0 mg/dL    Creatinine 0.70 0.51 - 0.95 mg/dL    GFR  Estimate 89 >60 mL/min/1.73m2    Calcium 9.0 8.8 - 10.2 mg/dL    Chloride 102 98 - 107 mmol/L    Glucose 120 (H) 70 - 99 mg/dL    Alkaline Phosphatase 92 40 - 150 U/L    AST 32 0 - 45 U/L    ALT 13 0 - 50 U/L    Protein Total 7.1 6.4 - 8.3 g/dL    Albumin 3.3 (L) 3.5 - 5.2 g/dL    Bilirubin Total 0.4 <=1.2 mg/dL   CK total   Result Value Ref Range     26 - 192 U/L   CBC with platelets and differential   Result Value Ref Range    WBC Count 17.3 (H) 4.0 - 11.0 10e3/uL    RBC Count 4.33 3.80 - 5.20 10e6/uL    Hemoglobin 12.3 11.7 - 15.7 g/dL    Hematocrit 38.3 35.0 - 47.0 %    MCV 89 78 - 100 fL    MCH 28.4 26.5 - 33.0 pg    MCHC 32.1 31.5 - 36.5 g/dL    RDW 15.8 (H) 10.0 - 15.0 %    Platelet Count 393 150 - 450 10e3/uL    % Neutrophils 87 %    % Lymphocytes 9 %    % Monocytes 3 %    % Eosinophils 0 %    % Basophils 0 %    % Immature Granulocytes 1 %    NRBCs per 100 WBC 0 <1 /100    Absolute Neutrophils 15.1 (H) 1.6 - 8.3 10e3/uL    Absolute Lymphocytes 1.5 0.8 - 5.3 10e3/uL    Absolute Monocytes 0.6 0.0 - 1.3 10e3/uL    Absolute Eosinophils 0.0 0.0 - 0.7 10e3/uL    Absolute Basophils 0.0 0.0 - 0.2 10e3/uL    Absolute Immature Granulocytes 0.1 <=0.4 10e3/uL    Absolute NRBCs 0.0 10e3/uL       Lab Results   Component Value Date    CHOL 241 07/07/2022    CHOL 220 07/05/2021     Lab Results   Component Value Date    HDL 78 07/07/2022    HDL 66 07/05/2021     Lab Results   Component Value Date     07/07/2022     07/05/2021     Lab Results   Component Value Date    TRIG 119 07/07/2022    TRIG 122 07/05/2021     Lab Results   Component Value Date    CHOLHDLRATIO 3.9 05/28/2015     TSH   Date Value Ref Range Status   07/12/2023 1.56 0.30 - 4.20 uIU/mL Final   11/18/2005 2.30 0.4 - 5.0 mU/L Final     Echocardiography 3/8:  The left ventricle is normal in size.  Severely decreased left ventricular systolic function. LVEF is 23%.  Entire left ventricle is severely hypokinetic but basal left  "ventricular  function is less so. Differential diagnosis includes stress-induced  cardiomyopathy or multivessel coronary artery disease.  Grade 1 left ventricular diastolic function.  Normal right ventricular size and systolic function.  No significant valve disease.  Trace mitral and tricuspid valve regurgitation.  Normal inferior vena cava.     There is no comparison study available.    Cardiac angiography 3/8:  No significant obstructive coronary artery disease.   Uncomplicated right common femoral access.     Clinically Significant Risk Factors              # Hypoalbuminemia: Lowest albumin = 3.3 g/dL at 3/9/2024  6:04 AM, will monitor as appropriate     # Hypertension: Noted on problem list        # Obesity: Estimated body mass index is 31.58 kg/m  as calculated from the following:    Height as of this encounter: 1.727 m (5' 8\").    Weight as of this encounter: 94.2 kg (207 lb 10.8 oz)., PRESENT ON ADMISSION           Cardiomyopathy    Chronic Fatigue and Other Debilities: Age-related physical debility  Chronic fatigue, unspecified       "

## 2024-03-09 NOTE — PROGRESS NOTES
A&O x 4, VSS on 1LNC, saturating low 90s. Total cares, bedrest, up with lift, Bileteral UE/LE weakness. Turn and reposition Q 2 hours. Head and lumbar CT with no acute finding. CT of cervical spine without contrast completed. Right groin site CDI, neuro is intact except weak upper and lower extremities, CMS intact. Continue plan of care.

## 2024-03-09 NOTE — PLAN OF CARE
Goal Outcome Evaluation:  A&Ox4.  Arrived from cath lab at 1530. Bedrest ended at 1930. Right femoral site is wnl. Nye catheter placed in ER for retention. Tolerating water and grapes. Family in room, very attentive.  Bilateral UE weakness. Head and lumbar CT with no acute findings/CVA. Hx laminectomy

## 2024-03-10 NOTE — PROGRESS NOTES
Pt here with Fall, was on the ground for 4-5 hour prior to being found, found to have elevated troponin, NSTEMI, New severe systolic heart failure EF 23% takotsubo vs other. Transferred from Heart center at 6:20 AM. A&Ox4.  RRT called uppon assessment when noticed slightly labored, accessory muscle and shallow breathing. Pt complained of SOB and difficulty to talk because of SOB. Sating 95-98% on 2L per n/c. Neuros: unable to move any of her limbs, able to barely slowly wiggle toes. Unable to move head,  increased hand and  and neck weakness noticed by patient. Overall pt seemed exhausted from trying to move limbs for neuros checks and from answering assessment questions. Angio site on R groin unchanged. Nye catheter in place.

## 2024-03-10 NOTE — CODE/RAPID RESPONSE
[facility]    RRT Note  3/10/2024   Time Called: 7:17 AM    Code Status: Full Code    I was called to evaluate Yohana Marques, who is a 78 year old female who was admitted on 3/8/2024 after a fall.  She was found to have elevated troponin and new heart failure.  It is reported that patient has had progressive weakness over the past couple of weeks, starting in the lower extremities.  This contributed to her fall prior to admission.  Overnight patient had difficulty swallowing and increased weakness that has progressed from her lower extremities and her upper extremities.  She was transferred to neuro IMC unit for close monitoring of neuro exams.  About 30 minutes after arriving to neuro IMC unit RRT called after patient expressed shortness of breath.    Assessment & Plan   Ascending muscle weakness suspect 2/2 Guillain [place] vs. other neuromuscular disorder  Acute hypercapnic respiratory failure 2/2 above  Upon arrival patient is nontoxic-appearing, not in acute distress however she does have shallow respirations and some mild abdominal accessory muscle use.  Initial vital signs include temp 96.8, HR 90, /98, RR 20, SpO2 98% on 2 L.  She is alert, and oriented.  Speech is soft-spoken.  Patient denies fever, chills, chest pain, palpitations, nausea, or headache.  Patient also denies any recent respiratory viral illness or any recent vaccines.  She does endorse shortness of breath, progressive dysphagia, progressive weakness, and increased fatigue.  On exam skin is warm and dry.  Lungs are clear bilaterally, no wheezing, rhonchi or rales.  No murmur, rub, or gallop.  Trace peripheral edema.  Patient is able to wiggle her toes however she is unable to lift her legs off of the bed.  She has a weak hand grasp in bilateral upper extremities, also unable to lift arms off the bed.  Her neck is weak, she is unable to support her head and neck independently.  When instructed to cough she  has a very weak cough.    Given rapid progression of patient's ascending muscle weakness, high suspicion for Guillain-Barré syndrome or other ascending neuromuscular disorder.  Patient's breathing is shallow, and she expresses she is getting tired from breathing.  Although patient is not in acute respiratory distress, would like to transfer to intensive care in anticipation that she may require intubation due to airway compromise.    Initially spoke with intensivist Dr. Tavares regarding concern for impaired respiratory drive due to ascending progressive muscle weakness. Although patient is not in severe distress, house provider recommended transfer to ICU based on concern for impending airway compromise. Dr. Tavares asked to obtain VBG, NIF, and forced vital capacity, to evaluate for impaired ventilation prior to transferring to ICU.     Also spoke with Dr. Salinas with general neurology, who deferred management of this patient to Neurocritical care and advised to transfer patient to ICU.     Working diagnosis: Acute respiratory failure secondary to Guillain-Barré syndrome or other ascending neuromuscular disorder    INTERVENTIONS:  - VBG: pH 7.34, CO2 55, O2 43, HCO3 30  - Lactic acid 1.4  - NIF -25  - Forced vital capacity 1000  - Initially placed on CPAP of 6, however changed to BiPAP 12/6 due to low tidal volumes  - Neurocritical care consult: Dr. Strong came to assess patient at bedside. Patient has no peripheral reflexes. She has grade 1/5 strength in all four extremities and neck. Dr. Strong agrees that patient's clinical exam is consistent with Guillain [place].   - After VBG returned showing hypercapnia, showing impaired ventilation, patient transferred to ICU.   - q4h NIFs  - Intensivist Consult    Goals of Care   Discussed with Yohana and her  the concern for losing her airway reflexes, and the next step would be intubation for airway protection. Both Yohana and her  are in agreement with  proceeding with intubation. Patient will remain FULL code.    Patient transferred to ICU with flying mcmullen RN and respiratory therapy. Defer further cares to intensivist Dr. Tavares.      Michoacano Vail NP  [facility]  Securely message with the Vocera Web Console (learn more here)  Text page via [facility] Paging/Directory          Physical Exam   Vital Signs with Ranges:  Temp:  [97.8  F (36.6  C)-98.1  F (36.7  C)] 97.9  F (36.6  C)  Pulse:  [] 94  Resp:  [14-22] 22  BP: (117-147)/(70-93) 144/92  SpO2:  [92 %-98 %] 98 %  I/O last 3 completed shifts:  In: 120 [P.O.:120]  Out: 400 [Urine:400]         Physical Exam   EXAM:   General:  Non-toxic appearing, shallow respirations. Alert/oriented x3.  Skin:  Warm, dry. No rashes or lesions on exposed skin.  HEENT:  Normocephalic, atraumatic. Speech is soft spoken.  Neck:  Unable to lift head/neck off bed.  Chest:  Breath sounds CTA. Shallow breathing pattern with mild abdominal accessory muscle use.  Cardiovascular:  RRR, no rub or murmur.   Abdomen:  Abdomen soft, non-tender.  Musculoskeletal:  Grade 1/5 strength in all four extremities.   Neurological:  Reflexes absent in all 4 extremities.      Data   IMAGING: (X-ray/CT/MRI)   Recent Results (from the past 24 hour(s))   MR Brain w/o & w Contrast    Narrative    EXAM: MR BRAIN W/O AND W CONTRAST  LOCATION: [facility]  DATE: 3/9/2024    INDICATION: Diffuse weakness.  COMPARISON: CT head dated 03/08/2024.  CONTRAST: 10 mL Gadavist.  TECHNIQUE: Routine multiplanar multisequence head MRI without and with intravenous contrast.    FINDINGS:  INTRACRANIAL CONTENTS: No acute or subacute infarct. No mass, acute hemorrhage, or extra-axial fluid collections. Patchy nonspecific T2/FLAIR hyperintensities within the cerebral white matter, left greater than right, most consistent with mild to   moderate chronic microvascular ischemic change. Mild generalized cerebral atrophy. No  "hydrocephalus. Normal position of the cerebellar tonsils. No pathologic contrast enhancement.    SELLA: No abnormality accounting for technique.    OSSEOUS STRUCTURES/SOFT TISSUES: Normal marrow signal. The major intracranial vascular flow voids are maintained.     ORBITS: Prior bilateral cataract surgery. Visualized portions of the orbits are otherwise unremarkable.     SINUSES/MASTOIDS: No paranasal sinus mucosal disease. Partial right mastoid effusion.      Impression    IMPRESSION:  1.  No acute intracranial process.  2.  Generalized brain atrophy and presumed microvascular ischemic changes as detailed above.       CBC with Diff:  Recent Labs   Lab Test 03/09/24  0604 05/02/23  0654 05/02/23  0650   WBC 17.3*   < >  --    HGB 12.3   < >  --    MCV 89   < >  --       < >  --    INR  --   --  1.02    < > = values in this interval not displayed.      No results found for: \"RETICABSCT\"  No results found for: \"RETP\"    Comprehensive Metabolic Panel:  Recent Labs   Lab 03/09/24  0604      POTASSIUM 4.2   CHLORIDE 102   CO2 21*   ANIONGAP 13   *   BUN 11.5   CR 0.70   GFRESTIMATED 89   YEE 9.0   PROTTOTAL 7.1   ALBUMIN 3.3*   BILITOTAL 0.4   ALKPHOS 92   AST 32   ALT 13         Time Spent on this Encounter     CRITICAL CARE TIME  I spent 74 minutes of critical care time on the unit/floor managing the care of Yohana Marques. Upon evaluation, this patient had a high probability of imminent or life-threatening deterioration due to acute respiratory failure due to progressive muscle weakness, which required my direct attention, intervention, and personal management. 100% of my time was spent at the bedside counseling the patient and/or coordinating care regarding services listed in this note.    "

## 2024-03-10 NOTE — PROCEDURES
[facility]    Central line    Date/Time: 3/10/2024 12:07 PM    Performed by: Alberto Tavares MD  Authorized by: Alberto Tavares MD  Indications: guillan barre syndrome requiring plasmapheresis.      UNIVERSAL PROTOCOL   Site Marked: NA  Prior Images Obtained and Reviewed:  NA  Required items: Required blood products, implants, devices and special equipment available    Patient identity confirmed:  Arm band  Patient was reevaluated immediately before administering moderate or deep sedation or anesthesia  Confirmation Checklist:  Patient's identity using two indicators, relevant allergies, procedure was appropriate and matched the consent or emergent situation and correct equipment/implants were available  Time out: Immediately prior to the procedure a time out was called    Universal Protocol: the Joint Commission Universal Protocol was followed    Preparation: Patient was prepped and draped in usual sterile fashion       ANESTHESIA    Local Anesthetic:  Lidocaine 1% without epinephrine      SEDATION    Patient Sedated: No      Preparation: skin prepped with ChloraPrep  Skin prep agent dried: skin prep agent completely dried prior to procedure  Sterile barriers: all five maximum sterile barriers used - cap, mask, sterile gown, sterile gloves, and large sterile sheet  Hand hygiene: hand hygiene performed prior to central venous catheter insertion  Patient position: Trendelenburg  Catheter type: double lumen  Catheter size: 12 Fr  Pre-procedure: landmarks identified  Ultrasound guidance: yes  Sterile ultrasound techniques: sterile gel and sterile probe covers were used  Number of attempts: 1  Successful placement: yes  Post-procedure: line sutured and dressing applied  Assessment: blood return through all ports and free fluid flow      PROCEDURE    Patient Tolerance:  Patient tolerated the procedure well with no immediate complications  Length of time physician/provider present for 1:1  monitoring during sedation: 0

## 2024-03-10 NOTE — PROGRESS NOTES
"Cardiology Consultation      Yohana Marques MRN# [id number]   YOB: 1945 Age: 78 year old   Date of Admission: 3/8/2024     Reason for consult: Elevated troponin, cardiomyopathy           Assessment and Plan:     Elevated troponin, cardiomyopathy and nonspecific ECG changes  No significant obstructive coronary artery disease on invasive coronary angiogram on 3/8/24  Stress-induced cardiomyopathy with a significantly reduced EF (23% on echo 3/8)    -She has been transferred to the ICU for respiratory distress and suspicion for neuromuscular process resulting in diaphragmatic weakness; planning to intubate this morning  -From a cardiology perspective reasonable to continue aspirin 81 mg once daily long term  -The key to the management of her stress-induced cardiomyopathy and heart failure with a reduced ejection fraction over the long-term is continued guideline directed medical therapy; when she stabilizes she should continue with her losartan and metoprolol succinate while uptitrating her doses according to her hemodynamics (these are currently held)  -Maintain euvolemia  -She will need repeat echocardiography in 3 to 6 months time to reevaluate her ejection fraction    -Cardiology will sign off at this time, please let us know if you have further questions    [facility]    Interval history  Overnight she developed respiratory distress and has been transferred to the ICU initially she was receiving BiPAP, and is now in the process of being intubated.  Neurology are evaluating her for suspected neuromuscular disease.  From a cardiovascular perspective her hemodynamics are within acceptable limits, she does not have any features of fluid overload.         Physical Exam:   Vitals were reviewed  Blood pressure 106/65, pulse 93, temperature 97  F (36.1  C), temperature source Axillary, resp. rate 18, height 1.727 m (5' 8\"), weight 91.5 kg (201 lb 11.5 oz), SpO2 100%, not currently " breastfeeding.    Constitutional:   On Bipap     Eyes:   lids and lashes normal, pupils equal, round and reactive to light, extra ocular muscles intact, sclera clear, conjunctiva normal     Neck:   supple, symmetrical, trachea midline,      Back:   symmetric     Lungs:   clear to auscultation     Cardiovascular:   regular rate and rhythm     Abdomen:   non-tender     Skin:   normal skin color, texture, turgor               Data:   All laboratory data reviewed  Results for orders placed or performed during the hospital encounter of 03/08/24 (from the past 24 hour(s))   MR Brain w/o & w Contrast    Narrative    EXAM: MR BRAIN W/O AND W CONTRAST  LOCATION: [facility]  DATE: 3/9/2024    INDICATION: Diffuse weakness.  COMPARISON: CT head dated 03/08/2024.  CONTRAST: 10 mL Gadavist.  TECHNIQUE: Routine multiplanar multisequence head MRI without and with intravenous contrast.    FINDINGS:  INTRACRANIAL CONTENTS: No acute or subacute infarct. No mass, acute hemorrhage, or extra-axial fluid collections. Patchy nonspecific T2/FLAIR hyperintensities within the cerebral white matter, left greater than right, most consistent with mild to   moderate chronic microvascular ischemic change. Mild generalized cerebral atrophy. No hydrocephalus. Normal position of the cerebellar tonsils. No pathologic contrast enhancement.    SELLA: No abnormality accounting for technique.    OSSEOUS STRUCTURES/SOFT TISSUES: Normal marrow signal. The major intracranial vascular flow voids are maintained.     ORBITS: Prior bilateral cataract surgery. Visualized portions of the orbits are otherwise unremarkable.     SINUSES/MASTOIDS: No paranasal sinus mucosal disease. Partial right mastoid effusion.      Impression    IMPRESSION:  1.  No acute intracranial process.  2.  Generalized brain atrophy and presumed microvascular ischemic changes as detailed above.   TSH with free T4 reflex   Result Value Ref Range    TSH 1.54 0.30 -  4.20 uIU/mL   Nt probnp inpatient   Result Value Ref Range    N terminal Pro BNP Inpatient 15,124 (H) 0 - 1,800 pg/mL   Lactate Dehydrogenase   Result Value Ref Range    Lactate Dehydrogenase 203 0 - 250 U/L   Blood gas venous   Result Value Ref Range    pH Venous 7.34 7.32 - 7.43    pCO2 Venous 55 (H) 40 - 50 mm Hg    pO2 Venous 43 25 - 47 mm Hg    Bicarbonate Venous 30 (H) 21 - 28 mmol/L    Base Excess/Deficit Venous 2.6 -3.0 - 3.0 mmol/L    FIO2 25     Oxyhemoglobin Venous 76 (H) 70 - 75 %    O2 Sat, Venous 77.7 (H) 70.0 - 75.0 %    Narrative    In healthy individuals, oxyhemoglobin (O2Hb) and oxygen saturation (SO2) are approximately equal. In the presence of dyshemoglobins, oxyhemoglobin can be considerably lower than oxygen saturation.   Lactic acid whole blood   Result Value Ref Range    Lactic Acid 1.4 0.7 - 2.0 mmol/L   Glucose by meter   Result Value Ref Range    GLUCOSE BY METER POCT 115 (H) 70 - 99 mg/dL       Lab Results   Component Value Date    CHOL 241 07/07/2022    CHOL 220 07/05/2021     Lab Results   Component Value Date    HDL 78 07/07/2022    HDL 66 07/05/2021     Lab Results   Component Value Date     07/07/2022     07/05/2021     Lab Results   Component Value Date    TRIG 119 07/07/2022    TRIG 122 07/05/2021     Lab Results   Component Value Date    CHOLHDLRATIO 3.9 05/28/2015     TSH   Date Value Ref Range Status   03/09/2024 1.54 0.30 - 4.20 uIU/mL Final   11/18/2005 2.30 0.4 - 5.0 mU/L Final     Echocardiography 3/8:  The left ventricle is normal in size.  Severely decreased left ventricular systolic function. LVEF is 23%.  Entire left ventricle is severely hypokinetic but basal left ventricular  function is less so. Differential diagnosis includes stress-induced  cardiomyopathy or multivessel coronary artery disease.  Grade 1 left ventricular diastolic function.  Normal right ventricular size and systolic function.  No significant valve disease.  Trace mitral and tricuspid  "valve regurgitation.  Normal inferior vena cava.     There is no comparison study available.    Cardiac angiography 3/8:  No significant obstructive coronary artery disease.   Uncomplicated right common femoral access.     Clinically Significant Risk Factors              # Hypoalbuminemia: Lowest albumin = 3.3 g/dL at 3/9/2024  6:04 AM, will monitor as appropriate     # Hypertension: Noted on problem list        # Obesity: Estimated body mass index is 30.67 kg/m  as calculated from the following:    Height as of this encounter: 1.727 m (5' 8\").    Weight as of this encounter: 91.5 kg (201 lb 11.5 oz)., PRESENT ON ADMISSION           Cardiomyopathy    Chronic Fatigue and Other Debilities: Age-related physical debility  Chronic fatigue, unspecified       "

## 2024-03-10 NOTE — CONSULTS
[facility]    NCC Consult Note    Reason for Consult:  Generalized weakness    Chief Complaint: Fall and Generalized Weakness       HPI  Yohana Marques is a 78 year old woman with h/o HTN, SPINAL STENOSIS AND CHRINIC PAIN, who presented to the ED on 3/8 after a fall at home early morning when getting up for water. She reports getting up and feeling fine for a few steps and then she became dizzy and fell down. She denies LOC. NO palpitations or really any symptoms prior to the fall. She was on the ground for 4-5 hour prior to being found.  On initial assessment by admitting physician, her only complaints were acute on chronic low back pain and  weakness. She reports generally before the admission feeling weaker - c/o issues using her legs due to pain and weakness which is chronic but more acutely worse at admission and in the last few days leading to the admission necessitating use of a walker (uses a cane at baseline).   She denied fevers or chills or cough nor any GI complaints and no recent illness.   On admission was found to have new heart failure (per cardiology likely stress induced) with a significantly reduced EF (23% on echo 3/8) with no significant obstructive coronary artery disease on invasive coronary angiogram on 3/8/24. She was started on Metoprolol succinate and Losartan.  Lactate of 3.1 and CK of 273 on admission which has trended down during her stay.    Of note: she has urinary retention    This AM (3/10) RRT called due to slightly labored, accessory muscle and shallow breathing with patient c/o SOB and difficulty to talk because of SOB. Sating 95-98% on 2L per n/c.   RT eval this AM: VC of 1000 ml, and NIF -25.   Patient was placed on CPAP +6 25%   vBG pH 7.34, pCO2 55 pO2 43  HCO3 30     Evaluation Summarized    MRI Brain w/wo contrast No acute intracranial process. Chronic periventricular microvascular disease (L > R)   MRI Spine pending   CT  "Chest/Abdomen/Pelvis pending     Echocardiogram 3/8      Coronary cath 3/8 EF is 23%.Entire left ventricle is severely hypokinetic but basal left ventricular function is less so    No significant CAD   CSF Labs Pending   Labs MG panel: pending  Ganglioside Ab: pending       Impression  - Progressive ?ascending weakness involving cervical, bulbar, diaphragm and limb muscle (No ocular muscle involvement)  - Areflexia   - Hypercapnic respiratory failure  - New onset stress induced HFrEF (EF 23%)  - Urinary retention    D/D: Highest for Guillain [place]ona Syndrome(motor and sensory involvement with arreflexia). Other differentials include myelitis (due to sensory level albeit patchy and in the acute setting, spinal cord involvement can lead to hyporeflexia before hyperreflexia) v/s myasthenia gravis (lower on the differential since there is an element of sensory involvement)     Recommendations   - Intubate for airway protection  - Initiate PLEX : 5 cycles; 1.5 vol Day1 (today) and 1 volume Day 2 (tomorrow) followed by 1 volume every other day  - LP for CSF studies (rule out infection)  - MRI C/T/L spine  - CT C/A/P (to rule out underlying malignancy/paraneoplastic phenomenon)  - Assess for autonomic dysfunction: tachy/bradycardia, hypotension, urinary retention (patient already on Nye for the same), constipation (please schedule BM regimen)  - Please start pharmacological DVT prophylaxis    Patient Follow-up    - final recommendation pending work-up    Thank you for this consult. We will continue to follow.     The Stroke Staff is Dr. Russell.    Julio Strong MD  Vascular Neurology Fellow    To page me or covering stroke neurology team member, click here: [facility]  Choose \"On Call\" tab at top, then select \"NEUROLOGY/ALL SITES\" from middle drop-down box, press Enter, then look for \"stroke\" or \"telestroke\" for your site.  _____________________________________________________    Clinically Significant Risk Factors           " "   # Hypoalbuminemia: Lowest albumin = 3.3 g/dL at 3/9/2024  6:04 AM, will monitor as appropriate     # Hypertension: Noted on problem list        # Obesity: Estimated body mass index is 31.64 kg/m  (pended) as calculated from the following:    Height as of this encounter: 1.727 m (5' 8\").    Weight as of this encounter: (P) 94.4 kg (208 lb 1.8 oz)., PRESENT ON ADMISSION              Past Medical History    Past Medical History:   Diagnosis Date    HTN (hypertension)     Hyperlipidemia     Leiomyoma of uterus, unspecified     Numbness and tingling     spinal stenosis causes numbness and tingling on feet and knees bilaterally    Osteoarthrosis, unspecified whether generalized or localized, unspecified site     Other and unspecified disc disorder of lumbar region     Other chronic pain     Primary osteoarthritis of left hip 12/18/2016    Spinal stenosis     S/P diskectomy x 2, most recently 8/2014     Medications   Home Meds  Prior to Admission medications    Medication Sig Start Date End Date Taking? Authorizing Provider   acetaminophen (TYLENOL) 325 MG tablet Take 2 tablets (650 mg) by mouth every 8 hours as needed for mild pain 12/2/22  Yes Raquel Cain PA-C   aspirin 81 MG EC tablet Take 81 mg by mouth daily   Yes Unknown, Entered By History   calcium-vitamin D (CALTRATE) 600-400 MG-UNIT per tablet Take 1 tablet by mouth 2 times daily   Yes Reported, Patient   celecoxib (CELEBREX) 200 MG capsule Take 1 capsule (200 mg) by mouth daily 7/12/23  Yes Arun Tao MD   lisinopril (ZESTRIL) 10 MG tablet Take 1 tablet (10 mg) by mouth daily 7/12/23  Yes Arun Tao MD   Multiple Vitamins-Minerals (CENTRUM SILVER) per tablet Take 1 tablet by mouth daily   Yes Reported, Patient   traMADol (ULTRAM) 50 MG tablet TAKE 1 TO 2 TABLETS BY MOUTH EVERY DAY FOR SEVERE PAIN 1/4/24  Yes Arun Tao MD   vitamin D3 (CHOLECALCIFEROL) 50 mcg (2000 units) tablet Take 1 tablet (50 mcg) by mouth daily 7/5/21 "  Yes Arun Tao MD       Scheduled Meds   aspirin  81 mg Oral Daily    losartan  25 mg Oral Daily    metoprolol succinate ER  50 mg Oral Daily    piperacillin-tazobactam  4.5 g Intravenous Q6H    sodium chloride (PF)  3 mL Intracatheter Q8H       Infusion Meds   - MEDICATION INSTRUCTIONS -      - MEDICATION INSTRUCTIONS -         Allergies   No Known Allergies       PHYSICAL EXAMINATION   Temp:  [96.8  F (36  C)-98.1  F (36.7  C)] 96.8  F (36  C)  Pulse:  [] 89  Resp:  [14-22] 16  BP: (117-147)/(70-98) 138/89  Cuff Mean (mmHg):  [100-114] 108  FiO2 (%):  [25 %] 25 %  SpO2:  [92 %-99 %] 98 %    Neuro: Awake, alert, hypophonic, weak cough, weak swallow, EOMI, no facial droop, able to close eye lids tight, no evidence of ptosis. Weak sternocleidomastoids b/lly (grade 2). Left shoulder shrug (grade 1) weaker than R shoulder shrug (Grade 2), Neck flexion Grade 1, Neck Extension 0                                           R                   L    Biceps                              1                  1  Triceps                             0                  0  Deltoids                            0                  0   strength                     2                 2  Hip flexion                         0                 0  Hip Abduction                   1                 1  Hip Adduction                    0                 0  Knee Flexion                     1                 1  Knee extension                  0                 0  Plantar flexion                    2                 2  Dorsiflexion                        2                 2    Patch sensory loss with decreased sensation to pin prick (anteriorly) from midthigh to anout C3/4 level on the chest.  Arreflexic throughout except for grade 1 reflex in left biceps    Imaging  I personally reviewed all imaging; relevant findings per HPI.    Labs Data   CBC  Recent Labs   Lab 03/09/24  0604 03/08/24  1111   WBC 17.3* 18.8*   RBC 4.33 4.51   HGB  12.3 13.1   HCT 38.3 39.8    352     Basic Metabolic Panel   Recent Labs   Lab 03/09/24  0604 03/08/24  1111    140   POTASSIUM 4.2 4.2   CHLORIDE 102 100   CO2 21* 23   BUN 11.5 9.5   CR 0.70 0.75   * 100*   YEE 9.0 9.5     Liver Panel  Recent Labs   Lab 03/09/24  0604 03/08/24  1111   PROTTOTAL 7.1 7.4   ALBUMIN 3.3* 3.6   BILITOTAL 0.4 0.5   ALKPHOS 92 103   AST 32 31   ALT 13 13     INR    Recent Labs   Lab Test 05/02/23  0650 01/25/23  0719 10/15/22  0728   INR 1.02 1.06 1.23*           Stroke Consult Data Data   This was a non-emergent, non-telestroke consult.

## 2024-03-10 NOTE — PROGRESS NOTES
ICU Note    Called to room for new onset tachycardia at end of PLEX session as she was being flushed back. HR ranging 120-150s in atrial fibrillation (confirmed on EKG). Family reports no prior history. BP also fluctuating between 70s-90s. Patient denies any pain. Likely autonomic dysfunction 2/2 Guillain [place]. Neuro update. 500cc bolus given. Plan to start amiodarone bolus and drip if HR still above 120 after bolus. Plan for PICC and phenylephrine if needed. Hold on LP for now until more HD stable.     Critical Care Time: 25 minutes in addition to Dr. Tavares's 60 minute from today.    Adriana Souza MD  Critical Care

## 2024-03-10 NOTE — PROGRESS NOTES
"Apheresis Treatment- plasma exchange for GB    Height: 5'8\"  Weight: 201lbs  Hematocrit: 38%   Inlet Speed: 70ml/min  ACDA ratio: 10:1  Fluid Balance: 104% (+181ml fluid balance)  Access: right temporary CVC.   Replacement Product: 4500 ml 5% albumin for a 1.5 volume exchange.  Electrolyte replacement: 4.5g calcium gluconate infused over duration of treatment.  Premedication: none    Treatment Notes: VSS throughout treatment with -120's, HR 70-80's. Upon rinseback HR suddenly increased to 120-160's. MD and primary RN notified.   Treatment completed as ordered, VSS, see Epic flowsheet for run detail.     Next Treatment: Monday, March 11th.      "

## 2024-03-10 NOTE — PROGRESS NOTES
[facility]  Hospitalist Progress Note  Ty Doshi MD  03/10/2024    Assessment & Plan   Yohana Marques is a 78 year old female with history of essential HTN, osteoporosis chronic pain with previous laminectomy admitted on 3/8/2024 for a fall. Within the ED, she was found to have elevated troponin and new heart failure.      Suspected GBS vs myelitis  Patient prior to admission had a few days of difficulty with ambulation.  She subsequently became weak and fell onto a bed and became wedged between bed and wall and stayed there for many hours.  She presented with bilateral upper ext  strength weakness and underwent spinal imaging which did not show any acute trauma.  Had diminished to no reflex. LDH and TSH were WNL, AM cortisol is normal.    Had progressive respiratory failure  Seen by stroke neurology, and started on PLEX x 5 cycles    Acute hypercapnic respiratory failure secondary to suspected GBS  NIF -25, progressive oxygen requirement including bipap  Patient was intubated electively  Continue PLEX therapy  Critical care managing vent, getting versed for sedation    New severe systolic heart failure EF 23% takotsubo vs other  Patient presented to the ED after a fall at home around 4am when getting up for water. She reports getting up and feeling fine for a few steps and then she became dizzy and fell down. She denies LOC. She was on the ground for 4-5 hour prior to being found. Within the ED, troponin returned at 585 and EKG read as a STEMI however after discussion cardiology suspected to be J point elevation. Patient without active chest pain or hypoxia. Echo then returned with global LV dysfunction with reduced EF at 23%, no prior comparison or history of heart failure  - cardiology consulted in the ED and underwent angiogram which showed no obstructive CAD  - off heparin and asa  - suspect secondary to her fall and being down for many hours     Lactic  acidosis  Sepsis rule out  Within the ED LA returned at 3.1 with leukocytosis of 18. Patient given 1L fluids and started on broad spectrum antibiotics  - no localizing signs of infection and difficult to determine if elevated lactate due to the fall and dehydration, heart failure or true infection  - trend lactate after fluids, follow cultures and for signs of infection  - continue antibiotics for now  - pending result of next lactate consider starting IVF however will be cautious given new heart failure    Diffuse generalized global weakness metabolic vs neurological  Bilateral hand  weakness  Global weakness noted in the ED however during course of ED admission her hands became more weak. CTA of the head and lumbar spine obtained and no acute findings or CVA. Chronic DJD and various levels of mild and moderate stenosis with previous laminectomy noted  - patient reports known cervical stenosis  - given bilateral nature of her weakness CT cervical spine was ordered:  Multilevel spondylosis without high grade canal stenosis. Severe bilateral C3-C4, C4-C5, C5-C6 foraminal stenosis.   - will transfer to   - check TSH/FT4, cortisol level  - MRI showed no acute intracranial process or stroke     Urinary retention  Patient denies any dysuria or issues with urination prior to admission. On lumbar CT distended bladder noted with hydroureters  - quiroz placed in the ED, TOV when appropriate  - follow UA is abnormal     Abnormal CXR  Bilateral upper lobe opacities with vascular congestion noted in the ED. No hypoxia and no respiratory complaints  - pro BNP elevated,  may need intermittent IV, but her BP are soft.     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 Follow CT of her cervical spine  - PT consults when appropriate     HTN  - hold PTA lisinopril for now     Osteoporosis  - hold PTA calcium and vitamin D     Diet:  NPO  DVT Prophylaxis: PCD  Nye Catheter: Not present  Lines: None     Cardiac Monitoring:    Code Status:  FULL        Clinically Significant Risk Factors Present on Admission                 # Drug Induced Platelet Defect: home medication list includes an antiplatelet medication   # Hypertension: Noted on problem list      # Obesity: Estimated body mass index is 31.32 kg/m  as calculated from the following:    Height as of this encounter: 1.727 m (5' 8\").    Weight as of this encounter: 93.4 kg (206 lb).              Disposition Plan     Expected Discharge Date:  > 2-4 days    Disposition:     Interval History   -- chart reviewed  -- cardiology consult noted and [facility] results noted  -- all imaging studies reviewed  -- today feeling more weak     -Data reviewed today: I reviewed all new labs and imaging over the last 24 hours. I personally reviewed no images or EKG's today.    Physical Exam    , Blood pressure 94/64, pulse (!) 128, temperature 98.1  F (36.7  C), resp. rate 14, height 1.727 m (5' 8\"), weight 91.5 kg (201 lb 11.5 oz), SpO2 100%, not currently breastfeeding.  Vitals:    03/09/24 0612 03/10/24 0526 03/10/24 0900   Weight: 94.2 kg (207 lb 10.8 oz) (P) 94.4 kg (208 lb 1.8 oz) 91.5 kg (201 lb 11.5 oz)     Vital Signs with Ranges  Temp:  [96.8  F (36  C)-98.1  F (36.7  C)] 98.1  F (36.7  C)  Pulse:  [] 128  Resp:  [14-23] 14  BP: ()/(35-98) 94/64  Cuff Mean (mmHg):  [100-114] 108  FiO2 (%):  [25 %-60 %] 40 %  SpO2:  [95 %-100 %] 100 %  I/O's Last 24 hours  I/O last 3 completed shifts:  In: 306.12 [P.O.:50; I.V.:6.12]  Out: 1150 [Urine:1150]    Constitutional: Sedated " and intubated  Respiratory: Clear to auscultation bilaterally, no crackles or wheezing  Cardiovascular: tachycardia  GI: Normal bowel sounds, soft, non-distended, non-tender  Skin/Integumen: No rashes, no cyanosis, no edema  Other:      Medications   All medications were reviewed.   amiodarone 1 mg/min (03/10/24 1619)    amiodarone      midazolam 1 mg/hr (03/10/24 1111)    - MEDICATION INSTRUCTIONS -      - MEDICATION INSTRUCTIONS -      phenylephrine 0.5 mcg/kg/min (03/10/24 1628)      aspirin  81 mg Oral Daily    chlorhexidine  15 mL Mouth/Throat Q12H    [START ON 3/11/2024] heparin ANTICOAGULANT  5,000 Units Subcutaneous Q8H    [Held by provider] metoprolol succinate ER  50 mg Oral Daily    [START ON 3/11/2024] pantoprazole  40 mg Per Feeding Tube QAM AC    Or    [START ON 3/11/2024] pantoprazole  40 mg Intravenous QAM AC    piperacillin-tazobactam  4.5 g Intravenous Q6H    sodium chloride (PF)  3 mL Intracatheter Q8H        Data   Recent Labs   Lab 03/10/24  1624 03/10/24  0904 03/10/24  0753 03/09/24  0604 03/08/24  1111   WBC  --   --   --  17.3* 18.8*   HGB  --   --   --  12.3 13.1   MCV  --   --   --  89 88   PLT  --   --   --  393 352   NA  --   --  140 136 140   POTASSIUM  --   --  4.7 4.2 4.2   CHLORIDE  --   --  103 102 100   CO2  --   --  25 21* 23   BUN  --   --  13.8 11.5 9.5   CR  --   --  0.67 0.70 0.75   ANIONGAP  --   --  12 13 17*   YEE  --   --  9.2 9.0 9.5   GLC 83 115* 119* 120* 100*   ALBUMIN  --   --   --  3.3* 3.6   PROTTOTAL  --   --   --  7.1 7.4   BILITOTAL  --   --   --  0.4 0.5   ALKPHOS  --   --   --  92 103   ALT  --   --   --  13 13   AST  --   --   --  32 31       Recent Results (from the past 24 hour(s))   MR Brain w/o & w Contrast    Narrative    EXAM: MR BRAIN W/O AND W CONTRAST  LOCATION: [facility]  DATE: 3/9/2024    INDICATION: Diffuse weakness.  COMPARISON: CT head dated 03/08/2024.  CONTRAST: 10 mL Gadavist.  TECHNIQUE: Routine multiplanar  multisequence head MRI without and with intravenous contrast.    FINDINGS:  INTRACRANIAL CONTENTS: No acute or subacute infarct. No mass, acute hemorrhage, or extra-axial fluid collections. Patchy nonspecific T2/FLAIR hyperintensities within the cerebral white matter, left greater than right, most consistent with mild to   moderate chronic microvascular ischemic change. Mild generalized cerebral atrophy. No hydrocephalus. Normal position of the cerebellar tonsils. No pathologic contrast enhancement.    SELLA: No abnormality accounting for technique.    OSSEOUS STRUCTURES/SOFT TISSUES: Normal marrow signal. The major intracranial vascular flow voids are maintained.     ORBITS: Prior bilateral cataract surgery. Visualized portions of the orbits are otherwise unremarkable.     SINUSES/MASTOIDS: No paranasal sinus mucosal disease. Partial right mastoid effusion.      Impression    IMPRESSION:  1.  No acute intracranial process.  2.  Generalized brain atrophy and presumed microvascular ischemic changes as detailed above.   XR Chest Port 1 View    Narrative    EXAM: XR CHEST PORT 1 VIEW  LOCATION: [facility]  DATE: 3/10/2024    INDICATION: ET tube placement, OG placement, dialysis port,  COMPARISON: 03/08/2024      Impression    IMPRESSION: Intubation. The endotracheal tube tip is 4 cm above the milan. Right IJ dialysis catheter has been placed with tip at the SVC/right atrial junction. NG tube is in the stomach. No pneumothorax or pleural effusion. Mild pulmonary edema. Normal   heart size.       Ty Doshi MD  Text Page  (7am to 6pm)

## 2024-03-10 NOTE — PROGRESS NOTES
RRT in progress at start of shift. Pt transferring to ICU. Report called to receiving RN. [facility]

## 2024-03-10 NOTE — PROCEDURES
[facility]    Triple Lumen PICC Placement    Date/Time: 3/10/2024 6:32 PM    Performed by: Mamadou Murray RN  Authorized by: Adriana Souza MD  Indications: vascular access and central pressure monitoring      UNIVERSAL PROTOCOL   Site Marked: Yes  Prior Images Obtained and Reviewed:  Yes  Required items: Required blood products, implants, devices and special equipment available    Patient identity confirmed:  Arm band and hospital-assigned identification number  NA - No sedation, light sedation, or local anesthesia  Confirmation Checklist:  Patient's identity using two indicators, relevant allergies, procedure was appropriate and matched the consent or emergent situation and correct equipment/implants were available  Time out: Immediately prior to the procedure a time out was called    Universal Protocol: the Joint Commission Universal Protocol was followed    Preparation: Patient was prepped and draped in usual sterile fashion    ESBL (mL):  2     ANESTHESIA    Anesthesia:  Local infiltration  Local Anesthetic:  Lidocaine 1% without epinephrine  Anesthetic Total (mL):  2      SEDATION    Patient Sedated: No        Preparation: skin prepped with 2% chlorhexidine  Skin prep agent: skin prep agent completely dried prior to procedure  Sterile barriers: maximum sterile barriers were used: cap, mask, sterile gown, sterile gloves, and large sterile sheet  Hand hygiene: hand hygiene performed prior to central venous catheter insertion  Type of line used: PICC  Catheter type: triple lumen  Lumen type: valved  Lumen Identification: Red, Blue and Gray  Catheter size: 5 Fr  Brand: Bard  Lot number: [id number]  Placement method: venipuncture, MST, ultrasound and tip navigation system  Number of attempts: 1  Difficulty threading catheter: no  Successful placement: yes  Orientation: right    Location: basilic vein  Tip Location: SVC  Arm circumference: adults 10 cm  Extremity circumference: 31  Visible  catheter length: 2  Total catheter length: 44  Dressing and securement: adhesive securement device, blood cleaned with CHG, blood removed, chlorhexidine disc applied, dressing applied, occlusive dressing applied, sterile dressing applied and transparent securement dressing  Post procedure assessment: blood return through all ports and placement verified by x-ray  PROCEDURE   Patient Tolerance:  Patient tolerated the procedure well with no immediate complicationsDescribe Procedure: 3L PICC line placed successfully without comps. Line flushed with NS w/o difficulty and had good blood return. Tip confirmed by xray and ok to use.  Disposal: sharps and needle count correct at the end of procedure, needles and guidewire disposed in sharps container

## 2024-03-10 NOTE — PROGRESS NOTES
Pt here with takosubo. A&O.   Has weakness in all extremeties, neck weakness- Head MRI completed last night with no acute intracranial process. VS:  02: 2L. Tele sr.  Thickened liquids.  Speech and neuro consult.  Up with lift, continue pneumonia abx.  Transfer to neuro when bed is available.

## 2024-03-10 NOTE — PROGRESS NOTES
Gave report to [id number] and transferred to floor.  Neuro/neurovascular assessment remains the same.  Belongings and chart taken.

## 2024-03-10 NOTE — PROGRESS NOTES
Igor's test performed prior to radial ABG draw. Collateral circulation confirmed.    Site:Left radial  FiO2: 60%  Device:   Vent Mode: CMV/AC  (Continuous Mandatory Ventilation/ Assist Control)  FiO2 (%): 60 %  Resp Rate (Set): 14 breaths/min  Tidal Volume (Set, mL): 420 mL  PEEP (cm H2O): 5 cmH2O  Resp: 14        Rose Aldrich RT on 3/10/2024 at 11:45 AM

## 2024-03-10 NOTE — PROGRESS NOTES
"0740:  Pt achieved VC of 1000ml, and NIF -25 on disposable NIF. Will continue to follow Q4 per order.      Pt was placed on CPAP +6 25%. The bridge of the nose looks good and remains intact. Pt is tolerating it well. Will continue to monitor and assess the pt's current respiratory status and needs. Alarm volume set at 10. BS Clear.  Rt will cont to monitor.    /89   Pulse 89   Temp 96.8  F (36  C) (Rectal)   Resp 16   Ht 1.727 m (5' 8\")   Wt (P) 94.4 kg (208 lb 1.8 oz)   SpO2 97%   BMI (P) 31.64 kg/m      "

## 2024-03-10 NOTE — CONSULTS
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 On my interview she denies recent innoculation or viral illness symptoms, also denies common neoplasm review of symptoms including coughing, bloody sputum, constipation, diarrhea, bloody urine or stool.  She has had progressive global weakness which now seems to involve her respiratory muscles.  I discussed intubation and mechanical ventilation with her and her family and they were willing to proceed.        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   I have personally reviewed the daily labs, imaging studies, cultures and discussed the case with referring physician and consulting physicians.     My assessment and plan by system for this patient is as follows:    Neurology/Psychiatry:   1. 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  Will proceed with plex today, catheter placed at their request.  They are performing additional workup as well, anticipate LP tomorrow.  2. Pain/analgesia:  prn dilaudid  3. Sedation:  Midazolam drip.  While I am loathe to use this in a patient requiring frequent neuro checks I hesitate to use propofol given her low EF and she did not tolerate precedex due to bradycardia.  Unable to provide sufficient sedation using prns.  Will try to continue to use low levels of midazolam.    Cardiovascular:    Takatsubo cardiomyopathy with chf exacerbation:   "appreciate cardiology support. Hemodynamically stable for now.  Holding ace-I and b-blocker for today, but low threshold to resume these and initiate diuresis once hemodynamic stability on mechanical ventilation is ascertained.    Pulmonary/Ventilator Management:   Acute hypercapnic respiratory failure, requiring mechanical ventilation:  worsening hypercapnia off mechanical support.  Inappropriate for long bipap trial due to airway protection needs.  Proceed with intubation and mechanical ventilation.    GI and Nutrition :   1. RD consult  2.  PPI for pud prophy    Renal/Fluids/Electrolytes:   1. Creat 0.70 ok    Infectious Disease:   1. Empirically on zosyn.  Sending sputum culture; if negative low threshold to discontinue or deescalate.    Endocrine:   1. Fsg 115 ok    Hematology/Oncology:   1. Leukocytosis to 17.3 ?reactive?  2. Hgb ok 12.3  3. Pltlts 393 ok    ICU Prophylaxis:   1. DVT: mechanical; holding chemoprophy for possible LP tomorrow--scheduled subcut heparin to start tomorrow at 1800.  2. VAP: HOB 30 degrees, chlorhexidine rinse  3. Stress Ulcer: PPI  4. Restraints: Nonviolent soft two point restraints required and necessary for patient safety and continued cares and good effect as patient continues to pull at necessary lines, tubes despite education and distraction. Will readdress daily.   5. Wound care  -   6. Feeding - rd consult  7. Family Update:  at bedside  8. Disposition - icu    Clinically Significant Risk Factors              # Hypoalbuminemia: Lowest albumin = 3.3 g/dL at 3/9/2024  6:04 AM, will monitor as appropriate     # Hypertension: Noted on problem list        # Obesity: Estimated body mass index is 30.67 kg/m  as calculated from the following:    Height as of this encounter: 1.727 m (5' 8\").    Weight as of this encounter: 91.5 kg (201 lb 11.5 oz)., PRESENT ON ADMISSION                                Key Medications:      albumin human  4,500 mL Apheresis Once    anticoagulant " citrate  500-2,000 mL Apheresis Once    aspirin  81 mg Oral Daily    atropine        calcium gluconate 4.5 g in sodium chloride 0.9 % 95 mL  4.5 g Intravenous Once    chlorhexidine  15 mL Mouth/Throat Q12H    [Held by provider] metoprolol succinate ER  50 mg Oral Daily    [START ON 3/11/2024] pantoprazole  40 mg Per Feeding Tube QAM AC    Or    [START ON 3/11/2024] pantoprazole  40 mg Intravenous QAM AC    piperacillin-tazobactam  4.5 g Intravenous Q6H    sodium chloride (PF)  3 mL Intracatheter Q8H      midazolam 1 mg/hr (03/10/24 1111)    - MEDICATION INSTRUCTIONS -      - MEDICATION INSTRUCTIONS -                Physical Examination:   Temp:  [96.8  F (36  C)-98.1  F (36.7  C)] 97.5  F (36.4  C)  Pulse:  [42-94] 42  Resp:  [14-23] 14  BP: (106-149)/(54-98) 107/63  Cuff Mean (mmHg):  [100-114] 108  FiO2 (%):  [25 %-60 %] 60 %  SpO2:  [95 %-100 %] 100 %    Intake/Output Summary (Last 24 hours) at 3/10/2024 1220  Last data filed at 3/10/2024 0700  Gross per 24 hour   Intake 50 ml   Output 950 ml   Net -900 ml     Wt Readings from Last 4 Encounters:   03/10/24 91.5 kg (201 lb 11.5 oz)   07/12/23 93 kg (205 lb)   05/02/23 92.4 kg (203 lb 12.8 oz)   04/28/23 92.4 kg (203 lb 12.8 oz)     BP - Mean:  [] 76  Vent Mode: CMV/AC  (Continuous Mandatory Ventilation/ Assist Control)  FiO2 (%): 60 %  Resp Rate (Set): 14 breaths/min  Tidal Volume (Set, mL): 420 mL  PEEP (cm H2O): 5 cmH2O  Resp: 14    Recent Labs   Lab 03/10/24  1142 03/10/24  0753   PH 7.43  --    PCO2 42  --    PO2 214*  --    HCO3 28  --    O2PER 60 25       GEN: no acute distress, profoundly weak, mild resp distress (pre-intubation)   HEENT: head ncat, sclera anicteric, OP patent, trachea midline   PULM: unlabored synchronous with vent (post-intubation), clear anteriorly    CV/COR: RRR S1S2 no gallop,  No rub, no murmur  ABD: soft nontender, hypoactive bowel sounds, no mass  EXT:  warm and well perfused x4  NEURO: PERRL, no obvious deficits  SKIN: no  "obvious rash  LINES: clean, dry intact         Data:   All data and imaging reviewed     ROUTINE ICU LABS (Last four results)  CMP  Recent Labs   Lab 03/10/24  0904 03/09/24  0604 03/08/24  1111   NA  --  136 140   POTASSIUM  --  4.2 4.2   CHLORIDE  --  102 100   CO2  --  21* 23   ANIONGAP  --  13 17*   * 120* 100*   BUN  --  11.5 9.5   CR  --  0.70 0.75   GFRESTIMATED  --  89 82   YEE  --  9.0 9.5   PROTTOTAL  --  7.1 7.4   ALBUMIN  --  3.3* 3.6   BILITOTAL  --  0.4 0.5   ALKPHOS  --  92 103   AST  --  32 31   ALT  --  13 13     CBC  Recent Labs   Lab 03/09/24  0604 03/08/24  1111   WBC 17.3* 18.8*   RBC 4.33 4.51   HGB 12.3 13.1   HCT 38.3 39.8   MCV 89 88   MCH 28.4 29.0   MCHC 32.1 32.9   RDW 15.8* 15.2*    352     INRNo lab results found in last 7 days.  Arterial Blood Gas  Recent Labs   Lab 03/10/24  1142 03/10/24  0753   PH 7.43  --    PCO2 42  --    PO2 214*  --    HCO3 28  --    O2PER 60 25       All cultures:  No results for input(s): \"CULT\" in the last 168 hours.  Recent Results (from the past 24 hour(s))   MR Brain w/o & w Contrast    Narrative    EXAM: MR BRAIN W/O AND W CONTRAST  LOCATION: [facility]  DATE: 3/9/2024    INDICATION: Diffuse weakness.  COMPARISON: CT head dated 03/08/2024.  CONTRAST: 10 mL Gadavist.  TECHNIQUE: Routine multiplanar multisequence head MRI without and with intravenous contrast.    FINDINGS:  INTRACRANIAL CONTENTS: No acute or subacute infarct. No mass, acute hemorrhage, or extra-axial fluid collections. Patchy nonspecific T2/FLAIR hyperintensities within the cerebral white matter, left greater than right, most consistent with mild to   moderate chronic microvascular ischemic change. Mild generalized cerebral atrophy. No hydrocephalus. Normal position of the cerebellar tonsils. No pathologic contrast enhancement.    SELLA: No abnormality accounting for technique.    OSSEOUS STRUCTURES/SOFT TISSUES: Normal marrow signal. The major " intracranial vascular flow voids are maintained.     ORBITS: Prior bilateral cataract surgery. Visualized portions of the orbits are otherwise unremarkable.     SINUSES/MASTOIDS: No paranasal sinus mucosal disease. Partial right mastoid effusion.      Impression    IMPRESSION:  1.  No acute intracranial process.  2.  Generalized brain atrophy and presumed microvascular ischemic changes as detailed above.   XR Chest Port 1 View    Narrative    EXAM: XR CHEST PORT 1 VIEW  LOCATION: [facility]  DATE: 3/10/2024    INDICATION: ET tube placement, OG placement, dialysis port,  COMPARISON: 03/08/2024      Impression    IMPRESSION: Intubation. The endotracheal tube tip is 4 cm above the milan. Right IJ dialysis catheter has been placed with tip at the SVC/right atrial junction. NG tube is in the stomach. No pneumothorax or pleural effusion. Mild pulmonary edema. Normal   heart size.         Billing: This patient is critically ill: Yes. Total critical care time today 60 min, excluding procedures.       Past Medical/surgical hx, meds, allergies, social history, family history     Past Medical History:   Diagnosis Date    HTN (hypertension)     Hyperlipidemia     Leiomyoma of uterus, unspecified     Numbness and tingling     spinal stenosis causes numbness and tingling on feet and knees bilaterally    Osteoarthrosis, unspecified whether generalized or localized, unspecified site     Other and unspecified disc disorder of lumbar region     Other chronic pain     Primary osteoarthritis of left hip 12/18/2016    Spinal stenosis     S/P diskectomy x 2, most recently 8/2014     Past Surgical History:   Procedure Laterality Date    ARTHROPLASTY HIP Left 12/12/2016    Procedure: ARTHROPLASTY HIP;  Surgeon: Leonid Morfin MD;  Location:  OR    BACK SURGERY      COLONOSCOPY N/A 07/20/2017    Procedure: COMBINED COLONOSCOPY, SINGLE OR MULTIPLE BIOPSY/POLYPECTOMY BY BIOPSY;  colonoscopy;  Surgeon:  Catarino Salas MD;  Location:  GI    CV CORONARY ANGIOGRAM N/A 3/8/2024    Procedure: Coronary Angiogram;  Surgeon: Alicia Dai MD;  Location:  HEART CARDIAC CATH LAB    DISCECTOMY LUMBAR POSTERIOR MICROSCOPIC TWO LEVELS  08/11/2014    Procedure: DISCECTOMY LUMBAR POSTERIOR MICROSCOPIC TWO LEVELS;  Surgeon: Warren Herring MD;  Location:  OR    ENDOSCOPIC RETROGRADE CHOLANGIOPANCREATOGRAM N/A 10/14/2022    Procedure: ENDOSCOPIC RETROGRADE CHOLANGIOPANCREATOGRAPHY with biliary spincterotomy, biliary stent placement, cholangioscopy;  Surgeon: Carson Franklin MD;  Location: [facility] OR    ENDOSCOPIC RETROGRADE CHOLANGIOPANCREATOGRAM N/A 5/2/2023    Procedure: ENDOSCOPIC RETROGRADE CHOLANGIOPANCREATOGRAPHY with biliary stone and stent removal;  Surgeon: Carson Franklin MD;  Location: [facility] OR    ENDOSCOPIC RETROGRADE CHOLANGIOPANCREATOGRAPHY, EXCHANGE TUBE/STENT N/A 1/25/2023    Procedure: ENDOSCOPIC RETROGRADE CHOLANGIOPANCREATOGRAPHY WITH BILIARY STENT EXCHANGE AND DEBRIS REMOVAL;  Surgeon: Carson Franklin MD;  Location: [facility] OR    EXPLORE COMMON BILE DUCT N/A 12/01/2022    Procedure: COMMON BILE DUCT REPAIR;  Surgeon: Thai Garcia MD;  Location: [facility] OR    LAPAROSCOPIC CHOLECYSTECTOMY WITH CHOLANGIOGRAMS N/A 12/01/2022    Procedure: EXPLORATORY LAPAROSCOPY, LAPAROSCOPIC PARTIAL CHOLECYSTECTOMY WITH RETREVIAL OF STONES WITH CHOLANGIOGRAM; choledochoscopy, INTRAOPERATIVE ULTRASOUND;  Surgeon: Thai Garcia MD;  Location: [facility] OR    LAPAROSCOPY DIAGNOSTIC (GENERAL) N/A 10/06/2022    Procedure: Diagnostic laparoscopy with liver biopsy;  Surgeon: Warren Zhang MD;  Location:  OR    ORTHOPEDIC SURGERY      TOTAL KNEE ARTHROPLASTY Left     ZZC NONSPECIFIC PROCEDURE  1996    lumbar discectomy    ZZC NONSPECIFIC PROCEDURE      cervical cone biopsy    ZZC NONSPECIFIC PROCEDURE      Left knee replacement     No current facility-administered medications on file prior to  encounter.  acetaminophen (TYLENOL) 325 MG tablet, Take 2 tablets (650 mg) by mouth every 8 hours as needed for mild pain  aspirin 81 MG EC tablet, Take 81 mg by mouth daily  calcium-vitamin D (CALTRATE) 600-400 MG-UNIT per tablet, Take 1 tablet by mouth 2 times daily  celecoxib (CELEBREX) 200 MG capsule, Take 1 capsule (200 mg) by mouth daily  lisinopril (ZESTRIL) 10 MG tablet, Take 1 tablet (10 mg) by mouth daily  Multiple Vitamins-Minerals (CENTRUM SILVER) per tablet, Take 1 tablet by mouth daily  traMADol (ULTRAM) 50 MG tablet, TAKE 1 TO 2 TABLETS BY MOUTH EVERY DAY FOR SEVERE PAIN  vitamin D3 (CHOLECALCIFEROL) 50 mcg (2000 units) tablet, Take 1 tablet (50 mcg) by mouth daily       No Known Allergies  Social History     Socioeconomic History    Marital status:      Spouse name: Not on file    Number of children: Not on file    Years of education: Not on file    Highest education level: Not on file   Occupational History    Not on file   Tobacco Use    Smoking status: Never    Smokeless tobacco: Never   Vaping Use    Vaping Use: Never used   Substance and Sexual Activity    Alcohol use: Yes     Alcohol/week: 0.0 standard drinks of alcohol     Comment: social    Drug use: No    Sexual activity: Yes     Partners: Male   Other Topics Concern    Parent/sibling w/ CABG, MI or angioplasty before 65F 55M? Not Asked   Social History Narrative    Not on file     Social Determinants of Health     Financial Resource Strain: Not on file   Food Insecurity: Not on file   Transportation Needs: Not on file   Physical Activity: Not on file   Stress: Not on file   Social Connections: Not on file   Interpersonal Safety: Not on file   Housing Stability: Not on file     Family History   Problem Relation Age of Onset    Family History Negative Mother          age 64 mva    Cancer Father          age 84 lung ca    Family History Negative Sister     Breast Cancer Paternal Grandmother     Genetic Disorder Other          daughter has Autoimmune disease

## 2024-03-10 NOTE — PROGRESS NOTES
[facility], [facility] ICU RESPIRATORY NOTE        Date of Admission: 3/8/2024    Date of Intubation (most recent): 3/10/24    Reason for Mechanical Ventilation: Airway protection    Number of Days on Mechanical Ventilation: 1    Met Criteria for Spontaneous Breathing Trial: No    Reason for No Spontaneous Breathing Trial: Per MD    Significant Events Today: Pt was intubated this morning. See previous RT note for details.     ABG Results:   Recent Labs   Lab 03/10/24  1142 03/10/24  0753   PH 7.43  --    PCO2 42  --    PO2 214*  --    HCO3 28  --    O2PER 60 25         Current Vent Settings: Vent Mode: CMV/AC  (Continuous Mandatory Ventilation/ Assist Control)  FiO2 (%): 40 %  Resp Rate (Set): 14 breaths/min  Tidal Volume (Set, mL): 420 mL  PEEP (cm H2O): 5 cmH2O  Resp: 14      Skin Assessment: Intact    Plan: Will cont full vent support for now and will assess for weaning readiness daily.    Rose Aldrich, RT on 3/10/2024 at 5:42 PM

## 2024-03-10 NOTE — PROCEDURES
[facility]    Intubation    Date/Time: 3/10/2024 12:18 PM    Performed by: Alberto Tavares MD  Authorized by: Alberto Tavares MD  Indications: acute hypercapnic respiratory failure.  Intubation method: video-assisted      UNIVERSAL PROTOCOL   Site Marked: NA  Prior Images Obtained and Reviewed:  NA  Required items: Required blood products, implants, devices and special equipment available    Patient identity confirmed:  Arm band  NA - No sedation, light sedation, or local anesthesia  Confirmation Checklist:  Patient's identity using two indicators, relevant allergies, procedure was appropriate and matched the consent or emergent situation and correct equipment/implants were available  Time out: Immediately prior to the procedure a time out was called    Universal Protocol: the Joint Commission Universal Protocol was followed    Preparation: Patient was prepped and draped in usual sterile fashion      Patient status: paralyzed (RSI)  Paralytic: rocuronium  Sedatives: etomidate  Laryngoscope size: Mac 3  Tube size: 7.5 mm  Tube type: cuffed  Number of attempts: 1  Cricoid pressure: no  Cords visualized: yes  Post-procedure assessment: CO2 detector  Breath sounds: equal  Cuff inflated: yes  ETT to teeth: 23 cm  Tube secured with: ETT soria      PROCEDURE    Patient Tolerance:  Patient tolerated the procedure well with no immediate complications  Length of time physician/provider present for 1:1 monitoring during sedation: 0

## 2024-03-10 NOTE — PLAN OF CARE
"  Problem: Adult Inpatient Plan of Care  Goal: Plan of Care Review  Description: The Plan of Care Review/Shift note should be completed every shift.  The Outcome Evaluation is a brief statement about your assessment that the patient is improving, declining, or no change.  This information will be displayed automatically on your shift  note.  Outcome: Not Progressing  Flowsheets (Taken 3/10/2024 1563)  Outcome Evaluation: Pt alert and can answer yes and no questions. Can squeeze hands and wiggle toes but cannot move extremities beyond that. Pt intubated today at 1020. Pt sinus rhythm until flushing the plasmaphresis back. Pt went into RVR and hypotensive. Amnio bolus and gtt started. Antonio started to maintain MAP >65. Pt remains in and out of A fib. Pressures remain stable on antonio. No BM this shift. OG placed. Nye in place with adequate output. PICC being placed for better access. MRI and CT ordered but unable to complete due to pt stability. Family updated at bedside. Remains on versed gtt for sedation. Precedex made pt bradycardic into 30s X3 times. PRN dilaudid given for pain. Albumin bolus given for hypotension before pressors were started. Lumbar puncture ordered, but due to scheduling with plasmaphresis and PICC placement and pt stablilty, unable to complete this shift. Neuro was paged. Plan to complete tomorrow.  Plan of Care Reviewed With: patient  Overall Patient Progress: declining  Goal: Patient-Specific Goal (Individualized)  Description: You can add care plan individualizations to a care plan. Examples of Individualization might be:  \"Parent requests to be called daily at 9am for status\", \"I have a hard time hearing out of my right ear\", or \"Do not touch me to wake me up as it startles  me\".  Outcome: Not Progressing     Problem: Adult Inpatient Plan of Care  Goal: Absence of Hospital-Acquired Illness or Injury  Outcome: Progressing  Intervention: Identify and Manage Fall Risk  Recent Flowsheet " Documentation  Taken 3/10/2024 1600 by Emy Anaya RN  Safety Promotion/Fall Prevention: activity supervised  Taken 3/10/2024 1200 by Emy Anaya RN  Safety Promotion/Fall Prevention: activity supervised  Taken 3/10/2024 0900 by Emy Anaya RN  Safety Promotion/Fall Prevention: activity supervised  Intervention: Prevent Skin Injury  Recent Flowsheet Documentation  Taken 3/10/2024 1600 by Emy Anaya RN  Body Position:   turned   right  Taken 3/10/2024 1400 by Emy Anaya RN  Body Position: (plasmaphresis) position maintained  Taken 3/10/2024 1200 by Emy Anaya RN  Body Position:   turned   right  Taken 3/10/2024 0900 by Emy Anaya RN  Body Position:   turned   right  Goal: Optimal Comfort and Wellbeing  Outcome: Progressing  Intervention: Provide Person-Centered Care  Recent Flowsheet Documentation  Taken 3/10/2024 1600 by Emy Anaya RN  Trust Relationship/Rapport:   choices provided   care explained   emotional support provided  Taken 3/10/2024 1200 by Emy Anaya RN  Trust Relationship/Rapport:   choices provided   care explained   emotional support provided  Taken 3/10/2024 0900 by Emy Anaya RN  Trust Relationship/Rapport:   choices provided   care explained   emotional support provided   Goal Outcome Evaluation:      Plan of Care Reviewed With: patient    Overall Patient Progress: decliningOverall Patient Progress: declining    Outcome Evaluation: Pt alert and can answer yes and no questions. Can squeeze hands and wiggle toes but cannot move extremities beyond that. Pt intubated today at 1020. Pt sinus rhythm until flushing the plasmaphresis back. Pt went into RVR and hypotensive. Amnio bolus and gtt started. Antonio started to maintain MAP >65. Pt remains in and out of A fib. Pressures remain stable on antonio. No BM this shift. OG placed. Nye in place with adequate output. PICC being placed for better access. MRI and CT ordered but unable to complete due to pt  stability. Family updated at bedside. Remains on versed gtt for sedation. Precedex made pt bradycardic into 30s X3 times. PRN dilaudid given for pain. Albumin bolus given for hypotension before pressors were started. Lumbar puncture ordered, but due to scheduling with plasmaphresis and PICC placement and pt stablilty, unable to complete this shift. Neuro was paged. Plan to complete tomorrow.

## 2024-03-10 NOTE — PROGRESS NOTES
SLP consult received. This patient transferred to the ICU today and is now intubated, not appropriate for participation with SLP. SLP is signing off, please reconsult when extubated and appropriate for SLP.

## 2024-03-10 NOTE — PROGRESS NOTES
Intubation Note    Date of intubation: 3/10/24  Time of intubation: 1020  Location of intubation: ICU  Intubated by:   Size of ETT: 7.5  Location (cm) at teeth: 23    ETT placement confirmed by: EtCO2 color change and bilateral BS present.  Comments: Pt was intubated per MD for airway protection. Pt was on BiPAP 10/5 FiO2 100% prior to intubation.  Vent settings per MD:  Vent Mode: CMV/AC  (Continuous Mandatory Ventilation/ Assist Control)  FiO2 (%): 60 %  Resp Rate (Set): 14 breaths/min  Tidal Volume (Set, mL): 420 mL  PEEP (cm H2O): 5 cmH2O  Resp: 18    3/10/2024  Rose Aldrich, RT

## 2024-03-10 NOTE — CONSULTS
[facility]    RENAL CONSULTATION NOTE    REFERRING MD:  Dr. Strong (Neuro)    REASON FOR CONSULTATION:  possible GBS    DATE OF CONSULTATION: 03/10/24    SHORTHAND KEY FOR MY NOTES:  c = with, s = without, p = after, a = before, x = except, asx = asymptomatic, tx = transplant or treatment, sx = symptoms or symptomatic, cx = canceled or culture, rxn = reaction, yday = yesterday, nl = normal, abx = antibiotics, fxn = function, dx = diagnosis, dz = disease, m/h = melena/hematochezia, c/d/l/ha = cramping/dizziness/lightheadedness/headache, d/c = discharge or diarrhea/constipation, f/c/n/v = fevers/chills/nausea/vomiting, cp/sob = chest pain/shortness of breath, tbv = total body volume, rxn = reaction, tdc = tunneled dialysis catheter, pta = prior to admission, hd = hemodialysis, pd = peritoneal dialysis, hhd = home hemodialysis, edw = estimated dry wt    HPI: Yohana Marques is a 78 year old female c h/o HTN who was admitted on 3/8/2024 c progressive weakness and is now intubated.  She is thought to have possible GBS and pheresis is being requested.  Notes from Ever Jonas (ER), Esteban (Hosp), Melba (Cards) were reviewed.    Pt presented to the ER and was weak.  It was thought she may have a cardiac etiology bc her EF was only 23% and went to the Cath Lab.  No significant obstructive CAD was noted.  Today, her weakness progress and she developed resp failure.  Neuro was consulted and there is concern for possible GBS.    ROS:  Unable.    PMH:    Past Medical History:   Diagnosis Date    HTN (hypertension)     Hyperlipidemia     Leiomyoma of uterus, unspecified     Numbness and tingling     spinal stenosis causes numbness and tingling on feet and knees bilaterally    Osteoarthrosis, unspecified whether generalized or localized, unspecified site     Other and unspecified disc disorder of lumbar region     Other chronic pain     Primary osteoarthritis of left hip 12/18/2016    Spinal stenosis      S/P diskectomy x 2, most recently 8/2014     PSH:    Past Surgical History:   Procedure Laterality Date    ARTHROPLASTY HIP Left 12/12/2016    Procedure: ARTHROPLASTY HIP;  Surgeon: Leonid Morfin MD;  Location:  OR    BACK SURGERY      COLONOSCOPY N/A 07/20/2017    Procedure: COMBINED COLONOSCOPY, SINGLE OR MULTIPLE BIOPSY/POLYPECTOMY BY BIOPSY;  colonoscopy;  Surgeon: Catarino Salas MD;  Location:  GI    CV CORONARY ANGIOGRAM N/A 3/8/2024    Procedure: Coronary Angiogram;  Surgeon: Alicia Dai MD;  Location:  HEART CARDIAC CATH LAB    DISCECTOMY LUMBAR POSTERIOR MICROSCOPIC TWO LEVELS  08/11/2014    Procedure: DISCECTOMY LUMBAR POSTERIOR MICROSCOPIC TWO LEVELS;  Surgeon: Warren Herring MD;  Location:  OR    ENDOSCOPIC RETROGRADE CHOLANGIOPANCREATOGRAM N/A 10/14/2022    Procedure: ENDOSCOPIC RETROGRADE CHOLANGIOPANCREATOGRAPHY with biliary spincterotomy, biliary stent placement, cholangioscopy;  Surgeon: Carson Franklin MD;  Location: [facility] OR    ENDOSCOPIC RETROGRADE CHOLANGIOPANCREATOGRAM N/A 5/2/2023    Procedure: ENDOSCOPIC RETROGRADE CHOLANGIOPANCREATOGRAPHY with biliary stone and stent removal;  Surgeon: Carson Franklin MD;  Location: [facility] OR    ENDOSCOPIC RETROGRADE CHOLANGIOPANCREATOGRAPHY, EXCHANGE TUBE/STENT N/A 1/25/2023    Procedure: ENDOSCOPIC RETROGRADE CHOLANGIOPANCREATOGRAPHY WITH BILIARY STENT EXCHANGE AND DEBRIS REMOVAL;  Surgeon: Carson Franklin MD;  Location: [facility] OR    EXPLORE COMMON BILE DUCT N/A 12/01/2022    Procedure: COMMON BILE DUCT REPAIR;  Surgeon: Thai Garcia MD;  Location: [facility] OR    LAPAROSCOPIC CHOLECYSTECTOMY WITH CHOLANGIOGRAMS N/A 12/01/2022    Procedure: EXPLORATORY LAPAROSCOPY, LAPAROSCOPIC PARTIAL CHOLECYSTECTOMY WITH RETREVIAL OF STONES WITH CHOLANGIOGRAM; choledochoscopy, INTRAOPERATIVE ULTRASOUND;  Surgeon: Thai Garcia MD;  Location: [facility] OR    LAPAROSCOPY DIAGNOSTIC (GENERAL) N/A 10/06/2022    Procedure:  Diagnostic laparoscopy with liver biopsy;  Surgeon: Warren Zhang MD;  Location:  OR    ORTHOPEDIC SURGERY      TOTAL KNEE ARTHROPLASTY Left     [facility] NONSPECIFIC PROCEDURE  1996    lumbar discectomy    Z NONSPECIFIC PROCEDURE      cervical cone biopsy    Z NONSPECIFIC PROCEDURE      Left knee replacement     MEDICATIONS:     albumin human  4,500 mL Apheresis Once    anticoagulant citrate  500-2,000 mL Apheresis Once    aspirin  81 mg Oral Daily    atropine        calcium gluconate 4.5 g in sodium chloride 0.9 % 95 mL  4.5 g Intravenous Once    chlorhexidine  15 mL Mouth/Throat Q12H    [Held by provider] metoprolol succinate ER  50 mg Oral Daily    [START ON 3/11/2024] pantoprazole  40 mg Per Feeding Tube QAM AC    Or    [START ON 3/11/2024] pantoprazole  40 mg Intravenous QAM AC    piperacillin-tazobactam  4.5 g Intravenous Q6H    sodium chloride (PF)  3 mL Intracatheter Q8H     ALLERGIES:    Allergies as of 2024    (No Known Allergies)     FH:    Family History   Problem Relation Age of Onset    Family History Negative Mother          age 64 mva    Cancer Father          age 84 lung ca    Family History Negative Sister     Breast Cancer Paternal Grandmother     Genetic Disorder Other         daughter has Autoimmune disease     SH:    Social History     Socioeconomic History    Marital status:      Spouse name: Not on file    Number of children: Not on file    Years of education: Not on file    Highest education level: Not on file   Occupational History    Not on file   Tobacco Use    Smoking status: Never    Smokeless tobacco: Never   Vaping Use    Vaping Use: Never used   Substance and Sexual Activity    Alcohol use: Yes     Alcohol/week: 0.0 standard drinks of alcohol     Comment: social    Drug use: No    Sexual activity: Yes     Partners: Male   Other Topics Concern    Parent/sibling w/ CABG, MI or angioplasty before 65F 55M? Not Asked   Social History Narrative    Not on file  "    Social Determinants of Health     Financial Resource Strain: Not on file   Food Insecurity: Not on file   Transportation Needs: Not on file   Physical Activity: Not on file   Stress: Not on file   Social Connections: Not on file   Interpersonal Safety: Not on file   Housing Stability: Not on file     PHYSICAL EXAM:    /63   Pulse (!) 42   Temp 97  F (36.1  C) (Axillary)   Resp 14   Ht 1.727 m (5' 8\")   Wt 91.5 kg (201 lb 11.5 oz)   SpO2 100%   BMI 30.67 kg/m      GENERAL: intubated, sedated  HEENT:  normocephalic  CV: RRR; nl S1/S2; no significant ble edema  RESP: + vent sounds; coarse rhonchi B   GI: abdomen o/s/nt/nd  :  nl genitalia  MUSCULOSKELETAL: extremities nl - no gross deformities noted  SKIN: no suspicious lesions or rashes, dry to touch  NEURO:  sedated   PSYCH: unable  LINES:  + quiroz, + RIJ temp HD catheter    LABS:      CBC RESULTS:     Recent Labs   Lab 03/09/24  0604 03/08/24  1111   WBC 17.3* 18.8*   RBC 4.33 4.51   HGB 12.3 13.1   HCT 38.3 39.8    352     BMP RESULTS:  Recent Labs   Lab 03/10/24  0904 03/09/24  0604 03/08/24  1111   NA  --  136 140   POTASSIUM  --  4.2 4.2   CHLORIDE  --  102 100   CO2  --  21* 23   BUN  --  11.5 9.5   CR  --  0.70 0.75   * 120* 100*   YEE  --  9.0 9.5     INRNo lab results found in last 7 days.     DIAGNOSTICS:  Personally reviewed - CXR: pulm congestion    A/P:  Yohana Marques is a 78 year old female c h/o HTN who has progressive weakness and possible GBS.    1.  Progressive neuromuscular weakness / possible GBS.  Pt is now intubated and Neuro is concerned the pt may have GBS.  Given the situation, we will plan to start pheresis today.  She was on lisinopril at home but the last dose was several days ago.  She did have a dose of losartan but that doesn't have the same kinin effect c pheresis, so it shouldn't interfere c the run.  Consents for line, pheresis obtained from .  Appreciate Dr. Tavares placing a line.  A.  " Start pheresis today - 1.5x exchange today, 1x exchange tmrw, then WFM for a total of 5 tx.  B.  Hold ARB.  C.  Check baseline labs today - CMP, CBC.    2.  Cardiomyopathy.  This is felt to be stress-induced.  Angio didn't show anything significant.  A.  Follow clinically.  B.  When done c course of pheresis can resume the ARB, but hold for now.    3.  FEN.  Electrolytes have been ok.  A.  Same plan.    Case d/w Ever Russell and Tae (Neuro), Chaitanya (ICU), Emy MARIANO, pt's family.    Thank you for this consultation. We will follow c you.  Please call if any questions.      Attestation:   I have reviewed today's relevant vital signs, notes, medications, labs and imaging.    Ignacio Cao MD  [facility] - Nephrology  [phone]

## 2024-03-11 NOTE — PROGRESS NOTES
This note written by the medical student has been reviewed and has been addended as needed. I agree with the assessment and plan.      Liz Shen MD  Vascular Neurology Fellow        [facility]    Stroke Progress Note    Interval Events  Continues to have quadriparesis, intubated. Underwent LP with mild elevation in protein of 55.2, 3 nucleated cells.     HPI Summary  Yohana Marques is a 78 year old woman with h/o HTN, spinal stenosis s/p diskectomy x 2 (most recent 8/2004), and chronic pain. Presented to the ED on 3/8 after a fall at home early in the morning when getting up for water. She reported getting up and feeling fine for a few steps and then she became dizzy and fell down. She denied LOC. No palpitations or any other sxms prior to the fall. She was on the ground for 4-5 hour prior to being found.  On initial assessment by admitting physician, her only complaints were acute on chronic low back pain and  weakness. She reported feeling weaker - c/o issues using her legs due to pain and weakness which is chronic but more acutely worse at admission and in the last few days leading to the admission necessitating use of a walker (uses a cane at baseline).   She denied fevers or chills or cough nor any GI complaints and no recent illness.   On admission was found to have new heart failure (per cardiology likely stress induced) with a significantly reduced EF (23% on echo 3/8) with no significant obstructive coronary artery disease on invasive coronary angiogram on 3/8/24. She was started on Metoprolol succinate and Losartan.  Lactate of 3.1 and CK of 273 on admission which has trended down during her stay.     Of note: she has urinary retention     AM of 3/10 RRT called due to slightly labored, accessory muscle and shallow breathing with patient c/o SOB and difficulty to talk because of SOB. Sating 95-98% on 2L per n/c.   RT eval this AM: VC of 1000 ml, and NIF -25.   Patient  was placed on CPAP +6 25%   vBG pH 7.34, pCO2 55 pO2 43  HCO3 30  Losartain and metoprolol held  Intubated later in the day   aBG pH 7.43, pCO2 42, pO2 214  HCO3 28    3/11: On full vent support with sedation and pressors, had runs of A-fib with RVR/CVR overnight. Continues to have an elevated WBC with left shift    Evaluation Summarized    MRI Brain w/wo contrast (3/9) No acute intracranial process. Chronic periventricular microvascular disease (L > R)   MRI Spine (3/10) Cervical Spine:  Multilevel degenerative foraminal narrowing of C3-C7.   Thoracic Spine:  Chronic superior endplate T3 compression  Lumbar Spine:  Progressive lumbar spondylosis in L2-L4 with multiple levels of increased foraminal narrowing   CT Chest/Abdomen/Pelvis 1.  Bibasilar atelectasis or infiltrate. Groundglass opacities in the upper lobes. Findings could be infectious or inflammatory.   2.  Small pleural effusions.  3.  1.4 cm nodular density in the endometrial cavity and endometrial prominence. Underlying submucosal fibroid versus nonmalignant polyp not differentiated. Pelvic ultrasound follow-up suggested.  4.  Wall thickening of the extrahepatic bile duct not excluded. Consider nonemergent MR follow-up.  5.  Trace amount of free fluid abdomen and pelvis.  6.  Stable 4 mm left upper lobe nodule.     Echocardiogram 3/8      Coronary cath 3/8 EF is 23%.Entire left ventricle is severely hypokinetic but basal left ventricular function is less so     No significant CAD   EKG/Telemetry 3/10   Tachycardic (147 bpm), Atrial fibrillation w/ RVR   Left axis deviation, Anterior infarct, age undertermined  T wave abnormality     CSF Labs Pending     Labs MG eval: pending  Ganglioside Antibodies: pending       Impression   - Acute onset flaccid weakness of UE and LE  - Areflexia  - Hypercapnic respiratory failure  - New onset stress-induced HFrEF (EF 23% on  3/8)  - Urinary retention    MRI brain w/o contrast did not show any acute processes. MRI of  "total spine did not show any acute processes. CT CAP did not show any malignancy. Note, she has a stable 4mm nodule in her left upper lobe    Ddx: Concern for Guillain-Snow Syndrome vs myasthenia gravis vs. Infectious source    Plan  - Continue intubation for airway protection  - Plan for alternate dosing schedule;  PLEX 2/5 for 1 vol exchange tomorrow  - Follow CSF analysis  - EMG (awaiting to be scheduled, getting it too early may give false negative results)  - Will review for anticoagulation given A-fib and high-risk     DVT prophylaxis: heparin 5000 units subcutaneous injection q8 hrs + mechanical prophylaxis with pneumo boots.     Patient Follow-up    - final recommendation pending work-up    We will continue to follow.     The Stroke Fellow is Dr. Shen. The Stroke Staff is Dr. Duffy.    Michoacano Tellez  Medical Student  To page a member of the stroke/neurocritical care service, click here:  AMCOM   Choose \"On Call\" tab at top, then search dropdown box for \"Neurology Adult\", select location, press Enter, then look for stroke/neuro ICU/telestroke.  ______________________________________________________    Clinically Significant Risk Factors        # Hypokalemia: Lowest K = 3.3 mmol/L in last 2 days, will replace as needed       # Hypoalbuminemia: Lowest albumin = 3.3 g/dL at 3/9/2024  6:04 AM, will monitor as appropriate     # Hypertension: Noted on problem list        # Obesity: Estimated body mass index is 31.27 kg/m  as calculated from the following:    Height as of this encounter: 1.727 m (5' 8\").    Weight as of this encounter: 93.3 kg (205 lb 11 oz)., PRESENT ON ADMISSION              Medications   Scheduled Meds   aspirin  81 mg Oral Daily    chlorhexidine  15 mL Mouth/Throat Q12H    heparin ANTICOAGULANT  5,000 Units Subcutaneous Q8H    [Held by provider] metoprolol succinate ER  50 mg Oral Daily    pantoprazole  40 mg Per Feeding Tube QAM AC    Or    pantoprazole  40 mg Intravenous QAM " AC    piperacillin-tazobactam  4.5 g Intravenous Q6H    potassium & sodium phosphates  2 packet Oral or Feeding Tube Q4H    sodium chloride (PF)  3 mL Intracatheter Q8H       Infusion Meds   amiodarone 0.5 mg/min (03/10/24 2130)    midazolam 2 mg/hr (03/11/24 0529)    - MEDICATION INSTRUCTIONS -      - MEDICATION INSTRUCTIONS -      phenylephrine 0.6 mcg/kg/min (03/11/24 0630)    sodium chloride 30 mL/hr at 03/10/24 1900       PRN Meds  acetaminophen, albuterol, calcium carbonate, calcium gluconate, calcium gluconate, artificial tears, diphenhydrAMINE, fentaNYL, HOLD MEDICATION, HOLD MEDICATION, HYDROmorphone, lidocaine 4%, lidocaine 4%, lidocaine (buffered or not buffered), lidocaine (buffered or not buffered), melatonin, midazolam, midazolam, naloxone **OR** naloxone **OR** naloxone **OR** naloxone, - MEDICATION INSTRUCTIONS -, ondansetron **OR** ondansetron, oxyCODONE **OR** oxyCODONE, - MEDICATION INSTRUCTIONS -, polyethylene glycol, senna-docusate **OR** senna-docusate, sodium chloride (PF), sodium chloride 0.9%, traMADol       PHYSICAL EXAMINATION  Temp:  [96.8  F (36  C)-99.1  F (37.3  C)] 98.6  F (37  C)  Pulse:  [] 64  Resp:  [14-27] 14  BP: ()/() 108/65  Cuff Mean (mmHg):  [] 69  FiO2 (%):  [25 %-60 %] 30 %  SpO2:  [90 %-100 %] 99 %      Neurologic  Mental Status:  alert to loud vocal stimuli and intubated   Cranial Nerves:  PERRL, EOMI with normal smooth pursuit,  facial movements symmetric, tongue protrusion midline  Motor:  diffuse muscle weakness. Weak shoulder shrug, Weak neck flexion. Not able to move limbs or hold limbs against gravity (0/5 b/l UE and LE). Weak  strength. Decreased tone in UE extremeties, increased tone in b/l feet.  Reflexes:  Absent UE and LE reflexes   Left Right   Triceps 0 0   Biceps 0 0   Brachioradialis 1 0   Adductor 0 0   Patellar 0 0   Achilles 0 0     Coordination:  unable to assess due to patient's current state  Station/Gait:  unable to test  due to intubation        Imaging  I personally reviewed all imaging; relevant findings per HPI.     Lab Results Data   CBC  Recent Labs   Lab 03/11/24  0436 03/09/24  0604 03/08/24  1111   WBC 16.8* 17.3* 18.8*   RBC 3.78* 4.33 4.51   HGB 10.7* 12.3 13.1   HCT 33.9* 38.3 39.8    393 352     Basic Metabolic Panel    Recent Labs   Lab 03/11/24  0436 03/10/24  1624 03/10/24  0904 03/10/24  0753 03/09/24  0604     --   --  140 136   POTASSIUM 3.3*  --   --  4.7 4.2   CHLORIDE 108*  --   --  103 102   CO2 23  --   --  25 21*   BUN 13.6  --   --  13.8 11.5   CR 0.64  --   --  0.67 0.70   GLC 98 83 115* 119* 120*   YEE 8.5*  --   --  9.2 9.0     Liver Panel  Recent Labs   Lab 03/11/24  0436 03/09/24  0604 03/08/24  1111   PROTTOTAL 5.4* 7.1 7.4   ALBUMIN 4.1 3.3* 3.6   BILITOTAL 0.8 0.4 0.5   ALKPHOS 33* 92 103   AST 17 32 31   ALT 6 13 13     INR    Recent Labs   Lab Test 05/02/23  0650 01/25/23  0719 10/15/22  0728   INR 1.02 1.06 1.23*      Lipid Profile    Recent Labs   Lab Test 07/07/22  1448 07/05/21  1352 07/03/19  0927   CHOL 241* 220* 213*   HDL 78 66 64   * 130* 118*   TRIG 119 122 156*     A1C    Recent Labs   Lab Test 03/11/24  0436   A1C 5.2     Troponin    Recent Labs   Lab 03/08/24  1111   CTROPT 585*

## 2024-03-11 NOTE — PROGRESS NOTES
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  # Severe cardiomyopathy with EF of only 23%:   # Afib  # Respiratory failure: Intubated    Plan:  # 2/5 apheresis later today, then Wed/Friday/Monday unless requested otherwise by neurology team.           Interval History:       On phenylephrine   Went into afib after apheresis-on amio gtt  Rate is controlled  Neurology team is at the bedside.   Pt is intubated.   FIO2 at 30%.          Medications and Allergies:      albumin human  3,000 mL Apheresis Once    anticoagulant citrate  500-2,000 mL Apheresis Once    aspirin  81 mg Oral or Feeding Tube Daily    calcium gluconate 4 g in sodium chloride 0.9 % 140 mL  4 g Intravenous Once    chlorhexidine  15 mL Mouth/Throat Q12H    heparin ANTICOAGULANT  5,000 Units Subcutaneous Q8H    [Held by provider] metoprolol succinate ER  50 mg Oral Daily    pantoprazole  40 mg Per Feeding Tube QAM AC    Or    pantoprazole  40 mg Intravenous QAM AC    piperacillin-tazobactam  4.5 g Intravenous Q6H    potassium & sodium phosphates  2 packet Oral or Feeding Tube Q4H    sennosides  8.6 mg Oral BID    sodium chloride (PF)  3 mL Intracatheter Q8H      No Known Allergies         Physical Exam:   Vitals were reviewed   , Blood pressure 99/69, pulse 54, temperature 97.7  F (36.5  C), resp. rate 25, height 1.727 m (5' 8\"), weight 93.3 kg (205 lb 11 oz), SpO2 98%, not currently breastfeeding.    Wt Readings from Last 3 Encounters:   03/11/24 93.3 kg (205 lb 11 oz)   07/12/23 93 kg (205 lb)   05/02/23 92.4 kg (203 lb 12.8 oz)       Intake/Output Summary (Last 24 hours) at 3/11/2024 1216  Last data filed at 3/11/2024 0800  Gross per 24 hour   Intake 2243.81 ml   Output 1365 ml   Net 878.81 ml       GENERAL APPEARANCE: Critical  HEENT:  intubated  RESP: lungs cta b c good efforts, no crackles, rhonchi or wheezes  CV: irregular  ABDOMEN: Soft,   EXTREMITIES/SKIN: +_ edema  NEURO: Sedated  R " temp internal jugular CDI         Data:     CBC RESULTS:     Recent Labs   Lab 03/11/24  0436 03/09/24  0604 03/08/24  1111   WBC 16.8* 17.3* 18.8*   RBC 3.78* 4.33 4.51   HGB 10.7* 12.3 13.1   HCT 33.9* 38.3 39.8    393 352       Basic Metabolic Panel:  Recent Labs   Lab 03/11/24  1007 03/11/24  0836 03/11/24  0436 03/10/24  1624 03/10/24  0904 03/10/24  0753 03/09/24  0604 03/08/24  1111   NA  --   --  143  --   --  140 136 140   POTASSIUM 4.0  --  3.3*  --   --  4.7 4.2 4.2   CHLORIDE  --   --  108*  --   --  103 102 100   CO2  --   --  23  --   --  25 21* 23   BUN  --   --  13.6  --   --  13.8 11.5 9.5   CR  --   --  0.64  --   --  0.67 0.70 0.75   GLC  --  101* 98 83 115* 119* 120* 100*   YEE  --   --  8.5*  --   --  9.2 9.0 9.5       INRNo lab results found in last 7 days.   Attestation:   I have reviewed today's relevant vital signs, notes, medications, labs and imaging.    Stoney Diamond MD  [facility] - Nephrology  Office phone :[phone]  Pager: [phone]

## 2024-03-11 NOTE — CONSULTS
Care Management Initial Consult    General Information  Assessment completed with: Spouse or significant otherEnrique  Type of CM/SW Visit: Initial Assessment    Primary Care Provider verified and updated as needed: Yes   Readmission within the last 30 days: no previous admission in last 30 days      Reason for Consult: discharge planning  Advance Care Planning:            Communication Assessment  Patient's communication style: spoken language (English or Bilingual)    Hearing Difficulty or Deaf: no (faustina)   Wear Glasses or Blind: yes    Cognitive  Cognitive/Neuro/Behavioral: .WDL except  Level of Consciousness: sedated  Arousal Level: arouses to voice  Orientation: other (see comments) (faustina-intubated)  Mood/Behavior: other (see comments) (faustina)     Speech: endotracheal tube    Living Environment:   People in home: spouse  Enrique  Current living Arrangements: condominium, house (pt and spouse have condo in [place] and house in [facility])      Able to return to prior arrangements: other (see comments) (PT/OT currently recommending ARU at discharge)       Family/Social Support:  Care provided by: self  Provides care for: no one  Marital Status:   , Children  Enrique       Description of Support System: Supportive, Involved         Current Resources:   Patient receiving home care services: No     Community Resources: None  Equipment currently used at home: other (see comments) (faustina)  Supplies currently used at home: None    Employment/Financial:  Employment Status: retired        Financial Concerns: none   Referral to Financial Worker: No       Does the patient's insurance plan have a 3 day qualifying hospital stay waiver?  No    Lifestyle & Psychosocial Needs:  Social Determinants of Health     Food Insecurity: Not on file   Depression: Not at risk (7/12/2023)    PHQ-2     PHQ-2 Score: 0   Housing Stability: Not on file   Tobacco Use: Low Risk  (7/12/2023)    Patient History     Smoking Tobacco  Use: Never     Smokeless Tobacco Use: Never     Passive Exposure: Not on file   Financial Resource Strain: Not on file   Alcohol Use: Not on file   Transportation Needs: Not on file   Physical Activity: Not on file   Interpersonal Safety: Not on file   Stress: Not on file   Social Connections: Not on file       Functional Status:  Prior to admission patient needed assistance:   Dependent ADLs:: Independent  Dependent IADLs:: Independent       Mental Health Status:          Chemical Dependency Status:                Values/Beliefs:  Spiritual, Cultural Beliefs, Evangelical Practices, Values that affect care: yes (Ortiz)               Additional Information:  Per consult for discharge planning, met with pt's spouse and pt's daughter Usha as pt is intubated.  Per Enrique, pt was independent with her ADLs & IADLs prior to admit. Enrique shared that patient does all the cooking, cleaning and laudry as he is limited with assisting with these house hold tasks due to health.  Enrique shared they have a home in [facility] and a condo here locally.  Discussed that PT/OT had assessed pt prior to her need to be intubated and had recommended ARU.  Explained ARU and location.  Discussed LTACH as well pending the pt's progress.     Inpatient Care Coordinator will continue to follow for discharge planning.   GARCÍA Le RN, BSN, OCN   Inpatient Care Coordination ICU  [facility]  Office: [phone]

## 2024-03-11 NOTE — PLAN OF CARE
Problem: Mechanical Ventilation Invasive  Goal: Optimal Nutrition Delivery  Outcome: Not Progressing     Problem: Adult Inpatient Plan of Care  Goal: Plan of Care Review  Description: The Plan of Care Review/Shift note should be completed every shift.  The Outcome Evaluation is a brief statement about your assessment that the patient is improving, declining, or no change.  This information will be displayed automatically on your shift  note.  3/11/2024 0313 by Jesus Hicks RN  Outcome: Progressing  Flowsheets (Taken 3/11/2024 0313)  Outcome Evaluation:   Pt RASS -3 to 2 . Pt on full vent support on sedation and pressor. Alert intermittently. When following can wiggle toes and weak hand squeeze. Blinks forcefully to communicate yes.     Pt complains of back pain when alert. Dilaudid given and tramadol given. OG oked to use per md. NPO w meds ok.     Pt on  versed for sedation. Amio for a-fib rvr. Antonio for hypotension.   MAP goal > 65. HR goal <120.   Pt flips out of A-fib a couple times thought the shift. Mostly fliping between Afib rvr and cvr. After midnight HR jumps up to 150's (no stimuli) and moves back into 50-90s after. Md aware: ok with it as long as pt does not maintain and BP is stable.   Converted to SR about 0200: maintaining HR 58-80's.     Pt has abrasion bilateral elbow and tear/abrasion on cocyx (most likly from falling on back and trying for hours to get up). Pt both feet are dry and scally with thick nail growth. Discoloration and blackness to: left 2nd and 3rd toe + right big and little toe. Also scaling and discoloration between all toes: maybe some fungal infection/growth.     Nye in place adequate output.   MRI and CT completed at start of shift: Final results out.   Sputum sample still pending.   Family updated before they left.    Started mag and phos replace protocol per MD.   K + Phos replaced in AM. Recheck scheduled.     Plan of Care Reviewed With: patient  Overall Patient Progress:  "improving  3/11/2024 0235 by Jesus Hicks RN  Outcome: Progressing  Flowsheets (Taken 3/11/2024 0158)  Outcome Evaluation: Pt Rass -3 to 2 overnight. Aleart  intermittently. When following can wiggle toes and very weak hand squeeze. Blinks forcefully to communicate yes. Pt complains of pain when  Plan of Care Reviewed With: patient  Overall Patient Progress: improving  Goal: Patient-Specific Goal (Individualized)  Description: You can add care plan individualizations to a care plan. Examples of Individualization might be:  \"Parent requests to be called daily at 9am for status\", \"I have a hard time hearing out of my right ear\", or \"Do not touch me to wake me up as it startles  me\".  Outcome: Progressing  Goal: Absence of Hospital-Acquired Illness or Injury  Outcome: Progressing  Intervention: Identify and Manage Fall Risk  Flowsheets  Taken 3/11/2024 0313  Safety Promotion/Fall Prevention:   clutter free environment maintained   increase visualization of patient   lighting adjusted   increased rounding and observation   patient and family education   room door open   room near nurse's station   safety round/check completed   supervised activity  Taken 3/11/2024 0000  Safety Promotion/Fall Prevention:   clutter free environment maintained   increase visualization of patient   lighting adjusted   increased rounding and observation   patient and family education   room door open   room near nurse's station   safety round/check completed   supervised activity  Taken 3/10/2024 2200  Safety Promotion/Fall Prevention:   clutter free environment maintained   increase visualization of patient   lighting adjusted   increased rounding and observation   patient and family education   room door open   room near nurse's station   safety round/check completed   supervised activity  Taken 3/10/2024 2000  Safety Promotion/Fall Prevention:   clutter free environment maintained   increase visualization of patient   lighting " adjusted   increased rounding and observation   patient and family education   room door open   room near nurse's station   safety round/check completed   supervised activity  Intervention: Prevent Skin Injury  Flowsheets  Taken 3/11/2024 0313  Body Position:   turned   side-lying  Skin Protection: adhesive use limited  Device Skin Pressure Protection: adhesive use limited  Taken 3/11/2024 0000  Body Position:   turned   side-lying  Taken 3/10/2024 2200  Body Position:   turned   side-lying  Taken 3/10/2024 2000  Body Position:   turned   side-lying  Intervention: Prevent and Manage VTE (Venous Thromboembolism) Risk  Flowsheets  Taken 3/11/2024 0313  VTE Prevention/Management: SCDs (sequential compression devices) on  Taken 3/11/2024 0000  VTE Prevention/Management: SCDs (sequential compression devices) on  Taken 3/10/2024 2200  VTE Prevention/Management: SCDs (sequential compression devices) on  Taken 3/10/2024 2000  VTE Prevention/Management: SCDs (sequential compression devices) on  Intervention: Prevent Infection  Flowsheets (Taken 3/11/2024 0313)  Infection Prevention:   single patient room provided   rest/sleep promoted  Goal: Optimal Comfort and Wellbeing  Outcome: Progressing  Intervention: Monitor Pain and Promote Comfort  Flowsheets  Taken 3/11/2024 0313  Pain Management Interventions:   medication (see MAR)   MD notified (comment)   care clustered   repositioned   rest  Taken 3/11/2024 0003  Pain Management Interventions: medication (see MAR)  Taken 3/10/2024 2344  Pain Management Interventions: medication (see MAR)  Intervention: Provide Person-Centered Care  Flowsheets  Taken 3/11/2024 0313  Trust Relationship/Rapport: reassurance provided  Taken 3/11/2024 0000  Trust Relationship/Rapport: reassurance provided  Taken 3/10/2024 2200  Trust Relationship/Rapport: reassurance provided  Taken 3/10/2024 2000  Trust Relationship/Rapport:   care explained   questions answered   questions encouraged    reassurance provided   thoughts/feelings acknowledged     Problem: Mechanical Ventilation Invasive  Goal: Effective Communication  Outcome: Progressing  Intervention: Ensure Effective Communication  Flowsheets  Taken 3/11/2024 0313  Communication Enhancement Strategies:   one-step directions provided   repetition utilized   extra time allowed for response  Trust Relationship/Rapport: reassurance provided  Taken 3/11/2024 0000  Communication Enhancement Strategies:   one-step directions provided   repetition utilized   extra time allowed for response  Trust Relationship/Rapport: reassurance provided  Taken 3/10/2024 2200  Communication Enhancement Strategies:   one-step directions provided   repetition utilized   extra time allowed for response  Trust Relationship/Rapport: reassurance provided  Taken 3/10/2024 2000  Communication Enhancement Strategies:   one-step directions provided   repetition utilized   extra time allowed for response  Family/Support System Care: caregiver stress acknowledged  Trust Relationship/Rapport:   care explained   questions answered   questions encouraged   reassurance provided   thoughts/feelings acknowledged  Goal: Optimal Device Function  Outcome: Progressing  Intervention: Optimize Device Care and Function  Flowsheets  Taken 3/11/2024 0313  Airway Safety Measures:   all equipment/monitors on and audible   manual resuscitator/mask/valve in room   manual resuscitator/mask/valve at bedside  Airway/Ventilation Management:   airway patency maintained   calming measures promoted  Oral Care:   suction provided   swabbed with antiseptic solution  Taken 3/11/2024 0000  Oral Care:   suction provided   swabbed with antiseptic solution  Taken 3/10/2024 2200  Oral Care:   suction provided   swabbed with antiseptic solution  Taken 3/10/2024 2000  Oral Care:   suction provided   swabbed with antiseptic solution  Goal: Mechanical Ventilation Liberation  Outcome: Progressing  Intervention: Promote  Extubation and Mechanical Ventilation Liberation  Flowsheets  Taken 3/11/2024 0313  Sleep/Rest Enhancement:   awakenings minimized   medication   noise level reduced   room darkened   regular sleep/rest pattern promoted  Medication Review/Management:   medications reviewed   infusion titrated  Environmental Support: calm environment promoted  Taken 3/11/2024 0000  Medication Review/Management:   medications reviewed   infusion titrated  Taken 3/10/2024 2200  Medication Review/Management:   medications reviewed   infusion titrated  Taken 3/10/2024 2000  Medication Review/Management:   medications reviewed   infusion titrated  Goal: Absence of Device-Related Skin and Tissue Injury  Outcome: Progressing  Intervention: Maintain Skin and Tissue Health  Flowsheets (Taken 3/11/2024 0313)  Device Skin Pressure Protection: adhesive use limited  Goal: Absence of Ventilator-Induced Lung Injury  Outcome: Progressing  Intervention: Facilitate Lung-Protection Measures  Flowsheets (Taken 3/11/2024 0313)  Lung Protection Measures:   optimal PEEP applied   ventilator synchrony promoted   ventilator waveforms monitored  Intervention: Prevent Ventilator-Associated Pneumonia  Flowsheets  Taken 3/11/2024 0313  VAP Prevention Bundle:   HOB elevation maintained   oral care regularly provided  Oral Care:   suction provided   swabbed with antiseptic solution  Head of Bed (HOB) Positioning: HOB at 20-30 degrees  Taken 3/11/2024 0000  Oral Care:   suction provided   swabbed with antiseptic solution  Head of Bed (HOB) Positioning: HOB at 20-30 degrees  Taken 3/10/2024 2200  Oral Care:   suction provided   swabbed with antiseptic solution  Head of Bed (HOB) Positioning: HOB at 20-30 degrees  Taken 3/10/2024 2000  Oral Care:   suction provided   swabbed with antiseptic solution  Head of Bed (HOB) Positioning: HOB at 20-30 degrees     Problem: Risk for Delirium  Goal: Optimal Coping  Intervention: Optimize Psychosocial Adjustment to  Delirium  Recent Flowsheet Documentation  Taken 3/11/2024 0000 by Jesus Hicks RN  Supportive Measures:   relaxation techniques promoted   positive reinforcement provided  Taken 3/10/2024 2200 by Jesus Hicks RN  Supportive Measures:   relaxation techniques promoted   positive reinforcement provided  Taken 3/10/2024 2000 by Jesus Hicks RN  Supportive Measures:   relaxation techniques promoted   positive reinforcement provided  Family/Support System Care: caregiver stress acknowledged  Goal: Improved Behavioral Control  Intervention: Prevent and Manage Agitation  Recent Flowsheet Documentation  Taken 3/11/2024 0000 by Jesus Hicks RN  Complementary Therapy: aromatherapy utilized  Taken 3/10/2024 2200 by Jesus Hicks RN  Complementary Therapy: aromatherapy utilized  Taken 3/10/2024 2000 by Jesus Hicks RN  Complementary Therapy: aromatherapy utilized  Intervention: Minimize Safety Risk  Recent Flowsheet Documentation  Taken 3/11/2024 0313 by Jesus Hicks RN  Communication Enhancement Strategies:   one-step directions provided   repetition utilized   extra time allowed for response  Trust Relationship/Rapport: reassurance provided  Taken 3/11/2024 0000 by Jesus Hicks RN  Communication Enhancement Strategies:   one-step directions provided   repetition utilized   extra time allowed for response  Enhanced Safety Measures:   assistive devices when indicated   pain management   review medications for side effects with activity   room near unit station  Trust Relationship/Rapport: reassurance provided  Taken 3/10/2024 2200 by Jesus Hicks RN  Communication Enhancement Strategies:   one-step directions provided   repetition utilized   extra time allowed for response  Enhanced Safety Measures:   assistive devices when indicated   pain management   review medications for side effects with activity   room near unit station  Trust Relationship/Rapport: reassurance provided  Taken 3/10/2024 2000 by Kurt  GARCÍA Lee  Communication Enhancement Strategies:   one-step directions provided   repetition utilized   extra time allowed for response  Enhanced Safety Measures:   assistive devices when indicated   pain management   review medications for side effects with activity   room near unit station  Trust Relationship/Rapport:   care explained   questions answered   questions encouraged   reassurance provided   thoughts/feelings acknowledged  Goal: Improved Attention and Thought Clarity  Intervention: Maximize Cognitive Function  Recent Flowsheet Documentation  Taken 3/11/2024 0000 by Jesus Hicks RN  Sensory Stimulation Regulation:   care clustered   lighting decreased   quiet environment promoted  Reorientation Measures:   clock in view   reorientation provided  Taken 3/10/2024 2200 by Jesus Hicks RN  Sensory Stimulation Regulation:   care clustered   lighting decreased   quiet environment promoted  Reorientation Measures:   clock in view   reorientation provided  Taken 3/10/2024 2000 by Jesus Hicks RN  Sensory Stimulation Regulation:   care clustered   lighting decreased   quiet environment promoted  Reorientation Measures:   clock in view   reorientation provided  Goal: Improved Sleep  Intervention: Promote Sleep  Recent Flowsheet Documentation  Taken 3/11/2024 0313 by Jesus Hicks RN  Sleep/Rest Enhancement:   awakenings minimized   medication   noise level reduced   room darkened   regular sleep/rest pattern promoted

## 2024-03-11 NOTE — PLAN OF CARE
Goal Outcome Evaluation:      Plan of Care Reviewed With: patient, family    Overall Patient Progress: no changeOverall Patient Progress: no change    Outcome Evaluation: Pt  RASS +1 to -2 on versed. alert and follows commands. wiggles toes and intermittently wiggles fingers. raises eyebrows and blinks forcefully to communicate yes. Dilaudid and tradamol given for pain. SR/SB. Antonio to keep MAP >65. vented 30% and PEEP 5. minimal secretions. TF started at 1600. no BM but BM regimen started. quiroz for rentention. adequate output. skin abrasions on coccyx and elbows. LP completed. family at bedside. Started transition from Antonio to Levo d/t HR in the high 40s.       Problem: Adult Inpatient Plan of Care  Goal: Absence of Hospital-Acquired Illness or Injury  Intervention: Identify and Manage Fall Risk  Recent Flowsheet Documentation  Taken 3/11/2024 1600 by Juanita Aiken, RN  Safety Promotion/Fall Prevention:   clutter free environment maintained   increase visualization of patient   lighting adjusted   increased rounding and observation   patient and family education   room door open   room near nurse's station   safety round/check completed   supervised activity  Taken 3/11/2024 1200 by Juanita Aiken, RN  Safety Promotion/Fall Prevention:   clutter free environment maintained   increase visualization of patient   lighting adjusted   increased rounding and observation   patient and family education   room door open   room near nurse's station   safety round/check completed   supervised activity  Taken 3/11/2024 0800 by Juanita Aiken, RN  Safety Promotion/Fall Prevention:   clutter free environment maintained   increase visualization of patient   lighting adjusted   increased rounding and observation   patient and family education   room door open   room near nurse's station   safety round/check completed   supervised activity

## 2024-03-11 NOTE — PROGRESS NOTES
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 On my interview she denies recent innoculation or viral illness symptoms, also denies common neoplasm review of symptoms including coughing, bloody sputum, constipation, diarrhea, bloody urine or stool.  She has had progressive global weakness which now seems to involve her respiratory muscles.  I discussed intubation and mechanical ventilation with her and her family and they were willing to proceed.    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   I have personally reviewed the daily labs, imaging studies, cultures and discussed the case with referring physician and consulting physicians.     My assessment and plan by system for this patient is as follows:    Neurology/Psychiatry:   1. 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  Hold of PLEX today per neuro.  Pending LP CSF studies. Spine MRI negative for acute process to explain the weakness.  2. Pain/analgesia:  prn dilaudid  3. Sedation:  Midazolam drip.  Unable to use Precedex due to the bradycardia and Propofol will be hazardous with the HFrEF. Unable to provide sufficient sedation using prns.  Will try to continue to use low dose of midazolam.    Cardiovascular:   Edward  "cardiomyopathy with chf exacerbation:  appreciate cardiology support.  Afib with RVR, now in NSR on amio  Shock, concern for autonomic dysfunction  Hold ACEI, BB and diuretics for now due to the shock   Continue the Amio gtt  Wean phenylephrine as tolerated      Pulmonary/Ventilator Management:   Acute hypercapnic respiratory failure, requiring mechanical ventilation:  worsening hypercapnia off mechanical support.  Inappropriate for long bipap trial due to airway protection needs.       GI and Nutrition :   1. RD consult, start TF  2.  PPI for pud prophy    Renal/Fluids/Electrolytes:   1. Creat 0.70 ok    Infectious Disease:   1. Empirically on zosyn.  Follow  sputum culture; if negative low threshold to discontinue or deescalate.    Endocrine:   1. BS control per ICU protocol     Hematology/Oncology:   1. Leukocytosis to 16.8 ?reactive?  2. Anemia, Hb 10.7, no signs for blood loss, monitor   3. Pltlts 345 ok    ICU Prophylaxis:   1. DVT: mechanical; holding chemoprophy for LP today--scheduled subcut heparin to start today at 1800.  2. VAP: HOB 30 degrees, chlorhexidine rinse  3. Stress Ulcer: PPI  4. Restraints: Nonviolent soft two point restraints required and necessary for patient safety and continued cares and good effect as patient continues to pull at necessary lines, tubes despite education and distraction. Will readdress daily.   5. Wound care  -   6. Feeding - rd consult  7. Family Update:  at bedside  8. Disposition - icu    Clinically Significant Risk Factors        # Hypokalemia: Lowest K = 3.3 mmol/L in last 2 days, will replace as needed       # Hypoalbuminemia: Lowest albumin = 3.3 g/dL at 3/9/2024  6:04 AM, will monitor as appropriate     # Hypertension: Noted on problem list        # Obesity: Estimated body mass index is 31.27 kg/m  as calculated from the following:    Height as of this encounter: 1.727 m (5' 8\").    Weight as of this encounter: 93.3 kg (205 lb 11 oz)., PRESENT ON ADMISSION   "                     Critical Care Time: 40 min.  I spent this time (excluding procedures) personally providing and directing critical care services at the bedside and on the critical care unit.     Maria Fernanda Tran MD   Pulmonary and Critical Care                 Key Medications:      albumin human  3,000 mL Apheresis Once    anticoagulant citrate  500-2,000 mL Apheresis Once    aspirin  81 mg Oral or Feeding Tube Daily    calcium gluconate 4 g in sodium chloride 0.9 % 140 mL  4 g Intravenous Once    chlorhexidine  15 mL Mouth/Throat Q12H    heparin ANTICOAGULANT  5,000 Units Subcutaneous Q8H    [Held by provider] metoprolol succinate ER  50 mg Oral Daily    pantoprazole  40 mg Per Feeding Tube QAM AC    Or    pantoprazole  40 mg Intravenous QAM AC    piperacillin-tazobactam  4.5 g Intravenous Q6H    potassium & sodium phosphates  2 packet Oral or Feeding Tube Q4H    sennosides  8.6 mg Oral BID    sodium chloride (PF)  3 mL Intracatheter Q8H      amiodarone 0.5 mg/min (03/10/24 2130)    dextrose      midazolam 1 mg/hr (03/11/24 1230)    - MEDICATION INSTRUCTIONS -      - MEDICATION INSTRUCTIONS -      phenylephrine 1 mcg/kg/min (03/11/24 1216)    sodium chloride 30 mL/hr at 03/10/24 1900              Physical Examination:   Temp:  [97.2  F (36.2  C)-99.1  F (37.3  C)] 97.3  F (36.3  C)  Pulse:  [] 58  Resp:  [14-27] 14  BP: ()/() 85/45  Cuff Mean (mmHg):  [69-76] 69  FiO2 (%):  [30 %-40 %] 30 %  SpO2:  [90 %-100 %] 98 %        Intake/Output Summary (Last 24 hours) at 3/11/2024 1347  Last data filed at 3/11/2024 1200  Gross per 24 hour   Intake 2347.62 ml   Output 1445 ml   Net 902.62 ml         Wt Readings from Last 4 Encounters:   03/11/24 93.3 kg (205 lb 11 oz)   07/12/23 93 kg (205 lb)   05/02/23 92.4 kg (203 lb 12.8 oz)   04/28/23 92.4 kg (203 lb 12.8 oz)     BP - Mean:  [] 63  Vent Mode: CMV/AC  (Continuous Mandatory Ventilation/ Assist Control)  FiO2 (%): 30 %  Resp Rate (Set): 14  breaths/min  Tidal Volume (Set, mL): 420 mL  PEEP (cm H2O): 5 cmH2O  Resp: 14    Recent Labs   Lab 03/10/24  1142 03/10/24  0753   PH 7.43  --    PCO2 42  --    PO2 214*  --    HCO3 28  --    O2PER 60 25       GEN: no acute distress  HEENT: head ncat, sclera anicteric, OP patent, trachea midline   PULM: unlabored synchronous with vent, clear anteriorly    CV/COR: RRR S1S2 no gallop,  No rub, no murmur  ABD: soft nontender  EXT:  warm and well perfused x4  NEURO: PERRL, sedated with versed  SKIN: no obvious rash  LINES: clean, dry intact         Data:   All data and imaging reviewed     ROUTINE ICU LABS (Last four results)  CMP  Recent Labs   Lab 03/11/24  1224 03/11/24  1007 03/11/24  0836 03/11/24  0436 03/10/24  1624 03/10/24  0904 03/10/24  0753 03/09/24  0604 03/08/24  1111   NA  --   --   --  143  --   --  140 136 140   POTASSIUM  --  4.0  --  3.3*  --   --  4.7 4.2 4.2   CHLORIDE  --   --   --  108*  --   --  103 102 100   CO2  --   --   --  23  --   --  25 21* 23   ANIONGAP  --   --   --  12  --   --  12 13 17*   GLC 93  --  101* 98 83   < > 119* 120* 100*   BUN  --   --   --  13.6  --   --  13.8 11.5 9.5   CR  --   --   --  0.64  --   --  0.67 0.70 0.75   GFRESTIMATED  --   --   --  >90  --   --  90 89 82   YEE  --   --   --  8.5*  --   --  9.2 9.0 9.5   MAG  --   --   --  1.8  --   --   --   --   --    PHOS  --   --   --  1.8*  --   --   --   --   --    PROTTOTAL  --   --   --  5.4*  --   --   --  7.1 7.4   ALBUMIN  --   --   --  4.1  --   --   --  3.3* 3.6   BILITOTAL  --   --   --  0.8  --   --   --  0.4 0.5   ALKPHOS  --   --   --  33*  --   --   --  92 103   AST  --   --   --  17  --   --   --  32 31   ALT  --   --   --  6  --   --   --  13 13    < > = values in this interval not displayed.     CBC  Recent Labs   Lab 03/11/24  0436 03/09/24  0604 03/08/24  1111   WBC 16.8* 17.3* 18.8*   RBC 3.78* 4.33 4.51   HGB 10.7* 12.3 13.1   HCT 33.9* 38.3 39.8   MCV 90 89 88   MCH 28.3 28.4 29.0   MCHC 31.6 32.1  "32.9   RDW 16.1* 15.8* 15.2*    393 352     INRNo lab results found in last 7 days.  Arterial Blood Gas  Recent Labs   Lab 03/10/24  1142 03/10/24  0753   PH 7.43  --    PCO2 42  --    PO2 214*  --    HCO3 28  --    O2PER 60 25       All cultures:  No results for input(s): \"CULT\" in the last 168 hours.  Recent Results (from the past 24 hour(s))   MR Brain w/o & w Contrast    Narrative    EXAM: MR BRAIN W/O AND W CONTRAST  LOCATION: [facility]  DATE: 3/9/2024    INDICATION: Diffuse weakness.  COMPARISON: CT head dated 03/08/2024.  CONTRAST: 10 mL Gadavist.  TECHNIQUE: Routine multiplanar multisequence head MRI without and with intravenous contrast.    FINDINGS:  INTRACRANIAL CONTENTS: No acute or subacute infarct. No mass, acute hemorrhage, or extra-axial fluid collections. Patchy nonspecific T2/FLAIR hyperintensities within the cerebral white matter, left greater than right, most consistent with mild to   moderate chronic microvascular ischemic change. Mild generalized cerebral atrophy. No hydrocephalus. Normal position of the cerebellar tonsils. No pathologic contrast enhancement.    SELLA: No abnormality accounting for technique.    OSSEOUS STRUCTURES/SOFT TISSUES: Normal marrow signal. The major intracranial vascular flow voids are maintained.     ORBITS: Prior bilateral cataract surgery. Visualized portions of the orbits are otherwise unremarkable.     SINUSES/MASTOIDS: No paranasal sinus mucosal disease. Partial right mastoid effusion.      Impression    IMPRESSION:  1.  No acute intracranial process.  2.  Generalized brain atrophy and presumed microvascular ischemic changes as detailed above.   XR Chest Port 1 View    Narrative    EXAM: XR CHEST PORT 1 VIEW  LOCATION: [facility]  DATE: 3/10/2024    INDICATION: ET tube placement, OG placement, dialysis port,  COMPARISON: 03/08/2024      Impression    IMPRESSION: Intubation. The endotracheal tube tip is " 4 cm above the milan. Right IJ dialysis catheter has been placed with tip at the SVC/right atrial junction. NG tube is in the stomach. No pneumothorax or pleural effusion. Mild pulmonary edema. Normal   heart size.         Billing: This patient is critically ill: Yes. Total critical care time today 60 min, excluding procedures.       Past Medical/surgical hx, meds, allergies, social history, family history     Past Medical History:   Diagnosis Date    HTN (hypertension)     Hyperlipidemia     Leiomyoma of uterus, unspecified     Numbness and tingling     spinal stenosis causes numbness and tingling on feet and knees bilaterally    Osteoarthrosis, unspecified whether generalized or localized, unspecified site     Other and unspecified disc disorder of lumbar region     Other chronic pain     Primary osteoarthritis of left hip 12/18/2016    Spinal stenosis     S/P diskectomy x 2, most recently 8/2014     Past Surgical History:   Procedure Laterality Date    ARTHROPLASTY HIP Left 12/12/2016    Procedure: ARTHROPLASTY HIP;  Surgeon: Leonid Morfin MD;  Location:  OR    BACK SURGERY      COLONOSCOPY N/A 07/20/2017    Procedure: COMBINED COLONOSCOPY, SINGLE OR MULTIPLE BIOPSY/POLYPECTOMY BY BIOPSY;  colonoscopy;  Surgeon: Catarino Salas MD;  Location:  GI    CV CORONARY ANGIOGRAM N/A 3/8/2024    Procedure: Coronary Angiogram;  Surgeon: Alicia Dai MD;  Location:  HEART CARDIAC CATH LAB    DISCECTOMY LUMBAR POSTERIOR MICROSCOPIC TWO LEVELS  08/11/2014    Procedure: DISCECTOMY LUMBAR POSTERIOR MICROSCOPIC TWO LEVELS;  Surgeon: Warren Herring MD;  Location:  OR    ENDOSCOPIC RETROGRADE CHOLANGIOPANCREATOGRAM N/A 10/14/2022    Procedure: ENDOSCOPIC RETROGRADE CHOLANGIOPANCREATOGRAPHY with biliary spincterotomy, biliary stent placement, cholangioscopy;  Surgeon: Carson Franklin MD;  [facility]    ENDOSCOPIC RETROGRADE CHOLANGIOPANCREATOGRAM N/A 5/2/2023    Procedure:  ENDOSCOPIC RETROGRADE CHOLANGIOPANCREATOGRAPHY with biliary stone and stent removal;  Surgeon: Carson Franklin MD;  [facility]    ENDOSCOPIC RETROGRADE CHOLANGIOPANCREATOGRAPHY, EXCHANGE TUBE/STENT N/A 1/25/2023    Procedure: ENDOSCOPIC RETROGRADE CHOLANGIOPANCREATOGRAPHY WITH BILIARY STENT EXCHANGE AND DEBRIS REMOVAL;  Surgeon: Carson Franklin MD;  Location: [facility]    EXPLORE COMMON BILE DUCT N/A 12/01/2022    Procedure: COMMON BILE DUCT REPAIR;  Surgeon: Thai Garcia MD;  [facility]    LAPAROSCOPIC CHOLECYSTECTOMY WITH CHOLANGIOGRAMS N/A 12/01/2022    Procedure: EXPLORATORY LAPAROSCOPY, LAPAROSCOPIC PARTIAL CHOLECYSTECTOMY WITH RETREVIAL OF STONES WITH CHOLANGIOGRAM; choledochoscopy, INTRAOPERATIVE ULTRASOUND;  Surgeon: Thai Garcia MD;  [facility]    LAPAROSCOPY DIAGNOSTIC (GENERAL) N/A 10/06/2022    Procedure: Diagnostic laparoscopy with liver biopsy;  Surgeon: Warren Zhang MD;  Location:  OR    ORTHOPEDIC SURGERY      TOTAL KNEE ARTHROPLASTY Left     [facility] NONSPECIFIC PROCEDURE  1996    lumbar discectomy    [facility] NONSPECIFIC PROCEDURE      cervical cone biopsy    [facility] NONSPECIFIC PROCEDURE      Left knee replacement     No current facility-administered medications on file prior to encounter.  acetaminophen (TYLENOL) 325 MG tablet, Take 2 tablets (650 mg) by mouth every 8 hours as needed for mild pain  aspirin 81 MG EC tablet, Take 81 mg by mouth daily  calcium-vitamin D (CALTRATE) 600-400 MG-UNIT per tablet, Take 1 tablet by mouth 2 times daily  celecoxib (CELEBREX) 200 MG capsule, Take 1 capsule (200 mg) by mouth daily  lisinopril (ZESTRIL) 10 MG tablet, Take 1 tablet (10 mg) by mouth daily  Multiple Vitamins-Minerals (CENTRUM SILVER) per tablet, Take 1 tablet by mouth daily  traMADol (ULTRAM) 50 MG tablet, TAKE 1 TO 2 TABLETS BY MOUTH EVERY DAY FOR SEVERE PAIN  vitamin D3 (CHOLECALCIFEROL) 50 mcg (2000 units) tablet, Take 1 tablet (50 mcg) by mouth daily       No Known  Allergies  Social History     Socioeconomic History    Marital status:      Spouse name: Not on file    Number of children: Not on file    Years of education: Not on file    Highest education level: Not on file   Occupational History    Not on file   Tobacco Use    Smoking status: Never    Smokeless tobacco: Never   Vaping Use    Vaping Use: Never used   Substance and Sexual Activity    Alcohol use: Yes     Alcohol/week: 0.0 standard drinks of alcohol     Comment: social    Drug use: No    Sexual activity: Yes     Partners: Male   Other Topics Concern    Parent/sibling w/ CABG, MI or angioplasty before 65F 55M? Not Asked   Social History Narrative    Not on file     Social Determinants of Health     Financial Resource Strain: Not on file   Food Insecurity: Not on file   Transportation Needs: Not on file   Physical Activity: Not on file   Stress: Not on file   Social Connections: Not on file   Interpersonal Safety: Not on file   Housing Stability: Not on file     Family History   Problem Relation Age of Onset    Family History Negative Mother          age 64 mva    Cancer Father          age 84 lung ca    Family History Negative Sister     Breast Cancer Paternal Grandmother     Genetic Disorder Other         daughter has Autoimmune disease

## 2024-03-11 NOTE — PROGRESS NOTES
RADIOLOGY PROCEDURE NOTE  Patient name: Yohana Marques  MRN: [id number]  : 1945    Pre-procedure diagnosis: Generalized weakenss  Post-procedure diagnosis: Same    Procedure Date/Time: 2024  11:24 AM  Procedure: LP over L3.  Previous laminectomies  Estimated blood loss: None  Specimen(s) collected with description: ~23 ml of csf.  About 10 ml of blood tinged fluid.  Adjusted needle tip and was able to clear CSF.  10 ml of clear CSF.  Bloody tap initially.  The patient tolerated the procedure well with no immediate complications.  Significant findings: opening pressure of 26 cm of water    See imaging dictation for procedural details.    Provider name: Jcarlos Galvan PA-C  Assistant(s):None

## 2024-03-11 NOTE — CONSULTS
"CLINICAL NUTRITION SERVICES  -  ASSESSMENT NOTE      Recommendations Ordered by Registered Dietitian (RD): Vital HP at 55 mL/hr= 1320 kcal (14 kcal/kg), 115 g protein (1.8 g/kg), 147 g CHO, 0 g fiber, 1204 mL H2O  Flushes 60 mL every 4 hours    Malnutrition: % Weight Loss:  None noted  % Intake:  Decreased intake does not meet criteria for malnutrition (eating prior to intubation)  Subcutaneous Fat Loss:  Unable to observe but none suspected   Muscle Loss:  Unable to observe but none suspected   Fluid Retention:  None noted    Malnutrition Diagnosis: Unable to determine due to lack of ability to perform a physical exam (off the floor)        REASON FOR ASSESSMENT  Yohana Marques is a 78 year old female seen by Registered Dietitian for Admission Nutrition Risk Screen for Have you recently lost weight without trying? - Unsure   Have you eaten poorly because of a decreased appetite? - No (INOCENCIA)   and Provider Order - Registered Dietitian to Assess and Order TF per Medical Nutrition Therapy Protocol      NUTRITION HISTORY  - Unable to obtain nutrition history as patient intubated not able to provide. Currently down for LP.  - Admitted with the following -->   New stress-induced cardiomyopathy (EF 23%)   NSTEMI   ACS   Respiratory failure   Severe sepsis       CURRENT NUTRITION ORDERS  Diet Order:     NPO asked to see for TF initiation     Current Intake/Tolerance:  Noted that she was on a regular diet prior to intubation and intake was \"good\" per flowsheets      NUTRITION FOCUSED PHYSICAL ASSESSMENT FOR DIAGNOSING MALNUTRITION)  No:  ADRIANA for lumbar puncture                 Observed:    Unable as above     Obtained from Chart/Interdisciplinary Team:  None     ANTHROPOMETRICS  Height: 5' 8\"  Weight: 91.5 kg (202#)(3/10)  Body mass index is 30.7 kg/m   Weight Status:  Obesity Grade I BMI 30-34.9  IBW: 63.6 kg   % IBW: 144%  Weight History:   Wt Readings from Last 10 Encounters:   03/11/24 93.3 kg (205 lb 11 oz) "   07/12/23 93 kg (205 lb)   05/02/23 92.4 kg (203 lb 12.8 oz)   04/28/23 92.4 kg (203 lb 12.8 oz)   01/25/23 89 kg (196 lb 3.4 oz)   12/19/22 92.1 kg (203 lb 1.6 oz)   12/02/22 95 kg (209 lb 6.4 oz)   11/22/22 95 kg (209 lb 8 oz)   11/21/22 95 kg (209 lb 8 oz)   10/31/22 93.8 kg (206 lb 14.4 oz)     No significant weight loss noted     LABS  K normal (after replacement)   Phos 1.8 (L) - Getting KPhos replacement     MEDICATIONS  Pressor x 1       ASSESSED NUTRITION NEEDS PER APPROVED PRACTICE GUIDELINES:    Dosing Weight 91.5 kg (energy) and 63.6 kg (protein)  Estimated Energy Needs: 1826-8865 kcals (14-17 Kcal/Kg)  Justification: obese and vented  Estimated Protein Needs:  grams protein (1.5-2 g pro/Kg)  Justification: hypercatabolism with critical illness and obesity guidelines   Estimated Fluid Needs: 8380-8942 mL (1 mL/Kcal)  Justification: maintenance    MALNUTRITION:  % Weight Loss:  None noted  % Intake:  Decreased intake does not meet criteria for malnutrition (eating prior to intubation)  Subcutaneous Fat Loss:  Unable to observe but none suspected   Muscle Loss:  Unable to observe but none suspected   Fluid Retention:  None noted    Malnutrition Diagnosis: Unable to determine due to lack of ability to perform a physical exam (off the floor)    NUTRITION DIAGNOSIS:  Inadequate protein-energy intake related to NPO with plans to start TF today as evidenced by meeting 0% needs       NUTRITION INTERVENTIONS  Recommendations / Nutrition Prescription  Vital HP at 55 mL/hr= 1320 kcal (14 kcal/kg), 115 g protein (1.8 g/kg), 147 g CHO, 0 g fiber, 1204 mL H2O  Flushes 60 mL every 4 hours     Implementation  Nutrition education: Not appropriate at this time due to patient condition  EN Composition, EN Schedule, Feeding Tube Flush: Orders written to begin TF at 15 mL/hr and increase every 24 hours by 20 mL to goal. Flushes as above.   Collaboration and Referral of Nutrition care:Patient discussed today during  interdisciplinary bedside rounds.    Nutrition Goals  Goal TF regimen will meet % needs while NPO     MONITORING AND EVALUATION:  Progress towards goals will be monitored and evaluated per protocol and Practice Guidelines    Sandra Chiu RD, LD, CNSC   Clinical Dietitian - [facility]

## 2024-03-12 NOTE — PLAN OF CARE
Problem: Adult Inpatient Plan of Care  Goal: Plan of Care Review  Description: The Plan of Care Review/Shift note should be completed every shift.  The Outcome Evaluation is a brief statement about your assessment that the patient is improving, declining, or no change.  This information will be displayed automatically on your shift  note.  Flowsheets (Taken 3/12/2024 1604)  Outcome Evaluation: pt able to pressure support 5/5 only for about 20 minutes due to going apneic-while breathing volumes good but pt sleepy. pt vent settings maintained 30 percent and peep of 5. pt titrated off roderick and still on levo. sedation paused at 1240 and kept off-pt appears comfortable and drowsy-prn pain meds being given. pt/ot will try to see pt tomorrow b/c pt having plasmapherisis started. tf continued. quiroz in place and urine output adequate. 24 hour urine collection started at noon. urine collection on ice. pt has emg from [facility] tentatively scheduled for Friday 3/15/23 at 0930. HR SB/NSR. neuro assessment remains unchanged with pt following commands, wiggling toes and minimally moving upper extremities. family updated at bedside. report given to other ICU nurse.  Plan of Care Reviewed With: patient  Overall Patient Progress: no change  Goal: Absence of Hospital-Acquired Illness or Injury  Intervention: Identify and Manage Fall Risk  Recent Flowsheet Documentation  Taken 3/12/2024 1200 by Charity Cifuentes RN  Safety Promotion/Fall Prevention:   lighting adjusted   clutter free environment maintained   safety round/check completed  Taken 3/12/2024 0800 by Charity Cifuentes RN  Safety Promotion/Fall Prevention:   lighting adjusted   clutter free environment maintained   safety round/check completed  Intervention: Prevent Skin Injury  Recent Flowsheet Documentation  Taken 3/12/2024 1400 by Charity Cifuentes RN  Body Position:   right   turned   heels elevated  Taken 3/12/2024 1200 by Charity Cifuentes RN  Body Position:   left    turned   heels elevated  Skin Protection:   incontinence pads utilized   pulse oximeter probe site changed   silicone foam dressing in place  Device Skin Pressure Protection:   adhesive use limited   skin-to-skin areas padded  Taken 3/12/2024 1000 by Charity Cifuentes RN  Body Position:   foot of bed elevated   right   turned  Taken 3/12/2024 0800 by Charity Cifuentes RN  Body Position:   left   turned   heels elevated  Skin Protection:   incontinence pads utilized   pulse oximeter probe site changed   silicone foam dressing in place  Device Skin Pressure Protection:   adhesive use limited   skin-to-skin areas padded  Intervention: Prevent and Manage VTE (Venous Thromboembolism) Risk  Recent Flowsheet Documentation  Taken 3/12/2024 1200 by Charity Cifuentes RN  VTE Prevention/Management: SCDs (sequential compression devices) on  Taken 3/12/2024 0800 by Charity Cifuentes RN  VTE Prevention/Management: SCDs (sequential compression devices) on  Intervention: Prevent Infection  Recent Flowsheet Documentation  Taken 3/12/2024 1200 by Charity Cifuentes RN  Infection Prevention:   rest/sleep promoted   single patient room provided  Taken 3/12/2024 0800 by Charity Cifuentes RN  Infection Prevention:   rest/sleep promoted   single patient room provided  Goal: Optimal Comfort and Wellbeing  Intervention: Monitor Pain and Promote Comfort  Recent Flowsheet Documentation  Taken 3/12/2024 1200 by Charity Cifuentes RN  Pain Management Interventions: medication (see MAR)  Intervention: Provide Person-Centered Care  Recent Flowsheet Documentation  Taken 3/12/2024 1200 by Charity Cifuentes RN  Trust Relationship/Rapport:   care explained   reassurance provided  Taken 3/12/2024 0800 by Charity Cifuentes RN  Trust Relationship/Rapport:   care explained   reassurance provided     Problem: Risk for Delirium  Goal: Optimal Coping  Intervention: Optimize Psychosocial Adjustment to Delirium  Recent Flowsheet Documentation  Taken 3/12/2024 1200 by  Charity Cifuentes RN  Supportive Measures:   relaxation techniques promoted   positive reinforcement provided  Taken 3/12/2024 0800 by Charity Cifuentes RN  Supportive Measures:   relaxation techniques promoted   positive reinforcement provided  Goal: Improved Behavioral Control  Intervention: Prevent and Manage Agitation  Recent Flowsheet Documentation  Taken 3/12/2024 1200 by Charity Cifuentes RN  Environment Familiarity/Consistency:   daily routine followed   familiar objects from home provided  Taken 3/12/2024 0800 by Charity Cifuentes RN  Environment Familiarity/Consistency:   daily routine followed   familiar objects from home provided  Intervention: Minimize Safety Risk  Recent Flowsheet Documentation  Taken 3/12/2024 1200 by Charity Cifuentes RN  Communication Enhancement Strategies:   extra time allowed for response   one-step directions provided  Enhanced Safety Measures: pain management  Trust Relationship/Rapport:   care explained   reassurance provided  Taken 3/12/2024 0800 by Charity Cifuentes RN  Communication Enhancement Strategies:   extra time allowed for response   one-step directions provided  Enhanced Safety Measures: pain management  Trust Relationship/Rapport:   care explained   reassurance provided  Goal: Improved Attention and Thought Clarity  Intervention: Maximize Cognitive Function  Recent Flowsheet Documentation  Taken 3/12/2024 1200 by Charity Cifuentes RN  Sensory Stimulation Regulation:   lighting decreased   care clustered  Reorientation Measures:   calendar in view   clock in view   family pictures in room  Taken 3/12/2024 0800 by Charity Cifuentes RN  Sensory Stimulation Regulation:   lighting decreased   care clustered  Reorientation Measures:   calendar in view   clock in view   family pictures in room  Goal: Improved Sleep  Intervention: Promote Sleep  Recent Flowsheet Documentation  Taken 3/12/2024 1200 by Charity Cifuentes RN  Sleep/Rest Enhancement: awakenings minimized  Taken  3/12/2024 0800 by Charity Cifuentes RN  Sleep/Rest Enhancement: awakenings minimized     Problem: Mechanical Ventilation Invasive  Goal: Effective Communication  Intervention: Ensure Effective Communication  Recent Flowsheet Documentation  Taken 3/12/2024 1200 by Charity Cifuentes RN  Communication Enhancement Strategies:   extra time allowed for response   one-step directions provided  Trust Relationship/Rapport:   care explained   reassurance provided  Taken 3/12/2024 0800 by Charity Cifuentes RN  Communication Enhancement Strategies:   extra time allowed for response   one-step directions provided  Trust Relationship/Rapport:   care explained   reassurance provided  Goal: Optimal Device Function  Intervention: Optimize Device Care and Function  Recent Flowsheet Documentation  Taken 3/12/2024 1400 by Charity Cifuentes RN  Oral Care:   suction provided   swabbed with antiseptic solution   tongue brushed   lip/mouth moisturizer applied  Taken 3/12/2024 1200 by Charity Cifuentes RN  Airway Safety Measures:   all equipment/monitors on and audible   manual resuscitator/mask/valve at bedside  Airway/Ventilation Management:   airway patency maintained   calming measures promoted  Oral Care:   suction provided   swabbed with antiseptic solution   tongue brushed   lip/mouth moisturizer applied  Taken 3/12/2024 1000 by Charity Cifuentes RN  Oral Care:   suction provided   swabbed with antiseptic solution   tongue brushed   lip/mouth moisturizer applied  Taken 3/12/2024 0800 by Charity Cifuentes RN  Airway Safety Measures:   all equipment/monitors on and audible   manual resuscitator/mask/valve at bedside  Airway/Ventilation Management:   airway patency maintained   calming measures promoted  Oral Care:   suction provided   swabbed with antiseptic solution   tongue brushed   lip/mouth moisturizer applied  Goal: Mechanical Ventilation Liberation  Intervention: Promote Extubation and Mechanical Ventilation Liberation  Recent  Flowsheet Documentation  Taken 3/12/2024 1200 by Charity Cifuentes RN  Sleep/Rest Enhancement: awakenings minimized  Medication Review/Management:   medications reviewed   infusion initiated  Environmental Support:   calm environment promoted   distractions minimized  Taken 3/12/2024 0800 by Charity Cifuentes RN  Sleep/Rest Enhancement: awakenings minimized  Medication Review/Management:   medications reviewed   infusion initiated  Environmental Support:   calm environment promoted   distractions minimized  Goal: Absence of Device-Related Skin and Tissue Injury  Intervention: Maintain Skin and Tissue Health  Recent Flowsheet Documentation  Taken 3/12/2024 1200 by Charity Cifuentes RN  Device Skin Pressure Protection:   adhesive use limited   skin-to-skin areas padded  Taken 3/12/2024 0800 by Charity Cifuentes RN  Device Skin Pressure Protection:   adhesive use limited   skin-to-skin areas padded  Goal: Absence of Ventilator-Induced Lung Injury  Intervention: Facilitate Lung-Protection Measures  Recent Flowsheet Documentation  Taken 3/12/2024 1200 by Charity Cifuentes RN  Lung Protection Measures: optimal PEEP applied  Taken 3/12/2024 0800 by Charity Cifuentes RN  Lung Protection Measures: optimal PEEP applied  Intervention: Prevent Ventilator-Associated Pneumonia  Recent Flowsheet Documentation  Taken 3/12/2024 1400 by Charity Cifuentes RN  Oral Care:   suction provided   swabbed with antiseptic solution   tongue brushed   lip/mouth moisturizer applied  Head of Bed (HOB) Positioning: HOB at 30 degrees  Taken 3/12/2024 1200 by Charity Cifuentes RN  VAP Prevention Bundle:   HOB elevation maintained   oral care regularly provided  Oral Care:   suction provided   swabbed with antiseptic solution   tongue brushed   lip/mouth moisturizer applied  Head of Bed (HOB) Positioning: HOB at 30 degrees  VAP Prevention Measures: completed  Taken 3/12/2024 1000 by Charity Cifuentes RN  Oral Care:   suction provided   swabbed with antiseptic  solution   tongue brushed   lip/mouth moisturizer applied  Head of Bed (HOB) Positioning: HOB at 30 degrees  Taken 3/12/2024 0800 by Charity Cifuentes RN  VAP Prevention Bundle:   HOB elevation maintained   oral care regularly provided  Oral Care:   suction provided   swabbed with antiseptic solution   tongue brushed   lip/mouth moisturizer applied  Head of Bed (HOB) Positioning: HOB at 30 degrees  VAP Prevention Measures: completed   Goal Outcome Evaluation:      Plan of Care Reviewed With: patient    Overall Patient Progress: no changeOverall Patient Progress: no change    Outcome Evaluation: pt able to pressure support 5/5 only for about 20 minutes due to going apneic-while breathing volumes good but pt sleepy. pt vent settings maintained 30 percent and peep of 5. pt titrated off roderick and still on levo. sedation paused at 1240 and kept off-pt appears comfortable and drowsy-prn pain meds being given. pt/ot will try to see pt tomorrow b/c pt having plasmapherisis started. tf continued. quiroz in place and urine output adequate. 24 hour urine collection started at noon. urine collection on ice. pt has emg from [facility] tentatively scheduled for Friday 3/15/23 at 0930. HR SB/NSR. neuro assessment remains unchanged with pt following commands, wiggling toes and minimally moving upper extremities. family updated at bedside. report given to other ICU nurse.

## 2024-03-12 NOTE — PROGRESS NOTES
[facility]    Stroke Progress Note    Interval Events  - More spontaneous eye movement, attempting to follow commands, smiling  - Continues to have quadriparesis, intubated. Underwent LP with WBC 3, , protein 55.3, glucose 59; meningitis/encephalitis panel negative    HPI Summary  Yohana Marques is a 78 year old woman with h/o HTN, spinal stenosis s/p discectomy x 2 (most recent 8/2004), and chronic pain. Presented to the ED on 3/8 after a fall at home with 3-4 days of preceding mild generalized weakness (was using a walker, uses a cane at baseline). She reported getting up and feeling fine for a few steps and then she became dizzy and fell down. She denied LOC. No palpitations or any other sxms prior to the fall. No sick contacts, recent illness, recent vacations, or other exposures. She and her  do can their own food at their 2nd residence in [facility]. [place] has not had access to their home-canned items.     On admission was found to have new heart failure (per cardiology likely stress induced) with a significantly reduced EF (23% on echo 3/8) with no significant obstructive coronary artery disease on invasive coronary angiogram on 3/8/24. Started on metoprolol and losartan. Of note, she has urinary retention.      Ended up needing to be intubated on 3/10 due to labored, shallow breathing and evident respiratory acidosis on venous BG. Started urgent PLEX and started to have runs of a-fib throughout the day and overnight. Continues to fluctuate between hypotensive and hypertensive. Requiring pressors. Requiring electrolyte replacement for K+ and Phos.       Evaluation Summarized     MRI Brain w/wo contrast (3/9) No acute intracranial process. Chronic periventricular microvascular disease (L > R)   MRI Spine (3/10) Cervical Spine:  Degenerative foraminal narrowing of C3-C7.   Thoracic Spine:  Chronic  superior endplate T3 compression  Lumbar Spine:  Progressive lumbar spondylosis in L2-L4 with multiple levels of increased foraminal narrowing   CT Chest/Abdomen/Pelvis No evidence of metastasis or neoplastic processes    Submucosal fibroid vs nonmalignant endometrial polyp      Stable 4 mm left upper lobe nodule.      Echocardiogram 3/8        Coronary cath 3/8 EF is 23%.Entire left ventricle is severely hypokinetic but basal left ventricular function is less so     No significant CAD   EKG/Telemetry 3/10    Tachycardic (147 bpm), Atrial fibrillation w/ RVR   Left axis deviation, Anterior infarct, age undertermined  T wave abnormality   CSF Labs Mild proteinuria  Pending CSF studies   Labs MG eval: pending  Ganglioside Antibodies: pending       Impression   - Acute onset flaccid quadriparesis  - Areflexia  - Hypercapnic respiratory failure  - New onset stress-induced HFrEF (EF 23% on 3/8)  - Urinary retention     MRI brain w/o contrast did not show any acute processes. MRI of total spine did not show any acute processes. CT CAP did not show any malignancy.  Infectious encephalitis/meningitis panel negative, PCR testing of CSF negative     Broad Ddx: Concern for Botulism, Guillain-Willisburg Syndrome vs anterior horn cell disease vs. NM junction defect (myasthenia gravis) vs. Myopathy, acute transverse myelitis, acute intermittent porphyria.     Plan  - PLEX 2/5 for 1 vol exchange today, then every other day for a total of 5 sessions  - Follow CSF analysis  - EMG -> [facility] tentative plan to come in and do on Friday 3/15 at 0930  - Repeat MRI brain/C/T/L spine scheduled for Thursday  - Will review for anticoagulation given A-fib and high-risk -> currently on ASA 81 mg  - Follow urine porphyrins, AZALEA, ANCA (MG antibody pending, nonreactive for HIV-1, HIV-2, or HIV-1 p24 antigen)  - CTM I/Os - has urinary retention     DVT prophylaxis: heparin 5000 units subcutaneous injection q8 hrs + mechanical prophylaxis with pneumo boots.  "    Patient Follow-up    - final recommendation pending work-up    We will continue to follow.     The Stroke Fellow is Dr. Shen. The Stroke Staff is Dr. Duffy.    Summer Tellez, MS-3  Medical Student  To page a member of the stroke/neurocritical care service, click here:  [facility]   Choose \"On Call\" tab at top, then search dropdown box for \"Neurology Adult\", select location, press Enter, then look for stroke/neuro ICU/telestroke.  ______________________________________________________    Clinically Significant Risk Factors        # Hypokalemia: Lowest K = 2.9 mmol/L in last 2 days, will replace as needed  # Hypernatremia: Highest Na = 148 mmol/L in last 2 days, will monitor as appropriate  # Hypocalcemia: Lowest Ca = 7.7 mg/dL in last 2 days, will monitor and replace as appropriate     # Hypoalbuminemia: Lowest albumin = 3.3 g/dL at 3/9/2024  6:04 AM, will monitor as appropriate     # Hypertension: Noted on problem list        # Obesity: Estimated body mass index is 31.88 kg/m  as calculated from the following:    Height as of this encounter: 1.727 m (5' 8\").    Weight as of this encounter: 95.1 kg (209 lb 10.5 oz)., PRESENT ON ADMISSION     # Financial/Environmental Concerns: none           Medications   Scheduled Meds   albumin human  3,000 mL Apheresis Once    anticoagulant citrate  500-2,000 mL Apheresis Once    aspirin  81 mg Oral or Feeding Tube Daily    calcium gluconate 4 g in sodium chloride 0.9 % 140 mL  4 g Intravenous Once    chlorhexidine  15 mL Mouth/Throat Q12H    heparin ANTICOAGULANT  5,000 Units Subcutaneous Q8H    [Held by provider] metoprolol succinate ER  50 mg Oral Daily    pantoprazole  40 mg Per Feeding Tube QAM AC    Or    pantoprazole  40 mg Intravenous QAM AC    piperacillin-tazobactam  4.5 g Intravenous Q6H    potassium chloride  20 mEq Oral or Feeding Tube Once    sennosides  8.6 mg Oral BID    sodium chloride (PF)  3 mL Intracatheter Q8H    sodium phosphate  15 mmol " Intravenous Once       Infusion Meds   amiodarone 0.5 mg/min (03/12/24 0635)    dextrose      midazolam 2 mg/hr (03/11/24 2055)    - MEDICATION INSTRUCTIONS -      norepinephrine 0.01 mcg/kg/min (03/12/24 0615)    - MEDICATION INSTRUCTIONS -      phenylephrine 0.2 mcg/kg/min (03/12/24 0615)    sodium chloride 10 mL/hr at 03/11/24 2151       PRN Meds  acetaminophen, albuterol, bisacodyl, calcium carbonate, artificial tears, dextrose, fentaNYL, heparin Lock (1000 units/mL High concentration), HOLD MEDICATION, HOLD MEDICATION, HYDROmorphone, lidocaine 4%, lidocaine 4%, lidocaine (buffered or not buffered), lidocaine (buffered or not buffered), melatonin, midazolam, midazolam, naloxone **OR** naloxone **OR** naloxone **OR** naloxone, - MEDICATION INSTRUCTIONS -, ondansetron **OR** ondansetron, oxyCODONE **OR** oxyCODONE, - MEDICATION INSTRUCTIONS -, polyethylene glycol, senna-docusate **OR** senna-docusate, sodium chloride (PF), sodium chloride 0.9%, traMADol       PHYSICAL EXAMINATION  Temp:  [96.6  F (35.9  C)-98.8  F (37.1  C)] 97.9  F (36.6  C)  Pulse:  [] 77  Resp:  [14-25] 14  BP: ()/() 127/69  FiO2 (%):  [30 %] 30 %  SpO2:  [93 %-100 %] 98 %      Neurologic  Mental Status:   lethargic, and easily arousable, very responsive to her name; intubated & on versed + tramadol PRN, difficult to assess mental status  Cranial Nerves:   pupils constricted 2 mm but reactive to light, EOM intact with no gaze palsy or nystagmus, facial movement symmetric, tongue midline. Unable to assess sensation. No shoulder shrug  Motor:   Not able to move limbs against gravity. Very weak  strength. Able to flex and extend toes. B/l UE and LE 1/5. Good passive ROM.   Reflexes:   See below      Left Right   Triceps 0 0   Biceps 0 0   Brachioradialis 2+ 1+   Adductor 0 0   Patellar 0 0   Achilles 0 0   Babinski Absent Absent   Sensory:   unable to fully assess d/t patient's current state ; will grimace to noxious  stimuli  Coordination:   unable to assess d/t patient's current state  Station/Gait:  deferred      Imaging  I personally reviewed all imaging; relevant findings per HPI.     Lab Results Data   CBC  Recent Labs   Lab 03/12/24 0422 03/11/24 0436 03/09/24  0604   WBC 11.7* 16.8* 17.3*   RBC 3.38* 3.78* 4.33   HGB 9.8* 10.7* 12.3   HCT 30.6* 33.9* 38.3    345 393     Basic Metabolic Panel    Recent Labs   Lab 03/12/24 0422 03/12/24  0313 03/11/24  2104 03/11/24  1224 03/11/24  1007 03/11/24  0836 03/11/24  0436 03/10/24  0904 03/10/24  0753   *  --   --   --   --   --  143  --  140   POTASSIUM 2.9*  --   --   --  4.0  --  3.3*  --  4.7   CHLORIDE 114*  --   --   --   --   --  108*  --  103   CO2 25  --   --   --   --   --  23  --  25   BUN 14.2  --   --   --   --   --  13.6  --  13.8   CR 0.58  --   --   --   --   --  0.64  --  0.67   * 114* 91   < >  --    < > 98   < > 119*   YEE 7.7*  --   --   --   --   --  8.5*  --  9.2    < > = values in this interval not displayed.     Liver Panel  Recent Labs   Lab 03/12/24 0422 03/11/24 0436 03/09/24  0604   PROTTOTAL 4.9* 5.4* 7.1   ALBUMIN 3.4* 4.1 3.3*   BILITOTAL 0.5 0.8 0.4   ALKPHOS 37* 33* 92   AST 14 17 32   ALT 6 6 13     INR    Recent Labs   Lab Test 05/02/23  0650 01/25/23  0719 10/15/22  0728   INR 1.02 1.06 1.23*      Lipid Profile    Recent Labs   Lab Test 07/07/22  1448 07/05/21  1352 07/03/19  0927   CHOL 241* 220* 213*   HDL 78 66 64   * 130* 118*   TRIG 119 122 156*     A1C    Recent Labs   Lab Test 03/11/24  0436   A1C 5.2     Troponin    Recent Labs   Lab 03/08/24  1111   CTROPT 585*          Data

## 2024-03-12 NOTE — PLAN OF CARE
Goal Outcome Evaluation:      Plan of Care Reviewed With: patient    Overall Patient Progress: no changeOverall Patient Progress: no change    Outcome Evaluation: Pt  RASS +1 to -2 on versed. alert/ drowsey but can follow commands. wiggles toes and wiggles fingers. raises eyebrows and blinks forcefully to communicate yes. Oxy and tradamol given for pain. SR/SB. Antonio to keep MAP >65. vented 30% and PEEP 5. minimal secretions. TF started at 1600. no BM but BM regimen started. quiroz for rentention. adequate output. skin abrasions on coccyx and elbows. Started transition from Antonio to Levo d/t HR in the high 40s.      Problem: Risk for Delirium  Goal: Optimal Coping  Outcome: Not Progressing  Intervention: Optimize Psychosocial Adjustment to Delirium  Recent Flowsheet Documentation  Taken 3/12/2024 0400 by Harry Snell RN  Supportive Measures:   relaxation techniques promoted   positive reinforcement provided  Taken 3/12/2024 0200 by Harry Snell RN  Supportive Measures:   relaxation techniques promoted   positive reinforcement provided  Taken 3/12/2024 0000 by Harry Snell RN  Supportive Measures:   relaxation techniques promoted   positive reinforcement provided  Taken 3/11/2024 2200 by Harry Snell RN  Supportive Measures:   relaxation techniques promoted   positive reinforcement provided  Taken 3/11/2024 2000 by Harry Snell RN  Supportive Measures:   relaxation techniques promoted   positive reinforcement provided  Family/Support System Care: caregiver stress acknowledged  Goal: Improved Behavioral Control  Outcome: Not Progressing  Intervention: Prevent and Manage Agitation  Recent Flowsheet Documentation  Taken 3/12/2024 0400 by Harry Snell RN  Environment Familiarity/Consistency:   daily routine followed   familiar objects from home provided  Taken 3/12/2024 0200 by Harry Snell RN  Environment Familiarity/Consistency:   daily routine followed   familiar objects from home provided  Taken  3/12/2024 0000 by Harry Snell RN  Environment Familiarity/Consistency:   daily routine followed   familiar objects from home provided  Taken 3/11/2024 2200 by Harry Snell RN  Environment Familiarity/Consistency:   daily routine followed   familiar objects from home provided  Taken 3/11/2024 2000 by Harry Snell RN  Environment Familiarity/Consistency:   daily routine followed   familiar objects from home provided  Intervention: Minimize Safety Risk  Recent Flowsheet Documentation  Taken 3/12/2024 0400 by Harry Snell RN  Communication Enhancement Strategies:   extra time allowed for response   one-step directions provided  Enhanced Safety Measures: pain management  Trust Relationship/Rapport:   care explained   reassurance provided  Taken 3/12/2024 0200 by Harry Snell RN  Communication Enhancement Strategies:   extra time allowed for response   one-step directions provided  Enhanced Safety Measures: pain management  Trust Relationship/Rapport:   care explained   reassurance provided  Taken 3/12/2024 0000 by Harry Snell RN  Communication Enhancement Strategies:   extra time allowed for response   one-step directions provided  Enhanced Safety Measures: pain management  Trust Relationship/Rapport:   care explained   reassurance provided  Taken 3/11/2024 2200 by Harry Snell RN  Communication Enhancement Strategies:   extra time allowed for response   one-step directions provided  Enhanced Safety Measures: pain management  Trust Relationship/Rapport:   care explained   reassurance provided  Taken 3/11/2024 2000 by Harry Snell RN  Communication Enhancement Strategies:   extra time allowed for response   one-step directions provided  Enhanced Safety Measures: pain management  Trust Relationship/Rapport:   care explained   reassurance provided  Goal: Improved Attention and Thought Clarity  Outcome: Not Progressing  Intervention: Maximize Cognitive Function  Recent Flowsheet Documentation  Taken  3/12/2024 0400 by Harry Snell RN  Sensory Stimulation Regulation:   lighting decreased   care clustered  Reorientation Measures:   calendar in view   clock in view   family pictures in room  Taken 3/12/2024 0200 by Harry Snell RN  Sensory Stimulation Regulation:   lighting decreased   care clustered  Reorientation Measures:   calendar in view   clock in view   family pictures in room  Taken 3/12/2024 0000 by Harry Snell RN  Sensory Stimulation Regulation:   lighting decreased   care clustered  Reorientation Measures:   calendar in view   clock in view   family pictures in room  Taken 3/11/2024 2200 by Harry Snell RN  Sensory Stimulation Regulation:   lighting decreased   care clustered  Reorientation Measures:   calendar in view   clock in view   family pictures in room  Taken 3/11/2024 2000 by Harry Snell RN  Sensory Stimulation Regulation:   lighting decreased   care clustered  Reorientation Measures:   calendar in view   clock in view   family pictures in room  Goal: Improved Sleep  Outcome: Not Progressing  Intervention: Promote Sleep  Recent Flowsheet Documentation  Taken 3/12/2024 0400 by Harry Snell RN  Sleep/Rest Enhancement: awakenings minimized  Taken 3/12/2024 0200 by Harry Snell RN  Sleep/Rest Enhancement: awakenings minimized  Taken 3/12/2024 0000 by Harry Snell RN  Sleep/Rest Enhancement: awakenings minimized  Taken 3/11/2024 2200 by Harry Snell RN  Sleep/Rest Enhancement: awakenings minimized  Taken 3/11/2024 2000 by Harry Snell RN  Sleep/Rest Enhancement: awakenings minimized

## 2024-03-12 NOTE — PROGRESS NOTES
[facility] ICU RESPIRATORY NOTE        Date of Admission: 3/8/2024    Date of Intubation (most recent): 3/10/2024    Reason for Mechanical Ventilation: AW protection    Number of Days on Mechanical Ventilation: 3    Met Criteria for Spontaneous Breathing Trial: No    Reason for No Spontaneous Breathing Trial: Per MD    Significant Events Today: None    ABG Results:   Recent Labs   Lab 03/10/24  1142 03/10/24  0753   PH 7.43  --    PCO2 42  --    PO2 214*  --    HCO3 28  --    O2PER 60 25         Current Vent Settings: Vent Mode: CMV/AC  (Continuous Mandatory Ventilation/ Assist Control)  FiO2 (%): 30 %  Resp Rate (Set): 14 breaths/min  Tidal Volume (Set, mL): 420 mL  PEEP (cm H2O): 5 cmH2O  Resp: 14        Tia Freeman, RT on 3/12/2024 at 6:10 AM

## 2024-03-12 NOTE — PROGRESS NOTES
Antimicrobial Stewardship Team Note    Antimicrobial Stewardship Program - A joint venture between [facility] and [facility] to optimize antibiotic management.     Patient: Yohana Marques  MRN: [id number]  Allergies: Patient has no known allergies.    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 PMH significant for HTN, osteoporosis, chronic pain.     She was empirically started on Zosyn for sepsis with infectious workup thus far negative. WBC count 18.8 on admission, 11.7 today. Remains intubated and afebrile this hospitalization. Blood cultures negative, sputum culture with no growth, CSF culture with no growth, meningitis/encephalitis panel negative.         Active Anti-infective Medications   (From admission, onward)                 Start     Stop    03/08/24 1800  piperacillin-tazobactam  4.5 g,   Intravenous,   EVERY 6 HOURS        Sepsis       --                  Assessment:   Patient is currently on day 5 of empiric Zosyn with no infectious source identified. Blood and sputum cultures negative and CSF culture with no growth to date. At this time, patient has completed adequate empiric course, consider stopping and monitoring off antibiotics.    Recommendations:  Stop Zosyn.    Discussed with ID Staff MD Lea Andrade, PharmD, BCIDP

## 2024-03-12 NOTE — PROGRESS NOTES
[facility] ICU RESPIRATORY NOTE        Date of Admission: 3/8/2024    Date of Intubation (most recent): 3/10/2024    Reason for Mechanical Ventilation: AW protection    Number of Days on Mechanical Ventilation: 3    Met Criteria for Spontaneous Breathing Trial: Yes - 5/5, 30% for 45min, discontinued d/t apnea ventilation    Significant Events Today: None    ABG Results:   Recent Labs   Lab 03/10/24  1142 03/10/24  0753   PH 7.43  --    PCO2 42  --    PO2 214*  --    HCO3 28  --    O2PER 60 25         Current Vent Settings: Vent Mode: CMV/AC  (Continuous Mandatory Ventilation/ Assist Control)  FiO2 (%): 30 %  Resp Rate (Set): 14 breaths/min  Tidal Volume (Set, mL): 420 mL  PEEP (cm H2O): 5 cmH2O  Pressure Support (cm H2O): 5 cmH2O  Resp: 14      Bimal Bahena, RT on 3/12/2024 at 5:50 PM

## 2024-03-12 NOTE — PLAN OF CARE
Goal Outcome Evaluation:      Plan of Care Reviewed With: patient, spouse, family, grandchild(natalia) (daughters X2)    Overall Patient Progress: no changeOverall Patient Progress: no change    Outcome Evaluation: pt drowsy but open eyes to voice, follows command, able to wiggle toes, not able to move upper extremities but nods yes to sensation. tolerating TF, BM large X1 today. quiroz with adequate UOP, pt PST 5/5 for 1 hour, stopped due apnea alarms. plasmapharesis done today, pt went to PAF with -160. amio drip restarted, 5mg IV dilt given. HR post plasmapheresis 1220-130 ST. MD notified, recieved order for 250ml LR bolus. weaning levo down now. family updated.      Problem: Guillain-Emerson  Syndrome  Goal: Optimal Functional Ability  Outcome: Not Progressing     Problem: Guillain-Emerson  Syndrome  Goal: Effective Communication  Outcome: Not Progressing     Problem: Guillain-Emerson  Syndrome  Goal: Balanced Sympathetic/Parasympathetic Function  Outcome: Not Progressing     Problem: Guillain-Emerson  Syndrome  Goal: Optimal Sensorimotor Function  Outcome: Not Progressing

## 2024-03-12 NOTE — PROGRESS NOTES
Developed PAF immediately after starting plasmapheresis, ICU MD Dr Andersen informed, pt with -150, -130. Order to restart amio drip at 0.5mg, see MAR for details

## 2024-03-12 NOTE — PROGRESS NOTES
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 On my interview she denies recent innoculation or viral illness symptoms, also denies common neoplasm review of symptoms including coughing, bloody sputum, constipation, diarrhea, bloody urine or stool.  She has had progressive global weakness which now seems to involve her respiratory muscles.  I discussed intubation and mechanical ventilation with her and her family and they were willing to proceed.        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   I have personally reviewed the daily labs, imaging studies, cultures and discussed the case with referring physician and consulting physicians.     My assessment and plan by system for this patient is as follows:    Neurology/Psychiatry:   1. 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  Hold of PLEX today per neuro. CSF sampling was done, patient had significant increase in the protein in the CSF, with only 3 WBCs (had significant RBCs,?  Traumatic), meningitis panel is negative.  Discussed with neurology, most likely GBS, but cannot rule out other potential etiologies, considering EMG at some point.    -Repeat CNS MRI as per neurology  -Will get Plex today  -PT/OT    2. Pain/analgesia:  prn dilaudid  3. Sedation:  Midazolam drip.  Unable to use Precedex due to the bradycardia and Propofol will be hazardous with the HFrEF. Unable to  provide sufficient sedation using prns.  Will try to continue to use low dose of midazolam.    Cardiovascular:   Takatsubo cardiomyopathy with chf exacerbation:  appreciate cardiology support.  Afib with RVR, now in NSR on amio, stop amiodarone.  Shock, concern for autonomic dysfunction  Hold ACEI, BB and diuretics for now due to the shock   Continue the Amio gtt  Wean vasopressors as tolerated, added norepinephrine and would like to minimize using the phenylephrine.      Pulmonary/Ventilator Management:   Acute hypercapnic respiratory failure, requiring mechanical ventilation:  worsening hypercapnia off mechanical support.  Inappropriate for long bipap trial due to airway protection needs.     -Continue pressure support trial daily  -Down the road if slow recovery improvement might need tracheostomy      GI and Nutrition :   1. RD consult, on TF  2.  PPI for pud prophy    Renal/Fluids/Electrolytes:   1. Creat 0.58 ok  2.  Hypokalemia  3.  Hypernatremia    -Monitor urine output and I&O, avoid nephrotoxic medications.  -Replace electrolytes per protocol  -Free water boluses      Infectious Disease:   1. Empirically on zosyn.  Cultures are negative so far.  Will stop the Zosyn    Endocrine:   1. BS control per ICU protocol     Hematology/Oncology:   1. Leukocytosis to 11.7 ?reactive  2. Anemia, Hb 9.8, no signs for blood loss, monitor   3. Pltlts 315 ok    ICU Prophylaxis:   1. DVT: mechanical; subcu heparin  2. VAP: HOB 30 degrees, chlorhexidine rinse  3. Stress Ulcer: PPI  4. Restraints: Nonviolent soft two point restraints required and necessary for patient safety and continued cares and good effect as patient continues to pull at necessary lines, tubes despite education and distraction. Will readdress daily.   5. Wound care  -   6. Feeding -tube feed  7. Family Update:  at bedside  8. Disposition - icu    Clinically Significant Risk Factors        # Hypokalemia: Lowest K = 2.9 mmol/L in last 2 days, will  "replace as needed  # Hypernatremia: Highest Na = 148 mmol/L in last 2 days, will monitor as appropriate  # Hypocalcemia: Lowest Ca = 7.7 mg/dL in last 2 days, will monitor and replace as appropriate     # Hypoalbuminemia: Lowest albumin = 3.3 g/dL at 3/9/2024  6:04 AM, will monitor as appropriate     # Hypertension: Noted on problem list        # Obesity: Estimated body mass index is 31.88 kg/m  as calculated from the following:    Height as of this encounter: 1.727 m (5' 8\").    Weight as of this encounter: 95.1 kg (209 lb 10.5 oz)., PRESENT ON ADMISSION     # Financial/Environmental Concerns: none                    Critical Care Time: 40 min.  I spent this time (excluding procedures) personally providing and directing critical care services at the bedside and on the critical care unit.     Maria Fernanda Tran MD   Pulmonary and Critical Care                 Key Medications:      albumin human  3,000 mL Apheresis Once    anticoagulant citrate  500-2,000 mL Apheresis Once    aspirin  81 mg Oral or Feeding Tube Daily    calcium gluconate 4 g in sodium chloride 0.9 % 140 mL  4 g Intravenous Once    chlorhexidine  15 mL Mouth/Throat Q12H    heparin ANTICOAGULANT  5,000 Units Subcutaneous Q8H    [Held by provider] metoprolol succinate ER  50 mg Oral Daily    pantoprazole  40 mg Per Feeding Tube QAM AC    Or    pantoprazole  40 mg Intravenous QAM AC    piperacillin-tazobactam  4.5 g Intravenous Q6H    potassium chloride  20 mEq Oral or Feeding Tube Once    sennosides  8.6 mg Oral BID    sodium chloride (PF)  3 mL Intracatheter Q8H    sodium phosphate  15 mmol Intravenous Once      amiodarone 0.5 mg/min (03/12/24 0635)    dextrose      midazolam 2 mg/hr (03/11/24 2055)    - MEDICATION INSTRUCTIONS -      norepinephrine 0.01 mcg/kg/min (03/12/24 0615)    - MEDICATION INSTRUCTIONS -      phenylephrine 0.2 mcg/kg/min (03/12/24 0615)    sodium chloride 10 mL/hr at 03/11/24 2151              Physical Examination:   Temp:  [96.6  F " (35.9  C)-98.8  F (37.1  C)] 97.5  F (36.4  C)  Pulse:  [] 64  Resp:  [14-25] 14  BP: ()/() 80/46  FiO2 (%):  [30 %] 30 %  SpO2:  [95 %-100 %] 98 %        Intake/Output Summary (Last 24 hours) at 3/12/2024 1335  Last data filed at 3/12/2024 1200  Gross per 24 hour   Intake 2364.89 ml   Output 975 ml   Net 1389.89 ml         Wt Readings from Last 4 Encounters:   03/12/24 95.1 kg (209 lb 10.5 oz)   07/12/23 93 kg (205 lb)   05/02/23 92.4 kg (203 lb 12.8 oz)   04/28/23 92.4 kg (203 lb 12.8 oz)     BP - Mean:  [] 57  Vent Mode: CMV/AC  (Continuous Mandatory Ventilation/ Assist Control)  FiO2 (%): 30 %  Resp Rate (Set): 14 breaths/min  Tidal Volume (Set, mL): 420 mL  PEEP (cm H2O): 5 cmH2O  Resp: 14    Recent Labs   Lab 03/10/24  1142 03/10/24  0753   PH 7.43  --    PCO2 42  --    PO2 214*  --    HCO3 28  --    O2PER 60 25       GEN: no acute distress  HEENT: head ncat, sclera anicteric, OP patent, trachea midline   PULM: unlabored synchronous with vent, clear anteriorly    CV/COR: RRR S1S2 no gallop,  No rub, no murmur  ABD: soft nontender  EXT:  warm and well perfused x4  NEURO: PERRL, patient follows commands, significant weakness in the upper and lower extremities, better exam today compared to yesterday.  SKIN: no obvious rash  LINES: clean, dry intact         Data:   All data and imaging reviewed     ROUTINE ICU LABS (Last four results)  CMP  Recent Labs   Lab 03/12/24  0755 03/12/24  0422 03/12/24  0313 03/11/24  2104 03/11/24  1622 03/11/24  1621 03/11/24  1224 03/11/24  1007 03/11/24  0836 03/11/24  0436 03/10/24  0904 03/10/24  0753 03/09/24 0604 03/08/24  1111   NA  --  148*  --   --   --   --   --   --   --  143  --  140 136 140   POTASSIUM  --  2.9*  --   --   --   --   --  4.0  --  3.3*  --  4.7 4.2 4.2   CHLORIDE  --  114*  --   --   --   --   --   --   --  108*  --  103 102 100   CO2  --  25  --   --   --   --   --   --   --  23  --  25 21* 23   ANIONGAP  --  9  --   --   --   --   " --   --   --  12  --  12 13 17*   * 107* 114* 91   < >  --    < >  --    < > 98   < > 119* 120* 100*   BUN  --  14.2  --   --   --   --   --   --   --  13.6  --  13.8 11.5 9.5   CR  --  0.58  --   --   --   --   --   --   --  0.64  --  0.67 0.70 0.75   GFRESTIMATED  --  >90  --   --   --   --   --   --   --  >90  --  90 89 82   YEE  --  7.7*  --   --   --   --   --   --   --  8.5*  --  9.2 9.0 9.5   MAG  --  1.9  --   --   --   --   --   --   --  1.8  --   --   --   --    PHOS  --  2.0*  --   --   --  2.8  --   --   --  1.8*  --   --   --   --    PROTTOTAL  --  4.9*  --   --   --   --   --   --   --  5.4*  --   --  7.1 7.4   ALBUMIN  --  3.4*  --   --   --   --   --   --   --  4.1  --   --  3.3* 3.6   BILITOTAL  --  0.5  --   --   --   --   --   --   --  0.8  --   --  0.4 0.5   ALKPHOS  --  37*  --   --   --   --   --   --   --  33*  --   --  92 103   AST  --  14  --   --   --   --   --   --   --  17  --   --  32 31   ALT  --  6  --   --   --   --   --   --   --  6  --   --  13 13    < > = values in this interval not displayed.     CBC  Recent Labs   Lab 03/12/24 0422 03/11/24  0436 03/09/24  0604 03/08/24  1111   WBC 11.7* 16.8* 17.3* 18.8*   RBC 3.38* 3.78* 4.33 4.51   HGB 9.8* 10.7* 12.3 13.1   HCT 30.6* 33.9* 38.3 39.8   MCV 91 90 89 88   MCH 29.0 28.3 28.4 29.0   MCHC 32.0 31.6 32.1 32.9   RDW 16.7* 16.1* 15.8* 15.2*    345 393 352     INRNo lab results found in last 7 days.  Arterial Blood Gas  Recent Labs   Lab 03/10/24  1142 03/10/24  0753   PH 7.43  --    PCO2 42  --    PO2 214*  --    HCO3 28  --    O2PER 60 25       All cultures:  No results for input(s): \"CULT\" in the last 168 hours.  Recent Results (from the past 24 hour(s))   MR Brain w/o & w Contrast    Narrative    EXAM: MR BRAIN W/O AND W CONTRAST  LOCATION: [facility]  DATE: 3/9/2024    INDICATION: Diffuse weakness.  COMPARISON: CT head dated 03/08/2024.  CONTRAST: 10 mL Gadavist.  TECHNIQUE: Routine " multiplanar multisequence head MRI without and with intravenous contrast.    FINDINGS:  INTRACRANIAL CONTENTS: No acute or subacute infarct. No mass, acute hemorrhage, or extra-axial fluid collections. Patchy nonspecific T2/FLAIR hyperintensities within the cerebral white matter, left greater than right, most consistent with mild to   moderate chronic microvascular ischemic change. Mild generalized cerebral atrophy. No hydrocephalus. Normal position of the cerebellar tonsils. No pathologic contrast enhancement.    SELLA: No abnormality accounting for technique.    OSSEOUS STRUCTURES/SOFT TISSUES: Normal marrow signal. The major intracranial vascular flow voids are maintained.     ORBITS: Prior bilateral cataract surgery. Visualized portions of the orbits are otherwise unremarkable.     SINUSES/MASTOIDS: No paranasal sinus mucosal disease. Partial right mastoid effusion.      Impression    IMPRESSION:  1.  No acute intracranial process.  2.  Generalized brain atrophy and presumed microvascular ischemic changes as detailed above.   XR Chest Port 1 View    Narrative    EXAM: XR CHEST PORT 1 VIEW  LOCATION: [facility]  DATE: 3/10/2024    INDICATION: ET tube placement, OG placement, dialysis port,  COMPARISON: 03/08/2024      Impression    IMPRESSION: Intubation. The endotracheal tube tip is 4 cm above the milan. Right IJ dialysis catheter has been placed with tip at the SVC/right atrial junction. NG tube is in the stomach. No pneumothorax or pleural effusion. Mild pulmonary edema. Normal   heart size.         Billing: This patient is critically ill: Yes. Total critical care time today 60 min, excluding procedures.       Past Medical/surgical hx, meds, allergies, social history, family history     Past Medical History:   Diagnosis Date    HTN (hypertension)     Hyperlipidemia     Leiomyoma of uterus, unspecified     Numbness and tingling     spinal stenosis causes numbness and tingling on feet  and knees bilaterally    Osteoarthrosis, unspecified whether generalized or localized, unspecified site     Other and unspecified disc disorder of lumbar region     Other chronic pain     Primary osteoarthritis of left hip 12/18/2016    Spinal stenosis     S/P diskectomy x 2, most recently 8/2014     Past Surgical History:   Procedure Laterality Date    ARTHROPLASTY HIP Left 12/12/2016    Procedure: ARTHROPLASTY HIP;  Surgeon: Leonid Morfin MD;  Location:  OR    BACK SURGERY      COLONOSCOPY N/A 07/20/2017    Procedure: COMBINED COLONOSCOPY, SINGLE OR MULTIPLE BIOPSY/POLYPECTOMY BY BIOPSY;  colonoscopy;  Surgeon: Catarino Salas MD;  Location:  GI    CV CORONARY ANGIOGRAM N/A 3/8/2024    Procedure: Coronary Angiogram;  Surgeon: Alicia Dai MD;  Location:  HEART CARDIAC CATH LAB    DISCECTOMY LUMBAR POSTERIOR MICROSCOPIC TWO LEVELS  08/11/2014    Procedure: DISCECTOMY LUMBAR POSTERIOR MICROSCOPIC TWO LEVELS;  Surgeon: Warren Herring MD;  Location:  OR    ENDOSCOPIC RETROGRADE CHOLANGIOPANCREATOGRAM N/A 10/14/2022    Procedure: ENDOSCOPIC RETROGRADE CHOLANGIOPANCREATOGRAPHY with biliary spincterotomy, biliary stent placement, cholangioscopy;  Surgeon: Carson Franklin MD;  Location: [facility] OR    ENDOSCOPIC RETROGRADE CHOLANGIOPANCREATOGRAM N/A 5/2/2023    Procedure: ENDOSCOPIC RETROGRADE CHOLANGIOPANCREATOGRAPHY with biliary stone and stent removal;  Surgeon: Carson Franklin MD;  Location: [facility] OR    ENDOSCOPIC RETROGRADE CHOLANGIOPANCREATOGRAPHY, EXCHANGE TUBE/STENT N/A 1/25/2023    Procedure: ENDOSCOPIC RETROGRADE CHOLANGIOPANCREATOGRAPHY WITH BILIARY STENT EXCHANGE AND DEBRIS REMOVAL;  Surgeon: Carson Frnaklin MD;  [facility]    EXPLORE COMMON BILE DUCT N/A 12/01/2022    Procedure: COMMON BILE DUCT REPAIR;  Surgeon: Thai Garcia MD;  Location: [facility] OR    LAPAROSCOPIC CHOLECYSTECTOMY WITH CHOLANGIOGRAMS N/A 12/01/2022    Procedure: EXPLORATORY  LAPAROSCOPY, LAPAROSCOPIC PARTIAL CHOLECYSTECTOMY WITH RETREVIAL OF STONES WITH CHOLANGIOGRAM; choledochoscopy, INTRAOPERATIVE ULTRASOUND;  Surgeon: Thai Garcia MD;  Location: [facility] OR    LAPAROSCOPY DIAGNOSTIC (GENERAL) N/A 10/06/2022    Procedure: Diagnostic laparoscopy with liver biopsy;  Surgeon: Warren Zhang MD;  Location:  OR    ORTHOPEDIC SURGERY      TOTAL KNEE ARTHROPLASTY Left     [facility] NONSPECIFIC PROCEDURE  1996    lumbar discectomy    [facility] NONSPECIFIC PROCEDURE      cervical cone biopsy    [facility] NONSPECIFIC PROCEDURE      Left knee replacement     No current facility-administered medications on file prior to encounter.  acetaminophen (TYLENOL) 325 MG tablet, Take 2 tablets (650 mg) by mouth every 8 hours as needed for mild pain  aspirin 81 MG EC tablet, Take 81 mg by mouth daily  calcium-vitamin D (CALTRATE) 600-400 MG-UNIT per tablet, Take 1 tablet by mouth 2 times daily  celecoxib (CELEBREX) 200 MG capsule, Take 1 capsule (200 mg) by mouth daily  lisinopril (ZESTRIL) 10 MG tablet, Take 1 tablet (10 mg) by mouth daily  Multiple Vitamins-Minerals (CENTRUM SILVER) per tablet, Take 1 tablet by mouth daily  traMADol (ULTRAM) 50 MG tablet, TAKE 1 TO 2 TABLETS BY MOUTH EVERY DAY FOR SEVERE PAIN  vitamin D3 (CHOLECALCIFEROL) 50 mcg (2000 units) tablet, Take 1 tablet (50 mcg) by mouth daily       No Known Allergies  Social History     Socioeconomic History    Marital status:      Spouse name: Not on file    Number of children: Not on file    Years of education: Not on file    Highest education level: Not on file   Occupational History    Not on file   Tobacco Use    Smoking status: Never    Smokeless tobacco: Never   Vaping Use    Vaping Use: Never used   Substance and Sexual Activity    Alcohol use: Yes     Alcohol/week: 0.0 standard drinks of alcohol     Comment: social    Drug use: No    Sexual activity: Yes     Partners: Male   Other Topics Concern    Parent/sibling w/ CABG, MI or angioplasty  before 65F 55M? Not Asked   Social History Narrative    Not on file     Social Determinants of Health     Financial Resource Strain: Not on file   Food Insecurity: Not on file   Transportation Needs: Not on file   Physical Activity: Not on file   Stress: Not on file   Social Connections: Not on file   Interpersonal Safety: Not on file   Housing Stability: Not on file     Family History   Problem Relation Age of Onset    Family History Negative Mother          age 64 mva    Cancer Father          age 84 lung ca    Family History Negative Sister     Breast Cancer Paternal Grandmother     Genetic Disorder Other         daughter has Autoimmune disease

## 2024-03-13 NOTE — PROGRESS NOTES
[facility] LTACH    On 3/13 received referral from Nery SPIVEY, Care Manager.  I reviewed the chart for potential admission to [facility] pending clinical readiness and bed availability.  Once the patient is off of sedation infusions and has trach/ PEG and is able to demonstrate successful weaning trials, we anticipate her to be clinically appropriate for [facility]. She will require prior authorization for LTACH admission from her insurance (I will do this).     Will continue to follow for appropriateness and bed availability.    Melba Kirkland, PT  LTACH Referral Specialist    [facility]    [address] [place] [id number]  Admissions Office: [phone]  Fax: [phone]     CONFIDENTIAL Protected under [place] Statute  145.61 et seq

## 2024-03-13 NOTE — PROGRESS NOTES
[facility] ICU RESPIRATORY NOTE        Date of Admission: 3/8/2024    Date of Intubation (most recent): 3/10/24    Reason for Mechanical Ventilation: airway protection    Number of Days on Mechanical Ventilation: 4    Met Criteria for Spontaneous Breathing Trial: Yes - 30 %    Spontaneous Breathing Trial: during the day    Significant Events Today: none over night    ABG Results:   Recent Labs   Lab 03/10/24  1142 03/10/24  0753   PH 7.43  --    PCO2 42  --    PO2 214*  --    HCO3 28  --    O2PER 60 25         Current Vent Settings: Vent Mode: CMV/AC  (Continuous Mandatory Ventilation/ Assist Control)  FiO2 (%): 30 %  Resp Rate (Set): 14 breaths/min  Tidal Volume (Set, mL): 420 mL  PEEP (cm H2O): 5 cmH2O  Pressure Support (cm H2O): 5 cmH2O  Resp: 14      Skin Assessment: intact     Plan: follow care plan    Herb Sanchez, RT on 3/13/2024 at 4:31 AM

## 2024-03-13 NOTE — PROGRESS NOTES
[facility] ICU RESPIRATORY NOTE        Date of Admission: 3/8/2024    Date of Intubation (most recent):3/10/24    Reason for Mechanical Ventilation: Airway protection    Number of Days on Mechanical Ventilation: 4    Met Criteria for Spontaneous Breathing Trial: Yes    Significant Events Today: Pt was on PS 5/5 for about 30 minutes this morning. Pt was placed back on CMV settings due to low RR with some apnea.    ABG Results:   Recent Labs   Lab 03/10/24  1142 03/10/24  0753   PH 7.43  --    PCO2 42  --    PO2 214*  --    HCO3 28  --    O2PER 60 25         Current Vent Settings: Vent Mode: CMV/AC  (Continuous Mandatory Ventilation/ Assist Control)  FiO2 (%): 30 %  Resp Rate (Set): 14 breaths/min  Tidal Volume (Set, mL): 420 mL  PEEP (cm H2O): 5 cmH2O  Pressure Support (cm H2O): 5 cmH2O  Resp: 15      Skin Assessment: Intact    Plan: Will cont full vent support for now and will assess for weaning readiness daily.    Rose Aldrich, RT on 3/13/2024// at 5:21 PM

## 2024-03-13 NOTE — PROGRESS NOTES
This note written by the medical student has been reviewed and has been addended as needed. I agree with the assessment and plan.      Liz Shen MD  Vascular Neurology Fellow        [facility]    Stroke Progress Note    Interval Events  - Off sedation, still on Antonio and amiodarone  - Follows commands, paraplegic  - A-fib after PLEX yesterday    HPI Summary  Yohana Marques is a 78 year old woman with h/o HTN, spinal stenosis s/p discectomy x 2 (most recent 8/2004), and chronic pain. Presented to the ED on 3/8 after a fall at home with 3-4 days of preceding mild generalized weakness (was using a walker, uses a cane at baseline). She reported getting up and feeling fine for a few steps and then she became dizzy and fell down. She denied LOC. No palpitations or any other sxms prior to the fall. No sick contacts, recent illness, recent vacations, or other exposures. She and her  do can their own food at their 2nd residence in [facility]. [place] has not had access to their home-canned items.     On admission was found to have new heart failure (per cardiology likely stress induced) with a significantly reduced EF (23% on echo 3/8) with no significant obstructive coronary artery disease on invasive coronary angiogram on 3/8/24. Started on metoprolol and losartan. Of note, she has urinary retention.      Ended up needing to be intubated on 3/10 due to labored, shallow breathing and evident respiratory acidosis on venous BG. Started urgent PLEX and started to have runs of a-fib throughout the day and overnight. Continues to fluctuate between hypotensive and hypertensive. Requiring pressors. Requiring electrolyte replacement for K+ and Phos. Goes into A-fib following PLEX treatment       Evaluation Summarized     MRI Brain w/wo contrast (3/9) No acute intracranial process. Chronic periventricular microvascular disease (L > R)    MRI Spine (3/10) Cervical Spine:  Degenerative foraminal narrowing of C3-C7.   Thoracic Spine:  Chronic superior endplate T3 compression  Lumbar Spine:  Progressive lumbar spondylosis in L2-L4 with multiple levels of increased foraminal narrowing   CT Chest/Abdomen/Pelvis No evidence of metastasis or neoplastic processes    Submucosal fibroid vs nonmalignant endometrial polyp      Stable 4 mm left upper lobe nodule.      Echocardiogram 3/8        Coronary cath 3/8 EF is 23%.Entire left ventricle is severely hypokinetic but basal left ventricular function is less so     No significant CAD   EKG/Telemetry 3/10    Tachycardic (147 bpm), Atrial fibrillation w/ RVR   Left axis deviation, Anterior infarct, age undertermined  T wave abnormality   CSF Labs Mild proteinuria  Encephalitis/meningitis panel negative   Labs MG eval: pending  Ganglioside Antibodies: pending  Urine porphyrins: pending  AZALEA: pending  ANCA: pending  HIV-1, HIV-2, HIV-1 p24 antigen nonreactive       Impression   - Acute onset flaccid quadriparesis- improving  - Areflexia  - Hypercapnic respiratory failure  - New onset stress-induced HFrEF (EF 23% on 3/8)  - Urinary retention     MRI brain w/o contrast did not show any acute processes. MRI of total spine did not show any acute processes. CT CAP did not show any malignancy.  Infectious encephalitis/meningitis panel negative, PCR testing of CSF negative     Broad Ddx: Concern for Botulism, Guillain-[place] Syndrome vs anterior horn cell disease vs. NM junction defect (myasthenia gravis) vs. Myopathy, acute transverse myelitis, acute intermittent porphyria.     Plan  - PLEX 3/5 on 3/14 if clinically stable/ok per nephrology team  - Follow CSF analysis  - EMG -> [facility] tentative plan to come in and do on Friday 3/15 at 0930  - Repeat MRI brain/C/T/L spine scheduled for Thursday  - Will review for anticoagulation given A-fib and high-risk -> currently on ASA 81 mg  - Follow urine porphyrins, AZALEA, ANCA (MG  "antibody pending, nonreactive for HIV-1, HIV-2, or HIV-1 p24 antigen)  - CTM I/Os - has urinary retention     DVT prophylaxis: heparin 5000 units subcutaneous injection q8 hrs + mechanical prophylaxis with pneumo boots.     Patient Follow-up    - final recommendation pending work-up    We will continue to follow.     The Stroke Fellow is Dr. Shen. The Stroke Staff is Dr. Duffy.    Summer Tellez, MS-3  Medical Student  To page a member of the stroke/neurocritical care service, click here:  [facility]   Choose \"On Call\" tab at top, then search dropdown box for \"Neurology Adult\", select location, press Enter, then look for stroke/neuro ICU/telestroke.  ______________________________________________________    Clinically Significant Risk Factors        # Hypokalemia: Lowest K = 2.9 mmol/L in last 2 days, will replace as needed  # Hypernatremia: Highest Na = 148 mmol/L in last 2 days, will monitor as appropriate  # Hypocalcemia: Lowest Ca = 7.7 mg/dL in last 2 days, will monitor and replace as appropriate     # Hypoalbuminemia: Lowest albumin = 3.3 g/dL at 3/9/2024  6:04 AM, will monitor as appropriate     # Hypertension: Noted on problem list        # Obesity: Estimated body mass index is 33.09 kg/m  as calculated from the following:    Height as of this encounter: 1.727 m (5' 8\").    Weight as of this encounter: 98.7 kg (217 lb 9.5 oz).        # Financial/Environmental Concerns: none           Medications   Scheduled Meds    aspirin  81 mg Oral or Feeding Tube Daily     chlorhexidine  15 mL Mouth/Throat Q12H     heparin ANTICOAGULANT  5,000 Units Subcutaneous Q8H     pantoprazole  40 mg Per Feeding Tube QAM AC    Or     pantoprazole  40 mg Intravenous QAM AC     sennosides  8.6 mg Oral BID     sodium chloride (PF)  3 mL Intracatheter Q8H     sodium phosphate  15 mmol Intravenous Q2H       Infusion Meds    amiodarone 0.5 mg/min (03/13/24 8210)     dextrose       midazolam Stopped (03/12/24 2775)     - MEDICATION " INSTRUCTIONS -       norepinephrine 0.04 mcg/kg/min (03/13/24 0408)     - MEDICATION INSTRUCTIONS -       phenylephrine Stopped (03/12/24 1309)     sodium chloride 20 mL/hr at 03/12/24 1600       PRN Meds  acetaminophen, albuterol, bisacodyl, calcium carbonate, artificial tears, dextrose, fentaNYL, HOLD MEDICATION, HOLD MEDICATION, HYDROmorphone, lidocaine 4%, lidocaine 4%, lidocaine (buffered or not buffered), lidocaine (buffered or not buffered), melatonin, metoprolol, midazolam, midazolam, naloxone **OR** naloxone **OR** naloxone **OR** naloxone, - MEDICATION INSTRUCTIONS -, ondansetron **OR** ondansetron, oxyCODONE **OR** oxyCODONE, - MEDICATION INSTRUCTIONS -, polyethylene glycol, senna-docusate **OR** senna-docusate, sodium chloride (PF), sodium chloride 0.9%, traMADol       PHYSICAL EXAMINATION  Temp:  [95  F (35  C)-98.2  F (36.8  C)] 97.7  F (36.5  C)  Pulse:  [] 105  Resp:  [9-29] 14  BP: ()/() 103/69  FiO2 (%):  [30 %] 30 %  SpO2:  [92 %-100 %] 99 %      Neurologic   Mental Status:   alert, follows commands   Cranial Nerves:   pupils constricted 2 mm but reactive to light, EOM intact with no gaze palsy or nystagmus, facial movement symmetric, tongue midline. Unable to assess sensation. No shoulder shrug  Motor:   Not able to move limbs against gravity. Very weak  strength. Able to flex and extend toes. B/l UE and LE 1/5. Good passive ROM.   Reflexes:   See below      Left Right   Triceps 0 0   Biceps 0 0   Brachioradialis 2+ 1+   Adductor 0 0   Patellar 0 0   Achilles 0 0   Babinski Absent Absent   Sensory:   unable to fully assess d/t patient's current state ; will grimace to noxious stimuli  Coordination:   unable to assess d/t patient's current state  Station/Gait:  deferred      Imaging  I personally reviewed all imaging; relevant findings per HPI.     Lab Results Data   CBC  Recent Labs   Lab 03/13/24  0445 03/12/24  0422 03/11/24  0436   WBC 13.3* 11.7* 16.8*   RBC 3.39* 3.38*  3.78*   HGB 10.1* 9.8* 10.7*   HCT 30.6* 30.6* 33.9*    315 345     Basic Metabolic Panel    Recent Labs   Lab 03/13/24  0453 03/13/24 0445 03/12/24  1944 03/12/24  1937 03/12/24  1252 03/12/24  1247 03/12/24  0755 03/12/24  0422   NA  --  145  --  148*  --   --   --  148*   POTASSIUM  --  3.9  3.9  --  3.0*  --  3.5  --  2.9*   CHLORIDE  --  113*  --  116*  --   --   --  114*   CO2  --  24  --  24  --   --   --  25   BUN  --  11.5  --  11.4  --   --   --  14.2   CR  --  0.47*  --  0.52  --   --   --  0.58   * 110* 101* 109*   < >  --    < > 107*   YEE  --  7.9*  --  7.7*  --   --   --  7.7*    < > = values in this interval not displayed.     Liver Panel  Recent Labs   Lab 03/13/24 0445 03/12/24  0422 03/11/24  0436   PROTTOTAL 4.9* 4.9* 5.4*   ALBUMIN 3.8 3.4* 4.1   BILITOTAL 0.6 0.5 0.8   ALKPHOS 24* 37* 33*   AST 15 14 17   ALT <5 6 6     INR    Recent Labs   Lab Test 05/02/23  0650 01/25/23  0719 10/15/22  0728   INR 1.02 1.06 1.23*      Lipid Profile    Recent Labs   Lab Test 07/07/22  1448 07/05/21  1352 07/03/19  0927   CHOL 241* 220* 213*   HDL 78 66 64   * 130* 118*   TRIG 119 122 156*     A1C    Recent Labs   Lab Test 03/11/24  0436   A1C 5.2     Troponin    Recent Labs   Lab 03/08/24  1111   CTROPT 585*          Data

## 2024-03-13 NOTE — PROGRESS NOTES
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 On my interview she denies recent innoculation or viral illness symptoms, also denies common neoplasm review of symptoms including coughing, bloody sputum, constipation, diarrhea, bloody urine or stool.  She has had progressive global weakness which now seems to involve her respiratory muscles.  I discussed intubation and mechanical ventilation with her and her family and they were willing to proceed.        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   I have personally reviewed the daily labs, imaging studies, cultures and discussed the case with referring physician and consulting physicians.     My assessment and plan by system for this patient is as follows:    Neurology/Psychiatry:   1. 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  Hold of PLEX today per neuro. CSF sampling was done, patient had significant increase in the protein in the CSF, with only 3 WBCs (had significant RBCs,?  Traumatic), meningitis panel is negative.  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 Unlikely hypokalemic paralysis (given normal electrolytes) or  snake envenomation (given lack of appropriate history/exposure).    -Repeat CNS MRI as per neurology and EMG on Friday   -Will get Plex tomorrow   -PT/OT    2. Pain/analgesia:  prn dilaudid  3. Sedation:  Start precedex gtt, stopped Versed gtt.    Cardiovascular:   Tak[place]ubo cardiomyopathy with chf exacerbation:  appreciate cardiology support.  Afib with RVR, now in NSR on amio  Shock, likely vasoplegic with concern for autonomic dysfunction  Hold ACEI, BB and diuretics for now due to the shock   Continue the Amio gtt  Phenylephrine if MAP < 65  Repeat limited TTE       Pulmonary/Ventilator Management:   Acute hypercapnic respiratory failure, requiring mechanical ventilation:  worsening hypercapnia off mechanical support.     -Continue pressure support trial daily  -Down the road if slow recovery improvement might need tracheostomy      GI and Nutrition :   1. RD consult, on TF  2.  PPI for pud prophy    Renal/Fluids/Electrolytes:   1. Creat 0.42 ok  2.  Hypokalemia  3.  Hypernatremia    -Monitor urine output and I&O, avoid nephrotoxic medications.  -Replace electrolytes per protocol  -Free water boluses      Infectious Disease:   1. Cultures are negative so far.  Off Abx.    Endocrine:   1. BS control per ICU protocol     Hematology/Oncology:   1. Leukocytosis to 13.3 ?reactive  2. Anemia, Hb 10.1, no signs for blood loss, monitor   3. Pltlts 310 ok    ICU Prophylaxis:   1. DVT: mechanical; subcu heparin  2. VAP: HOB 30 degrees, chlorhexidine rinse  3. Stress Ulcer: PPI  4. Restraints: Nonviolent soft two point restraints required and necessary for patient safety and continued cares and good effect as patient continues to pull at necessary lines, tubes despite education and distraction. Will readdress daily.   5. Wound care  -   6. Feeding -tube feed  7. Family Update: daughter at bedside  8. Disposition - icu    Clinically Significant Risk Factors        # Hypokalemia: Lowest K = 2.9 mmol/L in last 2 days, will  "replace as needed  # Hypernatremia: Highest Na = 148 mmol/L in last 2 days, will monitor as appropriate  # Hypocalcemia: Lowest Ca = 7.7 mg/dL in last 2 days, will monitor and replace as appropriate     # Hypoalbuminemia: Lowest albumin = 3.3 g/dL at 3/9/2024  6:04 AM, will monitor as appropriate     # Hypertension: Noted on problem list        # Obesity: Estimated body mass index is 33.09 kg/m  as calculated from the following:    Height as of this encounter: 1.727 m (5' 8\").    Weight as of this encounter: 98.7 kg (217 lb 9.5 oz).      # Financial/Environmental Concerns: none                    Critical Care Time: 40 min.  I spent this time (excluding procedures) personally providing and directing critical care services at the bedside and on the critical care unit.     Maria Fernanda Tran MD   Pulmonary and Critical Care                 Key Medications:      aspirin  81 mg Oral or Feeding Tube Daily    chlorhexidine  15 mL Mouth/Throat Q12H    heparin ANTICOAGULANT  5,000 Units Subcutaneous Q8H    pantoprazole  40 mg Per Feeding Tube QAM AC    Or    pantoprazole  40 mg Intravenous QAM AC    sennosides  8.6 mg Oral BID    sodium chloride (PF)  3 mL Intracatheter Q8H    sodium phosphate  15 mmol Intravenous Q2H      amiodarone 0.5 mg/min (03/13/24 0528)    dexmedeTOMIDine 0.2 mcg/kg/hr (03/13/24 1034)    dextrose      midazolam Stopped (03/12/24 1235)    - MEDICATION INSTRUCTIONS -      norepinephrine 0.01 mcg/kg/min (03/13/24 1125)    - MEDICATION INSTRUCTIONS -      phenylephrine Stopped (03/12/24 1309)    sodium chloride 10 mL/hr at 03/13/24 0700              Physical Examination:   Temp:  [95.5  F (35.3  C)-98.2  F (36.8  C)] 97.3  F (36.3  C)  Pulse:  [] 110  Resp:  [8-29] 14  BP: ()/() 82/52  FiO2 (%):  [30 %] 30 %  SpO2:  [92 %-100 %] 97 %      Intake/Output Summary (Last 24 hours) at 3/13/2024 1702  Last data filed at 3/13/2024 1000  Gross per 24 hour   Intake 2724.27 ml   Output 1125 ml   Net " 1599.27 ml         Wt Readings from Last 4 Encounters:   03/13/24 98.7 kg (217 lb 9.5 oz)   07/12/23 93 kg (205 lb)   05/02/23 92.4 kg (203 lb 12.8 oz)   04/28/23 92.4 kg (203 lb 12.8 oz)     BP - Mean:  [] 61  Vent Mode: CMV/AC  (Continuous Mandatory Ventilation/ Assist Control)  FiO2 (%): 30 %  Resp Rate (Set): 14 breaths/min  Tidal Volume (Set, mL): 420 mL  PEEP (cm H2O): 5 cmH2O  Pressure Support (cm H2O): 5 cmH2O  Resp: 14    Recent Labs   Lab 03/10/24  1142 03/10/24  0753   PH 7.43  --    PCO2 42  --    PO2 214*  --    HCO3 28  --    O2PER 60 25       GEN: no acute distress  HEENT: head ncat, sclera anicteric, OP patent, trachea midline   PULM: unlabored synchronous with vent, clear anteriorly    CV/COR: RRR S1S2 no gallop,  No rub, no murmur  ABD: soft nontender  EXT:  warm and well perfused x4  NEURO: PERRL, patient follows commands, significant weakness in the upper and lower extremities  SKIN: no obvious rash  LINES: clean, dry intact         Data:   All data and imaging reviewed     ROUTINE ICU LABS (Last four results)  CMP  Recent Labs   Lab 03/13/24  0929 03/13/24  0453 03/13/24  0445 03/12/24  1944 03/12/24  1937 03/12/24  1252 03/12/24  1247 03/12/24  0755 03/12/24  0422 03/11/24  1622 03/11/24  1621 03/11/24  0836 03/11/24  0436 03/10/24  0753 03/09/24  0604   NA  --   --  145  --  148*  --   --   --  148*  --   --   --  143   < > 136   POTASSIUM  --   --  3.9  3.9  --  3.0*  --  3.5  --  2.9*  --   --    < > 3.3*   < > 4.2   CHLORIDE  --   --  113*  --  116*  --   --   --  114*  --   --   --  108*   < > 102   CO2  --   --  24  --  24  --   --   --  25  --   --   --  23   < > 21*   ANIONGAP  --   --  8  --  8  --   --   --  9  --   --   --  12   < > 13   * 103* 110* 101* 109*   < >  --    < > 107*   < >  --    < > 98   < > 120*   BUN  --   --  11.5  --  11.4  --   --   --  14.2  --   --   --  13.6   < > 11.5   CR  --   --  0.47*  --  0.52  --   --   --  0.58  --   --   --  0.64   < >  "0.70   GFRESTIMATED  --   --  >90  --  >90  --   --   --  >90  --   --   --  >90   < > 89   YEE  --   --  7.9*  --  7.7*  --   --   --  7.7*  --   --   --  8.5*   < > 9.0   MAG  --   --  1.7  --   --   --   --   --  1.9  --   --   --  1.8  --   --    PHOS  --   --  1.8*  --   --   --   --   --  2.0*  --  2.8  --  1.8*  --   --    PROTTOTAL  --   --  4.9*  --   --   --   --   --  4.9*  --   --   --  5.4*  --  7.1   ALBUMIN  --   --  3.8  --   --   --   --   --  3.4*  --   --   --  4.1  --  3.3*   BILITOTAL  --   --  0.6  --   --   --   --   --  0.5  --   --   --  0.8  --  0.4   ALKPHOS  --   --  24*  --   --   --   --   --  37*  --   --   --  33*  --  92   AST  --   --  15  --   --   --   --   --  14  --   --   --  17  --  32   ALT  --   --  <5  --   --   --   --   --  6  --   --   --  6  --  13    < > = values in this interval not displayed.     CBC  Recent Labs   Lab 03/13/24  0445 03/12/24  0422 03/11/24  0436 03/09/24  0604   WBC 13.3* 11.7* 16.8* 17.3*   RBC 3.39* 3.38* 3.78* 4.33   HGB 10.1* 9.8* 10.7* 12.3   HCT 30.6* 30.6* 33.9* 38.3   MCV 90 91 90 89   MCH 29.8 29.0 28.3 28.4   MCHC 33.0 32.0 31.6 32.1   RDW 17.2* 16.7* 16.1* 15.8*    315 345 393     INRNo lab results found in last 7 days.  Arterial Blood Gas  Recent Labs   Lab 03/10/24  1142 03/10/24  0753   PH 7.43  --    PCO2 42  --    PO2 214*  --    HCO3 28  --    O2PER 60 25       All cultures:  No results for input(s): \"CULT\" in the last 168 hours.  Recent Results (from the past 24 hour(s))   MR Brain w/o & w Contrast    Narrative    EXAM: MR BRAIN W/O AND W CONTRAST  LOCATION: [facility]  DATE: 3/9/2024    INDICATION: Diffuse weakness.  COMPARISON: CT head dated 03/08/2024.  CONTRAST: 10 mL Gadavist.  TECHNIQUE: Routine multiplanar multisequence head MRI without and with intravenous contrast.    FINDINGS:  INTRACRANIAL CONTENTS: No acute or subacute infarct. No mass, acute hemorrhage, or extra-axial fluid collections. " Patchy nonspecific T2/FLAIR hyperintensities within the cerebral white matter, left greater than right, most consistent with mild to   moderate chronic microvascular ischemic change. Mild generalized cerebral atrophy. No hydrocephalus. Normal position of the cerebellar tonsils. No pathologic contrast enhancement.    SELLA: No abnormality accounting for technique.    OSSEOUS STRUCTURES/SOFT TISSUES: Normal marrow signal. The major intracranial vascular flow voids are maintained.     ORBITS: Prior bilateral cataract surgery. Visualized portions of the orbits are otherwise unremarkable.     SINUSES/MASTOIDS: No paranasal sinus mucosal disease. Partial right mastoid effusion.      Impression    IMPRESSION:  1.  No acute intracranial process.  2.  Generalized brain atrophy and presumed microvascular ischemic changes as detailed above.   XR Chest Port 1 View    Narrative    EXAM: XR CHEST PORT 1 VIEW  LOCATION: [facility]  DATE: 3/10/2024    INDICATION: ET tube placement, OG placement, dialysis port,  COMPARISON: 03/08/2024      Impression    IMPRESSION: Intubation. The endotracheal tube tip is 4 cm above the milan. Right IJ dialysis catheter has been placed with tip at the SVC/right atrial junction. NG tube is in the stomach. No pneumothorax or pleural effusion. Mild pulmonary edema. Normal   heart size.         Billing: This patient is critically ill: Yes. Total critical care time today 60 min, excluding procedures.       Past Medical/surgical hx, meds, allergies, social history, family history     Past Medical History:   Diagnosis Date    HTN (hypertension)     Hyperlipidemia     Leiomyoma of uterus, unspecified     Numbness and tingling     spinal stenosis causes numbness and tingling on feet and knees bilaterally    Osteoarthrosis, unspecified whether generalized or localized, unspecified site     Other and unspecified disc disorder of lumbar region     Other chronic pain     Primary  osteoarthritis of left hip 12/18/2016    Spinal stenosis     S/P diskectomy x 2, most recently 8/2014     Past Surgical History:   Procedure Laterality Date    ARTHROPLASTY HIP Left 12/12/2016    Procedure: ARTHROPLASTY HIP;  Surgeon: Leonid Morfin MD;  Location:  OR    BACK SURGERY      COLONOSCOPY N/A 07/20/2017    Procedure: COMBINED COLONOSCOPY, SINGLE OR MULTIPLE BIOPSY/POLYPECTOMY BY BIOPSY;  colonoscopy;  Surgeon: Catarino Salas MD;  Location:  GI    CV CORONARY ANGIOGRAM N/A 3/8/2024    Procedure: Coronary Angiogram;  Surgeon: Alicia Dai MD;  Location:  HEART CARDIAC CATH LAB    DISCECTOMY LUMBAR POSTERIOR MICROSCOPIC TWO LEVELS  08/11/2014    Procedure: DISCECTOMY LUMBAR POSTERIOR MICROSCOPIC TWO LEVELS;  Surgeon: Warren Herring MD;  Location:  OR    ENDOSCOPIC RETROGRADE CHOLANGIOPANCREATOGRAM N/A 10/14/2022    Procedure: ENDOSCOPIC RETROGRADE CHOLANGIOPANCREATOGRAPHY with biliary spincterotomy, biliary stent placement, cholangioscopy;  Surgeon: Carson Franklin MD;  Location: [facility] OR    ENDOSCOPIC RETROGRADE CHOLANGIOPANCREATOGRAM N/A 5/2/2023    Procedure: ENDOSCOPIC RETROGRADE CHOLANGIOPANCREATOGRAPHY with biliary stone and stent removal;  Surgeon: Carson Franklin MD;  Location: [facility] OR    ENDOSCOPIC RETROGRADE CHOLANGIOPANCREATOGRAPHY, EXCHANGE TUBE/STENT N/A 1/25/2023    Procedure: ENDOSCOPIC RETROGRADE CHOLANGIOPANCREATOGRAPHY WITH BILIARY STENT EXCHANGE AND DEBRIS REMOVAL;  Surgeon: Carson Franklin MD;  Location: [facility] OR    EXPLORE COMMON BILE DUCT N/A 12/01/2022    Procedure: COMMON BILE DUCT REPAIR;  Surgeon: Thai Garcia MD;  Location: [facility] OR    LAPAROSCOPIC CHOLECYSTECTOMY WITH CHOLANGIOGRAMS N/A 12/01/2022    Procedure: EXPLORATORY LAPAROSCOPY, LAPAROSCOPIC PARTIAL CHOLECYSTECTOMY WITH RETREVIAL OF STONES WITH CHOLANGIOGRAM; choledochoscopy, INTRAOPERATIVE ULTRASOUND;  Surgeon: Thai Garcia MD;  Location: [facility] OR    LAPAROSCOPY  DIAGNOSTIC (GENERAL) N/A 10/06/2022    Procedure: Diagnostic laparoscopy with liver biopsy;  Surgeon: Warren Zhang MD;  Location: [facility] OR    ORTHOPEDIC SURGERY      TOTAL KNEE ARTHROPLASTY Left     [facility] NONSPECIFIC PROCEDURE  1996    lumbar discectomy    [facility] NONSPECIFIC PROCEDURE      cervical cone biopsy    [facility] NONSPECIFIC PROCEDURE      Left knee replacement     No current facility-administered medications on file prior to encounter.  acetaminophen (TYLENOL) 325 MG tablet, Take 2 tablets (650 mg) by mouth every 8 hours as needed for mild pain  aspirin 81 MG EC tablet, Take 81 mg by mouth daily  calcium-vitamin D (CALTRATE) 600-400 MG-UNIT per tablet, Take 1 tablet by mouth 2 times daily  celecoxib (CELEBREX) 200 MG capsule, Take 1 capsule (200 mg) by mouth daily  lisinopril (ZESTRIL) 10 MG tablet, Take 1 tablet (10 mg) by mouth daily  Multiple Vitamins-Minerals (CENTRUM SILVER) per tablet, Take 1 tablet by mouth daily  traMADol (ULTRAM) 50 MG tablet, TAKE 1 TO 2 TABLETS BY MOUTH EVERY DAY FOR SEVERE PAIN  vitamin D3 (CHOLECALCIFEROL) 50 mcg (2000 units) tablet, Take 1 tablet (50 mcg) by mouth daily       No Known Allergies  Social History     Socioeconomic History    Marital status:      Spouse name: Not on file    Number of children: Not on file    Years of education: Not on file    Highest education level: Not on file   Occupational History    Not on file   Tobacco Use    Smoking status: Never    Smokeless tobacco: Never   Vaping Use    Vaping Use: Never used   Substance and Sexual Activity    Alcohol use: Yes     Alcohol/week: 0.0 standard drinks of alcohol     Comment: social    Drug use: No    Sexual activity: Yes     Partners: Male   Other Topics Concern    Parent/sibling w/ CABG, MI or angioplasty before 65F 55M? Not Asked   Social History Narrative    Not on file     Social Determinants of Health     Financial Resource Strain: Not on file   Food Insecurity: Not on file   Transportation Needs: Not on  file   Physical Activity: Not on file   Stress: Not on file   Social Connections: Not on file   Interpersonal Safety: Not on file   Housing Stability: Not on file     Family History   Problem Relation Age of Onset    Family History Negative Mother          age 64 mva    Cancer Father          age 84 lung ca    Family History Negative Sister     Breast Cancer Paternal Grandmother     Genetic Disorder Other         daughter has Autoimmune disease

## 2024-03-13 NOTE — PLAN OF CARE
Goal Outcome Evaluation:      Plan of Care Reviewed With: patient    Overall Patient Progress: improvingOverall Patient Progress: improving    Outcome Evaluation: Pt  RASS 0 to +1/-2. alert but drowsy at times, but can follow commands. wiggles toes and slightly grasps hands. Can raises eyebrows and blinks forcefully to communicate yes/no or nods head. Oxy, tylenol or tradamol given for pain. ST during shift, metoprolol given. Antonio stopped 3/12. Versed stopped. remains vented 30% and PEEP 5. minimal secretions. TF increased at 1600. Multiple BM's this shift, bowel meds held d/t loose stools. quiroz for rentention and 24hr urine collection restarted at 2100, adequate output. skin abrasions on coccyx and elbows. Levo continues on low dose for pressure support. will continue with plan of care.      Problem: Mechanical Ventilation Invasive  Goal: Absence of Device-Related Skin and Tissue Injury  Outcome: Progressing  Intervention: Maintain Skin and Tissue Health  Recent Flowsheet Documentation  Taken 3/13/2024 0600 by Harry Snell RN  Device Skin Pressure Protection:   adhesive use limited   skin-to-skin areas padded  Taken 3/13/2024 0400 by Harry Snell RN  Device Skin Pressure Protection:   adhesive use limited   skin-to-skin areas padded  Taken 3/13/2024 0200 by Harry Snell RN  Device Skin Pressure Protection:   adhesive use limited   skin-to-skin areas padded  Taken 3/13/2024 0000 by Harry Snell RN  Device Skin Pressure Protection:   adhesive use limited   skin-to-skin areas padded  Taken 3/12/2024 2200 by Harry Snell RN  Device Skin Pressure Protection:   adhesive use limited   skin-to-skin areas padded  Taken 3/12/2024 2000 by Harry Snell RN  Device Skin Pressure Protection:   adhesive use limited   skin-to-skin areas padded

## 2024-03-14 NOTE — PLAN OF CARE
Goal Outcome Evaluation:      Plan of Care Reviewed With: patient    Overall Patient Progress: no changeOverall Patient Progress: no change    Outcome Evaluation: Pt  RASS 0 to +1/-2. alert but drowsy at times, but can follow commands. wiggles toes and slightly grasps hands. Can raises eyebrows and blinks forcefully to communicate yes/no or nods head amd can shrug shoulders. Oxy, tylenol or tradamol given for pain. A-Fib w/ CVR at beginning of shift but flipped into a SB rhythm, dropping into 30's. 0.5 of atropine given x1 w/ minimal result. HR continues in SB rhythm. Dex and amio stopped d/t low HR. Continues to be vented at 30% and PEEP 5. minimal/ moderate secretions. TF increased at goal rate of 55. Rectal tube placed w/ minimal output. Nye removed after 24hr urine collection sent to lab at 2100. Continues to have retention, bladder scanned for 281. skin abrasions on coccyx and elbows. Levo continues on low dose for pressure support. Phos and potassium replaced. will continue with plan of care.      Problem: Adult Inpatient Plan of Care  Goal: Absence of Hospital-Acquired Illness or Injury  Outcome: Not Progressing  Intervention: Identify and Manage Fall Risk  Recent Flowsheet Documentation  Taken 3/14/2024 0600 by Harry Snell RN  Safety Promotion/Fall Prevention:   lighting adjusted   clutter free environment maintained   safety round/check completed  Taken 3/14/2024 0400 by Harry Snell RN  Safety Promotion/Fall Prevention:   lighting adjusted   clutter free environment maintained   safety round/check completed  Taken 3/14/2024 0200 by Harry Snell RN  Safety Promotion/Fall Prevention:   lighting adjusted   clutter free environment maintained   safety round/check completed  Taken 3/14/2024 0000 by Harry Snell RN  Safety Promotion/Fall Prevention:   lighting adjusted   clutter free environment maintained   safety round/check completed  Taken 3/13/2024 2200 by Harry Snell RN  Safety  Promotion/Fall Prevention:   lighting adjusted   clutter free environment maintained   safety round/check completed  Taken 3/13/2024 2000 by Harry Snell RN  Safety Promotion/Fall Prevention:   lighting adjusted   clutter free environment maintained   safety round/check completed  Intervention: Prevent Skin Injury  Recent Flowsheet Documentation  Taken 3/14/2024 0600 by Harry Snell RN  Body Position: left  Taken 3/14/2024 0400 by Harry Snell RN  Body Position: right  Skin Protection:   incontinence pads utilized   pulse oximeter probe site changed   silicone foam dressing in place  Device Skin Pressure Protection:   adhesive use limited   skin-to-skin areas padded  Taken 3/14/2024 0200 by Harry Snell RN  Body Position: left  Taken 3/14/2024 0000 by Harry Snell RN  Body Position: right  Skin Protection:   incontinence pads utilized   pulse oximeter probe site changed   silicone foam dressing in place  Device Skin Pressure Protection:   adhesive use limited   skin-to-skin areas padded  Taken 3/13/2024 2200 by Harry Snell RN  Body Position: left  Taken 3/13/2024 2000 by Harry Snell RN  Body Position: right  Skin Protection:   incontinence pads utilized   pulse oximeter probe site changed   silicone foam dressing in place  Device Skin Pressure Protection:   adhesive use limited   skin-to-skin areas padded  Intervention: Prevent and Manage VTE (Venous Thromboembolism) Risk  Recent Flowsheet Documentation  Taken 3/14/2024 0600 by Harry Snell RN  VTE Prevention/Management: SCDs (sequential compression devices) on  Taken 3/14/2024 0400 by Harry Snell RN  VTE Prevention/Management: SCDs (sequential compression devices) on  Taken 3/14/2024 0200 by Harry Snell RN  VTE Prevention/Management: SCDs (sequential compression devices) on  Taken 3/14/2024 0000 by Harry Snell RN  VTE Prevention/Management: SCDs (sequential compression devices) on  Taken 3/13/2024 2200 by Harry Snell  RN  VTE Prevention/Management: SCDs (sequential compression devices) on  Taken 3/13/2024 2000 by Harry Snell RN  VTE Prevention/Management: SCDs (sequential compression devices) on  Intervention: Prevent Infection  Recent Flowsheet Documentation  Taken 3/14/2024 0600 by Harry Snell RN  Infection Prevention:   rest/sleep promoted   single patient room provided  Taken 3/14/2024 0400 by Harry Snell RN  Infection Prevention:   rest/sleep promoted   single patient room provided  Taken 3/14/2024 0200 by Harry Snell RN  Infection Prevention:   rest/sleep promoted   single patient room provided  Taken 3/14/2024 0000 by Harry Snell RN  Infection Prevention:   rest/sleep promoted   single patient room provided  Taken 3/13/2024 2200 by Harry Snell RN  Infection Prevention:   rest/sleep promoted   single patient room provided  Taken 3/13/2024 2000 by Harry Snell RN  Infection Prevention:   rest/sleep promoted   single patient room provided

## 2024-03-14 NOTE — PROGRESS NOTES
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 On my interview she denies recent innoculation or viral illness symptoms, also denies common neoplasm review of symptoms including coughing, bloody sputum, constipation, diarrhea, bloody urine or stool.  She has had progressive global weakness which now seems to involve her respiratory muscles.  I discussed intubation and mechanical ventilation with her and her family and they were willing to proceed.        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   I have personally reviewed the daily labs, imaging studies, cultures and discussed the case with referring physician and consulting physicians.     My assessment and plan by system for this patient is as follows:    Neurology/Psychiatry:   1. 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  Hold of PLEX today per neuro. CSF sampling was done, patient had significant increase in the protein in the CSF, with only 3 WBCs (had significant RBCs,?  Traumatic), meningitis panel is negative.  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 Unlikely hypokalemic paralysis (given normal electrolytes) or  snake envenomation (given lack of appropriate history/exposure).    -Repeat CNS MRI as per neurology today and EMG on Friday   -Plex today  -PT/OT    2. Pain/analgesia:  prn dilaudid  3. Sedation:  Start precedex gtt, stopped Versed gtt.    Cardiovascular:   Takatsubo cardiomyopathy with chf exacerbation  Afib with RVR, now in NSR  Shock, likely vasoplegic with concern for autonomic dysfunction  Hold ACEI, BB and diuretics for now due to the shock   Stop Amio gtt due to bradycardia   Norepi if MAP < 65  Repeat limited TTE   Gentle diuresis with lasix 20 mg IV once   Repeat TTE is pending       Pulmonary/Ventilator Management:   Acute hypercapnic respiratory failure, requiring mechanical ventilation:  worsening hypercapnia off mechanical support.     -Continue pressure support trial daily  -Down the road if slow recovery improvement might need tracheostomy  -Lasix 20 mg IV once       GI and Nutrition :   1. RD consult, on TF  2.  PPI for pud prophy    Renal/Fluids/Electrolytes:   1. Creat 0.39 ok  2.  Hypokalemia  3.  Hypernatremia    -Monitor urine output and I&O, avoid nephrotoxic medications.  -Replace electrolytes per protocol  -Free water boluses      Infectious Disease:   1. Sepsis considered but ruled out. Cultures are negative so far.  Off Abx.    Endocrine:   1. BS control per ICU protocol     Hematology/Oncology:   1. Leukocytosis to 12.7 ?reactive  2. Anemia, Hb 9.5, no signs for blood loss, monitor   3. Pltlts 293 ok    ICU Prophylaxis:   1. DVT: mechanical; subcu heparin  2. VAP: HOB 30 degrees, chlorhexidine rinse  3. Stress Ulcer: PPI  4. Restraints: Nonviolent soft two point restraints required and necessary for patient safety and continued cares and good effect as patient continues to pull at necessary lines, tubes despite education and distraction. Will readdress daily.   5. Wound care  -   6. Feeding -tube feed  7. Family Update: daughter at bedside  8. Disposition - icu    Clinically Significant  "Risk Factors        # Hypokalemia: Lowest K = 3 mmol/L in last 2 days, will replace as needed  # Hypernatremia: Highest Na = 148 mmol/L in last 2 days, will monitor as appropriate  # Hypocalcemia: Lowest Ca = 7.1 mg/dL in last 2 days, will monitor and replace as appropriate     # Hypoalbuminemia: Lowest albumin = 3.3 g/dL at 3/9/2024  6:04 AM, will monitor as appropriate     # Hypertension: Noted on problem list        # Obesity: Estimated body mass index is 33.26 kg/m  as calculated from the following:    Height as of this encounter: 1.727 m (5' 7.99\").    Weight as of this encounter: 99.2 kg (218 lb 11.1 oz).      # Financial/Environmental Concerns: none                    Critical Care Time: 40 min.  I spent this time (excluding procedures) personally providing and directing critical care services at the bedside and on the critical care unit.     Maria Fernanda Tran MD   Pulmonary and Critical Care                 Key Medications:      aspirin  81 mg Oral or Feeding Tube Daily    chlorhexidine  15 mL Mouth/Throat Q12H    heparin ANTICOAGULANT  5,000 Units Subcutaneous Q8H    pantoprazole  40 mg Per Feeding Tube QAM AC    Or    pantoprazole  40 mg Intravenous QAM AC    sennosides  8.6 mg Oral BID    sodium chloride (PF)  3 mL Intracatheter Q8H      dexmedeTOMIDine 0.4 mcg/kg/hr (03/14/24 1246)    dextrose      - MEDICATION INSTRUCTIONS -      norepinephrine 0.03 mcg/kg/min (03/14/24 1130)    - MEDICATION INSTRUCTIONS -      sodium chloride 10 mL/hr at 03/13/24 0700              Physical Examination:   Temp:  [93.2  F (34  C)-98.8  F (37.1  C)] 98.4  F (36.9  C)  Pulse:  [] 65  Resp:  [12-32] 21  BP: ()/() 105/56  FiO2 (%):  [30 %] 30 %  SpO2:  [93 %-100 %] 98 %        Intake/Output Summary (Last 24 hours) at 3/14/2024 1401  Last data filed at 3/14/2024 1200  Gross per 24 hour   Intake 3777.93 ml   Output 1225 ml   Net 2552.93 ml         Wt Readings from Last 4 Encounters:   03/14/24 99.2 kg (218 lb 11.1 " oz)   07/12/23 93 kg (205 lb)   05/02/23 92.4 kg (203 lb 12.8 oz)   04/28/23 92.4 kg (203 lb 12.8 oz)     BP - Mean:  [] 83  Vent Mode: CMV/AC  (Continuous Mandatory Ventilation/ Assist Control)  FiO2 (%): 30 %  Resp Rate (Set): 14 breaths/min  Tidal Volume (Set, mL): 420 mL  PEEP (cm H2O): 5 cmH2O  Pressure Support (cm H2O): 10 cmH2O  Resp: 21    Recent Labs   Lab 03/10/24  1142 03/10/24  0753   PH 7.43  --    PCO2 42  --    PO2 214*  --    HCO3 28  --    O2PER 60 25       GEN: no acute distress  HEENT: head ncat, sclera anicteric, OP patent, trachea midline   PULM: unlabored synchronous with vent, clear anteriorly    CV/COR: RRR S1S2 no gallop,  No rub, no murmur  ABD: soft nontender  EXT:  warm and well perfused x4  NEURO: PERRL, patient follows commands, significant weakness in the upper and lower extremities  SKIN: no obvious rash  LINES: clean, dry intact         Data:   All data and imaging reviewed     ROUTINE ICU LABS (Last four results)  CMP  Recent Labs   Lab 03/14/24  0849 03/14/24  0527 03/14/24  0521 03/14/24  0210 03/14/24  0205 03/13/24 2006 03/13/24  1957 03/13/24  1414 03/13/24  1334 03/13/24  0453 03/13/24  0445 03/12/24  0755 03/12/24  0422 03/11/24  0836 03/11/24  0436   NA  --   --  146*  --   --   --  148*  --  148*  --  145   < > 148*  --  143   POTASSIUM  --   --  3.7  --  3.8  --  3.1*  --  3.3*  --  3.9  3.9   < > 2.9*   < > 3.3*   CHLORIDE  --   --  112*  --   --   --  115*  --  113*  --  113*   < > 114*  --  108*   CO2  --   --  24  --   --   --  24  --  24  --  24   < > 25  --  23   ANIONGAP  --   --  10  --   --   --  9  --  11  --  8   < > 9  --  12   * 115* 121* 113*  --    < > 113*   < > 110*   < > 110*   < > 107*   < > 98   BUN  --   --  10.4  --   --   --  9.7  --  10.2  --  11.5   < > 14.2  --  13.6   CR  --   --  0.39*  --   --   --  0.39*  --  0.42*  --  0.47*   < > 0.58  --  0.64   GFRESTIMATED  --   --  >90  --   --   --  >90  --  >90  --  >90   < > >90  --   ">90   YEE  --   --  8.0*  --   --   --  7.1*  --  7.6*  --  7.9*   < > 7.7*  --  8.5*   MAG  --   --  1.7  --   --   --   --   --   --   --  1.7  --  1.9  --  1.8   PHOS  --   --  2.6  --   --   --  1.9*  --  2.1*  --  1.8*  --  2.0*   < > 1.8*   PROTTOTAL  --   --  5.0*  --   --   --   --   --   --   --  4.9*  --  4.9*  --  5.4*   ALBUMIN  --   --  3.5  --   --   --   --   --   --   --  3.8  --  3.4*  --  4.1   BILITOTAL  --   --  0.4  --   --   --   --   --   --   --  0.6  --  0.5  --  0.8   ALKPHOS  --   --  33*  --   --   --   --   --   --   --  24*  --  37*  --  33*   AST  --   --  16  --   --   --   --   --   --   --  15  --  14  --  17   ALT  --   --  6  --   --   --   --   --   --   --  <5  --  6  --  6    < > = values in this interval not displayed.     CBC  Recent Labs   Lab 03/14/24  0521 03/13/24  0445 03/12/24  0422 03/11/24  0436   WBC 12.7* 13.3* 11.7* 16.8*   RBC 3.22* 3.39* 3.38* 3.78*   HGB 9.5* 10.1* 9.8* 10.7*   HCT 29.3* 30.6* 30.6* 33.9*   MCV 91 90 91 90   MCH 29.5 29.8 29.0 28.3   MCHC 32.4 33.0 32.0 31.6   RDW 17.5* 17.2* 16.7* 16.1*    310 315 345     INRNo lab results found in last 7 days.  Arterial Blood Gas  Recent Labs   Lab 03/10/24  1142 03/10/24  0753   PH 7.43  --    PCO2 42  --    PO2 214*  --    HCO3 28  --    O2PER 60 25       All cultures:  No results for input(s): \"CULT\" in the last 168 hours.  Recent Results (from the past 24 hour(s))   MR Brain w/o & w Contrast    Narrative    EXAM: MR BRAIN W/O AND W CONTRAST  LOCATION: [facility]  DATE: 3/9/2024    INDICATION: Diffuse weakness.  COMPARISON: CT head dated 03/08/2024.  CONTRAST: 10 mL Gadavist.  TECHNIQUE: Routine multiplanar multisequence head MRI without and with intravenous contrast.    FINDINGS:  INTRACRANIAL CONTENTS: No acute or subacute infarct. No mass, acute hemorrhage, or extra-axial fluid collections. Patchy nonspecific T2/FLAIR hyperintensities within the cerebral white matter, " left greater than right, most consistent with mild to   moderate chronic microvascular ischemic change. Mild generalized cerebral atrophy. No hydrocephalus. Normal position of the cerebellar tonsils. No pathologic contrast enhancement.    SELLA: No abnormality accounting for technique.    OSSEOUS STRUCTURES/SOFT TISSUES: Normal marrow signal. The major intracranial vascular flow voids are maintained.     ORBITS: Prior bilateral cataract surgery. Visualized portions of the orbits are otherwise unremarkable.     SINUSES/MASTOIDS: No paranasal sinus mucosal disease. Partial right mastoid effusion.      Impression    IMPRESSION:  1.  No acute intracranial process.  2.  Generalized brain atrophy and presumed microvascular ischemic changes as detailed above.   XR Chest Port 1 View    Narrative    EXAM: XR CHEST PORT 1 VIEW  LOCATION: [facility]  DATE: 3/10/2024    INDICATION: ET tube placement, OG placement, dialysis port,  COMPARISON: 03/08/2024      Impression    IMPRESSION: Intubation. The endotracheal tube tip is 4 cm above the milan. Right IJ dialysis catheter has been placed with tip at the SVC/right atrial junction. NG tube is in the stomach. No pneumothorax or pleural effusion. Mild pulmonary edema. Normal   heart size.         Billing: This patient is critically ill: Yes. Total critical care time today 60 min, excluding procedures.       Past Medical/surgical hx, meds, allergies, social history, family history     Past Medical History:   Diagnosis Date    HTN (hypertension)     Hyperlipidemia     Leiomyoma of uterus, unspecified     Numbness and tingling     spinal stenosis causes numbness and tingling on feet and knees bilaterally    Osteoarthrosis, unspecified whether generalized or localized, unspecified site     Other and unspecified disc disorder of lumbar region     Other chronic pain     Primary osteoarthritis of left hip 12/18/2016    Spinal stenosis     S/P diskectomy x 2, most  recently 8/2014     Past Surgical History:   Procedure Laterality Date    ARTHROPLASTY HIP Left 12/12/2016    Procedure: ARTHROPLASTY HIP;  Surgeon: Leonid Morfin MD;  Location:  OR    BACK SURGERY      COLONOSCOPY N/A 07/20/2017    Procedure: COMBINED COLONOSCOPY, SINGLE OR MULTIPLE BIOPSY/POLYPECTOMY BY BIOPSY;  colonoscopy;  Surgeon: Catarino Salas MD;  Location:  GI    CV CORONARY ANGIOGRAM N/A 3/8/2024    Procedure: Coronary Angiogram;  Surgeon: Alicia Dai MD;  Location:  HEART CARDIAC CATH LAB    DISCECTOMY LUMBAR POSTERIOR MICROSCOPIC TWO LEVELS  08/11/2014    Procedure: DISCECTOMY LUMBAR POSTERIOR MICROSCOPIC TWO LEVELS;  Surgeon: Warren Herring MD;  Location:  OR    ENDOSCOPIC RETROGRADE CHOLANGIOPANCREATOGRAM N/A 10/14/2022    Procedure: ENDOSCOPIC RETROGRADE CHOLANGIOPANCREATOGRAPHY with biliary spincterotomy, biliary stent placement, cholangioscopy;  Surgeon: Carson Franklin MD;  Location: [facility] OR    ENDOSCOPIC RETROGRADE CHOLANGIOPANCREATOGRAM N/A 5/2/2023    Procedure: ENDOSCOPIC RETROGRADE CHOLANGIOPANCREATOGRAPHY with biliary stone and stent removal;  Surgeon: Carson Franklin MD;  Location: [facility] OR    ENDOSCOPIC RETROGRADE CHOLANGIOPANCREATOGRAPHY, EXCHANGE TUBE/STENT N/A 1/25/2023    Procedure: ENDOSCOPIC RETROGRADE CHOLANGIOPANCREATOGRAPHY WITH BILIARY STENT EXCHANGE AND DEBRIS REMOVAL;  Surgeon: Carson Franklin MD;  Location: [facility] OR    EXPLORE COMMON BILE DUCT N/A 12/01/2022    Procedure: COMMON BILE DUCT REPAIR;  Surgeon: Thai Garcia MD;  Location: [facility] OR    LAPAROSCOPIC CHOLECYSTECTOMY WITH CHOLANGIOGRAMS N/A 12/01/2022    Procedure: EXPLORATORY LAPAROSCOPY, LAPAROSCOPIC PARTIAL CHOLECYSTECTOMY WITH RETREVIAL OF STONES WITH CHOLANGIOGRAM; choledochoscopy, INTRAOPERATIVE ULTRASOUND;  Surgeon: Thai Garcia MD;  Location: [facility] OR    LAPAROSCOPY DIAGNOSTIC (GENERAL) N/A 10/06/2022    Procedure: Diagnostic laparoscopy with liver  biopsy;  Surgeon: Warren Zhang MD;  Location:  OR    ORTHOPEDIC SURGERY      TOTAL KNEE ARTHROPLASTY Left     [facility] NONSPECIFIC PROCEDURE  1996    lumbar discectomy    [facility] NONSPECIFIC PROCEDURE      cervical cone biopsy    [facility] NONSPECIFIC PROCEDURE      Left knee replacement     No current facility-administered medications on file prior to encounter.  acetaminophen (TYLENOL) 325 MG tablet, Take 2 tablets (650 mg) by mouth every 8 hours as needed for mild pain  aspirin 81 MG EC tablet, Take 81 mg by mouth daily  calcium-vitamin D (CALTRATE) 600-400 MG-UNIT per tablet, Take 1 tablet by mouth 2 times daily  celecoxib (CELEBREX) 200 MG capsule, Take 1 capsule (200 mg) by mouth daily  lisinopril (ZESTRIL) 10 MG tablet, Take 1 tablet (10 mg) by mouth daily  Multiple Vitamins-Minerals (CENTRUM SILVER) per tablet, Take 1 tablet by mouth daily  traMADol (ULTRAM) 50 MG tablet, TAKE 1 TO 2 TABLETS BY MOUTH EVERY DAY FOR SEVERE PAIN  vitamin D3 (CHOLECALCIFEROL) 50 mcg (2000 units) tablet, Take 1 tablet (50 mcg) by mouth daily       No Known Allergies  Social History     Socioeconomic History    Marital status:      Spouse name: Not on file    Number of children: Not on file    Years of education: Not on file    Highest education level: Not on file   Occupational History    Not on file   Tobacco Use    Smoking status: Never    Smokeless tobacco: Never   Vaping Use    Vaping Use: Never used   Substance and Sexual Activity    Alcohol use: Yes     Alcohol/week: 0.0 standard drinks of alcohol     Comment: social    Drug use: No    Sexual activity: Yes     Partners: Male   Other Topics Concern    Parent/sibling w/ CABG, MI or angioplasty before 65F 55M? Not Asked   Social History Narrative    Not on file     Social Determinants of Health     Financial Resource Strain: Not on file   Food Insecurity: Not on file   Transportation Needs: Not on file   Physical Activity: Not on file   Stress: Not on file   Social Connections: Not  on file   Interpersonal Safety: Not on file   Housing Stability: Not on file     Family History   Problem Relation Age of Onset    Family History Negative Mother          age 64 mva    Cancer Father          age 84 lung ca    Family History Negative Sister     Breast Cancer Paternal Grandmother     Genetic Disorder Other         daughter has Autoimmune disease

## 2024-03-14 NOTE — PROGRESS NOTES
[facility]    Stroke Progress Note    Interval Events  No acute events overnight. Alert, follows commands for wiggle toes and grasp hands.   Dilaudid, tylenol and oxycodone for pain  Fluctuating between Afib RVR and Sinus bradycardia in 30s  Given bradycardia- amio and precedex held  Minimal output in rectal tube, continues to have urinary retention  Requiring levo 0.07 SBP   Afebrile, on tylenol. Mild leukocystosis to 12.7 improved from prior 18.8, Cr 0.39  MRI pending  On DVT prophylaxis, aspirin 81      HPI Summary  Yohana Marques is a 78 year old woman with h/o HTN, spinal stenosis s/p discectomy x 2 (most recent 8/2004), and chronic pain. Presented to the ED on 3/8 after a fall at home with 3-4 days of preceding mild generalized weakness (was using a walker, uses a cane at baseline).  She and her  do can their own food at their 2nd residence in [place] [place]. [place] not had access to their home-canned items.     On admission was found to have new heart failure (per cardiology likely stress induced) with a significantly reduced EF (23% on echo 3/8) with no significant obstructive coronary artery disease on invasive coronary angiogram on 3/8/24. Of note, she has urinary retention.      Ended up needing to be intubated on 3/10 due to labored, shallow breathing and evident respiratory acidosis on venous BG. Started on PLEX . Goes into A-fib following PLEX treatment. She has been having intermittent Afib and bradycardia and labile BP. and started to have runs of a-fib throughout the day and overnight.        Evaluation Summarized     MRI Brain w/wo contrast (3/9) No acute intracranial process. Chronic periventricular microvascular disease (L > R)   MRI Spine (3/10) Cervical Spine:  Degenerative foraminal narrowing of C3-C7.   Thoracic Spine:  Chronic superior endplate T3 compression  Lumbar  Spine:  Progressive lumbar spondylosis in L2-L4 with multiple levels of increased foraminal narrowing   CT Chest/Abdomen/Pelvis No evidence of metastasis or neoplastic processes    Submucosal fibroid vs nonmalignant endometrial polyp      Stable 4 mm left upper lobe nodule.      Echocardiogram 3/8        Coronary cath 3/8 EF is 23%.Entire left ventricle is severely hypokinetic but basal left ventricular function is less so     No significant CAD   EKG/Telemetry 3/10    Tachycardic (147 bpm), Atrial fibrillation w/ RVR   Left axis deviation, Anterior infarct, age undertermined  T wave abnormality   CSF Labs Mild proteinuria  Encephalitis/meningitis panel negative   Labs MG eval: pending  Ganglioside Antibodies: pending  Urine porphyrins: pending  AZALEA: pending  ANCA: pending  HIV-1, HIV-2, HIV-1 p24 antigen nonreactive       Impression   - Acute onset flaccid quadriparesis- improving  - Areflexia  - Hypercapnic respiratory failure  - New onset stress-induced Cardiomyopathy (EF 23% on 3/8)  - Urinary retention     MRI brain w/o contrast did not show any acute processes. MRI of total spine did not show any acute processes. CT CAP did not show any malignancy.  Infectious encephalitis/meningitis panel negative, PCR testing of CSF negative     Broad Ddx: Concern for Botulism, Guillain-Humboldt Syndrome vs anterior horn cell disease vs. NM junction defect (myasthenia gravis) vs. Myopathy, acute transverse myelitis, acute intermittent porphyria.     Plan  - PLEX 3/5 on 3/14 today if clinically stable/ok per nephrology team  - Follow CSF analysis  - EMG -> MCN tentative plan to come in and do on Friday 3/15 at 0930  - Repeat MRI brain/C/T/L spine pending  - Will review for anticoagulation given A-fib and high-risk -> currently on ASA 81 mg. Plan to start heparin after EMG  - Follow urine porphyrins, AZALEA, ANCA (MG antibody pending, nonreactive for HIV-1, HIV-2, or HIV-1 p24 antigen)  - CTM I/Os - has urinary retention     DVT  "prophylaxis: heparin 5000 units subcutaneous injection q8 hrs     Patient Follow-up    - final recommendation pending work-up    We will continue to follow.     The Stroke Staff is Dr. Duffy.  Liz KENNEDY    To page a member of the stroke/neurocritical care service, click here:  AMCOM   Choose \"On Call\" tab at top, then search dropdown box for \"Neurology Adult\", select location, press Enter, then look for stroke/neuro ICU/telestroke.  ______________________________________________________    Clinically Significant Risk Factors        # Hypokalemia: Lowest K = 3 mmol/L in last 2 days, will replace as needed  # Hypernatremia: Highest Na = 148 mmol/L in last 2 days, will monitor as appropriate  # Hypocalcemia: Lowest Ca = 7.1 mg/dL in last 2 days, will monitor and replace as appropriate     # Hypoalbuminemia: Lowest albumin = 3.3 g/dL at 3/9/2024  6:04 AM, will monitor as appropriate     # Hypertension: Noted on problem list        # Obesity: Estimated body mass index is 33.25 kg/m  as calculated from the following:    Height as of this encounter: 1.727 m (5' 8\").    Weight as of this encounter: 99.2 kg (218 lb 11.1 oz).        # Financial/Environmental Concerns: none           Medications   Scheduled Meds   albumin human  3,250 mL Apheresis Once    anticoagulant citrate  500-2,000 mL Apheresis Once    aspirin  81 mg Oral or Feeding Tube Daily    calcium gluconate 5 g in sodium chloride 0.9 % 150 mL  5 g Intravenous Once    chlorhexidine  15 mL Mouth/Throat Q12H    heparin ANTICOAGULANT  5,000 Units Subcutaneous Q8H    pantoprazole  40 mg Per Feeding Tube QAM AC    Or    pantoprazole  40 mg Intravenous QAM AC    sennosides  8.6 mg Oral BID    sodium chloride (PF)  3 mL Intracatheter Q8H       Infusion Meds   amiodarone Stopped (03/14/24 0145)    dexmedeTOMIDine Stopped (03/14/24 0145)    dextrose      - MEDICATION INSTRUCTIONS -      norepinephrine 0.05 mcg/kg/min (03/14/24 0414)    - MEDICATION " INSTRUCTIONS -      phenylephrine Stopped (03/12/24 1309)    sodium chloride 10 mL/hr at 03/13/24 0700       PRN Meds  acetaminophen, albuterol, atropine, bisacodyl, calcium carbonate, artificial tears, dextrose, fentaNYL, heparin Lock (1000 units/mL High concentration), HOLD MEDICATION, HOLD MEDICATION, HYDROmorphone, lidocaine 4%, lidocaine (buffered or not buffered), melatonin, metoprolol, midazolam, midazolam, naloxone **OR** naloxone **OR** naloxone **OR** naloxone, - MEDICATION INSTRUCTIONS -, ondansetron **OR** ondansetron, oxyCODONE **OR** oxyCODONE, - MEDICATION INSTRUCTIONS -, polyethylene glycol, senna-docusate **OR** senna-docusate, sodium chloride (PF), sodium chloride 0.9%, traMADol       PHYSICAL EXAMINATION  Temp:  [93.2  F (34  C)-98.3  F (36.8  C)] 98.1  F (36.7  C)  Pulse:  [] 76  Resp:  [8-37] 15  BP: ()/() 124/70  FiO2 (%):  [30 %] 30 %  SpO2:  [96 %-100 %] 98 %      Neurologic   Mental Status:   alert, follows commands   Cranial Nerves:   pupils constricted 2 mm but reactive to light, EOM intact with no gaze palsy or nystagmus, facial movement symmetric, tongue midline. Unable to assess sensation. No shoulder shrug  Motor:   Not able to move limbs against gravity. Very weak  strength. Able to flex and extend toes. B/l UE and LE 1/5. Good passive ROM.   Reflexes:   See below      Left Right   Triceps 0 0   Biceps 0 0   Brachioradialis 2+ 1+   Adductor 0 0   Patellar 0 0   Achilles 0 0   Babinski Absent Absent   Sensory:   unable to fully assess d/t patient's current state ; will grimace to noxious stimuli  Coordination:   unable to assess d/t patient's current state  Station/Gait:  deferred      Imaging  I personally reviewed all imaging; relevant findings per HPI.     Lab Results Data   CBC  Recent Labs   Lab 03/14/24  0521 03/13/24  0445 03/12/24  0422   WBC 12.7* 13.3* 11.7*   RBC 3.22* 3.39* 3.38*   HGB 9.5* 10.1* 9.8*   HCT 29.3* 30.6* 30.6*    310 315     Basic  Metabolic Panel    Recent Labs   Lab 03/14/24  0527 03/14/24  0521 03/14/24  0210 03/14/24  0205 03/13/24 2006 03/13/24  1957 03/13/24  1414 03/13/24  1334   NA  --  146*  --   --   --  148*  --  148*   POTASSIUM  --  3.7  --  3.8  --  3.1*  --  3.3*   CHLORIDE  --  112*  --   --   --  115*  --  113*   CO2  --  24  --   --   --  24  --  24   BUN  --  10.4  --   --   --  9.7  --  10.2   CR  --  0.39*  --   --   --  0.39*  --  0.42*   * 121* 113*  --    < > 113*   < > 110*   YEE  --  8.0*  --   --   --  7.1*  --  7.6*    < > = values in this interval not displayed.     Liver Panel  Recent Labs   Lab 03/14/24  0521 03/13/24  0445 03/12/24  0422   PROTTOTAL 5.0* 4.9* 4.9*   ALBUMIN 3.5 3.8 3.4*   BILITOTAL 0.4 0.6 0.5   ALKPHOS 33* 24* 37*   AST 16 15 14   ALT 6 <5 6     INR    Recent Labs   Lab Test 05/02/23  0650 01/25/23  0719 10/15/22  0728   INR 1.02 1.06 1.23*      Lipid Profile    Recent Labs   Lab Test 07/07/22  1448 07/05/21  1352 07/03/19  0927   CHOL 241* 220* 213*   HDL 78 66 64   * 130* 118*   TRIG 119 122 156*     A1C    Recent Labs   Lab Test 03/11/24  0436   A1C 5.2     Troponin    Recent Labs   Lab 03/08/24  1111   CTROPT 585*          Data

## 2024-03-14 NOTE — CONSULTS
[facility]  WOC Nurse Inpatient Assessment     Consulted for: pannus and perianal    Summary: Pt tissue to pannus and perianal is intact with no open areas, redness, rash or s/sx of infection.     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 On my interview she denies recent innoculation or viral illness symptoms, also denies common neoplasm review of symptoms including coughing, bloody sputum, constipation, diarrhea, bloody urine or stool.  She has had progressive global weakness which now seems to involve her respiratory muscles.  I discussed intubation and mechanical ventilation with her and her family and they were willing to proceed.    Assessment:      Areas visualized during today's visit:  pannus, coccyx, perianal    Pannus and perianal are intact with no open wounds.  Coccyx with mepilex in place and lifted with no open wounds.     Treatment Plan:     None necessary, continue to follow incontinence protocol    Orders:  none written    RECOMMEND PRIMARY TEAM ORDER: None, at this time  Education provided: importance of repositioning, plan of care, Moisture management, and Off-loading pressure  Discussed plan of care with: Patient, Family, and Nurse  WOC nurse follow-up plan: signing off  Notify WOC if wound(s) deteriorate.  Nursing to notify the Provider(s) and re-consult the WOC Nurse if new skin concern.    DATA:     Current support surface: Standard  Low air loss (HAROLDO pump, Isolibrium, Pulsate, skin guard, etc)  Containment of urine/stool: Incontinence Protocol and Internal fecal management  BMI: Body mass index is 33.26 kg/m .   Active diet order: Orders Placed This Encounter      NPO for Medical/Clinical Reasons Except for: Meds     Output: I/O last 3 completed shifts:  In: 3850.58 [I.V.:1560.58; NG/GT:1210]  Out: 1225 [Urine:1075; Stool:150]      Labs:   Recent Labs   Lab 03/14/24  0521 03/12/24  0422 03/11/24  0436   ALBUMIN 3.5   < > 4.1   HGB 9.5*   < > 10.7*   WBC 12.7*   < > 16.8*   A1C  --   --  5.2    < > = values in this interval not displayed.     Pressure injury risk assessment:   Sensory Perception: 2-->very limited  Moisture: 3-->occasionally moist  Activity: 1-->bedfast  Mobility: 2-->very limited  Nutrition: 2-->probably inadequate  Friction and Shear: 2-->potential problem  Tho Score: 12    Michoacano Devine RN CWOCN  -Securely message with [employer] (more info) - can reach individually by name or search 'WOC Nurse' (Kd) to reach all current WOCs on duty.  WOC Office Phone: [phone]

## 2024-03-14 NOTE — PROGRESS NOTES
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   -3/5 treatment today, then Saturday/Monday to complete 5/5 treatments.   # Severe cardiomyopathy with EF of only 23%:   # Afib  # Respiratory failure: Intubated  # ID: on Zosyn emperically     Plan:  # 3/5 apheresis treatments today (3500 ml of 5% albumin and 5 grams calcium gluconate). Next treatments on Saturday and Monday.           Interval History:     Afebrile. VSS.    She is getting 3rd apheresis treatment.   Procedure is going well-no issues.  Patient is awake and following verbal commands.  She seems much better today.  I discussed the plan with daughter and Dr. Tran in person.   I discussed the apheresis treatment with RN (Bruce) at the bedside.          Medications and Allergies:      aspirin  81 mg Oral or Feeding Tube Daily    chlorhexidine  15 mL Mouth/Throat Q12H    heparin ANTICOAGULANT  5,000 Units Subcutaneous Q8H    pantoprazole  40 mg Per Feeding Tube QAM AC    Or    pantoprazole  40 mg Intravenous QAM AC    sennosides  8.6 mg Oral BID    sodium chloride (PF)  3 mL Intracatheter Q8H      No Known Allergies         Physical Exam:   Vitals were reviewed   , Blood pressure 129/69, pulse 89, temperature 98.4  F (36.9  C), resp. rate 18, height 1.727 m (5' 7.99\"), weight 99.2 kg (218 lb 11.1 oz), SpO2 98%, not currently breastfeeding.    Wt Readings from Last 3 Encounters:   03/14/24 99.2 kg (218 lb 11.1 oz)   07/12/23 93 kg (205 lb)   05/02/23 92.4 kg (203 lb 12.8 oz)       Intake/Output Summary (Last 24 hours) at 3/14/2024 0955  Last data filed at 3/14/2024 0835  Gross per 24 hour   Intake 3301.58 ml   Output 1000 ml   Net 2301.58 ml       GENERAL APPEARANCE: Looks great today  HEENT:  Eyes/ears/nose/neck grossly normal  CV: RRR  ABDOMEN: Soft, NT.   EXTREMITIES/SKIN: no rashes/lesions on observed skin; no edema  NEURO: Awake, alert and following verbal commands         Data:     CBC RESULTS:   "   Recent Labs   Lab 03/14/24  0521 03/13/24  0445 03/12/24  0422 03/11/24  0436 03/09/24  0604 03/08/24  1111   WBC 12.7* 13.3* 11.7* 16.8* 17.3* 18.8*   RBC 3.22* 3.39* 3.38* 3.78* 4.33 4.51   HGB 9.5* 10.1* 9.8* 10.7* 12.3 13.1   HCT 29.3* 30.6* 30.6* 33.9* 38.3 39.8    310 315 345 393 352       Basic Metabolic Panel:  Recent Labs   Lab 03/14/24  0849 03/14/24  0527 03/14/24  0521 03/14/24  0210 03/14/24  0205 03/13/24 2006 03/13/24  1957 03/13/24  1414 03/13/24  1334 03/13/24  0453 03/13/24 0445 03/12/24  1944 03/12/24  1937 03/12/24  0755 03/12/24 0422   NA  --   --  146*  --   --   --  148*  --  148*  --  145  --  148*  --  148*   POTASSIUM  --   --  3.7  --  3.8  --  3.1*  --  3.3*  --  3.9  3.9  --  3.0*   < > 2.9*   CHLORIDE  --   --  112*  --   --   --  115*  --  113*  --  113*  --  116*  --  114*   CO2  --   --  24  --   --   --  24  --  24  --  24  --  24  --  25   BUN  --   --  10.4  --   --   --  9.7  --  10.2  --  11.5  --  11.4  --  14.2   CR  --   --  0.39*  --   --   --  0.39*  --  0.42*  --  0.47*  --  0.52  --  0.58   * 115* 121* 113*  --  92 113*   < > 110*   < > 110*   < > 109*   < > 107*   YEE  --   --  8.0*  --   --   --  7.1*  --  7.6*  --  7.9*  --  7.7*  --  7.7*    < > = values in this interval not displayed.       INRNo lab results found in last 7 days.   Attestation:   I have reviewed today's relevant vital signs, notes, medications, labs and imaging.    Stoney Diamond MD  [facility] - Nephrology  Office phone :[phone]  Pager: [phone]

## 2024-03-14 NOTE — PLAN OF CARE
Goal Outcome Evaluation:      Plan of Care Reviewed With: patient    Overall Patient Progress: no changeOverall Patient Progress: no change    Outcome Evaluation: RASS goal 0/-1 on Dex. alert but drowsy. follows commands. wiggles toes and slight hand grasps. oxy, tylenol, and dilaudid given for pain. A fib CVR and SB in the 50s. CMV 30% and PEEP 5. PS 10/5 for 30 but had apneic periods and went back to CMV. moderate secretions. TF @ goal. Rectal tube in place with good output. DTV. straight cath x2 for retention. Levo for MAP <65. MRI completed. Echo completed.       Problem: Adult Inpatient Plan of Care  Goal: Absence of Hospital-Acquired Illness or Injury  Intervention: Prevent Skin Injury  Recent Flowsheet Documentation  Taken 3/14/2024 1600 by Juanita Aiken RN  Body Position: (at MRI) supine  Skin Protection:   incontinence pads utilized   pulse oximeter probe site changed   silicone foam dressing in place  Device Skin Pressure Protection:   adhesive use limited   skin-to-skin areas padded  Taken 3/14/2024 1200 by Juanita Aiken, RN  Body Position:   right   turned  Skin Protection:   incontinence pads utilized   pulse oximeter probe site changed   silicone foam dressing in place  Device Skin Pressure Protection:   adhesive use limited   skin-to-skin areas padded  Taken 3/14/2024 1000 by Juanita Aiken, RN  Body Position:   left   turned   heels elevated  Taken 3/14/2024 0800 by Juanita Aiken, RN  Body Position:   right   turned   heels elevated  Skin Protection:   incontinence pads utilized   pulse oximeter probe site changed   silicone foam dressing in place  Device Skin Pressure Protection:   adhesive use limited   skin-to-skin areas padded

## 2024-03-14 NOTE — PROGRESS NOTES
CLINICAL NUTRITION SERVICES - REASSESSMENT NOTE      Recommendations Ordered by Registered Dietitian (RD): Add Banatrol TF 1 pkt BID = 90 kcal, 10 g fiber, 4 g protein   Total (TF + Banatrol TF)= 1410 kcal (15 kcal/kg), 119 g protein (1.9 g/kg), 10 g fiber    Malnutrition: % Weight Loss:  None noted  % Intake:  No decreased intake noted (on goal TF)  Subcutaneous Fat Loss:  None observed  Muscle Loss:  None observed  Fluid Retention:  None noted    Malnutrition Diagnosis: Patient does not meet two of the above criteria necessary for diagnosing malnutrition       EVALUATION OF PROGRESS TOWARD GOALS   Diet:  NPO on vent     Nutrition Support:  Patient started on TF 3/11, advanced to goal on 3/13, and continues as follows ~    Nutrition Support Enteral:  Type of Feeding Tube: OG/NG  Enteral Frequency:  Continuous  Enteral Regimen: Vital HP at 55 mL/hr  Total Enteral Provisions: 1320 kcal (14 kcal/kg), 115 g protein (1.8 g/kg), 147 g CHO, 0 g fiber, 1204 mL H2O  Free Water Flush: 60 mL every 4 hours      Intake/Tolerance:    Na 146 (H)  K/Mg/Phos NL  BGM   I/O 3850/1225, wt 99.2 kg (up overall)  Stool = 150 mL yest, multiple bowel movements overnight (3/12-3/13) but prior to this no stool - Bowel meds now being held, MD requesting fiber for loose stools         ASSESSED NUTRITION NEEDS:  Dosing Weight 91.5 kg (energy) and 63.6 kg (protein)  Estimated Energy Needs: 0483-5170 kcals (14-17 Kcal/Kg)  Justification: obese and vented  Estimated Protein Needs:  grams protein (1.5-2 g pro/Kg)  Justification: hypercatabolism with critical illness and obesity guidelines   Estimated Fluid Needs: 6986-3220 mL (1 mL/Kcal)  Justification: maintenance      NEW FINDINGS:   3/11: LP= meningitis/encephalitis panel negative   3/11: Begin TF via OG/NG     Norepi drip    Previous Goals (3/11):   Goal TF regimen will meet % needs while NPO   Evaluation: Met    Previous Nutrition Diagnosis (3/11):   Inadequate protein-energy  intake related to NPO with plans to start TF today as evidenced by meeting 0% needs    Evaluation: Resolved       MALNUTRITION  % Weight Loss:  None noted  % Intake:  No decreased intake noted (on goal TF)  Subcutaneous Fat Loss:  None observed  Muscle Loss:  None observed  Fluid Retention:  None noted    Malnutrition Diagnosis: Patient does not meet two of the above criteria necessary for diagnosing malnutrition    CURRENT NUTRITION DIAGNOSIS  Altered GI function related to diarrhea with unknown etiology as evidenced by need for Banatrol      INTERVENTIONS  Recommendations / Nutrition Prescription  Continue goal TF as above  Add Banatrol TF 1 pkt BID = 90 kcal, 10 g fiber, 4 g protein   Total (TF + Banatrol TF)= 1410 kcal (15 kcal/kg), 119 g protein (1.9 g/kg), 10 g fiber     Implementation  Medical Food Supplement: Ordered as above     Goals  Goal TF regimen will meet % needs   Stooling volume will decrease with addition of Banatrol TF    MONITORING AND EVALUATION:  Progress towards goals will be monitored and evaluated per protocol and Practice Guidelines    Sandra Chiu, RD, LD, CNSC   Clinical Dietitian - [facility]

## 2024-03-14 NOTE — PROGRESS NOTES
Apheresis - Plasma Exchange    Yohana Keith was run in  for a one volume apheresis treatment using the Optia apheresis machine. Consent verified.     Run number - 3   Height - 173 cm  Weight - 99 kg  HCT - 29%  Plasma Volume and type - Albumin 5% 3,500 mL  Calcium Replacement - Ca Gluc 5 g  Inlet speed - 90 ml/min  Fluid Balance - 100%  ACD-A ratio - 10:1  Access - R internal jugular, temporary line  Premeds - none    Treatment notes - Intubated, vent, A&O. VS WNL prior to start. Good flow on CVC with lines right. Intermittent alarms r/t reservoir excess fluid. Attempted to remedy with various adjustments. On the run for 14 min when centrifuge high pressure came in. Rinsed back approx half of external blood volume.     New apheresis circuit setup. Restarted run after ~15 min. No adverse effects.     Tolerated run well. CVC heparin locked; new caps. VS WNL at end.    Seen by Dr. Diamond during treatment. VSS at end of tx. Report given to Juanita DARBY RN.     Next treatment - Saturday, 3/16    Bruce Barba RN  [facility]  [facility]

## 2024-03-14 NOTE — PROGRESS NOTES
[facility] ICU RESPIRATORY NOTE        Date of Admission: 3/8/2024    Date of Intubation (most recent): 3/10/2024    Reason for Mechanical Ventilation: AW protection    Number of Days on Mechanical Ventilation: 5    Met Criteria for Spontaneous Breathing Trial: Yes    Significant Events Today: None    ABG Results:   Recent Labs   Lab 03/10/24  1142 03/10/24  0753   PH 7.43  --    PCO2 42  --    PO2 214*  --    HCO3 28  --    O2PER 60 25         Current Vent Settings: Vent Mode: CMV/AC  (Continuous Mandatory Ventilation/ Assist Control)  FiO2 (%): 30 %  Resp Rate (Set): 14 breaths/min  Tidal Volume (Set, mL): 420 mL  PEEP (cm H2O): 5 cmH2O  Pressure Support (cm H2O): 5 cmH2O  Resp: 19      Tia Freeman, RT on 3/14/2024 at 6:24 AM

## 2024-03-14 NOTE — PLAN OF CARE
"  Neuro: PERRL, opens eyes spontaneously, follows commands, unable to move extremities, can wiggle toes and weakly squeeze hands    CV: tele ST, on levo to maintain MAP >65, converted to afib at end of shift    Resp: LS coarse, PS for 30 min 5/5 this AM, did not tolerate this afternoon.    GI: BS active, multiple loose stools today, rectal tube inserted, intact, moderate leakage    : quiroz intact, with adequate urine output for 24 hour urine collection    Skin: R foot bruising, excoriated pannus and perianal region, barrier cream applied to bottom    Activity: assist of 2, lift, Q2 turns    Additional: pain managed with PRN and scheduled medication, family at bedside, up in chair for a couple of hours, worked with PT/OT, TF increased to goal rate of 55/hr    Plan of Care: continue to wean off levo and sedation as tolerated. Promote comfort      ---------------------------------------------------------------    Goal Outcome Evaluation:           Problem: Adult Inpatient Plan of Care  Goal: Plan of Care Review  Description: The Plan of Care Review/Shift note should be completed every shift.  The Outcome Evaluation is a brief statement about your assessment that the patient is improving, declining, or no change.  This information will be displayed automatically on your shift  note.  Outcome: Not Progressing  Goal: Patient-Specific Goal (Individualized)  Description: You can add care plan individualizations to a care plan. Examples of Individualization might be:  \"Parent requests to be called daily at 9am for status\", \"I have a hard time hearing out of my right ear\", or \"Do not touch me to wake me up as it startles  me\".  Outcome: Not Progressing  Goal: Absence of Hospital-Acquired Illness or Injury  Outcome: Not Progressing  Intervention: Identify and Manage Fall Risk  Recent Flowsheet Documentation  Taken 3/13/2024 1600 by Kim Polanco, RN  Safety Promotion/Fall Prevention:   activity supervised   clutter free " environment maintained   increased rounding and observation   lighting adjusted   room door open   room near nurse's station   safety round/check completed   supervised activity  Taken 3/13/2024 1200 by Kim Polanco RN  Safety Promotion/Fall Prevention:   activity supervised   clutter free environment maintained   increased rounding and observation   lighting adjusted   room door open   room near nurse's station   safety round/check completed   supervised activity  Taken 3/13/2024 0800 by Kim Polanco RN  Safety Promotion/Fall Prevention:   activity supervised   clutter free environment maintained   increased rounding and observation   lighting adjusted   room door open   room near nurse's station   safety round/check completed   supervised activity  Intervention: Prevent Skin Injury  Recent Flowsheet Documentation  Taken 3/13/2024 1800 by Kim Polanco RN  Body Position: (back to bed)   turned   left   heels elevated  Taken 3/13/2024 1600 by Kim Polanco RN  Device Skin Pressure Protection:   absorbent pad utilized/changed   adhesive use limited   positioning supports utilized   pressure points protected   skin-to-device areas padded   tubing/devices free from skin contact  Taken 3/13/2024 1400 by Kim Polanco RN  Body Position:   turned   right   heels elevated  Taken 3/13/2024 1200 by Kim Polanco RN  Body Position:   turned   left   heels elevated  Device Skin Pressure Protection:   absorbent pad utilized/changed   adhesive use limited   positioning supports utilized   pressure points protected   skin-to-device areas padded   tubing/devices free from skin contact  Taken 3/13/2024 1000 by Kim Polanco RN  Body Position:   turned   right   heels elevated  Taken 3/13/2024 0800 by Kim Polanco RN  Body Position:   turned   left   heels elevated  Device Skin Pressure Protection:   absorbent pad utilized/changed   adhesive use limited   positioning supports utilized   pressure points  protected   skin-to-device areas padded   tubing/devices free from skin contact  Intervention: Prevent and Manage VTE (Venous Thromboembolism) Risk  Recent Flowsheet Documentation  Taken 3/13/2024 1600 by Kim Polanco RN  VTE Prevention/Management: SCDs (sequential compression devices) on  Taken 3/13/2024 1200 by Kim Polanco RN  VTE Prevention/Management: SCDs (sequential compression devices) on  Taken 3/13/2024 0800 by Kim Polanco RN  VTE Prevention/Management: SCDs (sequential compression devices) on  Intervention: Prevent Infection  Recent Flowsheet Documentation  Taken 3/13/2024 1600 by Kim Polanco RN  Infection Prevention:   rest/sleep promoted   single patient room provided  Taken 3/13/2024 1200 by Kim Polanco RN  Infection Prevention:   rest/sleep promoted   single patient room provided  Taken 3/13/2024 0800 by Kim Polanco RN  Infection Prevention:   rest/sleep promoted   single patient room provided  Goal: Optimal Comfort and Wellbeing  Outcome: Not Progressing  Intervention: Monitor Pain and Promote Comfort  Recent Flowsheet Documentation  Taken 3/13/2024 1728 by Kim Polanco RN  Pain Management Interventions: medication (see MAR)  Taken 3/13/2024 1024 by Kim Polanco RN  Pain Management Interventions: medication (see MAR)  Taken 3/13/2024 0800 by Kim Polanco RN  Pain Management Interventions:   medication (see MAR)   emotional support   environmental changes   heat applied   music therapy   quiet environment facilitated   relaxation techniques promoted   repositioned   rest  Intervention: Provide Person-Centered Care  Recent Flowsheet Documentation  Taken 3/13/2024 1600 by iKm Polanco RN  Trust Relationship/Rapport:   care explained   choices provided   emotional support provided   questions answered   reassurance provided   thoughts/feelings acknowledged  Taken 3/13/2024 1200 by Kim Polanco RN  Trust Relationship/Rapport:   care explained   choices  provided   emotional support provided   questions answered   reassurance provided   thoughts/feelings acknowledged  Taken 3/13/2024 0800 by Kim Polanco RN  Trust Relationship/Rapport:   care explained   choices provided   emotional support provided   questions answered   reassurance provided   thoughts/feelings acknowledged  Goal: Readiness for Transition of Care  Outcome: Not Progressing     Problem: Risk for Delirium  Goal: Optimal Coping  Outcome: Not Progressing  Intervention: Optimize Psychosocial Adjustment to Delirium  Recent Flowsheet Documentation  Taken 3/13/2024 1600 by Kim Polanco RN  Supportive Measures:   active listening utilized   goal-setting facilitated   positive reinforcement provided   relaxation techniques promoted  Family/Support System Care:   caregiver stress acknowledged   involvement promoted   presence promoted   support provided  Taken 3/13/2024 1200 by Kim Polanco RN  Supportive Measures:   active listening utilized   goal-setting facilitated   positive reinforcement provided   relaxation techniques promoted  Family/Support System Care:   caregiver stress acknowledged   involvement promoted   presence promoted   support provided  Taken 3/13/2024 0800 by Kim Polanco RN  Supportive Measures:   active listening utilized   goal-setting facilitated   positive reinforcement provided   relaxation techniques promoted  Family/Support System Care:   caregiver stress acknowledged   involvement promoted   presence promoted   support provided  Goal: Improved Behavioral Control  Outcome: Not Progressing  Intervention: Minimize Safety Risk  Recent Flowsheet Documentation  Taken 3/13/2024 1600 by Kim Polanco RN  Communication Enhancement Strategies:   call light answered in person   communication board used   extra time allowed for response   family involved in communication plan   family/caregiver assisted with communication   one-step directions provided   verbal and visual  cues paired  Enhanced Safety Measures:   assistive devices when indicated   monitor patients coagulation values   pain management   patient/family teach back on injury risk   review medications for side effects with activity   room near unit station  Trust Relationship/Rapport:   care explained   choices provided   emotional support provided   questions answered   reassurance provided   thoughts/feelings acknowledged  Taken 3/13/2024 1200 by Kim Polanco RN  Communication Enhancement Strategies:   call light answered in person   communication board used   extra time allowed for response   family involved in communication plan   family/caregiver assisted with communication   one-step directions provided   verbal and visual cues paired  Enhanced Safety Measures:   assistive devices when indicated   monitor patients coagulation values   pain management   patient/family teach back on injury risk   review medications for side effects with activity   room near unit station  Trust Relationship/Rapport:   care explained   choices provided   emotional support provided   questions answered   reassurance provided   thoughts/feelings acknowledged  Taken 3/13/2024 0800 by Kim Polanco RN  Communication Enhancement Strategies:   call light answered in person   communication board used   extra time allowed for response   family involved in communication plan   family/caregiver assisted with communication   one-step directions provided   verbal and visual cues paired  Enhanced Safety Measures:   assistive devices when indicated   monitor patients coagulation values   pain management   patient/family teach back on injury risk   review medications for side effects with activity   room near unit station  Trust Relationship/Rapport:   care explained   choices provided   emotional support provided   questions answered   reassurance provided   thoughts/feelings acknowledged  Goal: Improved Attention and Thought Clarity  Outcome:  Not Progressing  Goal: Improved Sleep  Outcome: Not Progressing  Intervention: Promote Sleep  Recent Flowsheet Documentation  Taken 3/13/2024 1600 by Kim Polanco RN  Sleep/Rest Enhancement:   music provided   relaxation techniques promoted  Taken 3/13/2024 1200 by Kim Polanco RN  Sleep/Rest Enhancement:   music provided   relaxation techniques promoted  Taken 3/13/2024 0800 by Kim Polanco RN  Sleep/Rest Enhancement:   music provided   relaxation techniques promoted     Problem: Mechanical Ventilation Invasive  Goal: Effective Communication  Outcome: Not Progressing  Intervention: Ensure Effective Communication  Recent Flowsheet Documentation  Taken 3/13/2024 1600 by Kim Polanco RN  Communication Enhancement Strategies:   call light answered in person   communication board used   extra time allowed for response   family involved in communication plan   family/caregiver assisted with communication   one-step directions provided   verbal and visual cues paired  Family/Support System Care:   caregiver stress acknowledged   involvement promoted   presence promoted   support provided  Trust Relationship/Rapport:   care explained   choices provided   emotional support provided   questions answered   reassurance provided   thoughts/feelings acknowledged  Taken 3/13/2024 1200 by Kim Polanco RN  Communication Enhancement Strategies:   call light answered in person   communication board used   extra time allowed for response   family involved in communication plan   family/caregiver assisted with communication   one-step directions provided   verbal and visual cues paired  Family/Support System Care:   caregiver stress acknowledged   involvement promoted   presence promoted   support provided  Trust Relationship/Rapport:   care explained   choices provided   emotional support provided   questions answered   reassurance provided   thoughts/feelings acknowledged  Taken 3/13/2024 0800 by Kim Polanco  RN  Communication Enhancement Strategies:   call light answered in person   communication board used   extra time allowed for response   family involved in communication plan   family/caregiver assisted with communication   one-step directions provided   verbal and visual cues paired  Family/Support System Care:   caregiver stress acknowledged   involvement promoted   presence promoted   support provided  Trust Relationship/Rapport:   care explained   choices provided   emotional support provided   questions answered   reassurance provided   thoughts/feelings acknowledged  Goal: Optimal Device Function  Outcome: Not Progressing  Intervention: Optimize Device Care and Function  Recent Flowsheet Documentation  Taken 3/13/2024 1600 by Kim Polanco RN  Airway Safety Measures:   all equipment/monitors on and audible   manual resuscitator/mask/valve at bedside  Airway/Ventilation Management: airway patency maintained  Taken 3/13/2024 1200 by Kim Polanco RN  Airway Safety Measures:   all equipment/monitors on and audible   manual resuscitator/mask/valve at bedside  Airway/Ventilation Management: airway patency maintained  Taken 3/13/2024 0800 by Kim Polanco RN  Airway Safety Measures:   all equipment/monitors on and audible   manual resuscitator/mask/valve at bedside  Airway/Ventilation Management: airway patency maintained  Oral Care:   suction provided   swabbed with antiseptic solution  Goal: Mechanical Ventilation Liberation  Outcome: Not Progressing  Intervention: Promote Extubation and Mechanical Ventilation Liberation  Recent Flowsheet Documentation  Taken 3/13/2024 1600 by Kim Polanco RN  Sleep/Rest Enhancement:   music provided   relaxation techniques promoted  Medication Review/Management: medications reviewed  Environmental Support:   calm environment promoted   caregiver consistency promoted   distractions minimized   environmental consistency promoted   rest periods encouraged  Taken  3/13/2024 1200 by Kim Polanco RN  Sleep/Rest Enhancement:   music provided   relaxation techniques promoted  Medication Review/Management: medications reviewed  Environmental Support:   calm environment promoted   caregiver consistency promoted   distractions minimized   environmental consistency promoted   rest periods encouraged  Taken 3/13/2024 0800 by Kim Polanco RN  Sleep/Rest Enhancement:   music provided   relaxation techniques promoted  Medication Review/Management: medications reviewed  Environmental Support:   calm environment promoted   caregiver consistency promoted   distractions minimized   environmental consistency promoted   rest periods encouraged  Goal: Optimal Nutrition Delivery  Outcome: Not Progressing  Goal: Absence of Device-Related Skin and Tissue Injury  Outcome: Not Progressing  Intervention: Maintain Skin and Tissue Health  Recent Flowsheet Documentation  Taken 3/13/2024 1600 by Kim Polanco RN  Device Skin Pressure Protection:   absorbent pad utilized/changed   adhesive use limited   positioning supports utilized   pressure points protected   skin-to-device areas padded   tubing/devices free from skin contact  Taken 3/13/2024 1200 by Kim Polanco RN  Device Skin Pressure Protection:   absorbent pad utilized/changed   adhesive use limited   positioning supports utilized   pressure points protected   skin-to-device areas padded   tubing/devices free from skin contact  Taken 3/13/2024 0800 by Kim Polanco RN  Device Skin Pressure Protection:   absorbent pad utilized/changed   adhesive use limited   positioning supports utilized   pressure points protected   skin-to-device areas padded   tubing/devices free from skin contact  Goal: Absence of Ventilator-Induced Lung Injury  Outcome: Not Progressing  Intervention: Facilitate Lung-Protection Measures  Recent Flowsheet Documentation  Taken 3/13/2024 1600 by Kim Polanco RN  Lung Protection Measures: fluid excess  minimized  Taken 3/13/2024 1200 by Kim Polanco RN  Lung Protection Measures: fluid excess minimized  Taken 3/13/2024 0800 by Kim Polanco RN  Lung Protection Measures: fluid excess minimized  Intervention: Prevent Ventilator-Associated Pneumonia  Recent Flowsheet Documentation  Taken 3/13/2024 1800 by Kim Polanco RN  Head of Bed (HOB) Positioning: HOB at 30 degrees  Taken 3/13/2024 1600 by Kim Polanco RN  VAP Prevention Bundle: HOB elevation maintained  Head of Bed (HOB) Positioning: HOB at 30 degrees  VAP Prevention Measures: completed  Taken 3/13/2024 1400 by Kim Polanco RN  Head of Bed (HOB) Positioning: HOB at 30 degrees  Taken 3/13/2024 1200 by Kim Polanco RN  VAP Prevention Bundle: HOB elevation maintained  Head of Bed (HOB) Positioning: HOB at 30 degrees  VAP Prevention Measures: completed  Taken 3/13/2024 1000 by Kim Polanco RN  Head of Bed (HOB) Positioning: HOB at 30 degrees  Taken 3/13/2024 0800 by iKm Polanco RN  VAP Prevention Bundle: HOB elevation maintained  Oral Care:   suction provided   swabbed with antiseptic solution  Head of Bed (HOB) Positioning: HOB at 30 degrees  VAP Prevention Measures: completed     Problem: Guillain-[place]  Syndrome  Goal: Optimal Coping  Outcome: Not Progressing  Intervention: Optimize Psychosocial Response  Recent Flowsheet Documentation  Taken 3/13/2024 1600 by Kim Polanco RN  Supportive Measures:   active listening utilized   goal-setting facilitated   positive reinforcement provided   relaxation techniques promoted  Family/Support System Care:   caregiver stress acknowledged   involvement promoted   presence promoted   support provided  Taken 3/13/2024 1200 by Kim Polanco RN  Supportive Measures:   active listening utilized   goal-setting facilitated   positive reinforcement provided   relaxation techniques promoted  Family/Support System Care:   caregiver stress acknowledged   involvement promoted   presence promoted    support provided  Taken 3/13/2024 0800 by Kim Polanco RN  Supportive Measures:   active listening utilized   goal-setting facilitated   positive reinforcement provided   relaxation techniques promoted  Family/Support System Care:   caregiver stress acknowledged   involvement promoted   presence promoted   support provided  Goal: Balanced Sympathetic/Parasympathetic Function  Outcome: Not Progressing  Goal: Effective Communication  Outcome: Not Progressing  Intervention: Establish Effective Communication  Recent Flowsheet Documentation  Taken 3/13/2024 1600 by Kim Polanco RN  Communication Enhancement Strategies:   call light answered in person   communication board used   extra time allowed for response   family involved in communication plan   family/caregiver assisted with communication   one-step directions provided   verbal and visual cues paired  Taken 3/13/2024 1200 by Kim Polanco RN  Communication Enhancement Strategies:   call light answered in person   communication board used   extra time allowed for response   family involved in communication plan   family/caregiver assisted with communication   one-step directions provided   verbal and visual cues paired  Taken 3/13/2024 0800 by Kim Polanco RN  Communication Enhancement Strategies:   call light answered in person   communication board used   extra time allowed for response   family involved in communication plan   family/caregiver assisted with communication   one-step directions provided   verbal and visual cues paired  Goal: Optimal Functional Ability  Outcome: Not Progressing  Intervention: Optimize Functional Ability  Recent Flowsheet Documentation  Taken 3/13/2024 0800 by Kim Polanco RN  Activity Management: activity adjusted per tolerance  Goal: Acceptable Pain Control  Outcome: Not Progressing  Intervention: Prevent or Manage Pain  Recent Flowsheet Documentation  Taken 3/13/2024 1728 by Kim Polanco RN  Pain Management  Interventions: medication (see MAR)  Taken 3/13/2024 1600 by Kim Polanco RN  Sleep/Rest Enhancement:   music provided   relaxation techniques promoted  Taken 3/13/2024 1200 by Kim Polanco RN  Sleep/Rest Enhancement:   music provided   relaxation techniques promoted  Taken 3/13/2024 1024 by Kim Polanco RN  Pain Management Interventions: medication (see MAR)  Taken 3/13/2024 0800 by Kim Polanco RN  Pain Management Interventions:   medication (see MAR)   emotional support   environmental changes   heat applied   music therapy   quiet environment facilitated   relaxation techniques promoted   repositioned   rest  Sleep/Rest Enhancement:   music provided   relaxation techniques promoted  Goal: Effective Oxygenation and Ventilation  Outcome: Not Progressing  Intervention: Promote Airway Secretion Clearance  Recent Flowsheet Documentation  Taken 3/13/2024 1600 by Kim Polanco RN  Cough And Deep Breathing: unable to perform  Taken 3/13/2024 1200 by Kim Polanco RN  Cough And Deep Breathing: unable to perform  Taken 3/13/2024 0800 by Kim Polanco RN  Cough And Deep Breathing: unable to perform  Activity Management: activity adjusted per tolerance  Intervention: Optimize Oxygenation and Ventilation  Recent Flowsheet Documentation  Taken 3/13/2024 1800 by Kim Polanco RN  Head of Bed (HOB) Positioning: HOB at 30 degrees  Taken 3/13/2024 1600 by Kim Polanco RN  Airway/Ventilation Management: airway patency maintained  Head of Bed (HOB) Positioning: HOB at 30 degrees  Taken 3/13/2024 1400 by Kim Polanco RN  Head of Bed (HOB) Positioning: HOB at 30 degrees  Taken 3/13/2024 1200 by Kim Polanco RN  Airway/Ventilation Management: airway patency maintained  Head of Bed (HOB) Positioning: HOB at 30 degrees  Taken 3/13/2024 1000 by Kim Polanco RN  Head of Bed (HOB) Positioning: HOB at 30 degrees  Taken 3/13/2024 0800 by Kim Polanco RN  Airway/Ventilation Management: airway  patency maintained  Head of Bed (HOB) Positioning: HOB at 30 degrees  Goal: Optimal Sensorimotor Function  Outcome: Not Progressing  Intervention: Optimize Neurologic Status  Recent Flowsheet Documentation  Taken 3/13/2024 1600 by Kim Polanco RN  Range of Motion: ROM (range of motion) performed  Taken 3/13/2024 1200 by Kim Polanco RN  Range of Motion: ROM (range of motion) performed  Taken 3/13/2024 0800 by Kim Polanco RN  Range of Motion: ROM (range of motion) performed  Goal: Oral Nutrition Intake without Aspiration  Outcome: Not Progressing  Intervention: Optimize Eating and Swallowing  Recent Flowsheet Documentation  Taken 3/13/2024 1600 by Kim Polanco RN  Aspiration Precautions: NPO pending swallow screening/evaluation  Taken 3/13/2024 1200 by Kim Polanco RN  Aspiration Precautions: NPO pending swallow screening/evaluation  Taken 3/13/2024 0800 by Kim Polanco RN  Aspiration Precautions: NPO pending swallow screening/evaluation

## 2024-03-15 NOTE — PLAN OF CARE
Problem: Adult Inpatient Plan of Care  Goal: Plan of Care Review  Description: The Plan of Care Review/Shift note should be completed every shift.  The Outcome Evaluation is a brief statement about your assessment that the patient is improving, declining, or no change.  This information will be displayed automatically on your shift  note.  Outcome: Progressing  Flowsheets (Taken 3/15/2024 7913)  Outcome Evaluation: Patient remains intubated, sedated on precidex, RASS 0 to -1 overnight, follows commands as able, has weakness, able to communicate via nodding head and letter board. Given PRN oxy and tylenol for pain, melatonin for sleep. Bradycardic majority of shift, between sinus and AFIB, on levophed, titrated to MAP >65, able to titrate slightly down overnight. TF at goal. Purewick placed, adequate output overnight, rectal tube in place with low output.  Plan of Care Reviewed With:   patient   family  Overall Patient Progress: improving   Goal Outcome Evaluation:      Plan of Care Reviewed With: patient, family    Overall Patient Progress: improvingOverall Patient Progress: improving    Outcome Evaluation: Patient remains intubated, sedated on precidex, RASS 0 to -1 overnight, follows commands as able, has weakness, able to communicate via nodding head and letter board. Given PRN oxy and tylenol for pain, melatonin for sleep. Bradycardic majority of shift, between sinus and AFIB, on levophed, titrated to MAP >65, able to titrate slightly down overnight. TF at goal. Purewick placed, adequate output overnight, rectal tube in place with low output.

## 2024-03-15 NOTE — PLAN OF CARE
Problem: Adult Inpatient Plan of Care  Goal: Optimal Comfort and Wellbeing  Intervention: Monitor Pain and Promote Comfort  Recent Flowsheet Documentation  Taken 3/15/2024 1213 by Debora Keith RN  Pain Management Interventions: (Fentanyl gtt) medication (see MAR)  Intervention: Provide Person-Centered Care  Recent Flowsheet Documentation  Taken 3/15/2024 1600 by Debora Keith RN  Trust Relationship/Rapport:   care explained   choices provided   emotional support provided   reassurance provided  Taken 3/15/2024 1200 by Debora Keith RN  Trust Relationship/Rapport:   care explained   choices provided   emotional support provided   reassurance provided  Taken 3/15/2024 0800 by Debora Keith RN  Trust Relationship/Rapport:   care explained   choices provided   emotional support provided   reassurance provided   Goal Outcome Evaluation:      Plan of Care Reviewed With: patient, spouse, family          Outcome Evaluation: Pt remains on full vent support. Lungs coarse with thick ETT secretions with frequent suctioning. Mucomyst nebs started. Pt up to chair for 2 hours. Fentanyl gtt for pain. Neuro's per flowsheet. Weaning levo gtt throughout the day with goal MAP now 60. Lasix given with good urine output. Bedside EMG done. Family updated at bedside throughout the day.

## 2024-03-15 NOTE — PROGRESS NOTES
I reviewed her chart.   Apheresis Saturday and Monday to complete 5/5 treatments as requested by neurology

## 2024-03-15 NOTE — PROGRESS NOTES
[facility] ICU RESPIRATORY NOTE        Date of Admission: 3/8/2024    Date of Intubation (most recent): 2/10/24    Reason for Mechanical Ventilation: Airway protection    Number of Days on Mechanical Ventilation: 6    Met Criteria for Spontaneous Breathing Trial: No    Reason for No Spontaneous Breathing Trial: Per MD    Significant Events Today: None    ABG Results:   Recent Labs   Lab 03/10/24  1142 03/10/24  0753   PH 7.43  --    PCO2 42  --    PO2 214*  --    HCO3 28  --    O2PER 60 25         Current Vent Settings: Vent Mode: CMV/AC  (Continuous Mandatory Ventilation/ Assist Control)  FiO2 (%): 30 %  Resp Rate (Set): 14 breaths/min  Tidal Volume (Set, mL): 420 mL  PEEP (cm H2O): 5 cmH2O  Pressure Support (cm H2O): 10 cmH2O  Resp: (!) 46      Skin Assessment: intact    Plan: We will continue to monitor on a vent    RT Jacinta on 3/15/2024 at 3:43 AM

## 2024-03-15 NOTE — PROGRESS NOTES
[facility]    Stroke Progress Note    Interval Events  No acute events overnight. MRI brain and spine completed that shows evidence of cauda equina nerve root enhancement. EMG completed, results pending. Next plasmapheresis saturday       HPI Summary  Yohana Marques is a 78 year old woman with h/o HTN, spinal stenosis s/p discectomy x 2 (most recent 8/2004), and chronic pain.     She presented on 3/8 after a fall at home in 3 to 4 days of generalized weakness worsening to an extent that from using just a cane she had to start using a walker.  Reports of gautam at their second house in [facility] but no access to canned food since mid February.      During admission patient found to have stress-induced cardiomyopathy with a EF of 23% on echo on 3/8.  On 3/10 due to labored, shallow breathing and respiratory acidosis patient was intubated.  Noted to have hypotonia and quadriparesis with hypophonia.  Given suspicion of possible GBS started on plasmapheresis.  New onset atrial fibrillation noted in hospital.    Patient had been having labile blood pressure, fluctuating heart rhythm from bradycardia to atrial fibrillation.  Initial MRI brain and MRI spine unremarkable however repeat on 3/14 revealed cauda equina nerve root enhancement.      Evaluation Summarized     MRI Brain w/wo contrast (3/9) (03/14) Small vessel disease changes   MRI Spine  C/T/L-spine 03/10 degenerative changes with superior endplate T3 compression and lumbar spondylosis.     C/T/L-spine 3/14 with cauda equina nerve root enhancement   CT Chest/Abdomen/Pelvis No evidence of malignancy.  Submucosal fibroid versus nonmalignant endometrial polyp and stable 4 mm left upper lobe nodule.       Echocardiogram 3/8        Coronary cath 3/8 EF is 23%.Entire left ventricle is severely hypokinetic but basal left ventricular function is less so     No significant CAD   EKG/Telemetry 3/10    Tachycardic (147 bpm),  "Atrial fibrillation w/ RVR   Left axis deviation, Anterior infarct, age undertermined  T wave abnormality  Episodes of bradycardia   CSF Labs Mildly elevated protein, 5 nucleated cell  Encephalitis/meningitis panel negative   Labs Myasthenia panel negative  Ganglioside Antibodies: pending  Urine porphyrins: pending  AZALEA: negative  ANCA: negative  HIV-1, HIV-2, HIV-1 p24 antigen nonreactive   EMG Completed on 3/15, report pending       Impression   Acute onset flaccid quadriparesis with respiratory insufficiency and areflexia now with evidence of cauda equina or nerve root enhancement likely in the setting of Guillain-Barré syndrome.  New onset stress-induced cardiomyopathy.     Plan  - PLEX 3/5 completed, next session on 3/16.  Alternate day pa total of 5 sessions.ttern for   - Follow CSF analysis and ganglioside antibodies  -Follow EMG report  -EMG completed, plan to start on IV heparin tomorrow given atrial fibrillation and high [id number] score.   -Follow urine Proviron's, ANCA.  -Ventilator management per ICU team.  Patient on CPAP trials.     DVT prophylaxis: heparin 5000 units subcutaneous injection q8 hrs     Patient Follow-up    - final recommendation pending work-up    We will continue to follow.     The Stroke Staff is Dr. Duffy.  Liz KENNEDY    To page a member of the stroke/neurocritical care service, click here:  [facility]   Choose \"On Call\" tab at top, then search dropdown box for \"Neurology Adult\", select location, press Enter, then look for stroke/neuro ICU/telestroke.  ______________________________________________________    Clinically Significant Risk Factors        # Hypokalemia: Lowest K = 3.1 mmol/L in last 2 days, will replace as needed  # Hypernatremia: Highest Na = 148 mmol/L in last 2 days, will monitor as appropriate  # Hypocalcemia: Lowest Ca = 7.1 mg/dL in last 2 days, will monitor and replace as appropriate     # Hypoalbuminemia: Lowest albumin = 3.3 g/dL at 3/9/2024  6:04 " "AM, will monitor as appropriate     # Hypertension: Noted on problem list        # Obesity: Estimated body mass index is 34.07 kg/m  as calculated from the following:    Height as of this encounter: 1.727 m (5' 7.99\").    Weight as of this encounter: 101.6 kg (223 lb 15.8 oz).        # Financial/Environmental Concerns: none           Medications   Scheduled Meds   acetylcysteine  2 mL Nebulization Q6H    albuterol  2.5 mg Nebulization Q6H    aspirin  81 mg Oral or Feeding Tube Daily    chlorhexidine  15 mL Mouth/Throat Q12H    fiber modular  1 packet Per Feeding Tube BID    heparin ANTICOAGULANT  5,000 Units Subcutaneous Q8H    pantoprazole  40 mg Per Feeding Tube QAM AC    Or    pantoprazole  40 mg Intravenous QAM AC    sodium chloride (PF)  3 mL Intracatheter Q8H       Infusion Meds   dexmedeTOMIDine 0.3 mcg/kg/hr (03/15/24 0815)    dextrose      fentaNYL 50 mcg/hr (03/15/24 0800)    - MEDICATION INSTRUCTIONS -      norepinephrine 0.02 mcg/kg/min (03/15/24 1530)    - MEDICATION INSTRUCTIONS -      sodium chloride 10 mL/hr at 03/13/24 0700       PRN Meds  acetaminophen, albuterol, atropine, bisacodyl, calcium carbonate, artificial tears, dextrose, fentaNYL, HOLD MEDICATION, lidocaine 4%, lidocaine (buffered or not buffered), melatonin, midazolam, midazolam, naloxone **OR** naloxone **OR** naloxone **OR** naloxone, - MEDICATION INSTRUCTIONS -, oxyCODONE **OR** oxyCODONE, - MEDICATION INSTRUCTIONS -, polyethylene glycol, senna-docusate **OR** senna-docusate, sodium chloride (PF)       PHYSICAL EXAMINATION  Temp:  [98.5  F (36.9  C)-99.3  F (37.4  C)] 99  F (37.2  C)  Pulse:  [46-75] 61  Resp:  [11-46] 22  BP: ()/() 93/44  FiO2 (%):  [30 %-50 %] 30 %  SpO2:  [92 %-100 %] 97 %      Neurologic   Mental Status:   alert, follows commands   Cranial Nerves:   pupils constricted 2 mm but reactive to light, EOM intact with no gaze palsy or nystagmus, able to wrinkle forehead, facial movement symmetric, tongue " "midline.  Facial sensation intact no shoulder shrug  Motor:   Bilateral upper extremity 1/5 with handgrip 2+/5.  Bilateral lower extremity 1/5 with knee extension 3/5 and dorsi and plantarflexion about 3+/5..   Reflexes:   See below      Left Right   Triceps 0 0   Biceps 0 0   Brachioradialis 2+ 1+   Adductor 0 0   Patellar 0 0   Achilles 0 0   Babinski Absent Absent   Sensory intact to light touch throughout   Coordination:   unable to assess d/t patient's current state  Station/Gait:  deferred      Imaging  I personally reviewed all imaging; relevant findings per HPI.     Lab Results Data   CBC  Recent Labs   Lab 03/15/24  0414 03/14/24  0521 03/13/24  0445   WBC 15.0* 12.7* 13.3*   RBC 3.06* 3.22* 3.39*   HGB 9.1* 9.5* 10.1*   HCT 27.5* 29.3* 30.6*    293 310     Basic Metabolic Panel    Recent Labs   Lab 03/15/24  1040 03/15/24  0414 03/14/24  0849 03/14/24 0527 03/14/24 0521 03/13/24 2006 03/13/24  1957   NA  --  143  --   --  146*  --  148*   POTASSIUM 3.8 3.3*  --   --  3.7   < > 3.1*   CHLORIDE  --  106  --   --  112*  --  115*   CO2  --  27  --   --  24  --  24   BUN  --  13.1  --   --  10.4  --  9.7   CR  --  0.51  --   --  0.39*  --  0.39*   GLC  --  111* 111* 115* 121*   < > 113*   YEE  --  8.3*  --   --  8.0*  --  7.1*    < > = values in this interval not displayed.     Liver Panel  Recent Labs   Lab 03/15/24  0414 03/14/24  0521 03/13/24  0445   PROTTOTAL 5.0* 5.0* 4.9*   ALBUMIN 3.9 3.5 3.8   BILITOTAL 0.7 0.4 0.6   ALKPHOS 22* 33* 24*   AST 15 16 15   ALT <5 6 <5     INR    Recent Labs   Lab Test 05/02/23  0650 01/25/23  0719 10/15/22  0728   INR 1.02 1.06 1.23*      Lipid Profile    Recent Labs   Lab Test 07/07/22  1448 07/05/21  1352 07/03/19  0927   CHOL 241* 220* 213*   HDL 78 66 64   * 130* 118*   TRIG 119 122 156*     A1C    Recent Labs   Lab Test 03/11/24  0436   A1C 5.2     Troponin    No results for input(s): \"CTROPT\", \"TROPONINIS\", \"TROPONINI\", \"GHTROP\" in the last 168 " hours.         Data

## 2024-03-15 NOTE — PROGRESS NOTES
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 On my interview she denies recent innoculation or viral illness symptoms, also denies common neoplasm review of symptoms including coughing, bloody sputum, constipation, diarrhea, bloody urine or stool.  She has had progressive global weakness which now seems to involve her respiratory muscles.  I discussed intubation and mechanical ventilation with her and her family and they were willing to proceed.        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   I have personally reviewed the daily labs, imaging studies, cultures and discussed the case with referring physician and consulting physicians.     My assessment and plan by system for this patient is as follows:    Neurology/Psychiatry:   1. 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 CSF sampling was done, patient had significant increase in the protein in the CSF, with only 3 WBCs (had significant RBCs,?  Traumatic), meningitis panel is negative.  Discussed with neurology, spine MRIs are more supportive for GBS diagnosis.    -EMG today   -Plex tomorrow  -PT/OT    2. Pain/analgesia:  fentanyl gtt and as needed   3. Sedation:  low dose precedex gtt    Cardiovascular:   Takatsubo cardiomyopathy with chf exacerbation  Afib with RVR, now in NSR  Shock, likely vasoplegic with concern for autonomic dysfunction  Hold ACEI, BB and diuretics for now due to the  shock   Stop Amio gtt due to bradycardia   Norepi if MAP < 60  Repeat limited TTE   Gentle diuresis with lasix 20 mg IV once   Repeated TTE, still hypokinesia but improved and EF is recovering to 40-45%      Pulmonary/Ventilator Management:   Acute hypercapnic respiratory failure, requiring mechanical ventilation:  worsening hypercapnia off mechanical support.   Bilateral infiltrate on chest imaging and increased secretions, ? Pulmonary edema vs lower respiratory tract infection    -Continue pressure support trial daily  -Lasix 20 mg IV once   -Procal, sputum Cx and MRSA screen  -Low threshold for antibiotics       GI and Nutrition :   1. RD consult, on TF  2.  PPI for pud prophy    Renal/Fluids/Electrolytes:   1. Creat 0.39 ok  2.  Hypokalemia  3.  Hypernatremia    -Monitor urine output and I&O, avoid nephrotoxic medications.  -Replace electrolytes per protocol  -Decrease free water boluses given the improvement in her sodium       Infectious Disease:   1. Sepsis considered but ruled out. Cultures are negative so far.  Off Abx.  -Ordered sputum Cx, procal and MRSA screen   -Monitor while off antibiotics but low threshold for empiric coverage given the leukocytosis     Endocrine:   1. BS control per ICU protocol     Hematology/Oncology:   1. Leukocytosis to 15.7 ?reactive  2. Anemia, Hb 9.1, no signs for blood loss, monitor   3. Pltlts 278 ok    ICU Prophylaxis:   1. DVT: mechanical; subcu heparin  2. VAP: HOB 30 degrees, chlorhexidine rinse  3. Stress Ulcer: PPI  4. Restraints: None indicated. Will readdress daily.   5. Wound care  -   6. Feeding -tube feed  7. Family Update:  at bedside  8. Disposition - icu    Clinically Significant Risk Factors        # Hypokalemia: Lowest K = 3.1 mmol/L in last 2 days, will replace as needed  # Hypernatremia: Highest Na = 148 mmol/L in last 2 days, will monitor as appropriate  # Hypocalcemia: Lowest Ca = 7.1 mg/dL in last 2 days, will monitor and replace as appropriate    "  # Hypoalbuminemia: Lowest albumin = 3.3 g/dL at 3/9/2024  6:04 AM, will monitor as appropriate     # Hypertension: Noted on problem list        # Obesity: Estimated body mass index is 34.07 kg/m  as calculated from the following:    Height as of this encounter: 1.727 m (5' 7.99\").    Weight as of this encounter: 101.6 kg (223 lb 15.8 oz).      # Financial/Environmental Concerns: none                    Critical Care Time: 40 min.  I spent this time (excluding procedures) personally providing and directing critical care services at the bedside and on the critical care unit.     Maria Fernanda Tran MD   Pulmonary and Critical Care                 Key Medications:      aspirin  81 mg Oral or Feeding Tube Daily    chlorhexidine  15 mL Mouth/Throat Q12H    fiber modular  1 packet Per Feeding Tube BID    heparin ANTICOAGULANT  5,000 Units Subcutaneous Q8H    pantoprazole  40 mg Per Feeding Tube QAM AC    Or    pantoprazole  40 mg Intravenous QAM AC    sodium chloride (PF)  3 mL Intracatheter Q8H      dexmedeTOMIDine 0.3 mcg/kg/hr (03/15/24 0815)    dextrose      fentaNYL 50 mcg/hr (03/15/24 0800)    - MEDICATION INSTRUCTIONS -      norepinephrine 0.03 mcg/kg/min (03/15/24 1135)    - MEDICATION INSTRUCTIONS -      sodium chloride 10 mL/hr at 03/13/24 0700              Physical Examination:   Temp:  [98.2  F (36.8  C)-99.3  F (37.4  C)] 98.5  F (36.9  C)  Pulse:  [46-74] 59  Resp:  [11-46] 25  BP: ()/() 107/60  FiO2 (%):  [30 %-50 %] 30 %  SpO2:  [92 %-100 %] 100 %        Intake/Output Summary (Last 24 hours) at 3/15/2024 1151  Last data filed at 3/15/2024 1000  Gross per 24 hour   Intake 3270.98 ml   Output 2400 ml   Net 870.98 ml         Wt Readings from Last 4 Encounters:   03/15/24 101.6 kg (223 lb 15.8 oz)   07/12/23 93 kg (205 lb)   05/02/23 92.4 kg (203 lb 12.8 oz)   04/28/23 92.4 kg (203 lb 12.8 oz)     BP - Mean:  [] 76  Vent Mode: CMV/AC  (Continuous Mandatory Ventilation/ Assist Control)  FiO2 (%): " 30 %  Resp Rate (Set): 14 breaths/min  Tidal Volume (Set, mL): 420 mL  PEEP (cm H2O): 5 cmH2O  Pressure Support (cm H2O): 10 cmH2O  Resp: 25    Recent Labs   Lab 03/10/24  1142 03/10/24  0753   PH 7.43  --    PCO2 42  --    PO2 214*  --    HCO3 28  --    O2PER 60 25       GEN: no acute distress  HEENT: head ncat, sclera anicteric, OP patent, trachea midline   PULM: unlabored synchronous with vent, clear anteriorly    CV/COR: RRR S1S2 no gallop,  No rub, no murmur  ABD: soft nontender  EXT:  warm and well perfused x4  NEURO: PERRL, patient follows commands, significant weakness in the upper and lower extremities  SKIN: no obvious rash  LINES: clean, dry intact         Data:   All data and imaging reviewed     ROUTINE ICU LABS (Last four results)  CMP  Recent Labs   Lab 03/15/24  1040 03/15/24  0414 03/14/24  0849 03/14/24  0527 03/14/24  0521 03/14/24  0210 03/14/24  0205 03/13/24  2006 03/13/24  1957 03/13/24  1414 03/13/24  1334 03/13/24  0453 03/13/24  0445 03/12/24  0755 03/12/24  0422 03/11/24  0836 03/11/24  0436   NA  --  143  --   --  146*  --   --   --  148*  --  148*  --  145   < > 148*  --  143   POTASSIUM 3.8 3.3*  --   --  3.7  --  3.8  --  3.1*  --  3.3*  --  3.9  3.9   < > 2.9*   < > 3.3*   CHLORIDE  --  106  --   --  112*  --   --   --  115*  --  113*  --  113*   < > 114*  --  108*   CO2  --  27  --   --  24  --   --   --  24  --  24  --  24   < > 25  --  23   ANIONGAP  --  10  --   --  10  --   --   --  9  --  11  --  8   < > 9  --  12   GLC  --  111* 111* 115* 121*   < >  --    < > 113*   < > 110*   < > 110*   < > 107*   < > 98   BUN  --  13.1  --   --  10.4  --   --   --  9.7  --  10.2  --  11.5   < > 14.2  --  13.6   CR  --  0.51  --   --  0.39*  --   --   --  0.39*  --  0.42*  --  0.47*   < > 0.58  --  0.64   GFRESTIMATED  --  >90  --   --  >90  --   --   --  >90  --  >90  --  >90   < > >90  --  >90   YEE  --  8.3*  --   --  8.0*  --   --   --  7.1*  --  7.6*  --  7.9*   < > 7.7*  --  8.5*   MAG  " --   --   --   --  1.7  --   --   --   --   --   --   --  1.7  --  1.9  --  1.8   PHOS  --   --   --   --  2.6  --   --   --  1.9*  --  2.1*  --  1.8*  --  2.0*   < > 1.8*   PROTTOTAL  --  5.0*  --   --  5.0*  --   --   --   --   --   --   --  4.9*  --  4.9*  --  5.4*   ALBUMIN  --  3.9  --   --  3.5  --   --   --   --   --   --   --  3.8  --  3.4*  --  4.1   BILITOTAL  --  0.7  --   --  0.4  --   --   --   --   --   --   --  0.6  --  0.5  --  0.8   ALKPHOS  --  22*  --   --  33*  --   --   --   --   --   --   --  24*  --  37*  --  33*   AST  --  15  --   --  16  --   --   --   --   --   --   --  15  --  14  --  17   ALT  --  <5  --   --  6  --   --   --   --   --   --   --  <5  --  6  --  6    < > = values in this interval not displayed.     CBC  Recent Labs   Lab 03/15/24  0414 03/14/24  0521 03/13/24  0445 03/12/24  0422   WBC 15.0* 12.7* 13.3* 11.7*   RBC 3.06* 3.22* 3.39* 3.38*   HGB 9.1* 9.5* 10.1* 9.8*   HCT 27.5* 29.3* 30.6* 30.6*   MCV 90 91 90 91   MCH 29.7 29.5 29.8 29.0   MCHC 33.1 32.4 33.0 32.0   RDW 17.7* 17.5* 17.2* 16.7*    293 310 315     INRNo lab results found in last 7 days.  Arterial Blood Gas  Recent Labs   Lab 03/10/24  1142 03/10/24  4788   PH 7.43  --    PCO2 42  --    PO2 214*  --    HCO3 28  --    O2PER 60 25       All cultures:  No results for input(s): \"CULT\" in the last 168 hours.  Recent Results (from the past 24 hour(s))   MR Brain w/o & w Contrast    Narrative    EXAM: MR BRAIN W/O AND W CONTRAST  LOCATION: [facility]  DATE: 3/9/2024    INDICATION: Diffuse weakness.  COMPARISON: CT head dated 03/08/2024.  CONTRAST: 10 mL Gadavist.  TECHNIQUE: Routine multiplanar multisequence head MRI without and with intravenous contrast.    FINDINGS:  INTRACRANIAL CONTENTS: No acute or subacute infarct. No mass, acute hemorrhage, or extra-axial fluid collections. Patchy nonspecific T2/FLAIR hyperintensities within the cerebral white matter, left greater than " right, most consistent with mild to   moderate chronic microvascular ischemic change. Mild generalized cerebral atrophy. No hydrocephalus. Normal position of the cerebellar tonsils. No pathologic contrast enhancement.    SELLA: No abnormality accounting for technique.    OSSEOUS STRUCTURES/SOFT TISSUES: Normal marrow signal. The major intracranial vascular flow voids are maintained.     ORBITS: Prior bilateral cataract surgery. Visualized portions of the orbits are otherwise unremarkable.     SINUSES/MASTOIDS: No paranasal sinus mucosal disease. Partial right mastoid effusion.      Impression    IMPRESSION:  1.  No acute intracranial process.  2.  Generalized brain atrophy and presumed microvascular ischemic changes as detailed above.   XR Chest Port 1 View    Narrative    EXAM: XR CHEST PORT 1 VIEW  LOCATION: [facility]  DATE: 3/10/2024    INDICATION: ET tube placement, OG placement, dialysis port,  COMPARISON: 03/08/2024      Impression    IMPRESSION: Intubation. The endotracheal tube tip is 4 cm above the milan. Right IJ dialysis catheter has been placed with tip at the SVC/right atrial junction. NG tube is in the stomach. No pneumothorax or pleural effusion. Mild pulmonary edema. Normal   heart size.         Billing: This patient is critically ill: Yes. Total critical care time today 60 min, excluding procedures.       Past Medical/surgical hx, meds, allergies, social history, family history     Past Medical History:   Diagnosis Date    HTN (hypertension)     Hyperlipidemia     Leiomyoma of uterus, unspecified     Numbness and tingling     spinal stenosis causes numbness and tingling on feet and knees bilaterally    Osteoarthrosis, unspecified whether generalized or localized, unspecified site     Other and unspecified disc disorder of lumbar region     Other chronic pain     Primary osteoarthritis of left hip 12/18/2016    Spinal stenosis     S/P diskectomy x 2, most recently 8/2014      Past Surgical History:   Procedure Laterality Date    ARTHROPLASTY HIP Left 12/12/2016    Procedure: ARTHROPLASTY HIP;  Surgeon: Leonid Morifn MD;  Location:  OR    BACK SURGERY      COLONOSCOPY N/A 07/20/2017    Procedure: COMBINED COLONOSCOPY, SINGLE OR MULTIPLE BIOPSY/POLYPECTOMY BY BIOPSY;  colonoscopy;  Surgeon: Catarino Salas MD;  Location:  GI    CV CORONARY ANGIOGRAM N/A 3/8/2024    Procedure: Coronary Angiogram;  Surgeon: Alicia Dai MD;  Location:  HEART CARDI[facility] CATH LAB    DISCECTOMY LUMBAR POSTERIOR MICROSCOPIC TWO LEVELS  08/11/2014    Procedure: DISCECTOMY LUMBAR POSTERIOR MICROSCOPIC TWO LEVELS;  Surgeon: Warren Herring MD;  Location:  OR    ENDOSCOPIC RETROGRADE CHOLANGIOPANCREATOGRAM N/A 10/14/2022    Procedure: ENDOSCOPIC RETROGRADE CHOLANGIOPANCREATOGRAPHY with biliary spincterotomy, biliary stent placement, cholangioscopy;  Surgeon: Carson Franklin MD;  Location: [facility] OR    ENDOSCOPIC RETROGRADE CHOLANGIOPANCREATOGRAM N/A 5/2/2023    Procedure: ENDOSCOPIC RETROGRADE CHOLANGIOPANCREATOGRAPHY with biliary stone and stent removal;  Surgeon: Carson Franklin MD;  Location: [facility] OR    ENDOSCOPIC RETROGRADE CHOLANGIOPANCREATOGRAPHY, EXCHANGE TUBE/STENT N/A 1/25/2023    Procedure: ENDOSCOPIC RETROGRADE CHOLANGIOPANCREATOGRAPHY WITH BILIARY STENT EXCHANGE AND DEBRIS REMOVAL;  Surgeon: Carson Franklin MD;  Location: [facility] OR    EXPLORE COMMON BILE DUCT N/A 12/01/2022    Procedure: COMMON BILE DUCT REPAIR;  Surgeon: Thai Garcia MD;  Location: [facility] OR    LAPAROSCOPIC CHOLECYSTECTOMY WITH CHOLANGIOGRAMS N/A 12/01/2022    Procedure: EXPLORATORY LAPAROSCOPY, LAPAROSCOPIC PARTIAL CHOLECYSTECTOMY WITH RETREVIAL OF STONES WITH CHOLANGIOGRAM; choledochoscopy, INTRAOPERATIVE ULTRASOUND;  Surgeon: Thai Garcia MD;  Location: [facility] OR    LAPAROSCOPY DIAGNOSTIC (GENERAL) N/A 10/06/2022    Procedure: Diagnostic laparoscopy with liver biopsy;  Surgeon:  Warren Zhang MD;  Location: [facility] OR    ORTHOPEDIC SURGERY      TOTAL KNEE ARTHROPLASTY Left     [facility] NONSPECIFIC PROCEDURE  1996    lumbar discectomy    [facility] NONSPECIFIC PROCEDURE      cervical cone biopsy    [facility] NONSPECIFIC PROCEDURE      Left knee replacement     No current facility-administered medications on file prior to encounter.  acetaminophen (TYLENOL) 325 MG tablet, Take 2 tablets (650 mg) by mouth every 8 hours as needed for mild pain  aspirin 81 MG EC tablet, Take 81 mg by mouth daily  calcium-vitamin D (CALTRATE) 600-400 MG-UNIT per tablet, Take 1 tablet by mouth 2 times daily  celecoxib (CELEBREX) 200 MG capsule, Take 1 capsule (200 mg) by mouth daily  lisinopril (ZESTRIL) 10 MG tablet, Take 1 tablet (10 mg) by mouth daily  Multiple Vitamins-Minerals (CENTRUM SILVER) per tablet, Take 1 tablet by mouth daily  traMADol (ULTRAM) 50 MG tablet, TAKE 1 TO 2 TABLETS BY MOUTH EVERY DAY FOR SEVERE PAIN  vitamin D3 (CHOLECALCIFEROL) 50 mcg (2000 units) tablet, Take 1 tablet (50 mcg) by mouth daily       No Known Allergies  Social History     Socioeconomic History    Marital status:      Spouse name: Not on file    Number of children: Not on file    Years of education: Not on file    Highest education level: Not on file   Occupational History    Not on file   Tobacco Use    Smoking status: Never    Smokeless tobacco: Never   Vaping Use    Vaping Use: Never used   Substance and Sexual Activity    Alcohol use: Yes     Alcohol/week: 0.0 standard drinks of alcohol     Comment: social    Drug use: No    Sexual activity: Yes     Partners: Male   Other Topics Concern    Parent/sibling w/ CABG, MI or angioplasty before 65F 55M? Not Asked   Social History Narrative    Not on file     Social Determinants of Health     Financial Resource Strain: Not on file   Food Insecurity: Not on file   Transportation Needs: Not on file   Physical Activity: Not on file   Stress: Not on file   Social Connections: Not on file    Interpersonal Safety: Not on file   Housing Stability: Not on file     Family History   Problem Relation Age of Onset    Family History Negative Mother          age 64 mva    Cancer Father          age 84 lung ca    Family History Negative Sister     Breast Cancer Paternal Grandmother     Genetic Disorder Other         daughter has Autoimmune disease

## 2024-03-16 NOTE — PLAN OF CARE
Problem: Adult Inpatient Plan of Care  Goal: Plan of Care Review  Description: The Plan of Care Review/Shift note should be completed every shift.  The Outcome Evaluation is a brief statement about your assessment that the patient is improving, declining, or no change.  This information will be displayed automatically on your shift  note.  Outcome: Progressing  Flowsheets (Taken 3/16/2024 0556)  Outcome Evaluation: Patient alert, mouthing words around ETT and using letter board to communicate. Having thick secreations, suctioning with repositioning, seem less than yesterday. On fentanyl drip, given a few cliniction boluses and oxy/tylenol x2, had to restart levophed for MAP <60 overnight, remains on precidex. Purewick in place with good output overnight, FMS in place, still having liquid stool, lots of gas. Tolerating tube feedings  Plan of Care Reviewed With:   patient   family  Overall Patient Progress: no change   Goal Outcome Evaluation:      Plan of Care Reviewed With: patient, family    Overall Patient Progress: no changeOverall Patient Progress: no change    Outcome Evaluation: Patient alert, mouthing words around ETT and using letter board to communicate. Having thick secreations, suctioning with repositioning, seem less than yesterday. On fentanyl drip, given a few cliniction boluses and oxy/tylenol x2, had to restart levophed for MAP <60 overnight, remains on precidex. Purewick in place with good output overnight, FMS in place, still having liquid stool, lots of gas. Tolerating tube feedings

## 2024-03-16 NOTE — PROGRESS NOTES
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 On my interview she denies recent innoculation or viral illness symptoms, also denies common neoplasm review of symptoms including coughing, bloody sputum, constipation, diarrhea, bloody urine or stool.  She has had progressive global weakness which now seems to involve her respiratory muscles.  I discussed intubation and mechanical ventilation with her and her family and they were willing to proceed.        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   I have personally reviewed the daily labs, imaging studies, cultures and discussed the case with referring physician and consulting physicians.     My assessment and plan by system for this patient is as follows:    Neurology/Psychiatry:   1. 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 CSF sampling was done, patient had significant increase in the protein in the CSF, with only 3 WBCs (had significant RBCs,?  Traumatic), meningitis panel is negative.  Discussed with neurology, spine MRIs are more supportive for GBS diagnosis. EMG done and pending results.    -Plex today  -Continue PT    2. Pain/analgesia:  fentanyl gtt and as needed   3. Sedation:  low dose precedex gtt    Cardiovascular:   Takatsubo cardiomyopathy with chf exacerbation  Afib with RVR, now in NSR  Shock, likely vasoplegic with concern for autonomic dysfunction  Hold ACEI, BB for now  Norepi  if MAP < 60  Gentle diuresis with lasix 20 mg IV BID x2  Repeated TTE, still hypokinesia but improved and EF is recovering to 40-45%  Heparin gtt was started by NCC due to CHADS2 vasc score of 4      Pulmonary/Ventilator Management:   Acute hypercapnic respiratory failure, requiring mechanical ventilation:  worsening hypercapnia off mechanical support.   Bilateral infiltrate on chest imaging and increased secretions, ? Pulmonary edema vs lower respiratory tract infection    -Continue pressure support trial daily  -Lasix 20 mg IV BID  -Sputum Cx grew staph aureus and K. pneumoniae  -Started IV zosyn, MRSA PCR from the nares negative, unlikely to be MRSA pneumonia       GI and Nutrition :   1. RD consult, on TF  2.  PPI for pud prophy    Renal/Fluids/Electrolytes:   1. Creat 0.39 ok  2.  Hypokalemia  3.  Hypernatremia    -Monitor urine output and I&O, avoid nephrotoxic medications.  -Replace electrolytes per protocol  -Decrease free water boluses given the improvement in her sodium       Infectious Disease:   1. Sepsis, likely secondary to VAP  -Zosyn and workup as above    Endocrine:   1. BS control per ICU protocol     Hematology/Oncology:   1. Leukocytosis to 14.5 ?reactive vs HAP  2. Anemia, Hb 9.1, no signs for blood loss, monitor   3. Pltlts 251 ok    ICU Prophylaxis:   1. DVT: mechanical; Heparin  2. VAP: HOB 30 degrees, chlorhexidine rinse  3. Stress Ulcer: PPI  4. Restraints: None indicated. Will readdress daily.   5. Wound care  -   6. Feeding -tube feed  7. Family Update:  at bedside  8. Disposition - icu    Clinically Significant Risk Factors        # Hypokalemia: Lowest K = 3.3 mmol/L in last 2 days, will replace as needed   # Hypocalcemia: Lowest Ca = 8.3 mg/dL in last 2 days, will monitor and replace as appropriate   # Hypomagnesemia: Lowest Mg = 1.6 mg/dL in last 2 days, will replace as needed   # Hypoalbuminemia: Lowest albumin = 3.3 g/dL at 3/9/2024  6:04 AM, will monitor as appropriate     #  "Hypertension: Noted on problem list        # Obesity: Estimated body mass index is 34.07 kg/m  as calculated from the following:    Height as of this encounter: 1.727 m (5' 7.99\").    Weight as of this encounter: 101.6 kg (223 lb 15.8 oz).      # Financial/Environmental Concerns: none                    Critical Care Time: 40 min.  I spent this time (excluding procedures) personally providing and directing critical care services at the bedside and on the critical care unit.     Maria Fernanda Tran MD   Pulmonary and Critical Care                 Key Medications:      acetylcysteine  2 mL Nebulization Q6H    albuterol  2.5 mg Nebulization Q6H    aspirin  81 mg Oral or Feeding Tube Daily    chlorhexidine  15 mL Mouth/Throat Q12H    fiber modular  1 packet Per Feeding Tube BID    furosemide  20 mg Intravenous Q12H    heparin ANTICOAGULANT  5,000 Units Subcutaneous Q8H    pantoprazole  40 mg Per Feeding Tube QAM AC    Or    pantoprazole  40 mg Intravenous QAM AC    piperacillin-tazobactam  4.5 g Intravenous Q6H    sodium chloride (PF)  3 mL Intracatheter Q8H      dexmedeTOMIDine 0.3 mcg/kg/hr (03/16/24 0754)    dextrose      fentaNYL 50 mcg/hr (03/15/24 0800)    - MEDICATION INSTRUCTIONS -      norepinephrine 0.03 mcg/kg/min (03/16/24 0656)    - MEDICATION INSTRUCTIONS -      sodium chloride 10 mL/hr at 03/13/24 0700              Physical Examination:   Temp:  [98.1  F (36.7  C)-99  F (37.2  C)] 98.5  F (36.9  C)  Pulse:  [50-99] 58  Resp:  [14-92] 27  BP: ()/() 114/60  FiO2 (%):  [30 %] 30 %  SpO2:  [92 %-100 %] 99 %        Intake/Output Summary (Last 24 hours) at 3/16/2024 1710  Last data filed at 3/16/2024 1600  Gross per 24 hour   Intake 3063.57 ml   Output 2550 ml   Net 513.57 ml         Wt Readings from Last 4 Encounters:   03/15/24 101.6 kg (223 lb 15.8 oz)   07/12/23 93 kg (205 lb)   05/02/23 92.4 kg (203 lb 12.8 oz)   04/28/23 92.4 kg (203 lb 12.8 oz)     BP - Mean:  [] 86  Vent Mode: CMV/AC  " (Continuous Mandatory Ventilation/ Assist Control)  FiO2 (%): 30 %  Resp Rate (Set): 14 breaths/min  Tidal Volume (Set, mL): 420 mL  PEEP (cm H2O): 5 cmH2O  Pressure Support (cm H2O): 10 cmH2O  Resp: 27    Recent Labs   Lab 03/10/24  1142 03/10/24  0753   PH 7.43  --    PCO2 42  --    PO2 214*  --    HCO3 28  --    O2PER 60 25       GEN: no acute distress  HEENT: head ncat, sclera anicteric, OP patent, trachea midline   PULM: unlabored synchronous with vent, clear anteriorly    CV/COR: RRR S1S2 no gallop,  No rub, no murmur  ABD: soft nontender  EXT:  warm and well perfused x4  NEURO: PERRL, patient follows commands, significant weakness in the upper and lower extremities  SKIN: no obvious rash  LINES: clean, dry intact         Data:   All data and imaging reviewed     ROUTINE ICU LABS (Last four results)  CMP  Recent Labs   Lab 03/16/24  0504 03/15/24  1040 03/15/24  0414 03/14/24  0849 03/14/24  0527 03/14/24  0521 03/13/24  2006 03/13/24  1957 03/13/24  1414 03/13/24  1334 03/13/24  0453 03/13/24  0445 03/12/24  0755 03/12/24  0422     --  143  --   --  146*  --  148*  --  148*  --  145   < > 148*   POTASSIUM 3.8 3.8 3.3*  --   --  3.7   < > 3.1*  --  3.3*  --  3.9  3.9   < > 2.9*   CHLORIDE 104  --  106  --   --  112*  --  115*  --  113*  --  113*   < > 114*   CO2 24  --  27  --   --  24  --  24  --  24  --  24   < > 25   ANIONGAP 13  --  10  --   --  10  --  9  --  11  --  8   < > 9   *  --  111* 111* 115* 121*   < > 113*   < > 110*   < > 110*   < > 107*   BUN 19.2  --  13.1  --   --  10.4  --  9.7  --  10.2  --  11.5   < > 14.2   CR 0.56  --  0.51  --   --  0.39*  --  0.39*  --  0.42*  --  0.47*   < > 0.58   GFRESTIMATED >90  --  >90  --   --  >90  --  >90  --  >90  --  >90   < > >90   YEE 8.4*  --  8.3*  --   --  8.0*  --  7.1*  --  7.6*  --  7.9*   < > 7.7*   MAG 1.6*  --   --   --   --  1.7  --   --   --   --   --  1.7  --  1.9   PHOS 3.0  --   --   --   --  2.6  --  1.9*  --  2.1*  --  1.8*   "--  2.0*   PROTTOTAL 5.7*  --  5.0*  --   --  5.0*  --   --   --   --   --  4.9*  --  4.9*   ALBUMIN 3.9  --  3.9  --   --  3.5  --   --   --   --   --  3.8  --  3.4*   BILITOTAL 0.8  --  0.7  --   --  0.4  --   --   --   --   --  0.6  --  0.5   ALKPHOS 35*  --  22*  --   --  33*  --   --   --   --   --  24*  --  37*   AST 19  --  15  --   --  16  --   --   --   --   --  15  --  14   ALT 8  --  <5  --   --  6  --   --   --   --   --  <5  --  6    < > = values in this interval not displayed.     CBC  Recent Labs   Lab 03/16/24  0504 03/15/24  0414 03/14/24  0521 03/13/24  0445   WBC 14.5* 15.0* 12.7* 13.3*   RBC 3.02* 3.06* 3.22* 3.39*   HGB 9.1* 9.1* 9.5* 10.1*   HCT 27.0* 27.5* 29.3* 30.6*   MCV 89 90 91 90   MCH 30.1 29.7 29.5 29.8   MCHC 33.7 33.1 32.4 33.0   RDW 18.7* 17.7* 17.5* 17.2*    278 293 310     INRNo lab results found in last 7 days.  Arterial Blood Gas  Recent Labs   Lab 03/10/24  1142 03/10/24  0753   PH 7.43  --    PCO2 42  --    PO2 214*  --    HCO3 28  --    O2PER 60 25       All cultures:  No results for input(s): \"CULT\" in the last 168 hours.  Recent Results (from the past 24 hour(s))   MR Brain w/o & w Contrast    Narrative    EXAM: MR BRAIN W/O AND W CONTRAST  LOCATION: [facility]  DATE: 3/9/2024    INDICATION: Diffuse weakness.  COMPARISON: CT head dated 03/08/2024.  CONTRAST: 10 mL Gadavist.  TECHNIQUE: Routine multiplanar multisequence head MRI without and with intravenous contrast.    FINDINGS:  INTRACRANIAL CONTENTS: No acute or subacute infarct. No mass, acute hemorrhage, or extra-axial fluid collections. Patchy nonspecific T2/FLAIR hyperintensities within the cerebral white matter, left greater than right, most consistent with mild to   moderate chronic microvascular ischemic change. Mild generalized cerebral atrophy. No hydrocephalus. Normal position of the cerebellar tonsils. No pathologic contrast enhancement.    SELLA: No abnormality accounting for " technique.    OSSEOUS STRUCTURES/SOFT TISSUES: Normal marrow signal. The major intracranial vascular flow voids are maintained.     ORBITS: Prior bilateral cataract surgery. Visualized portions of the orbits are otherwise unremarkable.     SINUSES/MASTOIDS: No paranasal sinus mucosal disease. Partial right mastoid effusion.      Impression    IMPRESSION:  1.  No acute intracranial process.  2.  Generalized brain atrophy and presumed microvascular ischemic changes as detailed above.   XR Chest Port 1 View    Narrative    EXAM: XR CHEST PORT 1 VIEW  LOCATION: [facility]  DATE: 3/10/2024    INDICATION: ET tube placement, OG placement, dialysis port,  COMPARISON: 03/08/2024      Impression    IMPRESSION: Intubation. The endotracheal tube tip is 4 cm above the milan. Right IJ dialysis catheter has been placed with tip at the SVC/right atrial junction. NG tube is in the stomach. No pneumothorax or pleural effusion. Mild pulmonary edema. Normal   heart size.         Billing: This patient is critically ill: Yes. Total critical care time today 60 min, excluding procedures.       Past Medical/surgical hx, meds, allergies, social history, family history     Past Medical History:   Diagnosis Date    HTN (hypertension)     Hyperlipidemia     Leiomyoma of uterus, unspecified     Numbness and tingling     spinal stenosis causes numbness and tingling on feet and knees bilaterally    Osteoarthrosis, unspecified whether generalized or localized, unspecified site     Other and unspecified disc disorder of lumbar region     Other chronic pain     Primary osteoarthritis of left hip 12/18/2016    Spinal stenosis     S/P diskectomy x 2, most recently 8/2014     Past Surgical History:   Procedure Laterality Date    ARTHROPLASTY HIP Left 12/12/2016    Procedure: ARTHROPLASTY HIP;  Surgeon: Leonid Morfin MD;  Location:  OR    BACK SURGERY      COLONOSCOPY N/A 07/20/2017    Procedure: COMBINED  COLONOSCOPY, SINGLE OR MULTIPLE BIOPSY/POLYPECTOMY BY BIOPSY;  colonoscopy;  Surgeon: Catarino Salas MD;  Location:  GI    CV CORONARY ANGIOGRAM N/A 3/8/2024    Procedure: Coronary Angiogram;  Surgeon: Alicia Dai MD;  Location:  HEART CARDIAC CATH LAB    DISCECTOMY LUMBAR POSTERIOR MICROSCOPIC TWO LEVELS  08/11/2014    Procedure: DISCECTOMY LUMBAR POSTERIOR MICROSCOPIC TWO LEVELS;  Surgeon: Warren Herring MD;  Location:  OR    ENDOSCOPIC RETROGRADE CHOLANGIOPANCREATOGRAM N/A 10/14/2022    Procedure: ENDOSCOPIC RETROGRADE CHOLANGIOPANCREATOGRAPHY with biliary spincterotomy, biliary stent placement, cholangioscopy;  Surgeon: Carson Franklin MD;  Location: [facility] OR    ENDOSCOPIC RETROGRADE CHOLANGIOPANCREATOGRAM N/A 5/2/2023    Procedure: ENDOSCOPIC RETROGRADE CHOLANGIOPANCREATOGRAPHY with biliary stone and stent removal;  Surgeon: Carson Franklin MD;  Location: [facility] OR    ENDOSCOPIC RETROGRADE CHOLANGIOPANCREATOGRAPHY, EXCHANGE TUBE/STENT N/A 1/25/2023    Procedure: ENDOSCOPIC RETROGRADE CHOLANGIOPANCREATOGRAPHY WITH BILIARY STENT EXCHANGE AND DEBRIS REMOVAL;  Surgeon: Carson Franklin MD;  Location: [facility] OR    EXPLORE COMMON BILE DUCT N/A 12/01/2022    Procedure: COMMON BILE DUCT REPAIR;  Surgeon: Thai Garcia MD;  Location: [facility] OR    LAPAROSCOPIC CHOLECYSTECTOMY WITH CHOLANGIOGRAMS N/A 12/01/2022    Procedure: EXPLORATORY LAPAROSCOPY, LAPAROSCOPIC PARTIAL CHOLECYSTECTOMY WITH RETREVIAL OF STONES WITH CHOLANGIOGRAM; choledochoscopy, INTRAOPERATIVE ULTRASOUND;  Surgeon: Thai Garcia MD;  Location: [facility] OR    LAPAROSCOPY DIAGNOSTIC (GENERAL) N/A 10/06/2022    Procedure: Diagnostic laparoscopy with liver biopsy;  Surgeon: Warren Zhang MD;  Location:  OR    ORTHOPEDIC SURGERY      TOTAL KNEE ARTHROPLASTY Left     ZC NONSPECIFIC PROCEDURE  1996    lumbar discectomy    Z NONSPECIFIC PROCEDURE      cervical cone biopsy    ZZC NONSPECIFIC PROCEDURE      Left knee  replacement     No current facility-administered medications on file prior to encounter.  acetaminophen (TYLENOL) 325 MG tablet, Take 2 tablets (650 mg) by mouth every 8 hours as needed for mild pain  aspirin 81 MG EC tablet, Take 81 mg by mouth daily  calcium-vitamin D (CALTRATE) 600-400 MG-UNIT per tablet, Take 1 tablet by mouth 2 times daily  celecoxib (CELEBREX) 200 MG capsule, Take 1 capsule (200 mg) by mouth daily  lisinopril (ZESTRIL) 10 MG tablet, Take 1 tablet (10 mg) by mouth daily  Multiple Vitamins-Minerals (CENTRUM SILVER) per tablet, Take 1 tablet by mouth daily  traMADol (ULTRAM) 50 MG tablet, TAKE 1 TO 2 TABLETS BY MOUTH EVERY DAY FOR SEVERE PAIN  vitamin D3 (CHOLECALCIFEROL) 50 mcg (2000 units) tablet, Take 1 tablet (50 mcg) by mouth daily       No Known Allergies  Social History     Socioeconomic History    Marital status:      Spouse name: Not on file    Number of children: Not on file    Years of education: Not on file    Highest education level: Not on file   Occupational History    Not on file   Tobacco Use    Smoking status: Never    Smokeless tobacco: Never   Vaping Use    Vaping Use: Never used   Substance and Sexual Activity    Alcohol use: Yes     Alcohol/week: 0.0 standard drinks of alcohol     Comment: social    Drug use: No    Sexual activity: Yes     Partners: Male   Other Topics Concern    Parent/sibling w/ CABG, MI or angioplasty before 65F 55M? Not Asked   Social History Narrative    Not on file     Social Determinants of Health     Financial Resource Strain: Not on file   Food Insecurity: Not on file   Transportation Needs: Not on file   Physical Activity: Not on file   Stress: Not on file   Social Connections: Not on file   Interpersonal Safety: Not on file   Housing Stability: Not on file     Family History   Problem Relation Age of Onset    Family History Negative Mother          age 64 mva    Cancer Father          age 84 lung ca    Family History  Negative Sister     Breast Cancer Paternal Grandmother     Genetic Disorder Other         daughter has Autoimmune disease

## 2024-03-16 NOTE — PROGRESS NOTES
[facility] ICU RESPIRATORY NOTE           Date of Admission: 3/8/2024     Date of Intubation (most recent): 2/10/24     Reason for Mechanical Ventilation: Airway protection     Number of Days on Mechanical Ventilation: 7     Met Criteria for Spontaneous Breathing Trial: No     Reason for No Spontaneous Breathing Trial: Per MD     Significant Events Today: None overnight     ABG Results:   Recent Labs   Lab 03/10/24  1142 03/10/24  0753   PH 7.43  --    PCO2 42  --    PO2 214*  --    HCO3 28  --    O2PER 60 25     Vent Mode: CMV/AC  (Continuous Mandatory Ventilation/ Assist Control)  FiO2 (%): 30 %  Resp Rate (Set): 14 breaths/min  Tidal Volume (Set, mL): 420 mL  PEEP (cm H2O): 5 cmH2O  Pressure Support (cm H2O): 10 cmH2O  Resp: 20    NEIL Dias, RRT

## 2024-03-16 NOTE — PROGRESS NOTES
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               -3/5 treatment today, then Saturday/Monday to complete 5/5 treatments.   # Severe cardiomyopathy with EF of only 23%:   # Afib  # Respiratory failure: Intubated  # ID: on Zosyn emperically     Plan:  #  4/5 apheresis treatment and 5/5 on Monday. I discussed the plan with family at the bedside.           Interval History:     Remains intubated, FIO2 at 30%.   On low dose levo.  On hep gtt  On Zosyn  Sputum is growing GPC  Awake and following verbal commands            Medications and Allergies:      acetylcysteine  2 mL Nebulization Q6H    albuterol  2.5 mg Nebulization Q6H    [Held by provider] aspirin  81 mg Oral or Feeding Tube Daily    chlorhexidine  15 mL Mouth/Throat Q12H    fiber modular  1 packet Per Feeding Tube BID    furosemide  20 mg Intravenous Q12H    pantoprazole  40 mg Per Feeding Tube QAM AC    Or    pantoprazole  40 mg Intravenous QAM AC    piperacillin-tazobactam  4.5 g Intravenous Q6H    sodium chloride (PF)  3 mL Intracatheter Q8H      No Known Allergies         Physical Exam:   Vitals were reviewed   , Blood pressure 103/53, pulse 72, temperature 98.5  F (36.9  C), temperature source Axillary, resp. rate 19, height 1.727 m (5' 7.99\"), weight 101.6 kg (223 lb 15.8 oz), SpO2 96%, not currently breastfeeding.    Wt Readings from Last 3 Encounters:   03/15/24 101.6 kg (223 lb 15.8 oz)   07/12/23 93 kg (205 lb)   05/02/23 92.4 kg (203 lb 12.8 oz)       Intake/Output Summary (Last 24 hours) at 3/16/2024 1009  Last data filed at 3/16/2024 0900  Gross per 24 hour   Intake 2612.96 ml   Output 1950 ml   Net 662.96 ml       GENERAL APPEARANCE: NAD  HEENT:  Eyes/ears/nose/neck grossly normal  RESP: vent sound  CV: Irregular  ABDOMEN: soft.  EXTREMITIES/SKIN: + edema  NEURO: awake and following commands  R temp CVC CDI         Data:     CBC RESULTS:     Recent Labs   Lab 03/16/24  0504 " 03/15/24  0414 03/14/24  0521 03/13/24  0445 03/12/24  0422 03/11/24  0436   WBC 14.5* 15.0* 12.7* 13.3* 11.7* 16.8*   RBC 3.02* 3.06* 3.22* 3.39* 3.38* 3.78*   HGB 9.1* 9.1* 9.5* 10.1* 9.8* 10.7*   HCT 27.0* 27.5* 29.3* 30.6* 30.6* 33.9*    278 293 310 315 345       Basic Metabolic Panel:  Recent Labs   Lab 03/16/24  0504 03/15/24  1040 03/15/24  0414 03/14/24  0849 03/14/24  0527 03/14/24  0521 03/14/24  0210 03/14/24  0205 03/13/24 2006 03/13/24  1957 03/13/24  1414 03/13/24  1334 03/13/24  0453 03/13/24  0445     --  143  --   --  146*  --   --   --  148*  --  148*  --  145   POTASSIUM 3.8 3.8 3.3*  --   --  3.7  --  3.8  --  3.1*  --  3.3*  --  3.9  3.9   CHLORIDE 104  --  106  --   --  112*  --   --   --  115*  --  113*  --  113*   CO2 24  --  27  --   --  24  --   --   --  24  --  24  --  24   BUN 19.2  --  13.1  --   --  10.4  --   --   --  9.7  --  10.2  --  11.5   CR 0.56  --  0.51  --   --  0.39*  --   --   --  0.39*  --  0.42*  --  0.47*   *  --  111* 111* 115* 121* 113*  --    < > 113*   < > 110*   < > 110*   YEE 8.4*  --  8.3*  --   --  8.0*  --   --   --  7.1*  --  7.6*  --  7.9*    < > = values in this interval not displayed.       INRNo lab results found in last 7 days.   Attestation:   I have reviewed today's relevant vital signs, notes, medications, labs and imaging.    Stoney Diamond MD  [facility] - Nephrology  Office phone :[phone]  Pager: [phone]

## 2024-03-16 NOTE — PROGRESS NOTES
[facility]    Stroke Progress Note    Interval Events  No acute events overnight. Patient using letter board to communicate. Fentanyl drip with oxycodone and tylenol. On levo again. BP 80-120s. Loose watery stools, purewick in place with good output  4/5 PLEX session today    HPI Summary  Yohana Marques is a 78 year old woman with h/o HTN, spinal stenosis s/p discectomy x 2 (most recent 8/2004), and chronic pain.     She presented on 3/8 after a fall at home in 3 to 4 days of generalized weakness worsening to an extent that from using just a cane she had to start using a walker.  Reports of gautam at their second house in [facility] but no access to canned food since mid February.      During admission patient found to have stress-induced cardiomyopathy with a EF of 23% on echo on 3/8.  On 3/10 due to labored, shallow breathing and respiratory acidosis patient was intubated.  Noted to have hypotonia and quadriparesis with hypophonia.  Given suspicion of possible GBS started on plasmapheresis.  New onset atrial fibrillation noted in hospital.    Patient had been having labile blood pressure, fluctuating heart rhythm from bradycardia to atrial fibrillation.  Initial MRI brain and MRI spine unremarkable however repeat on 3/14 revealed cauda equina nerve root enhancement.      Evaluation Summarized     MRI Brain w/wo contrast (3/9) (03/14) Small vessel disease changes   MRI Spine  C/T/L-spine 03/10 degenerative changes with superior endplate T3 compression and lumbar spondylosis.     C/T/L-spine 3/14 with cauda equina nerve root enhancement   CT Chest/Abdomen/Pelvis No evidence of malignancy.  Submucosal fibroid versus nonmalignant endometrial polyp and stable 4 mm left upper lobe nodule.       Echocardiogram 3/8        Coronary cath 3/8 EF is 23%.Entire left ventricle is severely hypokinetic but basal left ventricular function is less so     No significant CAD  "  EKG/Telemetry 3/10    Tachycardic (147 bpm), Atrial fibrillation w/ RVR   Left axis deviation, Anterior infarct, age undertermined  T wave abnormality  Episodes of bradycardia   CSF Labs Mildly elevated protein, 5 nucleated cell  Encephalitis/meningitis panel negative   Labs Myasthenia panel negative  Ganglioside Antibodies: pending  Urine porphyrins: pending  AZALEA: negative  ANCA: negative  HIV-1, HIV-2, HIV-1 p24 antigen nonreactive   EMG Completed on 3/15, report pending       Impression   Acute onset flaccid quadriparesis with respiratory insufficiency and areflexia now with evidence of cauda equina or nerve root enhancement likely in the setting of Guillain-Barré syndrome.  New onset stress-induced cardiomyopathy.     Plan  -PLEX 4/5 session, next session on Monday. Alternate day session given hemodynamic instability with session.   -Follow CSF analysis and ganglioside antibodies  -Follow EMG report  -Recommend starting on IV heparin given atrial fibrillation and high [id number] score.  -Follow urine porphyrins, ANCA.  -Ventilator management per ICU team.  Patient on CPAP trials.     DVT prophylaxis: heparin 5000 units subcutaneous injection q8 hrs     Patient Follow-up    - final recommendation pending work-up    We will continue to follow.     The Stroke Staff is Dr. Duffy.  Liz KENNEDY    To page a member of the stroke/neurocritical care service, click here:  [facility]   Choose \"On Call\" tab at top, then search dropdown box for \"Neurology Adult\", select location, press Enter, then look for stroke/neuro ICU/telestroke.  ______________________________________________________             Medications   Scheduled Meds   acetylcysteine  2 mL Nebulization Q6H    albuterol  2.5 mg Nebulization Q6H    aspirin  81 mg Oral or Feeding Tube Daily    chlorhexidine  15 mL Mouth/Throat Q12H    fiber modular  1 packet Per Feeding Tube BID    heparin ANTICOAGULANT  5,000 Units Subcutaneous Q8H    pantoprazole  " 40 mg Per Feeding Tube QAM AC    Or    pantoprazole  40 mg Intravenous QAM AC    piperacillin-tazobactam  3.375 g Intravenous Q6H    sodium chloride (PF)  3 mL Intracatheter Q8H       Infusion Meds   dexmedeTOMIDine 0.3 mcg/kg/hr (03/15/24 1845)    dextrose      fentaNYL 50 mcg/hr (03/15/24 0800)    - MEDICATION INSTRUCTIONS -      norepinephrine 0.03 mcg/kg/min (03/16/24 0656)    - MEDICATION INSTRUCTIONS -      sodium chloride 10 mL/hr at 03/13/24 0700       PRN Meds  acetaminophen, albuterol, atropine, bisacodyl, calcium carbonate, artificial tears, dextrose, fentaNYL, HOLD MEDICATION, lidocaine 4%, lidocaine (buffered or not buffered), melatonin, midazolam, midazolam, naloxone **OR** naloxone **OR** naloxone **OR** naloxone, - MEDICATION INSTRUCTIONS -, oxyCODONE **OR** oxyCODONE, - MEDICATION INSTRUCTIONS -, polyethylene glycol, senna-docusate **OR** senna-docusate, sodium chloride (PF)       PHYSICAL EXAMINATION  Temp:  [98.1  F (36.7  C)-99  F (37.2  C)] 98.5  F (36.9  C)  Pulse:  [50-99] 58  Resp:  [14-92] 27  BP: ()/() 114/60  FiO2 (%):  [30 %] 30 %  SpO2:  [92 %-100 %] 99 %      Neurologic   Mental Status:   alert, follows commands   Cranial Nerves:   pupils constricted 2 mm but reactive to light, EOM intact with no gaze palsy or nystagmus, able to wrinkle forehead, facial movement symmetric, tongue midline.  Facial sensation intact no shoulder shrug  Motor:   Bilateral upper extremity 1/5 with handgrip 2+/5.  Bilateral lower extremity 1/5 with knee extension 3/5 and dorsiflexion 3+/5 and plantarflexion about 4/5..   Reflexes:   See below      Left Right   Triceps 0 0   Biceps 0 0   Brachioradialis 2+ 1+   Adductor 0 0   Patellar 0 0   Achilles 0 0   Babinski Absent Absent   Sensory intact to light touch throughout   Coordination:   unable to assess d/t patient's current state  Station/Gait:  deferred      Imaging  I personally reviewed all imaging; relevant findings per HPI.     Lab Results  "Data   CBC  Recent Labs   Lab 03/16/24  0504 03/15/24  0414 03/14/24  0521   WBC 14.5* 15.0* 12.7*   RBC 3.02* 3.06* 3.22*   HGB 9.1* 9.1* 9.5*   HCT 27.0* 27.5* 29.3*    278 293     Basic Metabolic Panel    Recent Labs   Lab 03/16/24  0504 03/15/24  1040 03/15/24  0414 03/14/24  0849 03/14/24  0527 03/14/24  0521     --  143  --   --  146*   POTASSIUM 3.8 3.8 3.3*  --   --  3.7   CHLORIDE 104  --  106  --   --  112*   CO2 24  --  27  --   --  24   BUN 19.2  --  13.1  --   --  10.4   CR 0.56  --  0.51  --   --  0.39*   *  --  111* 111*   < > 121*   YEE 8.4*  --  8.3*  --   --  8.0*    < > = values in this interval not displayed.     Liver Panel  Recent Labs   Lab 03/16/24  0504 03/15/24  0414 03/14/24  0521   PROTTOTAL 5.7* 5.0* 5.0*   ALBUMIN 3.9 3.9 3.5   BILITOTAL 0.8 0.7 0.4   ALKPHOS 35* 22* 33*   AST 19 15 16   ALT 8 <5 6     INR    Recent Labs   Lab Test 05/02/23  0650 01/25/23  0719 10/15/22  0728   INR 1.02 1.06 1.23*      Lipid Profile    Recent Labs   Lab Test 07/07/22  1448 07/05/21  1352 07/03/19  0927   CHOL 241* 220* 213*   HDL 78 66 64   * 130* 118*   TRIG 119 122 156*     A1C    Recent Labs   Lab Test 03/11/24  0436   A1C 5.2     Troponin    No results for input(s): \"CTROPT\", \"TROPONINIS\", \"TROPONINI\", \"GHTROP\" in the last 168 hours.         Data       "

## 2024-03-17 NOTE — PROGRESS NOTES
Critical Care  Note      03/17/2024    Name: Yohana Marques MRN#: [id number]   Age: 78 year old YOB: 1945     Hsptl Day# 9  ICU DAY # 9    MV DAY # 9             Problem List:   Principal Problem:    ACS (acute coronary syndrome) (H)  Active Problems:    Weakness    Severe sepsis (H)  Guillain [place] sydrome  Acute hypercapnic respiratory failure         Summary/Hospital Course:   78F pmh of HTN was admitted 3/8 with stress cardiomyopathy and progressive global weakness concerning for guillain barre syndrome. On my interview she denies recent innoculation or viral illness symptoms, also denies common neoplasm review of symptoms including coughing, bloody sputum, constipation, diarrhea, bloody urine or stool.  She has had progressive global weakness which now seems to involve her respiratory muscles.  Workup is consistent with Guillain-Barré.  She has been intubated and supported on mechanical ventilation for respiratory muscle weakness.       Interim History   Patient has been hypotensive requiring escalating doses of norepinephrine      Assessment and plan :     Yohana Marques IS a 78 year old female admitted on 3/8/2024 for 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   I have personally reviewed the daily labs, imaging studies, cultures and discussed the case with referring physician and consulting physicians.     My assessment and plan by system for this patient is as follows:    Neurology/Psychiatry:   1. 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 CSF sampling was done, patient had significant increase in the protein in the CSF, with only 3 WBCs (had significant RBCs,?  Traumatic), meningitis panel is negative.  Discussed with neurology, spine MRIs are more supportive for GBS diagnosis. EMG done and pending results.  Finished 4 sessions of plasmapheresis    -Plan for plasmapheresis tomorrow.  -Continue PT    2. Pain/analgesia:  fentanyl gtt and as needed   3. Sedation: Off Precedex  today.    Cardiovascular:   Takatsubo cardiomyopathy with chf exacerbation  Afib with RVR, relapsing again today.  Shock, likely vasoplegic with concern for autonomic dysfunction.  Concern for hypovolemia given the diuresis.  Hold ACEI, BB for now  Norepi if MAP < 60  Hold diuresis today given the increased pressors needed.  Give albumin 5% 25 g.  She improved her pressors needed after that.  Repeated TTE, still hypokinesia but improved and EF is recovering to 40-45%  Start amiodarone bolus and drip.  Hopefully wean the norepinephrine with an IV bolus as above, if not able to wean then would transition to phenylephrine.  Heparin drip for stroke prevention      Pulmonary/Ventilator Management:   Acute hypercapnic respiratory failure, requiring mechanical ventilation:  worsening hypercapnia off mechanical support.   Bilateral infiltrate on chest imaging and increased secretions, ? Pulmonary edema vs lower respiratory tract infection    -Hold off pressure support today due to the shock.  -I will hold off diuresis today.  -Sputum Cx grew staph aureus and K. pneumoniae  -Started IV Zosyn 3/16/2024, MRSA PCR from the nares negative, unlikely to be MRSA pneumonia   -Today I started discussion with the patient and her family about tracheostomy if not able to wean her off the ventilator in the next week.  -Airway clearance measures with Mucomyst and albuterol.  Improving secretions.      GI and Nutrition :   1. RD consult, on TF  2.  PPI for pud prophy    Renal/Fluids/Electrolytes:   1. Creat 0.57 ok  2.  Hypokalemia  3.  Hypernatremia  4.  Hypocalcemia    -Monitor urine output and I&O, avoid nephrotoxic medications.  -Replace electrolytes per protocol.  Give 1 g of calcium gluconate.  -Decrease free water boluses given the improvement in her sodium       Infectious Disease:   1. Sepsis, likely secondary to VAP.  -Zosyn and workup as above    Endocrine:   1. BS control per ICU protocol     Hematology/Oncology:   1.  "Leukocytosis to 13.8 ?reactive vs HAP  2. Anemia, Hb 9.1, trended down to 7.6 after starting the heparin drip, but on recheck hemoglobin was stable at 8.0.  No evidence for blood loss.  3. Pltlts 207 ok    ICU Prophylaxis:   1. DVT: mechanical; Heparin  2. VAP: HOB 30 degrees, chlorhexidine rinse  3. Stress Ulcer: PPI  4. Restraints: None indicated. Will readdress daily.   5. Wound care  -   6. Feeding -tube feed  7. Family Update:  at bedside  8. Disposition - icu    Clinically Significant Risk Factors        # Hypokalemia: Lowest K = 2.8 mmol/L in last 2 days, will replace as needed   # Hypocalcemia: Lowest Ca = 7.3 mg/dL in last 2 days, will monitor and replace as appropriate   # Hypomagnesemia: Lowest Mg = 1.4 mg/dL in last 2 days, will replace as needed   # Hypoalbuminemia: Lowest albumin = 3.3 g/dL at 3/9/2024  6:04 AM, will monitor as appropriate     # Hypertension: Noted on problem list        # Obesity: Estimated body mass index is 34.27 kg/m  as calculated from the following:    Height as of this encounter: 1.727 m (5' 7.99\").    Weight as of this encounter: 102.2 kg (225 lb 5 oz).      # Financial/Environmental Concerns: none                    Critical Care Time: 40 min.  I spent this time (excluding procedures) personally providing and directing critical care services at the bedside and on the critical care unit.     Maria Fernanda Tran MD   Pulmonary and Critical Care                 Key Medications:      acetylcysteine  2 mL Nebulization Q6H    albuterol  2.5 mg Nebulization Q6H    [Held by provider] aspirin  81 mg Oral or Feeding Tube Daily    chlorhexidine  15 mL Mouth/Throat Q12H    fiber modular  1 packet Per Feeding Tube BID    pantoprazole  40 mg Per Feeding Tube QAM AC    Or    pantoprazole  40 mg Intravenous QAM AC    piperacillin-tazobactam  4.5 g Intravenous Q6H    sodium chloride (PF)  3 mL Intracatheter Q8H      dexmedeTOMIDine 0.3 mcg/kg/hr (03/17/24 0800)    dextrose      fentaNYL 50 " mcg/hr (03/17/24 0800)    heparin 1,500 Units/hr (03/17/24 0800)    - MEDICATION INSTRUCTIONS -      norepinephrine 0.08 mcg/kg/min (03/17/24 0800)    - MEDICATION INSTRUCTIONS -      sodium chloride 10 mL/hr at 03/17/24 0502              Physical Examination:   Temp:  [98.6  F (37  C)-99.1  F (37.3  C)] 98.6  F (37  C)  Pulse:  [53-86] 56  Resp:  [14-36] 16  BP: ()/(35-63) 93/48  FiO2 (%):  [30 %] 30 %  SpO2:  [90 %-100 %] 95 %          Intake/Output Summary (Last 24 hours) at 3/17/2024 1820  Last data filed at 3/17/2024 1800  Gross per 24 hour   Intake 4253.41 ml   Output 1975 ml   Net 2278.41 ml             Wt Readings from Last 4 Encounters:   03/17/24 102.2 kg (225 lb 5 oz)   07/12/23 93 kg (205 lb)   05/02/23 92.4 kg (203 lb 12.8 oz)   04/28/23 92.4 kg (203 lb 12.8 oz)     BP - Mean:  [45-84] 64  Vent Mode: CMV/AC  (Continuous Mandatory Ventilation/ Assist Control)  FiO2 (%): 30 %  Resp Rate (Set): 14 breaths/min  Tidal Volume (Set, mL): 420 mL  PEEP (cm H2O): 5 cmH2O  Resp: 16    Recent Labs   Lab 03/10/24  1142   PH 7.43   PCO2 42   PO2 214*   HCO3 28   O2PER 60       GEN: no acute distress  HEENT: head ncat, sclera anicteric, OP patent, trachea midline   PULM: unlabored synchronous minor rhonchi bilaterally.  Less secretions.  CV/COR: RRR S1S2 no gallop,  No rub, no murmur  ABD: soft nontender  EXT:  warm and well perfused x4  NEURO: PERRL, patient follows commands, significant weakness in the upper and lower extremities  SKIN: no obvious rash  LINES: clean, dry intact         Data:   All data and imaging reviewed     ROUTINE ICU LABS (Last four results)  CMP  Recent Labs   Lab 03/17/24  0330 03/16/24  2132 03/16/24  0504 03/15/24  1040 03/15/24  0414 03/14/24  0527 03/14/24  0521 03/13/24 2006 03/13/24  1957 03/13/24 0453 03/13/24 0445     --  141  --  143  --  146*  --  148*   < > 145   POTASSIUM 2.8*  --  3.8 3.8 3.3*  --  3.7   < > 3.1*   < > 3.9  3.9   CHLORIDE 104  --  104  --  106  --  " 112*  --  115*   < > 113*   CO2 23  --  24  --  27  --  24  --  24   < > 24   ANIONGAP 12  --  13  --  10  --  10  --  9   < > 8   * 115* 144*  --  111*   < > 121*   < > 113*   < > 110*   BUN 17.7  --  19.2  --  13.1  --  10.4  --  9.7   < > 11.5   CR 0.57  --  0.56  --  0.51  --  0.39*  --  0.39*   < > 0.47*   GFRESTIMATED >90  --  >90  --  >90  --  >90  --  >90   < > >90   YEE 7.3*  --  8.4*  --  8.3*  --  8.0*  --  7.1*   < > 7.9*   MAG 1.4*  --  1.6*  --   --   --  1.7  --   --   --  1.7   PHOS 2.5  --  3.0  --   --   --  2.6  --  1.9*   < > 1.8*   PROTTOTAL 4.8*  --  5.7*  --  5.0*  --  5.0*  --   --   --  4.9*   ALBUMIN 3.7  --  3.9  --  3.9  --  3.5  --   --   --  3.8   BILITOTAL 0.9  --  0.8  --  0.7  --  0.4  --   --   --  0.6   ALKPHOS 25*  --  35*  --  22*  --  33*  --   --   --  24*   AST 17  --  19  --  15  --  16  --   --   --  15   ALT 7  --  8  --  <5  --  6  --   --   --  <5    < > = values in this interval not displayed.     CBC  Recent Labs   Lab 03/17/24  0610 03/17/24  0330 03/16/24  0504 03/15/24  0414 03/14/24  0521   WBC  --  13.8* 14.5* 15.0* 12.7*   RBC  --  2.54* 3.02* 3.06* 3.22*   HGB 7.6* 7.5* 9.1* 9.1* 9.5*   HCT  --  22.5* 27.0* 27.5* 29.3*   MCV  --  89 89 90 91   MCH  --  29.5 30.1 29.7 29.5   MCHC  --  33.3 33.7 33.1 32.4   RDW  --  18.6* 18.7* 17.7* 17.5*   PLT  --  190 251 278 293     INRNo lab results found in last 7 days.  Arterial Blood Gas  Recent Labs   Lab 03/10/24  1142   PH 7.43   PCO2 42   PO2 214*   HCO3 28   O2PER 60       All cultures:  No results for input(s): \"CULT\" in the last 168 hours.  Recent Results (from the past 24 hour(s))   MR Brain w/o & w Contrast    Narrative    EXAM: MR BRAIN W/O AND W CONTRAST  LOCATION: [facility]  DATE: 3/9/2024    INDICATION: Diffuse weakness.  COMPARISON: CT head dated 03/08/2024.  CONTRAST: 10 mL Gadavist.  TECHNIQUE: Routine multiplanar multisequence head MRI without and with intravenous " contrast.    FINDINGS:  INTRACRANIAL CONTENTS: No acute or subacute infarct. No mass, acute hemorrhage, or extra-axial fluid collections. Patchy nonspecific T2/FLAIR hyperintensities within the cerebral white matter, left greater than right, most consistent with mild to   moderate chronic microvascular ischemic change. Mild generalized cerebral atrophy. No hydrocephalus. Normal position of the cerebellar tonsils. No pathologic contrast enhancement.    SELLA: No abnormality accounting for technique.    OSSEOUS STRUCTURES/SOFT TISSUES: Normal marrow signal. The major intracranial vascular flow voids are maintained.     ORBITS: Prior bilateral cataract surgery. Visualized portions of the orbits are otherwise unremarkable.     SINUSES/MASTOIDS: No paranasal sinus mucosal disease. Partial right mastoid effusion.      Impression    IMPRESSION:  1.  No acute intracranial process.  2.  Generalized brain atrophy and presumed microvascular ischemic changes as detailed above.   XR Chest Port 1 View    Narrative    EXAM: XR CHEST PORT 1 VIEW  LOCATION: [facility]  DATE: 3/10/2024    INDICATION: ET tube placement, OG placement, dialysis port,  COMPARISON: 03/08/2024      Impression    IMPRESSION: Intubation. The endotracheal tube tip is 4 cm above the milan. Right IJ dialysis catheter has been placed with tip at the SVC/right atrial junction. NG tube is in the stomach. No pneumothorax or pleural effusion. Mild pulmonary edema. Normal   heart size.         Billing: This patient is critically ill: Yes. Total critical care time today 60 min, excluding procedures.       Past Medical/surgical hx, meds, allergies, social history, family history     Past Medical History:   Diagnosis Date    HTN (hypertension)     Hyperlipidemia     Leiomyoma of uterus, unspecified     Numbness and tingling     spinal stenosis causes numbness and tingling on feet and knees bilaterally    Osteoarthrosis, unspecified whether  generalized or localized, unspecified site     Other and unspecified disc disorder of lumbar region     Other chronic pain     Primary osteoarthritis of left hip 12/18/2016    Spinal stenosis     S/P diskectomy x 2, most recently 8/2014     Past Surgical History:   Procedure Laterality Date    ARTHROPLASTY HIP Left 12/12/2016    Procedure: ARTHROPLASTY HIP;  Surgeon: Leonid Morfin MD;  Location:  OR    BACK SURGERY      COLONOSCOPY N/A 07/20/2017    Procedure: COMBINED COLONOSCOPY, SINGLE OR MULTIPLE BIOPSY/POLYPECTOMY BY BIOPSY;  colonoscopy;  Surgeon: Catarino Salas MD;  Location:  GI    CV CORONARY ANGIOGRAM N/A 3/8/2024    Procedure: Coronary Angiogram;  Surgeon: Alicia Dai MD;  Location:  HEART CARDIAC CATH LAB    DISCECTOMY LUMBAR POSTERIOR MICROSCOPIC TWO LEVELS  08/11/2014    Procedure: DISCECTOMY LUMBAR POSTERIOR MICROSCOPIC TWO LEVELS;  Surgeon: Warren Herring MD;  Location:  OR    ENDOSCOPIC RETROGRADE CHOLANGIOPANCREATOGRAM N/A 10/14/2022    Procedure: ENDOSCOPIC RETROGRADE CHOLANGIOPANCREATOGRAPHY with biliary spincterotomy, biliary stent placement, cholangioscopy;  Surgeon: Crason Franklin MD;  Location: [facility] OR    ENDOSCOPIC RETROGRADE CHOLANGIOPANCREATOGRAM N/A 5/2/2023    Procedure: ENDOSCOPIC RETROGRADE CHOLANGIOPANCREATOGRAPHY with biliary stone and stent removal;  Surgeon: Carson Franklin MD;  Location: [facility] OR    ENDOSCOPIC RETROGRADE CHOLANGIOPANCREATOGRAPHY, EXCHANGE TUBE/STENT N/A 1/25/2023    Procedure: ENDOSCOPIC RETROGRADE CHOLANGIOPANCREATOGRAPHY WITH BILIARY STENT EXCHANGE AND DEBRIS REMOVAL;  Surgeon: Carson Franklin MD;  Location: [facility] OR    EXPLORE COMMON BILE DUCT N/A 12/01/2022    Procedure: COMMON BILE DUCT REPAIR;  Surgeon: Thai Garcia MD;  Location: [facility] OR    LAPAROSCOPIC CHOLECYSTECTOMY WITH CHOLANGIOGRAMS N/A 12/01/2022    Procedure: EXPLORATORY LAPAROSCOPY, LAPAROSCOPIC PARTIAL CHOLECYSTECTOMY WITH RETREVIAL OF  STONES WITH CHOLANGIOGRAM; choledochoscopy, INTRAOPERATIVE ULTRASOUND;  Surgeon: Thai Garcia MD;  Location: [facility] OR    LAPAROSCOPY DIAGNOSTIC (GENERAL) N/A 10/06/2022    Procedure: Diagnostic laparoscopy with liver biopsy;  Surgeon: Warren Zhang MD;  Location: [facility] OR    ORTHOPEDIC SURGERY      TOTAL KNEE ARTHROPLASTY Left     [facility] NONSPECIFIC PROCEDURE  1996    lumbar discectomy    [facility] NONSPECIFIC PROCEDURE      cervical cone biopsy    [facility] NONSPECIFIC PROCEDURE      Left knee replacement     No current facility-administered medications on file prior to encounter.  acetaminophen (TYLENOL) 325 MG tablet, Take 2 tablets (650 mg) by mouth every 8 hours as needed for mild pain  aspirin 81 MG EC tablet, Take 81 mg by mouth daily  calcium-vitamin D (CALTRATE) 600-400 MG-UNIT per tablet, Take 1 tablet by mouth 2 times daily  celecoxib (CELEBREX) 200 MG capsule, Take 1 capsule (200 mg) by mouth daily  lisinopril (ZESTRIL) 10 MG tablet, Take 1 tablet (10 mg) by mouth daily  Multiple Vitamins-Minerals (CENTRUM SILVER) per tablet, Take 1 tablet by mouth daily  traMADol (ULTRAM) 50 MG tablet, TAKE 1 TO 2 TABLETS BY MOUTH EVERY DAY FOR SEVERE PAIN  vitamin D3 (CHOLECALCIFEROL) 50 mcg (2000 units) tablet, Take 1 tablet (50 mcg) by mouth daily       No Known Allergies  Social History     Socioeconomic History    Marital status:      Spouse name: Not on file    Number of children: Not on file    Years of education: Not on file    Highest education level: Not on file   Occupational History    Not on file   Tobacco Use    Smoking status: Never    Smokeless tobacco: Never   Vaping Use    Vaping Use: Never used   Substance and Sexual Activity    Alcohol use: Yes     Alcohol/week: 0.0 standard drinks of alcohol     Comment: social    Drug use: No    Sexual activity: Yes     Partners: Male   Other Topics Concern    Parent/sibling w/ CABG, MI or angioplasty before 65F 55M? Not Asked   Social History Narrative    Not on file      Social Determinants of Health     Financial Resource Strain: Not on file   Food Insecurity: Not on file   Transportation Needs: Not on file   Physical Activity: Not on file   Stress: Not on file   Social Connections: Not on file   Interpersonal Safety: Not on file   Housing Stability: Not on file     Family History   Problem Relation Age of Onset    Family History Negative Mother          age 64 mva    Cancer Father          age 84 lung ca    Family History Negative Sister     Breast Cancer Paternal Grandmother     Genetic Disorder Other         daughter has Autoimmune disease

## 2024-03-17 NOTE — PLAN OF CARE
Goal Outcome Evaluation:      Plan of Care Reviewed With: patient, spouse, family    Overall Patient Progress: improvingOverall Patient Progress: improving    Outcome Evaluation: Alert to voice, follows all commands, weaker in upper extremities (improving). PERRL. SR, pressures stable on levophed. Hgb 7.5 (down from 9.1 yesterday) Dr. Andersen notified, redrawn for 7.6. Dr Andersen/Faustino notified around 0700. Small amt liquid bm in flexiseal, straight cathed pt around 0300 for 500 ml, purewick in place. Shoulder/back pain managed with PRN oxycodone/tylenol/and fentanyl boluses for severe pain. Magnesium and potassium replaced per protocol.       Problem: Mechanical Ventilation Invasive  Goal: Effective Communication  Outcome: Progressing  Intervention: Ensure Effective Communication  Recent Flowsheet Documentation  Taken 3/17/2024 0400 by Eneida Solano, RN  Communication Enhancement Strategies:   call light answered in person   communication board used   extra time allowed for response   family involved in communication plan   nonverbal strategies used   one-step directions provided   repetition utilized   written communication utilized  Family/Support System Care:   caregiver stress acknowledged   presence promoted   self-care encouraged   support provided  Trust Relationship/Rapport:   care explained   choices provided   emotional support provided   empathic listening provided   questions answered   questions encouraged   reassurance provided   thoughts/feelings acknowledged  Taken 3/17/2024 0000 by Eneida Solano RN  Communication Enhancement Strategies:   call light answered in person   communication board used   extra time allowed for response   family involved in communication plan   nonverbal strategies used   one-step directions provided   repetition utilized   written communication utilized  Family/Support System Care:   caregiver stress acknowledged   presence promoted   self-care encouraged   support  provided  Trust Relationship/Rapport:   care explained   choices provided   emotional support provided   empathic listening provided   questions answered   questions encouraged   reassurance provided   thoughts/feelings acknowledged  Taken 3/16/2024 2000 by Eneida Solano, RN  Communication Enhancement Strategies:   call light answered in person   communication board used   extra time allowed for response   family involved in communication plan   nonverbal strategies used   one-step directions provided   repetition utilized   written communication utilized  Family/Support System Care:   caregiver stress acknowledged   presence promoted   self-care encouraged   support provided  Trust Relationship/Rapport:   care explained   choices provided   emotional support provided   empathic listening provided   questions answered   questions encouraged   reassurance provided   thoughts/feelings acknowledged  Goal: Optimal Device Function  Outcome: Progressing  Intervention: Optimize Device Care and Function  Recent Flowsheet Documentation  Taken 3/17/2024 0400 by Eneida Solano, RN  Airway Safety Measures:   all equipment/monitors on and audible   manual resuscitator/mask/valve at bedside   oxygen flowmeter   suction at bedside   suction equipment   suction regulator  Airway/Ventilation Management:   airway patency maintained   calming measures promoted  Oral Care:   swabbed with antiseptic solution   suction provided  Taken 3/17/2024 0200 by Eneida Solano, RN  Oral Care:   swabbed with antiseptic solution   suction provided  Taken 3/17/2024 0000 by Eneida Solano, RN  Airway Safety Measures:   all equipment/monitors on and audible   manual resuscitator/mask/valve at bedside   oxygen flowmeter   suction at bedside   suction equipment   suction regulator  Airway/Ventilation Management:   airway patency maintained   calming measures promoted  Oral Care:   swabbed with antiseptic solution   suction provided  Taken  3/16/2024 2200 by Eneida Solano RN  Oral Care:   swabbed with antiseptic solution   suction provided  Taken 3/16/2024 2000 by Eneida Solano RN  Airway Safety Measures:   all equipment/monitors on and audible   manual resuscitator/mask/valve at bedside   oxygen flowmeter   suction at bedside   suction equipment   suction regulator  Airway/Ventilation Management:   airway patency maintained   calming measures promoted  Oral Care:   swabbed with antiseptic solution   suction provided   oral rinse provided  Goal: Mechanical Ventilation Liberation  Outcome: Progressing  Intervention: Promote Extubation and Mechanical Ventilation Liberation  Recent Flowsheet Documentation  Taken 3/17/2024 0400 by Eneida Solano RN  Medication Review/Management:   medications reviewed   high-risk medications identified   pharmacy consulted   provider consulted  Environmental Support:   calm environment promoted   rest periods encouraged  Taken 3/17/2024 0000 by Eneida Solano RN  Medication Review/Management:   medications reviewed   high-risk medications identified   pharmacy consulted   provider consulted  Environmental Support:   calm environment promoted   rest periods encouraged  Taken 3/16/2024 2000 by Eneida Solano RN  Medication Review/Management:   medications reviewed   high-risk medications identified   pharmacy consulted   provider consulted  Environmental Support:   calm environment promoted   rest periods encouraged  Goal: Optimal Nutrition Delivery  Outcome: Progressing  Intervention: Optimize Nutrition Delivery  Recent Flowsheet Documentation  Taken 3/17/2024 0400 by Eneida Solano RN  Nutrition Support Management: weight trending reviewed  Taken 3/17/2024 0000 by Eneida Solano RN  Nutrition Support Management: weight trending reviewed  Taken 3/16/2024 2000 by Eneida Solano RN  Nutrition Support Management: weight trending reviewed  Goal: Absence of Device-Related Skin and Tissue  Injury  Outcome: Progressing  Intervention: Maintain Skin and Tissue Health  Recent Flowsheet Documentation  Taken 3/17/2024 0400 by Eneida Solano RN  Device Skin Pressure Protection:   adhesive use limited   skin-to-skin areas padded   absorbent pad utilized/changed   skin-to-device areas padded  Taken 3/17/2024 0000 by Eneida Solano RN  Device Skin Pressure Protection:   adhesive use limited   skin-to-skin areas padded   absorbent pad utilized/changed   skin-to-device areas padded  Taken 3/16/2024 2000 by Eneida Solano RN  Device Skin Pressure Protection:   adhesive use limited   skin-to-skin areas padded   absorbent pad utilized/changed   skin-to-device areas padded  Goal: Absence of Ventilator-Induced Lung Injury  Outcome: Progressing  Intervention: Prevent Ventilator-Associated Pneumonia  Recent Flowsheet Documentation  Taken 3/17/2024 0600 by Eneida Solano RN  Head of Bed (HOB) Positioning: HOB at 30 degrees  Taken 3/17/2024 0400 by Eneida Solano RN  VAP Prevention Bundle:   HOB elevation maintained   oral care regularly provided   readiness to extubate assessed   sedation interruption performed   spontaneous breathing trial performed   stress ulcer prophylaxis provided   vent circuit breaks minimized   VTE prophylaxis provided  Oral Care:   swabbed with antiseptic solution   suction provided  Head of Bed (HOB) Positioning: HOB at 30 degrees  VAP Prevention Measures: completed  Taken 3/17/2024 0200 by Eneida Solano RN  Oral Care:   swabbed with antiseptic solution   suction provided  Head of Bed (HOB) Positioning: HOB at 30 degrees  Taken 3/17/2024 0000 by Eneida Solano RN  VAP Prevention Bundle:   HOB elevation maintained   oral care regularly provided   readiness to extubate assessed   sedation interruption performed   spontaneous breathing trial performed   stress ulcer prophylaxis provided   vent circuit breaks minimized   VTE prophylaxis provided  Oral Care:   swabbed with  antiseptic solution   suction provided  Head of Bed (HOB) Positioning: HOB at 30 degrees  VAP Prevention Measures: completed  Taken 3/16/2024 2200 by Eneida Solano RN  Oral Care:   swabbed with antiseptic solution   suction provided  Head of Bed (HOB) Positioning: HOB at 30 degrees  Taken 3/16/2024 2000 by nEeida Solano, RN  VAP Prevention Bundle:   HOB elevation maintained   oral care regularly provided   readiness to extubate assessed   sedation interruption performed   spontaneous breathing trial performed   stress ulcer prophylaxis provided   vent circuit breaks minimized   VTE prophylaxis provided  Oral Care:   swabbed with antiseptic solution   suction provided   oral rinse provided  Head of Bed (HOB) Positioning: HOB at 30 degrees  VAP Prevention Measures: completed

## 2024-03-17 NOTE — PROGRESS NOTES
[facility] ICU RESPIRATORY NOTE        Date of Admission: 3/8/2024     Date of Intubation (most recent): 3/10/2024    Reason for Mechanical Ventilation: Airway protection     Number of Days on Mechanical Ventilation: 7    Met Criteria for Spontaneous Breathing Trial: No    Reason for No Spontaneous Breathing Trial: Per MD     Significant Events Today: None     ABG Results:   Recent Labs   Lab 03/10/24  1142 03/10/24  0753   PH 7.43  --    PCO2 42  --    PO2 214*  --    HCO3 28  --    O2PER 60 25         Current Vent Settings: Vent Mode: CMV/AC  (Continuous Mandatory Ventilation/ Assist Control)  FiO2 (%): 30 %  Resp Rate (Set): 14 breaths/min  Tidal Volume (Set, mL): 420 mL  PEEP (cm H2O): 5 cmH2O  Pressure Support (cm H2O): 10 cmH2O  Resp: 14      Skin Assessment: Skin intact     Plan: Continue full vent support and wean as tolerated    Ximena Pacheco RT on 3/16/2024 at 9:27 PM

## 2024-03-17 NOTE — PROGRESS NOTES
[facility] ICU RESPIRATORY NOTE        Date of Admission: 3/8/2024    Date of Intubation (most recent): 3/10/2024    Reason for Mechanical Ventilation: Airway protection    Number of Days on Mechanical Ventilation: 8    Met Criteria for Spontaneous Breathing Trial: No    Reason for No Spontaneous Breathing Trial: Per MD    Significant Events Today: None    ABG Results: No lab results found in last 7 days.      Current Vent Settings: Vent Mode: CMV/AC  (Continuous Mandatory Ventilation/ Assist Control)  FiO2 (%): 30 %  Resp Rate (Set): 14 breaths/min  Tidal Volume (Set, mL): 420 mL  PEEP (cm H2O): 5 cmH2O  Resp: 16      Skin Assessment: Intact    Plan: Continue full vent support with daily wean assessment    Keny Guzman, RRT on 3/17/2024 at 1:27 PM

## 2024-03-17 NOTE — PROGRESS NOTES
[facility]    Stroke Progress Note    Interval Events  No acute events overnight.1.5 gm drop in Hb    HPI Summary  Yohana Marques is a 78 year old woman with h/o HTN, spinal stenosis s/p discectomy x 2 (most recent 8/2004), and chronic pain.     She presented on 3/8 after a fall at home in 3 to 4 days of generalized weakness worsening to an extent that from using just a cane she had to start using a walker.  Reports of gautam at their second house in [facility] but no access to canned food since mid February.      During admission patient found to have stress-induced cardiomyopathy with a EF of 23% on echo on 3/8.  On 3/10 due to labored, shallow breathing and respiratory acidosis patient was intubated.  Noted to have hypotonia and quadriparesis with hypophonia.  Given suspicion of possible GBS started on plasmapheresis.  New onset atrial fibrillation noted in hospital.    Patient had been having labile blood pressure, fluctuating heart rhythm from bradycardia to atrial fibrillation.  Initial MRI brain and MRI spine unremarkable however repeat on 3/14 revealed cauda equina nerve root enhancement.      Evaluation Summarized     MRI Brain w/wo contrast (3/9) (03/14) Small vessel disease changes   MRI Spine  C/T/L-spine 03/10 degenerative changes with superior endplate T3 compression and lumbar spondylosis.     C/T/L-spine 3/14 with cauda equina nerve root enhancement   CT Chest/Abdomen/Pelvis No evidence of malignancy.  Submucosal fibroid versus nonmalignant endometrial polyp and stable 4 mm left upper lobe nodule.       Echocardiogram 3/8        Coronary cath 3/8 EF is 23%.Entire left ventricle is severely hypokinetic but basal left ventricular function is less so     No significant CAD   EKG/Telemetry 3/10    Tachycardic (147 bpm), Atrial fibrillation w/ RVR   Left axis deviation, Anterior infarct, age undertermined  T wave abnormality  Episodes of bradycardia   CSF  "Labs Mildly elevated protein, 5 nucleated cell  Encephalitis/meningitis panel negative   Labs Myasthenia panel negative  Ganglioside Antibodies: pending  Urine porphyrins: pending  AZALEA: negative  ANCA: negative  HIV-1, HIV-2, HIV-1 p24 antigen nonreactive   EMG Completed on 3/15, report pending       Impression   Acute onset flaccid quadriparesis with respiratory insufficiency and areflexia now with evidence of cauda equina or nerve root enhancement likely in the setting of Guillain-Barré syndrome.  New onset stress-induced cardiomyopathy.     Plan  -PLEX 4/5 session, next session on Monday. Alternate day session given hemodynamic instability with session.   -Follow CSF analysis and ganglioside antibodies  -Follow EMG report  -holding heparin given drop in Hb  -Follow urine porphyrins, ANCA.  -Ventilator management per ICU team.  Patient on CPAP trials.     DVT prophylaxis: heparin 5000 units subcutaneous injection q8 hrs     Patient Follow-up    - final recommendation pending work-up    We will continue to follow.     The Stroke Staff is Dr. Duffy.    Mat Jeffries MD  Vascular Neurology fellow    To page a member of the stroke/neurocritical care service, click here:  [facility]   Choose \"On Call\" tab at top, then search dropdown box for \"Neurology Adult\", select location, press Enter, then look for stroke/neuro ICU/telestroke.  ______________________________________________________             Medications   Scheduled Meds   acetylcysteine  2 mL Nebulization Q6H    albuterol  2.5 mg Nebulization Q6H    [Held by provider] aspirin  81 mg Oral or Feeding Tube Daily    chlorhexidine  15 mL Mouth/Throat Q12H    fiber modular  1 packet Per Feeding Tube BID    pantoprazole  40 mg Per Feeding Tube QAM AC    Or    pantoprazole  40 mg Intravenous QAM AC    piperacillin-tazobactam  4.5 g Intravenous Q6H    sodium chloride (PF)  3 mL Intracatheter Q8H       Infusion Meds   dexmedeTOMIDine 0.3 mcg/kg/hr (03/16/24 8793) "    dextrose      fentaNYL 50 mcg/hr (03/16/24 2019)    heparin 1,500 Units/hr (03/16/24 2209)    - MEDICATION INSTRUCTIONS -      norepinephrine 0.06 mcg/kg/min (03/17/24 0800)    - MEDICATION INSTRUCTIONS -      sodium chloride 10 mL/hr at 03/17/24 0502       PRN Meds  acetaminophen, albuterol, atropine, bisacodyl, calcium carbonate, artificial tears, dextrose, fentaNYL, HOLD MEDICATION, lidocaine 4%, lidocaine (buffered or not buffered), melatonin, midazolam, midazolam, naloxone **OR** naloxone **OR** naloxone **OR** naloxone, - MEDICATION INSTRUCTIONS -, oxyCODONE **OR** oxyCODONE, - MEDICATION INSTRUCTIONS -, polyethylene glycol, senna-docusate **OR** senna-docusate, sodium chloride (PF), sodium chloride 0.9%       PHYSICAL EXAMINATION  Temp:  [98.6  F (37  C)-99.1  F (37.3  C)] 98.6  F (37  C)  Pulse:  [53-86] 55  Resp:  [14-36] 16  BP: ()/(35-63) 96/52  FiO2 (%):  [30 %] 30 %  SpO2:  [89 %-100 %] 100 %      Neurologic   Mental Status:   alert, follows commands   Cranial Nerves:   pupils constricted 2 mm but reactive to light, EOM intact with no gaze palsy or nystagmus, able to wrinkle forehead, facial movement symmetric, tongue midline.  Facial sensation intact no shoulder shrug  Motor:   Bilateral upper extremity 1/5 with handgrip 2+/5.  Bilateral lower extremity 1/5 with knee extension 3/5 and dorsiflexion 3+/5 and plantarflexion about 4/5..   Reflexes:   See below      Left Right   Triceps 0 0   Biceps 0 0   Brachioradialis 2+ 1+   Adductor 0 0   Patellar 0 0   Achilles 0 0   Babinski Absent Absent   Sensory intact to light touch throughout   Coordination:   unable to assess d/t patient's current state  Station/Gait:  deferred      Imaging  I personally reviewed all imaging; relevant findings per HPI.     Lab Results Data   CBC  Recent Labs   Lab 03/17/24  0610 03/17/24  0330 03/16/24  0504 03/15/24  0414   WBC  --  13.8* 14.5* 15.0*   RBC  --  2.54* 3.02* 3.06*   HGB 7.6* 7.5* 9.1* 9.1*   HCT  --   "22.5* 27.0* 27.5*   PLT  --  190 251 278     Basic Metabolic Panel    Recent Labs   Lab 03/17/24  0330 03/16/24  2132 03/16/24  0504 03/15/24  1040 03/15/24  0414     --  141  --  143   POTASSIUM 2.8*  --  3.8 3.8 3.3*   CHLORIDE 104  --  104  --  106   CO2 23  --  24  --  27   BUN 17.7  --  19.2  --  13.1   CR 0.57  --  0.56  --  0.51   * 115* 144*  --  111*   YEE 7.3*  --  8.4*  --  8.3*     Liver Panel  Recent Labs   Lab 03/17/24  0330 03/16/24  0504 03/15/24  0414   PROTTOTAL 4.8* 5.7* 5.0*   ALBUMIN 3.7 3.9 3.9   BILITOTAL 0.9 0.8 0.7   ALKPHOS 25* 35* 22*   AST 17 19 15   ALT 7 8 <5     INR    Recent Labs   Lab Test 05/02/23  0650 01/25/23  0719 10/15/22  0728   INR 1.02 1.06 1.23*      Lipid Profile    Recent Labs   Lab Test 07/07/22  1448 07/05/21  1352 07/03/19  0927   CHOL 241* 220* 213*   HDL 78 66 64   * 130* 118*   TRIG 119 122 156*     A1C    Recent Labs   Lab Test 03/11/24  0436   A1C 5.2     Troponin    No results for input(s): \"CTROPT\", \"TROPONINIS\", \"TROPONINI\", \"GHTROP\" in the last 168 hours.         Data       "

## 2024-03-17 NOTE — PLAN OF CARE
Problem: Adult Inpatient Plan of Care  Goal: Optimal Comfort and Wellbeing  Intervention: Monitor Pain and Promote Comfort  Recent Flowsheet Documentation  Taken 3/17/2024 1817 by Debora Keith, RN  Pain Management Interventions:   medication (see MAR)   heat applied  Taken 3/17/2024 1617 by Dbeora Keith, RN  Pain Management Interventions: medication (see MAR)  Taken 3/17/2024 1320 by Debora Keith, RN  Pain Management Interventions:   medication (see MAR)   heat applied   repositioned   Goal Outcome Evaluation:      Plan of Care Reviewed With: patient, spouse, family    Overall Patient Progress: no changeOverall Patient Progress: no change    Outcome Evaluation: Lungs coarse with thick inline secretions. Repeat Hgb 8.0. Heparin gtt stopped. Dex weaned off due to bradycardia. Pt's pressor requirments increasing. Pt received 5% albumin 500cc with improvement in B/P. Able to wean levo gtt slowly. Pt went into PETER this afternoon. Amiodarone bolus/gtt started. Heparin gtt restarted. Nye replaced due to retention. No vent weaning today due to hemodynamic instability. Family updated at bedside.

## 2024-03-17 NOTE — PLAN OF CARE
Problem: Adult Inpatient Plan of Care  Goal: Optimal Comfort and Wellbeing  Intervention: Monitor Pain and Promote Comfort  Recent Flowsheet Documentation  Taken 3/16/2024 1700 by Debora Keith RN  Pain Management Interventions: medication (see MAR)  Taken 3/16/2024 1055 by Debora Keith, RN  Pain Management Interventions:   medication (see MAR)   repositioned  Intervention: Provide Person-Centered Care  Recent Flowsheet Documentation  Taken 3/16/2024 1600 by Debora Keith RN  Trust Relationship/Rapport:   care explained   choices provided   emotional support provided   reassurance provided   questions answered  Taken 3/16/2024 1200 by Debora Keith RN  Trust Relationship/Rapport:   care explained   choices provided   emotional support provided   reassurance provided   questions answered  Taken 3/16/2024 0800 by Debora Keith, RN  Trust Relationship/Rapport:   care explained   choices provided   emotional support provided   reassurance provided   questions answered   Goal Outcome Evaluation:      Plan of Care Reviewed With: patient, spouse, family    Overall Patient Progress: no changeOverall Patient Progress: no change    Outcome Evaluation: Pt placed on CPAP 10/5 but only tolerated for <30 minutes as pt became tachypneic. Lungs coarse. Fentanyl gtt for pain. Oxycodone and tylenol prn with some relief. Good urine output after lasix. Plasmapheresis this afternoon. Levo gtt weaned off but restarted again this evening. Family updated at bedside.

## 2024-03-18 NOTE — PHARMACY-VANCOMYCIN DOSING SERVICE
"Pharmacy Vancomycin Initial Note  Date of Service 2024  Patient's  1945  78 year old, female    Indication: Healthcare-Associated Pneumonia    Current estimated CrCl = Estimated Creatinine Clearance: 128.9 mL/min (A) (based on SCr of 0.47 mg/dL (L)).    Creatinine for last 3 days  3/16/2024:  5:04 AM Creatinine 0.56 mg/dL  3/17/2024:  3:30 AM Creatinine 0.57 mg/dL  3/18/2024:  5:17 AM Creatinine 0.47 mg/dL    Recent Vancomycin Level(s) for last 3 days  No results found for requested labs within last 3 days.      Vancomycin IV Administrations (past 72 hours)        No vancomycin orders with administrations in past 72 hours.                    Nephrotoxins and other renal medications (From now, onward)      Start     Dose/Rate Route Frequency Ordered Stop    24 0900  vancomycin (VANCOCIN) 1,750 mg in 0.9% NaCl 500 mL intermittent infusion         1,750 mg  over 2 Hours Intravenous ONCE 24 0838      24 1500  norepinephrine (LEVOPHED) 16 mg in  mL infusion MAX CONC CENTRAL LINE         0.01-0.6 mcg/kg/min × 102.2 kg  1-57.5 mL/hr  Intravenous CONTINUOUS 24 1444      24 0800  piperacillin-tazobactam (ZOSYN) 4.5 g vial to attach to  mL bag        Note to Pharmacy: For [facility], [facility] and [facility]: For Zosyn-naive patients, use the \"Zosyn initial dose + extended infusion\" order panel.    4.5 g  over 30 Minutes Intravenous EVERY 6 HOURS 24 0748              Contrast Orders - past 72 hours (72h ago, onward)      None            [employer] Prediction of Planned Initial Vancomycin Regimen  Loading dose: 1750mg  Regimen: 1500 mg IV every 12 hours.  Start time: 08:38 on 2024  Exposure target: AUC24 (range)400-600 mg/L.hr   AUC24,ss: 546 mg/L.hr  Probability of AUC24 > 400: 81 %  Ctrough,ss: 16.9 mg/L  Probability of Ctrough,ss > 20: 36 %  Probability of nephrotoxicity (Lodise CAT ): 13 %          Plan:  Start vancomycin  1500 mg IV q12h after loading dose of 1750mg. "   Vancomycin monitoring method: AUC  Vancomycin therapeutic monitoring goal: 400-600 mg*h/L  Pharmacy will check vancomycin levels as appropriate in 1-3 Days.    Serum creatinine levels will be ordered daily for the first week of therapy and at least twice weekly for subsequent weeks.    Additionally susceptibilities for staph in sputum are pending (oxacillin, ancef etc). MRSA in nares were negative recently. Likely can de-escalate vancomycin asap     Lena Grigsby RP, PharmD

## 2024-03-18 NOTE — PROGRESS NOTES
Critical Care  Note      03/18/2024    Name: Yohana Marques MRN#: [id number]   Age: 78 year old YOB: 1945     Hsptl Day# 10  ICU DAY # 10    MV DAY # 10             Problem List:   Principal Problem:    ACS (acute coronary syndrome) (H)  Active Problems:    Weakness    Severe sepsis (H)  Guillain Richards sydrome  Acute hypercapnic respiratory failure         Summary/Hospital Course:   78F pmh of HTN was admitted 3/8 with stress cardiomyopathy and progressive global weakness concerning for guillain barre syndrome. On my interview she denies recent innoculation or viral illness symptoms, also denies common neoplasm review of symptoms including coughing, bloody sputum, constipation, diarrhea, bloody urine or stool.    She has had progressive global weakness which now seems to involve her respiratory muscles.  Workup is consistent with Guillain-Barré.  She has been intubated and supported on mechanical ventilation for respiratory muscle weakness.       Interim History   - weaned off pressors yesterday afternoon    - in and out of afib thus back on amiodarone gtt   - alert oriented, moving all 4 extremities spontaneously, some double triggering on vent   - worsening O2 with CXR showing evolving RUL pneumonia, some bloody secretions, sputum polymicrobial with staph aureaus (non MRSA), klebsiella and hafnia (R to zosyn)        Assessment and plan :     Yohana Marques IS a 78 year old female admitted on 3/8/2024 for 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   I have personally reviewed the daily labs, imaging studies, cultures and discussed the case with referring physician and consulting physicians.     My assessment and plan by system for this patient is as follows:    Neurology/Psychiatry:   1. 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 CSF sampling was done, patient had significant increase in the protein in the CSF, with only 3 WBCs (had significant RBCs,?  Traumatic), meningitis panel is negative.   Discussed with neurology, spine MRIs are more supportive for GBS diagnosis. EMG done and pending results.  Finished 4 sessions of plasmapheresis  -Plan for plasmapheresis today  -Continue PT  2. Pain/analgesia:  fentanyl gtt and as needed   3. Sedation: Off Precedex today. Discontinued in chart    Cardiovascular:   Takatsubo cardiomyopathy with chf exacerbation  Afib with RVR,  Shock, likely mixed , vasoplegic,  cardiogenic as well with concern for autonomic dysfunction.  Improved , off pressors   - Norepi if MAP < 60  - Follow volume status .aim even for now   - Repeated TTE, still hypokinesia but improved and EF is recovering to 40-45%  - continue  amiodarone bolus and drip.  - Heparin drip for stroke prevention      Pulmonary/Ventilator Management:   Acute hypercapnic respiratory failure, requiring mechanical ventilation:  worsening hypercapnia off mechanical support.   Bilateral infiltrate on chest imaging; Polymicrobial sputum c/w LRTI interim increase secretions and hemoptysis   - PS trials as tolerated with increase PS for conditioning,   - plan bronchoscopy today pending CT Chest  - CT Chest to better characterize PNA ? cavitation  -broaden to ceftazidime bases on culture, dose vanco now, pending repeat bronch sample   -Airway clearance measures with Mucomyst and albuterol.  Improving secretions.  - tracheostomy if not able to wean her off the ventilator in the next week.  - d/w RT formal weaning parameters       GI and Nutrition :   1. RD consult, on TF  2.  PPI for pud prophy    Renal/Fluids/Electrolytes:   1. Cr, UOP appropriate -  2.  Electrolyte abnormalities - hypokalemia, hypernatremia, hypocalcemia 2/2 critical illness, - improved, continue replacement protocols  3. Acid base - stable    -Monitor urine output and I&O, avoid nephrotoxic medications.  -replace electrolytes per protocol.  Give 1 g of calcium gluconate.  -continue free water boluses given the improvement in her sodium   - check VBG  "    Infectious Disease:   1. Sepsis, secondary to HCAP , polymicrobial with R organism (Hafnia) ,with worsening imaging and secretions on current therapy .  -  Broaden to ceftaz, add vanc temp  - work as above   - serial CRP and procal     Endocrine:   1. Stress hyperglycemia   - BS control per ICU protocol     Hematology/Oncology:   1. Leukocytosis to 13.8 ?reactive vs HAP  2. Anemia, Hb 9.1, trended down to 7.6 after starting the heparin drip, but on recheck hemoglobin was stable at 8.0.  No evidence for blood loss.  3. Pltlts 207 ok  4. Coagulopathy, med induced, need watch hemoptysis and weight risk benefit     ICU Prophylaxis:   1. DVT: mechanical; Heparin  2. VAP: HOB 30 degrees, chlorhexidine rinse  3. Stress Ulcer: PPI  4. Restraints: None indicated. Will readdress daily.   5. Wound care  - per unit routine   6. Feeding -tube feed  7. Family Update:  at bedside  8. Disposition - icu    Clinically Significant Risk Factors        # Hypokalemia: Lowest K = 2.8 mmol/L in last 2 days, will replace as needed   # Hypocalcemia: Lowest Ca = 7.3 mg/dL in last 2 days, will monitor and replace as appropriate   # Hypomagnesemia: Lowest Mg = 1.4 mg/dL in last 2 days, will replace as needed   # Hypoalbuminemia: Lowest albumin = 3.3 g/dL at 3/9/2024  6:04 AM, will monitor as appropriate     # Hypertension: Noted on problem list        # Obesity: Estimated body mass index is 34.27 kg/m  as calculated from the following:    Height as of this encounter: 1.727 m (5' 7.99\").    Weight as of this encounter: 102.2 kg (225 lb 5 oz).      # Financial/Environmental Concerns: none                    Critical Care Time: 40 min.  I spent this time (excluding procedures) personally providing and directing critical care services at the bedside and on the critical care unit.     Regan Bui MD  Pulmonary and Critical Care                 Key Medications:      acetylcysteine  2 mL Nebulization Q6H    albuterol  2.5 mg " Nebulization Q6H    [Held by provider] aspirin  81 mg Oral or Feeding Tube Daily    chlorhexidine  15 mL Mouth/Throat Q12H    fiber modular  1 packet Per Feeding Tube BID    pantoprazole  40 mg Per Feeding Tube QAM AC    Or    pantoprazole  40 mg Intravenous QAM AC    piperacillin-tazobactam  4.5 g Intravenous Q6H    QUEtiapine  50 mg Oral or Feeding Tube BID    sodium chloride (PF)  3 mL Intracatheter Q8H      amiodarone 0.5 mg/min (03/17/24 2240)    dexmedeTOMIDine Stopped (03/17/24 1244)    dextrose      fentaNYL 50 mcg/hr (03/17/24 2000)    heparin 1,650 Units/hr (03/17/24 2318)    - MEDICATION INSTRUCTIONS -      norepinephrine Stopped (03/17/24 2100)    - MEDICATION INSTRUCTIONS -      phenylephrine      sodium chloride 10 mL/hr at 03/17/24 0502              Physical Examination:   Temp:  [97.7  F (36.5  C)-101.1  F (38.4  C)] 100.2  F (37.9  C)  Pulse:  [] 121  Resp:  [0-58] 26  BP: ()/() 96/61  FiO2 (%):  [30 %-40 %] 40 %  SpO2:  [88 %-100 %] 95 %      Intake/Output Summary (Last 24 hours) at 3/18/2024 0903  Last data filed at 3/18/2024 0600  Gross per 24 hour   Intake 3258.6 ml   Output 2350 ml   Net 908.6 ml       Wt Readings from Last 4 Encounters:   03/17/24 102.2 kg (225 lb 5 oz)   07/12/23 93 kg (205 lb)   05/02/23 92.4 kg (203 lb 12.8 oz)   04/28/23 92.4 kg (203 lb 12.8 oz)     BP - Mean:  [] 74  Vent Mode: CMV/AC  (Continuous Mandatory Ventilation/ Assist Control)  FiO2 (%): 40 %  Resp Rate (Set): 14 breaths/min  Tidal Volume (Set, mL): 420 mL  PEEP (cm H2O): 5 cmH2O  Resp: 26    No lab results found in last 7 days.      GEN: no acute distress  HEENT: head ncat, sclera anicteric, OP patent, trachea midline   PULM: unlabored synchronous minor rhonchi bilaterally.  Moderate serosanginous  secretions.  CV/COR: RRR S1S2 no gallop,  No rub, no murmur  ABD: soft nontender  EXT:  warm and well perfused x4  NEURO: PERRL, patient follows commands, significant weakness in the upper and  "lower extremities  3/5 in upper and lower   SKIN: no obvious rash  LINES: clean, dry intact         Data:   All data and imaging reviewed     ROUTINE ICU LABS (Last four results)  CMP  Recent Labs   Lab 03/18/24 0517 03/17/24  1113 03/17/24  0330 03/16/24  2132 03/16/24  0504 03/15/24  1040 03/15/24  0414 03/14/24  0527 03/14/24  0521     --  139  --  141  --  143  --  146*   POTASSIUM 3.8 4.1 2.8*  --  3.8   < > 3.3*  --  3.7   CHLORIDE 105  --  104  --  104  --  106  --  112*   CO2 25  --  23  --  24  --  27  --  24   ANIONGAP 10  --  12  --  13  --  10  --  10   *  --  133* 115* 144*  --  111*   < > 121*   BUN 14.3  --  17.7  --  19.2  --  13.1  --  10.4   CR 0.47*  --  0.57  --  0.56  --  0.51  --  0.39*   GFRESTIMATED >90  --  >90  --  >90  --  >90  --  >90   YEE 8.1*  --  7.3*  --  8.4*  --  8.3*  --  8.0*   MAG 2.1 2.7* 1.4*  --  1.6*  --   --   --  1.7   PHOS 2.1*  --  2.5  --  3.0  --   --   --  2.6   PROTTOTAL 5.7*  --  4.8*  --  5.7*  --  5.0*  --  5.0*   ALBUMIN 3.8  --  3.7  --  3.9  --  3.9  --  3.5   BILITOTAL 0.8  --  0.9  --  0.8  --  0.7  --  0.4   ALKPHOS 40  --  25*  --  35*  --  22*  --  33*   AST 19  --  17  --  19  --  15  --  16   ALT 8  --  7  --  8  --  <5  --  6    < > = values in this interval not displayed.     CBC  Recent Labs   Lab 03/18/24 0517 03/17/24  1113 03/17/24  0610 03/17/24  0330 03/16/24  0504 03/15/24  0414   WBC 12.5*  --   --  13.8* 14.5* 15.0*   RBC 2.79*  --   --  2.54* 3.02* 3.06*   HGB 8.1* 8.0* 7.6* 7.5* 9.1* 9.1*   HCT 24.7*  --   --  22.5* 27.0* 27.5*   MCV 89  --   --  89 89 90   MCH 29.0  --   --  29.5 30.1 29.7   MCHC 32.8  --   --  33.3 33.7 33.1   RDW 19.6*  --   --  18.6* 18.7* 17.7*    207  --  190 251 278     INRNo lab results found in last 7 days.  Arterial Blood Gas  No lab results found in last 7 days.      All cultures:  No results for input(s): \"CULT\" in the last 168 hours.      "

## 2024-03-18 NOTE — PROGRESS NOTES
[facility] ICU RESPIRATORY NOTE        Date of Admission: 3/8/2024    Date of Intubation (most recent): 3/10    Reason for Mechanical Ventilation: a/p    Number of Days on Mechanical Ventilation: 9    Met Criteria for Spontaneous Breathing Trial: No    Reason for No Spontaneous Breathing Trial: not appropriate    Significant Events Today: n/a    ABG Results: No lab results found in last 7 days.      Current Vent Settings: Vent Mode: CMV/AC  (Continuous Mandatory Ventilation/ Assist Control)  FiO2 (%): 40 %  Resp Rate (Set): 14 breaths/min  Tidal Volume (Set, mL): 420 mL  PEEP (cm H2O): 5 cmH2O  Resp: 18      Skin Assessment: cool,dry    Plan: continue to monitor     RT Darrius on 3/18/2024 at 5:04 AM

## 2024-03-18 NOTE — CONSULTS
Patient has Medicare Advantage through [facility].    Xarelto/Eliquis  --$35/mo ($70 for 3 mo at [facility])  --lf/when total drug costs exceed $5,030, price will increase to a 25% coinsurance, equivalent to $146/mo.    Meeta Benavides  Pharmacy Technician/Liaison, Discharge Pharmacy   [phone] (voice or text)  [email or web address]  Available on [facility] and Teams

## 2024-03-18 NOTE — PROGRESS NOTES
[facility] ICU RESPIRATORY NOTE        Date of Admission: 3/8/2024    Date of Intubation (most recent): 3/10/24    Reason for Mechanical Ventilation: airway protection.     Number of Days on Mechanical Ventilation: 9    Met Criteria for Spontaneous Breathing Trial: No    Reason for No Spontaneous Breathing Trial: One attempt was made this morning, on my assessment patient was breathing in upper 40's.     Significant Events Today: Patient transported for chest CT.    ABG Results:   Recent Labs   Lab 03/18/24  0950   O2PER 50         Current Vent Settings: Vent Mode: CMV/AC  (Continuous Mandatory Ventilation/ Assist Control)  FiO2 (%): 45 %  Resp Rate (Set): 14 breaths/min  Tidal Volume (Set, mL): 420 mL  PEEP (cm H2O): 5 cmH2O  Resp: 23      Skin Assessment: Skin is intact.     Plan: Continue full support overnight,  and Q6H nebs.     Ang Lechuga, RT on 3/18/2024 at 5:58 PM

## 2024-03-18 NOTE — PROGRESS NOTES
Apheresis - Plasma Exchange     Yohana Keith was run in  for a one volume apheresis treatment using the Optia apheresis machine. Consent verified.      Run number - 5             Height - 173 cm  Weight - 101.6 kg  HCT - 27%  Plasma Volume and type - Albumin 5% 3,500 mL  Calcium Replacement - Ca Gluc 5 g  Inlet speed - 100 ml/min  Fluid Balance - 100%  ACD-A ratio - 10:1  Access - R internal jugular, temporary line  Premeds - none     Treatment notes - Tx tolerated as ordered, VSS throughout.     Report given to DAYNA Rosas RN.      Next treatment - TBD     RUSTMA Nieto, RN  [facility]  [facility]

## 2024-03-18 NOTE — PROGRESS NOTES
CLINICAL NUTRITION SERVICES - REASSESSMENT NOTE      Malnutrition: (3/14)  % Weight Loss:  None noted  % Intake:  No decreased intake noted (on goal TF)  Subcutaneous Fat Loss:  None observed  Muscle Loss:  None observed  Fluid Retention:  None noted     Malnutrition Diagnosis: Patient does not meet two of the above criteria necessary for diagnosing malnutrition       EVALUATION OF PROGRESS TOWARD GOALS   Diet:  NPO on vent     Nutrition Support:  Patient continues on goal TF regimen as follows ~    Nutrition Support Enteral:  Type of Feeding Tube: OG/NG  Enteral Frequency:  Continuous  Enteral Regimen: Vital HP at 55 mL/hr  Total Enteral Provisions: 1320 kcal (14 kcal/kg), 115 g protein (1.8 g/kg), 147 g CHO, 0 g fiber, 1204 mL H2O  Free Water Flush: 60 mL every 4 hours  Banatrol TF 1 pkt BID = 90 kcal, 10 g fiber, 4 g protein   Total = 1410 kcal (15 kcal/kg), 119 g protein (1.9 g/kg), 10 g fiber     Intake/Tolerance:    K/Mg normal  Phos 2.1 (L) - getting replacement   I/O 3640/2350, wt 104.6 kg (up overall)  BM x 2 today, x 1 yest - much improved with addition of Banatrol TF (noted rectal tube pulled)      ASSESSED NUTRITION NEEDS:  Dosing Weight 91.5 kg (energy) and 63.6 kg (protein)  Estimated Energy Needs: 4418-4188 kcals (14-17 Kcal/Kg)  Justification: obese and vented  Estimated Protein Needs:  grams protein (1.5-2 g pro/Kg)  Justification: hypercatabolism with critical illness and obesity guidelines   Estimated Fluid Needs: 7138-4566 mL (1 mL/Kcal)  Justification: maintenance      NEW FINDINGS:   Per rounds, likely to need Trach     Previous Goals (3/14):   Goal TF regimen will meet % needs - Met   Stooling volume will decrease with addition of Banatrol TF - Met    Previous Nutrition Diagnosis (3/14):   Altered GI function related to diarrhea with unknown etiology as evidenced by need for Banatrol   Evaluation: Improving      CURRENT NUTRITION DIAGNOSIS  No nutrition diagnosis identified at this  time as TF regimen meeting needs     INTERVENTIONS  Recommendations / Nutrition Prescription  Continue goal TF regimen as above     Implementation  Collaboration and Referral of Nutrition care: Patient discussed today during interdisciplinary bedside rounds    Goals  Goal TF regimen will continue to meet % needs     MONITORING AND EVALUATION:  Progress towards goals will be monitored and evaluated per protocol and Practice Guidelines    Sandra Chiu RD, LD, CNSC   Clinical Dietitian - [facility]

## 2024-03-18 NOTE — PROVIDER NOTIFICATION
Pt's plasmaphoresis run completed, pt's b/p low, see VS, notified Dr. Luque face to face communication, IVF bolus started per orders, see MAR    Addendum:  IVF bolus completed and b/p still low, norepinephrine gtt restarted, see MAR

## 2024-03-18 NOTE — PROGRESS NOTES
Assessment and Plan:     PLEX: last planned treatment today (5/5).   Lab with Na 140, K 3.8, HCO3  25, Cr 0.47. Phos  low at 2.1. Alb  3.8.     PLEX today. 1 PV XC, replace with Ca and 5% alb.             Interval History:   CHF: EF 23 %. Afib.    GBS:         Resp failure/Mech vent.     Hypotension: on levophed.              Review of Systems:   Unable.           Medications:      acetylcysteine  2 mL Nebulization Q6H    albuterol  2.5 mg Nebulization Q6H    [Held by provider] aspirin  81 mg Oral or Feeding Tube Daily    cefTAZidime  2 g Intravenous Q8H    chlorhexidine  15 mL Mouth/Throat Q12H    fiber modular  1 packet Per Feeding Tube BID    pantoprazole  40 mg Per Feeding Tube QAM AC    Or    pantoprazole  40 mg Intravenous QAM AC    piperacillin-tazobactam  4.5 g Intravenous Q6H    potassium & sodium phosphates  1 packet Oral or Feeding Tube Q4H    QUEtiapine  50 mg Oral or Feeding Tube BID    sodium chloride (PF)  3 mL Intracatheter Q8H    vancomycin  1,750 mg Intravenous Once      amiodarone 0.5 mg/min (03/17/24 2240)    dextrose      fentaNYL 50 mcg/hr (03/18/24 0925)    heparin 1,650 Units/hr (03/18/24 0748)    - MEDICATION INSTRUCTIONS -      norepinephrine Stopped (03/17/24 2100)    - MEDICATION INSTRUCTIONS -      phenylephrine      sodium chloride 20 mL/hr at 03/18/24 0835     Current active medications and PTA medications reviewed, see medication list for details.            Physical Exam:   Vitals were reviewed  Patient Vitals for the past 24 hrs:   BP Temp Temp src Pulse Resp SpO2 Weight   03/18/24 1030 101/59 98.8  F (37.1  C) -- (!) 121 22 95 % --   03/18/24 1000 -- 99.1  F (37.3  C) -- (!) 123 20 94 % --   03/18/24 0910 106/58 -- -- -- 22 95 % --   03/18/24 0811 -- 99.9  F (37.7  C) -- (!) 121 30 96 % --   03/18/24 0800 138/83 99.7  F (37.6  C) Bladder (!) 121 30 97 % --   03/18/24 0750 114/63 99.7  F (37.6  C) -- 120 22 98 % --   03/18/24 0730 108/67 99.7  F (37.6  C) -- (!) 121 20 98 %  --   03/18/24 0715 99/57 99.7  F (37.6  C) -- 120 14 96 % --   03/18/24 0700 100/65 99.9  F (37.7  C) -- 120 19 97 % --   03/18/24 0645 96/59 100  F (37.8  C) -- 120 27 94 % --   03/18/24 0615 -- -- -- -- -- -- 104.6 kg (230 lb 9.6 oz)   03/18/24 0600 96/61 100.2  F (37.9  C) -- (!) 121 26 95 % --   03/18/24 0500 -- (!) 100.6  F (38.1  C) -- (!) 121 (!) 42 94 % --   03/18/24 0414 131/81 (!) 101.1  F (38.4  C) -- 118 (!) 42 94 % --   03/18/24 0400 116/70 (!) 101.1  F (38.4  C) -- 120 27 95 % --   03/18/24 0300 105/70 (!) 100.8  F (38.2  C) -- (!) 121 27 96 % --   03/18/24 0200 106/84 -- -- (!) 124 25 97 % --   03/18/24 0100 122/77 (!) 100.6  F (38.1  C) -- (!) 121 22 90 % --   03/18/24 0000 104/67 100.2  F (37.9  C) -- (!) 142 20 95 % --   03/17/24 2330 111/62 100  F (37.8  C) -- (!) 126 (!) 0 95 % --   03/17/24 2315 107/69 100.2  F (37.9  C) -- (!) 129 (!) 0 93 % --   03/17/24 2300 104/58 100.4  F (38  C) -- (!) 123 (!) 0 90 % --   03/17/24 2245 100/60 100.4  F (38  C) -- 116 (!) 5 90 % --   03/17/24 2230 125/70 100.2  F (37.9  C) -- (!) 144 30 92 % --   03/17/24 2215 (!) 147/98 100  F (37.8  C) -- (!) 138 (!) 58 90 % --   03/17/24 2200 134/86 -- -- (!) 140 23 (!) 88 % --   03/17/24 2100 122/66 99.5  F (37.5  C) -- 120 18 93 % --   03/17/24 2033 128/82 99.9  F (37.7  C) -- 114 18 94 % --   03/17/24 2000 108/61 99.9  F (37.7  C) -- 118 15 95 % --   03/17/24 1900 100/48 100.2  F (37.9  C) Bladder (!) 121 16 96 % --   03/17/24 1830 110/50 -- -- 120 16 97 % --   03/17/24 1817 130/69 -- -- (!) 135 15 96 % --   03/17/24 1800 136/81 -- -- (!) 135 19 95 % --   03/17/24 1730 (!) 166/107 100.2  F (37.9  C) -- (!) 121 30 92 % --   03/17/24 1700 120/72 99.9  F (37.7  C) -- (!) 126 23 93 % --   03/17/24 1630 119/71 99.7  F (37.6  C) Bladder 120 24 94 % --   03/17/24 1600 99/74 -- -- (!) 137 23 96 % --   03/17/24 1545 121/82 -- -- (!) 122 20 95 % --   03/17/24 1530 120/59 -- -- 77 26 91 % --   03/17/24 1500 125/74 -- -- 78 21 97  % --   24 1430 114/56 -- -- 75 18 94 % --   24 1400 109/51 -- -- 76 29 96 % --   24 1330 (!) 73/55 -- -- 76 27 99 % --   24 1320 104/61 -- -- 75 26 100 % --   24 1315 104/61 -- -- 70 29 99 % --   24 1300 98/59 -- -- 64 21 100 % --   24 1230 100/50 -- -- 61 20 100 % --   24 1215 122/67 -- -- 66 16 100 % --   24 1200 104/56 -- -- 63 27 98 % --   24 1145 116/59 -- -- 64 18 99 % --   24 1130 117/63 97.7  F (36.5  C) Axillary 62 15 98 % --   24 1115 (!) 88/43 -- -- 56 15 98 % --   24 1100 96/56 -- -- 60 20 98 % --       Temp:  [97.7  F (36.5  C)-101.1  F (38.4  C)] 98.8  F (37.1  C)  Pulse:  [] 121  Resp:  [0-58] 22  BP: ()/() 101/59  FiO2 (%):  [30 %-50 %] 50 %  SpO2:  [88 %-100 %] 95 %    Temperatures:  Current - Temp: 98.8  F (37.1  C); Max - Temp  Av  F (37.8  C)  Min: 97.7  F (36.5  C)  Max: 101.1  F (38.4  C)  Respiration range: Resp  Av.9  Min: 0  Max: 58  Pulse range: Pulse  Av.9  Min: 56  Max: 144  Blood pressure range: Systolic (24hrs), Av , Min:73 , Max:166   ; Diastolic (24hrs), Av, Min:43, Max:107    Pulse oximetry range: SpO2  Av.3 %  Min: 88 %  Max: 100 %    I/O last 3 completed shifts:  In: 3639.8 [I.V.:1889.8; NG/GT:480]  Out: 2350 [Urine:2350]      Intake/Output Summary (Last 24 hours) at 3/18/2024 1050  Last data filed at 3/18/2024 1000  Gross per 24 hour   Intake 4314.45 ml   Output 2625 ml   Net 1689.45 ml     Eyes open, intubated  Lungs with clear BS  Cor irreg, tachy, nl S1 S2 no M  LE protective boots, no edema  R IJV Ovidio with no redness or tenderness         Wt Readings from Last 4 Encounters:   24 104.6 kg (230 lb 9.6 oz)   23 93 kg (205 lb)   23 92.4 kg (203 lb 12.8 oz)   23 92.4 kg (203 lb 12.8 oz)          Data:          Lab Results   Component Value Date     2024     2024     2024     2021      07/03/2019     06/29/2018    Lab Results   Component Value Date    CHLORIDE 105 03/18/2024    CHLORIDE 104 03/17/2024    CHLORIDE 104 03/16/2024    CHLORIDE 97 10/13/2022    CHLORIDE 100 08/23/2022    CHLORIDE 104 07/07/2022    CHLORIDE 104 07/05/2021    CHLORIDE 104 07/03/2019    CHLORIDE 106 06/29/2018    Lab Results   Component Value Date    BUN 14.3 03/18/2024    BUN 17.7 03/17/2024    BUN 19.2 03/16/2024    BUN 10 10/13/2022    BUN 12 08/23/2022    BUN 12 07/07/2022    BUN 15 07/05/2021    BUN 13 07/03/2019    BUN 16 06/29/2018      Lab Results   Component Value Date    POTASSIUM 3.8 03/18/2024    POTASSIUM 4.1 03/17/2024    POTASSIUM 2.8 03/17/2024    POTASSIUM 4.0 10/13/2022    POTASSIUM 4.2 08/23/2022    POTASSIUM 3.9 07/07/2022    POTASSIUM 4.2 07/05/2021    POTASSIUM 4.2 07/03/2019    POTASSIUM 4.0 06/29/2018    Lab Results   Component Value Date    CO2 25 03/18/2024    CO2 23 03/17/2024    CO2 24 03/16/2024    CO2 31 10/13/2022    CO2 28 08/23/2022    CO2 26 07/07/2022    CO2 27 07/05/2021    CO2 28 07/03/2019    CO2 24 06/29/2018    Lab Results   Component Value Date    CR 0.47 03/18/2024    CR 0.57 03/17/2024    CR 0.56 03/16/2024    CR 0.87 07/05/2021    CR 0.72 07/03/2019    CR 0.70 06/29/2018        Recent Labs   Lab Test 03/18/24  0517 03/17/24  1113 03/17/24  0610 03/17/24  0330 03/16/24  0504   WBC 12.5*  --   --  13.8* 14.5*   HGB 8.1* 8.0* 7.6* 7.5* 9.1*   HCT 24.7*  --   --  22.5* 27.0*   MCV 89  --   --  89 89    207  --  190 251     Recent Labs   Lab Test 03/18/24  0517 03/17/24  0330 03/16/24  0504 10/14/22  0532 10/13/22  1841   AST 19 17 19   < > 187*   ALT 8 7 8   < > 66*   ALKPHOS 40 25* 35*   < > 736*   BILITOTAL 0.8 0.9 0.8   < > 9.2*   SVEN  --   --   --   --  35    < > = values in this interval not displayed.       Recent Labs   Lab Test 03/18/24  0517 03/17/24  1113 03/17/24  0330   MAG 2.1 2.7* 1.4*     Recent Labs   Lab Test 03/18/24  0517 03/17/24  0330  03/16/24  0504   PHOS 2.1* 2.5 3.0     Recent Labs   Lab Test 03/18/24  0517 03/17/24  0330 03/16/24  0504   YEE 8.1* 7.3* 8.4*       Lab Results   Component Value Date    YEE 8.1 (L) 03/18/2024     Lab Results   Component Value Date    WBC 12.5 (H) 03/18/2024    HGB 8.1 (L) 03/18/2024    HCT 24.7 (L) 03/18/2024    MCV 89 03/18/2024     03/18/2024     Lab Results   Component Value Date     03/18/2024    POTASSIUM 3.8 03/18/2024    CHLORIDE 105 03/18/2024    CO2 25 03/18/2024     (H) 03/18/2024     Lab Results   Component Value Date    BUN 14.3 03/18/2024    CR 0.47 (L) 03/18/2024     Lab Results   Component Value Date    MAG 2.1 03/18/2024     Lab Results   Component Value Date    PHOS 2.1 (L) 03/18/2024       Creatinine   Date Value Ref Range Status   03/18/2024 0.47 (L) 0.51 - 0.95 mg/dL Final   03/17/2024 0.57 0.51 - 0.95 mg/dL Final   03/16/2024 0.56 0.51 - 0.95 mg/dL Final   03/15/2024 0.51 0.51 - 0.95 mg/dL Final   03/14/2024 0.39 (L) 0.51 - 0.95 mg/dL Final   03/13/2024 0.39 (L) 0.51 - 0.95 mg/dL Final   07/05/2021 0.87 0.52 - 1.04 mg/dL Final   07/03/2019 0.72 0.52 - 1.04 mg/dL Final   06/29/2018 0.70 0.52 - 1.04 mg/dL Final   06/21/2017 0.76 0.52 - 1.04 mg/dL Final   12/19/2016 0.66 0.52 - 1.04 mg/dL Final   12/15/2016 0.63 0.52 - 1.04 mg/dL Final       Attestation:  I have reviewed today's vital signs, notes, medications, labs and imaging.     Aristeo Carter MD

## 2024-03-18 NOTE — PROGRESS NOTES
[facility]    Stroke Progress Note    Interval Events  No acute events overnight.    HPI Summary  Yohana Marques is a 78 year old woman with h/o HTN, spinal stenosis s/p discectomy x 2 (most recent 8/2004), and chronic pain.     She presented on 3/8 after a fall at home in 3 to 4 days of generalized weakness worsening to an extent that from using just a cane she had to start using a walker.  Reports of gautam at their second house in [facility] but no access to canned food since mid February.      During admission patient found to have stress-induced cardiomyopathy with a EF of 23% on echo on 3/8.  On 3/10 due to labored, shallow breathing and respiratory acidosis patient was intubated.  Noted to have hypotonia and quadriparesis with hypophonia.  Given suspicion of possible GBS started on plasmapheresis.  New onset atrial fibrillation noted in hospital.    Patient had been having labile blood pressure, fluctuating heart rhythm from bradycardia to atrial fibrillation.  Initial MRI brain and MRI spine unremarkable however repeat on 3/14 revealed cauda equina nerve root enhancement.      Evaluation Summarized     MRI Brain w/wo contrast (3/9) (03/14) Small vessel disease changes   MRI Spine  C/T/L-spine 03/10 degenerative changes with superior endplate T3 compression and lumbar spondylosis.     C/T/L-spine 3/14 with cauda equina nerve root enhancement   CT Chest/Abdomen/Pelvis No evidence of malignancy.  Submucosal fibroid versus nonmalignant endometrial polyp and stable 4 mm left upper lobe nodule.       Echocardiogram 3/8        Coronary cath 3/8 EF is 23%.Entire left ventricle is severely hypokinetic but basal left ventricular function is less so     No significant CAD   EKG/Telemetry 3/10    Tachycardic (147 bpm), Atrial fibrillation w/ RVR   Left axis deviation, Anterior infarct, age undertermined  T wave abnormality  Episodes of bradycardia   CSF Labs Mildly  "elevated protein, 5 nucleated cell  Encephalitis/meningitis panel negative   Labs Myasthenia panel negative  Ganglioside Antibodies: pending  Urine porphyrins: pending  AZALEA: negative  ANCA: negative  HIV-1, HIV-2, HIV-1 p24 antigen nonreactive   EMG Completed on 3/15, report pending       Impression   Acute onset flaccid quadriparesis with respiratory insufficiency and areflexia now with evidence of cauda equina or nerve root enhancement likely in the setting of Guillain-Barré syndrome.  New onset stress-induced cardiomyopathy.     Plan  -PLEX 5/5 session. Alternate day session given hemodynamic instability with session.   -Follow CSF analysis and ganglioside antibodies  -Follow EMG report  -continue heparin   -Follow urine porphyrins, ANCA.  -Ventilator management per ICU team.  Patient on CPAP trials.     DVT prophylaxis: heparin 5000 units subcutaneous injection q8 hrs     Patient Follow-up    - final recommendation pending work-up    We will continue to follow.     The Stroke Staff is Dr. Drew Jeffries MD  Vascular Neurology fellow    To page a member of the stroke/neurocritical care service, click here:  [facility]   Choose \"On Call\" tab at top, then search dropdown box for \"Neurology Adult\", select location, press Enter, then look for stroke/neuro ICU/telestroke.  ______________________________________________________             Medications   Scheduled Meds   acetylcysteine  2 mL Nebulization Q6H    albuterol  2.5 mg Nebulization Q6H    [Held by provider] aspirin  81 mg Oral or Feeding Tube Daily    cefTAZidime  2 g Intravenous Q8H    chlorhexidine  15 mL Mouth/Throat Q12H    fiber modular  1 packet Per Feeding Tube BID    pantoprazole  40 mg Per Feeding Tube QAM AC    Or    pantoprazole  40 mg Intravenous QAM AC    piperacillin-tazobactam  4.5 g Intravenous Q6H    QUEtiapine  50 mg Oral or Feeding Tube BID    sodium chloride (PF)  3 mL Intracatheter Q8H    vancomycin  1,750 mg Intravenous Once "       Infusion Meds   amiodarone 0.5 mg/min (03/17/24 2240)    dextrose      fentaNYL 50 mcg/hr (03/17/24 2000)    heparin 1,650 Units/hr (03/18/24 0748)    - MEDICATION INSTRUCTIONS -      norepinephrine Stopped (03/17/24 2100)    - MEDICATION INSTRUCTIONS -      phenylephrine      sodium chloride 20 mL/hr at 03/18/24 0835       PRN Meds  acetaminophen, albuterol, atropine, bisacodyl, calcium carbonate, artificial tears, dextrose, fentaNYL, HOLD MEDICATION, lidocaine 4%, lidocaine (buffered or not buffered), melatonin, midazolam, midazolam, naloxone **OR** naloxone **OR** naloxone **OR** naloxone, - MEDICATION INSTRUCTIONS -, oxyCODONE **OR** oxyCODONE, - MEDICATION INSTRUCTIONS -, polyethylene glycol, senna-docusate **OR** senna-docusate, sodium chloride (PF), sodium chloride 0.9%       PHYSICAL EXAMINATION  Temp:  [97.7  F (36.5  C)-101.1  F (38.4  C)] 100  F (37.8  C)  Pulse:  [] 120  Resp:  [0-58] 27  BP: ()/() 96/59  FiO2 (%):  [30 %-40 %] 40 %  SpO2:  [88 %-100 %] 96 %      Neurologic   Mental Status:   alert, follows commands   Cranial Nerves:   pupils constricted 2 mm but reactive to light, EOM intact with no gaze palsy or nystagmus, able to wrinkle forehead, facial movement symmetric, tongue midline.  Facial sensation intact no shoulder shrug  Motor:   Bilateral upper extremity 1/5 with handgrip 2+/5.  Bilateral lower extremity 1/5 with knee extension 3/5 and dorsiflexion 3+/5 and plantarflexion about 4/5..   Reflexes:   See below      Left Right   Triceps 0 0   Biceps 0 0   Brachioradialis 2+ 1+   Adductor 0 0   Patellar 0 0   Achilles 0 0   Babinski Absent Absent   Sensory intact to light touch throughout   Coordination:   unable to assess d/t patient's current state  Station/Gait:  deferred      Imaging  I personally reviewed all imaging; relevant findings per HPI.     Lab Results Data   CBC  Recent Labs   Lab 03/18/24  0517 03/17/24  1113 03/17/24  0610 03/17/24  0330 03/16/24  0504  "  WBC 12.5*  --   --  13.8* 14.5*   RBC 2.79*  --   --  2.54* 3.02*   HGB 8.1* 8.0* 7.6* 7.5* 9.1*   HCT 24.7*  --   --  22.5* 27.0*    207  --  190 251     Basic Metabolic Panel    Recent Labs   Lab 03/18/24  0517 03/17/24  1113 03/17/24  0330 03/16/24  2132 03/16/24  0504     --  139  --  141   POTASSIUM 3.8 4.1 2.8*  --  3.8   CHLORIDE 105  --  104  --  104   CO2 25  --  23  --  24   BUN 14.3  --  17.7  --  19.2   CR 0.47*  --  0.57  --  0.56   *  --  133* 115* 144*   YEE 8.1*  --  7.3*  --  8.4*     Liver Panel  Recent Labs   Lab 03/18/24  0517 03/17/24  0330 03/16/24  0504   PROTTOTAL 5.7* 4.8* 5.7*   ALBUMIN 3.8 3.7 3.9   BILITOTAL 0.8 0.9 0.8   ALKPHOS 40 25* 35*   AST 19 17 19   ALT 8 7 8     INR    Recent Labs   Lab Test 05/02/23  0650 01/25/23  0719 10/15/22  0728   INR 1.02 1.06 1.23*      Lipid Profile    Recent Labs   Lab Test 07/07/22  1448 07/05/21  1352 07/03/19  0927   CHOL 241* 220* 213*   HDL 78 66 64   * 130* 118*   TRIG 119 122 156*     A1C    Recent Labs   Lab Test 03/11/24  0436   A1C 5.2     Troponin    No results for input(s): \"CTROPT\", \"TROPONINIS\", \"TROPONINI\", \"GHTROP\" in the last 168 hours.         Data       "

## 2024-03-18 NOTE — PLAN OF CARE
Neuro:Alert, resting between cares.  INOCENCIA orientation, intubated, does answer y/n questions correctly.  Able to make needs known. BLE movement +3-+4.  BUE +1-+2.  Restless and endorses anxiety during cares, prns administered per MAR.  Continues on fentanyl gtt and prn oxycodone for pain control.  CV:Tele SR/ST, remains on amio gtt.  Able to titrate levo off, MAP meets ordered goal.  Heparin gtt continues.  Resp: Lung sounds coarse, blood tinged sputum per ETT.  Remains on full vent sputum.  GI: Bowel sounds hyperactive, no BM this shift.  : Nye patent, in place for retention.  Good UOP this shift.  Skin: Skin care protocol in place.  Plan: Continue to monitor, possible bronch this morning.    Problem: Adult Inpatient Plan of Care  Goal: Absence of Hospital-Acquired Illness or Injury  Intervention: Identify and Manage Fall Risk  Recent Flowsheet Documentation  Taken 3/18/2024 0600 by Peggy Tay RN  Safety Promotion/Fall Prevention: activity supervised  Taken 3/18/2024 0400 by Peggy Tay RN  Safety Promotion/Fall Prevention: activity supervised  Taken 3/18/2024 0200 by Peggy Tay RN  Safety Promotion/Fall Prevention: activity supervised  Taken 3/18/2024 0000 by Peggy Tay RN  Safety Promotion/Fall Prevention: activity supervised  Taken 3/17/2024 2200 by Peggy Tay RN  Safety Promotion/Fall Prevention: activity supervised  Taken 3/17/2024 2000 by Peggy Tay RN  Safety Promotion/Fall Prevention: activity supervised  Intervention: Prevent Skin Injury  Recent Flowsheet Documentation  Taken 3/18/2024 0600 by Peggy Tay RN  Body Position:   side-lying   right  Skin Protection:   incontinence pads utilized   pulse oximeter probe site changed   silicone foam dressing in place   adhesive use limited   skin to device areas padded   skin to skin areas padded   transparent dressing maintained  Device Skin Pressure Protection:   adhesive use limited   skin-to-skin areas padded   absorbent pad  utilized/changed   skin-to-device areas padded  Taken 3/18/2024 0400 by Peggy Tay RN  Body Position:   left   side-lying  Skin Protection:   incontinence pads utilized   pulse oximeter probe site changed   silicone foam dressing in place   adhesive use limited   skin to device areas padded   skin to skin areas padded   transparent dressing maintained  Device Skin Pressure Protection:   adhesive use limited   skin-to-skin areas padded   absorbent pad utilized/changed   skin-to-device areas padded  Taken 3/18/2024 0200 by Peggy Tay RN  Body Position:   side-lying   right  Skin Protection:   incontinence pads utilized   pulse oximeter probe site changed   silicone foam dressing in place   adhesive use limited   skin to device areas padded   skin to skin areas padded   transparent dressing maintained  Device Skin Pressure Protection:   adhesive use limited   skin-to-skin areas padded   absorbent pad utilized/changed   skin-to-device areas padded  Taken 3/18/2024 0000 by Peggy Tay RN  Body Position:   left   side-lying  Skin Protection:   incontinence pads utilized   pulse oximeter probe site changed   silicone foam dressing in place   adhesive use limited   skin to device areas padded   skin to skin areas padded   transparent dressing maintained  Device Skin Pressure Protection:   adhesive use limited   skin-to-skin areas padded   absorbent pad utilized/changed   skin-to-device areas padded  Taken 3/17/2024 2200 by Peggy Tay RN  Body Position:   side-lying   right  Skin Protection:   incontinence pads utilized   pulse oximeter probe site changed   silicone foam dressing in place   adhesive use limited   skin to device areas padded   skin to skin areas padded   transparent dressing maintained  Device Skin Pressure Protection:   adhesive use limited   skin-to-skin areas padded   absorbent pad utilized/changed   skin-to-device areas padded  Taken 3/17/2024 2000 by Peggy Tay RN  Body  Position:   left   side-lying  Skin Protection:   incontinence pads utilized   pulse oximeter probe site changed   silicone foam dressing in place   adhesive use limited   skin to device areas padded   skin to skin areas padded   transparent dressing maintained  Device Skin Pressure Protection:   adhesive use limited   skin-to-skin areas padded   absorbent pad utilized/changed   skin-to-device areas padded  Intervention: Prevent and Manage VTE (Venous Thromboembolism) Risk  Recent Flowsheet Documentation  Taken 3/18/2024 0400 by Peggy Tay RN  VTE Prevention/Management: SCDs (sequential compression devices) on  Taken 3/18/2024 0000 by Peggy Tay RN  VTE Prevention/Management: SCDs (sequential compression devices) on  Taken 3/17/2024 2000 by Peggy Tay RN  VTE Prevention/Management: SCDs (sequential compression devices) on  Goal: Optimal Comfort and Wellbeing  Intervention: Provide Person-Centered Care  Recent Flowsheet Documentation  Taken 3/18/2024 0600 by Peggy Tay RN  Trust Relationship/Rapport:   care explained   choices provided   emotional support provided   questions answered   reassurance provided   thoughts/feelings acknowledged  Taken 3/18/2024 0400 by Peggy Tay RN  Trust Relationship/Rapport:   care explained   choices provided   emotional support provided   questions answered   reassurance provided   thoughts/feelings acknowledged  Taken 3/18/2024 0200 by Peggy Tay RN  Trust Relationship/Rapport:   care explained   choices provided   emotional support provided   questions answered   reassurance provided   thoughts/feelings acknowledged  Taken 3/18/2024 0000 by Peggy Tay RN  Trust Relationship/Rapport:   care explained   choices provided   emotional support provided   questions answered   reassurance provided   thoughts/feelings acknowledged  Taken 3/17/2024 2200 by Peggy Tay RN  Trust Relationship/Rapport:   care explained   choices provided   emotional  support provided   questions answered   reassurance provided   thoughts/feelings acknowledged  Taken 3/17/2024 2000 by Peggy Tay RN  Trust Relationship/Rapport:   care explained   choices provided   emotional support provided   questions answered   reassurance provided   thoughts/feelings acknowledged     Problem: Risk for Delirium  Goal: Optimal Coping  Outcome: Progressing  Intervention: Optimize Psychosocial Adjustment to Delirium  Recent Flowsheet Documentation  Taken 3/18/2024 0600 by Peggy Tay RN  Family/Support System Care:   caregiver stress acknowledged   involvement promoted   presence promoted   support provided  Taken 3/18/2024 0400 by Peggy Tay RN  Family/Support System Care:   caregiver stress acknowledged   involvement promoted   presence promoted   support provided  Taken 3/18/2024 0200 by Peggy Tay RN  Family/Support System Care:   caregiver stress acknowledged   involvement promoted   presence promoted   support provided  Taken 3/18/2024 0000 by Peggy Tay RN  Family/Support System Care:   caregiver stress acknowledged   involvement promoted   presence promoted   support provided  Taken 3/17/2024 2200 by Peggy Tay RN  Family/Support System Care:   caregiver stress acknowledged   involvement promoted   presence promoted   support provided  Taken 3/17/2024 2000 by Peggy Tay RN  Family/Support System Care:   caregiver stress acknowledged   involvement promoted   presence promoted   support provided  Goal: Improved Behavioral Control  Outcome: Progressing  Intervention: Minimize Safety Risk  Recent Flowsheet Documentation  Taken 3/18/2024 0600 by Peggy Tay RN  Enhanced Safety Measures: pain management  Trust Relationship/Rapport:   care explained   choices provided   emotional support provided   questions answered   reassurance provided   thoughts/feelings acknowledged  Taken 3/18/2024 0400 by Peggy Tay RN  Enhanced Safety Measures: pain  management  Trust Relationship/Rapport:   care explained   choices provided   emotional support provided   questions answered   reassurance provided   thoughts/feelings acknowledged  Taken 3/18/2024 0200 by Peggy Tay RN  Enhanced Safety Measures: pain management  Trust Relationship/Rapport:   care explained   choices provided   emotional support provided   questions answered   reassurance provided   thoughts/feelings acknowledged  Taken 3/18/2024 0000 by Peggy Tay RN  Enhanced Safety Measures: pain management  Trust Relationship/Rapport:   care explained   choices provided   emotional support provided   questions answered   reassurance provided   thoughts/feelings acknowledged  Taken 3/17/2024 2200 by Peggy Tay RN  Enhanced Safety Measures: pain management  Trust Relationship/Rapport:   care explained   choices provided   emotional support provided   questions answered   reassurance provided   thoughts/feelings acknowledged  Taken 3/17/2024 2000 by Peggy Tay RN  Enhanced Safety Measures: pain management  Trust Relationship/Rapport:   care explained   choices provided   emotional support provided   questions answered   reassurance provided   thoughts/feelings acknowledged  Goal: Improved Attention and Thought Clarity  Outcome: Progressing  Goal: Improved Sleep  Outcome: Not Progressing     Problem: Mechanical Ventilation Invasive  Goal: Effective Communication  Outcome: Progressing  Intervention: Ensure Effective Communication  Recent Flowsheet Documentation  Taken 3/18/2024 0600 by Peggy Tay RN  Family/Support System Care:   caregiver stress acknowledged   involvement promoted   presence promoted   support provided  Trust Relationship/Rapport:   care explained   choices provided   emotional support provided   questions answered   reassurance provided   thoughts/feelings acknowledged  Taken 3/18/2024 0400 by Peggy Tay RN  Family/Support System Care:   caregiver stress  acknowledged   involvement promoted   presence promoted   support provided  Trust Relationship/Rapport:   care explained   choices provided   emotional support provided   questions answered   reassurance provided   thoughts/feelings acknowledged  Taken 3/18/2024 0200 by Peggy Tay RN  Family/Support System Care:   caregiver stress acknowledged   involvement promoted   presence promoted   support provided  Trust Relationship/Rapport:   care explained   choices provided   emotional support provided   questions answered   reassurance provided   thoughts/feelings acknowledged  Taken 3/18/2024 0000 by Peggy Tay RN  Family/Support System Care:   caregiver stress acknowledged   involvement promoted   presence promoted   support provided  Trust Relationship/Rapport:   care explained   choices provided   emotional support provided   questions answered   reassurance provided   thoughts/feelings acknowledged  Taken 3/17/2024 2200 by Peggy Tay RN  Family/Support System Care:   caregiver stress acknowledged   involvement promoted   presence promoted   support provided  Trust Relationship/Rapport:   care explained   choices provided   emotional support provided   questions answered   reassurance provided   thoughts/feelings acknowledged  Taken 3/17/2024 2000 by Peggy Tay RN  Family/Support System Care:   caregiver stress acknowledged   involvement promoted   presence promoted   support provided  Trust Relationship/Rapport:   care explained   choices provided   emotional support provided   questions answered   reassurance provided   thoughts/feelings acknowledged  Goal: Optimal Device Function  Outcome: Progressing  Intervention: Optimize Device Care and Function  Recent Flowsheet Documentation  Taken 3/18/2024 0600 by Peggy Tay RN  Oral Care:   swabbed with antiseptic solution   suction provided   lip/mouth moisturizer applied  Taken 3/18/2024 0400 by Peggy Tay RN  Airway/Ventilation Management:  airway patency maintained  Oral Care:   swabbed with antiseptic solution   suction provided   lip/mouth moisturizer applied  Taken 3/18/2024 0200 by Peggy Tay RN  Oral Care:   swabbed with antiseptic solution   suction provided   lip/mouth moisturizer applied  Taken 3/18/2024 0000 by Peggy Tay RN  Airway/Ventilation Management: airway patency maintained  Oral Care:   swabbed with antiseptic solution   suction provided   lip/mouth moisturizer applied  Taken 3/17/2024 2200 by Peggy Tay RN  Oral Care:   swabbed with antiseptic solution   suction provided   lip/mouth moisturizer applied  Taken 3/17/2024 2000 by Peggy Tay RN  Airway/Ventilation Management: airway patency maintained  Oral Care:   swabbed with antiseptic solution   suction provided   lip/mouth moisturizer applied  Goal: Mechanical Ventilation Liberation  Outcome: Progressing  Intervention: Promote Extubation and Mechanical Ventilation Liberation  Recent Flowsheet Documentation  Taken 3/18/2024 0600 by Peggy Tay RN  Medication Review/Management: medications reviewed  Taken 3/18/2024 0400 by Peggy Tay RN  Medication Review/Management: medications reviewed  Taken 3/18/2024 0200 by Peggy Tay RN  Medication Review/Management: medications reviewed  Taken 3/18/2024 0000 by Peggy Tay RN  Medication Review/Management: medications reviewed  Taken 3/17/2024 2200 by Peggy Tay RN  Medication Review/Management: medications reviewed  Taken 3/17/2024 2000 by Peggy Tay RN  Medication Review/Management: medications reviewed  Goal: Optimal Nutrition Delivery  Outcome: Progressing  Intervention: Optimize Nutrition Delivery  Recent Flowsheet Documentation  Taken 3/18/2024 0600 by Peggy Tay RN  Nutrition Support Management: weight trending reviewed  Taken 3/18/2024 0400 by Peggy Tay RN  Nutrition Support Management: weight trending reviewed  Taken 3/18/2024 0200 by Peggy Tay RN  Nutrition Support  Management: weight trending reviewed  Taken 3/18/2024 0000 by Peggy Tay RN  Nutrition Support Management: weight trending reviewed  Taken 3/17/2024 2200 by Peggy Tay RN  Nutrition Support Management: weight trending reviewed  Taken 3/17/2024 2000 by Peggy Tay RN  Nutrition Support Management: weight trending reviewed  Goal: Absence of Device-Related Skin and Tissue Injury  Outcome: Progressing  Intervention: Maintain Skin and Tissue Health  Recent Flowsheet Documentation  Taken 3/18/2024 0600 by Peggy Tay RN  Device Skin Pressure Protection:   adhesive use limited   skin-to-skin areas padded   absorbent pad utilized/changed   skin-to-device areas padded  Taken 3/18/2024 0400 by Peggy Tay RN  Device Skin Pressure Protection:   adhesive use limited   skin-to-skin areas padded   absorbent pad utilized/changed   skin-to-device areas padded  Taken 3/18/2024 0200 by Peggy Tay RN  Device Skin Pressure Protection:   adhesive use limited   skin-to-skin areas padded   absorbent pad utilized/changed   skin-to-device areas padded  Taken 3/18/2024 0000 by Peggy Tay RN  Device Skin Pressure Protection:   adhesive use limited   skin-to-skin areas padded   absorbent pad utilized/changed   skin-to-device areas padded  Taken 3/17/2024 2200 by Peggy Tay RN  Device Skin Pressure Protection:   adhesive use limited   skin-to-skin areas padded   absorbent pad utilized/changed   skin-to-device areas padded  Taken 3/17/2024 2000 by Peggy Tay RN  Device Skin Pressure Protection:   adhesive use limited   skin-to-skin areas padded   absorbent pad utilized/changed   skin-to-device areas padded  Goal: Absence of Ventilator-Induced Lung Injury  Outcome: Progressing  Intervention: Facilitate Lung-Protection Measures  Recent Flowsheet Documentation  Taken 3/18/2024 0400 by Peggy Tay RN  Lung Protection Measures: ventilator waveforms monitored  Taken 3/18/2024 0000 by Peggy Tay  RN  Lung Protection Measures: ventilator waveforms monitored  Taken 3/17/2024 2000 by Peggy Tay RN  Lung Protection Measures: ventilator waveforms monitored  Intervention: Prevent Ventilator-Associated Pneumonia  Recent Flowsheet Documentation  Taken 3/18/2024 0600 by Peggy Tay RN  VAP Prevention Bundle:   HOB elevation maintained   oral care regularly provided   readiness to extubate assessed   VTE prophylaxis provided   stress ulcer prophylaxis provided  Oral Care:   swabbed with antiseptic solution   suction provided   lip/mouth moisturizer applied  Head of Bed (HOB) Positioning: HOB at 30 degrees  VAP Prevention Measures: completed  Taken 3/18/2024 0400 by Peggy Tay RN  VAP Prevention Bundle:   HOB elevation maintained   oral care regularly provided   readiness to extubate assessed   VTE prophylaxis provided   stress ulcer prophylaxis provided  Oral Care:   swabbed with antiseptic solution   suction provided   lip/mouth moisturizer applied  Head of Bed (HOB) Positioning: HOB at 30 degrees  VAP Prevention Measures: completed  Taken 3/18/2024 0200 by Peggy Tay RN  VAP Prevention Bundle:   HOB elevation maintained   oral care regularly provided   readiness to extubate assessed   VTE prophylaxis provided   stress ulcer prophylaxis provided  Oral Care:   swabbed with antiseptic solution   suction provided   lip/mouth moisturizer applied  Head of Bed (HOB) Positioning: HOB at 30 degrees  VAP Prevention Measures: completed  Taken 3/18/2024 0000 by Peggy Tay RN  VAP Prevention Bundle:   HOB elevation maintained   oral care regularly provided   readiness to extubate assessed   VTE prophylaxis provided   stress ulcer prophylaxis provided  Oral Care:   swabbed with antiseptic solution   suction provided   lip/mouth moisturizer applied  Head of Bed (HOB) Positioning: HOB at 30 degrees  VAP Prevention Measures: completed  Taken 3/17/2024 2200 by Peggy Tay RN  VAP Prevention Bundle:   [facility] [place]  elevation maintained   oral care regularly provided   readiness to extubate assessed   VTE prophylaxis provided   stress ulcer prophylaxis provided  Oral Care:   swabbed with antiseptic solution   suction provided   lip/mouth moisturizer applied  Head of Bed (HOB) Positioning: HOB at 30 degrees  VAP Prevention Measures: completed  Taken 3/17/2024 2000 by Peggy aTy RN  VAP Prevention Bundle:   HOB elevation maintained   oral care regularly provided   readiness to extubate assessed   VTE prophylaxis provided   stress ulcer prophylaxis provided  Oral Care:   swabbed with antiseptic solution   suction provided   lip/mouth moisturizer applied  Head of Bed (HOB) Positioning: HOB at 30 degrees  VAP Prevention Measures: completed     Problem: Guillain-[place]  Syndrome  Goal: Optimal Coping  Outcome: Progressing  Intervention: Optimize Psychosocial Response  Recent Flowsheet Documentation  Taken 3/18/2024 0600 by Peggy Tay RN  Family/Support System Care:   caregiver stress acknowledged   involvement promoted   presence promoted   support provided  Taken 3/18/2024 0400 by Peggy Tay RN  Family/Support System Care:   caregiver stress acknowledged   involvement promoted   presence promoted   support provided  Taken 3/18/2024 0200 by Peggy Tay RN  Family/Support System Care:   caregiver stress acknowledged   involvement promoted   presence promoted   support provided  Taken 3/18/2024 0000 by Peggy Tay RN  Family/Support System Care:   caregiver stress acknowledged   involvement promoted   presence promoted   support provided  Taken 3/17/2024 2200 by Peggy Tay RN  Family/Support System Care:   caregiver stress acknowledged   involvement promoted   presence promoted   support provided  Taken 3/17/2024 2000 by Peggy Tay RN  Family/Support System Care:   caregiver stress acknowledged   involvement promoted   presence promoted   support provided  Goal: Balanced Sympathetic/Parasympathetic  Function  Outcome: Progressing  Intervention: Manage Sympathetic/Parasympathetic Symptoms  Recent Flowsheet Documentation  Taken 3/18/2024 0600 by Peggy Tay RN  Fluid/Electrolyte Management: fluids provided  Taken 3/18/2024 0400 by Peggy Tay RN  Fluid/Electrolyte Management: fluids provided  Taken 3/18/2024 0200 by Peggy Tay RN  Fluid/Electrolyte Management: fluids provided  Taken 3/18/2024 0000 by Peggy Tay RN  Fluid/Electrolyte Management: fluids provided  Taken 3/17/2024 2200 by Peggy Tay RN  Fluid/Electrolyte Management: fluids provided  Taken 3/17/2024 2000 by Peggy Tay RN  Fluid/Electrolyte Management: fluids provided  Goal: Optimal Functional Ability  Intervention: Optimize Functional Ability  Recent Flowsheet Documentation  Taken 3/18/2024 0600 by Peggy Tay RN  Activity Management: bedrest  Taken 3/18/2024 0400 by Peggy Tay RN  Activity Management: bedrest  Taken 3/18/2024 0200 by Peggy Tay RN  Activity Management: bedrest  Taken 3/18/2024 0000 by Peggy Tay RN  Activity Management: bedrest  Taken 3/17/2024 2200 by Peggy Tay RN  Activity Management: bedrest  Taken 3/17/2024 2000 by Peggy Tay RN  Activity Management: bedrest  Goal: Effective Oxygenation and Ventilation  Intervention: Promote Airway Secretion Clearance  Recent Flowsheet Documentation  Taken 3/18/2024 0600 by Peggy Tay RN  Cough And Deep Breathing: unable to perform  Activity Management: bedrest  Taken 3/18/2024 0400 by Peggy Tay RN  Cough And Deep Breathing: unable to perform  Activity Management: bedrest  Taken 3/18/2024 0200 by Peggy Tay RN  Cough And Deep Breathing: unable to perform  Activity Management: bedrest  Taken 3/18/2024 0000 by Peggy Tay RN  Cough And Deep Breathing: unable to perform  Activity Management: bedrest  Taken 3/17/2024 2200 by Pgegy Tay RN  Cough And Deep Breathing: unable to perform  Activity Management: bedrest  Taken  3/17/2024 2000 by Peggy Tay RN  Cough And Deep Breathing: unable to perform  Activity Management: bedrest  Intervention: Optimize Oxygenation and Ventilation  Recent Flowsheet Documentation  Taken 3/18/2024 0600 by Peggy Tay RN  Head of Bed (HOB) Positioning: HOB at 30 degrees  Taken 3/18/2024 0400 by Peggy Tay RN  Airway/Ventilation Management: airway patency maintained  Head of Bed (HOB) Positioning: HOB at 30 degrees  Taken 3/18/2024 0200 by Peggy Tay RN  Head of Bed (HOB) Positioning: HOB at 30 degrees  Taken 3/18/2024 0000 by Peggy Tay RN  Airway/Ventilation Management: airway patency maintained  Head of Bed (HOB) Positioning: HOB at 30 degrees  Taken 3/17/2024 2200 by Peggy Tay RN  Head of Bed (HOB) Positioning: HOB at 30 degrees  Taken 3/17/2024 2000 by Peggy Tay RN  Airway/Ventilation Management: airway patency maintained  Head of Bed (HOB) Positioning: HOB at 30 degrees  Goal: Optimal Sensorimotor Function  Intervention: Optimize Neurologic Status  Recent Flowsheet Documentation  Taken 3/18/2024 0600 by Peggy Tay RN  Pressure Reduction Devices:   heel offloading device utilized   positioning supports utilized  Taken 3/18/2024 0400 by Peggy Tay RN  Pressure Reduction Devices:   heel offloading device utilized   positioning supports utilized  Taken 3/18/2024 0200 by Peggy Tay RN  Pressure Reduction Devices:   heel offloading device utilized   positioning supports utilized  Taken 3/18/2024 0000 by Peggy Tay RN  Pressure Reduction Devices:   heel offloading device utilized   positioning supports utilized  Taken 3/17/2024 2200 by Peggy Tay RN  Pressure Reduction Devices:   heel offloading device utilized   positioning supports utilized  Taken 3/17/2024 2000 by Peggy Tay RN  Pressure Reduction Devices:   heel offloading device utilized   positioning supports utilized   Goal Outcome Evaluation:

## 2024-03-19 NOTE — PLAN OF CARE
Goal Outcome Evaluation:      Plan of Care Reviewed With: patient    Overall Patient Progress: no changeOverall Patient Progress: no change    Outcome Evaluation: During the night, sats dropped below 90% and despite suctioning, did not come up so increased FiO2 to 50%, LS coarse t/o. HR also became elevated to 140's sustained afib, increased amio to 1 from 0.5 per order. Hep has been weaned per protocol, next hep 10a is this afternoon. No c/o pain, controlled w/fent gtt. Large volume of loose stool, re-inserted rectal tube w/good output and scant leakage x1. Did have temp, gave prn tyelonol w/good relief.    Problem: Adult Inpatient Plan of Care  Goal: Absence of Hospital-Acquired Illness or Injury  Intervention: Prevent Skin Injury  Recent Flowsheet Documentation  Taken 3/19/2024 0600 by Georgette Kline RN  Body Position:   turned   lower extremity elevated   upper extremity elevated  Taken 3/19/2024 0400 by Georgette Kline RN  Body Position:   turned   lower extremity elevated   upper extremity elevated  Device Skin Pressure Protection:   absorbent pad utilized/changed   skin-to-device areas padded   tubing/devices free from skin contact  Taken 3/19/2024 0200 by Georgette Kline RN  Body Position:   turned   lower extremity elevated   upper extremity elevated  Taken 3/19/2024 0000 by Georgette Kline RN  Body Position:   turned   lower extremity elevated   upper extremity elevated  Device Skin Pressure Protection:   absorbent pad utilized/changed   skin-to-device areas padded   tubing/devices free from skin contact  Taken 3/18/2024 2200 by Georgette Kline RN  Body Position:   turned   lower extremity elevated   upper extremity elevated  Taken 3/18/2024 2000 by Georgette Kline RN  Body Position:   turned   lower extremity elevated   upper extremity elevated  Device Skin Pressure Protection:   absorbent pad utilized/changed   skin-to-device areas padded   tubing/devices free from skin  contact     Problem: Adult Inpatient Plan of Care  Goal: Optimal Comfort and Wellbeing  Outcome: Progressing  Intervention: Provide Person-Centered Care  Recent Flowsheet Documentation  Taken 3/19/2024 0400 by Georgette Kline RN  Trust Relationship/Rapport:   care explained   reassurance provided  Taken 3/19/2024 0000 by Georgette Kilne RN  Trust Relationship/Rapport:   care explained   reassurance provided  Taken 3/18/2024 2000 by Georgette Kline RN  Trust Relationship/Rapport:   care explained   reassurance provided

## 2024-03-19 NOTE — PROGRESS NOTES
[facility] ICU RESPIRATORY NOTE        Date of Admission: 3/8/2024    Date of Intubation (most recent): 3/10/24    Reason for Mechanical Ventilation: Airway protection    Number of Days on Mechanical Ventilation: 10      Met Criteria for Spontaneous Breathing Trial: No    Reason for No Spontaneous Breathing Trial: Per MD    Significant Events Today: Steady increase in Oxygen need.    ABG Results:   Recent Labs   Lab 03/18/24  0950   O2PER 50         Current Vent Settings: Vent Mode: CMV/AC  (Continuous Mandatory Ventilation/ Assist Control)  FiO2 (%): 45 %  Resp Rate (Set): 14 breaths/min  Tidal Volume (Set, mL): 420 mL  PEEP (cm H2O): 5 cmH2O  Resp: 29      Tishaac RAUDEL Lilly, RT on 3/19/2024 at 4:33 AM

## 2024-03-19 NOTE — OR NURSING
Patient had bronchial lavage by Dr. Bui at the bedside on the ICU room 372.The primary ICU nurse had the consent done and a time out was preformed by ICU nurse and physician.  The ICU nurse gave conscience sedation and the bronchial lavage specimen was handled by the ICU nurse and Dr. Bui.  Specimen left with the ICU nurse.

## 2024-03-19 NOTE — PROCEDURES
Procedures      Procedure:   Bronchoscopy with BAL        Indication:   Pneumonia , mild hemoptysis       Consent:   Obtained from the patient/family.      Pre-medication:   Versed 1 mg, Fentanly 100 mcg, Lidocain 4% 3 c. Lidocaine 1% 3 ml down each mainstem for a total of 6 ml         Procedure Summary:   Time out was performed.   The scope inserted thru the ETT  Inspection of trachea and first order revealed ashen appearance of airways through left and right main sten, consistent with anthracosis appearance.  There was a david but vascular appearing endobronchial lesion at take off of  SANGEETA  second milan, brrshing not done as on heparin,  BAL performed in the RUL lobe. A total of 40 cc saline instilled in 2 aliquotes and 25 cc yellow colored fluid recovered.  The patient tolerated the procedure well without undue discomfort, hypotension or arrhythmia. The procedure was performed in the ICU--and vital sign parameters were monitored.        Complications:   No immediate complications.  Sample sent for cell count, cytology and microbiology.    This procedure is performed by Regan Bui MD

## 2024-03-19 NOTE — PLAN OF CARE
Goal Outcome Evaluation:      Plan of Care Reviewed With: patient, spouse    Overall Patient Progress: no changeOverall Patient Progress: no change    Pt. continued on vent, needing more 02 throughout this shift. Inline secretions thin pinkish. On Amiodarone 1 mg/min,hep gtt @ 12mL/hr, fent 50 mcg/hr. Levo turned off since 0830. Rass goal 0 to -1 VSS, Tele  Afib RVR converted to SR MAP >65. OG infusing @ 55mL/hr with 60mL Q4 flushes @goal.  Nye in place with adequate UOP. Rectal tube in place. Had bronchoscopy done today result pending. HD catheter removed, dressing CDI. K+, phos replaced per protocol recheck @0600, hep Xa recheck @ 2015.    Problem: Adult Inpatient Plan of Care  Goal: Absence of Hospital-Acquired Illness or Injury  Intervention: Prevent Skin Injury  Recent Flowsheet Documentation  Taken 3/19/2024 1800 by Makayla Kapoor RN  Body Position:   turned   right   heels elevated   legs elevated   side-lying   upper extremity elevated  Taken 3/19/2024 1600 by Makayla Kapoor RN  Body Position:   turned   left   heels elevated   legs elevated   side-lying  Device Skin Pressure Protection:   absorbent pad utilized/changed   skin-to-device areas padded   tubing/devices free from skin contact  Taken 3/19/2024 1400 by Makayla Kapoor RN  Body Position:   turned   right   heels elevated   legs elevated   side-lying   upper extremity elevated  Taken 3/19/2024 1200 by Makayla Kapoor RN  Body Position:   turned   left   heels elevated   legs elevated   side-lying  Device Skin Pressure Protection:   absorbent pad utilized/changed   skin-to-device areas padded   tubing/devices free from skin contact  Taken 3/19/2024 1000 by Makayla Kapoor RN  Body Position:   turned   right  Taken 3/19/2024 0800 by Makayla Kapoor RN  Body Position:   turned   left   heels elevated   legs elevated   side-lying  Device Skin Pressure Protection:   absorbent pad utilized/changed   skin-to-device areas padded    tubing/devices free from skin contact     Problem: Adult Inpatient Plan of Care  Goal: Absence of Hospital-Acquired Illness or Injury  Intervention: Prevent and Manage VTE (Venous Thromboembolism) Risk  Recent Flowsheet Documentation  Taken 3/19/2024 1600 by Makayla Kapoor RN  VTE Prevention/Management: (Hep gtt) SCDs (sequential compression devices) on  Taken 3/19/2024 1200 by Makayla Kapoor RN  VTE Prevention/Management: (Hep gtt) SCDs (sequential compression devices) on  Taken 3/19/2024 0800 by Makayla Kapoor RN  VTE Prevention/Management: (Hep gtt) SCDs (sequential compression devices) on

## 2024-03-19 NOTE — PROVIDER NOTIFICATION
Spoke to Dr. Fragoso re: pt back in afib maintaining HR in 140's who gave VORB to increase amio from 0.5 to 1.

## 2024-03-19 NOTE — PROGRESS NOTES
[facility] ICU RESPIRATORY NOTE        Date of Admission: 3/8/2024    Date of Intubation (most recent): 3/10/24    Reason for Mechanical Ventilation: Airway protection    Number of Days on Mechanical Ventilation: 10    Met Criteria for Spontaneous Breathing Trial: No    Reason for No Spontaneous Breathing Trial:FiO2 60%    Significant Events Today: Bedside bronch was done. FiO2 has been between 50-75% throughout the day to keep SpO2 in the low to mid 90's.    ABG Results:   Recent Labs   Lab 03/18/24  0950   O2PER 50         Current Vent Settings: Vent Mode: CMV/AC  (Continuous Mandatory Ventilation/ Assist Control)  FiO2 (%): 60 %  Resp Rate (Set): 14 breaths/min  Tidal Volume (Set, mL): 420 mL  PEEP (cm H2O): 5 cmH2O  Resp: 29      Skin Assessment: Intact    Plan: Will cont full vent support for now and will assess for weaning readiness daily.    Rose Aldrich RT on 3/19/2024 at 4:05 PM

## 2024-03-19 NOTE — PLAN OF CARE
Goal Outcome Evaluation:    Pt continuing to require 50% FiO2 today, only tolerating 15-20 minute pressure support at a time, chest CT completed, see results, abx order changes, frequent loose incontinent stools today, see I&O, 5th run of plasmapharesis done today, low b/p after, norepinephrine restarted per MAR,       Plan of Care Reviewed With: patient, family, significant other, child    Overall Patient Progress: no changeOverall Patient Progress: no change

## 2024-03-19 NOTE — PROGRESS NOTES
[facility]    Stroke Progress Note    Interval Events  No acute events overnight.    HPI Summary  Yohana Marques is a 78 year old woman with h/o HTN, spinal stenosis s/p discectomy x 2 (most recent 8/2004), and chronic pain.     She presented on 3/8 after a fall at home in 3 to 4 days of generalized weakness worsening to an extent that from using just a cane she had to start using a walker.  Reports of gautam at their second house in [facility] but no access to canned food since mid February.      During admission patient found to have stress-induced cardiomyopathy with a EF of 23% on echo on 3/8.  On 3/10 due to labored, shallow breathing and respiratory acidosis patient was intubated.  Noted to have hypotonia and quadriparesis with hypophonia.  Given suspicion of possible GBS started on plasmapheresis.  New onset atrial fibrillation noted in hospital.    Patient had been having labile blood pressure, fluctuating heart rhythm from bradycardia to atrial fibrillation.  Initial MRI brain and MRI spine unremarkable however repeat on 3/14 revealed cauda equina nerve root enhancement.      Evaluation Summarized     MRI Brain w/wo contrast (3/9) (03/14) Small vessel disease changes   MRI Spine  C/T/L-spine 03/10 degenerative changes with superior endplate T3 compression and lumbar spondylosis.     C/T/L-spine 3/14 with cauda equina nerve root enhancement   CT Chest/Abdomen/Pelvis No evidence of malignancy.  Submucosal fibroid versus nonmalignant endometrial polyp and stable 4 mm left upper lobe nodule.       Echocardiogram 3/8        Coronary cath 3/8 EF is 23%.Entire left ventricle is severely hypokinetic but basal left ventricular function is less so     No significant CAD   EKG/Telemetry 3/10    Tachycardic (147 bpm), Atrial fibrillation w/ RVR   Left axis deviation, Anterior infarct, age undertermined  T wave abnormality  Episodes of bradycardia   CSF Labs Mildly  "elevated protein, 5 nucleated cell  Encephalitis/meningitis panel negative   Labs Myasthenia panel negative  Ganglioside Antibodies: pending  Urine porphyrins: pending  AZALEA: negative  ANCA: negative  HIV-1, HIV-2, HIV-1 p24 antigen nonreactive   EMG Completed on 3/15, report pending       Impression   Acute onset flaccid quadriparesis with respiratory insufficiency and areflexia now with evidence of cauda equina or nerve root enhancement likely in the setting of Guillain-Barré syndrome.  New onset stress-induced cardiomyopathy.     Plan  -PLEX 5/5 session.   -Follow CSF analysis and ganglioside antibodies  -Follow EMG report  -continue heparin   -Ventilator management per ICU team.  Patient on CPAP trials.     DVT prophylaxis: heparin 5000 units subcutaneous injection q8 hrs     Patient Follow-up    - final recommendation pending work-up    We will continue to follow.     The Stroke Staff is Dr. Drew Jeffries MD  Vascular Neurology fellow    To page a member of the stroke/neurocritical care service, click here:  [facility]   Choose \"On Call\" tab at top, then search dropdown box for \"Neurology Adult\", select location, press Enter, then look for stroke/neuro ICU/telestroke.  ______________________________________________________             Medications   Scheduled Meds   acetylcysteine  2 mL Nebulization Q6H    albuterol  2.5 mg Nebulization Q6H    [Held by provider] aspirin  81 mg Oral or Feeding Tube Daily    cefTAZidime  2 g Intravenous Q8H    chlorhexidine  15 mL Mouth/Throat Q12H    fiber modular  1 packet Per Feeding Tube BID    pantoprazole  40 mg Per Feeding Tube QAM AC    Or    pantoprazole  40 mg Intravenous QAM AC    potassium & sodium phosphates  1 packet Oral or Feeding Tube Q4H    potassium chloride  40 mEq Oral or Feeding Tube Once    QUEtiapine  50 mg Oral or Feeding Tube BID    sodium chloride (PF)  3 mL Intracatheter Q8H    vancomycin  1,500 mg Intravenous Q12H       Infusion Meds   " amiodarone 1 mg/min (03/19/24 1125)    dextrose      fentaNYL 50 mcg/hr (03/18/24 0925)    heparin Stopped (03/19/24 0622)    - MEDICATION INSTRUCTIONS -      norepinephrine 0.06 mcg/kg/min (03/19/24 0516)    - MEDICATION INSTRUCTIONS -      phenylephrine      sodium chloride 20 mL/hr at 03/18/24 0835       PRN Meds  acetaminophen, albuterol, atropine, bisacodyl, calcium carbonate, artificial tears, dextrose, fentaNYL, HOLD MEDICATION, lidocaine 4%, lidocaine (buffered or not buffered), melatonin, midazolam, midazolam, naloxone **OR** naloxone **OR** naloxone **OR** naloxone, - MEDICATION INSTRUCTIONS -, oxyCODONE **OR** oxyCODONE, - MEDICATION INSTRUCTIONS -, polyethylene glycol, senna-docusate **OR** senna-docusate, sodium chloride (PF), sodium chloride 0.9%, sodium chloride 0.9%       PHYSICAL EXAMINATION  Temp:  [98.6  F (37  C)-100.8  F (38.2  C)] 99.1  F (37.3  C)  Pulse:  [106-140] 120  Resp:  [8-36] 14  BP: ()/() 102/61  FiO2 (%):  [45 %-50 %] 50 %  SpO2:  [88 %-100 %] 97 %      Neurologic   Mental Status:   alert, follows commands   Cranial Nerves:   pupils constricted 2 mm but reactive to light, EOM intact with no gaze palsy or nystagmus, able to wrinkle forehead, facial movement symmetric, tongue midline.  Facial sensation intact no shoulder shrug  Motor:   Bilateral upper extremity 1/5 with handgrip 2+/5. Left elbow extension 3/5> right elbow extension 2/5, Bilateral lower extremity 1/5 with knee extension 3/5 and dorsiflexion 3+/5 and plantarflexion about 4/5..   neck extension 3/5, neck flexion 4/5  Reflexes:   See below      Left Right   Triceps 0 0   Biceps 0 0   Brachioradialis 2+ 1+   Adductor 0 0   Patellar 0 0   Achilles 0 0   Babinski Absent Absent   Sensory intact to light touch throughout   Coordination:   unable to assess d/t patient's current state  Station/Gait:  deferred      Imaging  I personally reviewed all imaging; relevant findings per HPI.     Lab Results Data  "  CBC  Recent Labs   Lab 03/19/24  0555 03/18/24  0517 03/17/24  1113 03/17/24  0610 03/17/24  0330   WBC 13.6* 12.5*  --   --  13.8*   RBC 2.56* 2.79*  --   --  2.54*   HGB 7.7* 8.1* 8.0*   < > 7.5*   HCT 22.9* 24.7*  --   --  22.5*    172 207  --  190    < > = values in this interval not displayed.     Basic Metabolic Panel    Recent Labs   Lab 03/19/24  0555 03/18/24  0517 03/17/24  1113 03/17/24  0330    140  --  139   POTASSIUM 3.3* 3.8 4.1 2.8*   CHLORIDE 105 105  --  104   CO2 22 25  --  23   BUN 12.3 14.3  --  17.7   CR 0.39* 0.47*  --  0.57   * 131*  --  133*   YEE 8.2* 8.1*  --  7.3*     Liver Panel  Recent Labs   Lab 03/19/24  0555 03/18/24  0517 03/17/24  0330   PROTTOTAL 5.4* 5.7* 4.8*   ALBUMIN 3.9 3.8 3.7   BILITOTAL 1.0 0.8 0.9   ALKPHOS 47 40 25*   AST 20 19 17   ALT 5 8 7     INR    Recent Labs   Lab Test 05/02/23  0650 01/25/23  0719 10/15/22  0728   INR 1.02 1.06 1.23*      Lipid Profile    Recent Labs   Lab Test 07/07/22  1448 07/05/21  1352 07/03/19  0927   CHOL 241* 220* 213*   HDL 78 66 64   * 130* 118*   TRIG 119 122 156*     A1C    Recent Labs   Lab Test 03/11/24  0436   A1C 5.2     Troponin    No results for input(s): \"CTROPT\", \"TROPONINIS\", \"TROPONINI\", \"GHTROP\" in the last 168 hours.         Data       "

## 2024-03-19 NOTE — PROGRESS NOTES
Bedside bronch was done per MD. Pt was placed on FiO2 100% for the procedure. Pt tolerated the procedure well.  3/19/2024  Rose Aldrich RT

## 2024-03-19 NOTE — PROGRESS NOTES
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 On my interview she denies recent innoculation or viral illness symptoms, also denies common neoplasm review of symptoms including coughing, bloody sputum, constipation, diarrhea, bloody urine or stool.    She has had progressive global weakness which now seems to involve her respiratory muscles.  Workup is consistent with Guillain-Barré.  She has been intubated and supported on mechanical ventilation for respiratory muscle weakness.    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   I have personally reviewed the daily labs, imaging studies, cultures and discussed the case with referring physician and consulting physicians.     My assessment and plan by system for this patient is as follows:    Neurology/Psychiatry:   1. 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 CSF sampling was done, patient had significant increase in the protein in the CSF, with only 3 WBCs (had significant RBCs,?  Traumatic), meningitis panel is negative.  Discussed with neurology, spine MRIs are more supportive for GBS diagnosis. EMG done and pending results.  Completed PLEX 3/182. Pain/analgesia:  fentanyl gtt and  as needed   2. Sedation: Off Precedex today. Discontinued in chart  PLAN  - continue supportive care   - neuro following  - discontinue HD line    Cardiovascular:   Takatsubo cardiomyopathy with chf exacerbation  - Repeated TTE, still hypokinesia but improved and EF is recovering to 40-45%  Afib with RVR,  Shock, likely mixed , vasoplegic,  cardiogenic as well with concern for autonomic dysfunction.    PLAN  - follow hemodynamics, goal MAP >65  - Follow volume status .aim even for now , start prn lasix   - continue  amiodarone bolus and drip.  - Heparin drip for stroke prevention      Pulmonary/Ventilator Management:   Acute hypercapnic respiratory failure, requiring mechanical ventilation:  worsening hypercapnia off mechanical support.   Bilateral infiltrate on chest imaging; Polymicrobial sputum c/w LRTI interim increase secretions and hemoptysis , inflammatory marker increasing   PLAN  - bronchoscopy today  - CT Chest to better characterize PNA ? cavitation  - Airway clearance measures with Mucomyst and albuterol.  Improving secretions.  - consult thoracic and surgery for trach peg     GI and Nutrition :   1. RD consult, on TF  2.  PPI for pud prophy    Renal/Fluids/Electrolytes:   1. Cr, UOP appropriate -  2.  Electrolyte abnormalities - hypokalemia, hypernatremia, hypocalcemia 2/2 critical illness, - improved, continue replacement protocols  3. Acid base - stable    4. Volume overload - +10 L positive   -Monitor urine output and I&O, avoid nephrotoxic medications.  -replace electrolytes per protocol.    - start prn lasix     Infectious Disease:   1. Sepsis, secondary to HCAP , polymicrobial with R organism (Hafnia) ,with worsening imaging and secretions on current therapy .  -  continue ceftaz, and  vanc t  - work up as above   - serial CRP and procal     Endocrine:   1. Stress hyperglycemia   - BS control per ICU protocol     Hematology/Oncology:   1. Leukocytosis to 13.6 ?reactive vs HAP  2. Anemia, Hb 7.7, t  "No evidence for blood loss.  3. Pltlts 207 ok  4. Coagulopathy, med induced, need watch hemoptysis and weight risk benefit     ICU Prophylaxis:   1. DVT: mechanical; Heparin  2. VAP: HOB 30 degrees, chlorhexidine rinse  3. Stress Ulcer: PPI  4. Restraints: None indicated. Will readdress daily.   5. Wound care  - per unit routine   6. Feeding -tube feed  7. Family Update: family  at bedside  8. Disposition - icu    Clinically Significant Risk Factors        # Hypokalemia: Lowest K = 3.3 mmol/L in last 2 days, will replace as needed   # Hypocalcemia: Lowest Ca = 8.1 mg/dL in last 2 days, will monitor and replace as appropriate     # Hypoalbuminemia: Lowest albumin = 3.3 g/dL at 3/9/2024  6:04 AM, will monitor as appropriate     # Hypertension: Noted on problem list        # Obesity: Estimated body mass index is 34.97 kg/m  as calculated from the following:    Height as of this encounter: 1.727 m (5' 7.99\").    Weight as of this encounter: 104.3 kg (229 lb 15 oz).      # Financial/Environmental Concerns: none                    Critical Care Time: 50 min.  I spent this time (excluding procedures) personally providing and directing critical care services at the bedside and on the critical care unit.     Regan Bui MD  Pulmonary and Critical Care                 Key Medications:      acetylcysteine  2 mL Nebulization Q6H    albuterol  2.5 mg Nebulization Q6H    [Held by provider] aspirin  81 mg Oral or Feeding Tube Daily    cefTAZidime  2 g Intravenous Q8H    chlorhexidine  15 mL Mouth/Throat Q12H    fiber modular  1 packet Per Feeding Tube BID    pantoprazole  40 mg Per Feeding Tube QAM AC    Or    pantoprazole  40 mg Intravenous QAM AC    potassium & sodium phosphates  1 packet Oral or Feeding Tube Q4H    QUEtiapine  50 mg Oral or Feeding Tube BID    sodium chloride (PF)  3 mL Intracatheter Q8H    vancomycin  1,500 mg Intravenous Q12H      amiodarone 1 mg/min (03/19/24 0810)    dextrose      " fentaNYL 50 mcg/hr (03/19/24 0811)    heparin 1,200 Units/hr (03/19/24 0807)    - MEDICATION INSTRUCTIONS -      norepinephrine Stopped (03/19/24 0824)    - MEDICATION INSTRUCTIONS -      phenylephrine      sodium chloride 10 mL/hr at 03/19/24 0807              Physical Examination:   Temp:  [98.6  F (37  C)-100.8  F (38.2  C)] 99.7  F (37.6  C)  Pulse:  [106-140] 120  Resp:  [8-36] 26  BP: ()/() 158/102  FiO2 (%):  [40 %-50 %] 40 %  SpO2:  [88 %-100 %] 90 %      Intake/Output Summary (Last 24 hours) at 3/19/2024 1159  Last data filed at 3/19/2024 1000  Gross per 24 hour   Intake 3399.96 ml   Output 1660 ml   Net 1739.96 ml          Wt Readings from Last 4 Encounters:   03/19/24 104.3 kg (229 lb 15 oz)   07/12/23 93 kg (205 lb)   05/02/23 92.4 kg (203 lb 12.8 oz)   04/28/23 92.4 kg (203 lb 12.8 oz)     BP - Mean:  [] 126  Vent Mode: CMV/AC  (Continuous Mandatory Ventilation/ Assist Control)  FiO2 (%): 40 %  Resp Rate (Set): 14 breaths/min  Tidal Volume (Set, mL): 420 mL  PEEP (cm H2O): 5 cmH2O  Resp: 26    Recent Labs   Lab 03/18/24  0950   O2PER 50         GEN: no acute distress awake alert   HEENT: head ncat, sclera anicteric, OP patent, trachea midline   PULM: unlabored synchronous minor rhonchi bilaterally.  Moderate serosanginous  secretions.  CV/COR: irregular  S1S2 no gallop,  No rub, no murmur  ABD: soft nontender  EXT:  warm and well perfused x4  NEURO: PERRL, patient follows commands, significant weakness in the upper and lower extremities  ~3/5 in upper and lower   SKIN: no obvious rash  LINES: clean, dry intact         Data:   All data and imaging reviewed     ROUTINE ICU LABS (Last four results)  CMP  Recent Labs   Lab 03/19/24  0851 03/19/24  0555 03/18/24  0517 03/17/24  1113 03/17/24  0330 03/16/24  2132 03/16/24  0504   NA  --  140 140  --  139  --  141   POTASSIUM  --  3.3* 3.8 4.1 2.8*  --  3.8   CHLORIDE  --  105 105  --  104  --  104   CO2  --  22 25  --  23  --  24   ANIONGAP   "--  13 10  --  12  --  13   * 177* 131*  --  133*   < > 144*   BUN  --  12.3 14.3  --  17.7  --  19.2   CR  --  0.39* 0.47*  --  0.57  --  0.56   GFRESTIMATED  --  >90 >90  --  >90  --  >90   YEE  --  8.2* 8.1*  --  7.3*  --  8.4*   MAG  --   --  2.1 2.7* 1.4*  --  1.6*   PHOS  --  2.4* 2.1*  --  2.5  --  3.0   PROTTOTAL  --  5.4* 5.7*  --  4.8*  --  5.7*   ALBUMIN  --  3.9 3.8  --  3.7  --  3.9   BILITOTAL  --  1.0 0.8  --  0.9  --  0.8   ALKPHOS  --  47 40  --  25*  --  35*   AST  --  20 19  --  17  --  19   ALT  --  5 8  --  7  --  8    < > = values in this interval not displayed.     CBC  Recent Labs   Lab 03/19/24  0555 03/18/24  0517 03/17/24  1113 03/17/24  0610 03/17/24  0330 03/16/24  0504   WBC 13.6* 12.5*  --   --  13.8* 14.5*   RBC 2.56* 2.79*  --   --  2.54* 3.02*   HGB 7.7* 8.1* 8.0* 7.6* 7.5* 9.1*   HCT 22.9* 24.7*  --   --  22.5* 27.0*   MCV 90 89  --   --  89 89   MCH 30.1 29.0  --   --  29.5 30.1   MCHC 33.6 32.8  --   --  33.3 33.7   RDW 19.4* 19.6*  --   --  18.6* 18.7*    172 207  --  190 251     INRNo lab results found in last 7 days.  Arterial Blood Gas  Recent Labs   Lab 03/18/24  0950   O2PER 50         All cultures:  No results for input(s): \"CULT\" in the last 168 hours.      "

## 2024-03-19 NOTE — PROGRESS NOTES
Last planned  PLEX treatment yesterday.     Ok to remove Ovidio if Neuro confident that no more plasma exchange needed.    We will sign off.

## 2024-03-20 NOTE — PROGRESS NOTES
[facility] ICU RESPIRATORY NOTE        Date of Admission: 3/8/2024    Date of Intubation (most recent):  3/10/24    Reason for Mechanical Ventilation: AW protection    Number of Days on Mechanical Ventilation: 11    Met Criteria for Spontaneous Breathing Trial: No    Reason for No Spontaneous Breathing Trial: Per MD    Significant Events Today: None    ABG Results:   Recent Labs   Lab 03/18/24  0950   O2PER 50         Current Vent Settings: Vent Mode: CMV/AC  (Continuous Mandatory Ventilation/ Assist Control)  FiO2 (%): 60 %  Resp Rate (Set): 14 breaths/min  Tidal Volume (Set, mL): 420 mL  PEEP (cm H2O): 10 cmH2O  Resp: 26      Skin Assessment: Intact    Plan: Pt to remain on full vent support overnight    John Go, RT

## 2024-03-20 NOTE — PROGRESS NOTES
[facility] ICU RESPIRATORY NOTE        Date of Admission: 3/8/2024    Date of Intubation (most recent): 3/10    Reason for Mechanical Ventilation: airway protection     Number of Days on Mechanical Ventilation: 11    Met Criteria for Spontaneous Breathing Trial: No    Reason for No Spontaneous Breathing Trial: not appropriate this time    Significant Events Today: n/a    ABG Results:   Recent Labs   Lab 03/18/24  0950   O2PER 50         Current Vent Settings: Vent Mode: CMV/AC  (Continuous Mandatory Ventilation/ Assist Control)  FiO2 (%): 60 %  Resp Rate (Set): 14 breaths/min  Tidal Volume (Set, mL): 420 mL  PEEP (cm H2O): 8 cmH2O  Resp: 25      Skin Assessment: cool, dry     Plan: continue to monitor    Shanda Good RT on 3/20/2024 at 6:08 AM

## 2024-03-20 NOTE — PROGRESS NOTES
Anemia (6.8).    Transfusion ordered.    Sudhir Heaton MD  Pulmonary, Critical Care and Sleep Medicine  [facility]  Pager: [phone]

## 2024-03-20 NOTE — PROGRESS NOTES
"THORACIC SURGERY    Both myself and Dr. Delgado met with the patient and her daughter this afternoon. We discussed the tracheostomy procedure, risks and recovery. The patient is able to nod her head \"yes\" in agreement to proceed. Her daughter gave written consent for the tracheostomy procedure. This is tentatively scheduled for Friday 3/22/24 with Dr. Delgado. Should be NPO at midnight tomorrow night. Stop heparin drip 8 hours prior to procedure, will clarify stop time tomorrow once surgery scheduled.    Va Price PA-C with Dr. Mann Delgado  [facility]  Office: [phone]  Cell: [phone]    "

## 2024-03-20 NOTE — PLAN OF CARE
Goal Outcome Evaluation:      Plan of Care Reviewed With: patient, family    Overall Patient Progress: improvingOverall Patient Progress: improving    Outcome Evaluation: Pt initially requiring 65% O2 but slowly improved throughout shift, now at 55% w/ PEEP of 10. BPs soft after lasix but stable off levo. 650mL UOP q2hr after lasix given. Tele: SR. HR improving, amio decr to 0.5. Plan for trach and PEG on Fri.      Problem: Adult Inpatient Plan of Care  Goal: Plan of Care Review  Description: The Plan of Care Review/Shift note should be completed every shift.  The Outcome Evaluation is a brief statement about your assessment that the patient is improving, declining, or no change.  This information will be displayed automatically on your shift  note.  Outcome: Progressing  Flowsheets (Taken 3/20/2024 1828)  Outcome Evaluation: Pt initially requiring 65% O2 but slowly improved throughout shift, now at 55% w/ PEEP of 10. BPs soft after lasix but stable off levo. 650mL UOP q2hr after lasix given. Tele: SR. HR improving, amio decr to 0.5. Plan for trach and PEG on Fri.  Plan of Care Reviewed With:   patient   family  Overall Patient Progress: improving     Problem: Guillain-[place]  Syndrome  Goal: Effective Oxygenation and Ventilation  Intervention: Optimize Oxygenation and Ventilation  Recent Flowsheet Documentation  Taken 3/20/2024 1800 by Concepcion Recinos RN  Head of Bed (HOB) Positioning: HOB at 30 degrees  Taken 3/20/2024 1600 by Concepcion Recinos RN  Head of Bed (HOB) Positioning: (trendelenberg)   other (see comments)   HOB lowered  Taken 3/20/2024 1400 by Concepcion Recinos RN  Head of Bed (HOB) Positioning: HOB at 30 degrees  Taken 3/20/2024 1200 by Concepcion Recinos RN  Airway/Ventilation Management:   airway patency maintained   calming measures promoted  Head of Bed (HOB) Positioning: (trendelenberg)   other (see comments)   HOB lowered  Taken 3/20/2024 1000 by Concepcion Recinos RN  Head of Bed (HOB)  Positioning: HOB at 30 degrees  Taken 3/20/2024 0800 by Concepcion Recinos, RN  Airway/Ventilation Management:   airway patency maintained   calming measures promoted  Head of Bed (HOB) Positioning: HOB at 30 degrees

## 2024-03-20 NOTE — PHARMACY-VANCOMYCIN DOSING SERVICE
Pharmacy Vancomycin Note  Date of Service 2024  Patient's  1945   78 year old, female    Indication: Healthcare-Associated Pneumonia  Day of Therapy: 3  Current vancomycin regimen:  1500 mg IV q12h  Current vancomycin monitoring method: AUC  Current vancomycin therapeutic monitoring goal: 400-600 mg*h/L    InsightRX Prediction of Current Vancomycin Regimen  Regimen: 1500 mg IV every 12 hours.  Start time: 10:23 on 2024  Exposure target: AUC24 (range)400-600 mg/L.hr   AUC24,ss: 583 mg/L.hr  Probability of AUC24 > 400: 99 %  Ctrough,ss: 18.3 mg/L  Probability of Ctrough,ss > 20: 37 %  Probability of nephrotoxicity (Lodise CAT ): 15 %    Current estimated CrCl = Estimated Creatinine Clearance: 112.6 mL/min (based on SCr of 0.54 mg/dL).    Creatinine for last 3 days  3/18/2024:  5:17 AM Creatinine 0.47 mg/dL  3/19/2024:  5:55 AM Creatinine 0.39 mg/dL  3/20/2024:  4:19 AM Creatinine 0.54 mg/dL    Recent Vancomycin Levels (past 3 days)  3/20/2024:  4:19 AM Vancomycin 21.4 ug/mL    Vancomycin IV Administrations (past 72 hours)                     vancomycin (VANCOCIN) 1,500 mg in 0.9% NaCl 250 mL intermittent infusion (mg) 1,500 mg New Bag 24 2223     1,500 mg New Bag  0937     1,500 mg New Bag 24 2154    vancomycin (VANCOCIN) 1,750 mg in 0.9% NaCl 500 mL intermittent infusion (mg) 1,750 mg Given 24 0951                    Nephrotoxins and other renal medications (From now, onward)      Start     Dose/Rate Route Frequency Ordered Stop    24 1000  vancomycin (VANCOCIN) 1,250 mg in 0.9% NaCl 250 mL intermittent infusion         1,250 mg  over 90 Minutes Intravenous EVERY 12 HOURS 24 0756      24 1500  norepinephrine (LEVOPHED) 16 mg in  mL infusion MAX CONC CENTRAL LINE         0.01-0.6 mcg/kg/min × 102.2 kg  1-57.5 mL/hr  Intravenous CONTINUOUS 24 1444                 Contrast Orders - past 72 hours (72h ago, onward)      None             Interpretation of levels and current regimen:  Vancomycin level is reflective of -600    Has serum creatinine changed greater than 50% in last 72 hours: No    Urine output:  adequate    Renal Function: Stable    InsightRX Prediction of Planned New Vancomycin Regimen  Regimen: 1250 mg IV every 12 hours.  Start time: 10:23 on 03/20/2024  Exposure target: AUC24 (range)400-600 mg/L.hr   AUC24,ss: 488 mg/L.hr  Probability of AUC24 > 400: 88 %  Ctrough,ss: 15.3 mg/L  Probability of Ctrough,ss > 20: 17 %  Probability of nephrotoxicity (Lodise CAT 2009): 11 %    Plan:  Decrease Dose to Vancomycin 1250 mg IV q12h.  Vancomycin monitoring method: AUC  Vancomycin therapeutic monitoring goal: 400-600 mg*h/L  Pharmacy will check vancomycin levels as appropriate in 1-3 Days.  Serum creatinine levels will be ordered daily for the first week of therapy and at least twice weekly for subsequent weeks.    Lea Ansari, PharmD, BCPS

## 2024-03-20 NOTE — PROGRESS NOTES
[facility]    Stroke Progress Note    Interval Events  Fall in hemoglobin noted overnight. Will hold heparin until Hemoglobin improves.    HPI Summary  Yohana Marques is a 78 year old woman with h/o HTN, spinal stenosis s/p discectomy x 2 (most recent 8/2004), and chronic pain.     She presented on 3/8 after a fall at home in 3 to 4 days of generalized weakness worsening to an extent that from using just a cane she had to start using a walker.  Reports of gautam at their second house in [facility] but no access to canned food since mid February.      During admission patient found to have stress-induced cardiomyopathy with a EF of 23% on echo on 3/8.  On 3/10 due to labored, shallow breathing and respiratory acidosis patient was intubated.  Noted to have hypotonia and quadriparesis with hypophonia.  Given suspicion of possible GBS started on plasmapheresis.  New onset atrial fibrillation noted in hospital.    Patient had been having labile blood pressure, fluctuating heart rhythm from bradycardia to atrial fibrillation.  Initial MRI brain and MRI spine unremarkable however repeat on 3/14 revealed cauda equina nerve root enhancement.      Evaluation Summarized     MRI Brain w/wo contrast (3/9) (03/14) Small vessel disease changes   MRI Spine  C/T/L-spine 03/10 degenerative changes with superior endplate T3 compression and lumbar spondylosis.     C/T/L-spine 3/14 with cauda equina nerve root enhancement   CT Chest/Abdomen/Pelvis No evidence of malignancy.  Submucosal fibroid versus nonmalignant endometrial polyp and stable 4 mm left upper lobe nodule.       Echocardiogram 3/8        Coronary cath 3/8 EF is 23%.Entire left ventricle is severely hypokinetic but basal left ventricular function is less so     No significant CAD   EKG/Telemetry 3/10    Tachycardic (147 bpm), Atrial fibrillation w/ RVR   Left axis deviation, Anterior infarct, age undertermined  T wave  "abnormality  Episodes of bradycardia   CSF Labs Mildly elevated protein, 5 nucleated cell  Encephalitis/meningitis panel negative   Labs Myasthenia panel negative  Ganglioside Antibodies: pending  Urine porphyrins: pending  AZALEA: negative  ANCA: negative  HIV-1, HIV-2, HIV-1 p24 antigen nonreactive   EMG Completed on 3/15, report pending       Impression   Acute onset flaccid quadriparesis with respiratory insufficiency and areflexia now with evidence of cauda equina or nerve root enhancement likely in the setting of Guillain-Barré syndrome.  New onset stress-induced cardiomyopathy.     Plan  -PLEX 5/5 session.   -hold heparin until hemoglobin improves.  -Ventilator management per ICU team.      DVT prophylaxis: heparin 5000 units subcutaneous injection q8 hrs     Patient Follow-up    - final recommendation pending work-up    We will continue to follow.     The Stroke Staff is Dr. Drew Jeffries MD  Vascular Neurology fellow    To page a member of the stroke/neurocritical care service, click here:  [facility]   Choose \"On Call\" tab at top, then search dropdown box for \"Neurology Adult\", select location, press Enter, then look for stroke/neuro ICU/telestroke.  ______________________________________________________             Medications   Scheduled Meds   acetylcysteine  2 mL Nebulization Q6H    albuterol  2.5 mg Nebulization Q6H    [Held by provider] aspirin  81 mg Oral or Feeding Tube Daily    cefTAZidime  2 g Intravenous Q8H    chlorhexidine  15 mL Mouth/Throat Q12H    fiber modular  1 packet Per Feeding Tube BID    pantoprazole  40 mg Per Feeding Tube QAM AC    Or    pantoprazole  40 mg Intravenous QAM AC    QUEtiapine  50 mg Oral or Feeding Tube BID    sodium chloride (PF)  3 mL Intracatheter Q8H    sodium phosphate  15 mmol Intravenous Once    vancomycin  1,500 mg Intravenous Q12H       Infusion Meds   amiodarone 1 mg/min (03/20/24 0736)    dextrose      fentaNYL 50 mcg/hr (03/20/24 0600)    heparin " 1,650 Units/hr (03/20/24 0600)    - MEDICATION INSTRUCTIONS -      norepinephrine Stopped (03/19/24 0824)    - MEDICATION INSTRUCTIONS -      sodium chloride 20 mL/hr at 03/19/24 2222       PRN Meds  acetaminophen, albuterol, atropine, bisacodyl, calcium carbonate, artificial tears, dextrose, fentaNYL, HOLD MEDICATION, lidocaine 4%, lidocaine (buffered or not buffered), lidocaine, lidocaine, melatonin, midazolam, midazolam, naloxone **OR** naloxone **OR** naloxone **OR** naloxone, - MEDICATION INSTRUCTIONS -, oxyCODONE **OR** oxyCODONE, - MEDICATION INSTRUCTIONS -, polyethylene glycol, senna-docusate **OR** senna-docusate, sodium chloride (PF), sodium chloride 0.9% (bottle), sodium chloride 0.9%, sodium chloride 0.9%       PHYSICAL EXAMINATION  Temp:  [99.3  F (37.4  C)-101.7  F (38.7  C)] 100.6  F (38.1  C)  Pulse:  [] 85  Resp:  [12-33] 25  BP: ()/() 118/64  FiO2 (%):  [40 %-100 %] 60 %  SpO2:  [89 %-100 %] 89 %      Neurologic   Mental Status:   alert, follows commands   Cranial Nerves:   pupils reactive to light, EOM intact with no gaze palsy or nystagmus, able to wrinkle forehead, facial movement symmetric, tongue midline.  Facial sensation intact no shoulder shrug  Motor:   Bilateral upper extremity 1/5 with handgrip 2+/5. Left elbow extension 3/5> right elbow extension 2/5, Bilateral lower extremity 1/5 with knee extension 3/5 and dorsiflexion 3+/5 and plantarflexion about 4/5..   neck extension 3/5, neck flexion 4/5  Reflexes:   See below      Left Right   Triceps 0 0   Biceps 0 0   Brachioradialis 2+ 1+   Adductor 0 0   Patellar 0 0   Achilles 0 0   Babinski Absent Absent   Sensory intact to light touch throughout   Coordination:   unable to assess d/t patient's current state  Station/Gait:  deferred      Imaging  I personally reviewed all imaging; relevant findings per HPI.     Lab Results Data   CBC  Recent Labs   Lab 03/20/24  0419 03/19/24  0555 03/18/24  0517   WBC 10.7 13.6* 12.5*    RBC 2.28* 2.56* 2.79*   HGB 6.8* 7.7* 8.1*   HCT 20.2* 22.9* 24.7*    165 172     Basic Metabolic Panel    Recent Labs   Lab 03/20/24  0419 03/19/24  2202 03/19/24  1234 03/19/24  1206 03/19/24  0851 03/19/24  0555 03/18/24  0517     --   --   --   --  140 140   POTASSIUM 3.5  3.5  --   --  3.8  --  3.3* 3.8   CHLORIDE 103  --   --   --   --  105 105   CO2 25  --   --   --   --  22 25   BUN 19.2  --   --   --   --  12.3 14.3   CR 0.54  --   --   --   --  0.39* 0.47*   * 136* 138*  --    < > 177* 131*   YEE 8.3*  --   --   --   --  8.2* 8.1*    < > = values in this interval not displayed.     Liver Panel  Recent Labs   Lab 03/20/24 0419 03/19/24  0555 03/18/24  0517   PROTTOTAL 5.5* 5.4* 5.7*   ALBUMIN 3.5 3.9 3.8   BILITOTAL 0.8 1.0 0.8   ALKPHOS 55 47 40   AST 21 20 19   ALT 11 5 8     INR    Recent Labs   Lab Test 05/02/23  0650 01/25/23  0719 10/15/22  0728   INR 1.02 1.06 1.23*      Lipid Profile    Recent Labs   Lab Test 07/07/22  1448 07/05/21  1352 07/03/19  0927   CHOL 241* 220* 213*   HDL 78 66 64   * 130* 118*   TRIG 119 122 156*     A1C    Recent Labs   Lab Test 03/11/24  0436   A1C 5.2     Troponin    Recent Labs   Lab 03/19/24  1206   CTROPT 29*            Data

## 2024-03-20 NOTE — CONSULTS
Asked to see for consideration of feeding tube.  tracheostomy is being considered for this Friday.  She has had a right upper quadrant laparoscopic procedure but nothing in the left upper quadrant.  Both her daughter and the patient validate that there is no surgical history in this area.  I see no scars on the left upper quadrant.  We will review the options for feeding tube placement

## 2024-03-20 NOTE — PLAN OF CARE
Oxygen: MVD #11, 60% PEEP 8 R14 [id number]  Continuous Drips: heparin (increased), fentanyl, amiodarone  RASS: 0/-1  Pain/CPOT: Managed with continuous fentanyl drip  Mobility: Withdraws in upper extremities, weak in BLE  Restraints: NA  Lines: R x3 lumen PICC  Nye: Present for retention, making urine (lisa with sediment)  Skin: New bruising on feet  Diet/Bowel: Rectal tube, tolerating vital HP at goal 55 mL/h, Banatrol, Q4H 60 mL flush  DVT/GI Prophylaxis: protonix seen in orders, heparin continuous drip  Glucose: Euglycemic  Antibiotics: Ceftazadine given as ordered, vancomycin seen in orders  Events: hgb 6.8 this AM, no acute events overnight  Social: Enrique (spouse) and daughters sUha and Sandrita in contacts list, did not hear from/meet overnight          Problem: Guillain-[place]  Syndrome  Goal: Optimal Functional Ability  Outcome: Not Progressing  Intervention: Optimize Functional Ability  Recent Flowsheet Documentation  Taken 3/20/2024 0400 by Sarah Pruitt, RN  Activity Management:   activity adjusted per tolerance   bedrest  Taken 3/20/2024 0000 by Sarah Pruitt, RN  Activity Management:   activity adjusted per tolerance   bedrest     Problem: Guillain-[place]  Syndrome  Goal: Acceptable Pain Control  Outcome: Progressing   Goal Outcome Evaluation:

## 2024-03-21 NOTE — PROCEDURES
Procedures      Procedure:   Bronchoscopy with airway inspection and aspiration        Indication:   ETT migration and pt not getting volumes , airway inspections, resp failure      Consent:   Emergent      Pre-medication:    Fentanly 50 mcg, See MAR for details        Procedure Summary:   Time out was performed.   The scope inserted thru the ETT  Inspection of trachea and first order revealed pink airwasy, ETT manipulated and established 1.5 inch above milan Interestingly , airway pick no signs of previously identified  anthracosis in trachea or proximal first order airway .      The patient tolerated the procedure well without undue discomfort, hypotension or arrhythmia. The procedure was performed in the ICU--and vital sign parameters were monitored.        Complications:   No immediate complications.    This procedure is performed by Regan Bui MD

## 2024-03-21 NOTE — PROGRESS NOTES
[facility] ICU RESPIRATORY NOTE        Date of Admission: 3/8/2024    Date of Intubation (most recent):3/10/24    Reason for Mechanical Ventilation: Airway protection    Number of Days on Mechanical Ventilation: 12    Met Criteria for Spontaneous Breathing Trial: No    Reason for No Spontaneous Breathing Trial: PEEP 10 and FiO2 60%    Significant Events Today: Bedside bronch done. See previous RT note for details.    ABG Results:   Recent Labs   Lab 03/21/24  0432 03/18/24  0950   PH 7.38  --    PCO2 49*  --    PO2 58*  --    HCO3 29*  --    O2PER 55 50         Current Vent Settings: Vent Mode: CMV/AC  (Continuous Mandatory Ventilation/ Assist Control)  FiO2 (%): 60 %  Resp Rate (Set): 14 breaths/min  Tidal Volume (Set, mL): 420 mL  PEEP (cm H2O): 10 cmH2O  Resp: 25      Skin Assessment: Intact    Plan: Will cont full vent support for now and will assess for weaning readiness daily.    Rose Aldrich, RT on 3/21/2024 at 5:28 PM

## 2024-03-21 NOTE — PROGRESS NOTES
CLINICAL NUTRITION SERVICES - REASSESSMENT NOTE    Recommendations Ordered by Registered Dietitian (RD):     Continue current EN regimen       Malnutrition: (3/14)  % Weight Loss:  None noted  % Intake:  No decreased intake noted (on goal TF)  Subcutaneous Fat Loss:  None observed  Muscle Loss:  None observed  Fluid Retention:  None noted     Malnutrition Diagnosis: Patient does not meet two of the above criteria necessary for diagnosing malnutrition     EVALUATION OF PROGRESS TOWARD GOALS   Diet:  NPO    Nutrition Support:    Type of Feeding Tube: OG/NG  Enteral Frequency:  Continuous  Enteral Regimen: Vital HP at 55 mL/hr  Total Enteral Provisions: 1320 kcal (14 kcal/kg), 115 g protein (1.8 g/kg), 147 g CHO, 0 g fiber, 1204 mL H2O  Free Water Flush: 60 mL every 4 hours  Banatrol TF 1 pkt BID = 90 kcal, 10 g fiber, 4 g protein   Total = 1410 kcal (15 kcal/kg), 119 g protein (1.9 g/kg), 10 g fiber     Intake/Tolerance:    K/Mg normal  Phos 2.1 (L) - getting replacement   I/O 4252/2045, wt 111.9 kg (up overall, volume overloaded) - on IV lasix    Stool:  3/19: 400 mL output  3/20: 200 mL output  - much improved with addition of Banatrol TF (noted in rounds rectal tube getting pulled)  - If stool suddenly gets worse can attempt change in formula to Vivonex (elemental)    ASSESSED NUTRITION NEEDS:  Dosing Weight 91.5 kg (energy) and 63.6 kg (protein)  Estimated Energy Needs: 8735-9300 kcals (14-17 Kcal/Kg)  Justification: obese and vented  Estimated Protein Needs:  grams protein (1.5-2 g pro/Kg)  Justification: hypercatabolism with critical illness and obesity guidelines   Estimated Fluid Needs: 3813-6018 mL (1 mL/Kcal)  Justification: maintenance    NEW FINDINGS:   General: plan for trach and PEG friday    Previous Goals:   Goal TF regimen will continue to meet % needs    Evaluation: Met    Previous Nutrition Diagnosis:   No nutrition diagnosis identified at this time as TF regimen meeting needs     Evaluation: No change    CURRENT NUTRITION DIAGNOSIS  No nutrition diagnosis identified at this time as TF regimen meeting needs      INTERVENTIONS  Recommendations / Nutrition Prescription  Continue current EN regimen    Implementation  EN - continue    Goals  Goal TF regimen will continue to meet % needs     MONITORING AND EVALUATION:  Progress towards goals will be monitored and evaluated per protocol and Practice Guidelines    Leandra Zamarripa RD, LD  Clinical Dietitian - [facility]

## 2024-03-21 NOTE — PROGRESS NOTES
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 On my interview she denies recent innoculation or viral illness symptoms, also denies common neoplasm review of symptoms including coughing, bloody sputum, constipation, diarrhea, bloody urine or stool.    She has had progressive global weakness which now seems to involve her respiratory muscles.  Workup is consistent with Guillain-Barr[place].  She has been intubated and supported on mechanical ventilation for respiratory muscle weakness.    3/17 - worsening CXR  3/18 - CT obtained abx broadened, completed PLEX  3/19 - off levophed, bronch for pulm toilet, noted anthracosis of airways,  unclear of any exposures though noted does have [employer] near house as well he works as gunsmith but patient with minimal interaction with each   3/20 - transfused 1 unit PRBC, derecruits with positioning     Interim History     - labile sats with positioning  - still volume overloaded,   - ett migrated with turns with loss of volumes, RT reinflated cuff with improvement but still initial variable volumes thus emergent airway inspection performed with bedside scope to optimize ETT placement (see procedure note) but airways no evidence of discoloration on this   - US with Right below knee DVT - pt on heparin already         Assessment and plan :     Yohana Marques IS a 78 year old female admitted on 3/8/2024 for 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   I have personally reviewed the daily labs,  imaging studies, cultures and discussed the case with referring physician and consulting physicians.     My assessment and plan by system for this patient is as follows:    Neurology/Psychiatry:   1. 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 W/unit(s) consistent with GBS - Plex initiated and completed. Slow recovery of motor function  2. Pain/analgesia:  fentanyl gtt and as needed   3. Sedation: none  PLAN  - continue supportive care   - neuro following  - PT/OT    Cardiovascular:   Takatsubo cardiomyopathy with chf exacerbation and volume overload;  Repeated TTE, still hypokinesia but improved and EF is recovering to 40-45%  Afib with RVR, on amiodarone gtt  Shock, likely mixed , vasoplegic,  cardiogenic as well with concern for autonomic dysfunction.  Resolved  PLAN  - follow hemodynamics, goal MAP >65  - Follow volume status .aim even for now , start lasix gtt    - continue  amiodarone drip.  - Heparin drip for stroke prevention      Pulmonary/Ventilator Management:   Acute hypoxemic hypercapnic respiratory failure, requiring mechanical ventilation, multifactorial, noted gayle PNA, volume overload a large contributor, and evidence of likely thromboembolic disease  Polymicrbial PNA  Pulm edema and volume overload  ? Anthracosis of airways; initially seen on bronch 3/19, no overt exposures to explain degree seen on inspection, interestingly today on emergent bronch airways pink w/o evidence - suspect lighting source abnormality on initial bronch;  cytology negative   New DVT , pt on therapeutic heparin though unclear timing and whether this represents a therapeutic failure, pt was on low intensity gtt, given tenuous oxygenation raise consideration for ?PE   PLAN  -  continue lung protective ventilation, currently at ~ 8m/kg , increase PEEP for recruitment,   - pulm toilet  - thoracic consulted for Trach and PEG (anticipate 3/22)   - continue abx  - diuresis       GI and Nutrition :   1. RD consult, on  TF  2.  PPI for pud prophy    Renal/Fluids/Electrolytes:   1. Cr, UOP appropriate -  2.  Electrolyte abnormalities - hypokalemia, hypernatremia, hypocalcemia 2/2 critical illness, - improved, continue replacement protocols  3. Acid base - stable    4. Volume overload - +10 L positive   -Monitor urine output and I&O, avoid nephrotoxic medications.  -replace electrolytes per protocol.    -  start lasix gtt , goal net negative 1 to 1.5 L q 24 hrs     Infectious Disease:   1. Sepsis, secondary to HCAP , polymicrobial with R organism ([place]) ,with worsening imaging and secretions on current therapy .   - FUP bronch results  -  continue ceftaz, discontinue  vanc   - serial CRP and procal     Endocrine:   1. Stress hyperglycemia   - BS control per ICU protocol     Hematology/Oncology:   1. DVT - as above, unclear if therapeutic failure, platelets stable argue against JO   Leukocytosis to 13.6 ?reactive vs HAP  2. Anemia, s/p 1 unit(s) PRBC, no evidence for overt blood loss. Appropriate response   3. Coagulopathy, med induced, need watch hemoptysis and weight risk benefit with heparin gtt  Plan  - serial CBC, coags  -Hgb goal >7   - increase heparin gtt to high intensity protocol     ICU Prophylaxis:   1. DVT: mechanical; Heparin gtt  2. VAP: HOB 30 degrees, chlorhexidine rinse  3. Stress Ulcer: PPI  4. Restraints: None indicated. Will readdress daily.   5. Wound care  - per unit routine   6. Feeding -tube feed  7. Family Update: family  at bedside  8. Disposition - icu    Clinically Significant Risk Factors        # Hypokalemia: Lowest K = 2.8 mmol/L in last 2 days, will replace as needed   # Hypocalcemia: Lowest Ca = 8.3 mg/dL in last 2 days, will monitor and replace as appropriate     # Hypoalbuminemia: Lowest albumin = 3.3 g/dL at 3/9/2024  6:04 AM, will monitor as appropriate     # Hypertension: Noted on problem list        # Obesity: Estimated body mass index is 37.52 kg/m  as calculated from the  "following:    Height as of this encounter: 1.727 m (5' 7.99\").    Weight as of this encounter: 111.9 kg (246 lb 11.1 oz).      # Financial/Environmental Concerns: none                    Critical Care Time: 45 min.  I spent this time (excluding procedures) personally providing and directing critical care services at the bedside and on the critical care unit.     Regan Bui MD  Pulmonary and Critical Care                 Key Medications:      acetylcysteine  2 mL Nebulization Q6H    albuterol  2.5 mg Nebulization Q6H    [Held by provider] aspirin  81 mg Oral or Feeding Tube Daily    cefTAZidime  2 g Intravenous Q8H    chlorhexidine  15 mL Mouth/Throat Q12H    fiber modular  1 packet Per Feeding Tube BID    furosemide  40 mg Intravenous Q12H    pantoprazole  40 mg Per Feeding Tube QAM AC    Or    pantoprazole  40 mg Intravenous QAM AC    QUEtiapine  50 mg Oral or Feeding Tube BID    sodium chloride (PF)  3 mL Intracatheter Q8H    sodium phosphate  15 mmol Intravenous Once    vancomycin  1,250 mg Intravenous Q12H      amiodarone 0.5 mg/min (03/21/24 0807)    dextrose      fentaNYL 75 mcg/hr (03/21/24 0808)    heparin 1,650 Units/hr (03/21/24 0808)    - MEDICATION INSTRUCTIONS -      norepinephrine Stopped (03/19/24 0824)    - MEDICATION INSTRUCTIONS -      sodium chloride 20 mL/hr at 03/19/24 2222              Physical Examination:   Temp:  [98.6  F (37  C)-100  F (37.8  C)] 99  F (37.2  C)  Pulse:  [62-89] 76  Resp:  [17-34] 25  BP: ()/(45-95) 166/86  FiO2 (%):  [55 %-70 %] 60 %  SpO2:  [87 %-98 %] 90 %        Intake/Output Summary (Last 24 hours) at 3/21/2024 0920  Last data filed at 3/21/2024 0800  Gross per 24 hour   Intake 3582.21 ml   Output 2205 ml   Net 1377.21 ml         Wt Readings from Last 4 Encounters:   03/21/24 111.9 kg (246 lb 11.1 oz)   07/12/23 93 kg (205 lb)   05/02/23 92.4 kg (203 lb 12.8 oz)   04/28/23 92.4 kg (203 lb 12.8 oz)     BP - Mean:  [] 112  Vent Mode: CMV/AC  " (Continuous Mandatory Ventilation/ Assist Control)  FiO2 (%): 60 %  Resp Rate (Set): 14 breaths/min  Tidal Volume (Set, mL): 420 mL  PEEP (cm H2O): 10 cmH2O  Resp: 25    Recent Labs   Lab 03/21/24  0432 03/18/24  0950   PH 7.38  --    PCO2 49*  --    PO2 58*  --    HCO3 29*  --    O2PER 55 50         GEN: no acute distress awake alert   HEENT: head ncat, sclera anicteric, OP patent, trachea midline   PULM: unlabored synchronous minor rhonchi bilaterally.  Continued orange secretions.  CV/COR: irregular  S1S2 no gallop,  No rub, no murmur. Bilateral pitting edema   ABD: soft nontender  EXT:  warm and well perfused x4  NEURO: PERRL, patient follows commands, significant weakness in the upper and lower extremities  ~3/5 in upper and lower   SKIN: no obvious rash  LINES: clean, dry intact         Data:   All data and imaging reviewed     ROUTINE ICU LABS (Last four results)  CMP  Recent Labs   Lab 03/21/24  0406 03/20/24  2129 03/20/24  0419 03/19/24  2202 03/19/24  1234 03/19/24  1206 03/19/24  0851 03/19/24  0555 03/18/24  0517 03/17/24  1113     --  139  --   --   --   --  140 140  --    POTASSIUM 3.9  3.9 2.8* 3.5  3.5  --   --  3.8  --  3.3* 3.8 4.1   CHLORIDE 105  --  103  --   --   --   --  105 105  --    CO2 25  --  25  --   --   --   --  22 25  --    ANIONGAP 11  --  11  --   --   --   --  13 10  --    *  --  147* 136* 138*  --    < > 177* 131*  --    BUN 24.2*  --  19.2  --   --   --   --  12.3 14.3  --    CR 0.63  --  0.54  --   --   --   --  0.39* 0.47*  --    GFRESTIMATED >90  --  >90  --   --   --   --  >90 >90  --    YEE 8.5*  --  8.3*  --   --   --   --  8.2* 8.1*  --    MAG 2.1  --   --   --   --  1.9  --   --  2.1 2.7*   PHOS 2.0*  --  2.2*  --   --   --   --  2.4* 2.1*  --    PROTTOTAL 6.1*  --  5.5*  --   --   --   --  5.4* 5.7*  --    ALBUMIN 3.5  --  3.5  --   --   --   --  3.9 3.8  --    BILITOTAL 0.7  --  0.8  --   --   --   --  1.0 0.8  --    ALKPHOS 65  --  55  --   --   --   --   "47 40  --    AST 26  --  21  --   --   --   --  20 19  --    ALT 10  --  11  --   --   --   --  5 8  --     < > = values in this interval not displayed.     CBC  Recent Labs   Lab 03/21/24  0406 03/20/24  1340 03/20/24  0419 03/19/24  0555   WBC 13.2* 14.3* 10.7 13.6*   RBC 2.89* 2.93* 2.28* 2.56*   HGB 8.5* 8.6* 6.8* 7.7*   HCT 25.3* 26.0* 20.2* 22.9*   MCV 88 89 89 90   MCH 29.4 29.4 29.8 30.1   MCHC 33.6 33.1 33.7 33.6   RDW 18.6* 18.6* 19.5* 19.4*    217 184 165     INRNo lab results found in last 7 days.  Arterial Blood Gas  Recent Labs   Lab 03/21/24  0432 03/18/24  0950   PH 7.38  --    PCO2 49*  --    PO2 58*  --    HCO3 29*  --    O2PER 55 50         All cultures:  No results for input(s): \"CULT\" in the last 168 hours.      "

## 2024-03-21 NOTE — PROGRESS NOTES
RT called to bedside due loss of volumes on vent. ETT was noted to still be secured at the correct spot. 1 ml of air was added to cuff. Cuff leak stopped and Vt's increased on the vent. Bedside bronch was done per MD to inspect the airway. Pt was placed on FiO2 100% for the procedure. Pt tolerated the procedure well. FiO2 decreased to 60% after the bronch with SpO2 in the low 90's.  3/21/2024  Rose Aldrich, RT

## 2024-03-21 NOTE — PROGRESS NOTES
[facility] ICU RESPIRATORY NOTE           Date of Admission: 3/8/2024     Date of Intubation (most recent):  3/10/24     Reason for Mechanical Ventilation: AW protection     Number of Days on Mechanical Ventilation: 12     Met Criteria for Spontaneous Breathing Trial: No     Reason for No Spontaneous Breathing Trial: Per MD     Significant Events Today: None     ABG Results:   Recent Labs   Lab 03/18/24  0950   O2PER 50     Vent Mode: CMV/AC  (Continuous Mandatory Ventilation/ Assist Control)  FiO2 (%): 55 %  Resp Rate (Set): 14 breaths/min  Tidal Volume (Set, mL): 420 mL  PEEP (cm H2O): 10 cmH2O  Resp: 22    NEIL Dias, RRT

## 2024-03-21 NOTE — PROGRESS NOTES
[facility]    Stroke Progress Note    Interval Events  No acute events overnight.    HPI Summary  Yohana Marques is a 78 year old woman with h/o HTN, spinal stenosis s/p discectomy x 2 (most recent 8/2004), and chronic pain.     She presented on 3/8 after a fall at home in 3 to 4 days of generalized weakness worsening to an extent that from using just a cane she had to start using a walker.  Reports of gautam at their second house in [facility] but no access to canned food since mid February.      During admission patient found to have stress-induced cardiomyopathy with a EF of 23% on echo on 3/8.  On 3/10 due to labored, shallow breathing and respiratory acidosis patient was intubated.  Noted to have hypotonia and quadriparesis with hypophonia.  Given suspicion of possible GBS started on plasmapheresis.  New onset atrial fibrillation noted in hospital.    Patient had been having labile blood pressure, fluctuating heart rhythm from bradycardia to atrial fibrillation.  Initial MRI brain and MRI spine unremarkable however repeat on 3/14 revealed cauda equina nerve root enhancement.      Evaluation Summarized     MRI Brain w/wo contrast (3/9) (03/14) Small vessel disease changes   MRI Spine  C/T/L-spine 03/10 degenerative changes with superior endplate T3 compression and lumbar spondylosis.     C/T/L-spine 3/14 with cauda equina nerve root enhancement   CT Chest/Abdomen/Pelvis No evidence of malignancy.  Submucosal fibroid versus nonmalignant endometrial polyp and stable 4 mm left upper lobe nodule.       Echocardiogram 3/8        Coronary cath 3/8 EF is 23%.Entire left ventricle is severely hypokinetic but basal left ventricular function is less so     No significant CAD   EKG/Telemetry 3/10    Tachycardic (147 bpm), Atrial fibrillation w/ RVR   Left axis deviation, Anterior infarct, age undertermined  T wave abnormality  Episodes of bradycardia   CSF Labs Mildly  "elevated protein, 5 nucleated cell  Encephalitis/meningitis panel negative   Labs Myasthenia panel negative  Ganglioside Antibodies: pending  Urine porphyrins: pending  AZALEA: negative  ANCA: negative  HIV-1, HIV-2, HIV-1 p24 antigen nonreactive   EMG Completed on 3/15, report pending       Impression   Acute onset flaccid quadriparesis with respiratory insufficiency and areflexia now with evidence of cauda equina or nerve root enhancement likely in the setting of Guillain-Barré syndrome.  New onset stress-induced cardiomyopathy.     Plan  -PLEX 5/5 session.   -continue with heparin  -Ventilator management per ICU team.      DVT prophylaxis: heparin 5000 units subcutaneous injection q8 hrs     Patient Follow-up    - final recommendation pending work-up    We will continue to follow.     The Stroke Staff is Dr. Drew Jeffries MD  Vascular Neurology fellow    To page a member of the stroke/neurocritical care service, click here:  [facility]   Choose \"On Call\" tab at top, then search dropdown box for \"Neurology Adult\", select location, press Enter, then look for stroke/neuro ICU/telestroke.  ______________________________________________________             Medications   Scheduled Meds   acetylcysteine  2 mL Nebulization Q6H    albuterol  2.5 mg Nebulization Q6H    [Held by provider] aspirin  81 mg Oral or Feeding Tube Daily    cefTAZidime  2 g Intravenous Q8H    chlorhexidine  15 mL Mouth/Throat Q12H    fiber modular  1 packet Per Feeding Tube BID    furosemide  40 mg Intravenous Q12H    pantoprazole  40 mg Per Feeding Tube QAM AC    Or    pantoprazole  40 mg Intravenous QAM AC    QUEtiapine  50 mg Oral or Feeding Tube BID    sodium chloride (PF)  3 mL Intracatheter Q8H    sodium phosphate  15 mmol Intravenous Once    vancomycin  1,250 mg Intravenous Q12H       Infusion Meds   amiodarone 0.5 mg/min (03/21/24 0807)    dextrose      fentaNYL 75 mcg/hr (03/21/24 0808)    heparin 1,650 Units/hr (03/21/24 0808)    - " MEDICATION INSTRUCTIONS -      norepinephrine Stopped (03/19/24 0824)    - MEDICATION INSTRUCTIONS -      sodium chloride 20 mL/hr at 03/19/24 2222       PRN Meds  acetaminophen, albuterol, atropine, bisacodyl, calcium carbonate, artificial tears, dextrose, fentaNYL, HOLD MEDICATION, lidocaine 4%, lidocaine (buffered or not buffered), lidocaine, lidocaine, melatonin, midazolam, midazolam, naloxone **OR** naloxone **OR** naloxone **OR** naloxone, - MEDICATION INSTRUCTIONS -, oxyCODONE **OR** oxyCODONE, - MEDICATION INSTRUCTIONS -, polyethylene glycol, senna-docusate **OR** senna-docusate, sodium chloride (PF), sodium chloride 0.9% (bottle), sodium chloride 0.9%, sodium chloride 0.9%       PHYSICAL EXAMINATION  Temp:  [98.6  F (37  C)-100  F (37.8  C)] 98.8  F (37.1  C)  Pulse:  [62-89] 62  Resp:  [17-34] 25  BP: ()/(45-95) 91/48  FiO2 (%):  [55 %-70 %] 60 %  SpO2:  [87 %-98 %] 95 %      Neurologic   Mental Status:   alert, follows commands   Cranial Nerves:   pupils reactive to light, EOM intact with no gaze palsy or nystagmus, able to wrinkle forehead, facial movement symmetric, tongue midline.  Facial sensation intact no shoulder shrug  Motor:   Bilateral upper extremity 1/5 with handgrip 2+/5. Left elbow extension 3/5> right elbow extension 2/5, Bilateral lower extremity 1/5 with knee extension 3/5 and dorsiflexion 3+/5 and plantarflexion about 4/5..   neck extension 3/5, neck flexion 4/5  Reflexes:   See below      Left Right   Triceps 0 0   Biceps 0 0   Brachioradialis 2+ 1+   Adductor 0 0   Patellar 0 0   Achilles 0 0   Babinski Absent Absent   Sensory intact to light touch throughout   Coordination:   unable to assess d/t patient's current state  Station/Gait:  deferred      Imaging  I personally reviewed all imaging; relevant findings per HPI.     Lab Results Data   CBC  Recent Labs   Lab 03/21/24  0406 03/20/24  1340 03/20/24  0419   WBC 13.2* 14.3* 10.7   RBC 2.89* 2.93* 2.28*   HGB 8.5* 8.6* 6.8*    HCT 25.3* 26.0* 20.2*    217 184     Basic Metabolic Panel    Recent Labs   Lab 03/21/24  0406 03/20/24  2129 03/20/24  0419 03/19/24  2202 03/19/24  0851 03/19/24  0555     --  139  --   --  140   POTASSIUM 3.9  3.9 2.8* 3.5  3.5  --    < > 3.3*   CHLORIDE 105  --  103  --   --  105   CO2 25  --  25  --   --  22   BUN 24.2*  --  19.2  --   --  12.3   CR 0.63  --  0.54  --   --  0.39*   *  --  147* 136*   < > 177*   YEE 8.5*  --  8.3*  --   --  8.2*    < > = values in this interval not displayed.     Liver Panel  Recent Labs   Lab 03/21/24  0406 03/20/24  0419 03/19/24  0555   PROTTOTAL 6.1* 5.5* 5.4*   ALBUMIN 3.5 3.5 3.9   BILITOTAL 0.7 0.8 1.0   ALKPHOS 65 55 47   AST 26 21 20   ALT 10 11 5     INR    Recent Labs   Lab Test 05/02/23  0650 01/25/23  0719 10/15/22  0728   INR 1.02 1.06 1.23*      Lipid Profile    Recent Labs   Lab Test 07/07/22  1448 07/05/21  1352 07/03/19  0927   CHOL 241* 220* 213*   HDL 78 66 64   * 130* 118*   TRIG 119 122 156*     A1C    Recent Labs   Lab Test 03/11/24  0436   A1C 5.2     Troponin    Recent Labs   Lab 03/19/24  1206   CTROPT 29*            Data

## 2024-03-21 NOTE — PLAN OF CARE
Problem: Mechanical Ventilation Invasive  Goal: Optimal Nutrition Delivery  Outcome: Progressing   Goal Outcome Evaluation:    Patient continues to tolerate EN at goal. Stool improving w/ addition of Banatrol BID. Continue current EN regimen. See note for more details.    Leandra Zamarripa RD, LD  Clinical Dietitian - [facility]

## 2024-03-21 NOTE — PLAN OF CARE
ICU End of Shift Nursing Summary *For vital signs and complete assessments, please see documentation flowsheets      Neuro: RASS 0/-1, PERRL, opens eyes spontaneuously, follows commands, ALVARADO but weak  Pain: fent gtt for pain  CV:  tele SR. Amio gtt for rate control.  Pulm:LS coarse, vent at 14/420/70/10. Copious secretions inline. CXR done this AM.  GI/: TF at goal. Nye with brisk UOP, on IV lasix. Rectal tube in place.   Endocrine:   Skin: see flowsheet  Lines: triple lumen PICC  Drips: amio, fent, restarted hep gtt  Additional: replaced K and phosph, rechecks for tomorrow. Hgb stable this shift.    Plan (Upcoming Events): trach/peg on friday    Bedside Shift Report Completed : yes  Bedside Safety Check Completed: yes     ------------------------------------------------------------------  Problem: Adult Inpatient Plan of Care  Goal: Plan of Care Review  Description: The Plan of Care Review/Shift note should be completed every shift.  The Outcome Evaluation is a brief statement about your assessment that the patient is improving, declining, or no change.  This information will be displayed automatically on your shift  note.  Flowsheets (Taken 3/21/2024 0042)  Plan of Care Reviewed With:   patient   family  Overall Patient Progress: no change  Goal: Absence of Hospital-Acquired Illness or Injury  Intervention: Identify and Manage Fall Risk  Recent Flowsheet Documentation  Taken 3/21/2024 0000 by Moshe Jj, RN  Safety Promotion/Fall Prevention:   clutter free environment maintained   lighting adjusted   safety round/check completed   room near nurse's station   room door open   increased rounding and observation   increase visualization of patient   assistive device/personal items within reach   activity supervised   room organization consistent   supervised activity   treat reversible contributory factors   treat underlying cause   patient and family education  Taken 3/20/2024 2000 by Moshe Jj  RN  Safety Promotion/Fall Prevention:   clutter free environment maintained   lighting adjusted   safety round/check completed   room near nurse's station   room door open   increased rounding and observation   increase visualization of patient   assistive device/personal items within reach   activity supervised   room organization consistent   supervised activity   treat reversible contributory factors   treat underlying cause   patient and family education  Intervention: Prevent Skin Injury  Recent Flowsheet Documentation  Taken 3/21/2024 0000 by Moshe Jj RN  Body Position:   turned   heels elevated   right  Skin Protection:   adhesive use limited   incontinence pads utilized   pulse oximeter probe site changed   silicone foam dressing in place   protective footwear used   skin to device areas padded  Device Skin Pressure Protection:   absorbent pad utilized/changed   tubing/devices free from skin contact   positioning supports utilized   pressure points protected  Taken 3/20/2024 2200 by Moshe Jj RN  Body Position:   turned   heels elevated   right  Taken 3/20/2024 2045 by Moshe Jj RN  Body Position:   turned   heels elevated   legs elevated  Taken 3/20/2024 2000 by Moshe Jj RN  Skin Protection:   adhesive use limited   incontinence pads utilized   pulse oximeter probe site changed   silicone foam dressing in place   protective footwear used   skin to device areas padded  Device Skin Pressure Protection:   absorbent pad utilized/changed   tubing/devices free from skin contact   positioning supports utilized   pressure points protected  Intervention: Prevent and Manage VTE (Venous Thromboembolism) Risk  Recent Flowsheet Documentation  Taken 3/21/2024 0000 by Moshe Jj RN  VTE Prevention/Management: SCDs (sequential compression devices) on  Taken 3/20/2024 2000 by Moshe Jj RN  VTE Prevention/Management: SCDs (sequential compression devices)  on  Intervention: Prevent Infection  Recent Flowsheet Documentation  Taken 3/21/2024 0000 by Moshe jJ RN  Infection Prevention:   rest/sleep promoted   single patient room provided   hand hygiene promoted   environmental surveillance performed   equipment surfaces disinfected  Taken 3/20/2024 2000 by Moshe Jj RN  Infection Prevention:   rest/sleep promoted   single patient room provided   hand hygiene promoted   environmental surveillance performed   equipment surfaces disinfected  Goal: Optimal Comfort and Wellbeing  Intervention: Monitor Pain and Promote Comfort  Recent Flowsheet Documentation  Taken 3/21/2024 0000 by Moshe Jj RN  Pain Management Interventions:   pain pump in use   medication (see MAR)  Taken 3/20/2024 2045 by Moshe Jj RN  Pain Management Interventions: pain pump in use  Intervention: Provide Person-Centered Care  Recent Flowsheet Documentation  Taken 3/20/2024 2000 by Moshe Jj RN  Trust Relationship/Rapport:   care explained   reassurance provided   emotional support provided   thoughts/feelings acknowledged     Problem: Risk for Delirium  Goal: Optimal Coping  Intervention: Optimize Psychosocial Adjustment to Delirium  Recent Flowsheet Documentation  Taken 3/21/2024 0000 by Moshe Jj RN  Supportive Measures:   relaxation techniques promoted   problem-solving facilitated   positive reinforcement provided  Taken 3/20/2024 2000 by Moshe Jj RN  Supportive Measures:   relaxation techniques promoted   problem-solving facilitated   positive reinforcement provided  Family/Support System Care: support provided  Goal: Improved Behavioral Control  Intervention: Prevent and Manage Agitation  Recent Flowsheet Documentation  Taken 3/21/2024 0000 by Moshe Jj RN  Environment Familiarity/Consistency: daily routine followed  Taken 3/20/2024 2000 by Moshe Jj RN  Environment Familiarity/Consistency: daily routine  followed  Intervention: Minimize Safety Risk  Recent Flowsheet Documentation  Taken 3/21/2024 0000 by Moshe Jj RN  Communication Enhancement Strategies:   one-step directions provided   call light answered in person   extra time allowed for response   nonverbal strategies used   communication board used   repetition utilized   family/caregiver assisted with communication   verbal and visual cues paired  Enhanced Safety Measures:   room near unit station   monitor patients coagulation values   pain management   review medications for side effects with activity  Taken 3/20/2024 2000 by Moshe Jj RN  Communication Enhancement Strategies:   one-step directions provided   call light answered in person   extra time allowed for response   nonverbal strategies used   communication board used   repetition utilized   family/caregiver assisted with communication   verbal and visual cues paired  Enhanced Safety Measures:   room near unit station   monitor patients coagulation values   pain management   review medications for side effects with activity  Trust Relationship/Rapport:   care explained   reassurance provided   emotional support provided   thoughts/feelings acknowledged  Goal: Improved Attention and Thought Clarity  Intervention: Maximize Cognitive Function  Recent Flowsheet Documentation  Taken 3/21/2024 0000 by Moshe Jj RN  Sensory Stimulation Regulation:   care clustered   music on   quiet environment promoted  Reorientation Measures:   clock in view   calendar in view  Taken 3/20/2024 2000 by Moshe Jj RN  Sensory Stimulation Regulation:   care clustered   music on   quiet environment promoted  Reorientation Measures:   clock in view   calendar in view     Problem: Mechanical Ventilation Invasive  Goal: Effective Communication  Intervention: Ensure Effective Communication  Recent Flowsheet Documentation  Taken 3/21/2024 0000 by Moshe Jj RN  Communication  Enhancement Strategies:   one-step directions provided   call light answered in person   extra time allowed for response   nonverbal strategies used   communication board used   repetition utilized   family/caregiver assisted with communication   verbal and visual cues paired  Taken 3/20/2024 2000 by Moshe Jj RN  Communication Enhancement Strategies:   one-step directions provided   call light answered in person   extra time allowed for response   nonverbal strategies used   communication board used   repetition utilized   family/caregiver assisted with communication   verbal and visual cues paired  Family/Support System Care: support provided  Trust Relationship/Rapport:   care explained   reassurance provided   emotional support provided   thoughts/feelings acknowledged  Goal: Optimal Device Function  Intervention: Optimize Device Care and Function  Recent Flowsheet Documentation  Taken 3/21/2024 0000 by Moshe Jj RN  Airway Safety Measures:   all equipment/monitors on and audible   manual resuscitator/mask/valve in room   suction at bedside   suction equipment   suction regulator   oxygen flowmeter  Airway/Ventilation Management:   airway patency maintained   calming measures promoted  Oral Care: swabbed with antiseptic solution  Taken 3/20/2024 2200 by Moshe Jj RN  Oral Care: swabbed with antiseptic solution  Taken 3/20/2024 2045 by Moshe Jj RN  Oral Care:   lip/mouth moisturizer applied   suction provided   swabbed with antiseptic solution  Taken 3/20/2024 2000 by Moshe Jj RN  Airway Safety Measures:   all equipment/monitors on and audible   manual resuscitator/mask/valve in room   suction at bedside   suction equipment   suction regulator   oxygen flowmeter  Airway/Ventilation Management:   airway patency maintained   calming measures promoted  Goal: Mechanical Ventilation Liberation  Intervention: Promote Extubation and Mechanical Ventilation  Liberation  Recent Flowsheet Documentation  Taken 3/21/2024 0000 by Moshe Jj RN  Medication Review/Management:   medications reviewed   high-risk medications identified  Environmental Support:   rest periods encouraged   personal routine supported   distractions minimized   calm environment promoted   environmental consistency promoted  Taken 3/20/2024 2000 by Moshe Jj RN  Medication Review/Management:   medications reviewed   high-risk medications identified  Environmental Support:   rest periods encouraged   personal routine supported   distractions minimized   calm environment promoted   environmental consistency promoted  Goal: Optimal Nutrition Delivery  Intervention: Optimize Nutrition Delivery  Recent Flowsheet Documentation  Taken 3/21/2024 0000 by Moshe Jj RN  Nutrition Support Management: weight trending reviewed  Taken 3/20/2024 2000 by Moshe Jj RN  Nutrition Support Management: weight trending reviewed  Goal: Absence of Device-Related Skin and Tissue Injury  Intervention: Maintain Skin and Tissue Health  Recent Flowsheet Documentation  Taken 3/21/2024 0000 by Moshe Jj RN  Device Skin Pressure Protection:   absorbent pad utilized/changed   tubing/devices free from skin contact   positioning supports utilized   pressure points protected  Taken 3/20/2024 2000 by Moshe Jj RN  Device Skin Pressure Protection:   absorbent pad utilized/changed   tubing/devices free from skin contact   positioning supports utilized   pressure points protected  Goal: Absence of Ventilator-Induced Lung Injury  Intervention: Facilitate Lung-Protection Measures  Recent Flowsheet Documentation  Taken 3/21/2024 0000 by Moshe Jj RN  Lung Protection Measures:   fluid excess minimized   ventilator synchrony promoted   lung compliance monitored   plateau/inspiratory pressure monitored   optimal PEEP applied  Taken 3/20/2024 2000 by Moshe Jj RN  Lung  Protection Measures:   fluid excess minimized   ventilator synchrony promoted   lung compliance monitored   plateau/inspiratory pressure monitored   optimal PEEP applied  Intervention: Prevent Ventilator-Associated Pneumonia  Recent Flowsheet Documentation  Taken 3/21/2024 0000 by Moshe Jj RN  VAP Prevention Bundle:   HOB elevation maintained   oral care regularly provided   vent circuit breaks minimized   stress ulcer prophylaxis provided   VTE prophylaxis provided   readiness to extubate assessed  Oral Care: swabbed with antiseptic solution  Head of Bed (HOB) Positioning: HOB at 30 degrees  VAP Prevention Contraindications:   per provider order   no/minimum sedation required   condition unstable  VAP Prevention Measures: not completed (see contraindications)  Taken 3/20/2024 2200 by Moshe Jj RN  Oral Care: swabbed with antiseptic solution  Head of Bed (HOB) Positioning: HOB at 30 degrees  Taken 3/20/2024 2045 by Moshe Jj RN  Oral Care:   lip/mouth moisturizer applied   suction provided   swabbed with antiseptic solution  Head of Bed (HOB) Positioning: HOB at 30 degrees  Taken 3/20/2024 2000 by Moshe Jj RN  VAP Prevention Bundle:   HOB elevation maintained   oral care regularly provided   vent circuit breaks minimized   stress ulcer prophylaxis provided   VTE prophylaxis provided   readiness to extubate assessed  VAP Prevention Contraindications:   per provider order   no/minimum sedation required   condition unstable  VAP Prevention Measures: not completed (see contraindications)     Problem: Guillain-[place]  Syndrome  Goal: Optimal Coping  Intervention: Optimize Psychosocial Response  Recent Flowsheet Documentation  Taken 3/21/2024 0000 by Moshe Jj RN  Supportive Measures:   relaxation techniques promoted   problem-solving facilitated   positive reinforcement provided  Taken 3/20/2024 2000 by Moshe Jj RN  Supportive Measures:   relaxation techniques  promoted   problem-solving facilitated   positive reinforcement provided  Family/Support System Care: support provided  Goal: Balanced Sympathetic/Parasympathetic Function  Intervention: Manage Sympathetic/Parasympathetic Symptoms  Recent Flowsheet Documentation  Taken 3/21/2024 0000 by Moshe Jj RN  Fluid/Electrolyte Management: electrolyte supplement adjusted  Taken 3/20/2024 2000 by Moshe Jj RN  Fluid/Electrolyte Management: electrolyte supplement adjusted  Goal: Effective Communication  Intervention: Establish Effective Communication  Recent Flowsheet Documentation  Taken 3/21/2024 0000 by Moshe Jj RN  Communication Enhancement Strategies:   one-step directions provided   call light answered in person   extra time allowed for response   nonverbal strategies used   communication board used   repetition utilized   family/caregiver assisted with communication   verbal and visual cues paired  Taken 3/20/2024 2000 by Moshe Jj RN  Communication Enhancement Strategies:   one-step directions provided   call light answered in person   extra time allowed for response   nonverbal strategies used   communication board used   repetition utilized   family/caregiver assisted with communication   verbal and visual cues paired  Goal: Optimal Functional Ability  Intervention: Optimize Functional Ability  Recent Flowsheet Documentation  Taken 3/21/2024 0000 by Moshe Jj RN  Activity Management:   activity adjusted per tolerance   bedrest  Taken 3/20/2024 2045 by Moshe Jj RN  Activity Management:   activity adjusted per tolerance   bedrest  Taken 3/20/2024 2000 by Moshe Jj RN  Activity Management:   activity adjusted per tolerance   bedrest  Goal: Acceptable Pain Control  Intervention: Prevent or Manage Pain  Recent Flowsheet Documentation  Taken 3/21/2024 0000 by Moshe Jj RN  Pain Management Interventions:   pain pump in use   medication (see  MAR)  Taken 3/20/2024 2045 by Moshe Jj RN  Pain Management Interventions: pain pump in use  Goal: Effective Oxygenation and Ventilation  Intervention: Promote Airway Secretion Clearance  Recent Flowsheet Documentation  Taken 3/21/2024 0000 by Moshe Jj RN  Cough And Deep Breathing: unable to perform  Activity Management:   activity adjusted per tolerance   bedrest  Taken 3/20/2024 2045 by Moshe Jj RN  Activity Management:   activity adjusted per tolerance   bedrest  Taken 3/20/2024 2000 by Moshe Jj RN  Cough And Deep Breathing: unable to perform  Activity Management:   activity adjusted per tolerance   bedrest  Intervention: Optimize Oxygenation and Ventilation  Recent Flowsheet Documentation  Taken 3/21/2024 0000 by Moshe Jj RN  Airway/Ventilation Management:   airway patency maintained   calming measures promoted  Head of Bed (HOB) Positioning: HOB at 30 degrees  Taken 3/20/2024 2200 by Moshe Jj RN  Head of Bed (HOB) Positioning: HOB at 30 degrees  Taken 3/20/2024 2045 by Moshe Jj RN  Head of Bed (HOB) Positioning: HOB at 30 degrees  Taken 3/20/2024 2000 by Moshe Jj RN  Airway/Ventilation Management:   airway patency maintained   calming measures promoted  Goal: Optimal Sensorimotor Function  Intervention: Optimize Neurologic Status  Recent Flowsheet Documentation  Taken 3/21/2024 0000 by Moshe Jj RN  Pressure Reduction Devices:   heel offloading device utilized   positioning supports utilized   specialty bed utilized   pressure-redistributing mattress utilized  Range of Motion: ROM (range of motion) performed  Taken 3/20/2024 2000 by Moshe Jj RN  Pressure Reduction Devices:   heel offloading device utilized   positioning supports utilized   specialty bed utilized   pressure-redistributing mattress utilized  Range of Motion: ROM (range of motion) performed   Goal Outcome Evaluation:      Plan of Care  Reviewed With: patient, family    Overall Patient Progress: no changeOverall Patient Progress: no change

## 2024-03-21 NOTE — ANESTHESIA PREPROCEDURE EVALUATION
Anesthesia Pre-Procedure Evaluation    Patient: Yohana Marques   MRN: [id number] : 1945        Procedure : Procedure(s):  Tracheostomy  INSERTION OF PERCUTANEOUS ENDOSCOPIC GASTROSTOMY TUBE          Past Medical History:   Diagnosis Date    HTN (hypertension)     Hyperlipidemia     Leiomyoma of uterus, unspecified     Numbness and tingling     spinal stenosis causes numbness and tingling on feet and knees bilaterally    Osteoarthrosis, unspecified whether generalized or localized, unspecified site     Other and unspecified disc disorder of lumbar region     Other chronic pain     Primary osteoarthritis of left hip 2016    Spinal stenosis     S/P diskectomy x 2, most recently 2014      Past Surgical History:   Procedure Laterality Date    ARTHROPLASTY HIP Left 2016    Procedure: ARTHROPLASTY HIP;  Surgeon: Leonid Morfin MD;  Location:  OR    BACK SURGERY      BRONCHOSCOPY (RIGID OR FLEXIBLE), DIAGNOSTIC N/A 3/19/2024    Procedure: Bronchoscopy flexible and rigid;  Surgeon: Regan Bui MD;  Location:  GI    COLONOSCOPY N/A 2017    Procedure: COMBINED COLONOSCOPY, SINGLE OR MULTIPLE BIOPSY/POLYPECTOMY BY BIOPSY;  colonoscopy;  Surgeon: Catarino Salas MD;  Location:  GI    CV CORONARY ANGIOGRAM N/A 3/8/2024    Procedure: Coronary Angiogram;  Surgeon: Alicia Dai MD;  Location:  [facility] CATH LAB    DISCECTOMY LUMBAR POSTERIOR MICROSCOPIC TWO LEVELS  2014    Procedure: DISCECTOMY LUMBAR POSTERIOR MICROSCOPIC TWO LEVELS;  Surgeon: Warren Herring MD;  Location:  OR    ENDOSCOPIC RETROGRADE CHOLANGIOPANCREATOGRAM N/A 10/14/2022    Procedure: ENDOSCOPIC RETROGRADE CHOLANGIOPANCREATOGRAPHY with biliary spincterotomy, biliary stent placement, cholangioscopy;  Surgeon: Carson Franklin MD;  Location:  OR    ENDOSCOPIC RETROGRADE CHOLANGIOPANCREATOGRAM N/A 2023    Procedure: ENDOSCOPIC RETROGRADE  CHOLANGIOPANCREATOGRAPHY with biliary stone and stent removal;  Surgeon: Carson Franklin MD;  Location: [facility] OR    ENDOSCOPIC RETROGRADE CHOLANGIOPANCREATOGRAPHY, EXCHANGE TUBE/STENT N/A 1/25/2023    Procedure: ENDOSCOPIC RETROGRADE CHOLANGIOPANCREATOGRAPHY WITH BILIARY STENT EXCHANGE AND DEBRIS REMOVAL;  Surgeon: Carson Franklin MD;  Location: [facility] OR    EXPLORE COMMON BILE DUCT N/A 12/01/2022    Procedure: COMMON BILE DUCT REPAIR;  Surgeon: Thai Garcia MD;  Location: [facility] OR    LAPAROSCOPIC CHOLECYSTECTOMY WITH CHOLANGIOGRAMS N/A 12/01/2022    Procedure: EXPLORATORY LAPAROSCOPY, LAPAROSCOPIC PARTIAL CHOLECYSTECTOMY WITH RETREVIAL OF STONES WITH CHOLANGIOGRAM; choledochoscopy, INTRAOPERATIVE ULTRASOUND;  Surgeon: Thai Garcia MD;  Location: [facility] OR    LAPAROSCOPY DIAGNOSTIC (GENERAL) N/A 10/06/2022    Procedure: Diagnostic laparoscopy with liver biopsy;  Surgeon: Warren Zhang MD;  Location:  OR    ORTHOPEDIC SURGERY      TOTAL KNEE ARTHROPLASTY Left     [facility] NONSPECIFIC PROCEDURE  1996    lumbar discectomy    Z NONSPECIFIC PROCEDURE      cervical cone biopsy    [facility] NONSPECIFIC PROCEDURE      Left knee replacement      No Known Allergies   Social History     Tobacco Use    Smoking status: Never    Smokeless tobacco: Never   Substance Use Topics    Alcohol use: Yes     Alcohol/week: 0.0 standard drinks of alcohol     Comment: social      Wt Readings from Last 1 Encounters:   03/21/24 111.9 kg (246 lb 11.1 oz)        Echo 3/14/2024  nterpretation Summary     Moderate hypokinesis of the mid LV segments and apex, consistent with stress  cardiomyopathy.  Left ventricular systolic function is mild to moderately reduced.  The visual ejection fraction is 40-45%.     Since the previous study 6 days ago, the pattern of hypokinesis is similar but  now less severe and less extensive with significant improvement in overall LV  systolic  function.  ______________________________________________________________________________  Left Ventricle  The left ventricle is normal in size. There is normal left ventricular wall  thickness. Left ventricular systolic function is mild to moderately reduced.  The visual ejection fraction is 40-45%. Moderate hypokinesis of the mid LV  segments and apex, consistent with stress cardiomyopathy.     Right Ventricle  The right ventricle is normal in structure, function and size.     Atria  Normal left atrial size. Right atrial size is normal. There is no atrial shunt  seen.     Mitral Valve  The mitral valve is normal in structure and function. There is trace mitral  regurgitation.     Tricuspid Valve  The tricuspid valve is normal in structure and function. There is trace  tricuspid regurgitation.     Aortic Valve  The aortic valve is trileaflet with aortic valve sclerosis. No aortic  regurgitation is present. No aortic stenosis is present.     Pulmonic Valve  The pulmonic valve is not well seen, but is grossly normal. There is trace  pulmonic valvular regurgitation.     Vessels  Normal size aorta.     Pericardium  There is no pericardial effusion.     Rhythm  Sinus rhythm was noted.  ____________________________________________________________    EKG   Sinus rhythm   T wave abnormality, consider anterolateral ischemia   Prolonged QT   Abnormal ECG   When compared with ECG of 17-MAR-2024 15:58,   Sinus rhythm has replaced Atrial fibrillation   Left posterior fascicular block is no longer Present   T wave inversion now evident in Anterolateral leads   QT has lengthened     Cardiac cath  Comments/Patient Narrative    The patient is a 78 year old female with a history of hypertension that presents to the cath lab for coronary angiography in the setting of newly diagnosed cardiomyopathy and elevated troponin.     Pre Procedure Diagnosis    ACS <= 24 hoursLV dysfunction       Conclusion    No significant obstructive  coronary artery disease.   Uncomplicated right common femoral access.          Plan     Follow bedrest per protocol   Continued medical management and lifestyle modifications for cardiovascular risk factor optimizations.   Return to the primary inpatient team for further evaluation and managmenet     Coronary Findings    Diagnostic  Dominance: Right  Left Main   The vessel is moderate in size.      Left Anterior Descending   The vessel is moderate in size.      First Septal Branch   The vessel is large. The vessel exhibits minimal luminal irregularities.      Second Diagonal Branch   The vessel is moderate in size. The vessel exhibits minimal luminal irregularities.      Third Diagonal Branch   The vessel is small. The vessel exhibits minimal luminal irregularities.      Ramus Intermedius   The vessel is moderate in size.      Left Circumflex      Second Obtuse Marginal Branch   The vessel is small. The vessel exhibits minimal luminal irregularities.      Third Obtuse Marginal Branch   The vessel is moderate in size. The vessel exhibits minimal luminal irregularities.      Right Coronary Artery   The vessel is moderate in size.      Right Posterior Descending Artery   The vessel is small. The vessel exhibits minimal luminal irregularities.      Right Posterior Atrioventricular Artery   The vessel is moderate in size. The vessel exhibits minimal luminal irregularities.           Head MRI  EXAM: MR BRAIN W/O and W CONTRAST  LOCATION: [facility]  DATE: 3/14/2024     INDICATION: acute onset flaccid quadriparesis, respiratory insufficiency  COMPARISON: MRI brain 03/09/2024  CONTRAST: 10 mL Gadavist  TECHNIQUE: Routine multiplanar multisequence head MRI without and with intravenous contrast.     FINDINGS:  INTRACRANIAL CONTENTS: No acute or subacute infarct. No mass, acute hemorrhage, or extra-axial fluid collections.      Patchy nonspecific T2/FLAIR hyperintensities within the cerebral white  matter and stefan most consistent with mild to moderate chronic microvascular ischemic change. There is asymmetric increased FLAIR signal within the left frontal and parietal   periventricular white matter. Findings are similar compared to prior exam.      Normal ventricles and sulci. Normal position of the cerebellar tonsils. No pathologic contrast enhancement.     SELLA: No abnormality accounting for technique.     OSSEOUS STRUCTURES/SOFT TISSUES: Normal marrow signal. The major intracranial vascular flow voids are maintained.      ORBITS: No abnormality accounting for technique.      SINUSES/MASTOIDS: Mild mucosal thickening scattered about the paranasal sinuses. Bilateral mastoid air cells demonstrate patchy opacification right greater than left.                                                                       IMPRESSION:  1.  No acute intracranial process.     2.  Age-related changes described above.    Portable chest xray  Narrative & Impression   EXAM: XR CHEST PORT 1 VIEW  LOCATION: [facility]  DATE: 3/21/2024     INDICATION: resp failure,  PNA  COMPARISON: 03/18/2024                                                                      IMPRESSION: Stable endotracheal and enteric tubes, right upper extremity PICC. Right IJ catheter has been removed. Extensive bilateral airspace infiltrates with relative sparing of the left apex, worsened compared to prior study.     Chest CT  IMPRESSION:   1.  Multifocal infectious appearing consolidative opacities, most  significant in the right upper lobe. No evidence of cavitation.  2.  Small to moderate bilateral layering pleural effusions. Mild  interstitial pulmonary edema.  3.  Since 10/14/2022, similar left upper lobe 5 mm pulmonary nodule.  Anesthesia Evaluation   Pt has had prior anesthetic.         ROS/MED HX  ENT/Pulmonary: Comment: Multifactorial respiratory failure     3/21/24: Hospital day 13; ICU day 13, MV day 13    Intubated Fio2  - 70%, peep 10    Fentanyl drip    (+)                              recent URI,          Neurologic: Comment:  Initial MRI brain and MRI spine unremarkable however repeat on 3/14 revealed cauda equina nerve root enhancement.      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     Volume overload     Heparin drip- off    Lasix drip-   Amiodarone drip    (+) Dyslipidemia hypertension- -  CAD (Non-obstructive disease by cath) -  - -      CHF                  dysrhythmias, a-fib,             METS/Exercise Tolerance:     Hematologic:     (+) History of blood clots (DVT right lower extremity - heparin dripp),      history of blood transfusion (transfused one unit PRBC 3/20/2024),         Musculoskeletal: Comment: Spinal stenosis with numbness/ tingling lower extremities     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   s/p Plex therapy  (+)  arthritis,             GI/Hepatic: Comment: Tube feeds    (+)           hepatitis (Steatohepatitis)  liver disease (micronodular cirrhosis),       Renal/Genitourinary: Comment: Potassium 3.2 -       Endo:       Psychiatric/Substance Use:       Infectious Disease: Comment: sepsis      Malignancy:       Other: Comment: Right AC triple lumen PIC     (+)  , H/O Chronic Pain,         Physical Exam    Airway      Comment: intuabted         Respiratory Devices and Support    ETT:  FiO2: 70; %     Dental    unable to assess        Cardiovascular          Rhythm and rate: regular and normal     Pulmonary           (+) rhonchi and decreased breath sounds           OUTSIDE LABS:  CBC:   Lab Results   Component Value Date    WBC 13.2 (H) 03/21/2024    WBC 14.3 (H) 03/20/2024    HGB 8.5 (L) 03/21/2024    HGB 8.6 (L) 03/20/2024    HCT 25.3 (L) 03/21/2024    HCT 26.0 (L) 03/20/2024     03/21/2024     03/20/2024     BMP:   Lab Results    Component Value Date     03/21/2024     03/21/2024    POTASSIUM 3.7 03/21/2024    POTASSIUM 3.9 03/21/2024    POTASSIUM 3.9 03/21/2024    CHLORIDE 102 03/21/2024    CHLORIDE 105 03/21/2024    CO2 27 03/21/2024    CO2 25 03/21/2024    BUN 24.7 (H) 03/21/2024    BUN 24.2 (H) 03/21/2024    CR 0.72 03/21/2024    CR 0.63 03/21/2024     (H) 03/21/2024     (H) 03/21/2024     COAGS:   Lab Results   Component Value Date    PTT 25 05/31/2006    INR 1.02 05/02/2023     POC:   Lab Results   Component Value Date     (H) 08/13/2014     HEPATIC:   Lab Results   Component Value Date    ALBUMIN 3.5 03/21/2024    PROTTOTAL 6.1 (L) 03/21/2024    ALT 10 03/21/2024    AST 26 03/21/2024    ALKPHOS 65 03/21/2024    BILITOTAL 0.7 03/21/2024    SVEN 35 10/13/2022     OTHER:   Lab Results   Component Value Date    PH 7.38 03/21/2024    LACT 1.6 03/21/2024    A1C 5.2 03/11/2024    YEE 8.7 (L) 03/21/2024    PHOS 2.0 (L) 03/21/2024    MAG 2.0 03/21/2024    LIPASE 48 05/02/2023    AMYLASE 75 05/02/2023    TSH 1.32 03/10/2024    T4 1.13 11/18/2005    SED 67 (H) 10/13/2022       Anesthesia Plan    ASA Status:  4    NPO Status:  NPO Appropriate    Anesthesia Type: General.     - Airway: ETT   Induction: Inhalation.   Maintenance: Balanced.        Consents    Anesthesia Plan(s) and associated risks, benefits, and realistic alternatives discussed. Questions answered and patient/representative(s) expressed understanding.     - Discussed:     - Discussed with:  Patient, Other (See Comment) (granddaughter)            Postoperative Care    Pain management: IV analgesics.   PONV prophylaxis: Ondansetron (or other 5HT-3)     Comments:    Other Comments: Will transport patient to and from OR with transport ventilator           Bj Cain MD    I have reviewed the pertinent notes and labs in the chart from the past 30 days and (re)examined the patient.  Any updates or changes from those notes are reflected in this  note.

## 2024-03-22 NOTE — OP NOTE
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       ANESTHESIA:  General.    BLOOD LOSS: 4 ml    FINDINGS: PEG in position in the gastric antrum    INDICATIONS:   Prolonged ventilator, need for long term feeding    DETAILS OF PROCEDURE: This procedure took place after Dr. Delgado was done with the trach.  It took place on her hospital bed.    A Surgical timeout was then performed, verifying the correct surgeon, site, procedure, and patient and all in the room were in agreement.     I introduced a flexible endoscope down her mouth, down the esophagus into the stomach.  I did a diagnostic EGD by intubating the duodenum and retroflexed into the fundus.  No abnormalities were seen.  I fully inflated the stomach.  I saw transillumination of the scope faintly.  That area of her abdominal wall was prepped and draped.  It was anesthetized with 1% lidocaine.  We did pass the needle from the abdominal wall into the stomach.  Half of the needle was visualized in the stomach.    The trocar was then advanced into the stomach and the wire was passed.  I grasped the wire with a snare and pulled it out the mouth.  The PEG tube was attached and then it was pulled down into her stomach.  I did hook the scope onto the bumper and rode it back down into the stomach.  I sat the bumper down and placed the fixator so that there was very mild tension on the muscosa of the stomach and minimal indentation of her abdominal wall.  This was at 5 cm.  The  scope was then removed.  Pictures were taken.    The tube was placed to glove.  Feeds may be initiated in 4 hours.  She was returned to the ICU.      Jean Claude Connolly MD

## 2024-03-22 NOTE — PLAN OF CARE
Goal Outcome Evaluation:      Plan of Care Reviewed With: patient, family    Overall Patient Progress: no changeOverall Patient Progress: no change    Outcome Evaluation: Alert and follows commands. overall weak but improving. trach and PEG placed today. NSR. MAP >65. trach shilley 6.0. LS coarse. 80% 12 PEEP. TF restarted @ 1600. quiroz in for retention. Heparin restarted at 1650 recheck at 2100. Amio continued. Lasix @ 15. Fent at 100 for pain. K replaced. Tmax 37.9      Problem: Adult Inpatient Plan of Care  Goal: Absence of Hospital-Acquired Illness or Injury  Intervention: Identify and Manage Fall Risk  Recent Flowsheet Documentation  Taken 3/22/2024 1600 by Juanita Aiken, RN  Safety Promotion/Fall Prevention:   clutter free environment maintained   lighting adjusted   safety round/check completed   room near nurse's station   room door open   increased rounding and observation   increase visualization of patient   assistive device/personal items within reach   activity supervised   room organization consistent   supervised activity   treat reversible contributory factors   treat underlying cause   patient and family education  Taken 3/22/2024 1200 by Juanita Aiken, RN  Safety Promotion/Fall Prevention:   clutter free environment maintained   lighting adjusted   safety round/check completed   room near nurse's station   room door open   increased rounding and observation   increase visualization of patient   assistive device/personal items within reach   activity supervised   room organization consistent   supervised activity   treat reversible contributory factors   treat underlying cause   patient and family education  Taken 3/22/2024 0800 by Juanita Aiken, RN  Safety Promotion/Fall Prevention:   clutter free environment maintained   lighting adjusted   safety round/check completed   room near nurse's station   room door open   increased rounding and observation   increase visualization of  patient   assistive device/personal items within reach   activity supervised   room organization consistent   supervised activity   treat reversible contributory factors   treat underlying cause   patient and family education

## 2024-03-22 NOTE — PROGRESS NOTES
[facility] ICU RESPIRATORY NOTE           Date of Admission: 3/8/2024     Date of Intubation (most recent):3/10/24     Reason for Mechanical Ventilation: Airway protection     Number of Days on Mechanical Ventilation: 13     Met Criteria for Spontaneous Breathing Trial: No     Reason for No Spontaneous Breathing Trial: PEEP 10 and FiO2 60%     Significant Events Today: None overnight    ABG Results:   Recent Labs   Lab 03/21/24  0432 03/18/24  0950   PH 7.38  --    PCO2 49*  --    PO2 58*  --    HCO3 29*  --    O2PER 55 50     Vent Mode: CMV/AC  (Continuous Mandatory Ventilation/ Assist Control)  FiO2 (%): 65 %  Resp Rate (Set): 14 breaths/min  Tidal Volume (Set, mL): 420 mL  PEEP (cm H2O): 10 cmH2O  Resp: 22    NEIL Dias, RRT

## 2024-03-22 NOTE — PROVIDER NOTIFICATION
Spoke with Shar Tracey about when to stop heparin gtt for trach and peg Placement today. Per MD gtt Stopped at 0515

## 2024-03-22 NOTE — PLAN OF CARE
Goal Outcome Evaluation:  Neuro: Alert, nods appropriately to yes/no questions. Able to shrug her shoulders, wiggle fingers and toes but she is to weak to lift extremities from the bed. PERRL.   CV: SR, SR wit bigeminy of PACs self resolves to SR. Electrolytes checked at 1600, unremarkable. DVT on R leg, changed to high intensity heparin, recheck 10a in AM  Resp: Derecruits with turns and suction, needs 100% FIO2 with turns and  to recuperate after turns. Desats to low 80s   GI: TF Goal, Removed rectal tube for 0 output in 24 hrs  : Lasix gtt diuresed >2500 ml since 10 am   Skin: turned and repositioned Q 2 hrs and PRN  Access: PICC  Gtts: Fentanyl, heparin, furosemide,   Other: npo after midnight, follow with surgical team to stop heparin prior to surgery    Notified provider about indwelling quiroz catheter discussed removal or continued need.    Did provider choose to remove indwelling quiroz catheter? No    Provider's quiroz indication for keeping indwelling quiroz catheter: Retention.    Is there an order for indwelling quiroz catheter? Yes    *If there is a plan to keep quiroz catheter in place at discharge daily notification with provider is not necessary      Plan of Care Reviewed With: patient, family          Outcome Evaluation: Neuro: Alert, nods appropriately to yes/no questions. Able to shrug her shoulders, wiggle fingers and toes but she is to weak to lift extremities from the bed. PERRL.   CV: SR, SR wit bigeminy of PACs self resolves to SR. Electrolytes checked at 1600, unremarkable. DVT on R leg, changed to high intensity heparin, recheck 10a in AM  Resp: Derecruits with turns and suction, needs 100% FIO2 with turns and  to recuperate after turns. Desats to low 80s   GI: TF Goal, Removed rectal tube for 0 output in 24 hrs  : Lasix gtt diuresed >2500 ml since 10 am   Skin: turned and repositioned Q 2 hrs and PRN  Access: PICC  Gtts: Fentanyl, heparin, furosemide,   Other: npo after midnight, follow  with surgical team to stop heparin prior to surgery    Notified provider about indwelling quiroz catheter discussed removal or continued need.    Did provider choose to remove indwelling quiroz catheter? No    Provider's quiroz indication for keeping indwelling quiroz catheter: Retention.    Is there an order for indwelling quiroz catheter? Yes    *If there is a plan to keep quiroz catheter in place at discharge daily notification with provider is not necessary

## 2024-03-22 NOTE — PLAN OF CARE
ICU End of Shift Nursing Summary *For vital signs and complete assessments, please see documentation flowsheets       Neuro: RASS 0/-1, PERRL, opens eyes spontaneuously, follows commands, ALVARADO but weak  Pain: fent gtt for pain  CV:  tele SR/ goes in and out of afib w/controlled rate.. Amio gtt for rate control.   Pulm:LS coarse, vent at 14/420/65/10. Copious secretions inline and oral.   GI/: Tf stopped since MN.  Nye with brisk UOP, on lasix drip.    Endocrine:   Skin: see flowsheet  Lines: triple lumen PICC  Drips: amio, fent, lasix. hep gtt stopped this AM  Additional: replaced K, recheck at 1000.   Plan (Upcoming Events): trach placement at 1100.   Bedside Shift Report Completed : yes  Bedside Safety Check Completed: yes     ------------------------------------------  Problem: Adult Inpatient Plan of Care  Goal: Plan of Care Review  Description: The Plan of Care Review/Shift note should be completed every shift.  The Outcome Evaluation is a brief statement about your assessment that the patient is improving, declining, or no change.  This information will be displayed automatically on your shift  note.  Flowsheets (Taken 3/22/2024 0545)  Plan of Care Reviewed With:   patient   family  Overall Patient Progress: no change  Goal: Absence of Hospital-Acquired Illness or Injury  Intervention: Identify and Manage Fall Risk  Recent Flowsheet Documentation  Taken 3/22/2024 0400 by Moshe Jj RN  Safety Promotion/Fall Prevention:   clutter free environment maintained   lighting adjusted   safety round/check completed   room near nurse's station   room door open   increased rounding and observation   increase visualization of patient   assistive device/personal items within reach   activity supervised   room organization consistent   supervised activity   treat reversible contributory factors   treat underlying cause   patient and family education  Taken 3/22/2024 0000 by Moshe Jj, RN  Safety  Promotion/Fall Prevention:   clutter free environment maintained   lighting adjusted   safety round/check completed   room near nurse's station   room door open   increased rounding and observation   increase visualization of patient   assistive device/personal items within reach   activity supervised   room organization consistent   supervised activity   treat reversible contributory factors   treat underlying cause   patient and family education  Taken 3/21/2024 2000 by Moshe Jj RN  Safety Promotion/Fall Prevention:   clutter free environment maintained   lighting adjusted   safety round/check completed   room near nurse's station   room door open   increased rounding and observation   increase visualization of patient   assistive device/personal items within reach   activity supervised   room organization consistent   supervised activity   treat reversible contributory factors   treat underlying cause   patient and family education  Intervention: Prevent Skin Injury  Recent Flowsheet Documentation  Taken 3/22/2024 0400 by Moshe Jj RN  Body Position:   turned   foot of bed elevated   heels elevated   legs elevated  Skin Protection:   adhesive use limited   incontinence pads utilized   pulse oximeter probe site changed   silicone foam dressing in place   protective footwear used   skin to device areas padded  Device Skin Pressure Protection:   absorbent pad utilized/changed   tubing/devices free from skin contact   positioning supports utilized   pressure points protected  Taken 3/22/2024 0200 by Moshe Jj RN  Body Position:   turned   heels elevated  Taken 3/22/2024 0000 by Moshe Jj RN  Body Position:   turned   foot of bed elevated   heels elevated   legs elevated  Skin Protection:   adhesive use limited   incontinence pads utilized   pulse oximeter probe site changed   silicone foam dressing in place   protective footwear used   skin to device areas padded  Device Skin  Pressure Protection:   absorbent pad utilized/changed   tubing/devices free from skin contact   positioning supports utilized   pressure points protected  Taken 3/21/2024 2200 by Moshe Jj RN  Body Position:   turned   foot of bed elevated   heels elevated   legs elevated  Taken 3/21/2024 2000 by Moshe Jj RN  Body Position:   turned   foot of bed elevated   heels elevated   legs elevated  Skin Protection:   adhesive use limited   incontinence pads utilized   pulse oximeter probe site changed   silicone foam dressing in place   protective footwear used   skin to device areas padded  Device Skin Pressure Protection:   absorbent pad utilized/changed   tubing/devices free from skin contact   positioning supports utilized   pressure points protected  Intervention: Prevent and Manage VTE (Venous Thromboembolism) Risk  Recent Flowsheet Documentation  Taken 3/22/2024 0400 by Moshe Jj RN  VTE Prevention/Management: SCDs (sequential compression devices) on  Taken 3/22/2024 0000 by Moshe Jj RN  VTE Prevention/Management: SCDs (sequential compression devices) on  Taken 3/21/2024 2000 by Moshe Jj RN  VTE Prevention/Management: SCDs (sequential compression devices) on  Intervention: Prevent Infection  Recent Flowsheet Documentation  Taken 3/22/2024 0400 by Moshe Jj RN  Infection Prevention:   rest/sleep promoted   single patient room provided   hand hygiene promoted   environmental surveillance performed   equipment surfaces disinfected  Taken 3/22/2024 0000 by Moshe Jj RN  Infection Prevention:   rest/sleep promoted   single patient room provided   hand hygiene promoted   environmental surveillance performed   equipment surfaces disinfected  Taken 3/21/2024 2000 by Moshe Jj RN  Infection Prevention:   rest/sleep promoted   single patient room provided   hand hygiene promoted   environmental surveillance performed   equipment surfaces  disinfected  Goal: Optimal Comfort and Wellbeing  Intervention: Monitor Pain and Promote Comfort  Recent Flowsheet Documentation  Taken 3/22/2024 0000 by Moshe Jj RN  Pain Management Interventions:   pain pump in use   medication (see MAR)  Taken 3/21/2024 2200 by Moshe Jj RN  Pain Management Interventions:   pain pump in use   medication (see MAR)  Intervention: Provide Person-Centered Care  Recent Flowsheet Documentation  Taken 3/21/2024 2000 by Moshe Jj RN  Trust Relationship/Rapport:   care explained   reassurance provided   emotional support provided   thoughts/feelings acknowledged     Problem: Risk for Delirium  Goal: Optimal Coping  Intervention: Optimize Psychosocial Adjustment to Delirium  Recent Flowsheet Documentation  Taken 3/22/2024 0400 by Moshe Jj RN  Supportive Measures:   relaxation techniques promoted   problem-solving facilitated   positive reinforcement provided  Taken 3/22/2024 0000 by Moshe Jj RN  Supportive Measures:   relaxation techniques promoted   problem-solving facilitated   positive reinforcement provided  Taken 3/21/2024 2000 by Moshe Jj RN  Supportive Measures:   relaxation techniques promoted   problem-solving facilitated   positive reinforcement provided  Family/Support System Care: support provided  Goal: Improved Behavioral Control  Intervention: Prevent and Manage Agitation  Recent Flowsheet Documentation  Taken 3/22/2024 0400 by Moshe Jj RN  Environment Familiarity/Consistency: daily routine followed  Taken 3/22/2024 0000 by Moshe Jj RN  Environment Familiarity/Consistency: daily routine followed  Taken 3/21/2024 2000 by Moshe Jj RN  Environment Familiarity/Consistency: daily routine followed  Intervention: Minimize Safety Risk  Recent Flowsheet Documentation  Taken 3/22/2024 0400 by Moshe Jj RN  Communication Enhancement Strategies:   one-step directions provided    call light answered in person   extra time allowed for response   nonverbal strategies used   communication board used   repetition utilized   family/caregiver assisted with communication   verbal and visual cues paired   family involved in communication plan  Enhanced Safety Measures:   room near unit station   monitor patients coagulation values   pain management   review medications for side effects with activity  Taken 3/22/2024 0000 by Moshe Jj RN  Communication Enhancement Strategies:   one-step directions provided   call light answered in person   extra time allowed for response   nonverbal strategies used   communication board used   repetition utilized   family/caregiver assisted with communication   verbal and visual cues paired   family involved in communication plan  Enhanced Safety Measures:   room near unit station   monitor patients coagulation values   pain management   review medications for side effects with activity  Taken 3/21/2024 2000 by Moshe Jj RN  Communication Enhancement Strategies:   one-step directions provided   call light answered in person   extra time allowed for response   nonverbal strategies used   communication board used   repetition utilized   family/caregiver assisted with communication   verbal and visual cues paired   family involved in communication plan  Enhanced Safety Measures:   room near unit station   monitor patients coagulation values   pain management   review medications for side effects with activity  Trust Relationship/Rapport:   care explained   reassurance provided   emotional support provided   thoughts/feelings acknowledged  Goal: Improved Attention and Thought Clarity  Intervention: Maximize Cognitive Function  Recent Flowsheet Documentation  Taken 3/22/2024 0400 by Moshe Jj RN  Sensory Stimulation Regulation:   care clustered   quiet environment promoted   auditory stimulation provided  Reorientation Measures:   clock in  view   calendar in view  Taken 3/22/2024 0000 by Moshe Jj RN  Sensory Stimulation Regulation:   care clustered   quiet environment promoted   auditory stimulation provided  Reorientation Measures:   clock in view   calendar in view  Taken 3/21/2024 2000 by Moshe Jj RN  Sensory Stimulation Regulation:   care clustered   quiet environment promoted   auditory stimulation provided  Reorientation Measures:   clock in view   calendar in view     Problem: Mechanical Ventilation Invasive  Goal: Effective Communication  Intervention: Ensure Effective Communication  Recent Flowsheet Documentation  Taken 3/22/2024 0400 by Moshe Jj RN  Communication Enhancement Strategies:   one-step directions provided   call light answered in person   extra time allowed for response   nonverbal strategies used   communication board used   repetition utilized   family/caregiver assisted with communication   verbal and visual cues paired   family involved in communication plan  Taken 3/22/2024 0000 by Moshe Jj RN  Communication Enhancement Strategies:   one-step directions provided   call light answered in person   extra time allowed for response   nonverbal strategies used   communication board used   repetition utilized   family/caregiver assisted with communication   verbal and visual cues paired   family involved in communication plan  Taken 3/21/2024 2000 by Moshe Jj RN  Communication Enhancement Strategies:   one-step directions provided   call light answered in person   extra time allowed for response   nonverbal strategies used   communication board used   repetition utilized   family/caregiver assisted with communication   verbal and visual cues paired   family involved in communication plan  Family/Support System Care: support provided  Trust Relationship/Rapport:   care explained   reassurance provided   emotional support provided   thoughts/feelings acknowledged  Goal: Optimal  Device Function  Intervention: Optimize Device Care and Function  Recent Flowsheet Documentation  Taken 3/22/2024 0400 by Moshe Jj RN  Oral Care:   suction provided   swabbed with antiseptic solution  Taken 3/22/2024 0300 by Moshe Jj RN  Oral Care:   suction provided   swabbed with antiseptic solution  Taken 3/22/2024 0000 by Moshe Jj RN  Oral Care:   suction provided   swabbed with antiseptic solution  Taken 3/21/2024 2200 by Moshe Jj RN  Oral Care:   suction provided   swabbed with antiseptic solution  Taken 3/21/2024 2000 by Msohe Jj RN  Oral Care:   lip/mouth moisturizer applied   suction provided   swabbed with antiseptic solution  Goal: Mechanical Ventilation Liberation  Intervention: Promote Extubation and Mechanical Ventilation Liberation  Recent Flowsheet Documentation  Taken 3/22/2024 0400 by Moshe Jj RN  Medication Review/Management:   medications reviewed   high-risk medications identified  Environmental Support:   rest periods encouraged   personal routine supported   distractions minimized   calm environment promoted   environmental consistency promoted  Taken 3/22/2024 0000 by Moshe Jj RN  Medication Review/Management:   medications reviewed   high-risk medications identified  Environmental Support:   rest periods encouraged   personal routine supported   distractions minimized   calm environment promoted   environmental consistency promoted  Taken 3/21/2024 2000 by Moshe Jj RN  Medication Review/Management:   medications reviewed   high-risk medications identified  Environmental Support:   rest periods encouraged   personal routine supported   distractions minimized   calm environment promoted   environmental consistency promoted  Goal: Optimal Nutrition Delivery  Intervention: Optimize Nutrition Delivery  Recent Flowsheet Documentation  Taken 3/22/2024 0400 by Moshe Jj RN  Nutrition Support Management:  weight trending reviewed  Taken 3/22/2024 0000 by Moshe Jj RN  Nutrition Support Management: weight trending reviewed  Taken 3/21/2024 2000 by Moshe Jj RN  Nutrition Support Management: weight trending reviewed  Goal: Absence of Device-Related Skin and Tissue Injury  Intervention: Maintain Skin and Tissue Health  Recent Flowsheet Documentation  Taken 3/22/2024 0400 by Moshe Jj RN  Device Skin Pressure Protection:   absorbent pad utilized/changed   tubing/devices free from skin contact   positioning supports utilized   pressure points protected  Taken 3/22/2024 0000 by Moshe Jj RN  Device Skin Pressure Protection:   absorbent pad utilized/changed   tubing/devices free from skin contact   positioning supports utilized   pressure points protected  Taken 3/21/2024 2000 by Moshe Jj RN  Device Skin Pressure Protection:   absorbent pad utilized/changed   tubing/devices free from skin contact   positioning supports utilized   pressure points protected  Goal: Absence of Ventilator-Induced Lung Injury  Intervention: Prevent Ventilator-Associated Pneumonia  Recent Flowsheet Documentation  Taken 3/22/2024 0400 by Moshe Jj RN  VAP Prevention Bundle:   HOB elevation maintained   oral care regularly provided   vent circuit breaks minimized   stress ulcer prophylaxis provided   VTE prophylaxis provided   readiness to extubate assessed  Oral Care:   suction provided   swabbed with antiseptic solution  Head of Bed (HOB) Positioning: HOB at 30 degrees  VAP Prevention Contraindications:   per provider order   no/minimum sedation required   condition unstable  VAP Prevention Measures: not completed (see contraindications)  Taken 3/22/2024 0300 by Moshe Jj RN  Oral Care:   suction provided   swabbed with antiseptic solution  Taken 3/22/2024 0200 by Moshe Jj RN  Head of Bed (HOB) Positioning: HOB at 30 degrees  Taken 3/22/2024 0000 by Анна  GARCÍA [place]  VAP Prevention Bundle:   HOB elevation maintained   oral care regularly provided   vent circuit breaks minimized   stress ulcer prophylaxis provided   VTE prophylaxis provided   readiness to extubate assessed  Oral Care:   suction provided   swabbed with antiseptic solution  Head of Bed (HOB) Positioning: HOB at 30 degrees  VAP Prevention Contraindications:   per provider order   no/minimum sedation required   condition unstable  VAP Prevention Measures: not completed (see contraindications)  Taken 3/21/2024 2200 by Moshe Jj RN  Oral Care:   suction provided   swabbed with antiseptic solution  Head of Bed (HOB) Positioning: HOB at 30 degrees  Taken 3/21/2024 2000 by Moshe Jj RN  VAP Prevention Bundle:   HOB elevation maintained   oral care regularly provided   vent circuit breaks minimized   stress ulcer prophylaxis provided   VTE prophylaxis provided   readiness to extubate assessed  Oral Care:   lip/mouth moisturizer applied   suction provided   swabbed with antiseptic solution  Head of Bed (HOB) Positioning: HOB at 30 degrees  VAP Prevention Contraindications:   per provider order   no/minimum sedation required   condition unstable  VAP Prevention Measures: not completed (see contraindications)     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  Goal: Optimal Coping  Intervention: Optimize Psychosocial Response  Recent Flowsheet Documentation  Taken 3/22/2024 0400 by Moshe Jj RN  Supportive Measures:   relaxation techniques promoted   problem-solving facilitated   positive reinforcement provided  Taken 3/22/2024 0000 by Moshe Jj RN  Supportive Measures:   relaxation techniques promoted   problem-solving facilitated   positive reinforcement provided  Taken 3/21/2024 2000 by Moshe Jj RN  Supportive Measures:   relaxation techniques promoted   problem-solving facilitated   positive reinforcement provided  Family/Support System Care: support provided  Goal:  Balanced Sympathetic/Parasympathetic Function  Intervention: Manage Sympathetic/Parasympathetic Symptoms  Recent Flowsheet Documentation  Taken 3/22/2024 0400 by Moshe Jj RN  Fluid/Electrolyte Management: electrolyte supplement adjusted  Taken 3/22/2024 0000 by Moshe Jj RN  Fluid/Electrolyte Management: electrolyte supplement adjusted  Taken 3/21/2024 2000 by Moshe Jj RN  Fluid/Electrolyte Management: electrolyte supplement adjusted  Goal: Effective Communication  Intervention: Establish Effective Communication  Recent Flowsheet Documentation  Taken 3/22/2024 0400 by Moshe Jj RN  Communication Enhancement Strategies:   one-step directions provided   call light answered in person   extra time allowed for response   nonverbal strategies used   communication board used   repetition utilized   family/caregiver assisted with communication   verbal and visual cues paired   family involved in communication plan  Taken 3/22/2024 0000 by Moshe Jj RN  Communication Enhancement Strategies:   one-step directions provided   call light answered in person   extra time allowed for response   nonverbal strategies used   communication board used   repetition utilized   family/caregiver assisted with communication   verbal and visual cues paired   family involved in communication plan  Taken 3/21/2024 2000 by Moshe Jj RN  Communication Enhancement Strategies:   one-step directions provided   call light answered in person   extra time allowed for response   nonverbal strategies used   communication board used   repetition utilized   family/caregiver assisted with communication   verbal and visual cues paired   family involved in communication plan  Goal: Optimal Functional Ability  Intervention: Optimize Functional Ability  Recent Flowsheet Documentation  Taken 3/22/2024 0400 by Moshe Jj RN  Activity Management:   activity adjusted per tolerance    bedrest  Taken 3/22/2024 0000 by Moshe Jj RN  Activity Management:   activity adjusted per tolerance   bedrest  Taken 3/21/2024 2000 by Moshe Jj RN  Activity Management:   activity adjusted per tolerance   bedrest  Goal: Acceptable Pain Control  Intervention: Prevent or Manage Pain  Recent Flowsheet Documentation  Taken 3/22/2024 0000 by Moshe Jj RN  Pain Management Interventions:   pain pump in use   medication (see MAR)  Taken 3/21/2024 2200 by Moshe jJ RN  Pain Management Interventions:   pain pump in use   medication (see MAR)  Goal: Effective Oxygenation and Ventilation  Intervention: Promote Airway Secretion Clearance  Recent Flowsheet Documentation  Taken 3/22/2024 0400 by Moshe Jj RN  Cough And Deep Breathing: unable to perform  Activity Management:   activity adjusted per tolerance   bedrest  Taken 3/22/2024 0000 by Moshe Jj RN  Cough And Deep Breathing: unable to perform  Activity Management:   activity adjusted per tolerance   bedrest  Taken 3/21/2024 2000 by Moshe Jj RN  Cough And Deep Breathing: unable to perform  Activity Management:   activity adjusted per tolerance   bedrest  Intervention: Optimize Oxygenation and Ventilation  Recent Flowsheet Documentation  Taken 3/22/2024 0400 by Moshe Jj RN  Head of Bed (HOB) Positioning: HOB at 30 degrees  Taken 3/22/2024 0200 by Moshe Jj RN  Head of Bed (HOB) Positioning: HOB at 30 degrees  Taken 3/22/2024 0000 by Moshe Jj RN  Head of Bed (HOB) Positioning: HOB at 30 degrees  Taken 3/21/2024 2200 by Moshe Jj RN  Head of Bed (HOB) Positioning: HOB at 30 degrees  Taken 3/21/2024 2000 by Moshe Jj RN  Head of Bed (HOB) Positioning: HOB at 30 degrees  Goal: Optimal Sensorimotor Function  Intervention: Optimize Neurologic Status  Recent Flowsheet Documentation  Taken 3/22/2024 0400 by Moshe Jj RN  Pressure Reduction Devices:    heel offloading device utilized   positioning supports utilized   specialty bed utilized   pressure-redistributing mattress utilized  Range of Motion: ROM (range of motion) performed  Taken 3/22/2024 0000 by Moshe Jj RN  Pressure Reduction Devices:   heel offloading device utilized   positioning supports utilized   specialty bed utilized   pressure-redistributing mattress utilized  Range of Motion: ROM (range of motion) performed  Taken 3/21/2024 2000 by Moshe Jj RN  Pressure Reduction Devices:   heel offloading device utilized   positioning supports utilized   specialty bed utilized   pressure-redistributing mattress utilized  Range of Motion: ROM (range of motion) performed   Goal Outcome Evaluation:      Plan of Care Reviewed With: patient, family    Overall Patient Progress: no changeOverall Patient Progress: no change

## 2024-03-22 NOTE — ANESTHESIA POSTPROCEDURE EVALUATION
Patient: Yohana Marques    Procedure: Procedure(s):  Tracheostomy  INSERTION OF PERCUTANEOUS ENDOSCOPIC GASTROSTOMY TUBE       Anesthesia Type:  General    Note:     Postop Pain Control:             Sign Out: ONGOING pain issues   PONV: No   Neuro/Psych: Uneventful            Sign Out: Acceptable/Baseline neuro status   Airway/Respiratory: Uneventful            Sign Out: AIRWAY IN SITU/Resp. Support               Airway in situ/Resp. Support: Tracheostomy                 Reason: Planned Pre-op   CV/Hemodynamics: Uneventful            Sign Out: Acceptable CV status; No obvious hypovolemia; No obvious fluid overload   Other NRE: NONE   DID A NON-ROUTINE EVENT OCCUR? No           Last vitals:  Vitals:    03/22/24 1330 03/22/24 1400 03/22/24 1430   BP: 111/54 107/52 107/48   Pulse: 66 65 65   Resp: 26 26 25   Temp: 37.3  C (99.1  F) 37.4  C (99.3  F) 37.5  C (99.5  F)   SpO2: 94% 94% 94%       Electronically Signed By: Bj Cain MD  March 22, 2024  3:56 PM

## 2024-03-22 NOTE — PROGRESS NOTES
[facility] ICU RESPIRATORY NOTE        Date of Admission: 3/8/2024    Date of Intubation (most recent): Trach 3/22    Reason for Mechanical Ventilation: Resp. Failure    Number of Days on Mechanical Ventilation: 13    Met Criteria for Spontaneous Breathing Trial: No    Reason for No Spontaneous Breathing Trial: Significant vent support (PEEP + FiO2)    Significant Events Today: Pt went to OR for trach. Lower sats post op, PEEP increased from +10 to +12.     ABG Results:   Recent Labs   Lab 03/21/24  0432 03/18/24  0950   PH 7.38  --    PCO2 49*  --    PO2 58*  --    HCO3 29*  --    O2PER 55 50         Current Vent Settings: Vent Mode: CMV/AC  (Continuous Mandatory Ventilation/ Assist Control)  FiO2 (%): 70 %  Resp Rate (Set): 14 breaths/min  Tidal Volume (Set, mL): 420 mL  PEEP (cm H2O): (S) 12 cmH2O  Resp: 25      Dulce Paiz, RT on 3/22/2024 at 1:36 PM

## 2024-03-22 NOTE — PROGRESS NOTES
Critical Care  Note      03/22/2024    Name: Yohana Marques MRN#: [id number]   Age: 78 year old YOB: 1945     Hsptl Day# 14  ICU DAY # 14    MV DAY # 14             Problem List:   Principal Problem:    ACS (acute coronary syndrome) (H)  Active Problems:    Weakness    Severe sepsis (H)  Guillain Maxie sydrome  Acute hypercapnic respiratory failure         Summary/Hospital Course:   78F pmh of HTN was admitted 3/8 with stress cardiomyopathy and progressive global weakness concerning for guillain barre syndrome. On my interview she denies recent innoculation or viral illness symptoms, also denies common neoplasm review of symptoms including coughing, bloody sputum, constipation, diarrhea, bloody urine or stool.    She has had progressive global weakness which now seems to involve her respiratory muscles.  Workup is consistent with Guillain-Barré.  She has been intubated and supported on mechanical ventilation for respiratory muscle weakness.    3/17 - worsening CXR  3/18 - CT obtained abx broadened, completed PLEX  3/19 - off levophed, bronch for pulm toilet, noted anthracosis of airways,  unclear of any exposures though noted does have [employer] near house as well he works as gunsmith but patient with minimal interaction with each   3/20 - transfused 1 unit PRBC, derecruits with positioning  3/21 - intermittent desat, ETT migrating thus losing volumes , rebronched for placement, airways clear , hep dosing increased as new DVT diagnosed , lasix gtt initiated      Interim History     - finally net negative with lasix drip  - ETT with leak this AM , after turning requiring repositioning  - no major events otherwise   - hep held this AM in anticipation of trach and peg       Assessment and plan :     Yohana Marques IS a 78 year old female admitted on 3/8/2024 for 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   I have personally reviewed the daily labs, imaging studies, cultures and discussed the  case with referring physician and consulting physicians.     My assessment and plan by system for this patient is as follows:    Neurology/Psychiatry:   1. 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 W/unit(s) consistent with GBS - Plex initiated and completed. Slow recovery of motor function  2. Pain/analgesia:  fentanyl gtt and as needed   3. Sedation: none  PLAN  - continue supportive care   - neuro following  - PT/OT    Cardiovascular:   Takatsubo cardiomyopathy with chf exacerbation and volume overload;  Repeated TTE, still hypokinesia but improved and EF is recovering to 40-45%  Afib with RVR, on amiodarone gtt  Shock, likely mixed , vasoplegic,  cardiogenic as well with concern for autonomic dysfunction.  Resolved  PLAN  - follow hemodynamics, goal MAP >65  - increase lasix gtt,   - continue amiodarone drip (through procedures), plan transition PO after trach /peg  - high intensity Heparin drip for DVT      Pulmonary/Ventilator Management:   Acute hypoxemic hypercapnic respiratory failure, requiring mechanical ventilation, multifactorial, noted gayle PNA, volume overload a large contributor, and evidence of likely thromboembolic disease  Polymicrbial PNA  Pulm edema and volume overload  Darkened Aairways; erroneous - initially seen on bronch 3/19, thought anthracosis however no overt exposures to repeat bronch (different monitor) normal airways (pink w/o evidence)  cytology negative   New DVT  pt on low dose heparin (afib) not necessarily a therapeutic failure,  PLAN  -  continue lung protective ventilation, currently at ~ 8m/kg , increase PEEP for recruitment,   - pulm toilet  - pending Trach and PEG today  - continue abx (day # 5 ceftaz) would not count zosyn in total duration  - continue diuresis , increase lasix gtt as above, goal 1-2 L negative      GI and Nutrition :   1. RD consult, on TF  2.  PPI for pud prophy  Plan  - PEG today    Renal/Fluids/Electrolytes:   1. Cr, UOP appropriate -  2.   "Electrolyte abnormalities - variable - hypokalemia, hypernatremia, hypocalcemia 2/2 critical illness, - improved, continue replacement protocols  3. Acid base - stable    4. Volume overload - +12 L positive   Plan   -Monitor urine output and I&O, avoid nephrotoxic medications.  -replace electrolytes per protocol.    - increase lasix gtt , goal net negative 1 to 2 L negative q 24 hrs     Infectious Disease:   1. Sepsis, secondary to HCAP , polymicrobial with R organism (Hafnia) ,with worsening imaging and secretions on current therapy . Bronch with continued Hafnia growth  - continue ceftaz (day # 5/10)   - serial CRP and procal     Endocrine:   1. Stress hyperglycemia   - BS control per ICU protocol     Hematology/Oncology:   1. DVT - as above, unclear if therapeutic failure, platelets stable argue against JO , on high intensity   2. Leukocytosis to 13.6 ?reactive vs HAP  3. Anemia, s/p 1 unit(s) PRBC, no evidence for overt blood loss. Appropriate response   4. Coagulopathy, med induced, need watch hemoptysis and weight risk benefit with heparin gtt  Plan  - serial CBC, coags  -Hgb goal >7   - heparin gtt high intensity protocol     ICU Prophylaxis:   1. DVT: mechanical; Heparin gtt  2. VAP: HOB 30 degrees, chlorhexidine rinse  3. Stress Ulcer: PPI  4. Restraints: None indicated. Will readdress daily.   5. Wound care  - per unit routine   6. Feeding -tube feed  7. Family Update: family  at bedside  8. Disposition - icu    Clinically Significant Risk Factors        # Hypokalemia: Lowest K = 2.8 mmol/L in last 2 days, will replace as needed       # Hypoalbuminemia: Lowest albumin = 3.1 g/dL at 3/22/2024  4:15 AM, will monitor as appropriate     # Hypertension: Noted on problem list        # Obesity: Estimated body mass index is 36.21 kg/m  as calculated from the following:    Height as of this encounter: 1.727 m (5' 7.99\").    Weight as of this encounter: 108 kg (238 lb 1.6 oz).      # Financial/Environmental " Concerns: none                    Critical Care Time: 45 min.  I spent this time (excluding procedures) personally providing and directing critical care services at the bedside and on the critical care unit.     Regan Bui MD  Pulmonary and Critical Care                 Key Medications:      acetylcysteine  2 mL Nebulization Q6H    albuterol  2.5 mg Nebulization Q6H    [Held by provider] aspirin  81 mg Oral or Feeding Tube Daily    cefTAZidime  2 g Intravenous Q8H    chlorhexidine  15 mL Mouth/Throat Q12H    fiber modular  1 packet Per Feeding Tube BID    pantoprazole  40 mg Per Feeding Tube QAM AC    Or    pantoprazole  40 mg Intravenous QAM AC    QUEtiapine  50 mg Oral or Feeding Tube BID    sodium chloride (PF)  3 mL Intracatheter Q8H      amiodarone 0.5 mg/min (03/21/24 2000)    dextrose      fentaNYL 150 mcg/hr (03/21/24 2241)    furosemide (LASIX) 100 mg in sodium chloride 0.9 % 100 mL infusion 10 mg/hr (03/22/24 0654)    heparin Stopped (03/22/24 0511)    - MEDICATION INSTRUCTIONS -      norepinephrine Stopped (03/19/24 0824)    - MEDICATION INSTRUCTIONS -      sodium chloride 20 mL/hr at 03/19/24 2222              Physical Examination:   Temp:  [98.4  F (36.9  C)-100.6  F (38.1  C)] 99.5  F (37.5  C)  Pulse:  [62-86] 66  Resp:  [18-29] 18  BP: ()/(39-85) 100/55  FiO2 (%):  [60 %-70 %] 70 %  SpO2:  [88 %-99 %] 91 %        Intake/Output Summary (Last 24 hours) at 3/22/2024 0846  Last data filed at 3/22/2024 0600  Gross per 24 hour   Intake 2825.8 ml   Output 4475 ml   Net -1649.2 ml       Wt Readings from Last 4 Encounters:   03/22/24 108 kg (238 lb 1.6 oz)   07/12/23 93 kg (205 lb)   05/02/23 92.4 kg (203 lb 12.8 oz)   04/28/23 92.4 kg (203 lb 12.8 oz)     BP - Mean:  [] 74  Vent Mode: CMV/AC  (Continuous Mandatory Ventilation/ Assist Control)  FiO2 (%): 70 %  Resp Rate (Set): 14 breaths/min  Tidal Volume (Set, mL): 420 mL  PEEP (cm H2O): 10 cmH2O  Resp: 18    Recent Labs   Lab  03/21/24  0432 03/18/24  0950   PH 7.38  --    PCO2 49*  --    PO2 58*  --    HCO3 29*  --    O2PER 55 50         GEN: no acute distress resting comfortably this AM  HEENT: head ncat, sclera anicteric, OP patent, trachea midline   PULM: unlabored synchronous minor rhonchi bilaterally. Noted leak (sound and discrepant volumes on vent) - improved with adjustment   CV/COR: irregular  S1S2 no gallop,  No rub, no murmur. Bilateral pitting edema   ABD: soft nontender  EXT:  warm and well perfused x4  NEURO: PERRL, patient follows commands, significant weakness in the upper and lower extremities  ~3/5 in upper and lower   SKIN: no obvious rash  LINES: clean, dry intact         Data:   All data and imaging reviewed     ROUTINE ICU LABS (Last four results)  CMP  Recent Labs   Lab 03/22/24  0415 03/21/24  2236 03/21/24  1655 03/21/24  0406 03/20/24  2129 03/20/24  0419 03/19/24  0851 03/19/24  0555     --  141 141  --  139  --  140   POTASSIUM 3.2*  3.2* 3.6 3.7 3.9  3.9   < > 3.5  3.5   < > 3.3*   CHLORIDE 100  --  102 105  --  103  --  105   CO2 29  --  27 25  --  25  --  22   ANIONGAP 13  --  12 11  --  11  --  13   *  --  125* 182*  --  147*   < > 177*   BUN 24.3*  --  24.7* 24.2*  --  19.2  --  12.3   CR 0.78  --  0.72 0.63  --  0.54  --  0.39*   GFRESTIMATED 78  --  86 >90  --  >90  --  >90   YEE 8.2*  --  8.7* 8.5*  --  8.3*  --  8.2*   MAG 1.8 1.9 2.0 2.1  --   --    < >  --    PHOS 2.5  --   --  2.0*  --  2.2*  --  2.4*   PROTTOTAL 5.8*  --   --  6.1*  --  5.5*  --  5.4*   ALBUMIN 3.1*  --   --  3.5  --  3.5  --  3.9   BILITOTAL 0.7  --   --  0.7  --  0.8  --  1.0   ALKPHOS 63  --   --  65  --  55  --  47   AST 30  --   --  26  --  21  --  20   ALT 13  --   --  10  --  11  --  5    < > = values in this interval not displayed.     CBC  Recent Labs   Lab 03/22/24  0415 03/21/24  0406 03/20/24  1340 03/20/24  0419   WBC 9.5 13.2* 14.3* 10.7   RBC 2.66* 2.89* 2.93* 2.28*   HGB 8.0* 8.5* 8.6* 6.8*   HCT  "23.4* 25.3* 26.0* 20.2*   MCV 88 88 89 89   MCH 30.1 29.4 29.4 29.8   MCHC 34.2 33.6 33.1 33.7   RDW 18.7* 18.6* 18.6* 19.5*    226 217 184     INRNo lab results found in last 7 days.  Arterial Blood Gas  Recent Labs   Lab 03/21/24  0432 03/18/24  0950   PH 7.38  --    PCO2 49*  --    PO2 58*  --    HCO3 29*  --    O2PER 55 50       All cultures:  No results for input(s): \"CULT\" in the last 168 hours.      "

## 2024-03-22 NOTE — ANESTHESIA CARE TRANSFER NOTE
Patient: Yohana Marques    Procedure: Procedure(s):  Tracheostomy  INSERTION OF PERCUTANEOUS ENDOSCOPIC GASTROSTOMY TUBE       Diagnosis: Respiratory center failure [G93.89]  Diagnosis Additional Information: No value filed.    Anesthesia Type:   General     Note:    Oropharynx: oropharynx clear of all foreign objects and ventilatory support (new trach in place)        Independent Airway: airway patency not satisfactory and stable  Dentition: dentition unchanged  Vital Signs Stable: post-procedure vital signs reviewed and stable  Report to RN Given: handoff report given  Patient transferred to: ICU  Comments: Patient transported to ICU with assist of CRNA, RN and RT. Ventilated en route using transport ventilator. Report to RN at the bedside. Questions answered. VSS.   ICU Handoff: Call for PAUSE to initiate/utilize ICU HANDOFF, Identified Patient, Identified Responsible Provider, Reviewed the Pertinent Medical History, Discussed Surgical Course, Reviewed Intra-OP Anesthesia Management and Issues during Anesthesia, Set Expectations for Post Procedure Period and Allowed Opportunity for Questions and Acknowledgement of Understanding      Vitals:  Vitals Value Taken Time   /86    Temp 37.3    Pulse 72    Resp 14    SpO2 95%        Electronically Signed By: HANY Laboy CRNA  March 22, 2024  12:00 PM

## 2024-03-22 NOTE — OP NOTE
DATE OF PROCEDURE: March 22, 2024      SURGEON:  Mann Delgado MD   FIRST ASSIST: Va Price PA-C      PREOPERATIVE DIAGNOSIS:  Respiratory failure.       POSTOPERATIVE DIAGNOSIS:  Respiratory failure.       PROCEDURE:  Tracheostomy with Shiley #60 PROX XLT cuffed nonfenestrated tube.       ANESTHESIA:  General.       INDICATIONS:  Patient has respiratory failure and will require prolonged mechanical ventilation.       DESCRIPTION OF PROCEDURE:  The patient was brought to the operating room on the ICU bed.  Under general anesthesia, the patient's neck was extended.  Neck and upper chest were prepared and draped in the usual fashion using ChloraPrep.  A transverse incision was made approximately 2 cm above the sternal notch.  Dissection was carried down to the midline of the strap muscle.  The inferior aspect of the isthmus of the thyroid was divided.  Hemostasis was excellent.  The second ring of the trachea was clearly identified, and some sutures of 2-0 Prolene were placed around the second ring laterally on each side.  A longitudinal tracheotomy was made and dilated.  The endotracheal tube was pulled just above the tracheotomy, and a Shiley #60 PROX XLT cuffed nonfenestrated tube was advanced without any difficulty.  The cuff was inflated.  Ventilation was excellent.  Hemostasis was again verified and was excellent.  Incision was closed with interrupted 3-0 nylon suture on the skin along the tracheostomy.  A dressing was applied, and the tracheostomy was secured around the patient's neck.       Then the PEG was placed           MANN DELGADO MD

## 2024-03-22 NOTE — PROGRESS NOTES
[facility]    Stroke Progress Note    Interval Events  No acute events overnight.    HPI Summary  Yohana Marques is a 78 year old woman with h/o HTN, spinal stenosis s/p discectomy x 2 (most recent 8/2004), and chronic pain.     She presented on 3/8 after a fall at home in 3 to 4 days of generalized weakness worsening to an extent that from using just a cane she had to start using a walker.  Reports of gautam at their second house in [facility] but no access to canned food since mid February.      During admission patient found to have stress-induced cardiomyopathy with a EF of 23% on echo on 3/8.  On 3/10 due to labored, shallow breathing and respiratory acidosis patient was intubated.  Noted to have hypotonia and quadriparesis with hypophonia.  Given suspicion of possible GBS started on plasmapheresis.  New onset atrial fibrillation noted in hospital.    Patient had been having labile blood pressure, fluctuating heart rhythm from bradycardia to atrial fibrillation.  Initial MRI brain and MRI spine unremarkable however repeat on 3/14 revealed cauda equina nerve root enhancement.      Evaluation Summarized     MRI Brain w/wo contrast (3/9) (03/14) Small vessel disease changes   MRI Spine  C/T/L-spine 03/10 degenerative changes with superior endplate T3 compression and lumbar spondylosis.     C/T/L-spine 3/14 with cauda equina nerve root enhancement   CT Chest/Abdomen/Pelvis No evidence of malignancy.  Submucosal fibroid versus nonmalignant endometrial polyp and stable 4 mm left upper lobe nodule.       Echocardiogram 3/8        Coronary cath 3/8 EF is 23%.Entire left ventricle is severely hypokinetic but basal left ventricular function is less so     No significant CAD   EKG/Telemetry 3/10    Tachycardic (147 bpm), Atrial fibrillation w/ RVR   Left axis deviation, Anterior infarct, age undertermined  T wave abnormality  Episodes of bradycardia   CSF Labs Mildly  "elevated protein, 5 nucleated cell  Encephalitis/meningitis panel negative   Labs Myasthenia panel negative  Ganglioside Antibodies: pending  Urine porphyrins: pending  AZALEA: negative  ANCA: negative  HIV-1, HIV-2, HIV-1 p24 antigen nonreactive   EMG Completed on 3/15, report pending       Impression   Acute onset flaccid quadriparesis with respiratory insufficiency and areflexia now with evidence of cauda equina or nerve root enhancement likely in the setting of Guillain-Barré syndrome.  New onset stress-induced cardiomyopathy.     Plan  -PLEX 5/5 session.   -continue with heparin  -Ventilator management per ICU team.      DVT prophylaxis: heparin 5000 units subcutaneous injection q8 hrs     Patient Follow-up    - final recommendation pending work-up    We will continue to follow.     The Stroke Staff is Dr. Drew Jeffries MD  Vascular Neurology fellow    To page a member of the stroke/neurocritical care service, click here:  [facility]   Choose \"On Call\" tab at top, then search dropdown box for \"Neurology Adult\", select location, press Enter, then look for stroke/neuro ICU/telestroke.  ______________________________________________________             Medications   Scheduled Meds   acetylcysteine  2 mL Nebulization Q6H    albuterol  2.5 mg Nebulization Q6H    [Held by provider] aspirin  81 mg Oral or Feeding Tube Daily    cefTAZidime  2 g Intravenous Q8H    chlorhexidine  15 mL Mouth/Throat Q12H    fiber modular  1 packet Per Feeding Tube BID    pantoprazole  40 mg Per Feeding Tube QAM AC    Or    pantoprazole  40 mg Intravenous QAM AC    potassium chloride  40 mEq Oral or Feeding Tube Once    QUEtiapine  50 mg Oral or Feeding Tube BID    sodium chloride (PF)  3 mL Intracatheter Q8H       Infusion Meds   amiodarone 0.5 mg/min (03/21/24 2000)    dextrose      fentaNYL 150 mcg/hr (03/21/24 2241)    furosemide (LASIX) 100 mg in sodium chloride 0.9 % 100 mL infusion 10 mg/hr (03/22/24 0654)    heparin Stopped " (03/22/24 0511)    - MEDICATION INSTRUCTIONS -      norepinephrine Stopped (03/19/24 0824)    - MEDICATION INSTRUCTIONS -      sodium chloride 20 mL/hr at 03/19/24 2222       PRN Meds  acetaminophen, albuterol, atropine, bisacodyl, calcium carbonate, artificial tears, dextrose, fentaNYL, HOLD MEDICATION, lidocaine 4%, lidocaine (buffered or not buffered), lidocaine, lidocaine, melatonin, midazolam, midazolam, naloxone **OR** naloxone **OR** naloxone **OR** naloxone, - MEDICATION INSTRUCTIONS -, oxyCODONE **OR** oxyCODONE, - MEDICATION INSTRUCTIONS -, polyethylene glycol, senna-docusate **OR** senna-docusate, sodium chloride (PF), sodium chloride 0.9% (bottle), sodium chloride 0.9%, sodium chloride 0.9%       PHYSICAL EXAMINATION  Temp:  [98.4  F (36.9  C)-100.6  F (38.1  C)] 99.5  F (37.5  C)  Pulse:  [62-86] 66  Resp:  [18-29] 18  BP: ()/(39-86) 100/55  FiO2 (%):  [60 %-70 %] 70 %  SpO2:  [88 %-99 %] 91 %      Neurologic   Mental Status:   alert, follows commands   Cranial Nerves:   pupils reactive to light, EOM intact with no gaze palsy or nystagmus, able to wrinkle forehead, facial movement symmetric, tongue midline.  Facial sensation intact no shoulder shrug  Motor:   Bilateral upper extremity 1/5 with handgrip 2+/5. Left elbow extension 3/5> right elbow extension 2/5, Bilateral lower extremity 1/5 with knee extension 3/5 and dorsiflexion 3+/5 and plantarflexion about 4/5..   neck extension 3/5, neck flexion 4/5  Reflexes:   See below      Left Right   Triceps 0 0   Biceps 0 0   Brachioradialis 2+ 1+   Adductor 0 0   Patellar 0 0   Achilles 0 0   Babinski Absent Absent   Sensory intact to light touch throughout   Coordination:   unable to assess d/t patient's current state  Station/Gait:  deferred      Imaging  I personally reviewed all imaging; relevant findings per HPI.     Lab Results Data   CBC  Recent Labs   Lab 03/22/24  0415 03/21/24  0406 03/20/24  1340   WBC 9.5 13.2* 14.3*   RBC 2.66* 2.89* 2.93*    HGB 8.0* 8.5* 8.6*   HCT 23.4* 25.3* 26.0*    226 217     Basic Metabolic Panel    Recent Labs   Lab 03/22/24  0415 03/21/24  2236 03/21/24  1655 03/21/24  0406     --  141 141   POTASSIUM 3.2*  3.2* 3.6 3.7 3.9  3.9   CHLORIDE 100  --  102 105   CO2 29  --  27 25   BUN 24.3*  --  24.7* 24.2*   CR 0.78  --  0.72 0.63   *  --  125* 182*   YEE 8.2*  --  8.7* 8.5*     Liver Panel  Recent Labs   Lab 03/22/24  0415 03/21/24  0406 03/20/24  0419   PROTTOTAL 5.8* 6.1* 5.5*   ALBUMIN 3.1* 3.5 3.5   BILITOTAL 0.7 0.7 0.8   ALKPHOS 63 65 55   AST 30 26 21   ALT 13 10 11     INR    Recent Labs   Lab Test 05/02/23  0650 01/25/23  0719 10/15/22  0728   INR 1.02 1.06 1.23*      Lipid Profile    Recent Labs   Lab Test 07/07/22  1448 07/05/21  1352 07/03/19  0927   CHOL 241* 220* 213*   HDL 78 66 64   * 130* 118*   TRIG 119 122 156*     A1C    Recent Labs   Lab Test 03/11/24  0436   A1C 5.2     Troponin    Recent Labs   Lab 03/19/24  1206   CTROPT 29*            Data

## 2024-03-23 NOTE — PLAN OF CARE
Goal Outcome Evaluation:      Plan of Care Reviewed With: patient          Outcome Evaluation: Alert with command following. SR w/ bigeminal pacs. Blood pressures soft with MAP falling into mid-upper 50s. Furosemide gtt stopped and patient received 25g albumin (see mar). Ultimately, levophed gtt started to maintain prescribed MAP. Bilateral lung sounds coarse. No changes made to vent settings. Amio gtt continues. Patient prefers oxycodone for pain management. Oxy administered x 2 (see mar). Titrated down on Fentanyl. Received heparin bolus and heparin infusion rate increased to 1950 per protocol. Anti-xa in goal range x 1. Next Xa recheck at 1035am. K, Mag, Phos rechecks tomorrow. No bm this shift.      Problem: Adult Inpatient Plan of Care  Goal: Plan of Care Review  Description: The Plan of Care Review/Shift note should be completed every shift.  The Outcome Evaluation is a brief statement about your assessment that the patient is improving, declining, or no change.  This information will be displayed automatically on your shift  note.  Outcome: Progressing  Flowsheets (Taken 3/23/2024 0698)  Outcome Evaluation: Alert with command following. SR w/ bigeminal pacs. Blood pressures soft with MAP falling into mid-upper 50s. Furosemide gtt stopped and patient received 25g albumin (see mar). Ultimately, levophed gtt started to maintain prescribed MAP. Bilateral lung sounds coarse. No changes made to vent settings. Amio gtt continues. Patient prefers oxycodone for pain management. Oxy administered x 2 (see mar). Titrated down on Fentanyl. Received heparin bolus and heparin infusion rate increased to 1950 per protocol. Anti-xa in goal range x 1. Next Xa recheck at 1035am. K, Mag, Phos rechecks tomorrow. No bm this shift.  Plan of Care Reviewed With: patient  Goal: Absence of Hospital-Acquired Illness or Injury  Intervention: Identify and Manage Fall Risk  Recent Flowsheet Documentation  Taken 3/23/2024 0400 by Maci  GARCÍA Joshua  Safety Promotion/Fall Prevention:   clutter free environment maintained   increased rounding and observation   increase visualization of patient   room near nurse's station   room organization consistent   safety round/check completed   room door open   supervised activity   treat reversible contributory factors   treat underlying cause  Taken 3/23/2024 0000 by Tres Lux RN  Safety Promotion/Fall Prevention:   clutter free environment maintained   increased rounding and observation   increase visualization of patient   room near nurse's station   room organization consistent   safety round/check completed   room door open   supervised activity   treat reversible contributory factors   treat underlying cause  Taken 3/22/2024 2000 by Tres Lux RN  Safety Promotion/Fall Prevention:   clutter free environment maintained   increased rounding and observation   increase visualization of patient   room near nurse's station   room organization consistent   safety round/check completed   room door open   supervised activity   treat reversible contributory factors   treat underlying cause  Intervention: Prevent Skin Injury  Recent Flowsheet Documentation  Taken 3/23/2024 0632 by Tres Lux RN  Body Position:   turned   right  Taken 3/23/2024 0500 by Tres Lux RN  Body Position:   turned   left  Taken 3/23/2024 0330 by Tres Lux RN  Body Position:   turned   right  Taken 3/23/2024 0130 by Tres Lux RN  Body Position:   turned   left  Taken 3/22/2024 2300 by Tres Lux RN  Body Position:   turned   right  Taken 3/22/2024 2030 by Tres Lux RN  Body Position:   turned   left  Intervention: Prevent and Manage VTE (Venous Thromboembolism) Risk  Recent Flowsheet Documentation  Taken 3/23/2024 0400 by Tres Lux RN  VTE Prevention/Management: SCDs (sequential compression devices) off  Taken 3/23/2024 0000 by Tres Lux RN  VTE Prevention/Management: SCDs (sequential  compression devices) off  Taken 3/22/2024 2000 by Tres Lux RN  VTE Prevention/Management: SCDs (sequential compression devices) off  Intervention: Prevent Infection  Recent Flowsheet Documentation  Taken 3/23/2024 0400 by Tres Lux RN  Infection Prevention:   rest/sleep promoted   single patient room provided   hand hygiene promoted   environmental surveillance performed   equipment surfaces disinfected  Taken 3/23/2024 0000 by Tres Lux RN  Infection Prevention:   rest/sleep promoted   single patient room provided   hand hygiene promoted   environmental surveillance performed   equipment surfaces disinfected  Taken 3/22/2024 2000 by Tres Lux RN  Infection Prevention:   rest/sleep promoted   single patient room provided   hand hygiene promoted   environmental surveillance performed   equipment surfaces disinfected

## 2024-03-23 NOTE — PROGRESS NOTES
ICU Update    Fentanyl not effective for pain - has been on drip since admit. Suspect tachyphylaxis . Will transition to dilaudid gtt with bolus     Regan Bui MD

## 2024-03-23 NOTE — PROGRESS NOTES
[facility] ICU RESPIRATORY NOTE        Date of Admission: 3/8/2024    Date of Intubation (most recent): Trach 3/22    Reason for Mechanical Ventilation: GBS    Number of Days on Mechanical Ventilation: 14    Met Criteria for Spontaneous Breathing Trial: No    Reason for No Spontaneous Breathing Trial: High O2 needs + PEEP    Significant Events Today: None    ABG Results:   Recent Labs   Lab 03/21/24  0432 03/18/24  0950   PH 7.38  --    PCO2 49*  --    PO2 58*  --    HCO3 29*  --    O2PER 55 50         Current Vent Settings: Vent Mode: CMV/AC  (Continuous Mandatory Ventilation/ Assist Control)  FiO2 (%): 70 %  Resp Rate (Set): 14 breaths/min  Tidal Volume (Set, mL): 420 mL  PEEP (cm H2O): 12 cmH2O  Resp: 18      Dulce Paiz, RT on 3/23/2024 at 2:05 PM

## 2024-03-23 NOTE — PROGRESS NOTES
Critical Care  Note      03/23/2024    Name: Yohana Marques MRN#: [id number]   Age: 78 year old YOB: 1945     Hsptl Day# 15  ICU DAY # 15    MV DAY # 14, trach 3/22             Problem List:   Principal Problem:    ACS (acute coronary syndrome) (H)  Active Problems:    Weakness    Severe sepsis (H)  Guillain [place] sydrome  Acute hypercapnic respiratory failure         Summary/Hospital Course:     78F, spinal stenosis prior discectomy. Presented 3/8 after fall at home with 3-4 days of preceding mild generalized weakness (had started using walker as opposed to cane to walk). No preceding illness (URI, GI, or other), vaccination, travel, unusual exposures. Found to have new heart failure (EF 23%) LHC negative  On 3/10 RRT for new respiratory failure and diffuse weakness requiring intubation, VC 1L, NIF -25 with hypercarbia. Exam consistent with diffuse, areflexic flaccid paresis that included face/neck muscles., fall MRI brain w wo namita unrevealing. Underwent urgent PLEX on 3/10 with hemodynamic instability afterward. Electrolytes are normal. New afib with RVR. MRI total spine w wo namita unrevealing. CT CAP unrevealing (endometrial fibroid vs nonmalignant polyp, extrahepatic bile duct wall thickening, L upper lung nodule). LP basic studies: WBC 3, , Protein 55.3, glucose 59; meningitis/encephalitis panel negative. PLex initiated empirically and completed last week. Course complicated by rapid afib polymicrobial PNA (MSSA, non ESBL kleb and Hafnia) as well as marked volume overload (15L up from admit).      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   I have personally reviewed the daily labs, imaging studies, cultures and discussed the case with referring physician and consulting physicians.     My assessment and plan by system for this patient is as follows:    Neurology/Psychiatry:   1. 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 W/unit(s) consistent with GBS - Plex initiated and completed. Slow recovery of motor function  2. Pain/analgesia:  fentanyl gtt and as needed   3. Sedation: none  PLAN  - continue supportive care   - neuro following  - PT/OT    Cardiovascular:   Takatsubo cardiomyopathy with chf exacerbation and volume overload;  Repeated TTE, still hypokinesia but improved and EF is recovering to 40-45%  Afib with RVR, on amiodarone gtt  Hypotension - current episode related to fluid shifts, + element of vasoplegia/autonomic dysfunction  PLAN  - follow hemodynamics, goal MAP >65, wean levophed  -  lasix 60 1 x   - transition to PO amiodarone 200 every day (already received 10g IV)   - high intensity Heparin drip for DVT > transition to lovenox to avoid volume      Pulmonary/Ventilator Management:   Acute hypoxemic hypercapnic respiratory failure, requiring mechanical ventilation, multifactorial, noted gayle PNA, volume overload a large contributor, and evidence of likely thromboembolic disease  Polymicrbial PNA  Pulm edema and volume overload  Darkened Airways; erroneous - initially seen on bronch 3/19, thought  anthracosis however no overt exposures to repeat bronch (different monitor) normal airways (pink w/o evidence)  cytology negative   New DVT  pt previously on low dose heparin (afib) not necessarily a therapeutic failure,  PLAN  -  continue lung protective ventilation, currently at ~ 8m/kg , increase PEEP for recruitment,   - pulm toilet  - continue meropenem, FUP culture sensitivities     (Previously day # 5 ceftaz nor would not count zosyn in total duration)  - continue diuresis, redose lasix prn (60 today)  to keep even    GI and Nutrition :   1. RD consult, on TF  2.  PPI for pud prophy  Plan  - continue enteral feeds     Renal/Fluids/Electrolytes:   1. BHAVNA, non oliguric appropriate -  2.  Electrolyte abnormalities - variable - hypokalemia, hypernatremia, hypocalcemia 2/2 critical illness,in setting of diuresis   3. Acid base - stable    4. Volume overload - +10 L positive   Plan   - maintain hemodynamics   -Monitor urine output and I&O, avoid nephrotoxic medications.  -replace electrolytes per protocol.    - keep even today, dose lasix prn       Infectious Disease:   1. Sepsis, secondary to HCAP , polymicrobial with R organism (Hafnia) ,with worsening imaging and secretions on current therapy . Bronch with continued Hafnia growth, changed to meropenem given concern for R and repeat growth   - continue meropenem, restart count #2/10    - serial CRP and procal     Endocrine:   1. Stress hyperglycemia   - BS control per ICU protocol     Hematology/Oncology:   1. DVT - as above, unclear if therapeutic failure, platelets stable argue against JO , on high intensity   2. Leukocytosis to 13.6 ?reactive vs HAP  3. Anemia, s/p 1 unit(s) PRBC, no evidence for overt blood loss. Appropriate response   4. Coagulopathy, med induced, need watch hemoptysis and weight risk benefit with heparin gtt  Plan  - serial CBC, coags  -Hgb goal >7   - transition to lovenox to mitigate volume form heparin gtt     ICU Prophylaxis:   1.  "DVT: mechanical; lovenox  2. VAP: HOB 30 degrees, chlorhexidine rinse  3. Stress Ulcer: PPI  4. Restraints: None indicated. Will readdress daily.   5. Wound care  - per unit routine   6. Feeding -tube feed  7. Family Update: family  at bedside  8. Disposition - icu    Clinically Significant Risk Factors        # Hypokalemia: Lowest K = 3.2 mmol/L in last 2 days, will replace as needed       # Hypoalbuminemia: Lowest albumin = 3.1 g/dL at 3/22/2024  4:15 AM, will monitor as appropriate     # Hypertension: Noted on problem list        # Obesity: Estimated body mass index is 35.88 kg/m  as calculated from the following:    Height as of this encounter: 1.727 m (5' 7.99\").    Weight as of this encounter: 107 kg (235 lb 14.3 oz).      # Financial/Environmental Concerns: none                  Critical Care Time: 40 min.  I spent this time (excluding procedures) personally providing and directing critical care services at the bedside and on the critical care unit.     Regan Bui MD  Pulmonary and Critical Care                 Key Medications:      acetylcysteine  2 mL Nebulization Q6H    albuterol  2.5 mg Nebulization Q6H    [Held by provider] aspirin  81 mg Oral or Feeding Tube Daily    chlorhexidine  15 mL Mouth/Throat Q12H    fiber modular  1 packet Per Feeding Tube BID    meropenem  1 g Intravenous Q8H    pantoprazole  40 mg Per Feeding Tube QAM AC    Or    pantoprazole  40 mg Intravenous QAM AC    QUEtiapine  50 mg Oral or Feeding Tube BID    sodium chloride (PF)  3 mL Intracatheter Q8H      amiodarone 0.5 mg/min (03/22/24 1030)    dextrose      fentaNYL 50 mcg/hr (03/22/24 2030)    [Held by provider] furosemide (LASIX) 100 mg in sodium chloride 0.9 % 100 mL infusion Stopped (03/22/24 2213)    heparin 1,950 Units/hr (03/23/24 0406)    - MEDICATION INSTRUCTIONS -      norepinephrine 0.06 mcg/kg/min (03/23/24 0808)    - MEDICATION INSTRUCTIONS -                Physical Examination:   Temp:  [98.6 "  F (37  C)-100.6  F (38.1  C)] 99  F (37.2  C)  Pulse:  [60-81] 72  Resp:  [14-30] 16  BP: ()/() 104/52  FiO2 (%):  [70 %] 70 %  SpO2:  [88 %-100 %] 93 %        Intake/Output Summary (Last 24 hours) at 3/23/2024 0915  Last data filed at 3/23/2024 0800  Gross per 24 hour   Intake 3126.32 ml   Output 3104 ml   Net 22.32 ml           Wt Readings from Last 4 Encounters:   03/23/24 107 kg (235 lb 14.3 oz)   07/12/23 93 kg (205 lb)   05/02/23 92.4 kg (203 lb 12.8 oz)   04/28/23 92.4 kg (203 lb 12.8 oz)     BP - Mean:  [] 69  Vent Mode: CMV/AC  (Continuous Mandatory Ventilation/ Assist Control)  FiO2 (%): 70 %  Resp Rate (Set): 14 breaths/min  Tidal Volume (Set, mL): 420 mL  PEEP (cm H2O): 12 cmH2O  Resp: 16    Recent Labs   Lab 03/21/24  0432 03/18/24  0950   PH 7.38  --    PCO2 49*  --    PO2 58*  --    HCO3 29*  --    O2PER 55 50         GEN: no acute distress resting comfortably this AM  HEENT: head ncat, sclera anicteric, OP patent, trachea midline  tracheostomy c/d/I   PULM: unlabored synchronous coarse BS anteriorly   CV/COR: irregular  S1S2 no gallop,  No rub, no murmur. Bilateral pitting edema   ABD: soft nontender  EXT:  warm and well perfused x4  NEURO: PERRL, patient follows commands, significant weakness in the upper and lower extremities  ~3/5 in upper and lower   SKIN: no obvious rash  LINES: clean, dry intact         Data:   All data and imaging reviewed     ROUTINE ICU LABS (Last four results)  CMP  Recent Labs   Lab 03/23/24  0826 03/23/24  0410 03/22/24  2138 03/22/24  2135 03/22/24  1225 03/22/24  1223 03/22/24  0851 03/22/24  0415 03/21/24  2236 03/21/24  1655 03/21/24  0406 03/20/24 2129 03/20/24  0419   NA  --  143  --   --   --   --   --  142  --  141 141  --  139   POTASSIUM  --  3.9  3.9  --  3.5  --  4.2  --  3.2*  3.2*   < > 3.7 3.9  3.9   < > 3.5  3.5   CHLORIDE  --  99  --   --   --   --   --  100  --  102 105  --  103   CO2  --  32*  --   --   --   --   --  29  --  27  "25  --  25   ANIONGAP  --  12  --   --   --   --   --  13  --  12 11  --  11   * 153* 115*  --  102*  --    < > 178*  --  125* 182*  --  147*   BUN  --  30.1*  --   --   --   --   --  24.3*  --  24.7* 24.2*  --  19.2   CR  --  1.06*  --   --   --   --   --  0.78  --  0.72 0.63  --  0.54   GFRESTIMATED  --  54*  --   --   --   --   --  78  --  86 >90  --  >90   YEE  --  8.3*  --   --   --   --   --  8.2*  --  8.7* 8.5*  --  8.3*   MAG  --  2.0  --  1.9  --  1.9  --  1.8   < > 2.0 2.1  --   --    PHOS  --  2.8  --   --   --   --   --  2.5  --   --  2.0*  --  2.2*   PROTTOTAL  --  6.2*  --   --   --   --   --  5.8*  --   --  6.1*  --  5.5*   ALBUMIN  --  3.4*  --   --   --   --   --  3.1*  --   --  3.5  --  3.5   BILITOTAL  --  0.8  --   --   --   --   --  0.7  --   --  0.7  --  0.8   ALKPHOS  --  62  --   --   --   --   --  63  --   --  65  --  55   AST  --  30  --   --   --   --   --  30  --   --  26  --  21   ALT  --  14  --   --   --   --   --  13  --   --  10  --  11    < > = values in this interval not displayed.     CBC  Recent Labs   Lab 03/23/24  0410 03/22/24  0415 03/21/24  0406 03/20/24  1340   WBC 9.4 9.5 13.2* 14.3*   RBC 2.59* 2.66* 2.89* 2.93*   HGB 7.6* 8.0* 8.5* 8.6*   HCT 22.8* 23.4* 25.3* 26.0*   MCV 88 88 88 89   MCH 29.3 30.1 29.4 29.4   MCHC 33.3 34.2 33.6 33.1   RDW 18.4* 18.7* 18.6* 18.6*    257 226 217     INRNo lab results found in last 7 days.  Arterial Blood Gas  Recent Labs   Lab 03/21/24  0432 03/18/24  0950   PH 7.38  --    PCO2 49*  --    PO2 58*  --    HCO3 29*  --    O2PER 55 50       All cultures:  No results for input(s): \"CULT\" in the last 168 hours.      "

## 2024-03-23 NOTE — PROGRESS NOTES
ICU Update  CXR with worsening bilateral infiltrates haziness, ARDS type picture + /- volume overload given ~15 kg above admit weight.  Will continue diuresis and fluid mitigation plan     Regan Bui MD

## 2024-03-23 NOTE — PROGRESS NOTES
[facility] ICU RESPIRATORY NOTE        Date of Admission: 3/8/2024    Date of Intubation (most recent): Trach today 3/22/24    Reason for Mechanical Ventilation: respiratory failure/airway protection.     Number of Days on Mechanical Ventilation: 13    Met Criteria for Spontaneous Breathing Trial: No    Reason for No Spontaneous Breathing Trial: FiO2 70% PEEP [id number]    Significant Events Today: Patient had tracheostomy placed in OR. Patient is now on PEEP of 12 and FiO2 of 70%.    ABG Results:   Recent Labs   Lab 03/21/24  0432 03/18/24  0950   PH 7.38  --    PCO2 49*  --    PO2 58*  --    HCO3 29*  --    O2PER 55 50         Current Vent Settings: Vent Mode: CMV/AC  (Continuous Mandatory Ventilation/ Assist Control)  FiO2 (%): 70 %  Resp Rate (Set): 14 breaths/min  Tidal Volume (Set, mL): 420 mL  PEEP (cm H2O): 12 cmH2O  Resp: 22      Skin Assessment: Newly placed tracheostomy, no skin issues observed.     Plan: Continue full ventilatory support overnight.     Ang Lechuga RT on 3/22/2024 at 9:32 PM

## 2024-03-23 NOTE — PROGRESS NOTES
THORACIC SURGERY  POD # 1    Tracheostomy ok  No bleeding    MARCE WEAVER MD [facility] ONCOLOGY THORACIC SURGERY  CELL:  [phone]  OFFICE: [phone]

## 2024-03-23 NOTE — PROGRESS NOTES
[facility] ICU RESPIRATORY NOTE        Date of Admission: 3/8/2024    Date of Intubation (most recent): Trached 3/22/2024    Reason for Mechanical Ventilation: Respiratory failure/AW protection    Number of Days on Mechanical Ventilation: 14    Met Criteria for Spontaneous Breathing Trial: No    Reason for No Spontaneous Breathing Trial: High oxygen needs    Significant Events Today: None overnight    ABG Results:   Recent Labs   Lab 03/21/24  0432 03/18/24  0950   PH 7.38  --    PCO2 49*  --    PO2 58*  --    HCO3 29*  --    O2PER 55 50         Current Vent Settings: Vent Mode: CMV/AC  (Continuous Mandatory Ventilation/ Assist Control)  FiO2 (%): 70 %  Resp Rate (Set): 14 breaths/min  Tidal Volume (Set, mL): 420 mL  PEEP (cm H2O): 12 cmH2O  Resp: 18      Skin Assessment: New trach site. No issues as of now.    Plan: Wean as able.    Tia Freeman, RT on 3/23/2024 at 6:12 AM

## 2024-03-23 NOTE — PLAN OF CARE
Goal Outcome Evaluation:      Plan of Care Reviewed With: patient    Overall Patient Progress: no changeOverall Patient Progress: no change    Outcome Evaluation: Alert and follows commands. overall weak but improving. trach and PEG placed today. NSR. MAP >65 levo titrated. trach shilley 6.0. LS coarse. 70% 12 PEEP. TF@ goal. quiroz in for retention. Heparin transitioned to lovenox. IV Amio transitioned to PO. 60 mg of Lasix. Fent at 50 for pain. K replaced. Oxy given for pain. Up to chair. Transitioned to IV dilaudid from fentanyl for pain control.       Problem: Adult Inpatient Plan of Care  Goal: Absence of Hospital-Acquired Illness or Injury  Intervention: Identify and Manage Fall Risk  Recent Flowsheet Documentation  Taken 3/23/2024 1600 by Juanita Aiken, RN  Safety Promotion/Fall Prevention:   clutter free environment maintained   increased rounding and observation   increase visualization of patient   room near nurse's station   room organization consistent   safety round/check completed   room door open   supervised activity   treat reversible contributory factors   treat underlying cause  Taken 3/23/2024 1200 by Juanita Aiken, RN  Safety Promotion/Fall Prevention:   clutter free environment maintained   increased rounding and observation   increase visualization of patient   room near nurse's station   room organization consistent   safety round/check completed   room door open   supervised activity   treat reversible contributory factors   treat underlying cause  Taken 3/23/2024 0800 by Juanita Aiken, RN  Safety Promotion/Fall Prevention:   clutter free environment maintained   increased rounding and observation   increase visualization of patient   room near nurse's station   room organization consistent   safety round/check completed   room door open   supervised activity   treat reversible contributory factors   treat underlying cause

## 2024-03-23 NOTE — PROGRESS NOTES
[facility]    General Surgery  Daily Post-Op Note       Assessment and Plan:   Yohana Marques is a 78 year old female S/P Procedure(s):  Tracheostomy  INSERTION OF PERCUTANEOUS ENDOSCOPIC GASTROSTOMY TUBE, 1 Day Post-Op    Patient tolerating TF at goal.   General surgery will sign off.         Interval History:   Patient at goal for TF. No issues.            Physical Exam:   Temp: 99  F (37.2  C) Temp src: Bladder BP: 104/52 Pulse: 72   Resp: 16 SpO2: 93 % O2 Device: Mechanical Ventilator      I/O last 3 completed shifts:  In: 2982.32 [I.V.:1792.32; NG/GT:420]  Out: 3579 [Urine:3575; Blood:4]      Constitutional: intubated and mildly sedated but able to nod and open eyes   Cardiovascular: negative  Respiratory: mechanically ventilated  Abdomen: PEG tube in place with TF actively going through. Site is clean and dry without any drainage.       Data   Recent Labs   Lab 03/23/24  1025 03/23/24  0826 03/23/24  0410 03/22/24  2138 03/22/24  2135 03/22/24  0851 03/22/24  0415 03/21/24  2236 03/21/24  1655   WBC 8.5  --  9.4  --   --   --  9.5  --   --    HGB 7.5*  --  7.6*  --   --   --  8.0*  --   --    MCV 89  --  88  --   --   --  88  --   --      --  239  --   --   --  257  --   --    NA  --   --  143  --   --   --  142  --  141   POTASSIUM 3.4  --  3.9  3.9  --  3.5   < > 3.2*  3.2*   < > 3.7   CHLORIDE  --   --  99  --   --   --  100  --  102   CO2  --   --  32*  --   --   --  29  --  27   BUN  --   --  30.1*  --   --   --  24.3*  --  24.7*   CR  --   --  1.06*  --   --   --  0.78  --  0.72   ANIONGAP  --   --  12  --   --   --  13  --  12   YEE  --   --  8.3*  --   --   --  8.2*  --  8.7*   GLC  --  135* 153* 115*  --    < > 178*  --  125*   ALBUMIN  --   --  3.4*  --   --   --  3.1*  --   --    PROTTOTAL  --   --  6.2*  --   --   --  5.8*  --   --    BILITOTAL  --   --  0.8  --   --   --  0.7  --   --    ALKPHOS  --   --  62  --   --   --  63  --   --    ALT  --   --  14   --   --   --  13  --   --    AST  --   --  30  --   --   --  30  --   --     < > = values in this interval not displayed.       Luis Barajas MD

## 2024-03-24 NOTE — PROGRESS NOTES
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 Presented 3/8 after fall at home with 3-4 days of preceding mild generalized weakness (had started using walker as opposed to cane to walk). No preceding illness (URI, GI, or other), vaccination, travel, unusual exposures. Found to have new heart failure (EF 23%) LHC negative  On 3/10 RRT for new respiratory failure and diffuse weakness requiring intubation, VC 1L, NIF -25 with hypercarbia. Exam consistent with diffuse, areflexic flaccid paresis that included face/neck muscles., fall MRI brain w wo namita unrevealing. Underwent urgent PLEX on 3/10 with hemodynamic instability afterward. Electrolytes are normal. New afib with RVR. MRI total spine w wo namita unrevealing. CT CAP unrevealing (endometrial fibroid vs nonmalignant polyp, extrahepatic bile duct wall thickening, L upper lung nodule). LP basic studies: WBC 3, , Protein 55.3, glucose 59; meningitis/encephalitis panel negative. PLex initiated empirically and completed last week. Course complicated by rapid afib polymicrobial PNA (MSSA, non ESBL kleb and Hafnia) as well as marked volume overload (15L up from admit).      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   I have personally reviewed the daily labs, imaging studies, cultures and discussed the case with referring physician and consulting physicians.     My assessment and plan by system for this patient is as follows:    Neurology/Psychiatry:   1. 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 W/unit(s) consistent with GBS - Plex initiated and completed. Slow recovery of motor function  2. Pain/analgesia:  dilaudid gtt with prn and as needed   3. Sedation: none  PLAN  - continue supportive care   - neuro following  - PT/OT    Cardiovascular:   Takatsubo cardiomyopathy with chf exacerbation and volume overload;  Repeated TTE, still hypokinesia but improved and EF is recovering to 40-45%  Afib with RVR, on amiodarone gtt  Hypotension - current episode related to fluid shifts, + element of vasoplegia/autonomic dysfunction  PLAN  - follow hemodynamics, goal MAP >65, wean levophed as able but continue to allow volume removal.   -  restart low dose lasix gtt as still grossly volume overloaded    - PO amiodarone 200 every day (already received 10g IV)   - lovenox for DVT, watch creatinine   -  consider repeat ECHO    Pulmonary/Ventilator Management:   Acute hypoxemic hypercapnic respiratory failure, requiring mechanical ventilation, multifactorial, noted gayle PNA, volume overload a large contributor, and evidence of likely thromboembolic disease  Polymicrobial PNA - dense RUL consolidation  Pulm edema and volume overload  Darkened Airways; erroneous - initially seen on bronch 3/19, thought anthracosis however no overt exposures to repeat bronch (different monitor) normal airways (pink w/o evidence)  cytology negative   DVT  pt previously on low dose heparin (afib) not necessarily a therapeutic failure,  PLAN  -  continue lung protective ventilation, currently at ~ 8m/kg , increase PEEP for recruitment, 16 cm H20   - pulm toilet  - continue meropenem, FUP culture sensitivities     (Previously day # 5 ceftaz nor would not count zosyn in total duration)  - continue diuresis, restart low dose lasix gtt    GI and Nutrition :   1. RD consult, on TF  2.  PPI for pud prophy  Plan  - continue enteral feeds     Renal/Fluids/Electrolytes:   1. BHAVNA, non oliguric appropriate -  2.  Electrolyte abnormalities - variable - hypokalemia, hypernatremia, hypocalcemia 2/2 critical illness,in setting of diuresis   3. Acid base - stable    4. Volume overload - +12 L positive  (admit weight 94 kg- currently 109)  Plan   - maintain hemodynamics   -Monitor urine output and I&O, avoid nephrotoxic medications.  -replace electrolytes per protocol.    - lasix gtt      Infectious Disease:   1. Sepsis, secondary to HCAP , polymicrobial with R organism (Hafnia) ,with worsening imaging and secretions on current therapy . Bronch with continued Hafnia growth, changed to meropenem given concern for R and repeat growth   - continue meropenem, restart count #3/10    - serial CRP and procal     Endocrine:   1. Stress hyperglycemia   - BS control per ICU protocol     Hematology/Oncology:   1. DVT - as above, unclear if therapeutic failure,  "platelets stable argue against JO , changed to lovenox 3/23, need to watch cr however  2. Leukocytosis- tresolved   3. Anemia, s/p 1 unit(s) PRBC, no evidence for overt blood loss. Appropriate response   4. Coagulopathy, med induced, need watch hemoptysis and weight risk benefit with lovenox  Plan  - serial CBC, coags  -Hgb goal >7   - lovenox (to mitigate volume form heparin gtt)    ICU Prophylaxis:   1. DVT: mechanical; lovenox  2. VAP: HOB 30 degrees, chlorhexidine rinse  3. Stress Ulcer: PPI  4. Restraints: None indicated. Will readdress daily.   5. Wound care  - per unit routine   6. Feeding -tube feed  7. Family Update: family  at bedside  8. Disposition - icu      Critical Care Time: 30 min.  I spent this time (excluding procedures) personally providing and directing critical care services at the bedside and on the critical care unit.     Regan Bui MD  Pulmonary and Critical Care     Clinically Significant Risk Factors              # Hypoalbuminemia: Lowest albumin = 3.1 g/dL at 3/24/2024  4:16 AM, will monitor as appropriate    # Acute Kidney Injury, unspecified: based on a >150% or 0.3 mg/dL increase in last creatinine compared to past 90 day average, will monitor renal function  # Hypertension: Noted on problem list        # Obesity: Estimated body mass index is 36.55 kg/m  as calculated from the following:    Height as of this encounter: 1.727 m (5' 7.99\").    Weight as of this encounter: 109 kg (240 lb 4.8 oz).      # Financial/Environmental Concerns: none                              Key Medications:      albuterol  2.5 mg Nebulization Q6H    amiodarone  200 mg Oral or FT or NG tube Daily    [Held by provider] aspirin  81 mg Oral or Feeding Tube Daily    chlorhexidine  15 mL Mouth/Throat Q12H    enoxaparin ANTICOAGULANT  0.75 mg/kg Subcutaneous Q12H    meropenem  1 g Intravenous Q8H    pantoprazole  40 mg Per Feeding Tube QAM AC    Or    pantoprazole  40 mg Intravenous QAM AC "    [Held by provider] QUEtiapine  50 mg Oral or Feeding Tube BID    sodium chloride (PF)  3 mL Intracatheter Q8H      dextrose      furosemide (LASIX) 100 mg in sodium chloride 0.9 % 100 mL infusion Stopped (03/22/24 2213)    HYDROmorphone 0.6 mg/hr (03/24/24 0012)    - MEDICATION INSTRUCTIONS -      norepinephrine 0.03 mcg/kg/min (03/23/24 2115)    - MEDICATION INSTRUCTIONS -                Physical Examination:   Temp:  [97.5  F (36.4  C)-100.2  F (37.9  C)] 98.8  F (37.1  C)  Pulse:  [] 126  Resp:  [11-50] 12  BP: ()/(45-80) 105/60  FiO2 (%):  [60 %-70 %] 70 %  SpO2:  [88 %-100 %] 94 %        Intake/Output Summary (Last 24 hours) at 3/24/2024 0809  Last data filed at 3/24/2024 0615  Gross per 24 hour   Intake 2107.54 ml   Output 1450 ml   Net 657.54 ml       Wt Readings from Last 4 Encounters:   03/24/24 109 kg (240 lb 4.8 oz)   07/12/23 93 kg (205 lb)   05/02/23 92.4 kg (203 lb 12.8 oz)   04/28/23 92.4 kg (203 lb 12.8 oz)     BP - Mean:  [] 71  Vent Mode: CMV/AC  (Continuous Mandatory Ventilation/ Assist Control)  FiO2 (%): 70 %  Resp Rate (Set): 14 breaths/min  Tidal Volume (Set, mL): 420 mL  PEEP (cm H2O): 14 cmH2O  Resp: 12    Recent Labs   Lab 03/24/24  0733 03/21/24  0432 03/18/24  0950   PH 7.38 7.38  --    PCO2 61* 49*  --    PO2 60* 58*  --    HCO3 36* 29*  --    O2PER 55 55 50       GEN: no acute distress resting comfortably this AM  HEENT: head ncat, sclera anicteric, OP patent, trachea midline  tracheostomy c/d/I   PULM: unlabored synchronous coarse BS anteriorly   CV/COR: irregular  S1S2 no gallop,  No rub, no murmur. Bilateral pitting edema   ABD: soft nontender  EXT:  warm and well perfused x4  NEURO: PERRL, patient follows commands, significant weakness in the upper and lower extremities  ~3/5 in upper and lower   SKIN: no obvious rash  LINES: clean, dry intact         Data:   All data and imaging reviewed     ROUTINE ICU LABS (Last four results)  CMP  Recent Labs   Lab  03/24/24 0416 03/23/24 2157 03/23/24 2154 03/23/24 1654 03/23/24  1653 03/23/24  1025 03/23/24  0826 03/23/24  0410 03/22/24  0851 03/22/24  0415 03/21/24 2236 03/21/24 1655 03/21/24  0406     --   --   --   --   --   --  143  --  142  --  141 141   POTASSIUM 4.0  4.0  --  4.0  --  4.2 3.4  --  3.9  3.9   < > 3.2*  3.2*   < > 3.7 3.9  3.9   CHLORIDE 99  --   --   --   --   --   --  99  --  100  --  102 105   CO2 30*  --   --   --   --   --   --  32*  --  29  --  27 25   ANIONGAP 12  --   --   --   --   --   --  12  --  13  --  12 11   * 106*  --  104*  --   --  135* 153*   < > 178*  --  125* 182*   BUN 38.8*  --   --   --   --   --   --  30.1*  --  24.3*  --  24.7* 24.2*   CR 1.22*  --   --   --   --   --   --  1.06*  --  0.78  --  0.72 0.63   GFRESTIMATED 46*  --   --   --   --   --   --  54*  --  78  --  86 >90   YEE 8.1*  --   --   --   --   --   --  8.3*  --  8.2*  --  8.7* 8.5*   MAG 1.9  --  1.9  --   --  1.9  --  2.0   < > 1.8   < > 2.0 2.1   PHOS 3.7  --   --   --   --   --   --  2.8  --  2.5  --   --  2.0*   PROTTOTAL 5.8*  --   --   --   --   --   --  6.2*  --  5.8*  --   --  6.1*   ALBUMIN 3.1*  --   --   --   --   --   --  3.4*  --  3.1*  --   --  3.5   BILITOTAL 0.6  --   --   --   --   --   --  0.8  --  0.7  --   --  0.7   ALKPHOS 61  --   --   --   --   --   --  62  --  63  --   --  65   AST 29  --   --   --   --   --   --  30  --  30  --   --  26   ALT 13  --   --   --   --   --   --  14  --  13  --   --  10    < > = values in this interval not displayed.     CBC  Recent Labs   Lab 03/24/24  0416 03/23/24  1025 03/23/24  0410 03/22/24  0415   WBC 8.8 8.5 9.4 9.5   RBC 2.51* 2.52* 2.59* 2.66*   HGB 7.3* 7.5* 7.6* 8.0*   HCT 22.4* 22.4* 22.8* 23.4*   MCV 89 89 88 88   MCH 29.1 29.8 29.3 30.1   MCHC 32.6 33.5 33.3 34.2   RDW 18.7* 18.7* 18.4* 18.7*    240 239 257     INRNo lab results found in last 7 days.  Arterial Blood Gas  Recent Labs   Lab 03/24/24  0733 03/21/24  0176  "03/18/24  0950   PH 7.38 7.38  --    PCO2 61* 49*  --    PO2 60* 58*  --    HCO3 36* 29*  --    O2PER 55 55 50       All cultures:  No results for input(s): \"CULT\" in the last 168 hours.      "

## 2024-03-24 NOTE — PROGRESS NOTES
Major Event [id number]  Converted to SR at 1530 from PETER  Levo titrated off 1100  FiO2 50% PEEP 14  Lasix gtt restarted, great UOP  Up to chair for most of day. Weight shift provided.   Oral care provided, dry mouth and lips    Alert, able to mouth words. Continues to have back pain and right foot pain.   Minimal secretions from trach. LS dim/coarse.   Tele now SR. Levo off.   TF at goal. No BM this shift. UOP great.     Daughter at bedside, interactive and supportive of patient. Requesting information about LTACH.

## 2024-03-24 NOTE — PLAN OF CARE
"Goal Outcome Evaluation:      Plan of Care Reviewed With: patient    Overall Patient Progress: no changeOverall Patient Progress: no change    Outcome Evaluation: Alert. Follows commands. Generalized weakness. Bilateral lung sounds coarse throughout. FiO2 70% with PEEP of 12. SR until approximately 0330 when patient converted to afib rvr. HR as high as 140, non sustained. Dr Choudhury updated; no new orders obtained. Received directons \"continue to monitor\". Remains on levophed gtt, titrated to maintain prescribed MAP 65 (see mar). PRN oxycodone administered for pain (See mar). Improved pain control with dilaudid gtt. K, Mag, Phos are re checks tomorrow, per protocol.      Problem: Adult Inpatient Plan of Care  Goal: Plan of Care Review  Description: The Plan of Care Review/Shift note should be completed every shift.  The Outcome Evaluation is a brief statement about your assessment that the patient is improving, declining, or no change.  This information will be displayed automatically on your shift  note.  Outcome: Progressing  Flowsheets (Taken 3/24/2024 0648)  Outcome Evaluation:   Alert. Follows commands. Generalized weakness. Bilateral lung sounds coarse throughout. FiO2 70% with PEEP of 12. SR until approximately 0330 when patient converted to afib rvr. HR as high as 140, non sustained. Dr Choudhury updated   no new orders obtained. Received directons \"continue to monitor\". Remains on levophed gtt, titrated to maintain prescribed MAP 65 (see mar). PRN oxycodone administered for pain (See mar). Improved pain control with dilaudid gtt. K, Mag, Phos are re checks tomorrow, per protocol.  Plan of Care Reviewed With: patient  Overall Patient Progress: no change  Goal: Absence of Hospital-Acquired Illness or Injury  Intervention: Identify and Manage Fall Risk  Recent Flowsheet Documentation  Taken 3/24/2024 0400 by Tres Lux RN  Safety Promotion/Fall Prevention:   clutter free environment maintained   increased " rounding and observation   increase visualization of patient   room near nurse's station   room organization consistent   safety round/check completed   room door open   supervised activity   treat reversible contributory factors   treat underlying cause  Taken 3/24/2024 0000 by Tres Lux RN  Safety Promotion/Fall Prevention:   clutter free environment maintained   increased rounding and observation   increase visualization of patient   room near nurse's station   room organization consistent   safety round/check completed   room door open   supervised activity   treat reversible contributory factors   treat underlying cause  Taken 3/23/2024 2030 by Tres Lux RN  Safety Promotion/Fall Prevention:   clutter free environment maintained   increased rounding and observation   increase visualization of patient   room near nurse's station   room organization consistent   safety round/check completed   room door open   supervised activity   treat reversible contributory factors   treat underlying cause  Intervention: Prevent Skin Injury  Recent Flowsheet Documentation  Taken 3/24/2024 0610 by Tres Lux RN  Body Position:   turned   right   heels elevated  Taken 3/24/2024 0600 by Tres Lux RN  Body Position:   turned   weight shifting  Taken 3/24/2024 0535 by Tres Lux RN  Body Position:   turned   right  Taken 3/24/2024 0400 by Tres Lux RN  Skin Protection:   adhesive use limited   incontinence pads utilized   pulse oximeter probe site changed   silicone foam dressing in place   protective footwear used   skin to device areas padded  Device Skin Pressure Protection:   absorbent pad utilized/changed   tubing/devices free from skin contact   positioning supports utilized   pressure points protected  Taken 3/24/2024 0230 by Tres Lux RN  Body Position:   turned   left   heels elevated  Taken 3/24/2024 0030 by Tres Lux RN  Body Position:   turned   right   heels elevated  Taken  3/24/2024 0000 by Tres Lux RN  Skin Protection:   adhesive use limited   incontinence pads utilized   pulse oximeter probe site changed   silicone foam dressing in place   protective footwear used   skin to device areas padded  Device Skin Pressure Protection:   absorbent pad utilized/changed   tubing/devices free from skin contact   positioning supports utilized   pressure points protected  Taken 3/23/2024 2300 by Tres Lux RN  Body Position:   turned   left  Taken 3/23/2024 2030 by Tres Lux RN  Body Position:   right   heels elevated  Skin Protection:   adhesive use limited   incontinence pads utilized   pulse oximeter probe site changed   silicone foam dressing in place   protective footwear used   skin to device areas padded  Device Skin Pressure Protection:   absorbent pad utilized/changed   tubing/devices free from skin contact   positioning supports utilized   pressure points protected  Intervention: Prevent and Manage VTE (Venous Thromboembolism) Risk  Recent Flowsheet Documentation  Taken 3/24/2024 0400 by Tres Lux RN  VTE Prevention/Management: SCDs (sequential compression devices) off  Taken 3/24/2024 0000 by Tres Lux RN  VTE Prevention/Management: SCDs (sequential compression devices) off  Taken 3/23/2024 2030 by Tres Lux RN  VTE Prevention/Management: SCDs (sequential compression devices) off  Intervention: Prevent Infection  Recent Flowsheet Documentation  Taken 3/24/2024 0400 by Tres Lux RN  Infection Prevention:   rest/sleep promoted   single patient room provided   hand hygiene promoted   environmental surveillance performed   equipment surfaces disinfected  Taken 3/24/2024 0000 by Tres Lux RN  Infection Prevention:   rest/sleep promoted   single patient room provided   hand hygiene promoted   environmental surveillance performed   equipment surfaces disinfected  Taken 3/23/2024 2030 by Tres Lux RN  Infection Prevention:   rest/sleep  promoted   single patient room provided   hand hygiene promoted   environmental surveillance performed   equipment surfaces disinfected

## 2024-03-24 NOTE — PROGRESS NOTES
[facility]    Stroke Progress Note    Interval Events  - SR until approximately 0330 when patient converted to afib rvr. HR as high as 140, currently at ~120s; on Amiodarone gtt  - Remains on levophed gtt to maintain MAP > 65  - On heparin gtt for Right below knee DVT  - On Meropenem for sepsis (PNA)  - S/p trach and PEG on 3/22  - God urine output.  - Rectal tube+    HPI Summary  Yohana Marques is a 78 year old woman with h/o HTN, spinal stenosis s/p discectomy x 2 (most recent 8/2004), and chronic pain.      She presented on 3/8 after a fall at home in 3 to 4 days of generalized weakness worsening to an extent that from using just a cane she had to start using a walker.  Reports of gautam at their second house in [facility] but no access to canned food since mid February.       During admission patient found to have stress-induced cardiomyopathy with a EF of 23% on echo on 3/8.  On 3/10 due to labored, shallow breathing and respiratory acidosis patient was intubated.  Noted to have hypotonia and quadriparesis with hypophonia.  Given suspicion of possible GBS started on plasmapheresis.  New onset atrial fibrillation noted in hospital.     Patient had been having labile blood pressure, fluctuating heart rhythm from bradycardia to atrial fibrillation.  Initial MRI brain and MRI spine unremarkable however repeat on 3/14 revealed cauda equina nerve root enhancement.    - S/p PLEX (5/5 sessions from 3/10-3/18)  - S/p trach and PEG on 3/22    Evaluation Summarized     MRI Brain w/wo contrast (3/9) (03/14) Small vessel disease changes   MRI Spine  C/T/L-spine 03/10 degenerative changes with superior endplate T3 compression and lumbar spondylosis.      C/T/L-spine 3/14 with cauda equina nerve root enhancement   CT Chest/Abdomen/Pelvis No evidence of malignancy.  Submucosal fibroid versus nonmalignant endometrial polyp and stable 4 mm left upper lobe nodule.      "  Echocardiogram 3/8          Coronary cath 3/8 EF is 23%.Entire left ventricle is severely hypokinetic but basal left ventricular function is less so     No significant CAD   EKG/Telemetry 3/10    Tachycardic (147 bpm), Atrial fibrillation w/ RVR   Left axis deviation, Anterior infarct, age undertermined  T wave abnormality  Episodes of bradycardia   CSF Labs Mildly elevated protein, 5 nucleated cell  Encephalitis/meningitis panel negative   Labs   Ganglioside Antibodies:         Myasthenia panel negative    AZALEA: negative    ANCA: negative    HIV-1, HIV-2, HIV-1 p24 antigen nonreactive   EMG Completed on 3/15, report pending          Impression   - 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     The Stroke Staff is Dr. Peraza.    Julio Strong MD  Vascular Neurology Fellow    To page me or covering stroke neurology team member, click here: AMCOM  Choose \"On Call\" tab at top, then select \"NEUROLOGY/ALL SITES\" from middle drop-down box, press Enter, then look for \"stroke\" or \"telestroke\" for your site.  ______________________________________________________    Clinically Significant Risk Factors              # Hypoalbuminemia: Lowest albumin = 3.1 g/dL at 3/24/2024  4:16 AM, will monitor as appropriate    # Acute Kidney Injury, unspecified: based on a >150% or 0.3 mg/dL increase in last creatinine compared to past 90 day average, will monitor renal function  # Hypertension: Noted on problem list        # Obesity: Estimated body mass index is 36.55 kg/m  as calculated from the " "following:    Height as of this encounter: 1.727 m (5' 7.99\").    Weight as of this encounter: 109 kg (240 lb 4.8 oz).      # Financial/Environmental Concerns: none           Medications   Scheduled Meds   acetylcysteine  2 mL Nebulization Q6H    albuterol  2.5 mg Nebulization Q6H    amiodarone  200 mg Oral or FT or NG tube Daily    [Held by provider] aspirin  81 mg Oral or Feeding Tube Daily    chlorhexidine  15 mL Mouth/Throat Q12H    enoxaparin ANTICOAGULANT  0.75 mg/kg Subcutaneous Q12H    meropenem  1 g Intravenous Q8H    pantoprazole  40 mg Per Feeding Tube QAM AC    Or    pantoprazole  40 mg Intravenous QAM AC    [Held by provider] QUEtiapine  50 mg Oral or Feeding Tube BID    sodium chloride (PF)  3 mL Intracatheter Q8H       Infusion Meds   dextrose      [Held by provider] furosemide (LASIX) 100 mg in sodium chloride 0.9 % 100 mL infusion Stopped (03/22/24 2213)    HYDROmorphone 0.6 mg/hr (03/24/24 0012)    - MEDICATION INSTRUCTIONS -      norepinephrine 0.03 mcg/kg/min (03/23/24 2115)    - MEDICATION INSTRUCTIONS -         PRN Meds  acetaminophen, albuterol, atropine, bisacodyl, calcium carbonate, artificial tears, dextrose, HOLD MEDICATION, hydromorphone, lidocaine 4%, lidocaine (buffered or not buffered), melatonin, midazolam, midazolam, naloxone **OR** naloxone **OR** naloxone **OR** naloxone, - MEDICATION INSTRUCTIONS -, oxyCODONE **OR** oxyCODONE, - MEDICATION INSTRUCTIONS -, polyethylene glycol, senna-docusate **OR** senna-docusate, sodium chloride (PF), sodium chloride 0.9%, sodium chloride 0.9%       PHYSICAL EXAMINATION  Temp:  [97.5  F (36.4  C)-100.2  F (37.9  C)] 98.8  F (37.1  C)  Pulse:  [] 126  Resp:  [11-50] 12  BP: ()/(45-80) 105/60  FiO2 (%):  [60 %-70 %] 70 %  SpO2:  [88 %-100 %] 94 %      Neuro: Awake, alert, follows commands, mouths words appropriately, grade 2 movement in biceps, triceps and  strength bilaterally, could not test LE due to seated position, no facial " asymmetry(symmetric smile and strongly squeezes eyes shut, raises eyebrows), areflexic through out    Imaging  I personally reviewed all imaging; relevant findings per HPI.     Lab Results Data   CBC  Recent Labs   Lab 03/24/24  0416 03/23/24  1025 03/23/24  0410   WBC 8.8 8.5 9.4   RBC 2.51* 2.52* 2.59*   HGB 7.3* 7.5* 7.6*   HCT 22.4* 22.4* 22.8*    240 239     Basic Metabolic Panel    Recent Labs   Lab 03/24/24  0416 03/23/24  2157 03/23/24  2154 03/23/24  1654 03/23/24  1653 03/23/24  0826 03/23/24  0410 03/22/24  0851 03/22/24  0415     --   --   --   --   --  143  --  142   POTASSIUM 4.0  4.0  --  4.0  --  4.2   < > 3.9  3.9   < > 3.2*  3.2*   CHLORIDE 99  --   --   --   --   --  99  --  100   CO2 30*  --   --   --   --   --  32*  --  29   BUN 38.8*  --   --   --   --   --  30.1*  --  24.3*   CR 1.22*  --   --   --   --   --  1.06*  --  0.78   * 106*  --  104*  --    < > 153*   < > 178*   YEE 8.1*  --   --   --   --   --  8.3*  --  8.2*    < > = values in this interval not displayed.     Liver Panel  Recent Labs   Lab 03/24/24  0416 03/23/24  0410 03/22/24  0415   PROTTOTAL 5.8* 6.2* 5.8*   ALBUMIN 3.1* 3.4* 3.1*   BILITOTAL 0.6 0.8 0.7   ALKPHOS 61 62 63   AST 29 30 30   ALT 13 14 13     INR    Recent Labs   Lab Test 05/02/23  0650 01/25/23  0719 10/15/22  0728   INR 1.02 1.06 1.23*      Lipid Profile    Recent Labs   Lab Test 07/07/22  1448 07/05/21  1352 07/03/19  0927   CHOL 241* 220* 213*   HDL 78 66 64   * 130* 118*   TRIG 119 122 156*     A1C    Recent Labs   Lab Test 03/11/24  0436   A1C 5.2     Troponin    Recent Labs   Lab 03/19/24  1206   CTROPT 29*          Data

## 2024-03-24 NOTE — PROGRESS NOTES
[facility] ICU RESPIRATORY NOTE        Date of Admission: 3/8/2024    Date of Intubation (most recent):  Trach'd 3/22    Reason for Mechanical Ventilation: Resp failure    Number of Days on Mechanical Ventilation: 15    Met Criteria for Spontaneous Breathing Trial: No    Reason for No Spontaneous Breathing Trial: Per MD    Significant Events Today: None    ABG Results:   Recent Labs   Lab 03/24/24  0733 03/21/24  0432 03/18/24  0950   PH 7.38 7.38  --    PCO2 61* 49*  --    PO2 60* 58*  --    HCO3 36* 29*  --    O2PER 55 55 50         Current Vent Settings: Vent Mode: CMV/AC  (Continuous Mandatory Ventilation/ Assist Control)  FiO2 (%): 60 %  Resp Rate (Set): 14 breaths/min  Tidal Volume (Set, mL): 420 mL  PEEP (cm H2O): 14 cmH2O  Resp: 14      Skin Assessment: Intact    Plan: Pt to remain on full vent support overnight    John Go, RT

## 2024-03-25 NOTE — PROGRESS NOTES
[facility] ICU RESPIRATORY NOTE        Date of Admission: 3/8/2024    Date of Intubation (most recent): 3/22 tracheostomy    Reason for Mechanical Ventilation: respiratory failure.     Number of Days on Mechanical Ventilation: 16    Met Criteria for Spontaneous Breathing Trial: No    Reason for No Spontaneous Breathing Trial: PEEP 14    Significant Events Today: FiO2 lowered to 50%    ABG Results:   Recent Labs   Lab 03/24/24  0733 03/21/24  0432   PH 7.38 7.38   PCO2 61* 49*   PO2 60* 58*   HCO3 36* 29*   O2PER 55 55         Current Vent Settings: Vent Mode: CMV/AC  (Continuous Mandatory Ventilation/ Assist Control)  FiO2 (%): 50 %  Resp Rate (Set): 14 breaths/min  Tidal Volume (Set, mL): 420 mL  PEEP (cm H2O): 14 cmH2O  Resp: 23      Skin Assessment: Skin intact around stoma.     Plan: Continue full ventilatory support overnight.     Ang Lechuga RT on 3/25/2024 at 5:46 PM

## 2024-03-25 NOTE — PLAN OF CARE
Goal Outcome Evaluation:      Plan of Care Reviewed With: other (see comments)    Overall Patient Progress: no changeOverall Patient Progress: no change    Outcome Evaluation: Continues to tolerate TF at goal with new PEG placed 3/22. Monitor stooling - none recorded since 3/22. no changes today.

## 2024-03-25 NOTE — PLAN OF CARE
Goal Outcome Evaluation:      Plan of Care Reviewed With: patient, spouse    Overall Patient Progress: no changeOverall Patient Progress: no change    Outcome Evaluation: Alert, pleasant and cooperative. Follows commands. Generalized weakness. Increased movement in extremeities noted. SR throughout the shift. Bilateral lung sounds coarse throughout with occasional inspiratory wheeze. Increased FiO2 to 65%. Peep remains at 14. Levophed gtt remained off throughout the night. Remains on furosemide gtt with excellent UO. No bm; + bowel sounds. PRN oxycodone administered for back and leg pain (see mar). K, Mag, phos rechecks tomorrow.      Problem: Adult Inpatient Plan of Care  Goal: Plan of Care Review  Description: The Plan of Care Review/Shift note should be completed every shift.  The Outcome Evaluation is a brief statement about your assessment that the patient is improving, declining, or no change.  This information will be displayed automatically on your shift  note.  Outcome: Progressing  Flowsheets (Taken 3/25/2024 0625)  Outcome Evaluation:   Alert, pleasant and cooperative. Follows commands. Generalized weakness. Increased movement in extremeities noted. SR throughout the shift. Bilateral lung sounds coarse throughout with occasional inspiratory wheeze. Increased FiO2 to 65%. Peep remains at 14. Levophed gtt remained off throughout the night. Remains on furosemide gtt with excellent UO. No bm   + bowel sounds. PRN oxycodone administered for back and leg pain (see mar). K, Mag, phos rechecks tomorrow.  Plan of Care Reviewed With:   patient   spouse  Overall Patient Progress: no change  Goal: Absence of Hospital-Acquired Illness or Injury  Intervention: Identify and Manage Fall Risk  Recent Flowsheet Documentation  Taken 3/25/2024 0400 by Tres Lux RN  Safety Promotion/Fall Prevention:   clutter free environment maintained   increased rounding and observation   increase visualization of patient   room near  nurse's station   room organization consistent   safety round/check completed   room door open   supervised activity   treat reversible contributory factors   treat underlying cause  Taken 3/25/2024 0000 by Tres Lux RN  Safety Promotion/Fall Prevention:   clutter free environment maintained   increased rounding and observation   increase visualization of patient   room near nurse's station   room organization consistent   safety round/check completed   room door open   supervised activity   treat reversible contributory factors   treat underlying cause  Taken 3/24/2024 2000 by Tres Lux RN  Safety Promotion/Fall Prevention:   clutter free environment maintained   increased rounding and observation   increase visualization of patient   room near nurse's station   room organization consistent   safety round/check completed   room door open   supervised activity   treat reversible contributory factors   treat underlying cause  Intervention: Prevent Skin Injury  Recent Flowsheet Documentation  Taken 3/25/2024 0600 by Tres Lux RN  Body Position:   turned   right   heels elevated  Taken 3/25/2024 0415 by Tres Lux RN  Body Position:   turned   weight shifting   heels elevated  Taken 3/25/2024 0230 by Tres Lux RN  Body Position:   turned   left   heels elevated  Taken 3/25/2024 0045 by Tres Lux RN  Body Position:   turned   right  Taken 3/24/2024 2230 by Tres Lux RN  Body Position:   turned   left   heels elevated  Taken 3/24/2024 2000 by Tres Lux RN  Body Position:   turned   right   heels elevated  Intervention: Prevent and Manage VTE (Venous Thromboembolism) Risk  Recent Flowsheet Documentation  Taken 3/25/2024 0400 by Tres Lux RN  VTE Prevention/Management: foot pump device off  Taken 3/25/2024 0000 by Tres Lux RN  VTE Prevention/Management: foot pump device off  Taken 3/24/2024 2000 by Tres Lux RN  VTE Prevention/Management: foot pump device  off  Intervention: Prevent Infection  Recent Flowsheet Documentation  Taken 3/25/2024 0400 by Tres Lux, RN  Infection Prevention:   rest/sleep promoted   single patient room provided   hand hygiene promoted   environmental surveillance performed   equipment surfaces disinfected  Taken 3/25/2024 0000 by Tres Lux, RN  Infection Prevention:   rest/sleep promoted   single patient room provided   hand hygiene promoted   environmental surveillance performed   equipment surfaces disinfected  Taken 3/24/2024 2000 by Tres Lux, RN  Infection Prevention:   rest/sleep promoted   single patient room provided   hand hygiene promoted   environmental surveillance performed   equipment surfaces disinfected  Goal: Optimal Comfort and Wellbeing  Intervention: Provide Person-Centered Care  Recent Flowsheet Documentation  Taken 3/24/2024 2000 by Tres Lux, RN  Trust Relationship/Rapport:   care explained   thoughts/feelings acknowledged   reassurance provided   emotional support provided

## 2024-03-25 NOTE — PROGRESS NOTES
[facility] Progress Note    Interval Events  A fib RVR last night. Currently in  -140s.   Overnight O2 needs increased from 50% FiO2 to 80%   Good urine output (on Lasix)      HPI Summary  Yohana Marques is a 78 year old woman with h/o HTN, spinal stenosis s/p discectomy x 2 (most recent 8/2004), and chronic pain.      She presented on 3/8 after a fall at home in 3 to 4 days of generalized weakness worsening to an extent that from using just a cane she had to start using a walker.  Reports of gautam at their second house in [facility] but no access to canned food since mid February.       During admission patient found to have stress-induced cardiomyopathy with a EF of 23% on echo on 3/8.  On 3/10 due to labored, shallow breathing and respiratory acidosis patient was intubated.  Noted to have hypotonia and quadriparesis with hypophonia.  Given suspicion of possible GBS started on plasmapheresis.  New onset atrial fibrillation noted in hospital.     Patient had been having labile blood pressure, fluctuating heart rhythm from bradycardia to atrial fibrillation.  Initial MRI brain and MRI spine unremarkable however repeat on 3/14 revealed cauda equina nerve root enhancement.     - S/p PLEX (5/5 sessions from 3/10-3/18)  - S/p trach and PEG on 3/22     Evaluation Summarized     MRI Brain w/wo contrast (3/9) (03/14) Small vessel disease changes   MRI Spine  C/T/L-spine 03/10 degenerative changes with superior endplate T3 compression and lumbar spondylosis.      C/T/L-spine 3/14 with cauda equina nerve root enhancement   CT Chest/Abdomen/Pelvis No evidence of malignancy.  Submucosal fibroid versus nonmalignant endometrial polyp and stable 4 mm left upper lobe nodule.       Echocardiogram 3/8           Coronary cath 3/8 EF is 23%.Entire left ventricle is severely hypokinetic but basal left ventricular function is less so     No significant CAD   EKG/Telemetry  "3/10    Tachycardic (147 bpm), Atrial fibrillation w/ RVR   Left axis deviation, Anterior infarct, age undertermined  T wave abnormality  Episodes of bradycardia   CSF Labs Mildly elevated protein, 5 nucleated cell  Encephalitis/meningitis panel negative   Labs    Ganglioside Antibodies:          Myasthenia panel negative     AZALEA: negative     ANCA: negative     HIV-1, HIV-2, HIV-1 p24 antigen nonreactive   EMG Completed on 3/15, report pending           Impression   - 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      The Stroke Staff is Dr. Peraza.     Julio Strong MD  Vascular Neurology Fellow     To page me or covering stroke neurology team member, click here: AMCOM  Choose \"On Call\" tab at top, then select \"NEUROLOGY/ALL SITES\" from middle drop-down box, press Enter, then look for \"stroke\" or \"telestroke\" for your site.  ______________________________________________________    Clinically Significant Risk Factors        # Hypokalemia: Lowest K = 3.1 mmol/L in last 2 days, will replace as needed       # Hypoalbuminemia: Lowest albumin = 3 g/dL at 3/26/2024  5:14 AM, will monitor as appropriate       # Hypertension: Noted on problem list        # Obesity: Estimated body mass index is 35.71 kg/m  as calculated from the following:    Height as of this encounter: 1.727 m (5' 7.99\").    Weight as of this encounter: 106.5 kg (234 lb 12.6 oz).        # Financial/Environmental Concerns: none           Medications   Scheduled Meds   albuterol  2.5 mg " Nebulization Q6H    amiodarone  200 mg Oral or FT or NG tube Daily    [Held by provider] aspirin  81 mg Oral or Feeding Tube Daily    chlorhexidine  15 mL Mouth/Throat Q12H    enoxaparin ANTICOAGULANT  0.75 mg/kg Subcutaneous Q12H    meropenem  1 g Intravenous Q8H    metoprolol tartrate  12.5 mg Oral or Feeding Tube BID    miconazole   Topical BID    pantoprazole  40 mg Per Feeding Tube QAM AC    Or    pantoprazole  40 mg Intravenous QAM AC    [Held by provider] QUEtiapine  50 mg Oral or Feeding Tube BID    sodium chloride (PF)  3 mL Intracatheter Q8H       Infusion Meds   amiodarone Stopped (03/26/24 0734)    dextrose      furosemide (LASIX) 100 mg in sodium chloride 0.9 % 100 mL infusion 10 mg/hr (03/26/24 0507)    HYDROmorphone 0.4 mg/hr (03/26/24 0742)    - MEDICATION INSTRUCTIONS -      norepinephrine Stopped (03/24/24 1048)    - MEDICATION INSTRUCTIONS -         PRN Meds  acetaminophen, albuterol, atropine, bisacodyl, calcium carbonate, artificial tears, dextrose, hydromorphone, lidocaine 4%, lidocaine (buffered or not buffered), melatonin, metoprolol, midazolam, naloxone **OR** naloxone **OR** naloxone **OR** naloxone, - MEDICATION INSTRUCTIONS -, oxyCODONE **OR** oxyCODONE, - MEDICATION INSTRUCTIONS -, polyethylene glycol, senna-docusate **OR** senna-docusate, sodium chloride (PF)       PHYSICAL EXAMINATION  Temp:  [97.7  F (36.5  C)-99.1  F (37.3  C)] 97.9  F (36.6  C)  Pulse:  [] 142  Resp:  [10-46] 15  BP: ()/(45-81) 93/58  FiO2 (%):  [50 %-80 %] 60 %  SpO2:  [88 %-99 %] 97 %      Neuro: Trach+, Awake, alert, symmetric smile, EOMI, grade 4 strength in neck extension and grade 3 strength in neck flexion and b/l shoulder flexion, grade 3 movement in b/l biceps and wrist flexions and grade 2 movement in extensors of UE as well as knee flexion.    Imaging  I personally reviewed all imaging; relevant findings per HPI.     Lab Results Data   CBC  Recent Labs   Lab 03/26/24  0315 03/25/24  3748  03/24/24  0416   WBC 14.5* 10.3 8.8   RBC 2.86* 2.52* 2.51*   HGB 8.4* 7.3* 7.3*   HCT 25.6* 22.5* 22.4*    252 227     Basic Metabolic Panel    Recent Labs   Lab 03/26/24  0839 03/26/24  0514 03/25/24  2159 03/25/24  1435 03/25/24  0527 03/24/24  2139 03/24/24  1622 03/24/24  0416   NA  --  144  --   --  143  --   --  141   POTASSIUM  --  3.6  3.6 3.1* 3.9 3.8  3.8   < > 4.1 4.0  4.0   CHLORIDE  --  100  --   --  99  --   --  99   CO2  --  35*  --   --  34*  --   --  30*   BUN  --  45.1*  --   --  46.5*  --   --  38.8*   CR  --  0.88  --   --  1.14*  --  1.16* 1.22*   * 115* 99  --  120*  --   --  110*   YEE  --  8.1*  --   --  8.6*  --   --  8.1*    < > = values in this interval not displayed.     Liver Panel  Recent Labs   Lab 03/26/24  0514 03/25/24  0527 03/24/24  0416   PROTTOTAL 6.3* 6.1* 5.8*   ALBUMIN 3.0* 3.0* 3.1*   BILITOTAL 0.5 0.6 0.6   ALKPHOS 79 60 61   AST 31 30 29   ALT 17 15 13     INR    Recent Labs   Lab Test 05/02/23  0650 01/25/23  0719 10/15/22  0728   INR 1.02 1.06 1.23*      Lipid Profile    Recent Labs   Lab Test 07/07/22  1448 07/05/21  1352 07/03/19  0927   CHOL 241* 220* 213*   HDL 78 66 64   * 130* 118*   TRIG 119 122 156*     A1C    Recent Labs   Lab Test 03/11/24  0436   A1C 5.2     Troponin    Recent Labs   Lab 03/19/24  1206   CTROPT 29*          Data

## 2024-03-25 NOTE — PLAN OF CARE
"Patient alert and oriented x4. Increased , dorsiflexion, and plantar flexion since beginning of shift. NSR. Tube feeding infusing at goal rate of 55ml/hr. Nye in place for urine retention, good UO. Vent- 50%/P14. Up to chair today via lift. Patient tolerated transfer well. Pain today in back and RLE, PRN dilaudid administered. Lasix drip running at 10mg/hr. Family present and updated.         Problem: Adult Inpatient Plan of Care  Goal: Plan of Care Review  Description: The Plan of Care Review/Shift note should be completed every shift.  The Outcome Evaluation is a brief statement about your assessment that the patient is improving, declining, or no change.  This information will be displayed automatically on your shift  note.  Outcome: Progressing  Flowsheets (Taken 3/25/2024 1721)  Plan of Care Reviewed With:   patient   spouse  Overall Patient Progress: improving  Goal: Patient-Specific Goal (Individualized)  Description: You can add care plan individualizations to a care plan. Examples of Individualization might be:  \"Parent requests to be called daily at 9am for status\", \"I have a hard time hearing out of my right ear\", or \"Do not touch me to wake me up as it startles  me\".  Outcome: Progressing  Goal: Absence of Hospital-Acquired Illness or Injury  Outcome: Progressing  Intervention: Identify and Manage Fall Risk  Recent Flowsheet Documentation  Taken 3/25/2024 1600 by Alicia Garcia, RN  Safety Promotion/Fall Prevention:   activity supervised   assistive device/personal items within reach   clutter free environment maintained   lighting adjusted   patient and family education   room door open   room near nurse's station   supervised activity  Taken 3/25/2024 1200 by Alicia Garcia, RN  Safety Promotion/Fall Prevention:   activity supervised   assistive device/personal items within reach   clutter free environment maintained   lighting adjusted   patient and family education   room door open   " room near nurse's station   supervised activity  Taken 3/25/2024 0830 by Alicia Garcia RN  Safety Promotion/Fall Prevention:   activity supervised   assistive device/personal items within reach   clutter free environment maintained   lighting adjusted   patient and family education   room door open   room near nurse's station   supervised activity  Intervention: Prevent Skin Injury  Recent Flowsheet Documentation  Taken 3/25/2024 1600 by Alicia Garcia RN  Body Position:   turned   right   heels elevated  Taken 3/25/2024 1430 by Alicia Garcia RN  Body Position:   turned   left   heels elevated  Taken 3/25/2024 1200 by Alicia Garcia RN  Skin Protection:   incontinence pads utilized   silicone foam dressing in place  Device Skin Pressure Protection: tubing/devices free from skin contact  Taken 3/25/2024 1030 by Alicia Garcia RN  Body Position: weight shifting  Taken 3/25/2024 1000 by Alicia Garcia RN  Body Position:   turned   right   heels elevated  Taken 3/25/2024 0830 by Alicia Garcia RN  Skin Protection:   incontinence pads utilized   silicone foam dressing in place  Device Skin Pressure Protection: tubing/devices free from skin contact  Intervention: Prevent and Manage VTE (Venous Thromboembolism) Risk  Recent Flowsheet Documentation  Taken 3/25/2024 1600 by Alicia Garcia RN  VTE Prevention/Management: foot pump device off  Taken 3/25/2024 1200 by Alicia Garcia RN  VTE Prevention/Management: foot pump device off  Taken 3/25/2024 0830 by Alicia Garcia RN  VTE Prevention/Management: foot pump device off  Intervention: Prevent Infection  Recent Flowsheet Documentation  Taken 3/25/2024 1600 by Alicia Garcia RN  Infection Prevention:   rest/sleep promoted   hand hygiene promoted   equipment surfaces disinfected  Taken 3/25/2024 1200 by Alicia Garcia RN  Infection Prevention:   rest/sleep promoted   hand hygiene promoted   equipment surfaces  disinfected  Taken 3/25/2024 0830 by Alicia Garcia RN  Infection Prevention:   rest/sleep promoted   hand hygiene promoted   equipment surfaces disinfected  Goal: Optimal Comfort and Wellbeing  Outcome: Progressing  Intervention: Provide Person-Centered Care  Recent Flowsheet Documentation  Taken 3/25/2024 1200 by Alicia Garcia RN  Trust Relationship/Rapport:   care explained   reassurance provided   emotional support provided   empathic listening provided   thoughts/feelings acknowledged  Taken 3/25/2024 0800 by Alicia Garcia RN  Trust Relationship/Rapport:   care explained   reassurance provided   emotional support provided   empathic listening provided   thoughts/feelings acknowledged  Goal: Readiness for Transition of Care  Outcome: Progressing     Problem: Risk for Delirium  Goal: Optimal Coping  Outcome: Progressing  Intervention: Optimize Psychosocial Adjustment to Delirium  Recent Flowsheet Documentation  Taken 3/25/2024 1200 by Alicia Garcia RN  Supportive Measures:   decision-making supported   positive reinforcement provided   relaxation techniques promoted  Family/Support System Care:   involvement promoted   support provided  Taken 3/25/2024 0800 by Alicia Garcia RN  Supportive Measures:   decision-making supported   positive reinforcement provided   relaxation techniques promoted  Goal: Improved Behavioral Control  Outcome: Progressing  Intervention: Prevent and Manage Agitation  Recent Flowsheet Documentation  Taken 3/25/2024 1200 by Alicia Garcia RN  Environment Familiarity/Consistency: daily routine followed  Taken 3/25/2024 0800 by Alicia Garcia RN  Environment Familiarity/Consistency: daily routine followed  Intervention: Minimize Safety Risk  Recent Flowsheet Documentation  Taken 3/25/2024 1600 by Alicia Garcia RN  Enhanced Safety Measures:   pain management   review medications for side effects with activity   room near unit station  Taken  3/25/2024 1200 by Alicia Garcia RN  Communication Enhancement Strategies:   call light answered in person   communication board used   extra time allowed for response   nonverbal strategies used   verbal and visual cues paired  Enhanced Safety Measures:   pain management   review medications for side effects with activity   room near unit station  Trust Relationship/Rapport:   care explained   reassurance provided   emotional support provided   empathic listening provided   thoughts/feelings acknowledged  Taken 3/25/2024 0830 by Alicia Garcia RN  Enhanced Safety Measures:   pain management   review medications for side effects with activity   room near unit station  Taken 3/25/2024 0800 by Alicia Garcia RN  Communication Enhancement Strategies:   call light answered in person   communication board used   extra time allowed for response   nonverbal strategies used   verbal and visual cues paired  Trust Relationship/Rapport:   care explained   reassurance provided   emotional support provided   empathic listening provided   thoughts/feelings acknowledged  Goal: Improved Attention and Thought Clarity  Outcome: Progressing  Intervention: Maximize Cognitive Function  Recent Flowsheet Documentation  Taken 3/25/2024 1200 by Alicia Garcia RN  Sensory Stimulation Regulation:   care clustered   television on  Reorientation Measures:   clock in view   calendar in view  Taken 3/25/2024 0800 by Alicia Garcia RN  Sensory Stimulation Regulation:   care clustered   television on  Reorientation Measures:   clock in view   calendar in view  Goal: Improved Sleep  Outcome: Progressing     Problem: Mechanical Ventilation Invasive  Goal: Effective Communication  Outcome: Progressing  Intervention: Ensure Effective Communication  Recent Flowsheet Documentation  Taken 3/25/2024 1200 by Alicia Garcia RN  Communication Enhancement Strategies:   call light answered in person   communication board used    extra time allowed for response   nonverbal strategies used   verbal and visual cues paired  Family/Support System Care:   involvement promoted   support provided  Trust Relationship/Rapport:   care explained   reassurance provided   emotional support provided   empathic listening provided   thoughts/feelings acknowledged  Taken 3/25/2024 0800 by Alicia Garcia RN  Communication Enhancement Strategies:   call light answered in person   communication board used   extra time allowed for response   nonverbal strategies used   verbal and visual cues paired  Trust Relationship/Rapport:   care explained   reassurance provided   emotional support provided   empathic listening provided   thoughts/feelings acknowledged  Goal: Optimal Device Function  Outcome: Progressing  Intervention: Optimize Device Care and Function  Recent Flowsheet Documentation  Taken 3/25/2024 1600 by Alicia Garcia RN  Airway Safety Measures:   all equipment/monitors on and audible   suction equipment  Oral Care:   swabbed with antiseptic solution   suction provided   lip/mouth moisturizer applied  Taken 3/25/2024 1200 by Alicia Garcia RN  Airway Safety Measures:   all equipment/monitors on and audible   suction equipment  Oral Care:   lip/mouth moisturizer applied   swabbed with antiseptic solution   suction provided  Taken 3/25/2024 0830 by Alicia Garcia RN  Airway Safety Measures:   all equipment/monitors on and audible   suction equipment  Oral Care:   lip/mouth moisturizer applied   oral rinse provided   swabbed with sterile water   suction provided  Goal: Mechanical Ventilation Liberation  Outcome: Progressing  Intervention: Promote Extubation and Mechanical Ventilation Liberation  Recent Flowsheet Documentation  Taken 3/25/2024 1600 by Alicia Garcia RN  Medication Review/Management:   medications reviewed   high-risk medications identified  Taken 3/25/2024 1200 by Alicia Garcia RN  Medication  Review/Management:   medications reviewed   high-risk medications identified  Environmental Support:   calm environment promoted   distractions minimized   environmental consistency promoted   rest periods encouraged  Taken 3/25/2024 0830 by Alicia Garcia RN  Medication Review/Management:   medications reviewed   high-risk medications identified  Taken 3/25/2024 0800 by Alicia Garcia RN  Environmental Support:   calm environment promoted   distractions minimized   environmental consistency promoted   rest periods encouraged  Goal: Optimal Nutrition Delivery  Outcome: Progressing  Goal: Absence of Device-Related Skin and Tissue Injury  Outcome: Progressing  Intervention: Maintain Skin and Tissue Health  Recent Flowsheet Documentation  Taken 3/25/2024 1200 by Alicia Garcia RN  Device Skin Pressure Protection: tubing/devices free from skin contact  Taken 3/25/2024 0830 by Alicia Garcia RN  Device Skin Pressure Protection: tubing/devices free from skin contact  Goal: Absence of Ventilator-Induced Lung Injury  Outcome: Progressing  Intervention: Prevent Ventilator-Associated Pneumonia  Recent Flowsheet Documentation  Taken 3/25/2024 1600 by Alicia Garcia RN  Oral Care:   swabbed with antiseptic solution   suction provided   lip/mouth moisturizer applied  Head of Bed (HOB) Positioning: HOB at 30 degrees  Taken 3/25/2024 1430 by Alicia Garcia RN  Head of Bed (HOB) Positioning: HOB at 30 degrees  Taken 3/25/2024 1200 by Alicia Garcia RN  Oral Care:   lip/mouth moisturizer applied   swabbed with antiseptic solution   suction provided  Taken 3/25/2024 1000 by Alicia Garcia RN  Head of Bed (HOB) Positioning: HOB at 30 degrees  Taken 3/25/2024 0830 by Alicia Garcia RN  Oral Care:   lip/mouth moisturizer applied   oral rinse provided   swabbed with sterile water   suction provided     Problem: Guillain-[place]  Syndrome  Goal: Optimal Coping  Outcome: Progressing  Intervention:  Optimize Psychosocial Response  Recent Flowsheet Documentation  Taken 3/25/2024 1200 by Alicia Garcia RN  Supportive Measures:   decision-making supported   positive reinforcement provided   relaxation techniques promoted  Family/Support System Care:   involvement promoted   support provided  Taken 3/25/2024 0800 by Alicia Garcia RN  Supportive Measures:   decision-making supported   positive reinforcement provided   relaxation techniques promoted  Goal: Balanced Sympathetic/Parasympathetic Function  Outcome: Progressing  Goal: Effective Communication  Outcome: Progressing  Intervention: Establish Effective Communication  Recent Flowsheet Documentation  Taken 3/25/2024 1200 by Alicia Garcia RN  Communication Enhancement Strategies:   call light answered in person   communication board used   extra time allowed for response   nonverbal strategies used   verbal and visual cues paired  Taken 3/25/2024 0800 by Alicia Garcia RN  Communication Enhancement Strategies:   call light answered in person   communication board used   extra time allowed for response   nonverbal strategies used   verbal and visual cues paired  Goal: Optimal Functional Ability  Outcome: Progressing  Intervention: Optimize Functional Ability  Recent Flowsheet Documentation  Taken 3/25/2024 1600 by Alicia Garcia RN  Activity Management: activity adjusted per tolerance  Taken 3/25/2024 1430 by Alicia Garcia RN  Activity Management: back to bed  Taken 3/25/2024 1200 by Alicia Garcia RN  Activity Management: (tilted right) up in chair  Taken 3/25/2024 1030 by Alicia Garcia RN  Activity Management: up in chair  Taken 3/25/2024 1000 by Alicia Garcia RN  Activity Management: activity adjusted per tolerance  Taken 3/25/2024 0830 by Alicia Garcia RN  Activity Management: activity adjusted per tolerance  Goal: Acceptable Pain Control  Outcome: Progressing  Goal: Effective Oxygenation and  Ventilation  Outcome: Progressing  Intervention: Promote Airway Secretion Clearance  Recent Flowsheet Documentation  Taken 3/25/2024 1600 by Alicia Garcia RN  Cough And Deep Breathing: unable to perform  Activity Management: activity adjusted per tolerance  Taken 3/25/2024 1430 by Alicia Garcia RN  Activity Management: back to bed  Taken 3/25/2024 1200 by Alicia Garcia RN  Cough And Deep Breathing: unable to perform  Activity Management: (tilted right) up in chair  Taken 3/25/2024 1030 by Alicia Garcia RN  Activity Management: up in chair  Taken 3/25/2024 1000 by Alicia Garcia RN  Activity Management: activity adjusted per tolerance  Taken 3/25/2024 0830 by Alicia Garcia RN  Activity Management: activity adjusted per tolerance  Taken 3/25/2024 0800 by Alicia Garcia RN  Cough And Deep Breathing: unable to perform  Intervention: Optimize Oxygenation and Ventilation  Recent Flowsheet Documentation  Taken 3/25/2024 1600 by Alicia Garcia RN  Head of Bed (HOB) Positioning: HOB at 30 degrees  Taken 3/25/2024 1430 by Alicia Garcia RN  Head of Bed (HOB) Positioning: HOB at 30 degrees  Taken 3/25/2024 1000 by Alicia Garcia RN  Head of Bed (HOB) Positioning: HOB at 30 degrees  Goal: Optimal Sensorimotor Function  Outcome: Progressing  Intervention: Optimize Neurologic Status  Recent Flowsheet Documentation  Taken 3/25/2024 1600 by Alicia Garcia RN  Range of Motion: active ROM (range of motion) encouraged  Taken 3/25/2024 1200 by Alicia Garcia RN  Range of Motion: active ROM (range of motion) encouraged  Taken 3/25/2024 0830 by Alicia Garcia RN  Range of Motion: active ROM (range of motion) encouraged  Goal: Oral Nutrition Intake without Aspiration  Outcome: Progressing

## 2024-03-25 NOTE — PROGRESS NOTES
CLINICAL NUTRITION SERVICES - REASSESSMENT NOTE    Recommendations Ordered by Registered Dietitian (RD):   - Updated enteral orders to reflect change in enteral access (PEG placement)   - Continue to monitor stooling and need for bowel program.    Malnutrition:   (3/14)  % Weight Loss:  None noted  % Intake:  No decreased intake noted (on goal TF)  Subcutaneous Fat Loss:  None observed  Muscle Loss:  None observed  Fluid Retention:  None noted     Malnutrition Diagnosis: Patient does not meet two of the above criteria necessary for diagnosing malnutrition     EVALUATION OF PROGRESS TOWARD GOALS   Diet: NPO  Nutrition Support: TF continues at goal as follows:     Nutrition Support Enteral:  Type of Feeding Tube: PEG (3/22)   Enteral Frequency:  Continuous  Enteral Regimen: Vital HP @ 55 mL/hr   +2 Pkts Banatrol daily = 90 kcal, 10 g fiber, 4 g protein   Total Enteral Provisions: 1410 kcal (15 kcal/kg), 119 g protein (1.9 g/kg), 10 g fiber, 1204 ml free water   Free Water Flush: 60 mL q 4 hours     Intake/Tolerance:    - TF interrupted on 3/22 for trach and PEG procedures, no other interruptions noted.   - Labs:   Na/K/Mg/Phos all NL   (H)  BUN 46.5 (H), Cr 1.14 (H) - slight trends up  - Stooling:   Rectal tube removed. Last documented stool was 2/22.  - Weight: 107.6 kg as of this morning. Trending up since admission. Likely fluid.     - Meds:   Lasix gtt    ASSESSED NUTRITION NEEDS:  Dosing Weight 91.5 kg (energy) and 63.6 kg (protein)  Estimated Energy Needs: 2603-1807 kcals (14-17 Kcal/Kg)  Justification: obese and vented  Estimated Protein Needs:  grams protein (1.5-2 g pro/Kg)  Justification: hypercatabolism with critical illness and obesity guidelines     NEW FINDINGS:   3/22 - Trach and PEG     Previous Goals:   Goal TF regimen will continue to meet % needs    Evaluation: Met    Previous Nutrition Diagnosis:   No nutrition diagnosis identified at this time as TF regimen meeting needs      Evaluation: No change    CURRENT NUTRITION DIAGNOSIS  No nutrition diagnosis identified at this time     INTERVENTIONS  Recommendations / Nutrition Prescription  Continue TF as ordered    Implementation  Collaboration and Referral of Nutrition Care: RD attendance at interdisciplinary rounds.     Goals  TF @ goal to provide % estimated needs.    MONITORING AND EVALUATION:  Progress towards goals will be monitored and evaluated per protocol and Practice Guidelines    Catherine Gambino RD, LD  Pager: [phone]  Weekend Pager: [phone]

## 2024-03-26 NOTE — PROVIDER NOTIFICATION
Discussed with Dr. Tran that pt continues to be in a fib rvr after amiodarone bolus, magnesium, and potassium replacement per Dr. Millard. Will be ordering continuous drip. Also, pt having increased O2 needs without increase in secretions- will be getting CXR and ABG.

## 2024-03-26 NOTE — PROCEDURES
Procedures      Procedure:   Bronchoscopy with BAL        Indication:   Airway inspection of SANGEETA lesion (seen on prior bronch)        Consent:   Obtained from the patient/family.     Pre-medication:   Versed 2mg, benadryl  50mg  Lidocaine 2% 6 ml via trach tube         Procedure Summary:   Time out was performed.   The scope inserted thru the trach tube.  Exam of trachea and bronchus of the right and left bronchial tree to the sub-segmental level revealed lesion at take off of SANGEETA and lingula as seen prior. Some mucous in SANGEETA thus attempted to advance scope with mild ooze adjacent to lesion. See picture.  Scope left in place and chilled saline applied with hemostasis.   The patient tolerated the procedure well some post procedural hypotension (secondary to sedation)  The procedure was performed in the ICU--and vital sign parameters were monitored.        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  Though alternate possibilities include neuroendocrine tumour's -case report of GBS associated with lung malignancies   Mild hemorrhage (pt on lovenox), resolved.   If continued heme , place left side down, repeat airway inspection.   Hold next lovenox dose     Regan Bui MD

## 2024-03-26 NOTE — PROGRESS NOTES
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 Presented 3/8 after fall at home with 3-4 days of preceding mild generalized weakness (had started using walker as opposed to cane to walk). No preceding illness (URI, GI, or other), vaccination, travel, unusual exposures. Found to have new heart failure (EF 23%) LHC negative  On 3/10 RRT for new respiratory failure and diffuse weakness requiring intubation, VC 1L, NIF -25 with hypercarbia. Exam consistent with diffuse, areflexic flaccid paresis that included face/neck muscles., fall MRI brain w wo namita unrevealing. Underwent urgent PLEX on 3/10 with hemodynamic instability afterward. Electrolytes are normal. New afib with RVR. MRI total spine w wo namita unrevealing. CT CAP unrevealing (endometrial fibroid vs nonmalignant polyp, extrahepatic bile duct wall thickening, L upper lung nodule). LP basic studies: WBC 3, , Protein 55.3, glucose 59; meningitis/encephalitis panel negative. PLex initiated empirically and completed last week. Course complicated by rapid afib polymicrobial PNA (MSSA, non ESBL kleb and Hafnia) as well as marked volume overload (15L up from admit).      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   I have personally reviewed the daily labs, imaging studies, cultures and discussed the case with referring physician and consulting physicians.     My assessment and plan by system for this patient is as follows:    Neurology/Psychiatry:   1. 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 W/unit(s) consistent with GBS - Plex initiated and completed. Slow recovery of motor function  2. Pain/analgesia:  dilaudid gtt with prn and as needed   3. Sedation: none  PLAN  - continue supportive care   - neuro following  - PT/OT    Cardiovascular:   Takatsubo cardiomyopathy with chf exacerbation and volume overload;  Repeated TTE, still hypokinesia but improved and EF is recovering to 40-45%  Afib with RVR, back on amiodarone gtt  Hypotension - current episode related to fluid shifts, + element of vasoplegia/autonomic dysfunction  PLAN  - follow hemodynamics, goal MAP >65,   - discontinue amio given MAP appropriate  - trial low dose metoprolol   -   low dose lasix gtt as still grossly volume overloaded    - PO amiodarone 200 every day (already received 10g IV)   - lovenox for DVT, watch creatinine     Pulmonary/Ventilator Management:   Acute hypoxemic hypercapnic respiratory failure, requiring mechanical ventilation, multifactorial, noted gayle PNA, volume overload a large  contributor, and evidence of likely thromboembolic disease  Polymicrobial PNA - dense RUL consolidation  Pulm edema and volume overload  Darkened Airways; erroneous - initially seen on bronch 3/19, thought anthracosis however no overt exposures to repeat bronch (different monitor) normal airways (pink w/o evidence)  cytology negative   DVT  pt previously on low dose heparin (afib) not necessarily a therapeutic failure,  PLAN  -  continue lung protective ventilation, currently at ~ 8m/kg ,   - wean PEEP 12  - pulm toilet  - continue meropenem, FUP culture sensitivities     - continue diuresis as above   - repeat bronch for airway inspection    GI and Nutrition :   1. RD consult, on TF  2.  PPI for pud prophy  Plan  - continue enteral feeds     Renal/Fluids/Electrolytes:   1. BHAVNA, non oliguric appropriate -  2.  Electrolyte abnormalities - variable - hypokalemia, hypernatremia, hypocalcemia 2/2 critical illness,in setting of diuresis   3. Acid base - stable    4. Volume overload - +10 L positive  (admit weight 94 kg- currently 107)  Plan   - maintain hemodynamics   -Monitor urine output and I&O, avoid nephrotoxic medications.  -replace electrolytes per protocol.    - lasix gtt      Infectious Disease:   1. Sepsis, secondary to HCAP , polymicrobial with R organism (Hafnia) ,with worsening imaging and secretions on current therapy . Bronch with continued Hafnia growth, changed to meropenem given concern for R and repeat growth   - continue meropenem, restart count #5/10    - serial CRP and procal    Endocrine:   1. Stress hyperglycemia   - BS control per ICU protocol     Hematology/Oncology:   1. DVT - as above, unclear if therapeutic failure, platelets stable argue against JO , changed to lovenox 3/23, need to watch cr however  2. New leukocytosis-   3. Anemia,  1 unit(s) PRBC (3/20), no evidence for overt blood loss.   4. Coagulopathy, med induced, need watch hemoptysis and weight risk benefit with lovenox  Plan  -  serial CBC, coags  -Hgb goal >7   - lovenox (to mitigate volume form heparin gtt)    ICU Prophylaxis:   1. DVT: mechanical; lovenox  2. VAP: HOB 30 degrees, chlorhexidine rinse  3. Stress Ulcer: PPI  4. Restraints: None indicated. Will readdress daily.   5. Wound care  - per unit routine   6. Feeding -tube feed  7. Family Update: family  at bedside  8. Disposition - icu      Critical Care Time: 30 min.  I spent this time (excluding procedures) personally providing and directing critical care services at the bedside and on the critical care unit.     Regan Bui MD  Pulmonary and Critical Care            Key Medications:      albuterol  2.5 mg Nebulization Q6H    amiodarone  200 mg Oral or FT or NG tube Daily    [Held by provider] aspirin  81 mg Oral or Feeding Tube Daily    chlorhexidine  15 mL Mouth/Throat Q12H    enoxaparin ANTICOAGULANT  0.75 mg/kg Subcutaneous Q12H    meropenem  1 g Intravenous Q8H    miconazole   Topical BID    pantoprazole  40 mg Per Feeding Tube QAM AC    Or    pantoprazole  40 mg Intravenous QAM AC    potassium chloride  20 mEq Oral or Feeding Tube Once    [Held by provider] QUEtiapine  50 mg Oral or Feeding Tube BID    sodium chloride (PF)  3 mL Intracatheter Q8H      amiodarone 1 mg/min (03/26/24 0114)    amiodarone      dextrose      furosemide (LASIX) 100 mg in sodium chloride 0.9 % 100 mL infusion 10 mg/hr (03/26/24 0507)    HYDROmorphone 0.5 mg/hr (03/26/24 0330)    - MEDICATION INSTRUCTIONS -      norepinephrine Stopped (03/24/24 1048)    - MEDICATION INSTRUCTIONS -                Physical Examination:   Temp:  [98.2  F (36.8  C)-99.1  F (37.3  C)] 98.2  F (36.8  C)  Pulse:  [] 117  Resp:  [10-46] 46  BP: ()/(45-81) 117/67  FiO2 (%):  [50 %-80 %] 80 %  SpO2:  [88 %-99 %] 90 %      Intake/Output Summary (Last 24 hours) at 3/26/2024 0629  Last data filed at 3/26/2024 0600  Gross per 24 hour   Intake 2854.37 ml   Output 4375 ml   Net -1520.63 ml       Wt  Readings from Last 4 Encounters:   03/26/24 106.5 kg (234 lb 12.6 oz)   07/12/23 93 kg (205 lb)   05/02/23 92.4 kg (203 lb 12.8 oz)   04/28/23 92.4 kg (203 lb 12.8 oz)     BP - Mean:  [61-94] 75  Vent Mode: CMV/AC  (Continuous Mandatory Ventilation/ Assist Control)  FiO2 (%): 60 %  Resp Rate (Set): 14 breaths/min  Tidal Volume (Set, mL): 420 mL  PEEP (cm H2O): 12 cmH2O  Resp: 15    Recent Labs   Lab 03/26/24  0108 03/24/24  0733 03/21/24  0432   PH 7.43 7.38 7.38   PCO2 64* 61* 49*   PO2 85 60* 58*   HCO3 42* 36* 29*   O2PER 80 55 55       GEN: no acute distress resting comfortably this AM  HEENT: head ncat, sclera anicteric, OP patent, trachea midline  tracheostomy c/d/I   PULM: unlabored synchronous coarse BS anteriorly   CV/COR: irregular  S1S2 no gallop,  No rub, no murmur. Bilateral pitting edema   ABD: soft nontender  EXT:  warm and well perfused x4  NEURO: PERRL, patient follows commands, improved weakness in the upper and lower extremities   SKIN: no obvious rash  LINES: clean, dry intact         Data:   All data and imaging reviewed     ROUTINE ICU LABS (Last four results)  CMP  Recent Labs   Lab 03/26/24  0514 03/26/24  0315 03/25/24  2159 03/25/24  1435 03/25/24  0527 03/24/24  2139 03/24/24  1622 03/24/24  0416 03/23/24  0826 03/23/24  0410     --   --   --  143  --   --  141  --  143   POTASSIUM 3.6  3.6  --  3.1* 3.9 3.8  3.8   < > 4.1 4.0  4.0   < > 3.9  3.9   CHLORIDE 100  --   --   --  99  --   --  99  --  99   CO2 35*  --   --   --  34*  --   --  30*  --  32*   ANIONGAP 9  --   --   --  10  --   --  12  --  12   *  --  99  --  120*  --   --  110*   < > 153*   BUN 45.1*  --   --   --  46.5*  --   --  38.8*  --  30.1*   CR 0.88  --   --   --  1.14*  --  1.16* 1.22*  --  1.06*   GFRESTIMATED 68  --   --   --  50*  --  49* 46*  --  54*   YEE 8.1*  --   --   --  8.6*  --   --  8.1*  --  8.3*   MAG 2.2 2.2 1.7 1.9 2.0   < > 2.0 1.9   < > 2.0   PHOS  --  2.9  --   --  3.8  --   --  3.7   "--  2.8   PROTTOTAL 6.3*  --   --   --  6.1*  --   --  5.8*  --  6.2*   ALBUMIN 3.0*  --   --   --  3.0*  --   --  3.1*  --  3.4*   BILITOTAL 0.5  --   --   --  0.6  --   --  0.6  --  0.8   ALKPHOS 79  --   --   --  60  --   --  61  --  62   AST 31  --   --   --  30  --   --  29  --  30   ALT 17  --   --   --  15  --   --  13  --  14    < > = values in this interval not displayed.     CBC  Recent Labs   Lab 03/26/24  0315 03/25/24  0527 03/24/24  0416 03/23/24  1025   WBC 14.5* 10.3 8.8 8.5   RBC 2.86* 2.52* 2.51* 2.52*   HGB 8.4* 7.3* 7.3* 7.5*   HCT 25.6* 22.5* 22.4* 22.4*   MCV 90 89 89 89   MCH 29.4 29.0 29.1 29.8   MCHC 32.8 32.4 32.6 33.5   RDW 18.4* 19.0* 18.7* 18.7*    252 227 240     INRNo lab results found in last 7 days.  Arterial Blood Gas  Recent Labs   Lab 03/26/24  0108 03/24/24  0733 03/21/24  0432   PH 7.43 7.38 7.38   PCO2 64* 61* 49*   PO2 85 60* 58*   HCO3 42* 36* 29*   O2PER 80 55 55       All cultures:  No results for input(s): \"CULT\" in the last 168 hours.      "

## 2024-03-26 NOTE — PROGRESS NOTES
[facility] ICU RESPIRATORY NOTE        Date of Admission: 3/8/2024    Date of Intubation (most recent): 3/22 tracheostomy    Reason for Mechanical Ventilation: Respiratory failure    Number of Days on Mechanical Ventilation: 17    Met Criteria for Spontaneous Breathing Trial: No    Reason for No Spontaneous Breathing Trial: Criteria not met with PEEP of 14     Significant Events Today: FiO2 increased to 70    ABG Results:   Recent Labs   Lab 03/26/24  0108 03/24/24  0733 03/21/24  0432   PH 7.43 7.38 7.38   PCO2 64* 61* 49*   PO2 85 60* 58*   HCO3 42* 36* 29*   O2PER 80 55 55         Current Vent Settings: Vent Mode: CMV/AC  (Continuous Mandatory Ventilation/ Assist Control)  FiO2 (%): 80 %  Resp Rate (Set): 14 breaths/min  Tidal Volume (Set, mL): 420 mL  PEEP (cm H2O): 14 cmH2O  Resp: 17      Skin Assessment: Intact    Plan: Continue full vent support and assess readiness for SBT    RT Bessie on 3/26/2024 at 3:40 AM

## 2024-03-26 NOTE — PLAN OF CARE
Pt follows commands, generalized weakness. Tele has been Afib CVR in 70's since noon today- controlling w/ PRN Metoprolol instead of amio. LS are coarse. Secretions are thick through trach. Loose BM today after working w/ therapies. Nye in place for retention- 5 HIAA qualitative urine orders clarified w/ lab (random sample sent down after being okayed by lab). TF at goal. Bronch today, see results. R triple PICC in place. Levo started d/t soft BP's. Family updated throughout shift.

## 2024-03-26 NOTE — PLAN OF CARE
Goal Outcome Evaluation:      Plan of Care Reviewed With: patient    Overall Patient Progress: no changeOverall Patient Progress: no change    Outcome Evaluation: alert, follows commands in all extremities, no effort against gravity in uppers, strength 2/3 uppers/lowers. Pt in a fib rvr around 2300, see previous note for more details. Currently in ST 110s since 0515, BP normotensive. O2 needs increased from 50% FiO2 to 80%, CXR and ABG obtained, results relatively unchanged per , no vent changes made and lasix drip remains on d/t CXR. No BM, passing gas, Nye in place with large amt UOP (600+ ml q2h in am d/t lasix gtt). Mag and K replaced per protocols, rechecking q8h.      Problem: Guillain-[place]  Syndrome  Goal: Effective Communication  Intervention: Establish Effective Communication  Recent Flowsheet Documentation  Taken 3/26/2024 0400 by Eneida Solano, RN  Communication Enhancement Strategies:   call light answered in person   communication board used   extra time allowed for response   nonverbal strategies used   verbal and visual cues paired   one-step directions provided   repetition utilized   family involved in communication plan  Taken 3/26/2024 0000 by Eneida Solano, RN  Communication Enhancement Strategies:   call light answered in person   communication board used   extra time allowed for response   nonverbal strategies used   verbal and visual cues paired   one-step directions provided   repetition utilized   family involved in communication plan  Taken 3/25/2024 2000 by Eneida Solano, RN  Communication Enhancement Strategies:   call light answered in person   communication board used   extra time allowed for response   nonverbal strategies used   verbal and visual cues paired   one-step directions provided   repetition utilized   family involved in communication plan     Problem: Guillain-[place]  Syndrome  Goal: Effective Oxygenation and Ventilation  Outcome: Not  Progressing  Intervention: Promote Airway Secretion Clearance  Recent Flowsheet Documentation  Taken 3/26/2024 0400 by Eneida Solano RN  Activity Management: activity adjusted per tolerance  Taken 3/26/2024 0000 by Eneida Solano RN  Activity Management: activity adjusted per tolerance  Taken 3/25/2024 2000 by Eneida Solano RN  Activity Management: activity adjusted per tolerance  Intervention: Optimize Oxygenation and Ventilation  Recent Flowsheet Documentation  Taken 3/26/2024 0600 by Eneida Solano RN  Head of Bed (HOB) Positioning: HOB at 30 degrees  Taken 3/26/2024 0400 by Eneida Solano RN  Airway/Ventilation Management:   airway patency maintained   calming measures promoted  Head of Bed (HOB) Positioning: HOB at 30 degrees  Taken 3/26/2024 0200 by Eneida Solano RN  Head of Bed (HOB) Positioning: HOB at 30 degrees  Taken 3/26/2024 0000 by Eneida Solano RN  Airway/Ventilation Management:   airway patency maintained   calming measures promoted  Head of Bed (HOB) Positioning: HOB at 30 degrees  Taken 3/25/2024 2200 by Eneida Solano RN  Head of Bed (HOB) Positioning: HOB at 30 degrees  Taken 3/25/2024 2000 by Eneida Solano RN  Airway/Ventilation Management:   airway patency maintained   calming measures promoted  Head of Bed (HOB) Positioning: HOB at 30 degrees

## 2024-03-26 NOTE — PROGRESS NOTES
Igor's test performed prior to radial ABG draw. Collateral circulation confirmed. Sample obtained from Right radial artery while patient on 80 % FIO2.      Michael Harmon, RT

## 2024-03-27 NOTE — PROGRESS NOTES
[facility] ICU RESPIRATORY NOTE        Date of Admission: 3/8/2024    Date of Intubation (most recent):  3/22    Reason for Mechanical Ventilation: Resp failure    Number of Days on Mechanical Ventilation: 18    Met Criteria for Spontaneous Breathing Trial: No    Reason for No Spontaneous Breathing Trial: Peep +18    Significant Events Today:  None    ABG Results:   Recent Labs   Lab 03/26/24  0108 03/24/24  0733 03/21/24  0432   PH 7.43 7.38 7.38   PCO2 64* 61* 49*   PO2 85 60* 58*   HCO3 42* 36* 29*   O2PER 80 55 55         Current Vent Settings: Vent Mode: CMV/AC  (Continuous Mandatory Ventilation/ Assist Control)  FiO2 (%): 85 %  Resp Rate (Set): 14 breaths/min  Tidal Volume (Set, mL): 420 mL  PEEP (cm H2O): 12 cmH2O  Resp: 18      Skin Assessment: Intact    Plan: Pt to remain on full vent support overnight    John Go, RT

## 2024-03-27 NOTE — PROGRESS NOTES
[facility] ICU RESPIRATORY NOTE        Date of Admission: 3/8/2024    Date of Intubation (most recent): 3/22 trach    Reason for Mechanical Ventilation: resp failure    Number of Days on Mechanical Ventilation: 18    Met Criteria for Spontaneous Breathing Trial: No    Reason for No Spontaneous Breathing Trial: peep 14    Significant Events Today: FIO2 increased to 85%    ABG Results:   Recent Labs   Lab 03/26/24  0108 03/24/24  0733 03/21/24  0432   PH 7.43 7.38 7.38   PCO2 64* 61* 49*   PO2 85 60* 58*   HCO3 42* 36* 29*   O2PER 80 55 55         Current Vent Settings: Vent Mode: CMV/AC  (Continuous Mandatory Ventilation/ Assist Control)  FiO2 (%): 85 %  Resp Rate (Set): 14 breaths/min  Tidal Volume (Set, mL): 420 mL  PEEP (cm H2O): 12 cmH2O  Resp: 18      Skin Assessment: intact    Plan: follow care plan    Herb Sanchez, RT on 3/27/2024 at 3:40 AM

## 2024-03-27 NOTE — PLAN OF CARE
Problem: Mechanical Ventilation Invasive  Goal: Effective Communication  Outcome: Progressing  Intervention: Ensure Effective Communication  Recent Flowsheet Documentation  Taken 3/27/2024 0400 by Jayshree Herrmann RN  Communication Enhancement Strategies:   call light answered in person   extra time allowed for response   nonverbal strategies used   one-step directions provided   repetition utilized  Family/Support System Care: self-care encouraged  Trust Relationship/Rapport:   care explained   questions answered   reassurance provided   thoughts/feelings acknowledged  Taken 3/27/2024 0000 by Jayshree Herrmann RN  Communication Enhancement Strategies:   call light answered in person   extra time allowed for response   nonverbal strategies used   one-step directions provided   repetition utilized  Family/Support System Care: self-care encouraged  Trust Relationship/Rapport:   care explained   questions answered   reassurance provided   thoughts/feelings acknowledged  Taken 3/26/2024 2000 by Jayshree Herrmann RN  Communication Enhancement Strategies:   call light answered in person   extra time allowed for response   nonverbal strategies used   one-step directions provided   repetition utilized  Family/Support System Care: self-care encouraged  Trust Relationship/Rapport:   care explained   questions answered   reassurance provided   thoughts/feelings acknowledged     Problem: Mechanical Ventilation Invasive  Goal: Optimal Nutrition Delivery  Outcome: Progressing  Intervention: Optimize Nutrition Delivery  Recent Flowsheet Documentation  Taken 3/27/2024 0400 by Jayshree Herrmann RN  Nutrition Support Management: weight trending reviewed  Taken 3/27/2024 0000 by Jayshree Herrmann RN  Nutrition Support Management: weight trending reviewed  Taken 3/26/2024 2000 by Jayshree Herrmann RN  Nutrition Support Management: weight trending reviewed   Goal Outcome Evaluation:       Summary: Guillain-Berre syndrome, ACS, Sepsis      [id number]  Neuro:A/O, follows commands, moves all extremities.     CV/Tele: A.fib to NSR. On levo. Goal to keep HR < 130, no issue overnight.    Resp: Trached on vent : RR 14, , FiO2 85, PEEP 12. 6.0 Shiley. Thick secretions, LS coarse to clear    GI/:PEG infusing at goal. 1 loose BM overnight. Nye in place for retention, adequate UOP.     Skin: Dry oral mucosa. Excoriated stevie area. Scattered bruising.     IV/infusion:3 lumen PICC in place. Dilaudid, lasix, levo, TKO.     Followed By: ICU, Neuro

## 2024-03-27 NOTE — PROGRESS NOTES
ICU staff  DOS 3/27/2024    Developed pressor requirement overnight. Bronchoscopy yesterday identified an endobronchial lesion in the left upper lobe of uncertain etiology/significance.     Vitals:   Temp:  [98.6  F (37  C)-99.9  F (37.7  C)] 99.1  F (37.3  C)  Pulse:  [59-82] 82  Resp:  [9-41] 27  BP: ()/() 125/62  FiO2 (%):  [70 %-85 %] 85 %  SpO2:  [88 %-97 %] 93 %     Vent Mode: CMV/AC  (Continuous Mandatory Ventilation/ Assist Control)  FiO2 (%): 85 %  Resp Rate (Set): 14 breaths/min  Tidal Volume (Set, mL): 420 mL  PEEP (cm H2O): 12 cmH2O  Resp: 27    I/O last 3 completed shifts:  In: 2576.36 [I.V.:1466.36; NG/GT:450]  Out: 2660 [Urine:2660]    NE 0.2-0.33  Furosemide 10  Hydromorphone 0.1    Exam:  Gen: Sleeping  HEENT: NC/AT, tracheostomy in place  Pulm: Remains on 12/85%, no bloody secretions  Cor: RRR  Abdomen/GI: Soft, nondistended  : Nye in place, urine clear  Extremities: Warm, mild edema  Skin: Well-perfused  Neuro: Sleeping  Psych: Unable to assess    Data:   Labs reviewed  - Na up at 148 today  - Lactate sent this afternoon -> 0.8  - WBCs 12.7 <- 14.5  - K 2.6  No new imaging or culture results  EKG done for QTc screening -- 528 today, down from 550s    Assessment/plan:  79 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 Progressive paresis and respiratory failure, now is s/p tracheostomy/feeding tube. Over the last 24 hours, is more hypotensive and hypoxemic with unclear etiology.   CNS: Sleeping when I saw her and not awoken, did well with therapies and per neurology has an improving exam.   # Guillain-[place] syndrome  # Pain/sedation  - Continue supportive cares, PT/OT, out of bed as able  - Hydromorphone if needed for pain  Pulm: Tolerating ventilator but PEEP 12, FiO2 85% since hemorrhage yesterday  # Acute hypoxemic/hypercarbic respiratory failure  # Tracheostomy status  # Pulmonary edema  # Endobronchial lesion left upper lobe  - Unsure why she's more  hypoxemic --> ddx would include worsening pneumonia/edema (though CXR is stable to improved), derecruitment from bronchoscopy, PE (on therapeutic a/c)  - Doesn't have a smoking history  - Continue ventilator support, wean FiO2   - Further workup of endobronchial mass will need to await improvement in her condition; getting 24-hour 5-HIAA to check for carcinoid  CV: More hypotensive since procedure yesterday; Hb dropped a little but no continued evidence of bleeding.  # Hypotension/shock, multifactorial  # Atrial fibrillation, resolved  # Takotsubo cardiomyopathy with improving EF (40-45%)  - Increased pressor demands --> ?etiology. Will stop furosemide gtt for now and give back some albumin, possible that we're getting her intravascularly dry. Checked a lactate which is reassuring at 0.8, and on exam she looks adequately perfused. Will add stress steroids and make vasopressin available to help spare catecholamines if NE consistently >0.2 mcg/kg/min. No real indication for additional antimicrobials at the moment. No evidence of bleeding.  GI: Abdomen benign.  # Nutrition  # Risk of constipation/neurogenic bowel  - Continue tube feedings  - Made bowel regimen scheduled  : UOP brisk, weights coming down  # Hypervolemia  # BHAVNA, improved  # Hypokalemia  - Replace lytes on protocol  - Hold furosemide for tonight, may resume as prn dosing tomorrow if pressures improve off the infusion  Heme: Hb 7.9 (8.4)  # Anemia of critical illness  # DVT  - No indication to transfuse  - Therapeutic enoxaparin  Msk: Extremities warm, well-perfused.   Endo: Glucose 126-134  # Stress hyperglycemia  - Sliding scale insulin  ID: Afebrile, WBCs trending down a little  # Polymicrobial healthcare-associated pneumonia  # Sepsis  - Continue meropenem for H alvei  - Will check an AM procalcitonin  ICU: LMWH, PPI  - Family updated at bedside    This patient is critically ill to my assessment and requires ICU monitoring and cares. A total of 60  "minutes critical care time spent on 3/27/2024, exclusive of procedures.     Pierre Wilkerson MD, PhD  Surgical critical care  3/27/2024, 4:02 PM    Clinically Significant Risk Factors        # Hypokalemia: Lowest K = 3.1 mmol/L in last 2 days, will replace as needed  # Hypernatremia: Highest Na = 148 mmol/L in last 2 days, will monitor as appropriate      # Hypoalbuminemia: Lowest albumin = 2.8 g/dL at 3/27/2024  4:32 AM, will monitor as appropriate     # Hypertension: Noted on problem list        # Obesity: Estimated body mass index is 34.77 kg/m  as calculated from the following:    Height as of this encounter: 1.727 m (5' 7.99\").    Weight as of this encounter: 103.7 kg (228 lb 9.9 oz).      # Financial/Environmental Concerns: none                "

## 2024-03-27 NOTE — PLAN OF CARE
Problem: Risk for Delirium  Goal: Optimal Coping  Outcome: Progressing  Intervention: Optimize Psychosocial Adjustment to Delirium  Recent Flowsheet Documentation  Taken 3/27/2024 1600 by Rex Barkley RN  Supportive Measures:   relaxation techniques promoted   positive reinforcement provided  Family/Support System Care: self-care encouraged  Taken 3/27/2024 1200 by Rex Barkley RN  Supportive Measures:   relaxation techniques promoted   positive reinforcement provided  Family/Support System Care: self-care encouraged  Taken 3/27/2024 0800 by Rex Barkley RN  Supportive Measures:   relaxation techniques promoted   positive reinforcement provided  Family/Support System Care: self-care encouraged     Problem: Mechanical Ventilation Invasive  Goal: Effective Communication  Outcome: Progressing  Intervention: Ensure Effective Communication  Recent Flowsheet Documentation  Taken 3/27/2024 1600 by Rex Barkley RN  Communication Enhancement Strategies:   call light answered in person   extra time allowed for response   nonverbal strategies used   one-step directions provided   repetition utilized  Family/Support System Care: self-care encouraged  Trust Relationship/Rapport:   care explained   questions answered   reassurance provided   thoughts/feelings acknowledged  Taken 3/27/2024 1200 by Rex Barkley RN  Communication Enhancement Strategies:   call light answered in person   extra time allowed for response   nonverbal strategies used   one-step directions provided   repetition utilized  Family/Support System Care: self-care encouraged  Trust Relationship/Rapport:   care explained   questions answered   reassurance provided   thoughts/feelings acknowledged  Taken 3/27/2024 0800 by Rex Barkley RN  Communication Enhancement Strategies:   call light answered in person   extra time allowed for response   nonverbal strategies used   one-step directions provided   repetition  utilized  Family/Support System Care: self-care encouraged  Trust Relationship/Rapport:   care explained   questions answered   reassurance provided   thoughts/feelings acknowledged   Goal Outcome Evaluation:                 Outcome Evaluation: Able to wean pressors. K, mag, and phos all replaced. Respiratory stable. multiple BMs today. Nye with good UO.

## 2024-03-28 NOTE — CONSULTS
[facility] Nurse Inpatient Assessment     Consulted for: Coccyx, now peeling    Summary: Patient vented, can nod head yes/no but not verbalizing, cannot turn or reposition herself without assistance  RN reported Rash to back, requested [facility] eval. 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 Progressive paresis and respiratory failure, now is s/p tracheostomy/feeding tube. Over the last 24 hours, is more hypotensive and hypoxemic with unclear etiology.   CNS: Sleeping when I saw her and not awoken, did well with therapies and per neurology has an improving exam.   # Guillain-[place] syndrome  # Pain/sedation  - Continue supportive cares, PT/OT, out of bed as able  Pulm: Tolerating ventilator but PEEP 12, FiO2 85% since hemorrhage yesterday  # Acute hypoxemic/hypercarbic respiratory failure  # Tracheostomy status  # Pulmonary edema  # Endobronchial lesion left upper lobe  - Unsure why she's more hypoxemic --> ddx would include worsening pneumonia/edema (though CXR is stable to improved), derecruitment from bronchoscopy, PE (on therapeutic a/c)  - Doesn't have a smoking history  - Continue ventilator support, wean FiO2   - Further workup of endobronchial mass will need to await improvement in her condition; getting 24-hour 5-HIAA to check for carcinoid  CV: More hypotensive since procedure yesterday; Hb dropped a little but no continued evidence of bleeding.  # Hypotension/shock, multifactorial  # Atrial fibrillation, resolved  # Takotsubo cardiomyopathy with improving EF (40-45%)  - Increased pressor demands --> ?etiology. Will stop furosemide gtt for now and give back some albumin, possible that we're getting her intravascularly dry. Checked a lactate which is reassuring at 0.8, and on  exam she looks adequately perfused. Will add stress steroids and make vasopressin available to help spare catecholamines if NE consistently >0.2 mcg/kg/min. No real indication for additional antimicrobials at the moment. No evidence of bleeding.  GI: Abdomen benign.  # Nutrition  # Risk of constipation/neurogenic bowel  - Continue tube feedings  - Made bowel regimen scheduled  : UOP brisk, weights coming down  # Hypervolemia  # BHAVNA, improved  # Hypokalemia  - Replace lytes on protocol  - Hold furosemide for tonight, may resume as prn dosing tomorrow if pressures improve off the infusion  Heme: Hb 7.9 (8.4)  # Anemia of critical illness  # DVT  - No indication to transfuse  - Therapeutic enoxaparin  Msk: Extremities warm, well-perfused.   Endo: Glucose 126-134  # Stress hyperglycemia  - Sliding scale insulin  ID: Afebrile, WBCs trending down a little  # Polymicrobial healthcare-associated pneumonia  # Sepsis  - Continue meropenem for H alvei  - Will check an AM procalcitonin    3/27: Developed pressor requirement overnight. Bronchoscopy yesterday identified an endobronchial lesion in the left upper lobe of uncertain etiology/significance.      Assessment:      Areas visualized during today's visit: Focused:, Sacrum/coccyx, and perineal areas    Wound location: coccyx and perineal areas    Coccyx       Perianal and posterior thighs   Poornima genital, inner thighs    Last photo: 3/28/24  Wound due to: Friction, Shear, Incontinence Associated Dermatitis (IAD), Moisture Associated Skin Damage (MASD), and suspect yeast rash  Wound history/plan of care: denuded perianal skin with FMS in place, Nye in place  but inner thighs, perigenital, perianal areas with red rash appearance to skin,  warm to touch. Suspected yeast rash. Related to incontinence of bowel and bladder, MASD and likely excessive wiping of skin for cleansing. No barrier cream in use at time of assessment, preventative mepilex in place over sacrum.  Coccyx/gluteal crease with peeled and denuded skin and redness that does alpa,related to incontinence moisture trapping, friction and shear, increased risk for evolving skin breakdown related to pressure and MASD. Antifungal powder in  use per RN,if no improvement,recommend  to consider Interdry to groin and stevie area folds to assist with moisture wicking, patient is  on isolibrium support surface with built in HAROLDO function.      Wound base: perigential and thighs, 100 % erythema, epidermis, and rash , perianal: 100 % erythema, epidermis, and denuded , coccyx: 100% blanchable redness, dry,     Palpation of the wound bed: normal      Drainage: none     Description of drainage: none     Measurements (length x width x depth, in cm): difficult to measure denuded skin, centralized around FMS tubing, coccyx: 1  x 1  x  0.1 cm      Tunneling: N/A     Undermining: N/A  Periwound skin: Intact      Color: pink and red      Temperature: warm  Odor: none  Pain: denies , none  Pain interventions prior to dressing change: patient tolerated well, no significant pain present , soaking, and slow and gentle cares   Treatment goal: Heal , Infection control/prevention, Maintain (prevention of deterioration), and Protection  STATUS: initial assessment  Supplies ordered: at bedside, supplies stored on unit, discussed with RN, and discussed with patient       Treatment Plan:     Coccyx and sacrum wound(s): Every 3 days and PRN if dressing is loose or soiled  Cleanse the area with NS and pat dry.  Apply No sting film barrier to periwound skin.  Cover wound with Sacral Mepilex (#301445).  Turn and reposition Q 2hrs side to side only.  Ensure pt has Xiang-cushion while sitting up in the chair.  FYI- If pt has constant incontinent loose stools needing dressing changes Q shift please discontinue the Mepilex dressing and apply criticaid barrier paste BID and PRN.     Perianal, perineal: BID and PRN for episodes of incontinence  Cleanse the area  "with Nicola cleanse and protect, very gently with soft cloth.  Apply ostomy powder (#1397) on all open and denuded skin.  Apply thin layer of critic aid paste on top of it.  With repeat application, do not scrub the paste, only remove soiled paste and reapply.  If complete removal of paste is necessary use baby oil/mineral oil (#817347) and soft wash cloth.  Ensure pt has Xiang-cushion (#460834) while sitting up in the chair.  Use only one Covidien pad in between mattress and pt. No brief while in bed.     Pressure Injury Prevention (PIP) Plan:  If patient is declining pressure injury prevention interventions: Explore reason why and address patient's concerns, Educate on pressure injury risk and prevention intervention(s), If patient is still declining, document \"informed refusal\" , and Ensure Care team is aware ( provider, charge nurse, etc)  Mattress: Follow bed algorithm, reassess daily and order specialty mattress, if indicated.  HOB: Maintain at or below 30 degrees, unless contraindicated  Repositioning in bed: Every 1-2 hours , Left/right positioning; avoid supine, Raise foot of bed prior to raising head of bed, to reduce patient sliding down (shear), and Frequent microturns using TAPS wedges, as patient tolerates  Heels: Keep elevated off mattress, Pillows under calves, and Heel lift boots  Protective Dressing: Sacral Mepilex for prevention (#533690),  especially for the agitated patient   Positioning Equipment: TAPS wedges (#857339) to help maintain 30 degree side lying position   Chair positioning: Chair cushion (#632397) , Assist patient to reposition hourly, and Do NOT use a donut for sitting (this increases pressure to smaller area and creates a higher potential for injury)    If patient has a buttock pressure injury, or high risk for PI use chair cushion or SPS.  Moisture Management: Perineal cleansing /protection: Follow Incontinence Protocol, Avoid brief in bed, Clean and dry skin folds with bathing , " Consider InterDry (#715111) between folds, and Moisturize dry skin  Under Devices: Inspect skin under all medical devices during skin inspection , Ensure tubes are stabilized without tension, and Ensure patient is not lying on medical devices or equipment when repositioned  Ask provider to discontinue device when no longer needed.      Orders: Written    RECOMMEND PRIMARY TEAM ORDER: None, at this time  Education provided: importance of repositioning, plan of care, wound progress, Infection prevention , Moisture management, Hygiene, and Off-loading pressure  Discussed plan of care with: Patient, Family, and Nurse  [facility] nurse follow-up plan: weekly  Notify WO if wound(s) deteriorate.  Nursing to notify the Provider(s) and re-consult the [facility] Nurse if new skin concern.    DATA:     Current support surface: Standard  Low air loss (HAROLDO pump, Isolibrium, Pulsate, skin guard, etc)  Containment of urine/stool: Incontinence Protocol, Incontinent pad in bed, Indwelling catheter, and Internal fecal management  BMI: Body mass index is 35 kg/m .   Active diet order: Orders Placed This Encounter      NPO per Anesthesia Guidelines for Procedure/Surgery Except for: Meds     Output: I/O last 3 completed shifts:  In: 2985.73 [I.V.:1255.73; NG/GT:570]  Out: 2750 [Urine:2650; Stool:100]     Labs:   Recent Labs   Lab 03/28/24  0438   ALBUMIN 3.1*   HGB 7.2*   WBC 11.2*     Pressure injury risk assessment:   Sensory Perception: 3-->slightly limited  Moisture: 2-->very moist  Activity: 2-->chairfast  Mobility: 2-->very limited  Nutrition: 3-->adequate  Friction and Shear: 1-->problem  Tho Score: 13    Enedina Poon, RN, BSN, CWON   Pager no longer is use, please contact through [employer] group: [facility] Nurse Kd Wittt. Office Number: [phone]

## 2024-03-28 NOTE — PLAN OF CARE
Problem: Adult Inpatient Plan of Care  Goal: Plan of Care Review  Description: The Plan of Care Review/Shift note should be completed every shift.  The Outcome Evaluation is a brief statement about your assessment that the patient is improving, declining, or no change.  This information will be displayed automatically on your shift  note.  Outcome: Progressing  Flowsheets (Taken 3/28/2024 0639)  Outcome Evaluation: Able to shake/nod head to questions. Oriented to place, time and person. Follows commands. Weak extremities, attempts to lift arms and legs off from bed. Able to squeeze hands. Tele: SR with T wave inversion. LS coarse. Rectal tube placed d/t constant stools and perennial excoriation. Trach, setting unchanged. Small thick secretions. Peg tube, TF at goal rate. Nye in place. Triple lumen PICC. Dilaudid pump and levo gtt continued.  Goal: Absence of Hospital-Acquired Illness or Injury  Intervention: Identify and Manage Fall Risk  Recent Flowsheet Documentation  Taken 3/28/2024 0400 by Lizette Craig RN  Safety Promotion/Fall Prevention:   activity supervised   clutter free environment maintained   increased rounding and observation   increase visualization of patient   patient and family education   room door open   room near nurse's station   room organization consistent   safety round/check completed  Taken 3/28/2024 0000 by Lizette Craig RN  Safety Promotion/Fall Prevention:   activity supervised   clutter free environment maintained   increased rounding and observation   increase visualization of patient   patient and family education   room door open   room near nurse's station   room organization consistent   safety round/check completed  Taken 3/27/2024 2000 by Lizette Craig RN  Safety Promotion/Fall Prevention:   activity supervised   clutter free environment maintained   increased rounding and observation   increase visualization of patient   patient and  family education   room door open   room near nurse's station   room organization consistent   safety round/check completed  Intervention: Prevent Skin Injury  Recent Flowsheet Documentation  Taken 3/28/2024 0600 by Lizette Craig RN  Body Position:   turned   side-lying   heels elevated  Taken 3/28/2024 0400 by Lizette Craig RN  Body Position:   turned   side-lying   heels elevated  Skin Protection:   adhesive use limited   incontinence pads utilized   pulse oximeter probe site changed   silicone foam dressing in place   skin to skin areas padded   skin to device areas padded   transparent dressing maintained  Device Skin Pressure Protection:   absorbent pad utilized/changed   adhesive use limited   positioning supports utilized   pressure points protected   skin-to-skin areas padded   tubing/devices free from skin contact  Taken 3/28/2024 0200 by Lizette Craig RN  Body Position:   turned   side-lying   heels elevated  Taken 3/28/2024 0000 by Lieztte Craig RN  Body Position:   turned   side-lying   heels elevated  Skin Protection:   adhesive use limited   incontinence pads utilized   pulse oximeter probe site changed   silicone foam dressing in place   skin to skin areas padded   skin to device areas padded   transparent dressing maintained  Device Skin Pressure Protection:   absorbent pad utilized/changed   adhesive use limited   positioning supports utilized   pressure points protected   skin-to-skin areas padded   tubing/devices free from skin contact  Taken 3/27/2024 2200 by Lizette Craig RN  Body Position:   turned   side-lying   heels elevated  Taken 3/27/2024 2000 by Lizette Craig RN  Body Position:   turned   side-lying   heels elevated  Skin Protection:   adhesive use limited   incontinence pads utilized   pulse oximeter probe site changed   silicone foam dressing in place   skin to skin areas padded   skin to device areas  padded   transparent dressing maintained  Device Skin Pressure Protection:   absorbent pad utilized/changed   adhesive use limited   positioning supports utilized   pressure points protected   skin-to-skin areas padded   tubing/devices free from skin contact  Intervention: Prevent and Manage VTE (Venous Thromboembolism) Risk  Recent Flowsheet Documentation  Taken 3/28/2024 0400 by Lizette Craig RN  VTE Prevention/Management: SCDs (sequential compression devices) on  Taken 3/28/2024 0000 by Lizette Craig RN  VTE Prevention/Management: SCDs (sequential compression devices) on  Taken 3/27/2024 2000 by Lizette Craig RN  VTE Prevention/Management: SCDs (sequential compression devices) on  Intervention: Prevent Infection  Recent Flowsheet Documentation  Taken 3/28/2024 0400 by Lizette Craig RN  Infection Prevention:   environmental surveillance performed   equipment surfaces disinfected   personal protective equipment utilized   rest/sleep promoted   single patient room provided  Taken 3/28/2024 0000 by Lizette rCaig RN  Infection Prevention:   environmental surveillance performed   equipment surfaces disinfected   personal protective equipment utilized   rest/sleep promoted   single patient room provided  Taken 3/27/2024 2000 by Lizette Craig RN  Infection Prevention:   environmental surveillance performed   equipment surfaces disinfected   personal protective equipment utilized   rest/sleep promoted   single patient room provided  Goal: Optimal Comfort and Wellbeing  Intervention: Provide Person-Centered Care  Recent Flowsheet Documentation  Taken 3/28/2024 0400 by Lizette Cragi RN  Trust Relationship/Rapport:   care explained   thoughts/feelings acknowledged   choices provided   emotional support provided   reassurance provided  Taken 3/28/2024 0000 by Lizette Craig RN  Trust Relationship/Rapport:   care  explained   thoughts/feelings acknowledged   choices provided   emotional support provided   reassurance provided  Taken 3/27/2024 2000 by Lizette Craig RN  Trust Relationship/Rapport:   care explained   thoughts/feelings acknowledged   choices provided   emotional support provided   reassurance provided   Goal Outcome Evaluation:                 Outcome Evaluation: Able to shake/nod head to questions. Oriented to place, time and person. Follows commands. Weak extremities, attempts to lift arms and legs off from bed. Able to squeeze hands. Tele: SR with T wave inversion. LS coarse. Rectal tube placed d/t constant stools and perennial excoriation. Trach, setting unchanged. Small thick secretions. Peg tube, TF at goal rate. Nye in place. Triple lumen PICC. Dilaudid pump and levo gtt continued.

## 2024-03-28 NOTE — PROGRESS NOTES
Intensivist brief update  DOS: 3/28/2024    Ms Sagar Marques developed tachycardia to the 130s-150s.   She is asymptomatic.  EKG shows sinus tachycardia without ischemic changes.  She is hypertensive now, and coming down on pressor quickly.  Will continue with plan to give albumin and follow HR/BP.  Switch pressor to phenylephrine (unless we can wean to off).  Getting echo.    Pierre Wilkerson MD, PhD  Surgical critical care  March 28, 2024, 1:11 PM

## 2024-03-28 NOTE — PROGRESS NOTES
.  [facility] ICU RESPIRATORY NOTE        Date of Admission: 3/8/2024    Date of Intubation (most recent): 3/22/24    Reason for Mechanical Ventilation: Respiratory Failure    Number of Days on Mechanical Ventilation: 19    Met Criteria for Spontaneous Breathing Trial: No    Reason for No Spontaneous Breathing Trial: Per MD    Significant Events Today: Non    ABG Results:   Recent Labs   Lab 03/26/24  0108 03/24/24  0733 03/21/24  0432   PH 7.43 7.38 7.38   PCO2 64* 61* 49*   PO2 85 60* 58*   HCO3 42* 36* 29*   O2PER 80 55 55         Current Vent Settings: Vent Mode: CMV/AC  (Continuous Mandatory Ventilation/ Assist Control)  FiO2 (%): 85 %  Resp Rate (Set): 14 breaths/min  Tidal Volume (Set, mL): 420 mL  PEEP (cm H2O): 12 cmH2O  Resp: 18      Skin Assessment: Intact    Plan: Continue to monitor     RT Jacinta on 3/28/2024 at 3:22 AM

## 2024-03-28 NOTE — PROGRESS NOTES
ICU staff  DOS 3/28/2024    No major changes overnight. Ms Sagar Marques is awake this morning and moving her extremities with more strength. She remains on 85% oxygen. She is down a bit on NE.    Vitals:   Temp:  [98.2  F (36.8  C)-99.7  F (37.6  C)] 98.6  F (37  C)  Pulse:  [59-85] 80  Resp:  [10-34] 18  BP: ()/(39-70) 112/39  FiO2 (%):  [85 %] 85 %  SpO2:  [60 %-99 %] 95 %     Vent Mode: CMV/AC  (Continuous Mandatory Ventilation/ Assist Control)  FiO2 (%): 85 %  Resp Rate (Set): 14 breaths/min  Tidal Volume (Set, mL): 420 mL  PEEP (cm H2O): 14 cmH2O (increased by MD)  Resp: 18    I/O last 3 completed shifts:  In: 2985.73 [I.V.:1255.73; NG/GT:570]  Out: 2750 [Urine:2650; Stool:100]    NE 0.34 -> 0.2  Hydromorphone 0.4  Furosemide held    Exam:  Gen: Alert, interactive, mouthing words  HEENT: NC/AT, tracheostomy in situ  Pulm: Mechanically ventilated, airway pressures OK  Cor: RRR  Abdomen/GI: Soft, nondistended  : Nye in place  Extremities: Warm, trace edema  Skin: Well-perfused  Neuro: GCS 11t, moving all extremities today  Psych: Calm, interactive    Data:   Labs reviewed  - Hb 7.2 (7.9)  No new imaging or culture results    Assessment/plan:  79 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 Had progressive flaccid paralysis and associated respiratory failure. Is now s/ tracheostomy and feeding tube. Remains ventilator dependent.  CNS: Alert this morning, interactive. Looks like she's moving her extremities with more strength.  # Guillain-Mendota syndrome  # Pain/sedation  - Continues on hydromorphone infusion  - Changing quetiapine to prn in light of prolonged QT  - Continue PT/OT, mobilizing as able  - Appreciate urology follow-up  Pulm: Tolerating mechanical ventilation, still hypoxemic.   # Acute hypoxemic respiratory failure  # Tracheostomy status  # Ventilator dependence  # Left upper lobe endobronchial lesion  - Increased PEEP back to 14, try to wean FiO2  - 5-HIAA  "pending  CV: Pressor needs decreased overall, but still on 0.2 NE  # Hypotension/shock, multifactorial  # Atrial fibrillation, resolved  # Takotsubo cardiomyopathy  - Continue pressor, stress steroid  - Will give gentle fluid challenge again today to see if this helps bring down NE dose  - Continue holding diuretic, might resume as intermittent in the next couple of days  GI: Abdomen soft, nondistended  # Nutrition  # Risk of constipation  - Continue tube feedings  - Multiple BMs, keeping bowel regimen PRN  : UOP brisk even off diuretic  # Hypervolemia, improving  # BHAVNA, improved  # Hypokalemia, improved  # Hypernatremia, mild  - Gentle fluid today, follow for effect  - Hold diuretics today  - Increase enteral free water  Heme: Hb 7.2 (7.9)  # Anemia of critical illness  # DVT  - No indication to transfuse  - On enoxaparin  Msk: Extremities warm, well-perfused.   Endo: Glucose 142-155.  # Stress hyperglycemia  - Continue ICU insulin protocol  ID: Afebrile, WBCs 11.  # Polymicrobial healthcare-associated pneumonia  # Sepsis  - Continue meropenem  ICU: LMWH, PPI  - Updated family at bedside    This patient is critically ill to my assessment and requires ICU monitoring and cares. A total of 40 minutes critical care time spent on 3/28/2024, exclusive of procedures.     Pierre Wilkerson MD, PhD  Surgical critical care  3/28/2024, 12:20 PM      Clinically Significant Risk Factors        # Hypokalemia: Lowest K = 2.6 mmol/L in last 2 days, will replace as needed  # Hypernatremia: Highest Na = 150 mmol/L in last 2 days, will monitor as appropriate    # Hypomagnesemia: Lowest Mg = 1.6 mg/dL in last 2 days, will replace as needed   # Hypoalbuminemia: Lowest albumin = 2.8 g/dL at 3/27/2024  4:32 AM, will monitor as appropriate     # Hypertension: Noted on problem list        # Obesity: Estimated body mass index is 35 kg/m  as calculated from the following:    Height as of this encounter: 1.727 m (5' 7.99\").    Weight as of " this encounter: 104.4 kg (230 lb 2.6 oz).      # Financial/Environmental Concerns: none

## 2024-03-28 NOTE — PROGRESS NOTES
Care Management Follow Up    Length of Stay (days): 20    Expected Discharge Date: TBD      Concerns to be Addressed:       Patient plan of care discussed at interdisciplinary rounds: Yes    Anticipated Discharge Disposition:  LTACH     Anticipated Discharge Services:    Anticipated Discharge DME:      Patient/family educated on Medicare website which has current facility and service quality ratings:    Education Provided on the Discharge Plan:    Patient/Family in Agreement with the Plan:      Referrals Placed by CM/SW: Internal Clinic Care Coordination  Private pay costs discussed:  TBD    Additional Information:  Following for discharge planning to [facility].  Previous notes indicate referral had been sent to [facility] and they were following.   CC met with  Enrique at bedside and he indicates family wants to go to [facility] due to proximity/location.      Referral sent to [facility] via [employer]  Liaison is now Honey ( [phone]).  Adriana (previous contact) will tell her about referral.     CC will continue to follow for medical readiness to transfer to [facility].   aware that we will need to await acceptance, open bed, possible insurance authorization.    Updated [facility] that family is interested in [facility].   Also updated daughter Usha at 's request.     CC will continue to follow.     Katalina Zazueta RN

## 2024-03-28 NOTE — PLAN OF CARE
Goal Outcome Evaluation:      Plan of Care Reviewed With:  (Interdisciplinary bedside rounds)    Overall Patient Progress: improvingOverall Patient Progress: improving    Outcome Evaluation: TF regimen adjusted to better meet needs. See RD note dated 3/28/24 for details.    Sandra Chiu RD, LD, CNSC   Clinical Dietitian - [facility]

## 2024-03-28 NOTE — PLAN OF CARE
"  Problem: Adult Inpatient Plan of Care  Goal: Plan of Care Review  Description: The Plan of Care Review/Shift note should be completed every shift.  The Outcome Evaluation is a brief statement about your assessment that the patient is improving, declining, or no change.  This information will be displayed automatically on your shift  note.  Outcome: Progressing  Flowsheets (Taken 3/28/2024 1702)  Outcome Evaluation: AxOx4 and able to make needs known. Levo off and roderick ordered for Map >65. Pt went tachy to 150s. MD notified and albumin given. HR stablized. TF changed to osmolite. at goal of 55. Adequate urine thorugh quiroz. Maintained for retention and to protect perineium skin/wounds. Rectal tube maintained due to continuous loose stool. Pt remains trached. Trach cares done. WOC saw pt today. orders obtained and followed. Benadryl given for rash on back. Intensivist aware of rash. Family updated at bedside.  Plan of Care Reviewed With: patient  Overall Patient Progress: no change  Goal: Patient-Specific Goal (Individualized)  Description: You can add care plan individualizations to a care plan. Examples of Individualization might be:  \"Parent requests to be called daily at 9am for status\", \"I have a hard time hearing out of my right ear\", or \"Do not touch me to wake me up as it startles  me\".  Outcome: Progressing  Goal: Absence of Hospital-Acquired Illness or Injury  Outcome: Progressing  Intervention: Identify and Manage Fall Risk  Recent Flowsheet Documentation  Taken 3/28/2024 1600 by Emy Anaya, RN  Safety Promotion/Fall Prevention:   activity supervised   clutter free environment maintained   increased rounding and observation   increase visualization of patient   patient and family education   room door open   room near nurse's station   room organization consistent   safety round/check completed  Taken 3/28/2024 1200 by Emy Anaya, RN  Safety Promotion/Fall Prevention:   activity supervised   " clutter free environment maintained   increased rounding and observation   increase visualization of patient   patient and family education   room door open   room near nurse's station   room organization consistent   safety round/check completed  Taken 3/28/2024 0800 by Emy Anaya RN  Safety Promotion/Fall Prevention:   activity supervised   clutter free environment maintained   increased rounding and observation   increase visualization of patient   patient and family education   room door open   room near nurse's station   room organization consistent   safety round/check completed  Intervention: Prevent Skin Injury  Recent Flowsheet Documentation  Taken 3/28/2024 1600 by Emy Anaya RN  Body Position:   turned   side-lying   heels elevated   right  Skin Protection:   adhesive use limited   incontinence pads utilized   pulse oximeter probe site changed   silicone foam dressing in place   skin to skin areas padded   skin to device areas padded   transparent dressing maintained  Device Skin Pressure Protection:   absorbent pad utilized/changed   adhesive use limited   positioning supports utilized   pressure points protected   skin-to-skin areas padded   tubing/devices free from skin contact  Taken 3/28/2024 1400 by Emy Anaya RN  Body Position:   turned   side-lying   heels elevated   left  Taken 3/28/2024 1200 by Emy Anaya RN  Body Position:   turned   side-lying   heels elevated   right  Skin Protection:   adhesive use limited   incontinence pads utilized   pulse oximeter probe site changed   silicone foam dressing in place   skin to skin areas padded   skin to device areas padded   transparent dressing maintained  Device Skin Pressure Protection:   absorbent pad utilized/changed   adhesive use limited   positioning supports utilized   pressure points protected   skin-to-skin areas padded   tubing/devices free from skin contact  Taken 3/28/2024 1000 by Emy Anaya RN  Body  Position:   turned   side-lying   heels elevated   left  Taken 3/28/2024 0800 by Emy Anaya RN  Body Position:   turned   side-lying   heels elevated   right  Skin Protection:   adhesive use limited   incontinence pads utilized   pulse oximeter probe site changed   silicone foam dressing in place   skin to skin areas padded   skin to device areas padded   transparent dressing maintained  Device Skin Pressure Protection:   absorbent pad utilized/changed   adhesive use limited   positioning supports utilized   pressure points protected   skin-to-skin areas padded   tubing/devices free from skin contact  Intervention: Prevent and Manage VTE (Venous Thromboembolism) Risk  Recent Flowsheet Documentation  Taken 3/28/2024 1600 by Emy Anaya RN  VTE Prevention/Management: SCDs (sequential compression devices) on  Taken 3/28/2024 1200 by Emy Anaya RN  VTE Prevention/Management: SCDs (sequential compression devices) on  Taken 3/28/2024 0800 by Emy Anaya RN  VTE Prevention/Management: SCDs (sequential compression devices) on  Intervention: Prevent Infection  Recent Flowsheet Documentation  Taken 3/28/2024 1600 by Emy Anaya RN  Infection Prevention:   environmental surveillance performed   equipment surfaces disinfected   personal protective equipment utilized   rest/sleep promoted   single patient room provided  Taken 3/28/2024 1200 by Emy Anaya RN  Infection Prevention:   environmental surveillance performed   equipment surfaces disinfected   personal protective equipment utilized   rest/sleep promoted   single patient room provided  Taken 3/28/2024 0800 by Emy Anaya RN  Infection Prevention:   environmental surveillance performed   equipment surfaces disinfected   personal protective equipment utilized   rest/sleep promoted   single patient room provided  Goal: Optimal Comfort and Wellbeing  Outcome: Progressing  Intervention: Provide Person-Centered Care  Recent Flowsheet  Documentation  Taken 3/28/2024 1600 by Emy Anaya RN  Trust Relationship/Rapport:   care explained   thoughts/feelings acknowledged   choices provided   emotional support provided   reassurance provided  Taken 3/28/2024 1200 by Emy Anaya RN  Trust Relationship/Rapport:   care explained   thoughts/feelings acknowledged   choices provided   emotional support provided   reassurance provided  Taken 3/28/2024 0800 by Emy Anaya RN  Trust Relationship/Rapport:   care explained   thoughts/feelings acknowledged   choices provided   emotional support provided   reassurance provided   Goal Outcome Evaluation:      Plan of Care Reviewed With: patient    Overall Patient Progress: no changeOverall Patient Progress: no change    Outcome Evaluation: AxOx4 and able to make needs known. Levo off and roderick ordered for Map >65. Pt went tachy to 150s. MD notified and albumin given. HR stablized. TF changed to osmolite. at goal of 55. Adequate urine thorugh quiroz. Maintained for retention and to protect perineium skin/wounds. Rectal tube maintained due to continuous loose stool. Pt remains trached. Trach cares done. WOC saw pt today. orders obtained and followed. Benadryl given for rash on back. Intensivist aware of rash. Family updated at bedside.

## 2024-03-28 NOTE — PROGRESS NOTES
CLINICAL NUTRITION SERVICES - REASSESSMENT NOTE      Recommendations Ordered by Registered Dietitian (RD): Change TF regimen to Osmolite 1.5 at 55 mL/hr= 1980 kcal, 83 g protein, 269 g CHO, 0 g fiber, 1006 mL H2O   Continue Banatrol TF 1 pkt BID = 90 kcal, 10 g fiber, 4 g protein   Total = 2070 kcal (30 kcal/kg), 87 g protein (1.2 g/kg), 10 g fiber    Malnutrition:  (3/14)  % Weight Loss:  None noted  % Intake:  No decreased intake noted (on goal TF)  Subcutaneous Fat Loss:  None observed  Muscle Loss:  None observed  Fluid Retention:  None noted     Malnutrition Diagnosis: Patient does not meet two of the above criteria necessary for diagnosing malnutrition       EVALUATION OF PROGRESS TOWARD GOALS   Diet:  NPO with Trach     Nutrition Support:  Patient continues on goal TF regimen as follows ~    Nutrition Support Enteral:  Type of Feeding Tube: PEG  Enteral Frequency:  Continuous  Enteral Regimen: Vital HP at 55 mL/hr  Total Enteral Provisions: 1320 kcal (14 kcal/kg), 115 g protein (1.8 g/kg), 147 g CHO, 0 g fiber, 1204 mL H2O  Free Water Flush: 60 mL every 4 hours  Banatrol TF 1 pkt BID = 90 kcal, 10 g fiber, 4 protein   Total = 1410 kcal (15 kcal/kg and 80% needs), 119 g protein (1.9 g/kg), 10 g fiber    Intake/Tolerance:    Na 150 (H) - Flushes as above per MD orders today   K/Phos NL  Mg 2.5 (H)  BGM < 180  Stool = 100 mL (high risk for constipation per Neuro notes)  I/O 2985/2750, wt 104.4 kg (up overall)      ASSESSED NUTRITION NEEDS:  Dosing Weight 70 kg (adjusted)  Estimated Energy Needs: 8466-0562 kcals (25-30 Kcal/Kg)  Justification: overweight, trached   Estimated Protein Needs:  grams protein (1.2-1.5 g pro/Kg)  Justification: hypercatabolism with acute illness and obese       NEW FINDINGS:   3/26: Bronch     3/28: WOCN=   Wound location: coccyx and perineal areas   Coccyx   Perianal and posterior thighs                       Poornima genital, inner thighs   Wound due to: Friction, Shear,  Incontinence Associated Dermatitis (IAD), Moisture   STATUS: initial assessment     Norepi drip     Previous Goals (3/25):   TF @ goal to provide % estimated needs  Evaluation: Not met - now meeting 80% energy needs with re-estimated needs     Previous Nutrition Diagnosis (3/25):   No nutrition diagnosis identified at this time    Evaluation: Declining      CURRENT NUTRITION DIAGNOSIS  Inadequate energy intake related to TF at goal, needs re-estimated now with Trach as evidenced by meeting 80% energy needs     INTERVENTIONS  Recommendations / Nutrition Prescription  Change TF regimen to Osmolite 1.5 at 55 mL/hr= 1980 kcal, 83 g protein, 269 g CHO, 0 g fiber, 1006 mL H2O   Continue Banatrol TF 1 pkt BID = 90 kcal, 10 g fiber, 4 g protein   Total = 2070 kcal (30 kcal/kg), 87 g protein (1.2 g/kg), 10 g fiber     Implementation  EN Composition, EN Schedule, Medical Food Supplement: Ordered as above   Collaboration and Referral of Nutrition care: Patient discussed today during interdisciplinary bedside rounds    Goals  Goal TF regimen will meet % needs     MONITORING AND EVALUATION:  Progress towards goals will be monitored and evaluated per protocol and Practice Guidelines    Sandra Chiu RD, LD, CNSC   Clinical Dietitian - [facility]

## 2024-03-28 NOTE — PROGRESS NOTES
[facility] ICU RESPIRATORY NOTE        Date of Admission: 3/8/2024    Date of Intubation (most recent):3/22/24-trach    Reason for Mechanical Ventilation:Respiratory failure    Number of Days on Mechanical Ventilation: 19    Met Criteria for Spontaneous Breathing Trial: No    Reason for No Spontaneous Breathing Trial: PEEP 14 and FiO2 80%    Significant Events Today: PEEP increased from 12 to 14 per MD order.     ABG Results:   Recent Labs   Lab 03/26/24  0108 03/24/24  0733   PH 7.43 7.38   PCO2 64* 61*   PO2 85 60*   HCO3 42* 36*   O2PER 80 55         Current Vent Settings: Vent Mode: CMV/AC  (Continuous Mandatory Ventilation/ Assist Control)  FiO2 (%): 0 % (80)  Resp Rate (Set): 14 breaths/min  Tidal Volume (Set, mL): 420 mL  PEEP (cm H2O): 14 cmH2O  Resp: 18      Skin Assessment: Trach site intact    Plan: Will cont full vent support for now and will assess for weaning readiness daily.    Rose Aldrich RT on 3/28/2024 at 5:56 PM

## 2024-03-29 NOTE — PROGRESS NOTES
[facility] ICU RESPIRATORY NOTE        Date of Admission: 3/8/2024    Date of Intubation (most recent): Trach 3/22/24    Reason for Mechanical Ventilation: Respiratory failure    Number of Days on Mechanical Ventilation: 20    Met Criteria for Spontaneous Breathing Trial: No    Reason for No Spontaneous Breathing Trial: Criteria not met with PEEP of 14 and 80% FiO2    Significant Events Today: None overnight    ABG Results:   Recent Labs   Lab 03/26/24  0108 03/24/24  0733   PH 7.43 7.38   PCO2 64* 61*   PO2 85 60*   HCO3 42* 36*   O2PER 80 55         Current Vent Settings: Vent Mode: CMV/AC  (Continuous Mandatory Ventilation/ Assist Control)  FiO2 (%): 80 %  Resp Rate (Set): 14 breaths/min  Tidal Volume (Set, mL): 420 mL  PEEP (cm H2O): 14 cmH2O  Resp: 30      Skin Assessment: Intact    Plan: Continue full vent support and assess readiness for SBT.    RT Bessie on 3/29/2024 at 5:00 AM

## 2024-03-29 NOTE — PROGRESS NOTES
ICU staff  DOS 3/29/2024    No major changes. Off pressors. Awake and denies pain at my visit.    Vitals:   Temp:  [97  F (36.1  C)-98.8  F (37.1  C)] 97.3  F (36.3  C)  Pulse:  [] 73  Resp:  [12-43] 19  BP: (101-150)/(52-84) 126/66  FiO2 (%):  [80 %-85 %] 85 %  SpO2:  [88 %-96 %] 90 %     Vent Mode: CMV/AC  (Continuous Mandatory Ventilation/ Assist Control)  FiO2 (%): 85 %  Resp Rate (Set): 14 breaths/min  Tidal Volume (Set, mL): 420 mL  PEEP (cm H2O): 14 cmH2O  Resp: 19    I/O last 3 completed shifts:  In: 1774.4 [I.V.:224.4; NG/GT:340]  Out: 1975 [Urine:1625; Stool:350]    Exam:  Gen: Alert, interacts  HEENT: NC/AT, tracheostomy in place  Pulm: Clear, tolerating vent  Cor: RRR  Abdomen/GI: Soft, nondistended  : Nye in place  Extremities: Warm, mild edema  Skin: Well-perfused  Neuro: Eyes to voice, shrugs, moves extremities  Psych: Calm    Data:   Labs reviewed  - Na 152 (150)  - Hb up at 8.1, WBCs steady at 12.7  Imaging reviewed  - Echocardiogram LVEF 60-65%, RV normal  No new culture information    Assessment/plan:  77 y/o woman with Guillain-[place] syndrome, admitted 3/8 after a fall at home. Developed paralysis and respiratory failure and required tracheostomy. Remains ventilator-dependent but is off pressors today and showing signs of improved strength. Key barrier to LTACH transfer is hypoxemia.  CNS: Alert, interactive, follows.   # Guillain-Ozark syndrome  # Flushing/pruritis  - Continue supportive cares  - Quetiapine prn for anxiety  - Appreciate neurology assistance  - Hydroxyzine, diphenhydramine prn for pruritus; 5-HIAA levels were normal so lower suspicion of carcinoid syndrome as etiology  Pulm: Tolerating mechanical ventilation, oxygen/PEEP needs remain high.  # Acute hypoxemic respiratory failure  # Tracheostomy status  # Ventilator dependence  # Left upper lobe endobronchial lesion  - Continue vent support  - Has significant parenchymal disease on imaging, which may explain hypoxemia; no  "cardiac cause identified  CV: Off pressors today  # Hypotension/shock, resolved  # Atrial fibrillation with RVR, resolved  # Takotsubo cardiomyopathy, resolved  - Finish one more day of steroid  GI: Abdomen benign  # Nutrition  - Continue tube feedings  : UOP adequate, still negative today. Na up a bit.  # Hypervolemia  # BHAVNA, resolved  # Hypokalemia  # Hypernatremia  - Spot dose diuretics today  - Increased enteral free water  Heme: Hb 8.1 (7.2)  # Anemia of critical illness  # DVT  - No indication to transfuse  - Continue therapeutic enoxaparin  Msk: Extremities warm, well-perfused.   Endo: Glucose 162  # Stress/steroid hyperglycemia  - Routine ICU glycemic management  ID: Afebrile, WBCs stable.  # Polymicrobial healthcare-associated pneumonia  - Continue meropenem, was planning 10-day course  ICU: LMWH, PPI  - Family updated at bedside    This patient is critically ill to my assessment and requires ICU monitoring and cares. A total of 35 minutes critical care time spent on 3/29/2024, exclusive of procedures.     Pierre Wilkerson MD, PhD  Surgical critical care  3/29/2024, 4:43 PM    Clinically Significant Risk Factors        # Hypokalemia: Lowest K = 2.6 mmol/L in last 2 days, will replace as needed  # Hypernatremia: Highest Na = 152 mmol/L in last 2 days, will monitor as appropriate    # Hypomagnesemia: Lowest Mg = 1.6 mg/dL in last 2 days, will replace as needed   # Hypoalbuminemia: Lowest albumin = 2.8 g/dL at 3/27/2024  4:32 AM, will monitor as appropriate     # Hypertension: Noted on problem list        # Obesity: Estimated body mass index is 35.21 kg/m  as calculated from the following:    Height as of this encounter: 1.727 m (5' 7.99\").    Weight as of this encounter: 105 kg (231 lb 7.7 oz).      # Financial/Environmental Concerns: none                "

## 2024-03-29 NOTE — PROGRESS NOTES
Care Management Follow Up    Length of Stay (days): 21    Expected Discharge Date:       Concerns to be Addressed:       Patient plan of care discussed at interdisciplinary rounds: Yes    Anticipated Discharge Disposition: Acute Rehab, LTACH     Anticipated Discharge Services:    Anticipated Discharge DME:      Patient/family educated on Medicare website which has current facility and service quality ratings:    Education Provided on the Discharge Plan:    Patient/Family in Agreement with the Plan:      Referrals Placed by CM/SW: Internal Clinic Care Coordination  Private pay costs discussed: Not applicable    Additional Information:  Received call from [facility] ([phone].  They have accepted patient.  Patient has a trach and PEG in place; awaiting further vent weaning.   States with her [employer], no prior auth needed.    Care management will continue to follow for discharge.    Meredith Nash RN  Inpatient Float Care Coordinator  [facility]

## 2024-03-29 NOTE — PROGRESS NOTES
[facility]    Stroke Progress Note    Interval Events  NAEO  On Phenyephrine gtt (off Levopged) for MAP > 65  On Lovenox for DVT    HPI Summary  Yohana Marques is a 78 year old woman with h/o HTN, spinal stenosis s/p discectomy x 2 (most recent 8/2004), and chronic pain.      She presented on 3/8 after a fall at home in 3 to 4 days of generalized weakness worsening to an extent that from using just a cane she had to start using a walker.  Reports of gautam at their second house in [facility] but no access to canned food since mid February.       During admission patient found to have stress-induced cardiomyopathy with a EF of 23% on echo on 3/8.  On 3/10 due to labored, shallow breathing and respiratory acidosis patient was intubated.  Noted to have hypotonia and quadriparesis with hypophonia.  Given suspicion of possible GBS started on plasmapheresis.  New onset atrial fibrillation noted in hospital.     Patient had been having labile blood pressure, fluctuating heart rhythm from bradycardia to atrial fibrillation.  Initial MRI brain and MRI spine unremarkable however repeat on 3/14 revealed cauda equina nerve root enhancement.     - S/p PLEX (5/5 sessions from 3/10-3/18)  - S/p trach and PEG on 3/22     Evaluation Summarized     MRI Brain w/wo contrast (3/9) (03/14) Small vessel disease changes   MRI Spine  C/T/L-spine 03/10 degenerative changes with superior endplate T3 compression and lumbar spondylosis.      C/T/L-spine 3/14 with cauda equina nerve root enhancement   CT Chest/Abdomen/Pelvis No evidence of malignancy.  Submucosal fibroid versus nonmalignant endometrial polyp and stable 4 mm left upper lobe nodule.       Echocardiogram 3/8           Coronary cath 3/8 EF is 23%.Entire left ventricle is severely hypokinetic but basal left ventricular function is less so     No significant CAD   EKG/Telemetry 3/10    Tachycardic (147 bpm), Atrial fibrillation w/ RVR  "  Left axis deviation, Anterior infarct, age undertermined  T wave abnormality  Episodes of bradycardia   CSF Labs Mildly elevated protein, 5 nucleated cell  Encephalitis/meningitis panel negative   Labs    Ganglioside Antibodies:          Myasthenia panel negative     AZALEA: negative     ANCA: negative     HIV-1, HIV-2, HIV-1 p24 antigen nonreactive   EMG Completed on 3/15, report pending           Impression   - 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      The Stroke Staff is Dr. Peraza.     Julio Strong MD  Vascular Neurology Fellow     To page me or covering stroke neurology team member, click here: AMCOM  Choose \"On Call\" tab at top, then select \"NEUROLOGY/ALL SITES\" from middle drop-down box, press Enter, then look for \"stroke\" or \"telestroke\" for your site.  ______________________________________________________    Clinically Significant Risk Factors        # Hypokalemia: Lowest K = 2.6 mmol/L in last 2 days, will replace as needed  # Hypernatremia: Highest Na = 152 mmol/L in last 2 days, will monitor as appropriate    # Hypomagnesemia: Lowest Mg = 1.6 mg/dL in last 2 days, will replace as needed   # Hypoalbuminemia: Lowest albumin = 2.8 g/dL at 3/27/2024  4:32 AM, will monitor as appropriate     # Hypertension: Noted on problem list        # Obesity: Estimated body mass index is 35.21 kg/m  as calculated from the following:    Height as of this encounter: 1.727 m (5' 7.99\").    Weight as of this encounter: 105 kg (231 lb 7.7 oz).    "   # Financial/Environmental Concerns: none           Medications   Scheduled Meds   albuterol  2.5 mg Nebulization Q6H    amiodarone  200 mg Oral or FT or NG tube Daily    [Held by provider] aspirin  81 mg Oral or Feeding Tube Daily    chlorhexidine  15 mL Mouth/Throat Q12H    enoxaparin ANTICOAGULANT  0.75 mg/kg Subcutaneous Q12H    fiber modular  1 packet Per Feeding Tube BID    hydrocortisone sodium succinate PF  50 mg Intravenous Q6H    meropenem  1 g Intravenous Q8H    [Held by provider] metoprolol tartrate  12.5 mg Oral or Feeding Tube BID    miconazole   Topical BID    pantoprazole  40 mg Per Feeding Tube QAM AC    Or    pantoprazole  40 mg Intravenous QAM AC    sodium chloride (PF)  3 mL Intracatheter Q8H       Infusion Meds   dextrose      [Held by provider] furosemide (LASIX) 100 mg in sodium chloride 0.9 % 100 mL infusion Stopped (03/27/24 1433)    HYDROmorphone 0.4 mg/hr (03/28/24 1441)    - MEDICATION INSTRUCTIONS -      - MEDICATION INSTRUCTIONS -      phenylephrine Stopped (03/28/24 1508)    vasopressin         PRN Meds  acetaminophen, albuterol, atropine, bisacodyl, calcium carbonate, artificial tears, dextrose, diphenhydrAMINE, hydromorphone, lidocaine 4%, lidocaine (buffered or not buffered), lidocaine, melatonin, metoprolol, midazolam, naloxone **OR** naloxone **OR** naloxone **OR** naloxone, - MEDICATION INSTRUCTIONS -, oxyCODONE **OR** oxyCODONE, - MEDICATION INSTRUCTIONS -, polyethylene glycol, QUEtiapine, senna-docusate **OR** senna-docusate, sodium chloride (PF)       PHYSICAL EXAMINATION  Temp:  [97.2  F (36.2  C)-99  F (37.2  C)] 97.2  F (36.2  C)  Pulse:  [] 77  Resp:  [12-48] 20  BP: (101-150)/(52-84) 126/64  FiO2 (%):  [80 %-85 %] 80 %  SpO2:  [88 %-96 %] 91 %      Neuro: Exam per Dr. Peraza. Unchanged from last exam 2 days ago.    Imaging  I personally reviewed all imaging; relevant findings per HPI.     Lab Results Data   CBC  Recent Labs   Lab 03/29/24  0538 03/28/24  0438  "03/27/24 0432   WBC 12.7* 11.2* 12.7*   RBC 2.78* 2.52* 2.74*   HGB 8.1* 7.2* 7.9*   HCT 26.0* 22.6* 24.3*    302 371     Basic Metabolic Panel    Recent Labs   Lab 03/29/24  0538 03/28/24  2155 03/28/24  2152 03/28/24  1608 03/28/24  1420 03/28/24  0457 03/28/24  0438 03/27/24  0901 03/27/24 0432   *  --   --   --   --   --  150*  --  148*   POTASSIUM 3.7  3.7  --  3.6  --  3.3*  --  4.1  4.1   < > 3.9  3.9   CHLORIDE 104  --   --   --   --   --  103  --  100   CO2 39*  --   --   --   --   --  35*  --  40*   BUN 52.6*  --   --   --   --   --  53.0*  --  49.8*   CR 0.79  --   --   --   --   --  0.83  --  0.88   * 138*  --  116*  --    < > 155*   < > 136*   YEE 9.0  --   --   --   --   --  8.4*  --  8.6*    < > = values in this interval not displayed.     Liver Panel  Recent Labs   Lab 03/29/24  0538 03/28/24 0438 03/27/24 0432   PROTTOTAL 7.4 6.7 6.1*   ALBUMIN 3.6 3.1* 2.8*   BILITOTAL 0.5 0.5 0.5   ALKPHOS 86 72 62   AST 28 26 27   ALT 20 15 16     INR    Recent Labs   Lab Test 05/02/23  0650 01/25/23  0719 10/15/22  0728   INR 1.02 1.06 1.23*      Lipid Profile    Recent Labs   Lab Test 07/07/22  1448 07/05/21  1352 07/03/19  0927   CHOL 241* 220* 213*   HDL 78 66 64   * 130* 118*   TRIG 119 122 156*     A1C    Recent Labs   Lab Test 03/11/24 0436   A1C 5.2     Troponin  No results for input(s): \"CTROPT\", \"TROPONINIS\", \"TROPONINI\", \"GHTROP\" in the last 168 hours.       Data       " Trilobed Flap Text: The defect edges were debeveled with a #15 scalpel blade.  Given the location of the defect and the proximity to free margins a trilobed flap was deemed most appropriate.  Using a sterile surgical marker, an appropriate trilobed flap drawn around the defect.    The area thus outlined was incised deep to adipose tissue with a #15 scalpel blade.  The skin margins were undermined to an appropriate distance in all directions utilizing iris scissors.

## 2024-03-30 NOTE — PROGRESS NOTES
ICU staff  DOS 3/30/2024    Increased FiO2 needs overnight, with epoprostenol added and FiO2 up to 100.     Vitals:   Temp:  [96.8  F (36  C)-97.9  F (36.6  C)] 97  F (36.1  C)  Pulse:  [65-84] 68  Resp:  [15-56] 21  BP: ()/(42-78) 102/52  FiO2 (%):  [85 %-100 %] 100 %  SpO2:  [87 %-92 %] 92 %     Vent Mode: CMV/AC  (Continuous Mandatory Ventilation/ Assist Control)  FiO2 (%): 100 %  Resp Rate (Set): 14 breaths/min  Tidal Volume (Set, mL): 420 mL  PEEP (cm H2O): 14 cmH2O  Pressure Support (cm H2O): 10 cmH2O  Resp: 21    I/O last 3 completed shifts:  In: 2084.07 [I.V.:94.07; NG/GT:560]  Out: 2560 [Urine:2335; Stool:225]    Propofol 10-20  Epoprostenol 20  Hydromorphone 0.1    Exam:  Gen: Sedated  HEENT: NC/AT, tracheostomy in place  Pulm: Clear  Cor: RRR  Abdomen/GI: Soft, nondistended  : Nye replaced last night, urine clear  Extremities: Warm, trace edema  Skin: Well-perfused  Neuro: Sedated today  Psych: Unable to assess    Data:   Labs reviewed  - VB 7.42/68/53/44  - Na slowly trending up, now 155  No new cultures  Imaging personally reviewed  - CXR with diffuse bilateral infiltrates, not significantly changed    Assessment/plan:  79 y/o woman with Guillain-Rayle syndrome, paralysis, and respiratory failure leading to tracheostomy. Persistently hypoxemic for several days, with worsening overnight requiring initiation of epoprostenol.   CNS: Sedated this morning.   # Guillain-Rayle syndrome  # Sedation/analgesia  - Continue propofol, hydromorphone today  - Ongoing PT/OT as able  Pulm: PEEP 14, FiO2 100% overnight. Saturations are better overall today.   # Acute hypoxemic and hypercarbic respiratory failure  # ?ARDS  # Tracheostomy status  # Ventilator dependence  # Left upper lobe endobronchial lesion  - Continue full ventilator support, wean FiO2 if able  - Continue full-strength epoprostenol today  - Proning would be the likely next rescue maneuver  - Not having significant secretion burden, doubt  "benefit from bronchoscopy  - Endobronchial lesion workup pending  CV: Remains off pressors today  # Hypotension/shock, resolved  # Atrial fibrillation with RVR, resolved  # Takotsubo cardiomyopathy, resolved  - Completed course of stress steroids  - Continue to follow  GI: Abdomen benign  # Nutrition  - Continue tube feedings  : UOP has been good, weight down overall but still above admission  # Hypervolemia  # BHAVNA, resolved  # Hypernatremia  - Continue diuretics today  - Add d5w to supplement free water and natriuresis from loop diuretic  - Follow Na later today  Heme: Hb 7.6 (8.1)  # Anemia of critical illness  # DVT  - Continues on enoxaparin  - No indications for tranfusion  Msk: Extremities warm, well-perfused.   Endo: Glucose 126-156  # Stress/steroid hyperglycemia  - Continue ICU glycemic management  ID: Afebrile, WBCs unchanged.  # Polymicrobial healthcare-associated pneumonia (H alvei)  - Continue meropenem  ICU: LMWH, PPI.  - Will update family later today when they return to the room    This patient is critically ill to my assessment and requires ICU monitoring and cares. A total of 45 minutes critical care time spent on 3/30/2024, exclusive of procedures.     Pierre Wilkerson MD, PhD  Surgical critical care  3/30/2024, 1:20 PM    Clinically Significant Risk Factors        # Hypokalemia: Lowest K = 3.3 mmol/L in last 2 days, will replace as needed  # Hypernatremia: Highest Na = 155 mmol/L in last 2 days, will monitor as appropriate      # Hypoalbuminemia: Lowest albumin = 2.8 g/dL at 3/27/2024  4:32 AM, will monitor as appropriate     # Hypertension: Noted on problem list        # Obesity: Estimated body mass index is 35.14 kg/m  as calculated from the following:    Height as of this encounter: 1.727 m (5' 7.99\").    Weight as of this encounter: 104.8 kg (231 lb 0.7 oz).      # Financial/Environmental Concerns: none              "

## 2024-03-30 NOTE — CARE PLAN
Patient remains vented. Coarse lungs with scant to small amount of secretions via trach. Patient frequently burping. Oxygen sats remaining 88-91% most of the day. Patient c/o itching and discomfort - on dilaudid gtt, got an additional dose of dilaudid, oxy, and tylenol today. Benadryl and vistaril for itching. Her pad had urine this evening although she has a quiroz. Bladder scan was 550, quiroz changed.  at bedside most of the day. Frequent repositioning. Antifungal powder applied to groin.

## 2024-03-30 NOTE — PROVIDER NOTIFICATION
Discussion with Dr. Wilkerson about patient quiroz catheter clogged. Bladder scan for volume of 550 with quiroz in place. Questioning whether to remove, straight cath and attempt trial of voiding again or replace quiroz. Per MD, replace quiroz catheter.

## 2024-03-30 NOTE — CARE PLAN
Patient weaned to 80% fio2 today from 100%. Rectal tube removed. Good urine output. Family at bedside all day. Given meds for itching. Prop for sedation to help with vent compliance. Weaned up prop throughout the day slowly. She appears calm, but her respiratory rate occasionally climbs to 30s.

## 2024-03-30 NOTE — PROGRESS NOTES
[facility] ICU RESPIRATORY NOTE        Date of Admission: 3/8/2024    Date of Intubation (most recent): Trach 3/22/2024    Reason for Mechanical Ventilation:Respiratory failure    Number of Days on Mechanical Ventilation: 20    Met Criteria for Spontaneous Breathing Trial: No    Reason for No Spontaneous Breathing Trial: Pt. On Full strength joey and PEEP of 14.    Significant Events Today: None    ABG Results:   Recent Labs   Lab 03/30/24  0405 03/26/24  0108 03/24/24  0733   PH  --  7.43 7.38   PCO2  --  64* 61*   PO2  --  85 60*   HCO3  --  42* 36*   O2PER 100 80 55         Current Vent Settings: Vent Mode: CMV/AC  (Continuous Mandatory Ventilation/ Assist Control)  FiO2 (%): 90 %  Resp Rate (Set): 14 breaths/min  Tidal Volume (Set, mL): 420 mL  PEEP (cm H2O): 14 cmH2O  Pressure Support (cm H2O): 10 cmH2O  Resp: 15    Plan: Will continue full ventilatory support.    Maria D Lei RT on 3/30/2024 at 5:45 PM

## 2024-03-30 NOTE — PROGRESS NOTES
Intensivist brief update  DOS: 3/30/2024    Serum osm 349.  Had already started D5w to replace free water.  Hold on further diuresis for today.    Pierre Wilkerson MD, PhD  Surgical critical care  March 30, 2024, 3:09 PM

## 2024-03-30 NOTE — PLAN OF CARE
"  Problem: Adult Inpatient Plan of Care  Goal: Plan of Care Review  Description: The Plan of Care Review/Shift note should be completed every shift.  The Outcome Evaluation is a brief statement about your assessment that the patient is improving, declining, or no change.  This information will be displayed automatically on your shift  note.  Outcome: Not Progressing  Flowsheets (Taken 3/30/2024 0447)  Outcome Evaluation: Pt remains on high vent setting, added sedation following chest x-ray. Trach cares done. AUO w/ lasix. TF at goal.  Plan of Care Reviewed With: patient  Goal: Patient-Specific Goal (Individualized)  Description: You can add care plan individualizations to a care plan. Examples of Individualization might be:  \"Parent requests to be called daily at 9am for status\", \"I have a hard time hearing out of my right ear\", or \"Do not touch me to wake me up as it startles  me\".  Outcome: Not Progressing  Goal: Absence of Hospital-Acquired Illness or Injury  Outcome: Not Progressing  Intervention: Identify and Manage Fall Risk  Recent Flowsheet Documentation  Taken 3/30/2024 0400 by Marli Garcia, RN  Safety Promotion/Fall Prevention:   activity supervised   clutter free environment maintained   increased rounding and observation   increase visualization of patient   patient and family education   room door open   room near nurse's station   room organization consistent   safety round/check completed  Taken 3/30/2024 0000 by Marli Garcia, RN  Safety Promotion/Fall Prevention:   activity supervised   clutter free environment maintained   increased rounding and observation   increase visualization of patient   patient and family education   room door open   room near nurse's station   room organization consistent   safety round/check completed  Taken 3/29/2024 2000 by Marli Garcia, RN  Safety Promotion/Fall Prevention:   activity supervised   clutter free environment maintained   increased rounding and " observation   increase visualization of patient   patient and family education   room door open   room near nurse's station   room organization consistent   safety round/check completed  Intervention: Prevent Skin Injury  Recent Flowsheet Documentation  Taken 3/30/2024 0400 by Marli Garcia RN  Body Position:   turned   lower extremity elevated   upper extremity elevated  Taken 3/30/2024 0200 by Marli Garcia RN  Body Position:   turned   lower extremity elevated   upper extremity elevated  Taken 3/30/2024 0000 by Marli Garcia RN  Body Position:   turned   lower extremity elevated   upper extremity elevated  Taken 3/29/2024 2200 by Marli Garcia RN  Body Position:   turned   lower extremity elevated   upper extremity elevated  Taken 3/29/2024 2000 by Marli Garcia RN  Body Position:   turned   lower extremity elevated   upper extremity elevated  Intervention: Prevent and Manage VTE (Venous Thromboembolism) Risk  Recent Flowsheet Documentation  Taken 3/30/2024 0400 by Marli Garcia RN  VTE Prevention/Management: SCDs (sequential compression devices) on  Taken 3/30/2024 0000 by Marli Garcia RN  VTE Prevention/Management: SCDs (sequential compression devices) on  Taken 3/29/2024 2000 by Marli Garcia RN  VTE Prevention/Management: SCDs (sequential compression devices) on  Intervention: Prevent Infection  Recent Flowsheet Documentation  Taken 3/30/2024 0400 by Marli Garcia RN  Infection Prevention:   environmental surveillance performed   equipment surfaces disinfected   personal protective equipment utilized   rest/sleep promoted   single patient room provided  Taken 3/30/2024 0000 by Marli Garcia RN  Infection Prevention:   environmental surveillance performed   equipment surfaces disinfected   personal protective equipment utilized   rest/sleep promoted   single patient room provided  Taken 3/29/2024 2000 by Marli Garcia RN  Infection Prevention:   environmental  surveillance performed   equipment surfaces disinfected   personal protective equipment utilized   rest/sleep promoted   single patient room provided  Goal: Optimal Comfort and Wellbeing  Outcome: Not Progressing  Intervention: Monitor Pain and Promote Comfort  Recent Flowsheet Documentation  Taken 3/30/2024 0400 by Marli Garcia RN  Pain Management Interventions:   pain pump in use   medication (see MAR)  Taken 3/29/2024 2000 by Marli Garcia RN  Pain Management Interventions:   pain pump in use   medication (see MAR)  Intervention: Provide Person-Centered Care  Recent Flowsheet Documentation  Taken 3/30/2024 0400 by Marli Garcia RN  Trust Relationship/Rapport:   care explained   emotional support provided   thoughts/feelings acknowledged  Taken 3/30/2024 0000 by Marli Garcia RN  Trust Relationship/Rapport:   care explained   emotional support provided   thoughts/feelings acknowledged  Taken 3/29/2024 2000 by Marli Garcia RN  Trust Relationship/Rapport:   care explained   emotional support provided   thoughts/feelings acknowledged  Goal: Readiness for Transition of Care  Outcome: Not Progressing   Goal Outcome Evaluation:      Plan of Care Reviewed With: patient          Outcome Evaluation: Pt remains on high vent setting, added sedation following chest x-ray. Trach cares done. AUO w/ lasix. TF at goal.

## 2024-03-30 NOTE — PROGRESS NOTES
[facility] ICU RESPIRATORY NOTE        Date of Admission: 3/8/2024    Date of Intubation (most recent):  Trach 3/22/2024    Reason for Mechanical Ventilation: Respiratory failure.    Number of Days on Mechanical Ventilation: 20    Met Criteria for Spontaneous Breathing Trial: No    Reason for No Spontaneous Breathing Trial: The patient is on full strength veletri.    Significant Events Today: None.    ABG Results:   Recent Labs   Lab 03/26/24  0108 03/24/24  0733   PH 7.43 7.38   PCO2 64* 61*   PO2 85 60*   HCO3 42* 36*   O2PER 80 55         Current Vent Settings: Vent Mode: CMV/AC  (Continuous Mandatory Ventilation/ Assist Control)  FiO2 (%): 100 %  Resp Rate (Set): 14 breaths/min  Tidal Volume (Set, mL): 420 mL  PEEP (cm H2O): 14 cmH2O  Pressure Support (cm H2O): 10 cmH2O  Resp: 18        Marcell Stahl, RT on 3/30/2024 at 3:27 AM

## 2024-03-30 NOTE — PROGRESS NOTES
Worsening hypoxemia and FIO2 now at 100%. No evidence of markedly elevated vent pressures. CXR in keeping with worsening bilateral pulmonary opacities R>L    Scanty secretions and has undergone several bronchoscopies to help hypoxemia with minimal response.    My concern is that she may be developing ARDS although she doesn't appear to have significant compliance issues per se.      Plan  Increase sedation to improve vent synchrony. Propofol has been added  Start full dose Veletri  Already on diuresis with good UOP  Already on broad spectrum antibiotics- Meropenem.    Sudhir Heaton MD  Pulmonary, Critical Care and Sleep Medicine  [facility]  Pager: [phone]

## 2024-03-31 NOTE — PLAN OF CARE
"  Problem: Adult Inpatient Plan of Care  Goal: Patient-Specific Goal (Individualized)  Description: You can add care plan individualizations to a care plan. Examples of Individualization might be:  \"Parent requests to be called daily at 9am for status\", \"I have a hard time hearing out of my right ear\", or \"Do not touch me to wake me up as it startles  me\".  Outcome: Not Progressing     Problem: Risk for Delirium  Goal: Improved Behavioral Control  Intervention: Prevent and Manage Agitation  Recent Flowsheet Documentation  Taken 3/31/2024 1200 by Aruna Medellin RN  Environment Familiarity/Consistency:   daily routine followed   familiar objects from home provided  Taken 3/31/2024 0800 by Aruna Medellin RN  Environment Familiarity/Consistency:   daily routine followed   familiar objects from home provided     Problem: Guillain-[place]  Syndrome  Goal: Optimal Coping  Intervention: Optimize Psychosocial Response  Recent Flowsheet Documentation  Taken 3/31/2024 1200 by Aruna Medellin RN  Supportive Measures:   positive reinforcement provided   relaxation techniques promoted  Family/Support System Care: presence promoted  Taken 3/31/2024 0800 by Aruna Medellin RN  Supportive Measures:   positive reinforcement provided   relaxation techniques promoted  Family/Support System Care: presence promoted   Goal Outcome Evaluation:      Plan of Care Reviewed With: patient, spouse          Outcome Evaluation: remains sedated. PRN benadryl given for itching. denies pain.  at bedside.      "

## 2024-03-31 NOTE — PROGRESS NOTES
[facility] ICU RESPIRATORY NOTE        Date of Admission: 3/8/2024    Date of Intubation (most recent): 03/22/2024    Reason for Mechanical Ventilation: Respiratory failure     Number of Days on Mechanical Ventilation: 21    Met Criteria for Spontaneous Breathing Trial: No    Reason for No Spontaneous Breathing Trial: Pt. On Full strength joey and PEEP of 14.     Significant Events Today: Pt had increased PIP and increased work of breathing. Immediate intervention was initiated, including bagging the patient to assist with ventilation and performing lavage to clear airway secretions. Trach care administered as part of the intervention. Following these measures, the patient's respiratory status improved, and they exhibited of settling down. Continued monitoring and appropriate interventions will be implemented as needed.     ABG Results:   Recent Labs   Lab 03/31/24  0525 03/30/24  0405 03/26/24  0108   PH  --   --  7.43   PCO2  --   --  64*   PO2  --   --  85   HCO3  --   --  42*   O2PER 80 100 80         Current Vent Settings: Vent Mode: CMV/AC  (Continuous Mandatory Ventilation/ Assist Control)  FiO2 (%): 75 %  Resp Rate (Set): 14 breaths/min  Tidal Volume (Set, mL): 420 mL  PEEP (cm H2O): 14 cmH2O  Pressure Support (cm H2O): 10 cmH2O  Resp: 25      Skin Assessment: Clean and dry.     Plan: Continue to monitor and assess respiratory status while in ICU.

## 2024-03-31 NOTE — PROGRESS NOTES
[facility] ICU RESPIRATORY NOTE           Date of Admission: 3/8/2024     Date of Intubation (most recent): Trach 3/22/2024     Reason for Mechanical Ventilation:Respiratory failure     Number of Days on Mechanical Ventilation: 21     Met Criteria for Spontaneous Breathing Trial: No     Reason for No Spontaneous Breathing Trial: Pt. On Full strength joey and PEEP of 14.     Significant Events Today: None overnight, airway pressures continue to be elevated. O2 needs remain high.     ABG Results:   Recent Labs   Lab 03/30/24  0405 03/26/24  0108 03/24/24  0733   PH  --  7.43 7.38   PCO2  --  64* 61*   PO2  --  85 60*   HCO3  --  42* 36*   O2PER 100 80 55     Vent Mode: CMV/AC  (Continuous Mandatory Ventilation/ Assist Control)  FiO2 (%): 80 %  Resp Rate (Set): 14 breaths/min  Tidal Volume (Set, mL): 420 mL  PEEP (cm H2O): 14 cmH2O  Pressure Support (cm H2O): 10 cmH2O  Resp: 25    NEIL Dias, RRT

## 2024-03-31 NOTE — PLAN OF CARE
"  Problem: Adult Inpatient Plan of Care  Goal: Plan of Care Review  Description: The Plan of Care Review/Shift note should be completed every shift.  The Outcome Evaluation is a brief statement about your assessment that the patient is improving, declining, or no change.  This information will be displayed automatically on your shift  note.  Outcome: Progressing  Flowsheets (Taken 3/31/2024 0605)  Outcome Evaluation: Pt tolerated titrating down FI02. Remains sedated.  Overall Patient Progress: improving  Goal: Patient-Specific Goal (Individualized)  Description: You can add care plan individualizations to a care plan. Examples of Individualization might be:  \"Parent requests to be called daily at 9am for status\", \"I have a hard time hearing out of my right ear\", or \"Do not touch me to wake me up as it startles  me\".  Outcome: Progressing  Goal: Absence of Hospital-Acquired Illness or Injury  Outcome: Progressing  Intervention: Identify and Manage Fall Risk  Recent Flowsheet Documentation  Taken 3/31/2024 0400 by Marli Garcia, RN  Safety Promotion/Fall Prevention:   activity supervised   clutter free environment maintained   increased rounding and observation   increase visualization of patient   patient and family education   room door open   room near nurse's station   room organization consistent   safety round/check completed  Taken 3/31/2024 0000 by Marli Garcia, RN  Safety Promotion/Fall Prevention:   activity supervised   clutter free environment maintained   increased rounding and observation   increase visualization of patient   patient and family education   room door open   room near nurse's station   room organization consistent   safety round/check completed  Taken 3/30/2024 2000 by Marli Garcia, RN  Safety Promotion/Fall Prevention:   activity supervised   clutter free environment maintained   increased rounding and observation   increase visualization of patient   patient and family " education   room door open   room near nurse's station   room organization consistent   safety round/check completed  Intervention: Prevent Skin Injury  Recent Flowsheet Documentation  Taken 3/31/2024 0600 by Marli Garcia RN  Body Position:   turned   lower extremity elevated   upper extremity elevated  Taken 3/31/2024 0400 by Marli Garcia RN  Body Position:   turned   lower extremity elevated   upper extremity elevated  Taken 3/31/2024 0200 by Marli Garcia RN  Body Position:   turned   lower extremity elevated   upper extremity elevated  Taken 3/31/2024 0000 by Marli Garcia RN  Body Position:   turned   lower extremity elevated   upper extremity elevated  Taken 3/30/2024 2200 by Marli Garcia RN  Body Position:   turned   lower extremity elevated   upper extremity elevated  Taken 3/30/2024 2000 by Marli Garcia RN  Body Position:   turned   lower extremity elevated   upper extremity elevated  Intervention: Prevent and Manage VTE (Venous Thromboembolism) Risk  Recent Flowsheet Documentation  Taken 3/31/2024 0400 by Marli Garcia RN  VTE Prevention/Management: SCDs (sequential compression devices) on  Taken 3/31/2024 0000 by Marli Garcia RN  VTE Prevention/Management: SCDs (sequential compression devices) on  Taken 3/30/2024 2000 by Marli Garcia RN  VTE Prevention/Management: SCDs (sequential compression devices) on  Intervention: Prevent Infection  Recent Flowsheet Documentation  Taken 3/31/2024 0400 by Marli Garcia RN  Infection Prevention:   environmental surveillance performed   equipment surfaces disinfected   personal protective equipment utilized   rest/sleep promoted   single patient room provided  Taken 3/31/2024 0000 by Marli Garcia RN  Infection Prevention:   environmental surveillance performed   equipment surfaces disinfected   personal protective equipment utilized   rest/sleep promoted   single patient room provided  Taken 3/30/2024 2000 by Jose  Marli LEDEZMA RN  Infection Prevention:   environmental surveillance performed   equipment surfaces disinfected   personal protective equipment utilized   rest/sleep promoted   single patient room provided  Goal: Optimal Comfort and Wellbeing  Outcome: Progressing  Intervention: Monitor Pain and Promote Comfort  Recent Flowsheet Documentation  Taken 3/31/2024 0400 by Marli Garcia RN  Pain Management Interventions:   pain pump in use   medication (see MAR)  Taken 3/31/2024 0000 by Marli Garcia RN  Pain Management Interventions:   pain pump in use   medication (see MAR)  Taken 3/30/2024 2000 by Marli Garcia RN  Pain Management Interventions:   pain pump in use   medication (see MAR)  Intervention: Provide Person-Centered Care  Recent Flowsheet Documentation  Taken 3/31/2024 0400 by Marli Garcia RN  Trust Relationship/Rapport:   care explained   emotional support provided   thoughts/feelings acknowledged  Taken 3/31/2024 0000 by Marli Garcia RN  Trust Relationship/Rapport:   care explained   emotional support provided   thoughts/feelings acknowledged  Taken 3/30/2024 2000 by Marli Garcia RN  Trust Relationship/Rapport:   care explained   emotional support provided   thoughts/feelings acknowledged  Goal: Readiness for Transition of Care  Outcome: Progressing   Goal Outcome Evaluation:           Overall Patient Progress: improvingOverall Patient Progress: improving    Outcome Evaluation: Pt tolerated titrating down FI02. Remains sedated.

## 2024-03-31 NOTE — PROGRESS NOTES
ICU staff  DOS 3/31/2024    Hemoglobin low this morning, otherwise no major changes. Has weaned down a bit on FiO2.     Vitals:   Temp:  [97.5  F (36.4  C)-99.3  F (37.4  C)] 98.8  F (37.1  C)  Pulse:  [54-71] 57  Resp:  [4-28] 8  BP: ()/(42-60) 98/52  FiO2 (%):  [80 %-90 %] 80 %  SpO2:  [91 %-98 %] 96 %     Vent Mode: CMV/AC  (Continuous Mandatory Ventilation/ Assist Control)  FiO2 (%): 80 %  Resp Rate (Set): 14 breaths/min  Tidal Volume (Set, mL): 420 mL  PEEP (cm H2O): 14 cmH2O  Pressure Support (cm H2O): 10 cmH2O  Resp: (!) 8    I/O last 3 completed shifts:  In: 2430.52 [I.V.:1160.52; NG/GT:500]  Out: 1725 [Urine:1725]    Hydromorphone 0.4  Propofol 30    Exam:  Gen: Sedated, appears comfortable  HEENT: NC/AT, tracheostomy in place  Pulm: Clear  Cor: RRR  Abdomen/GI: Soft, nondistended  : Nye in place  Extremities: Warm, trace edema  Skin: Well-perfused  Neuro: Sedated, not interacting  Psych: Unable to assess    Data:   Labs reviewed  - Na down at 144  - Hb 6.3  No new imaging or cultures    Assessment/plan:  77 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 Has tracheostomy and feeding tube. Persistently hypoxemic, likely secondary to H alvei infection. Continues to require high PEEP/FiO2 and is on epoprostenol.  CNS: Intubated, sedated.  # Guillain-[place] syndrome  # Pain  # Sedation  - Continue propofol, hydromorphone  - PT/OT when able  Pulm: Decreased FiO2 needs, but still on 14 peep.  # Acute hypoxemic and hypercarbic respiratory failure  # Ventilator dependence  # Tracheostomy status  # Evolving ARDS  # Left upper lobe endobronchial lesion  - Continue ventilator support, wean FiO2  - Keep full-strength epoprostenol  - Not having a lot of secretions, and didn't have much on two bronchoscopies so have not repeated this  - SANGEETA mass will need workup eventually once critical issues improve; 5-HIAA was normal  CV: Off pressors.  # Hypotension/shock, resolved  # Atrial  "fibrillation with RVR, resolved  # Takotsubo cardiomyopathy, resolved  - Continue supportive cares  GI: Abdomen benign  # Nutrition  - Continue tube feedings  : UOP good overall. Na improved.  # Hypervolemia  # BHAVNA, resolved  # Hypernatremia, improving  - Stopped D5w  - Holding diuretics one more day  Heme: Hb 6.3 (7.6)  # Anemia of critical illness  # DVT  - Transfusing PRBC today, I obtained consent from family  - No clinical evidence of bleeding, so not planning on stopping enoxaparin for now  Msk: Extremities warm, well-perfused.   Endo: Glucose 132-252  # Hyperglycemia, likely iatrogenic  - FSG higher when on D5, improved now  - Sliding scale insulin ordered if needed to cover  ID: Afebrile, WBCs 8.9   # Polymicrobial healthcare-associated pneumonia  # Hafnea alvei pneumonia  - Continue meropenem  ICU: LMWH, PPI  - Updated family at bedside    This patient is critically ill to my assessment and requires ICU monitoring and cares. A total of 40 minutes critical care time spent on 3/31/2024, exclusive of procedures.     Pierre Wilkerson MD, PhD  Surgical critical care  3/31/2024, 1:14 PM    Clinically Significant Risk Factors        # Hypokalemia: Lowest K = 3.3 mmol/L in last 2 days, will replace as needed  # Hypernatremia: Highest Na = 156 mmol/L in last 2 days, will monitor as appropriate      # Hypoalbuminemia: Lowest albumin = 2.2 g/dL at 3/31/2024  5:24 AM, will monitor as appropriate     # Hypertension: Noted on problem list        # Obesity: Estimated body mass index is 35.88 kg/m  as calculated from the following:    Height as of this encounter: 1.727 m (5' 7.99\").    Weight as of this encounter: 107 kg (235 lb 14.3 oz).      # Financial/Environmental Concerns: none                "

## 2024-04-01 NOTE — PLAN OF CARE
Problem: Adult Inpatient Plan of Care  Goal: Plan of Care Review  Description: The Plan of Care Review/Shift note should be completed every shift.  The Outcome Evaluation is a brief statement about your assessment that the patient is improving, declining, or no change.  This information will be displayed automatically on your shift  note.  Flowsheets (Taken 4/1/2024 2498)  Plan of Care Reviewed With: patient  Overall Patient Progress: no change   Goal Outcome Evaluation:      Plan of Care Reviewed With: patient    Overall Patient Progress: no changeOverall Patient Progress: no change    Patient appears calm, fallow commands. Patient is on FiO2 80% increase to 100% this morning after SPO2 dropped to mid 80's, PEEP 14, scant oral secretion, and significant inline secretion, LS coarse, SR, Nye in place with adequate UOP. Family at bedside till 18:00. Benadryl given for itching. Prop for sedation to help with vent compliance.   Continue with plan of care.

## 2024-04-01 NOTE — PROGRESS NOTES
[facility] ICU RESPIRATORY NOTE        Date of Admission: 3/8/2024    Date of Intubation (most recent): 3/22/24    Reason for Mechanical Ventilation: Resp failure    Number of Days on Mechanical Ventilation: 22    Met Criteria for Spontaneous Breathing Trial: No    Reason for No Spontaneous Breathing Trial: Pt on 95% FIO2    Significant Events Today: None    ABG Results:   Recent Labs   Lab 03/31/24  0525 03/30/24  0405 03/26/24  0108   PH  --   --  7.43   PCO2  --   --  64*   PO2  --   --  85   HCO3  --   --  42*   O2PER 80 100 80         Current Vent Settings: Vent Mode: CMV/AC  (Continuous Mandatory Ventilation/ Assist Control)  FiO2 (%): 95 %  Resp Rate (Set): 14 breaths/min  Tidal Volume (Set, mL): 420 mL  PEEP (cm H2O): 14 cmH2O  Resp: 16      Skin Assessment: Intact    Plan: Pt to remain on full strength Veletri and full vent support overnight    John Go, RT

## 2024-04-01 NOTE — PLAN OF CARE
Goal Outcome Evaluation:      Plan of Care Reviewed With:  (Interdisciplinary bedside rounds)    Overall Patient Progress: no changeOverall Patient Progress: no change    Outcome Evaluation: TF regimen modified as patient now on Propofol. See RD note dated 4/1/24 for details.    Sandra Chiu RD, LD, CNSC   Clinical Dietitian - [facility]

## 2024-04-01 NOTE — PROGRESS NOTES
CLINICAL NUTRITION SERVICES - REASSESSMENT NOTE      Recommendations Ordered by Registered Dietitian (RD): Change TF regimen to Promote with Fiber at 60 mL/hr= 1440 kcal, 89 g protein (1.3 g/kg), 199 g CHO, 20 g fiber, 1197 mL H2O  Discontinue the Banatrol TF  Total with Propofol = 2023 kcal (29 kcal/kg)   Malnutrition: (3/14)  % Weight Loss:  None noted  % Intake:  No decreased intake noted (on goal TF)  Subcutaneous Fat Loss:  None observed  Muscle Loss:  None observed  Fluid Retention:  None noted     Malnutrition Diagnosis: Patient does not meet two of the above criteria necessary for diagnosing malnutrition       EVALUATION OF PROGRESS TOWARD GOALS   Diet:  NPO with Trach     Nutrition Support:  Patient's TF regimen was changed on 3/28 and continues as follows ~    Nutrition Support Enteral:  Type of Feeding Tube: PEG  Enteral Frequency:  Continuous  Enteral Regimen: Osmolite 1.5 at 55 mL/hr  Total Enteral Provisions: 1980 kcal, 83 g protein, 269 g CHO, 0 g fiber, 1006 mL H2O  Free Water Flush: 90 mL every 4 hours   Banatrol TF 1 pkt BID = 90 kcal, 10 g fiber, 4 g protein   Propofol at 22.1 mL/hr= 583 kcal   Total = 2653 kcal (38 kcal/kg), 87 g protein (1.2 g/kg), 10 g fiber       Intake/Tolerance:    Na 154 (H)  K/Phos NL  Mg 3.0 (H)  -122  I/O 3179/1975, wt 109.2 kg (up overall)  Last BM 3/28 (rectal tube pulled at that time as well) - Noted Neuro note indicated that patient at high risk for constipation       ASSESSED NUTRITION NEEDS:  Dosing Weight 70 kg (adjusted)  Estimated Energy Needs: 8361-7802 kcals (25-30 Kcal/Kg)  Justification: overweight, trached   Estimated Protein Needs:  grams protein (1.2-1.5 g pro/Kg)  Justification: hypercatabolism with acute illness and obese     Previous Goals (3/28):   Goal TF regimen will meet % needs    Evaluation: Not met, exceeding energy needs now that Propofol running     Previous Nutrition Diagnosis (3/28):   Inadequate energy intake related to  TF at goal, needs re-estimated now with Trach as evidenced by meeting 80% energy needs   Evaluation: Declining      CURRENT NUTRITION DIAGNOSIS  Excessive energy intake related to TF at goal, Propofol also running as evidenced by meeting > 125% energy needs     INTERVENTIONS  Recommendations / Nutrition Prescription  Change TF regimen to Promote with Fiber at 60 mL/hr= 1440 kcal, 89 g protein (1.3 g/kg), 199 g CHO, 20 g fiber, 1197 mL H2O  Discontinue the Banatrol TF  Total with Propofol = 2023 kcal (29 kcal/kg)    Implementation  EN Composition, EN Schedule, Medical Food Supplement: Orders written for above TF change and discontinuation of Banatrol TF  Collaboration and Referral of Nutrition care: Patient discussed today during interdisciplinary bedside rounds    Goals  Goal TF regimen will meet % needs   Patient will have 1-2 formed BM every 24-48 hours     MONITORING AND EVALUATION:  Progress towards goals will be monitored and evaluated per protocol and Practice Guidelines    Sandra Chiu RD, LD, CNSC   Clinical Dietitian - [facility]

## 2024-04-01 NOTE — PROGRESS NOTES
"ICU Progress Note    Date of Service: 04/01/24    A/P:  78F HTN, Guillain-[place] syndrome, HFrEF admitted 3/8 w/ new onset HF. Now s/p trach and PEG. Worsening respiratory status 2/2 H alvei pneumonia. EF has now recovered.     I have personally reviewed the daily labs, imaging studies, cultures and discussed the case with referring physician and consulting physicians.     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  - vent settings below  - inhaled epoprostenol  - albuterol nebs     Cardiac  # Afib w/ RVR - resolved  # Stress cardiomyopathy - EF now recovered  # Shock - resolved  - MAP > 65  - off vasopressors  - amiodarone     Renal  # Hypernatremia  # Hypervolemia - improved   - monitor UOP, Cr, I/O  - electrolyte replacement protocols   - monitor off diuretics     GI  # No acute issues    - RD to manage TF    Heme/Onc  # Anemia - 2/2 critical illness   - monitor CBC    ID  # Healthcare-associated pneumonia - H alvei  - completed meropenem     Endo  # Hyperglycemia   - monitor BG  - sliding scale insulin     Clinically Significant Risk Factors        # Hypokalemia: Lowest K = 3.3 mmol/L in last 2 days, will replace as needed  # Hypernatremia: Highest Na = 156 mmol/L in last 2 days, will monitor as appropriate      # Hypoalbuminemia: Lowest albumin = 2.2 g/dL at 3/31/2024  5:24 AM, will monitor as appropriate     # Hypertension: Noted on problem list        # Obesity: Estimated body mass index is 36.61 kg/m  as calculated from the following:    Height as of this encounter: 1.727 m (5' 7.99\").    Weight as of this encounter: 109.2 kg (240 lb 11.9 oz).      # Financial/Environmental Concerns: none                PPX  1. DVT: enoxaparin   2. " "VAP: HOB 30 degrees, chlorhexidine rinse  3. Stress Ulcer: PPI  4. Restraints: Nonviolent soft two point restraints required and necessary for patient safety and continued cares and good effect as patient continues to pull at necessary lines, tubes despite education and distraction. Will readdress daily.   5. Wound care - per unit routine   6. Feeding - TF    Interval Hx:  NAEON. Continued high vent settings.     Unable to obtain ROS 2/2 sedation/intubation.     BP 99/49   Pulse 62   Temp 98.8  F (37.1  C)   Resp 17   Ht 1.727 m (5' 7.99\")   Wt 109.2 kg (240 lb 11.9 oz)   SpO2 92%   BMI 36.61 kg/m    Gen: supine, NAD  Neuro: sedated, pupils equal  HEENT: anicteric  Card: RRR  Pulm: coarse b/l  Abd: soft, non-distended  MSK: no edema, no acute joint abnormality  Skin: no obvious rash    Vent Mode: CMV/AC  (Continuous Mandatory Ventilation/ Assist Control)  FiO2 (%): 80 %  Resp Rate (Set): 14 breaths/min  Tidal Volume (Set, mL): 420 mL  PEEP (cm H2O): 14 cmH2O  Resp: 17        Intake/Output Summary (Last 24 hours) at 4/1/2024 0901  Last data filed at 4/1/2024 0800  Gross per 24 hour   Intake 2977.34 ml   Output 2075 ml   Net 902.34 ml       Labs: reviewed    Imaging: reviewed    Billing: Patient is critically ill. Total critical care time today, excluding procedures, was 60 minutes.    Bj Isaac MD  Pulmonary Disease and Critical Care Medicine   [facility]      "

## 2024-04-01 NOTE — PROGRESS NOTES
[facility] ICU RESPIRATORY NOTE           Date of Admission: 3/8/2024     Date of Intubation (most recent): 03/22/2024     Reason for Mechanical Ventilation: Respiratory failure      Number of Days on Mechanical Ventilation: 22     Met Criteria for Spontaneous Breathing Trial: No     Reason for No Spontaneous Breathing Trial: Pt. On Full strength joey and PEEP of 14.      Significant Events Today: None overnight, pt continues to require high FiO2/peep.     Recent Labs   Lab 03/31/24  0525 03/30/24  0405 03/26/24  0108   PH  --   --  7.43   PCO2  --   --  64*   PO2  --   --  85   HCO3  --   --  42*   O2PER 80 100 80     Vent Mode: CMV/AC  (Continuous Mandatory Ventilation/ Assist Control)  FiO2 (%): 80 %  Resp Rate (Set): 14 breaths/min  Tidal Volume (Set, mL): 420 mL  PEEP (cm H2O): 14 cmH2O  Resp: 19    NEIL Dias, RRT

## 2024-04-02 NOTE — PROGRESS NOTES
[facility] ICU RESPIRATORY NOTE        Date of Admission: 3/8/2024    Date of Intubation (most recent): 3/22/2024    Reason for Mechanical Ventilation: Respiratory Failure    Number of Days on Mechanical Ventilation: Day 24    Met Criteria for Spontaneous Breathing Trial: No    Reason for No Spontaneous Breathing Trial: Patient is on a PEEP of 14 cm H2O, 100% FiO2, Full-strength veletri    Significant Events Today: Patient SpO2 in the high 80's low 90's for most of this shift despite maximum ventilatory support.    ABG Results:   Recent Labs   Lab 04/02/24  0217 03/31/24  0525 03/30/24  0405   PH 7.46*  --   --    PCO2 60*  --   --    PO2 52*  --   --    HCO3 43*  --   --    O2PER 100 80 100         Current Vent Settings: Vent Mode: CMV/AC  (Continuous Mandatory Ventilation/ Assist Control)  FiO2 (%): 100 %  Resp Rate (Set): 18 breaths/min  Tidal Volume (Set, mL): 340 mL  PEEP (cm H2O): 14 cmH2O  Resp: 29      Viktor Dunn, RRT on 4/2/2024 at 5:51 PM

## 2024-04-02 NOTE — CONSULTS
SPIRITUAL HEALTH SERVICES - Consult Note   [facility] ICU     Referral Source: Consult Request      I visited Brianna and her family per staff request. Brianna has entered comfort care. Family is tearful and at bedside playing music. Prayer was welcomed and provided.     Plan: [facility] remains available for support. Family was informed how to request another visit from a .       Chelsey Gayle (they/them)Nelson  Spiritual Health Services  Chaplain Resident    [facility] routine referrals?*[phone]  [facility] available 24/7 for emergent requests/referrals, either by paging the on-call  or by entering an ASAP/STAT consult in Epic (this will also page the on-call ).

## 2024-04-02 NOTE — PROGRESS NOTES
Brief note:    -Neurocritical team signing off.  Please reach out for any further queries.    Mat Jeffries MD  Vascular Neurology fellow

## 2024-04-02 NOTE — PROGRESS NOTES
"ICU Progress Note    Date of Service: 04/02/24    A/P:  78F HTN, Guillain-Southington syndrome, HFrEF admitted 3/8 w/ new onset HF. Now s/p trach and PEG. Worsening respiratory status 2/2 Hafnia alvei pneumonia. EF has now recovered.     I have personally reviewed the daily labs, imaging studies, cultures and discussed the case with referring physician and consulting physicians.     Neuro  # Sedation for mechanical ventilation  # Guillain-Southington syndrome   - propofol gtt  - hydromorphone gtt  - consider paralytic if worsening airway pressures or vent compliance     Pulmonary  # Acute hypoxemic hypercapnic respiratory failure  # Tracheostomy  # Severe ARDS  # SANGEETA endobronchial lesion - concern for carcinoid  - vent settings below  - inhaled epoprostenol  - albuterol nebs   - start 20mg dexamethasone daily x 5d, followed by 10mg x 5d per DEXA-ARDS protocol   - not an ECMO candidate     Cardiac  # Afib w/ RVR - resolved  # Stress cardiomyopathy - EF now recovered  # Shock - resolved  - MAP > 65  - off vasopressors  - amiodarone     Renal  # Hypernatremia  # Hypervolemia - improved   - monitor UOP, Cr, I/O  - electrolyte replacement protocols   - monitor off diuretics     GI  # No acute issues    - RD to manage TF    Heme/Onc  # Anemia - 2/2 critical illness   - monitor CBC    ID  # Healthcare-associated pneumonia - H alvei  - completed meropenem     Endo  # Hyperglycemia   - monitor BG  - sliding scale insulin     Clinically Significant Risk Factors         # Hypernatremia: Highest Na = 154 mmol/L in last 2 days, will monitor as appropriate      # Hypoalbuminemia: Lowest albumin = 2.2 g/dL at 3/31/2024  5:24 AM, will monitor as appropriate     # Hypertension: Noted on problem list        # Obesity: Estimated body mass index is 35.88 kg/m  as calculated from the following:    Height as of this encounter: 1.727 m (5' 7.99\").    Weight as of this encounter: 107 kg (235 lb 14.3 oz).        # Financial/Environmental Concerns: none " "               PPX  1. DVT: enoxaparin   2. VAP: HOB 30 degrees, chlorhexidine rinse  3. Stress Ulcer: PPI  4. Restraints: Nonviolent soft two point restraints required and necessary for patient safety and continued cares and good effect as patient continues to pull at necessary lines, tubes despite education and distraction. Will readdress daily.   5. Wound care - per unit routine   6. Feeding - TF    Interval Hx:  P:F 52 this AM. Trial of one time dose paralytic overnight w/ no significant change. Less responsive this AM.    Prolonged discussion had with family at bedside. Explained current clinical situation and overall declining trajectory. We discussed that we were reaching the limits of what we can do for ARDS. This patient is not an ECMO candidate and that was explained to family. The relayed the struggles they have been having because she was so healthy prior to this hospitalization, which is understandable. Her daughter stated that they are not ready to \"throw in the towel.\" We discussed that we may trial dexamethasone, but we may not see much benefit, they are agreeable to this. I brought up a code status discussion and my thought that she would almost certainly not survive a cardiac arrest. They would like her to remain FULL CODE given that when she was awake and oriented she specifically told them this. I mentioned that this would likely just cause suffering, but they remained firm in their decision. I appreciate Emy MARIANO help during this discussion.     Unable to obtain ROS 2/2 sedation/intubation.     /57   Pulse 73   Temp 99.9  F (37.7  C)   Resp 18   Ht 1.727 m (5' 7.99\")   Wt 107 kg (235 lb 14.3 oz)   SpO2 90%   BMI 35.88 kg/m    Gen: supine, NAD  Neuro: sedated, pupils equal  HEENT: anicteric  Card: RRR  Pulm: coarse b/l  Abd: soft, non-distended  MSK: no edema, no acute joint abnormality  Skin: blanching erythematous rash b/l UE    Vent Mode: CMV/AC  (Continuous Mandatory Ventilation/ " Assist Control)  FiO2 (%): 100 %  Resp Rate (Set): 18 breaths/min  Tidal Volume (Set, mL): 340 mL  PEEP (cm H2O): 14 cmH2O  Resp: 18        Intake/Output Summary (Last 24 hours) at 4/1/2024 0901  Last data filed at 4/1/2024 0800  Gross per 24 hour   Intake 2977.34 ml   Output 2075 ml   Net 902.34 ml       Labs: reviewed    Imaging: reviewed    Billing: Patient is critically ill. Total critical care time today, excluding procedures, was 74 minutes.    Bj Isaac MD  Pulmonary Disease and Critical Care Medicine   [facility]

## 2024-04-02 NOTE — PROGRESS NOTES
[facility] ICU RESPIRATORY NOTE        Date of Admission: 3/8/2024    Date of Intubation (most recent): 3/22/24    Reason for Mechanical Ventilation: Resp Failure    Number of Days on Mechanical Ventilation: 23    Met Criteria for Spontaneous Breathing Trial: No    Reason for No Spontaneous Breathing Trial Pt maxed out on o2    Significant Events Today: high airway pressures in 50's, MD made aware and adjusted vent settings. Abg drawn.     ABG Results:   Recent Labs   Lab 04/02/24  0217 03/31/24  0525 03/30/24  0405   PH 7.46*  --   --    PCO2 60*  --   --    PO2 52*  --   --    HCO3 43*  --   --    O2PER 100 80 100         Current Vent Settings: Vent Mode: CMV/AC  (Continuous Mandatory Ventilation/ Assist Control)  FiO2 (%): 100 %  Resp Rate (Set): 14 breaths/min  Tidal Volume (Set, mL): (S) 280 mL  PEEP (cm H2O): 14 cmH2O  Resp: 30      Skin Assessment: intact    Plan: Continue on full strength veletri and current vent settings.    Gale Dennis, RT on 4/2/2024 at 4:28 AM

## 2024-04-02 NOTE — PROGRESS NOTES
Intensivist:  Notified of peak pressures in the mid 50s, slowly increasing throughout the day.  I performed a plateau pressure which was similar to peak.    Stat CXR (personally reviewed/interpreted) showed dense b/l infiltrates, but no PTX.  Reduced tidal volume to 280.  At that volume her peaks have decreased to mid 40s.  Will hold here.  Repeat gas in 1 hr.    ADDENDUM 0400:  Desatting to mid 80s after vent changes noted above despite 100% fio2.  I tried titrating her PEEP and her sats actually did a bit better on slightly lower peep of 10.  She is already on inhaled joey and given the duration that she's been intubated and in respiratory failure I'm not sure proning would be beneficial.  Simiarly she would not be a good candidate for VV ECMO given her age and duration of mechanical ventilation.  Will continue for now with peep 10 and somewhat increased tv of 340.     ADDENDUM 0600:  After a brief recovery to the high 80s, sats are back in the mid 80s again.  Trialing push-dose paralytic.    ADDENDUM 0645:  Desatt'ed to 70s after paralytic.  Increased peep back to 14 and this seemed to improve sats back to high 80s again.  Also trying 80 mg IV lasix empirically despite no excessive in's recently.  I had a lengthy discussion with the patient's spouse as the pt is unable to make her own medical decisions and input needed for cares.  We discussed that her oxygenation has been worsening for the last 3 days despite aggressive cares and that we are now reaching the limit of support we can provide.  I explained that she is not an ecmo candidate.  We may be able to attempt proning, but I'm not sure how we would be able to do that with tracheostomy.  Her  is gathering their children to come in to the hospital for further decision-making.    I spent an aggregate of 60 minutes of critical care time, excluding procedures, managing this patient for the diagnosis of acute hypoxemic respiratory failure, vent  dependent.

## 2024-04-02 NOTE — PLAN OF CARE
Goal Outcome Evaluation:    Plan of Care Reviewed With: patient, spouse, family    Overall Patient Progress: no changeOverall Patient Progress: no change    Pt here with Guillain Rice. A&O INOCENCIA due to patient is sedated and trached. Neuros generalized weakness, Moved RUE spontaneously, Opened eyes on command. VSS. NPO _ tube feed at goal of 60 mls/hr with q 4 hr FWF of 90 mls. Takes pills crushed through PEG tube. Up with A2 + lift. Denies pain. Pt scoring yellow on the Aggression Stop Light Tool for unable to assess orientation. Discharge to [facility]ach when medically ready.

## 2024-04-02 NOTE — PROGRESS NOTES
Dr Acevedo came to bedside to discuss care with . Pt stating 88-87. Plan to have care conference when pts children arrive. Agreed to maintain O2 > 85. Will notify if lower

## 2024-04-02 NOTE — PLAN OF CARE
"  Problem: Adult Inpatient Plan of Care  Goal: Patient-Specific Goal (Individualized)  Description: You can add care plan individualizations to a care plan. Examples of Individualization might be:  \"Parent requests to be called daily at 9am for status\", \"I have a hard time hearing out of my right ear\", or \"Do not touch me to wake me up as it startles  me\".  Outcome: Not Progressing     Problem: Adult Inpatient Plan of Care  Goal: Plan of Care Review  Description: The Plan of Care Review/Shift note should be completed every shift.  The Outcome Evaluation is a brief statement about your assessment that the patient is improving, declining, or no change.  This information will be displayed automatically on your shift  note.  4/2/2024 1656 by Emy Anaya RN  Flowsheets (Taken 4/2/2024 1656)  Plan of Care Reviewed With:   patient   spouse   family  Overall Patient Progress: declining  4/2/2024 1653 by Emy Anaya RN  Outcome: Progressing  Flowsheets (Taken 4/2/2024 1651)  Outcome Evaluation: Pt sedated on 0.5 of dilaudid and 5 of prop. Opens eyes to repeated stimulation. No movement in extremities. Pt on full stregnth veletri, peep 14 and 100% Fio2. Maintaining poor sats in upper 80s low 90s. SR with no BP concerns this shift. No BM this shift but TF at goal. Good output through quiroz. Long discussion with family and intensivist regarding pt's declining status. Decided to remain full code at this time and follow through with high dose steroids.  Plan of Care Reviewed With:   patient   spouse   family  Overall Patient Progress: declining  Goal: Absence of Hospital-Acquired Illness or Injury  Outcome: Progressing  Intervention: Identify and Manage Fall Risk  Recent Flowsheet Documentation  Taken 4/2/2024 1600 by Emy Anaya RN  Safety Promotion/Fall Prevention:   activity supervised   clutter free environment maintained   increased rounding and observation   increase visualization of patient   patient and " family education   room door open   room near nurse's station   room organization consistent   safety round/check completed  Taken 4/2/2024 1200 by Emy Anaya RN  Safety Promotion/Fall Prevention:   activity supervised   clutter free environment maintained   increased rounding and observation   increase visualization of patient   patient and family education   room door open   room near nurse's station   room organization consistent   safety round/check completed  Taken 4/2/2024 0800 by Emy Anaya RN  Safety Promotion/Fall Prevention:   activity supervised   clutter free environment maintained   increased rounding and observation   increase visualization of patient   patient and family education   room door open   room near nurse's station   room organization consistent   safety round/check completed  Intervention: Prevent Skin Injury  Recent Flowsheet Documentation  Taken 4/2/2024 1600 by Emy Anaya RN  Body Position:   turned   heels elevated   left  Skin Protection:   incontinence pads utilized   pulse oximeter probe site changed   silicone foam dressing in place   skin to device areas padded  Device Skin Pressure Protection:   absorbent pad utilized/changed   positioning supports utilized   pressure points protected   skin-to-device areas padded   tubing/devices free from skin contact  Taken 4/2/2024 1400 by Emy Anaya RN  Body Position:   turned   heels elevated   right  Taken 4/2/2024 1200 by Emy Anaya RN  Body Position:   turned   heels elevated   left  Skin Protection:   incontinence pads utilized   pulse oximeter probe site changed   silicone foam dressing in place   skin to device areas padded  Device Skin Pressure Protection:   absorbent pad utilized/changed   positioning supports utilized   pressure points protected   skin-to-device areas padded   tubing/devices free from skin contact  Taken 4/2/2024 1000 by Emy Anaya RN  Body Position:   turned   heels elevated    right  Taken 4/2/2024 0800 by Emy Anaya RN  Body Position:   turned   left   heels elevated  Skin Protection:   incontinence pads utilized   pulse oximeter probe site changed   silicone foam dressing in place   skin to device areas padded  Device Skin Pressure Protection:   absorbent pad utilized/changed   positioning supports utilized   pressure points protected   skin-to-device areas padded   tubing/devices free from skin contact  Intervention: Prevent and Manage VTE (Venous Thromboembolism) Risk  Recent Flowsheet Documentation  Taken 4/2/2024 1600 by Emy Anaya RN  VTE Prevention/Management: SCDs (sequential compression devices) on  Taken 4/2/2024 1200 by Emy Anaya RN  VTE Prevention/Management: SCDs (sequential compression devices) on  Taken 4/2/2024 0800 by Emy Anaya RN  VTE Prevention/Management: SCDs (sequential compression devices) on  Intervention: Prevent Infection  Recent Flowsheet Documentation  Taken 4/2/2024 1600 by Emy Anaya RN  Infection Prevention:   environmental surveillance performed   equipment surfaces disinfected   hand hygiene promoted   personal protective equipment utilized   rest/sleep promoted   single patient room provided  Taken 4/2/2024 1200 by Emy Anaya RN  Infection Prevention:   environmental surveillance performed   equipment surfaces disinfected   hand hygiene promoted   personal protective equipment utilized   rest/sleep promoted   single patient room provided  Taken 4/2/2024 0800 by Emy Anaya RN  Infection Prevention:   environmental surveillance performed   equipment surfaces disinfected   hand hygiene promoted   personal protective equipment utilized   rest/sleep promoted   single patient room provided  Goal: Optimal Comfort and Wellbeing  Outcome: Progressing  Intervention: Provide Person-Centered Care  Recent Flowsheet Documentation  Taken 4/2/2024 1600 by Emy Anaya RN  Trust Relationship/Rapport: care explained  Taken  4/2/2024 1200 by Emy Anaya, RN  Trust Relationship/Rapport: care explained  Taken 4/2/2024 0800 by Emy Anaya RN  Trust Relationship/Rapport: care explained   Goal Outcome Evaluation:      Plan of Care Reviewed With: patient, spouse, family    Overall Patient Progress: decliningOverall Patient Progress: declining    Outcome Evaluation: Pt sedated on 0.5 of dilaudid and 5 of prop. Opens eyes to repeated stimulation. No movement in extremities. Pt on full stregnth veletri, peep 14 and 100% Fio2. Maintaining poor sats in upper 80s low 90s. SR with no BP concerns this shift. No BM this shift but TF at goal. Good output through quiroz. Long discussion with family and intensivist regarding pt's declining status. Decided to remain full code at this time and follow through with high dose steroids.

## 2024-04-02 NOTE — PLAN OF CARE
"  Neuro: PERRL, opens eyes to voice, follows commands, on propofol and dilaudid for pain management and sedation    CV: tele SR, hx of afib RVR, EF 55-60%    Resp: LS diminished/coarse, trach/vent settings changed multiple times to optimize O2 needs. O2 goal >88%. IV push paralytic given, patient desatted to 75% until PEEP was changed to 14. Unable to prone pt d/t trach. On full strength continuous velitri.     GI: BS active, last BM 3/31/24, diet NPO/TF    : quiroz intact, for retention, with adequate urine output    Skin: +2 generalized edema, scattered bruising, rash on R side of back, abdomen, and arm, outlined w/ skin marker, MD aware. groin/perineum reddened and rashy- antifungal powder     Activity: assist of 2 w/ lift    Additional: afebrile, no sign of pain, brief moment of agitation/restlessness, propofol dose adjusted to patient needs.    Plan of Care: needs care conference, possible plan for LTACH?        -------------------------------------------------------------------------------------    Goal Outcome Evaluation:               Problem: Adult Inpatient Plan of Care  Goal: Plan of Care Review  Description: The Plan of Care Review/Shift note should be completed every shift.  The Outcome Evaluation is a brief statement about your assessment that the patient is improving, declining, or no change.  This information will be displayed automatically on your shift  note.  Outcome: Not Progressing  Goal: Patient-Specific Goal (Individualized)  Description: You can add care plan individualizations to a care plan. Examples of Individualization might be:  \"Parent requests to be called daily at 9am for status\", \"I have a hard time hearing out of my right ear\", or \"Do not touch me to wake me up as it startles  me\".  Outcome: Not Progressing  Goal: Absence of Hospital-Acquired Illness or Injury  Outcome: Not Progressing  Intervention: Identify and Manage Fall Risk  Recent Flowsheet Documentation  Taken 4/2/2024 0400 " Patient tolerated the procedure very well under propofol sedation. by Collin, Kim, RN  Safety Promotion/Fall Prevention:   activity supervised   clutter free environment maintained   increased rounding and observation   increase visualization of patient   patient and family education   room door open   room near nurse's station   room organization consistent   safety round/check completed  Taken 4/2/2024 0000 by Kim Polanco RN  Safety Promotion/Fall Prevention:   activity supervised   clutter free environment maintained   increased rounding and observation   increase visualization of patient   patient and family education   room door open   room near nurse's station   room organization consistent   safety round/check completed  Taken 4/1/2024 2000 by Kim Polanco RN  Safety Promotion/Fall Prevention:   activity supervised   clutter free environment maintained   increased rounding and observation   increase visualization of patient   patient and family education   room door open   room near nurse's station   room organization consistent   safety round/check completed  Intervention: Prevent Skin Injury  Recent Flowsheet Documentation  Taken 4/2/2024 0600 by Kim Polanco RN  Body Position:   turned   left   heels elevated  Taken 4/2/2024 0400 by Kim Polanco RN  Body Position:   turned   right   heels elevated  Skin Protection:   incontinence pads utilized   pulse oximeter probe site changed   silicone foam dressing in place   skin to device areas padded  Device Skin Pressure Protection:   absorbent pad utilized/changed   positioning supports utilized   pressure points protected   skin-to-device areas padded   tubing/devices free from skin contact  Taken 4/2/2024 0200 by Kim Polanco RN  Body Position:   turned   left   heels elevated  Taken 4/2/2024 0000 by Kim Polanco RN  Body Position:   turned   right   heels elevated  Skin Protection:   incontinence pads utilized   pulse oximeter probe site changed   silicone foam dressing in place   skin to device  areas padded  Device Skin Pressure Protection:   absorbent pad utilized/changed   positioning supports utilized   pressure points protected   skin-to-device areas padded   tubing/devices free from skin contact  Taken 4/1/2024 2200 by Kim Polanco RN  Body Position:   turned   left   heels elevated  Taken 4/1/2024 2000 by Kim Polanco RN  Body Position:   turned   right   heels elevated  Skin Protection:   incontinence pads utilized   pulse oximeter probe site changed   silicone foam dressing in place   skin to device areas padded  Device Skin Pressure Protection:   absorbent pad utilized/changed   positioning supports utilized   pressure points protected   skin-to-device areas padded   tubing/devices free from skin contact  Intervention: Prevent and Manage VTE (Venous Thromboembolism) Risk  Recent Flowsheet Documentation  Taken 4/2/2024 0400 by Kim Polanco RN  VTE Prevention/Management: SCDs (sequential compression devices) on  Taken 4/2/2024 0000 by Kim Polanco RN  VTE Prevention/Management: SCDs (sequential compression devices) on  Taken 4/1/2024 2000 by Kim Polanco RN  VTE Prevention/Management: SCDs (sequential compression devices) on  Intervention: Prevent Infection  Recent Flowsheet Documentation  Taken 4/2/2024 0400 by Kim Polanco RN  Infection Prevention:   environmental surveillance performed   equipment surfaces disinfected   hand hygiene promoted   personal protective equipment utilized   rest/sleep promoted   single patient room provided  Taken 4/2/2024 0000 by Kim Polanco RN  Infection Prevention:   environmental surveillance performed   equipment surfaces disinfected   hand hygiene promoted   personal protective equipment utilized   rest/sleep promoted   single patient room provided  Taken 4/1/2024 2000 by Kim Polanco RN  Infection Prevention:   environmental surveillance performed   equipment surfaces disinfected   hand hygiene promoted   personal protective  equipment utilized   rest/sleep promoted   single patient room provided  Goal: Optimal Comfort and Wellbeing  Outcome: Not Progressing  Intervention: Provide Person-Centered Care  Recent Flowsheet Documentation  Taken 4/2/2024 0400 by Kim Polanco RN  Trust Relationship/Rapport: care explained  Taken 4/2/2024 0000 by Kim Polanco RN  Trust Relationship/Rapport: care explained  Taken 4/1/2024 2000 by Kim Polanco RN  Trust Relationship/Rapport: care explained  Goal: Readiness for Transition of Care  Outcome: Not Progressing     Problem: Risk for Delirium  Goal: Optimal Coping  Outcome: Not Progressing  Intervention: Optimize Psychosocial Adjustment to Delirium  Recent Flowsheet Documentation  Taken 4/2/2024 0400 by Kim Polanco RN  Supportive Measures: relaxation techniques promoted  Taken 4/2/2024 0000 by Kim Polanco RN  Supportive Measures: relaxation techniques promoted  Taken 4/1/2024 2000 by Kim Polanco RN  Supportive Measures: relaxation techniques promoted  Goal: Improved Behavioral Control  Outcome: Not Progressing  Intervention: Prevent and Manage Agitation  Recent Flowsheet Documentation  Taken 4/2/2024 0400 by Kim Polanco RN  Environment Familiarity/Consistency:   daily routine followed   familiar objects from home provided  Taken 4/2/2024 0000 by Kim Polanco RN  Environment Familiarity/Consistency:   daily routine followed   familiar objects from home provided  Taken 4/1/2024 2000 by Kim Polanco RN  Environment Familiarity/Consistency:   daily routine followed   familiar objects from home provided  Intervention: Minimize Safety Risk  Recent Flowsheet Documentation  Taken 4/2/2024 0400 by Kim Polanco RN  Communication Enhancement Strategies:   extra time allowed for response   nonverbal strategies used   one-step directions provided   repetition utilized   communication board used   communication device used  Enhanced Safety Measures:   pain management   room  near unit station  Trust Relationship/Rapport: care explained  Taken 4/2/2024 0000 by Kim Polanco RN  Communication Enhancement Strategies:   extra time allowed for response   nonverbal strategies used   one-step directions provided   repetition utilized   communication board used   communication device used  Enhanced Safety Measures:   pain management   room near unit station  Trust Relationship/Rapport: care explained  Taken 4/1/2024 2000 by Kim Polanco RN  Communication Enhancement Strategies:   extra time allowed for response   nonverbal strategies used   one-step directions provided   repetition utilized   communication board used   communication device used  Enhanced Safety Measures:   pain management   room near unit station  Trust Relationship/Rapport: care explained  Goal: Improved Attention and Thought Clarity  Outcome: Not Progressing  Intervention: Maximize Cognitive Function  Recent Flowsheet Documentation  Taken 4/2/2024 0400 by Kim Polanco RN  Sensory Stimulation Regulation:   care clustered   lighting decreased   quiet environment promoted  Reorientation Measures:   calendar in view   clock in view   family pictures in room   reorientation provided  Taken 4/2/2024 0000 by Kim Polanco RN  Sensory Stimulation Regulation:   care clustered   lighting decreased   quiet environment promoted  Reorientation Measures:   calendar in view   clock in view   family pictures in room   reorientation provided  Taken 4/1/2024 2000 by Kim Polanco RN  Sensory Stimulation Regulation:   care clustered   lighting decreased   quiet environment promoted  Reorientation Measures:   calendar in view   clock in view   family pictures in room   reorientation provided  Goal: Improved Sleep  Outcome: Not Progressing     Problem: Mechanical Ventilation Invasive  Goal: Effective Communication  Outcome: Not Progressing  Intervention: Ensure Effective Communication  Recent Flowsheet Documentation  Taken  4/2/2024 0400 by Kim Polanco RN  Communication Enhancement Strategies:   extra time allowed for response   nonverbal strategies used   one-step directions provided   repetition utilized   communication board used   communication device used  Trust Relationship/Rapport: care explained  Taken 4/2/2024 0000 by Kim Polanco RN  Communication Enhancement Strategies:   extra time allowed for response   nonverbal strategies used   one-step directions provided   repetition utilized   communication board used   communication device used  Trust Relationship/Rapport: care explained  Taken 4/1/2024 2000 by Kim Polanco RN  Communication Enhancement Strategies:   extra time allowed for response   nonverbal strategies used   one-step directions provided   repetition utilized   communication board used   communication device used  Trust Relationship/Rapport: care explained  Goal: Optimal Device Function  Outcome: Not Progressing  Intervention: Optimize Device Care and Function  Recent Flowsheet Documentation  Taken 4/2/2024 0400 by Kim Polanco RN  Airway Safety Measures:   all equipment/monitors on and audible   manual resuscitator/mask/valve at bedside   suction at bedside   suction equipment   suction regulator  Airway/Ventilation Management:   airway patency maintained   calming measures promoted  Oral Care:   lip/mouth moisturizer applied   mouth wash rinse   swabbed with antiseptic solution   suction provided  Taken 4/2/2024 0200 by Kim Polanco RN  Oral Care:   suction provided   swabbed with antiseptic solution  Taken 4/2/2024 0000 by Kim Polanco RN  Airway Safety Measures:   all equipment/monitors on and audible   manual resuscitator/mask/valve at bedside   suction at bedside   suction equipment   suction regulator  Airway/Ventilation Management:   airway patency maintained   calming measures promoted  Oral Care:   lip/mouth moisturizer applied   mouth wash rinse   swabbed with antiseptic  solution   suction provided  Taken 4/1/2024 2000 by Kim Polanco RN  Airway Safety Measures:   all equipment/monitors on and audible   manual resuscitator/mask/valve at bedside   suction at bedside   suction equipment   suction regulator  Airway/Ventilation Management:   airway patency maintained   calming measures promoted  Oral Care:   lip/mouth moisturizer applied   mouth wash rinse   swabbed with antiseptic solution   suction provided  Goal: Mechanical Ventilation Liberation  Outcome: Not Progressing  Intervention: Promote Extubation and Mechanical Ventilation Liberation  Recent Flowsheet Documentation  Taken 4/2/2024 0400 by Kim Polanco RN  Medication Review/Management:   medications reviewed   high-risk medications identified  Environmental Support:   calm environment promoted   environmental consistency promoted  Taken 4/2/2024 0000 by Kim Polanco RN  Medication Review/Management:   medications reviewed   high-risk medications identified  Environmental Support:   calm environment promoted   environmental consistency promoted  Taken 4/1/2024 2000 by Kim Polanco RN  Medication Review/Management:   medications reviewed   high-risk medications identified  Environmental Support:   calm environment promoted   environmental consistency promoted  Goal: Optimal Nutrition Delivery  Outcome: Not Progressing  Intervention: Optimize Nutrition Delivery  Recent Flowsheet Documentation  Taken 4/2/2024 0400 by Kim Polanco, RN  Nutrition Support Management: weight trending reviewed  Taken 4/2/2024 0000 by Kim Polanco RN  Nutrition Support Management: weight trending reviewed  Goal: Absence of Device-Related Skin and Tissue Injury  Outcome: Not Progressing  Intervention: Maintain Skin and Tissue Health  Recent Flowsheet Documentation  Taken 4/2/2024 0400 by Kim Polanco RN  Device Skin Pressure Protection:   absorbent pad utilized/changed   positioning supports utilized   pressure points  protected   skin-to-device areas padded   tubing/devices free from skin contact  Taken 4/2/2024 0000 by Kim Polanco RN  Device Skin Pressure Protection:   absorbent pad utilized/changed   positioning supports utilized   pressure points protected   skin-to-device areas padded   tubing/devices free from skin contact  Taken 4/1/2024 2000 by Kim Polanco RN  Device Skin Pressure Protection:   absorbent pad utilized/changed   positioning supports utilized   pressure points protected   skin-to-device areas padded   tubing/devices free from skin contact  Goal: Absence of Ventilator-Induced Lung Injury  Outcome: Not Progressing  Intervention: Facilitate Lung-Protection Measures  Recent Flowsheet Documentation  Taken 4/2/2024 0400 by Kim Polanco RN  Lung Protection Measures:   fluid excess minimized   ventilator synchrony promoted   lung compliance monitored   plateau/inspiratory pressure monitored   optimal PEEP applied  Taken 4/2/2024 0000 by Kim Polanco RN  Lung Protection Measures:   fluid excess minimized   ventilator synchrony promoted   lung compliance monitored   plateau/inspiratory pressure monitored   optimal PEEP applied  Taken 4/1/2024 2000 by Kim Polanco RN  Lung Protection Measures:   fluid excess minimized   ventilator synchrony promoted   lung compliance monitored   plateau/inspiratory pressure monitored   optimal PEEP applied  Intervention: Prevent Ventilator-Associated Pneumonia  Recent Flowsheet Documentation  Taken 4/2/2024 0600 by Kim Polanco RN  Head of Bed (HOB) Positioning: HOB at 30 degrees  Taken 4/2/2024 0400 by Kim Polanco RN  VAP Prevention Bundle:   HOB elevation maintained   oral care regularly provided   VTE prophylaxis provided   vent circuit breaks minimized  Oral Care:   lip/mouth moisturizer applied   mouth wash rinse   swabbed with antiseptic solution   suction provided  Head of Bed (HOB) Positioning: HOB at 30 degrees  VAP Prevention Contraindications:    per provider order   no/minimum sedation required   condition unstable  VAP Prevention Measures: completed  Taken 4/2/2024 0200 by Kim Polanco RN  Oral Care:   suction provided   swabbed with antiseptic solution  Head of Bed (HOB) Positioning: HOB at 30 degrees  Taken 4/2/2024 0000 by Kim Polanco RN  VAP Prevention Bundle:   HOB elevation maintained   oral care regularly provided   VTE prophylaxis provided   vent circuit breaks minimized  Oral Care:   lip/mouth moisturizer applied   mouth wash rinse   swabbed with antiseptic solution   suction provided  Head of Bed (HOB) Positioning: HOB at 30 degrees  VAP Prevention Contraindications:   per provider order   no/minimum sedation required   condition unstable  VAP Prevention Measures: completed  Taken 4/1/2024 2200 by Kim Polanco RN  Head of Bed (HOB) Positioning: HOB at 30 degrees  Taken 4/1/2024 2000 by Kim Polanco RN  VAP Prevention Bundle:   HOB elevation maintained   oral care regularly provided   VTE prophylaxis provided   vent circuit breaks minimized  Oral Care:   lip/mouth moisturizer applied   mouth wash rinse   swabbed with antiseptic solution   suction provided  Head of Bed (HOB) Positioning: HOB at 30 degrees  VAP Prevention Contraindications:   per provider order   no/minimum sedation required   condition unstable  VAP Prevention Measures: completed     Problem: Guillain-[place]  Syndrome  Goal: Optimal Coping  Outcome: Not Progressing  Intervention: Optimize Psychosocial Response  Recent Flowsheet Documentation  Taken 4/2/2024 0400 by Kim Polanco RN  Supportive Measures: relaxation techniques promoted  Taken 4/2/2024 0000 by Kim Polanco RN  Supportive Measures: relaxation techniques promoted  Taken 4/1/2024 2000 by Kim Polanco RN  Supportive Measures: relaxation techniques promoted  Goal: Balanced Sympathetic/Parasympathetic Function  Outcome: Not Progressing  Intervention: Manage Sympathetic/Parasympathetic  Symptoms  Recent Flowsheet Documentation  Taken 4/2/2024 0400 by Kim Polanco RN  Fluid/Electrolyte Management: fluids provided  Taken 4/2/2024 0000 by Kim Polanco RN  Fluid/Electrolyte Management: fluids provided  Taken 4/1/2024 2000 by Kim Polanco RN  Fluid/Electrolyte Management: fluids provided  Goal: Effective Communication  Outcome: Not Progressing  Intervention: Establish Effective Communication  Recent Flowsheet Documentation  Taken 4/2/2024 0400 by Kim Polanco RN  Communication Enhancement Strategies:   extra time allowed for response   nonverbal strategies used   one-step directions provided   repetition utilized   communication board used   communication device used  Taken 4/2/2024 0000 by Kim Polanco RN  Communication Enhancement Strategies:   extra time allowed for response   nonverbal strategies used   one-step directions provided   repetition utilized   communication board used   communication device used  Taken 4/1/2024 2000 by Kim Polanco RN  Communication Enhancement Strategies:   extra time allowed for response   nonverbal strategies used   one-step directions provided   repetition utilized   communication board used   communication device used  Goal: Optimal Functional Ability  Outcome: Not Progressing  Intervention: Optimize Functional Ability  Recent Flowsheet Documentation  Taken 4/2/2024 0400 by Kim Polanco RN  Activity Management: activity adjusted per tolerance  Taken 4/2/2024 0000 by Kim Polanco RN  Activity Management: activity adjusted per tolerance  Taken 4/1/2024 2000 by Kim Polanco RN  Activity Management: activity adjusted per tolerance  Goal: Acceptable Pain Control  Outcome: Not Progressing  Goal: Effective Oxygenation and Ventilation  Outcome: Not Progressing  Intervention: Promote Airway Secretion Clearance  Recent Flowsheet Documentation  Taken 4/2/2024 0400 by Kim Polanco RN  Cough And Deep Breathing: unable to perform  Activity  Management: activity adjusted per tolerance  Taken 4/2/2024 0000 by Kim Polanco RN  Cough And Deep Breathing: unable to perform  Activity Management: activity adjusted per tolerance  Taken 4/1/2024 2000 by Kim Polanco RN  Cough And Deep Breathing: unable to perform  Activity Management: activity adjusted per tolerance  Intervention: Optimize Oxygenation and Ventilation  Recent Flowsheet Documentation  Taken 4/2/2024 0600 by Kim Polanco RN  Head of Bed (HOB) Positioning: HOB at 30 degrees  Taken 4/2/2024 0400 by Kim Polanco RN  Airway/Ventilation Management:   airway patency maintained   calming measures promoted  Head of Bed (HOB) Positioning: HOB at 30 degrees  Taken 4/2/2024 0200 by Kim Polanco RN  Head of Bed (HOB) Positioning: HOB at 30 degrees  Taken 4/2/2024 0000 by Kim Polanco RN  Airway/Ventilation Management:   airway patency maintained   calming measures promoted  Head of Bed (HOB) Positioning: HOB at 30 degrees  Taken 4/1/2024 2200 by Kim Polanco RN  Head of Bed (HOB) Positioning: HOB at 30 degrees  Taken 4/1/2024 2000 by Kim Polanco RN  Airway/Ventilation Management:   airway patency maintained   calming measures promoted  Head of Bed (HOB) Positioning: HOB at 30 degrees  Goal: Optimal Sensorimotor Function  Outcome: Not Progressing  Intervention: Optimize Neurologic Status  Recent Flowsheet Documentation  Taken 4/2/2024 0400 by Kim Polanco RN  Pressure Reduction Devices:   heel offloading device utilized   positioning supports utilized   pressure-redistributing mattress utilized  Range of Motion: ROM (range of motion) performed  Taken 4/2/2024 0000 by Kim Polanco RN  Pressure Reduction Devices:   heel offloading device utilized   positioning supports utilized   pressure-redistributing mattress utilized  Range of Motion: ROM (range of motion) performed  Taken 4/1/2024 2000 by Kim Polanco RN  Pressure Reduction Devices:   heel offloading device  utilized   positioning supports utilized   pressure-redistributing mattress utilized  Range of Motion: ROM (range of motion) performed  Goal: Oral Nutrition Intake without Aspiration  Outcome: Not Progressing  Intervention: Optimize Eating and Swallowing  Recent Flowsheet Documentation  Taken 4/2/2024 0400 by Kim Polanco RN  Aspiration Precautions:   tube feeding placement verified   respiratory status monitored   oral hygiene care promoted   NPO pending swallow screening/evaluation   upright posture maintained  Taken 4/2/2024 0000 by Kim Polanco RN  Aspiration Precautions:   tube feeding placement verified   respiratory status monitored   oral hygiene care promoted   NPO pending swallow screening/evaluation   upright posture maintained  Taken 4/1/2024 2000 by Kim Polanco RN  Aspiration Precautions:   tube feeding placement verified   respiratory status monitored   oral hygiene care promoted   NPO pending swallow screening/evaluation   upright posture maintained

## 2024-04-03 PROBLEM — N17.9 ACUTE KIDNEY FAILURE, UNSPECIFIED (H): Status: ACTIVE | Noted: 2024-01-01

## 2024-04-03 NOTE — PROGRESS NOTES
[facility] Nurse Inpatient Assessment     Consulted for: Coccyx, now peeling    Summary: Patient vented, can nod head yes/no but not verbalizing, cannot turn or reposition herself without assistance  RN reported Rash to back, requested [facility] eval. [facility] is not consulted for this area, obtained photo for chart for MD review, RN to notify MD of findings per WO recommendation    4/3/24: Patient extubated and now comfort cares, spoke with RN no needs from [facility] team at this time, unable to turn patient due to oxygen desaturation when flat. 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 Progressive paresis and respiratory failure, now is s/p tracheostomy/feeding tube. Over the last 24 hours, is more hypotensive and hypoxemic with unclear etiology.   CNS: Sleeping when I saw her and not awoken, did well with therapies and per neurology has an improving exam.   # Guillain-[place] syndrome  # Pain/sedation  - Continue supportive cares, PT/OT, out of bed as able  Pulm: Tolerating ventilator but PEEP 12, FiO2 85% since hemorrhage yesterday  # Acute hypoxemic/hypercarbic respiratory failure  # Tracheostomy status  # Pulmonary edema  # Endobronchial lesion left upper lobe  - Unsure why she's more hypoxemic --> ddx would include worsening pneumonia/edema (though CXR is stable to improved), derecruitment from bronchoscopy, PE (on therapeutic a/c)  - Doesn't have a smoking history  - Continue ventilator support, wean FiO2   - Further workup of endobronchial mass will need to await improvement in her condition; getting 24-hour 5-HIAA to check for carcinoid  CV: More hypotensive since procedure yesterday; Hb dropped a little but no continued evidence of bleeding.  # Hypotension/shock, multifactorial  # Atrial fibrillation, resolved  # Takotsubo  cardiomyopathy with improving EF (40-45%)  - Increased pressor demands --> ?etiology. Will stop furosemide gtt for now and give back some albumin, possible that we're getting her intravascularly dry. Checked a lactate which is reassuring at 0.8, and on exam she looks adequately perfused. Will add stress steroids and make vasopressin available to help spare catecholamines if NE consistently >0.2 mcg/kg/min. No real indication for additional antimicrobials at the moment. No evidence of bleeding.  GI: Abdomen benign.  # Nutrition  # Risk of constipation/neurogenic bowel  - Continue tube feedings  - Made bowel regimen scheduled  : UOP brisk, weights coming down  # Hypervolemia  # BHAVNA, improved  # Hypokalemia  - Replace lytes on protocol  - Hold furosemide for tonight, may resume as prn dosing tomorrow if pressures improve off the infusion  Heme: Hb 7.9 (8.4)  # Anemia of critical illness  # DVT  - No indication to transfuse  - Therapeutic enoxaparin  Msk: Extremities warm, well-perfused.   Endo: Glucose 126-134  # Stress hyperglycemia  - Sliding scale insulin  ID: Afebrile, WBCs trending down a little  # Polymicrobial healthcare-associated pneumonia  # Sepsis  - Continue meropenem for H alvei  - Will check an AM procalcitonin    3/27: Developed pressor requirement overnight. Bronchoscopy yesterday identified an endobronchial lesion in the left upper lobe of uncertain etiology/significance.      Assessment:      Areas visualized during today's visit: Focused:, Sacrum/coccyx, and perineal areas    Wound location: coccyx and perineal areas    Coccyx       Perianal and posterior thighs   Poornima genital, inner thighs    Last photo: 3/28/24  Wound due to: Friction, Shear, Incontinence Associated Dermatitis (IAD), Moisture Associated Skin Damage (MASD), and suspect yeast rash  Wound history/plan of care: denuded perianal skin with FMS in place, Nye in place  but inner thighs, perigenital, perianal areas with red rash  appearance to skin,  warm to touch. Suspected yeast rash. Related to incontinence of bowel and bladder, MASD and likely excessive wiping of skin for cleansing. No barrier cream in use at time of assessment, preventative mepilex in place over sacrum. Coccyx/gluteal crease with peeled and denuded skin and redness that does alpa,related to incontinence moisture trapping, friction and shear, increased risk for evolving skin breakdown related to pressure and MASD. Antifungal powder in  use per RN,if no improvement,recommend  to consider Interdry to groin and stevie area folds to assist with moisture wicking, patient is  on isolibrium support surface with built in HAROLDO function.      Wound base: perigential and thighs, 100 % erythema, epidermis, and rash , perianal: 100 % erythema, epidermis, and denuded , coccyx: 100% blanchable redness, dry,     Palpation of the wound bed: normal      Drainage: none     Description of drainage: none     Measurements (length x width x depth, in cm): difficult to measure denuded skin, centralized around FMS tubing, coccyx: 1  x 1  x  0.1 cm      Tunneling: N/A     Undermining: N/A  Periwound skin: Intact      Color: pink and red      Temperature: warm  Odor: none  Pain: denies , none  Pain interventions prior to dressing change: patient tolerated well, no significant pain present , soaking, and slow and gentle cares   Treatment goal: Heal , Infection control/prevention, Maintain (prevention of deterioration), and Protection  STATUS: initial assessment  Supplies ordered: at bedside, supplies stored on unit, discussed with RN, and discussed with patient       Treatment Plan:     Coccyx and sacrum wound(s): Every 3 days and PRN if dressing is loose or soiled  Cleanse the area with NS and pat dry.  Apply No sting film barrier to periwound skin.  Cover wound with Sacral Mepilex (#775567).  Turn and reposition Q 2hrs side to side only.  Ensure pt has Xiang-cushion while sitting up in the  "chair.  FYI- If pt has constant incontinent loose stools needing dressing changes Q shift please discontinue the Mepilex dressing and apply criticaid barrier paste BID and PRN.     Perianal, perineal: BID and PRN for episodes of incontinence  Cleanse the area with Nicola cleanse and protect, very gently with soft cloth.  Apply ostomy powder (#1177) on all open and denuded skin.  Apply thin layer of critic aid paste on top of it.  With repeat application, do not scrub the paste, only remove soiled paste and reapply.  If complete removal of paste is necessary use baby oil/mineral oil (#407416) and soft wash cloth.  Ensure pt has Xiang-cushion (#061555) while sitting up in the chair.  Use only one Covidien pad in between mattress and pt. No brief while in bed.     Pressure Injury Prevention (PIP) Plan:  If patient is declining pressure injury prevention interventions: Explore reason why and address patient's concerns, Educate on pressure injury risk and prevention intervention(s), If patient is still declining, document \"informed refusal\" , and Ensure Care team is aware ( provider, charge nurse, etc)  Mattress: Follow bed algorithm, reassess daily and order specialty mattress, if indicated.  HOB: Maintain at or below 30 degrees, unless contraindicated  Repositioning in bed: Every 1-2 hours , Left/right positioning; avoid supine, Raise foot of bed prior to raising head of bed, to reduce patient sliding down (shear), and Frequent microturns using TAPS wedges, as patient tolerates  Heels: Keep elevated off mattress, Pillows under calves, and Heel lift boots  Protective Dressing: Sacral Mepilex for prevention (#049769),  especially for the agitated patient   Positioning Equipment: TAPS wedges (#642040) to help maintain 30 degree side lying position   Chair positioning: Chair cushion (#908669) , Assist patient to reposition hourly, and Do NOT use a donut for sitting (this increases pressure to smaller area and creates a higher " potential for injury)    If patient has a buttock pressure injury, or high risk for PI use chair cushion or SPS.  Moisture Management: Perineal cleansing /protection: Follow Incontinence Protocol, Avoid brief in bed, Clean and dry skin folds with bathing , Consider InterDry (#925714) between folds, and Moisturize dry skin  Under Devices: Inspect skin under all medical devices during skin inspection , Ensure tubes are stabilized without tension, and Ensure patient is not lying on medical devices or equipment when repositioned  Ask provider to discontinue device when no longer needed.      Orders: Reviewed    RECOMMEND PRIMARY TEAM ORDER: None, at this time  Education provided: importance of repositioning, plan of care, wound progress, Infection prevention , Moisture management, Hygiene, and Off-loading pressure  Discussed plan of care with: Patient, Family, and Nurse  WOC nurse follow-up plan: signing off and comfort cares  Notify WOC if wound(s) deteriorate.  Nursing to notify the Provider(s) and re-consult the WOC Nurse if new skin concern.    DATA:     Current support surface: Standard  Low air loss (HAROLDO pump, Isolibrium, Pulsate, skin guard, etc)  Containment of urine/stool: Incontinence Protocol, Incontinent pad in bed, Indwelling catheter, and Internal fecal management  BMI: Body mass index is 36.85 kg/m .   Active diet order: Orders Placed This Encounter      NPO per Anesthesia Guidelines for Procedure/Surgery Except for: Meds     Output: I/O last 3 completed shifts:  In: 2131.95 [I.V.:211.95; NG/GT:600]  Out: 1460 [Urine:1460]     Labs:   Recent Labs   Lab 04/03/24  0445   ALBUMIN 2.8*   HGB 8.4*   WBC 15.9*     Pressure injury risk assessment:   Sensory Perception: 2-->very limited  Moisture: 3-->occasionally moist  Activity: 1-->bedfast  Mobility: 2-->very limited  Nutrition: 3-->adequate  Friction and Shear: 2-->potential problem  Tho Score: 13    Enedina Poon RN, BSN, CWON   Pager no longer is use,  please contact through Vocera   Vocera group: [facility] Nurse K[facility]  Dept. Office Number: [phone]

## 2024-04-03 NOTE — PROGRESS NOTES
ICU Progress Note    Date of Service: 04/03/24    A/P:  78F HTN, Guillain-[place] syndrome, HFrEF admitted 3/8 w/ new onset HF. Now s/p trach and PEG. Worsening respiratory status 2/2 Hafnia alvei pneumonia. EF has now recovered. Pt is in severe ARDS. Prolonged discussions had with family regarding that we are extremely limited on additional therapeutics. They are in understanding, but maintain full code.     I have personally reviewed the daily labs, imaging studies, cultures and discussed the case with referring physician and consulting physicians.     Neuro  # Sedation for mechanical ventilation  # Guillain-[place] syndrome   - propofol gtt  - hydromorphone gtt  - consider paralytic if worsening airway pressures or vent compliance     Pulmonary  # Acute hypoxemic hypercapnic respiratory failure  # Tracheostomy  # Severe ARDS  # SANGEETA endobronchial lesion - concern for carcinoid  - vent settings below  - inhaled epoprostenol  - albuterol nebs   - 20mg dexamethasone daily x 5d, followed by 10mg x 5d per DEXA-ARDS protocol   - not an ECMO candidate     Cardiac  # Afib w/ RVR - resolved  # Stress cardiomyopathy - EF now recovered  # Shock - resolved  - MAP > 65  - off vasopressors  - amiodarone     Renal  # Hypernatremia  # Hypervolemia - improved   # Hyperkalemia  # BHAVNA  - monitor UOP, Cr, I/O  - electrolyte replacement protocols   - monitor off diuretics   - recheck K this PM    GI  # No acute issues    - RD to manage TF    Heme/Onc  # Anemia - 2/2 critical illness   - monitor CBC    ID  # Healthcare-associated pneumonia - H alvei  - completed meropenem     Endo  # Hyperglycemia   - monitor BG  - sliding scale insulin     Clinically Significant Risk Factors        # Hyperkalemia: Highest K = 5.7 mmol/L in last 2 days, will monitor as appropriate  # Hypernatremia: Highest Na = 154 mmol/L in last 2 days, will monitor as appropriate      # Hypoalbuminemia: Lowest albumin = 2.2 g/dL at 3/31/2024  5:24 AM, will monitor as  "appropriate    # Acute Kidney Injury, unspecified: based on a >150% or 0.3 mg/dL increase in last creatinine compared to past 90 day average, will monitor renal function  # Hypertension: Noted on problem list        # Obesity: Estimated body mass index is 36.85 kg/m  as calculated from the following:    Height as of this encounter: 1.727 m (5' 7.99\").    Weight as of this encounter: 109.9 kg (242 lb 4.6 oz).        # Financial/Environmental Concerns: none                PPX  1. DVT: enoxaparin   2. VAP: HOB 30 degrees, chlorhexidine rinse  3. Stress Ulcer: PPI  4. Restraints: Nonviolent soft two point restraints required and necessary for patient safety and continued cares and good effect as patient continues to pull at necessary lines, tubes despite education and distraction. Will readdress daily.   5. Wound care - per unit routine   6. Feeding - TF    Interval Hx:  P:F 58 this AM. Pplat 35, but I question this value as patient fought inspiratory hold maneuver. Worsening hypoxemia. Updated  at bedside. I relayed that we are essentially out of options in regards to ARDS treatment at this point. He is understandable. He would like to just \"see what happens.\"    Unable to obtain ROS 2/2 sedation/intubation.     /67   Pulse 78   Temp 98.6  F (37  C)   Resp 16   Ht 1.727 m (5' 7.99\")   Wt 109.9 kg (242 lb 4.6 oz)   SpO2 (!) 88%   BMI 36.85 kg/m    Gen: supine, NAD  Neuro: sedated, pupils equal  HEENT: anicteric  Card: RRR  Pulm: coarse b/l  Abd: soft, non-distended  MSK: no edema, no acute joint abnormality  Skin: blanching erythematous rash b/l UE    Vent Mode: CMV/AC  (Continuous Mandatory Ventilation/ Assist Control)  FiO2 (%): 100 %  Resp Rate (Set): 18 breaths/min  Tidal Volume (Set, mL): 340 mL  PEEP (cm H2O): 14 cmH2O  Resp: 16        Intake/Output Summary (Last 24 hours) at 4/1/2024 0901  Last data filed at 4/1/2024 0800  Gross per 24 hour   Intake 2977.34 ml   Output 2075 ml   Net 902.34 ml "       Labs: reviewed    Imaging: reviewed    Billing: Patient is critically ill. Total critical care time today, excluding procedures, was 60 minutes.    Bj Isaac MD  Pulmonary Disease and Critical Care Medicine   [facility]

## 2024-04-03 NOTE — PROGRESS NOTES
[facility] ICU RESPIRATORY NOTE           Date of Admission: 3/8/2024     Date of Intubation (most recent): 3/22/2024     Reason for Mechanical Ventilation: Respiratory Failure     Number of Days on Mechanical Ventilation: Day 25     Met Criteria for Spontaneous Breathing Trial: No     Reason for No Spontaneous Breathing Trial: Patient is on a PEEP of 14 cm H2O, 100% FiO2, Full-strength veletri     Significant Events Today: Patient SpO2 in the high 80's low 90's for most of this shift despite maximum ventilatory support.     ABG Results:   Recent Labs   Lab 04/02/24  0217 03/31/24  0525 03/30/24  0405   PH 7.46*  --   --    PCO2 60*  --   --    PO2 52*  --   --    HCO3 43*  --   --    O2PER 100 80 100     Vent Mode: CMV/AC  (Continuous Mandatory Ventilation/ Assist Control)  FiO2 (%): 100 %  Resp Rate (Set): 18 breaths/min  Tidal Volume (Set, mL): 340 mL  PEEP (cm H2O): 14 cmH2O  Resp: 18    NEIL Dias, RRT

## 2024-04-03 NOTE — PROGRESS NOTES
Brief Critical Care Note    Discussion held w/ family. Decision has been made to pursue comfort care. Orderset placed.     Bj Isaac MD  Pulmonary Disease and Critical Care Medicine  [facility]

## 2024-04-03 NOTE — DEATH PRONOUNCEMENT
MD DEATH PRONOUNCEMENT    Called to pronounce Yohana Marques dead.    Physical Exam: Unresponsive to noxious stimuli, Spontaneous respirations absent, Breath sounds absent, Carotid pulse absent, Heart sounds absent, Pupillary light reflex absent, and Corneal blink reflex absent    Patient was pronounced dead at 14:26 PM, April 3, 2024.    No data filed       Principal Problem:    ACS (acute coronary syndrome) (H)  Active Problems:    Weakness    Severe sepsis (H)    Acute kidney failure, unspecified (H)       Infectious disease present?: NO    Communicable disease present? (examples: HIV, chicken pox, TB, Ebola, CJD) :  NO    Multi-drug resistant organism present? (example: MRSA): NO    Please consider an autopsy if any of the following exist:  NO Unexpected or unexplained death during or following any dental, medical, or surgical diagnostic treatment procedures.   NO Death of mother at or up to seven days after delivery.     NO All  and pediatric deaths.     NO Death where the cause is sufficiently obscure to delay completion of the death certificate.   NO Deaths in which autopsy would confirm a suspected illness/condition that would affect surviving family members or recipients of transplanted organs.     The following deaths must be reported to the 's Office:  NO A death that may be due entirely or in part to any factors other than natural disease (recent surgery, recent trauma, suspected abuse/neglect).   NO A death that may be an accident, suicide, or homicide.     NO Any sudden, unexpected death in which there is no prior history of significant heart disease or any other condition associated with sudden death.   NO A death under suspicious, unusual, or unexpected circumstances.    NO Any death which is apparently due to natural causes but in which the  does not have a personal physician familiar with the patient s medical history, social, or environmental situation or the  circumstances of the terminal event.   NO Any death apparently due to Sudden Infant Death Syndrome.     NO Deaths that occur during, in association with, or as consequences of a diagnostic, therapeutic, or anesthetic procedure.   NO Any death in which a fracture of a major bone has occurred within the past (6) six months.   NO A death of persons note seen by their physician within 120 days of demise.     NO Any death in which the  was an inmate of a public institution or was in the custody of Law Enforcement personnel.   NO  All unexpected deaths of children   NO Solid organ donors   NO Unidentified bodies   NO Deaths of persons whose bodies are to be cremated or otherwise disposed of so that the bodies will later be unavailable for examination;   NO Deaths unattended by a physician outside of a licensed healthcare facility or licensed residential hospice program   NO Deaths occurring within 24 hours of arrival to a health care facility if death is unexpected.    NO Deaths associated with the decedent s employment.   NO Deaths attributed to acts of terrorism.   NO Any death in which there is uncertainty as to whether it is a medical examiner s care should be discussed with the medical investigator.        Body disposition: Body released to the morgue.

## 2024-04-03 NOTE — PROGRESS NOTES
Pt went comfort care this afternoon, extubated at 1417, and passed at 1426. Pt belongings sent home with family.

## 2024-04-03 NOTE — PLAN OF CARE
Goal Outcome Evaluation:      Plan of Care Reviewed With: patient    Overall Patient Progress: no change    Outcome Evaluation: Patient remains Trached and sedated. She remains on Full strength Veletri, Propofol for sedation and Dilaudid for pain management. Eyes open most of the time,, blinks to threat at times. Oxygen low 80s to low 90s. She is tolerating tube feeds. Nye patent with good output. No BM. Spouse updated early this morning.    Problem: Adult Inpatient Plan of Care  Goal: Optimal Comfort and Wellbeing  Outcome: Not Progressing  Intervention: Monitor Pain and Promote Comfort  Recent Flowsheet Documentation  Taken 4/3/2024 0400 by Dennis Bowles RN  Pain Management Interventions:   medication (see MAR)   pain pump in use   pillow support provided  Taken 4/2/2024 2000 by Dennis Bowles RN  Pain Management Interventions:   medication (see MAR)   pain pump in use   pillow support provided  Intervention: Provide Person-Centered Care  Recent Flowsheet Documentation  Taken 4/3/2024 0400 by Dennis Bowles RN  Trust Relationship/Rapport:   care explained   reassurance provided  Taken 4/3/2024 0000 by Dennis Bowles RN  Trust Relationship/Rapport:   care explained   reassurance provided  Taken 4/2/2024 2000 by Dennis Bowles RN  Trust Relationship/Rapport:   care explained   reassurance provided     Problem: Mechanical Ventilation Invasive  Goal: Effective Communication  Outcome: Not Progressing  Intervention: Ensure Effective Communication  Recent Flowsheet Documentation  Taken 4/3/2024 0400 by Dennis Bowles RN  Communication Enhancement Strategies:   extra time allowed for response   nonverbal strategies used   one-step directions provided   repetition utilized   communication board used   communication device used  Trust Relationship/Rapport:   care explained   reassurance provided  Taken 4/3/2024 0000 by Dennis Bowles RN  Communication Enhancement Strategies:   extra time allowed for  response   nonverbal strategies used   one-step directions provided   repetition utilized   communication board used   communication device used  Trust Relationship/Rapport:   care explained   reassurance provided  Taken 4/2/2024 2000 by Dennis Bowles RN  Communication Enhancement Strategies:   extra time allowed for response   nonverbal strategies used   one-step directions provided   repetition utilized   communication board used   communication device used  Trust Relationship/Rapport:   care explained   reassurance provided

## 2024-04-03 NOTE — PROGRESS NOTES
Brief Critical Care Note    Pt continues to decline. Discussed further with  the futility of CPR. He is in agreement. Code status changed to DNR.     Bj Isaac MD  Pulmonary Disease and Critical Care Medicine  [facility]

## 2024-04-04 NOTE — DISCHARGE SUMMARY
Discharge summary    Admit date: 3/8/2024    Discharge date: 4/3/2024    Admitting Physician: Juliana Orellana DO    Discharge Physician: Bj Isaac MD    Admission Diagnoses: Hypertension, osteoporosis, HFrEF, NSTEMI, weakness    Discharge Diagnoses: Guillain-[place] syndrome, acute hypoxemic hypercapnic respiratory failure, tracheostomy in place, severe ARDS, SANGEETA endobronchial lesion, atrial fibrillation w/ RVR, stress cardiomyopathy (recovered), hypernatremia, hyperkalemia, anemia, Hafnia alvei pneumonia.     Admission Condition: Stable    Discharged Condition:     Indication for Admission: New onset heart failure    Hospital Course: Pt admitted 3/8 w/ new onset heart failure. Ultimately, pt found to have Guillain-[place] syndrome and required tracheostomy and G-tube placement. Unfortunately, pt developed severe ARDS 2/2 HCAP. She had progressive hypoxemia and required maximum ventilatory support. Despite this, she continued to be hypoxemic. Ultimately, family made the decision to pursue comfort care and pt passed away 4/3.     Discharge Exam:  See death pronouncement note.     Disposition: nadira Isaac MD  Pulmonary and Critical Care Medicine  [facility]

## 2024-04-08 LAB — BACTERIA CSF CULT: NO GROWTH

## 2024-04-16 LAB
BACTERIA BRONCH: ABNORMAL
BACTERIA BRONCH: NO GROWTH
BACTERIA SPEC CULT: ABNORMAL
BACTERIA SPEC CULT: ABNORMAL

## 2024-05-14 LAB
ACID FAST STAIN (ARUP): NORMAL

## 2024-12-16 NOTE — PROGRESS NOTES
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  # Severe cardiomyopathy with EF of only 23%:   # Afib  # Respiratory failure: Intubated  # ID: on Zosyn emperically     Plan:  # 2/5 apheresis today    I discussed the plan with her  at the bedside.        Interval History:     She remains intubated.  Phenylephrine at 0.2 mcg  Getting sodium phosphate replacement.           Medications and Allergies:      albumin human  3,000 mL Apheresis Once    anticoagulant citrate  500-2,000 mL Apheresis Once    aspirin  81 mg Oral or Feeding Tube Daily    calcium gluconate 4 g in sodium chloride 0.9 % 140 mL  4 g Intravenous Once    chlorhexidine  15 mL Mouth/Throat Q12H    heparin ANTICOAGULANT  5,000 Units Subcutaneous Q8H    [Held by provider] metoprolol succinate ER  50 mg Oral Daily    pantoprazole  40 mg Per Feeding Tube QAM AC    Or    pantoprazole  40 mg Intravenous QAM AC    piperacillin-tazobactam  4.5 g Intravenous Q6H    sennosides  8.6 mg Oral BID    sodium chloride (PF)  3 mL Intracatheter Q8H    sodium phosphate  15 mmol Intravenous Once      No Known Allergies         Physical Exam:   Vitals were reviewed   , Blood pressure (!) 86/50, pulse 59, temperature 97.7  F (36.5  C), resp. rate 14, height 1.727 m (5' 8\"), weight 95.1 kg (209 lb 10.5 oz), SpO2 98%, not currently breastfeeding.    Wt Readings from Last 3 Encounters:   03/12/24 95.1 kg (209 lb 10.5 oz)   07/12/23 93 kg (205 lb)   05/02/23 92.4 kg (203 lb 12.8 oz)       Intake/Output Summary (Last 24 hours) at 3/12/2024 1119  Last data filed at 3/12/2024 1000  Gross per 24 hour   Intake 2365.23 ml   Output 930 ml   Net 1435.23 ml       GENERAL APPEARANCE: Critical  HEENT:  intubated  RESP: lungs cta b c good efforts, no crackles, rhonchi or wheezes  CV: irregular  ABDOMEN: Soft,   EXTREMITIES/SKIN: +_edema  NEURO: Sedated  R temp internal jugular CDI         Data:     CBC RESULTS:     Recent Labs " Please change patient's postop appointment to the 23rd.  She is currently scheduled on the 24th.  Thanks   Lab 03/12/24  0422 03/11/24  0436 03/09/24  0604 03/08/24  1111   WBC 11.7* 16.8* 17.3* 18.8*   RBC 3.38* 3.78* 4.33 4.51   HGB 9.8* 10.7* 12.3 13.1   HCT 30.6* 33.9* 38.3 39.8    345 393 352       Basic Metabolic Panel:  Recent Labs   Lab 03/12/24  0755 03/12/24  0422 03/12/24  0313 03/11/24  2104 03/11/24  1622 03/11/24  1224 03/11/24  1007 03/11/24  0836 03/11/24 0436 03/10/24  0904 03/10/24  0753 03/09/24  0604 03/08/24  1111   NA  --  148*  --   --   --   --   --   --  143  --  140 136 140   POTASSIUM  --  2.9*  --   --   --   --  4.0  --  3.3*  --  4.7 4.2 4.2   CHLORIDE  --  114*  --   --   --   --   --   --  108*  --  103 102 100   CO2  --  25  --   --   --   --   --   --  23  --  25 21* 23   BUN  --  14.2  --   --   --   --   --   --  13.6  --  13.8 11.5 9.5   CR  --  0.58  --   --   --   --   --   --  0.64  --  0.67 0.70 0.75   * 107* 114* 91 91 93  --    < > 98   < > 119* 120* 100*   YEE  --  7.7*  --   --   --   --   --   --  8.5*  --  9.2 9.0 9.5    < > = values in this interval not displayed.       INRNo lab results found in last 7 days.   Attestation:   I have reviewed today's relevant vital signs, notes, medications, labs and imaging.    Stoney Diamond MD  [facility] - Nephrology  Office phone :[phone]  Pager: [phone]

## 2025-02-11 NOTE — TELEPHONE ENCOUNTER
Requested Prescriptions   Pending Prescriptions Disp Refills     traMADol (ULTRAM) 50 MG tablet [Pharmacy Med Name: TRAMADOL 50MG TABLETS] 90 tablet      Sig: TAKE 1-2 TABLETS BY MOUTH EVERY DAY AS NEEDED FOR SEVERE PAIN.       There is no refill protocol information for this order          Last Written Prescription Date:  9/24/2019  Last Fill Quantity: 90,  # refills: 0   Last office visit: 11/25/2019 with prescribing provider:     Future Office Visit:        Routing refill request to provider for review/approval because:  Drug [place] [facility] [place]     Anh FIELDSN, RN, PHN       Detail Level: Detailed Quality 226: Preventive Care And Screening: Tobacco Use: Screening And Cessation Intervention: Patient screened for tobacco use and is an ex/non-smoker Quality 47: Advance Care Plan: Advance Care Planning discussed and documented in the medical record; patient did not wish or was not able to name a surrogate decision maker or provide an advance care plan. Quality 130: Documentation Of Current Medications In The Medical Record: Current Medications Documented

## (undated) DEVICE — ENDO TROCAR SLEEVE KII Z-THREADED 05X100MM CTS02

## (undated) DEVICE — ESU GROUND PAD UNIVERSAL W/O CORD

## (undated) DEVICE — INTRO SHEATH 4FRX10CM PINNACLE RSS402

## (undated) DEVICE — LINEN TOWEL PACK X5 5464

## (undated) DEVICE — KIT CONNECTOR FOR OLYMPUS ENDOSCOPES DEFENDO 100310

## (undated) DEVICE — SUTURE 5-0 MAXON SMM5042

## (undated) DEVICE — CLIP APPLIER ENDO ROTATING 10MM MED/LG ER320

## (undated) DEVICE — ENDO DEVICE LOCKING AND BIOPSY CAP M00545261

## (undated) DEVICE — ENDO TUBING CO2 SMARTCAP STERILE DISP 100145CO2EXT

## (undated) DEVICE — SU VICRYL 3-0 SH 27" UND J416H

## (undated) DEVICE — GOWN IMPERVIOUS ZONED LG

## (undated) DEVICE — SU VICRYL 3-0 RB-1 27" UND J215H

## (undated) DEVICE — CATH ANGIO JUDKINS JL4 6FRX100CM INFINITI 534620T

## (undated) DEVICE — ESU GROUND PAD ADULT W/CORD E7507

## (undated) DEVICE — ESU HOLDER LAP INST DISP PURPLE LONG 330MM H-PRO-330

## (undated) DEVICE — SU VICRYL 0 UR-6 27" J603H

## (undated) DEVICE — PACK MINOR SBA15MIFSE

## (undated) DEVICE — BIOPSY VALVE BIOSHIELD 00711135

## (undated) DEVICE — SUCTION MANIFOLD NEPTUNE 2 SYS 4 PORT 0702-020-000

## (undated) DEVICE — GLOVE ESTEEM POWDER FREE SMT 8.0  2D72PT80

## (undated) DEVICE — DRAIN JACKSON PRATT RESERVOIR 100ML SU130-1305

## (undated) DEVICE — CATH RETRIEVAL BALLOON EXTRACTOR PRO RX-S INJ ABOVE 9-12MM

## (undated) DEVICE — TUBING IRRIG CYSTO/BLADDER SET 81" LF 2C4040

## (undated) DEVICE — ENDO TROCAR FIRST ENTRY KII FIOS Z-THRD 12X100MM CTF73

## (undated) DEVICE — STRAP UNIVERSAL POSITIONING 2-PIECE 4X47X76" 91-287

## (undated) DEVICE — DRAPE SLEEVE 599

## (undated) DEVICE — DRSG TELFA 3X8" 1238

## (undated) DEVICE — COVER LIGHT HANDLE LT-F02

## (undated) DEVICE — NDL INSUFFLATION 13GA 120MM C2201

## (undated) DEVICE — Device

## (undated) DEVICE — GRASPING DEVICE RAPTOR RAT TOOTH 00711177

## (undated) DEVICE — CATH ANGIO INFINITI 3DRC 4FRX100CM 538476

## (undated) DEVICE — DRAIN JACKSON PRATT 15FR ROUND SU130-1323

## (undated) DEVICE — PACK LAPAROSCOPY LF SBA15LPFCA

## (undated) DEVICE — ENDO TROCAR BLUNT TIP KII BALLOON 12X100MM C0R47

## (undated) DEVICE — INTRO SHEATH 6FRX10CM PINNACLE RSS602

## (undated) DEVICE — ENDO FUSION OMNI-TOME G31903

## (undated) DEVICE — PACK ENDOSCOPY GI CUSTOM UMMC

## (undated) DEVICE — PREP CHLORAPREP W/ORANGE TINT 10.5ML 930715

## (undated) DEVICE — ENDO TROCAR FIRST ENTRY KII FIOS Z-THRD 05X100MM CTF03

## (undated) DEVICE — SU ETHILON 3-0 FS-1 18" 669H

## (undated) DEVICE — TOTE ANGIO CORP PC15AT SAN32CC83O

## (undated) DEVICE — SOL WATER IRRIG 1000ML BOTTLE 2F7114

## (undated) DEVICE — CATH TRAY FOLEY SURESTEP 16FR W/URNE MTR STLK LATEX A303316A

## (undated) DEVICE — PREP CHLORAPREP CLEAR 26ML APPLICATOR 260800

## (undated) DEVICE — ENDO SNARE POLYPECTOMY OVAL 15MM LOOP SD-240U-15

## (undated) DEVICE — LINEN TOWEL PACK X6 WHITE 5487

## (undated) DEVICE — ENDO BITE BLOCK ADULT OMNI-BLOC

## (undated) DEVICE — SOL NACL 0.9% INJ 1000ML BAG 2B1324X

## (undated) DEVICE — WIRE GUIDE 0.025"X270CM ANG VISIGLIDE G-240-2527A

## (undated) DEVICE — KIT ENDO FIRST STEP DISINFECTANT 200ML W/POUCH EP-4

## (undated) DEVICE — ESU CORD MONOPOLAR 10'  E0510

## (undated) DEVICE — LINEN GOWN OVERSIZE 5408

## (undated) DEVICE — JELLY LUBRICATING SURGILUBE 4OZ TUBE

## (undated) DEVICE — SURGICEL HEMOSTAT 4X8" 1952

## (undated) DEVICE — MANIFOLD KIT ANGIO AUTOMATED 014613

## (undated) DEVICE — INTRODUCER CATH VASC 5FRX10CM  MPIS-501-NT-U-SST

## (undated) DEVICE — ESU PENCIL W/HOLSTER E2350H

## (undated) DEVICE — GLOVE BIOGEL PI ULTRATOUCH G SZ 6.5 42165

## (undated) DEVICE — DRAPE C-ARM W/STRAPS 42X72" 07-CA104

## (undated) DEVICE — CANNULA BILIARY ERCP .035" TAPER TIP 3.2MM CHANNEL

## (undated) DEVICE — SUCTION STRYKERFLOW II 250-070-500

## (undated) DEVICE — WIRE GUIDE 0.025"X450CM STR VISIGLIDE G-240-2545S

## (undated) DEVICE — CATH SPYGLASS DS 2 DELIVERY & ACCESS M00546610

## (undated) DEVICE — ENDO ENDO DISTAL MAJ-2315

## (undated) DEVICE — SOL WATER 1500ML

## (undated) DEVICE — SOL NACL 0.9% IRRIG 1000ML BOTTLE 2F7124

## (undated) DEVICE — TUBING INSUFFLATOR W/FILTER OLYMPUS WA95005A

## (undated) DEVICE — SU PDS II 1 CTX 36" Z371T

## (undated) DEVICE — SYSTEM PANNUS RETENTION 4 PAD 2 STRAP CZ-PRS-04

## (undated) DEVICE — SYR 30ML SLIP TIP W/O NDL 302833

## (undated) DEVICE — DRAPE STERI FLUOROSCOPE 35X43"1012 LATEX FREE

## (undated) DEVICE — GLOVE BIOGEL PI INDICATOR 8.0 LF 41680

## (undated) DEVICE — GLOVE PROTEXIS BLUE W/NEU-THERA 8.0  2D73EB80

## (undated) DEVICE — DEFIB PRO-PADZ LVP LQD GEL ADULT 8900-2105-01

## (undated) DEVICE — GLOVE BIOGEL PI ULTRATOUCH G SZ 7.5 42175

## (undated) DEVICE — ENDO POUCH UNIV RETRIEVAL SYSTEM INZII 10MM CD001

## (undated) DEVICE — GOWN IMPERVIOUS BREATHABLE SMART LG 89015

## (undated) DEVICE — ESU ELEC BLADE 2.75" COATED/INSULATED E1455

## (undated) DEVICE — GLOVE PROTEXIS MICRO 7.5  2D73PM75

## (undated) DEVICE — DRSG MEDIPORE 2X2 3/4" 3562

## (undated) DEVICE — KIT HAND CONTROL ANGIOTOUCH ACIST 65CM AT-P65

## (undated) DEVICE — TUBE GASTROSTOMY MIC PERC PULL PEG KIT ENFIT 20FR 8160-20

## (undated) DEVICE — ENDO POUCH UNIVERSAL RETRIEVAL SYSTEM INZII 5MM CD003

## (undated) DEVICE — SLEEVE COMPRESSION SCD KNEE MED 74022

## (undated) DEVICE — SYR 10ML SLIP TIP W/O NDL

## (undated) DEVICE — LUBRICANT INST KIT ENDO-LUBE 220-90

## (undated) DEVICE — ENDO TROCAR SLEEVE KII 5X100MM CTR03

## (undated) DEVICE — PREP CHLORAPREP 26ML TINTED HI-LITE ORANGE 930815

## (undated) DEVICE — SU ETHILON 3-0 PS-1 18" 1663H

## (undated) DEVICE — DEVICE SUTURE PASSER 14GA WECK EFX EFXSP2

## (undated) DEVICE — DRSG DRAIN 4X4" 7086

## (undated) DEVICE — DECANTER BAG 10-102

## (undated) DEVICE — SYSTEM LAPAROVUE VISIBILITY LAPVUE10

## (undated) DEVICE — LINEN TOWEL PACK X30 5481

## (undated) DEVICE — CATH DIAG 4FR JL 4.5 538417

## (undated) DEVICE — SU PROLENE 2-0 RB-1DA 36" 8559H

## (undated) DEVICE — GUIDEWIRE TERUMO .035X260 3CM STR TIP GS3504

## (undated) DEVICE — ESU PROBE LAPAROSCOPIC ABC HANDSWITCHING 5X36MM 160636

## (undated) DEVICE — ANTIFOG SOLUTION W/FOAM PAD 31142527

## (undated) DEVICE — GLOVE BIOGEL PI MICRO SZ 6.0 48560

## (undated) DEVICE — TUBING SUCTION 10'X3/16" N510

## (undated) DEVICE — ESU ENDO SCISSORS 5MM CVD 5DCS

## (undated) DEVICE — DRAPE MINOR PROCEDURE LAP 29496

## (undated) DEVICE — SU MONOCRYL 4-0 PS-2 27" UND Y426H

## (undated) DEVICE — CATH CHOLANGIOGRAM 7.5FR TAUT PLASTIC TIP 20018-010

## (undated) DEVICE — GLOVE BIOGEL PI MICRO INDICATOR UNDERGLOVE SZ 6.5 48965

## (undated) DEVICE — TUBING SUCTION MEDI-VAC SOFT 3/16"X20' N520A

## (undated) DEVICE — INTR ENDOSCOPIC STENT FUSION OASIS 09.0FRX200CM

## (undated) DEVICE — DRAPE IOBAN INCISE 23X17" 6650EZ

## (undated) DEVICE — ENDO SCOPE WARMER DUAL LAP TM500D

## (undated) DEVICE — DRSG MEDIPORE 3 1/2X4" 3566

## (undated) DEVICE — SURGICEL ABSORBABLE HEMOSTAT SNOW 4"X4" 2083

## (undated) DEVICE — CATH ANGIO INFINITI 3DRC 6FRX100CM 534676T

## (undated) DEVICE — SU VICRYL 1 CT-1 27" UND J261H

## (undated) DEVICE — SU VICRYL 3-0 CT-3 27" J327H

## (undated) DEVICE — WIRE GUIDE 0.025"X270CM SHORT ANG VISIGLIDE 2 G-260-2527A

## (undated) DEVICE — KIT PATIENT POSITIONING PIGAZZI LATEX FREE 40580

## (undated) RX ORDER — LIDOCAINE HYDROCHLORIDE 10 MG/ML
INJECTION, SOLUTION INFILTRATION; PERINEURAL
Status: DISPENSED
Start: 2024-01-01

## (undated) RX ORDER — HYDROMORPHONE HYDROCHLORIDE 1 MG/ML
INJECTION, SOLUTION INTRAMUSCULAR; INTRAVENOUS; SUBCUTANEOUS
Status: DISPENSED
Start: 2022-12-01

## (undated) RX ORDER — IOPAMIDOL 510 MG/ML
INJECTION, SOLUTION INTRAVASCULAR
Status: DISPENSED
Start: 2022-12-01

## (undated) RX ORDER — DEXAMETHASONE SODIUM PHOSPHATE 4 MG/ML
INJECTION, SOLUTION INTRA-ARTICULAR; INTRALESIONAL; INTRAMUSCULAR; INTRAVENOUS; SOFT TISSUE
Status: DISPENSED
Start: 2022-10-14

## (undated) RX ORDER — GLYCOPYRROLATE 0.2 MG/ML
INJECTION, SOLUTION INTRAMUSCULAR; INTRAVENOUS
Status: DISPENSED
Start: 2023-01-01

## (undated) RX ORDER — ETOMIDATE 2 MG/ML
INJECTION INTRAVENOUS
Status: DISPENSED
Start: 2022-10-14

## (undated) RX ORDER — FENTANYL CITRATE 50 UG/ML
INJECTION, SOLUTION INTRAMUSCULAR; INTRAVENOUS
Status: DISPENSED
Start: 2022-10-14

## (undated) RX ORDER — FENTANYL CITRATE 50 UG/ML
INJECTION, SOLUTION INTRAMUSCULAR; INTRAVENOUS
Status: DISPENSED
Start: 2017-07-20

## (undated) RX ORDER — SODIUM CHLORIDE, SODIUM LACTATE, POTASSIUM CHLORIDE, CALCIUM CHLORIDE 600; 310; 30; 20 MG/100ML; MG/100ML; MG/100ML; MG/100ML
INJECTION, SOLUTION INTRAVENOUS
Status: DISPENSED
Start: 2022-12-01

## (undated) RX ORDER — HEPARIN SODIUM 1000 [USP'U]/ML
INJECTION, SOLUTION INTRAVENOUS; SUBCUTANEOUS
Status: DISPENSED
Start: 2024-01-01

## (undated) RX ORDER — OXYCODONE HYDROCHLORIDE 5 MG/1
TABLET ORAL
Status: DISPENSED
Start: 2022-12-01

## (undated) RX ORDER — HYDROMORPHONE HCL IN WATER/PF 6 MG/30 ML
PATIENT CONTROLLED ANALGESIA SYRINGE INTRAVENOUS
Status: DISPENSED
Start: 2022-12-01

## (undated) RX ORDER — SIMETHICONE 40MG/0.6ML
SUSPENSION, DROPS(FINAL DOSAGE FORM)(ML) ORAL
Status: DISPENSED
Start: 2023-01-01

## (undated) RX ORDER — LABETALOL HYDROCHLORIDE 5 MG/ML
INJECTION, SOLUTION INTRAVENOUS
Status: DISPENSED
Start: 2022-10-14

## (undated) RX ORDER — PROPOFOL 10 MG/ML
INJECTION, EMULSION INTRAVENOUS
Status: DISPENSED
Start: 2023-01-01

## (undated) RX ORDER — FENTANYL CITRATE 50 UG/ML
INJECTION, SOLUTION INTRAMUSCULAR; INTRAVENOUS
Status: DISPENSED
Start: 2022-12-01

## (undated) RX ORDER — METRONIDAZOLE 500 MG/100ML
INJECTION, SOLUTION INTRAVENOUS
Status: DISPENSED
Start: 2022-12-01

## (undated) RX ORDER — ONDANSETRON 2 MG/ML
INJECTION INTRAMUSCULAR; INTRAVENOUS
Status: DISPENSED
Start: 2022-10-06

## (undated) RX ORDER — ONDANSETRON 2 MG/ML
INJECTION INTRAMUSCULAR; INTRAVENOUS
Status: DISPENSED
Start: 2023-01-25

## (undated) RX ORDER — LIDOCAINE HYDROCHLORIDE 10 MG/ML
INJECTION, SOLUTION EPIDURAL; INFILTRATION; INTRACAUDAL; PERINEURAL
Status: DISPENSED
Start: 2024-01-01

## (undated) RX ORDER — CIPROFLOXACIN 2 MG/ML
INJECTION, SOLUTION INTRAVENOUS
Status: DISPENSED
Start: 2022-10-14

## (undated) RX ORDER — FENTANYL CITRATE-0.9 % NACL/PF 10 MCG/ML
PLASTIC BAG, INJECTION (ML) INTRAVENOUS
Status: DISPENSED
Start: 2023-01-01

## (undated) RX ORDER — ENOXAPARIN SODIUM 100 MG/ML
INJECTION SUBCUTANEOUS
Status: DISPENSED
Start: 2022-12-01

## (undated) RX ORDER — FENTANYL CITRATE 50 UG/ML
INJECTION, SOLUTION INTRAMUSCULAR; INTRAVENOUS
Status: DISPENSED
Start: 2023-01-01

## (undated) RX ORDER — KETOROLAC TROMETHAMINE 30 MG/ML
INJECTION, SOLUTION INTRAMUSCULAR; INTRAVENOUS
Status: DISPENSED
Start: 2022-10-06

## (undated) RX ORDER — DEXAMETHASONE SODIUM PHOSPHATE 4 MG/ML
INJECTION, SOLUTION INTRA-ARTICULAR; INTRALESIONAL; INTRAMUSCULAR; INTRAVENOUS; SOFT TISSUE
Status: DISPENSED
Start: 2023-01-01

## (undated) RX ORDER — TRAMADOL HYDROCHLORIDE 50 MG/1
TABLET ORAL
Status: DISPENSED
Start: 2022-10-14

## (undated) RX ORDER — MAGNESIUM SULFATE HEPTAHYDRATE 40 MG/ML
INJECTION, SOLUTION INTRAVENOUS
Status: DISPENSED
Start: 2022-12-01

## (undated) RX ORDER — FENTANYL CITRATE-0.9 % NACL/PF 10 MCG/ML
PLASTIC BAG, INJECTION (ML) INTRAVENOUS
Status: DISPENSED
Start: 2023-01-25

## (undated) RX ORDER — INDOMETHACIN 50 MG/1
SUPPOSITORY RECTAL
Status: DISPENSED
Start: 2023-01-01

## (undated) RX ORDER — PROPOFOL 10 MG/ML
INJECTION, EMULSION INTRAVENOUS
Status: DISPENSED
Start: 2023-01-25

## (undated) RX ORDER — EPINEPHRINE 1 MG/ML
INJECTION, SOLUTION INTRAMUSCULAR; SUBCUTANEOUS
Status: DISPENSED
Start: 2023-01-01

## (undated) RX ORDER — GLYCOPYRROLATE 0.2 MG/ML
INJECTION, SOLUTION INTRAMUSCULAR; INTRAVENOUS
Status: DISPENSED
Start: 2022-10-14

## (undated) RX ORDER — ONDANSETRON 2 MG/ML
INJECTION INTRAMUSCULAR; INTRAVENOUS
Status: DISPENSED
Start: 2024-01-01

## (undated) RX ORDER — LIDOCAINE HYDROCHLORIDE 40 MG/ML
SOLUTION TOPICAL
Status: DISPENSED
Start: 2024-01-01

## (undated) RX ORDER — EPHEDRINE SULFATE 50 MG/ML
INJECTION, SOLUTION INTRAMUSCULAR; INTRAVENOUS; SUBCUTANEOUS
Status: DISPENSED
Start: 2023-01-01

## (undated) RX ORDER — VECURONIUM BROMIDE 1 MG/ML
INJECTION, POWDER, LYOPHILIZED, FOR SOLUTION INTRAVENOUS
Status: DISPENSED
Start: 2022-10-06

## (undated) RX ORDER — LIDOCAINE HYDROCHLORIDE 20 MG/ML
INJECTION, SOLUTION EPIDURAL; INFILTRATION; INTRACAUDAL; PERINEURAL
Status: DISPENSED
Start: 2022-10-06

## (undated) RX ORDER — ONDANSETRON 2 MG/ML
INJECTION INTRAMUSCULAR; INTRAVENOUS
Status: DISPENSED
Start: 2022-10-14

## (undated) RX ORDER — IOPAMIDOL 510 MG/ML
INJECTION, SOLUTION INTRAVASCULAR
Status: DISPENSED
Start: 2023-01-01

## (undated) RX ORDER — LIDOCAINE HYDROCHLORIDE 20 MG/ML
INJECTION, SOLUTION EPIDURAL; INFILTRATION; INTRACAUDAL; PERINEURAL
Status: DISPENSED
Start: 2022-10-14

## (undated) RX ORDER — ACETAMINOPHEN 325 MG/1
TABLET ORAL
Status: DISPENSED
Start: 2022-12-01

## (undated) RX ORDER — FENTANYL CITRATE 50 UG/ML
INJECTION, SOLUTION INTRAMUSCULAR; INTRAVENOUS
Status: DISPENSED
Start: 2023-01-25

## (undated) RX ORDER — FENTANYL CITRATE 50 UG/ML
INJECTION, SOLUTION INTRAMUSCULAR; INTRAVENOUS
Status: DISPENSED
Start: 2024-01-01

## (undated) RX ORDER — DEXAMETHASONE SODIUM PHOSPHATE 4 MG/ML
INJECTION, SOLUTION INTRA-ARTICULAR; INTRALESIONAL; INTRAMUSCULAR; INTRAVENOUS; SOFT TISSUE
Status: DISPENSED
Start: 2022-10-06

## (undated) RX ORDER — DEXMEDETOMIDINE HYDROCHLORIDE 4 UG/ML
INJECTION, SOLUTION INTRAVENOUS
Status: DISPENSED
Start: 2022-10-06

## (undated) RX ORDER — ONDANSETRON 2 MG/ML
INJECTION INTRAMUSCULAR; INTRAVENOUS
Status: DISPENSED
Start: 2023-01-01

## (undated) RX ORDER — ACETAMINOPHEN 325 MG/1
TABLET ORAL
Status: DISPENSED
Start: 2022-10-06

## (undated) RX ORDER — FENTANYL CITRATE 50 UG/ML
INJECTION, SOLUTION INTRAMUSCULAR; INTRAVENOUS
Status: DISPENSED
Start: 2022-10-06

## (undated) RX ORDER — PROPOFOL 10 MG/ML
INJECTION, EMULSION INTRAVENOUS
Status: DISPENSED
Start: 2024-01-01

## (undated) RX ORDER — PROPOFOL 10 MG/ML
INJECTION, EMULSION INTRAVENOUS
Status: DISPENSED
Start: 2022-10-06

## (undated) RX ORDER — GLYCOPYRROLATE 0.2 MG/ML
INJECTION, SOLUTION INTRAMUSCULAR; INTRAVENOUS
Status: DISPENSED
Start: 2022-10-06